# Patient Record
Sex: FEMALE | Race: OTHER | NOT HISPANIC OR LATINO
[De-identification: names, ages, dates, MRNs, and addresses within clinical notes are randomized per-mention and may not be internally consistent; named-entity substitution may affect disease eponyms.]

---

## 2016-03-08 RX ORDER — FLUTICASONE PROPIONATE AND SALMETEROL 50; 250 UG/1; UG/1
1 POWDER ORAL; RESPIRATORY (INHALATION)
Qty: 0 | Refills: 0 | COMMUNITY
Start: 2016-03-08

## 2016-03-08 RX ORDER — METOPROLOL TARTRATE 50 MG
1 TABLET ORAL
Qty: 0 | Refills: 0 | COMMUNITY
Start: 2016-03-08

## 2017-01-05 ENCOUNTER — APPOINTMENT (OUTPATIENT)
Dept: HOME HEALTH SERVICES | Facility: HOME HEALTH | Age: 82
End: 2017-01-05

## 2017-01-05 VITALS
DIASTOLIC BLOOD PRESSURE: 48 MMHG | SYSTOLIC BLOOD PRESSURE: 120 MMHG | OXYGEN SATURATION: 97 % | RESPIRATION RATE: 14 BRPM | HEART RATE: 64 BPM | TEMPERATURE: 99 F

## 2017-01-05 DIAGNOSIS — E55.9 VITAMIN D DEFICIENCY, UNSPECIFIED: ICD-10-CM

## 2017-01-30 ENCOUNTER — MEDICATION RENEWAL (OUTPATIENT)
Age: 82
End: 2017-01-30

## 2017-02-01 ENCOUNTER — LABORATORY RESULT (OUTPATIENT)
Age: 82
End: 2017-02-01

## 2017-02-22 ENCOUNTER — LABORATORY RESULT (OUTPATIENT)
Age: 82
End: 2017-02-22

## 2017-02-24 LAB
BASOPHILS # BLD AUTO: 0.05 K/UL
BASOPHILS NFR BLD AUTO: 1 %
EOSINOPHIL # BLD AUTO: 0.2 K/UL
EOSINOPHIL NFR BLD AUTO: 3.9 %
HCT VFR BLD CALC: 32 %
HGB BLD-MCNC: 10 G/DL
IMM GRANULOCYTES NFR BLD AUTO: 1.4 %
LYMPHOCYTES # BLD AUTO: 1.66 K/UL
LYMPHOCYTES NFR BLD AUTO: 32.4 %
MAN DIFF?: NORMAL
MCHC RBC-ENTMCNC: 28.8 PG
MCHC RBC-ENTMCNC: 31.3 GM/DL
MCV RBC AUTO: 92.2 FL
MONOCYTES # BLD AUTO: 0.49 K/UL
MONOCYTES NFR BLD AUTO: 9.6 %
NEUTROPHILS # BLD AUTO: 2.65 K/UL
NEUTROPHILS NFR BLD AUTO: 51.7 %
PLATELET # BLD AUTO: NORMAL
RBC # BLD: 3.47 M/UL
RBC # FLD: 13.4 %
WBC # FLD AUTO: 5.12 K/UL

## 2017-03-06 ENCOUNTER — MEDICATION RENEWAL (OUTPATIENT)
Age: 82
End: 2017-03-06

## 2017-03-08 ENCOUNTER — APPOINTMENT (OUTPATIENT)
Dept: HOME HEALTH SERVICES | Facility: HOME HEALTH | Age: 82
End: 2017-03-08

## 2017-03-08 VITALS
DIASTOLIC BLOOD PRESSURE: 60 MMHG | HEART RATE: 69 BPM | RESPIRATION RATE: 14 BRPM | TEMPERATURE: 98.2 F | OXYGEN SATURATION: 96 % | SYSTOLIC BLOOD PRESSURE: 140 MMHG

## 2017-03-08 DIAGNOSIS — Z23 ENCOUNTER FOR IMMUNIZATION: ICD-10-CM

## 2017-03-08 DIAGNOSIS — K31.84 GASTROPARESIS: ICD-10-CM

## 2017-03-08 DIAGNOSIS — B49 UNSPECIFIED MYCOSIS: ICD-10-CM

## 2017-03-08 DIAGNOSIS — R19.5 OTHER FECAL ABNORMALITIES: ICD-10-CM

## 2017-03-15 ENCOUNTER — MEDICATION RENEWAL (OUTPATIENT)
Age: 82
End: 2017-03-15

## 2017-03-20 ENCOUNTER — MEDICATION RENEWAL (OUTPATIENT)
Age: 82
End: 2017-03-20

## 2017-03-20 ENCOUNTER — LABORATORY RESULT (OUTPATIENT)
Age: 82
End: 2017-03-20

## 2017-03-21 ENCOUNTER — LABORATORY RESULT (OUTPATIENT)
Age: 82
End: 2017-03-21

## 2017-03-26 ENCOUNTER — CLINICAL ADVICE (OUTPATIENT)
Age: 82
End: 2017-03-26

## 2017-03-30 LAB
BASOPHILS # BLD AUTO: 0.01 K/UL
BASOPHILS NFR BLD AUTO: 0.2 %
EOSINOPHIL # BLD AUTO: 0.15 K/UL
EOSINOPHIL NFR BLD AUTO: 2.7 %
HCT VFR BLD CALC: 33.5 %
HGB BLD-MCNC: 10.8 G/DL
IMM GRANULOCYTES NFR BLD AUTO: 0.4 %
LYMPHOCYTES # BLD AUTO: 1.36 K/UL
LYMPHOCYTES NFR BLD AUTO: 24.6 %
MAN DIFF?: NORMAL
MCHC RBC-ENTMCNC: 29 PG
MCHC RBC-ENTMCNC: 32.2 GM/DL
MCV RBC AUTO: 90.1 FL
MONOCYTES # BLD AUTO: 0.48 K/UL
MONOCYTES NFR BLD AUTO: 8.7 %
NEUTROPHILS # BLD AUTO: 3.5 K/UL
NEUTROPHILS NFR BLD AUTO: 63.4 %
PLATELET # BLD AUTO: NORMAL
PLATELET # PLAS AUTO: 52 K/UL
RBC # BLD: 3.72 M/UL
RBC # FLD: 13.2 %
WBC # FLD AUTO: 5.52 K/UL

## 2017-03-31 ENCOUNTER — CLINICAL ADVICE (OUTPATIENT)
Age: 82
End: 2017-03-31

## 2017-05-03 ENCOUNTER — APPOINTMENT (OUTPATIENT)
Dept: HOME HEALTH SERVICES | Facility: HOME HEALTH | Age: 82
End: 2017-05-03

## 2017-05-03 VITALS
OXYGEN SATURATION: 96 % | TEMPERATURE: 98 F | DIASTOLIC BLOOD PRESSURE: 62 MMHG | SYSTOLIC BLOOD PRESSURE: 142 MMHG | HEART RATE: 67 BPM | RESPIRATION RATE: 16 BRPM

## 2017-05-03 DIAGNOSIS — K21.9 GASTRO-ESOPHAGEAL REFLUX DISEASE W/OUT ESOPHAGITIS: ICD-10-CM

## 2017-05-03 DIAGNOSIS — D69.6 THROMBOCYTOPENIA, UNSPECIFIED: ICD-10-CM

## 2017-05-03 DIAGNOSIS — E78.5 HYPERLIPIDEMIA, UNSPECIFIED: ICD-10-CM

## 2017-05-03 RX ORDER — AZITHROMYCIN 500 MG/1
500 TABLET, FILM COATED ORAL DAILY
Qty: 5 | Refills: 0 | Status: DISCONTINUED | COMMUNITY
Start: 2017-03-26 | End: 2017-05-03

## 2017-05-03 RX ORDER — AZITHROMYCIN 500 MG/1
500 TABLET, FILM COATED ORAL DAILY
Qty: 5 | Refills: 0 | Status: DISCONTINUED | COMMUNITY
Start: 2017-03-31 | End: 2017-05-03

## 2017-05-05 ENCOUNTER — MEDICATION RENEWAL (OUTPATIENT)
Age: 82
End: 2017-05-05

## 2017-05-11 DIAGNOSIS — L03.119 CELLULITIS OF UNSPECIFIED PART OF LIMB: ICD-10-CM

## 2017-05-16 ENCOUNTER — APPOINTMENT (OUTPATIENT)
Dept: HOME HEALTH SERVICES | Facility: HOME HEALTH | Age: 82
End: 2017-05-16

## 2017-05-16 VITALS
OXYGEN SATURATION: 94 % | HEART RATE: 68 BPM | SYSTOLIC BLOOD PRESSURE: 120 MMHG | RESPIRATION RATE: 18 BRPM | DIASTOLIC BLOOD PRESSURE: 60 MMHG | TEMPERATURE: 98.3 F

## 2017-05-24 ENCOUNTER — MEDICATION RENEWAL (OUTPATIENT)
Age: 82
End: 2017-05-24

## 2017-06-05 ENCOUNTER — MEDICATION RENEWAL (OUTPATIENT)
Age: 82
End: 2017-06-05

## 2017-06-26 ENCOUNTER — RX RENEWAL (OUTPATIENT)
Age: 82
End: 2017-06-26

## 2017-06-26 ENCOUNTER — CHART COPY (OUTPATIENT)
Age: 82
End: 2017-06-26

## 2017-06-27 ENCOUNTER — LABORATORY RESULT (OUTPATIENT)
Age: 82
End: 2017-06-27

## 2017-07-11 ENCOUNTER — APPOINTMENT (OUTPATIENT)
Dept: HOME HEALTH SERVICES | Facility: HOME HEALTH | Age: 82
End: 2017-07-11

## 2017-07-11 VITALS
HEART RATE: 64 BPM | DIASTOLIC BLOOD PRESSURE: 78 MMHG | RESPIRATION RATE: 16 BRPM | OXYGEN SATURATION: 95 % | WEIGHT: 210 LBS | SYSTOLIC BLOOD PRESSURE: 124 MMHG | TEMPERATURE: 98 F | BODY MASS INDEX: 34.99 KG/M2 | HEIGHT: 65 IN

## 2017-07-11 DIAGNOSIS — R30.0 DYSURIA: ICD-10-CM

## 2017-07-11 DIAGNOSIS — Z74.01 BED CONFINEMENT STATUS: ICD-10-CM

## 2017-07-11 DIAGNOSIS — I10 ESSENTIAL (PRIMARY) HYPERTENSION: ICD-10-CM

## 2017-07-11 DIAGNOSIS — K59.00 CONSTIPATION, UNSPECIFIED: ICD-10-CM

## 2017-07-11 RX ORDER — AZITHROMYCIN 250 MG/1
250 TABLET, FILM COATED ORAL
Qty: 6 | Refills: 0 | Status: COMPLETED | COMMUNITY
Start: 2017-03-26

## 2017-07-12 PROBLEM — Z74.01 CONFINED TO BED: Status: ACTIVE | Noted: 2017-01-19

## 2017-07-12 PROBLEM — K59.00 CONSTIPATION: Status: ACTIVE | Noted: 2017-07-11

## 2017-07-12 PROBLEM — R30.0 DYSURIA: Status: ACTIVE | Noted: 2017-07-11

## 2017-07-12 RX ORDER — CEPHALEXIN 500 MG/1
500 CAPSULE ORAL
Qty: 14 | Refills: 0 | Status: DISCONTINUED | COMMUNITY
Start: 2017-05-11 | End: 2017-07-12

## 2017-07-14 ENCOUNTER — LABORATORY RESULT (OUTPATIENT)
Age: 82
End: 2017-07-14

## 2017-07-16 LAB
APPEARANCE: ABNORMAL
BILIRUBIN URINE: NEGATIVE
BLOOD URINE: NEGATIVE
COLOR: YELLOW
GLUCOSE QUALITATIVE U: NORMAL MG/DL
KETONES URINE: NEGATIVE
LEUKOCYTE ESTERASE URINE: NEGATIVE
NITRITE URINE: NEGATIVE
PH URINE: 8
PROTEIN URINE: 30 MG/DL
SPECIFIC GRAVITY URINE: 1.01
UROBILINOGEN URINE: NORMAL MG/DL

## 2017-07-31 ENCOUNTER — MEDICATION RENEWAL (OUTPATIENT)
Age: 82
End: 2017-07-31

## 2017-08-22 ENCOUNTER — MEDICATION RENEWAL (OUTPATIENT)
Age: 82
End: 2017-08-22

## 2017-08-23 ENCOUNTER — RX RENEWAL (OUTPATIENT)
Age: 82
End: 2017-08-23

## 2017-08-29 ENCOUNTER — MEDICATION RENEWAL (OUTPATIENT)
Age: 82
End: 2017-08-29

## 2017-09-01 ENCOUNTER — LABORATORY RESULT (OUTPATIENT)
Age: 82
End: 2017-09-01

## 2017-09-02 LAB
25(OH)D3 SERPL-MCNC: 28.2 NG/ML
ALBUMIN SERPL ELPH-MCNC: 3.1 G/DL
ALP BLD-CCNC: 156 U/L
ALT SERPL-CCNC: 18 U/L
ANION GAP SERPL CALC-SCNC: 12 MMOL/L
AST SERPL-CCNC: 35 U/L
BASOPHILS # BLD AUTO: 0.01 K/UL
BASOPHILS NFR BLD AUTO: 0.2 %
BILIRUB SERPL-MCNC: 0.4 MG/DL
BUN SERPL-MCNC: 39 MG/DL
CALCIUM SERPL-MCNC: 9.3 MG/DL
CHLORIDE SERPL-SCNC: 104 MMOL/L
CO2 SERPL-SCNC: 25 MMOL/L
CREAT SERPL-MCNC: 1.6 MG/DL
EOSINOPHIL # BLD AUTO: 0.21 K/UL
EOSINOPHIL NFR BLD AUTO: 5.1 %
GLUCOSE SERPL-MCNC: 90 MG/DL
HBA1C MFR BLD HPLC: 5.4 %
HCT VFR BLD CALC: 28.8 %
HGB BLD-MCNC: 8.8 G/DL
IMM GRANULOCYTES NFR BLD AUTO: 0 %
LYMPHOCYTES # BLD AUTO: 1.47 K/UL
LYMPHOCYTES NFR BLD AUTO: 36 %
MAN DIFF?: NORMAL
MCHC RBC-ENTMCNC: 27.1 PG
MCHC RBC-ENTMCNC: 30.6 GM/DL
MCV RBC AUTO: 88.6 FL
MONOCYTES # BLD AUTO: 0.35 K/UL
MONOCYTES NFR BLD AUTO: 8.6 %
NEUTROPHILS # BLD AUTO: 2.04 K/UL
NEUTROPHILS NFR BLD AUTO: 50.1 %
NT-PROBNP SERPL-MCNC: 244 PG/ML
PLATELET # BLD AUTO: NORMAL
POTASSIUM SERPL-SCNC: 4.5 MMOL/L
PROT SERPL-MCNC: 7.6 G/DL
RBC # BLD: 3.25 M/UL
RBC # FLD: 14.1 %
SODIUM SERPL-SCNC: 141 MMOL/L
TSH SERPL-ACNC: 1.45 UIU/ML
WBC # FLD AUTO: 4.08 K/UL

## 2017-09-05 ENCOUNTER — MEDICATION RENEWAL (OUTPATIENT)
Age: 82
End: 2017-09-05

## 2017-09-07 ENCOUNTER — CLINICAL ADVICE (OUTPATIENT)
Age: 82
End: 2017-09-07

## 2017-09-07 ENCOUNTER — RESULT CHARGE (OUTPATIENT)
Age: 82
End: 2017-09-07

## 2017-09-11 ENCOUNTER — MEDICATION RENEWAL (OUTPATIENT)
Age: 82
End: 2017-09-11

## 2017-09-20 ENCOUNTER — APPOINTMENT (OUTPATIENT)
Dept: HOME HEALTH SERVICES | Facility: HOME HEALTH | Age: 82
End: 2017-09-20
Payer: MEDICAID

## 2017-09-20 VITALS
HEART RATE: 67 BPM | DIASTOLIC BLOOD PRESSURE: 68 MMHG | BODY MASS INDEX: 34.99 KG/M2 | RESPIRATION RATE: 16 BRPM | OXYGEN SATURATION: 95 % | WEIGHT: 210 LBS | HEIGHT: 65 IN | SYSTOLIC BLOOD PRESSURE: 138 MMHG | TEMPERATURE: 98.8 F

## 2017-09-20 PROCEDURE — 99350 HOME/RES VST EST HIGH MDM 60: CPT

## 2017-09-22 ENCOUNTER — MEDICATION RENEWAL (OUTPATIENT)
Age: 82
End: 2017-09-22

## 2017-09-22 VITALS — OXYGEN SATURATION: 88 %

## 2017-09-27 ENCOUNTER — LABORATORY RESULT (OUTPATIENT)
Age: 82
End: 2017-09-27

## 2017-09-27 ENCOUNTER — MOBILE ON CALL (OUTPATIENT)
Age: 82
End: 2017-09-27

## 2017-09-27 ENCOUNTER — CLINICAL ADVICE (OUTPATIENT)
Age: 82
End: 2017-09-27

## 2017-09-27 DIAGNOSIS — R11.2 NAUSEA WITH VOMITING, UNSPECIFIED: ICD-10-CM

## 2017-09-27 LAB
ALBUMIN SERPL ELPH-MCNC: 3.1 G/DL
ALP BLD-CCNC: 150 U/L
ALT SERPL-CCNC: 19 U/L
ANION GAP SERPL CALC-SCNC: 11 MMOL/L
AST SERPL-CCNC: 36 U/L
BILIRUB SERPL-MCNC: 0.4 MG/DL
BUN SERPL-MCNC: 28 MG/DL
CALCIUM SERPL-MCNC: 9.1 MG/DL
CHLORIDE SERPL-SCNC: 104 MMOL/L
CO2 SERPL-SCNC: 29 MMOL/L
CREAT SERPL-MCNC: 1.19 MG/DL
GLUCOSE SERPL-MCNC: 157 MG/DL
POTASSIUM SERPL-SCNC: 4.5 MMOL/L
PROT SERPL-MCNC: 7.2 G/DL
SODIUM SERPL-SCNC: 144 MMOL/L

## 2017-09-27 RX ORDER — ONDANSETRON 4 MG/1
4 TABLET, ORALLY DISINTEGRATING ORAL EVERY 6 HOURS
Qty: 1 | Refills: 0 | Status: COMPLETED | COMMUNITY
Start: 2017-09-27 | End: 2017-10-04

## 2017-09-28 LAB
BASOPHILS # BLD AUTO: 0.01 K/UL
BASOPHILS NFR BLD AUTO: 0.2 %
EOSINOPHIL # BLD AUTO: 0.02 K/UL
EOSINOPHIL NFR BLD AUTO: 0.4 %
HCT VFR BLD CALC: 30.1 %
HGB BLD-MCNC: 9.2 G/DL
IMM GRANULOCYTES NFR BLD AUTO: 0.4 %
LYMPHOCYTES # BLD AUTO: 0.77 K/UL
LYMPHOCYTES NFR BLD AUTO: 13.6 %
MAN DIFF?: NORMAL
MCHC RBC-ENTMCNC: 27.2 PG
MCHC RBC-ENTMCNC: 30.6 GM/DL
MCV RBC AUTO: 89.1 FL
MONOCYTES # BLD AUTO: 0.37 K/UL
MONOCYTES NFR BLD AUTO: 6.5 %
NEUTROPHILS # BLD AUTO: 4.48 K/UL
NEUTROPHILS NFR BLD AUTO: 78.9 %
PLATELET # BLD AUTO: NORMAL
RBC # BLD: 3.38 M/UL
RBC # FLD: 14 %
WBC # FLD AUTO: 5.67 K/UL

## 2017-09-30 ENCOUNTER — INPATIENT (INPATIENT)
Facility: HOSPITAL | Age: 82
LOS: 10 days | Discharge: HOME HEALTH | DRG: 871 | End: 2017-10-11
Attending: INTERNAL MEDICINE | Admitting: STUDENT IN AN ORGANIZED HEALTH CARE EDUCATION/TRAINING PROGRAM
Payer: MEDICAID

## 2017-09-30 VITALS
RESPIRATION RATE: 24 BRPM | HEART RATE: 100 BPM | HEIGHT: 63 IN | OXYGEN SATURATION: 94 % | SYSTOLIC BLOOD PRESSURE: 138 MMHG | DIASTOLIC BLOOD PRESSURE: 68 MMHG | WEIGHT: 149.91 LBS

## 2017-09-30 DIAGNOSIS — N17.9 ACUTE KIDNEY FAILURE, UNSPECIFIED: ICD-10-CM

## 2017-09-30 DIAGNOSIS — J18.9 PNEUMONIA, UNSPECIFIED ORGANISM: ICD-10-CM

## 2017-09-30 DIAGNOSIS — R57.9 SHOCK, UNSPECIFIED: ICD-10-CM

## 2017-09-30 DIAGNOSIS — K72.01 ACUTE AND SUBACUTE HEPATIC FAILURE WITH COMA: ICD-10-CM

## 2017-09-30 DIAGNOSIS — E87.5 HYPERKALEMIA: ICD-10-CM

## 2017-09-30 DIAGNOSIS — A41.9 SEPSIS, UNSPECIFIED ORGANISM: ICD-10-CM

## 2017-09-30 DIAGNOSIS — Z29.9 ENCOUNTER FOR PROPHYLACTIC MEASURES, UNSPECIFIED: ICD-10-CM

## 2017-09-30 DIAGNOSIS — I21.4 NON-ST ELEVATION (NSTEMI) MYOCARDIAL INFARCTION: ICD-10-CM

## 2017-09-30 DIAGNOSIS — E11.8 TYPE 2 DIABETES MELLITUS WITH UNSPECIFIED COMPLICATIONS: ICD-10-CM

## 2017-09-30 LAB
ALBUMIN SERPL ELPH-MCNC: 2.3 G/DL — LOW (ref 3.3–5)
ALP SERPL-CCNC: 148 U/L — HIGH (ref 40–120)
ALT FLD-CCNC: 1547 U/L — HIGH (ref 12–78)
ANION GAP SERPL CALC-SCNC: 11 MMOL/L — SIGNIFICANT CHANGE UP (ref 5–17)
APPEARANCE UR: ABNORMAL
APTT BLD: 34.3 SEC — SIGNIFICANT CHANGE UP (ref 27.5–37.4)
AST SERPL-CCNC: 3440 U/L — HIGH (ref 15–37)
BASE EXCESS BLDA CALC-SCNC: -4.3 MMOL/L — LOW (ref -2–2)
BASOPHILS # BLD AUTO: 0.1 K/UL — SIGNIFICANT CHANGE UP (ref 0–0.2)
BASOPHILS NFR BLD AUTO: 0.5 % — SIGNIFICANT CHANGE UP (ref 0–2)
BILIRUB SERPL-MCNC: 0.9 MG/DL — SIGNIFICANT CHANGE UP (ref 0.2–1.2)
BILIRUB UR-MCNC: NEGATIVE — SIGNIFICANT CHANGE UP
BLOOD GAS COMMENTS ARTERIAL: SIGNIFICANT CHANGE UP
BUN SERPL-MCNC: 48 MG/DL — HIGH (ref 7–23)
CALCIUM SERPL-MCNC: 8.4 MG/DL — LOW (ref 8.5–10.1)
CHLORIDE SERPL-SCNC: 103 MMOL/L — SIGNIFICANT CHANGE UP (ref 96–108)
CK MB BLD-MCNC: 3.8 % — HIGH (ref 0–3.5)
CK MB CFR SERPL CALC: 15.3 NG/ML — HIGH (ref 0–3.6)
CK SERPL-CCNC: 402 U/L — HIGH (ref 26–192)
CO2 SERPL-SCNC: 26 MMOL/L — SIGNIFICANT CHANGE UP (ref 22–31)
COLOR SPEC: YELLOW — SIGNIFICANT CHANGE UP
CREAT SERPL-MCNC: 2.7 MG/DL — HIGH (ref 0.5–1.3)
DIFF PNL FLD: NEGATIVE — SIGNIFICANT CHANGE UP
EOSINOPHIL # BLD AUTO: 0 K/UL — SIGNIFICANT CHANGE UP (ref 0–0.5)
EOSINOPHIL NFR BLD AUTO: 0 % — SIGNIFICANT CHANGE UP (ref 0–6)
GLUCOSE SERPL-MCNC: 111 MG/DL — HIGH (ref 70–99)
GLUCOSE UR QL: NEGATIVE — SIGNIFICANT CHANGE UP
HCO3 BLDA-SCNC: 20 MMOL/L — LOW (ref 23–27)
HCT VFR BLD CALC: 28.1 % — LOW (ref 34.5–45)
HCT VFR BLD CALC: 30.6 % — LOW (ref 34.5–45)
HGB BLD-MCNC: 9.1 G/DL — LOW (ref 11.5–15.5)
HGB BLD-MCNC: 9.5 G/DL — LOW (ref 11.5–15.5)
HOROWITZ INDEX BLDA+IHG-RTO: 40 — SIGNIFICANT CHANGE UP
INR BLD: 1.53 RATIO — HIGH (ref 0.88–1.16)
KETONES UR-MCNC: NEGATIVE — SIGNIFICANT CHANGE UP
LACTATE SERPL-SCNC: 1.7 MMOL/L — SIGNIFICANT CHANGE UP (ref 0.7–2)
LACTATE SERPL-SCNC: 2.7 MMOL/L — HIGH (ref 0.7–2)
LACTATE SERPL-SCNC: 3.4 MMOL/L — HIGH (ref 0.7–2)
LEUKOCYTE ESTERASE UR-ACNC: ABNORMAL
LYMPHOCYTES # BLD AUTO: 1 K/UL — SIGNIFICANT CHANGE UP (ref 1–3.3)
LYMPHOCYTES # BLD AUTO: 6.1 % — LOW (ref 13–44)
MCHC RBC-ENTMCNC: 28.2 PG — SIGNIFICANT CHANGE UP (ref 27–34)
MCHC RBC-ENTMCNC: 29 PG — SIGNIFICANT CHANGE UP (ref 27–34)
MCHC RBC-ENTMCNC: 31.1 GM/DL — LOW (ref 32–36)
MCHC RBC-ENTMCNC: 32.3 GM/DL — SIGNIFICANT CHANGE UP (ref 32–36)
MCV RBC AUTO: 89.6 FL — SIGNIFICANT CHANGE UP (ref 80–100)
MCV RBC AUTO: 90.8 FL — SIGNIFICANT CHANGE UP (ref 80–100)
MONOCYTES # BLD AUTO: 1.3 K/UL — HIGH (ref 0–0.9)
MONOCYTES NFR BLD AUTO: 8 % — SIGNIFICANT CHANGE UP (ref 1–9)
NEUTROPHILS # BLD AUTO: 13.7 K/UL — HIGH (ref 1.8–7.4)
NEUTROPHILS NFR BLD AUTO: 85.4 % — HIGH (ref 43–77)
NITRITE UR-MCNC: NEGATIVE — SIGNIFICANT CHANGE UP
PCO2 BLDA: 65 MMHG — HIGH (ref 32–46)
PH BLDA: 7.17 — CRITICAL LOW (ref 7.35–7.45)
PH UR: 5 — SIGNIFICANT CHANGE UP (ref 5–8)
PLATELET # BLD AUTO: 112 K/UL — LOW (ref 150–400)
PLATELET # BLD AUTO: 156 K/UL — SIGNIFICANT CHANGE UP (ref 150–400)
PO2 BLDA: 73 MMHG — LOW (ref 74–108)
POTASSIUM SERPL-MCNC: 5.4 MMOL/L — HIGH (ref 3.5–5.3)
POTASSIUM SERPL-SCNC: 5.4 MMOL/L — HIGH (ref 3.5–5.3)
PROT SERPL-MCNC: 7.5 G/DL — SIGNIFICANT CHANGE UP (ref 6–8.3)
PROT UR-MCNC: 150 MG/DL
PROTHROM AB SERPL-ACNC: 16.8 SEC — HIGH (ref 9.8–12.7)
RBC # BLD: 3.13 M/UL — LOW (ref 3.8–5.2)
RBC # BLD: 3.37 M/UL — LOW (ref 3.8–5.2)
RBC # FLD: 13.4 % — SIGNIFICANT CHANGE UP (ref 10.3–14.5)
RBC # FLD: 14 % — SIGNIFICANT CHANGE UP (ref 10.3–14.5)
SAO2 % BLDA: 90 % — LOW (ref 92–96)
SODIUM SERPL-SCNC: 140 MMOL/L — SIGNIFICANT CHANGE UP (ref 135–145)
SP GR SPEC: 1.02 — SIGNIFICANT CHANGE UP (ref 1.01–1.02)
TROPONIN I SERPL-MCNC: 14.8 NG/ML — HIGH (ref 0.01–0.04)
TROPONIN I SERPL-MCNC: 35.1 NG/ML — HIGH (ref 0.01–0.04)
UROBILINOGEN FLD QL: NEGATIVE — SIGNIFICANT CHANGE UP
WBC # BLD: 15.8 K/UL — HIGH (ref 3.8–10.5)
WBC # BLD: 16 K/UL — HIGH (ref 3.8–10.5)
WBC # FLD AUTO: 15.8 K/UL — HIGH (ref 3.8–10.5)
WBC # FLD AUTO: 16 K/UL — HIGH (ref 3.8–10.5)

## 2017-09-30 PROCEDURE — 99223 1ST HOSP IP/OBS HIGH 75: CPT | Mod: AI

## 2017-09-30 PROCEDURE — 93010 ELECTROCARDIOGRAM REPORT: CPT

## 2017-09-30 PROCEDURE — 99291 CRITICAL CARE FIRST HOUR: CPT

## 2017-09-30 PROCEDURE — 71010: CPT | Mod: 26

## 2017-09-30 PROCEDURE — 99285 EMERGENCY DEPT VISIT HI MDM: CPT

## 2017-09-30 PROCEDURE — 99223 1ST HOSP IP/OBS HIGH 75: CPT

## 2017-09-30 RX ORDER — ACETAMINOPHEN 500 MG
650 TABLET ORAL ONCE
Qty: 0 | Refills: 0 | Status: COMPLETED | OUTPATIENT
Start: 2017-09-30 | End: 2017-09-30

## 2017-09-30 RX ORDER — SODIUM CHLORIDE 9 MG/ML
1000 INJECTION INTRAMUSCULAR; INTRAVENOUS; SUBCUTANEOUS ONCE
Qty: 0 | Refills: 0 | Status: COMPLETED | OUTPATIENT
Start: 2017-09-30 | End: 2017-09-30

## 2017-09-30 RX ORDER — DEXTROSE 50 % IN WATER 50 %
12.5 SYRINGE (ML) INTRAVENOUS ONCE
Qty: 0 | Refills: 0 | Status: DISCONTINUED | OUTPATIENT
Start: 2017-09-30 | End: 2017-10-03

## 2017-09-30 RX ORDER — PANTOPRAZOLE SODIUM 20 MG/1
40 TABLET, DELAYED RELEASE ORAL DAILY
Qty: 0 | Refills: 0 | Status: DISCONTINUED | OUTPATIENT
Start: 2017-09-30 | End: 2017-09-30

## 2017-09-30 RX ORDER — VANCOMYCIN HCL 1 G
1000 VIAL (EA) INTRAVENOUS ONCE
Qty: 0 | Refills: 0 | Status: COMPLETED | OUTPATIENT
Start: 2017-09-30 | End: 2017-09-30

## 2017-09-30 RX ORDER — INSULIN LISPRO 100/ML
VIAL (ML) SUBCUTANEOUS
Qty: 0 | Refills: 0 | Status: DISCONTINUED | OUTPATIENT
Start: 2017-09-30 | End: 2017-10-01

## 2017-09-30 RX ORDER — INFLUENZA VIRUS VACCINE 15; 15; 15; 15 UG/.5ML; UG/.5ML; UG/.5ML; UG/.5ML
0.5 SUSPENSION INTRAMUSCULAR ONCE
Qty: 0 | Refills: 0 | Status: DISCONTINUED | OUTPATIENT
Start: 2017-09-30 | End: 2017-10-11

## 2017-09-30 RX ORDER — SODIUM CHLORIDE 9 MG/ML
1000 INJECTION, SOLUTION INTRAVENOUS
Qty: 0 | Refills: 0 | Status: DISCONTINUED | OUTPATIENT
Start: 2017-09-30 | End: 2017-10-03

## 2017-09-30 RX ORDER — IPRATROPIUM/ALBUTEROL SULFATE 18-103MCG
3 AEROSOL WITH ADAPTER (GRAM) INHALATION EVERY 4 HOURS
Qty: 0 | Refills: 0 | Status: DISCONTINUED | OUTPATIENT
Start: 2017-09-30 | End: 2017-10-11

## 2017-09-30 RX ORDER — FUROSEMIDE 40 MG
0.5 TABLET ORAL
Qty: 0 | Refills: 0 | COMMUNITY

## 2017-09-30 RX ORDER — FUROSEMIDE 40 MG
40 TABLET ORAL ONCE
Qty: 0 | Refills: 0 | Status: COMPLETED | OUTPATIENT
Start: 2017-09-30 | End: 2017-09-30

## 2017-09-30 RX ORDER — GLUCAGON INJECTION, SOLUTION 0.5 MG/.1ML
1 INJECTION, SOLUTION SUBCUTANEOUS ONCE
Qty: 0 | Refills: 0 | Status: DISCONTINUED | OUTPATIENT
Start: 2017-09-30 | End: 2017-10-03

## 2017-09-30 RX ORDER — DEXTROSE 50 % IN WATER 50 %
1 SYRINGE (ML) INTRAVENOUS ONCE
Qty: 0 | Refills: 0 | Status: DISCONTINUED | OUTPATIENT
Start: 2017-09-30 | End: 2017-10-03

## 2017-09-30 RX ORDER — PANTOPRAZOLE SODIUM 20 MG/1
40 TABLET, DELAYED RELEASE ORAL DAILY
Qty: 0 | Refills: 0 | Status: DISCONTINUED | OUTPATIENT
Start: 2017-09-30 | End: 2017-10-03

## 2017-09-30 RX ORDER — VANCOMYCIN HCL 1 G
1000 VIAL (EA) INTRAVENOUS EVERY 24 HOURS
Qty: 0 | Refills: 0 | Status: DISCONTINUED | OUTPATIENT
Start: 2017-09-30 | End: 2017-09-30

## 2017-09-30 RX ORDER — ASPIRIN/CALCIUM CARB/MAGNESIUM 324 MG
300 TABLET ORAL DAILY
Qty: 0 | Refills: 0 | Status: DISCONTINUED | OUTPATIENT
Start: 2017-09-30 | End: 2017-10-03

## 2017-09-30 RX ORDER — HEPARIN SODIUM 5000 [USP'U]/ML
5000 INJECTION INTRAVENOUS; SUBCUTANEOUS EVERY 12 HOURS
Qty: 0 | Refills: 0 | Status: DISCONTINUED | OUTPATIENT
Start: 2017-09-30 | End: 2017-10-03

## 2017-09-30 RX ORDER — HYDROMORPHONE HYDROCHLORIDE 2 MG/ML
0.25 INJECTION INTRAMUSCULAR; INTRAVENOUS; SUBCUTANEOUS ONCE
Qty: 0 | Refills: 0 | Status: DISCONTINUED | OUTPATIENT
Start: 2017-09-30 | End: 2017-09-30

## 2017-09-30 RX ORDER — ACETAMINOPHEN 500 MG
650 TABLET ORAL EVERY 4 HOURS
Qty: 0 | Refills: 0 | Status: DISCONTINUED | OUTPATIENT
Start: 2017-09-30 | End: 2017-10-03

## 2017-09-30 RX ORDER — PIPERACILLIN AND TAZOBACTAM 4; .5 G/20ML; G/20ML
3.38 INJECTION, POWDER, LYOPHILIZED, FOR SOLUTION INTRAVENOUS EVERY 12 HOURS
Qty: 0 | Refills: 0 | Status: DISCONTINUED | OUTPATIENT
Start: 2017-09-30 | End: 2017-09-30

## 2017-09-30 RX ORDER — ACETAMINOPHEN 500 MG
650 TABLET ORAL EVERY 4 HOURS
Qty: 0 | Refills: 0 | Status: DISCONTINUED | OUTPATIENT
Start: 2017-09-30 | End: 2017-10-01

## 2017-09-30 RX ORDER — VANCOMYCIN HCL 1 G
1000 VIAL (EA) INTRAVENOUS EVERY 24 HOURS
Qty: 0 | Refills: 0 | Status: DISCONTINUED | OUTPATIENT
Start: 2017-10-01 | End: 2017-10-01

## 2017-09-30 RX ORDER — PIPERACILLIN AND TAZOBACTAM 4; .5 G/20ML; G/20ML
3.38 INJECTION, POWDER, LYOPHILIZED, FOR SOLUTION INTRAVENOUS ONCE
Qty: 0 | Refills: 0 | Status: COMPLETED | OUTPATIENT
Start: 2017-09-30 | End: 2017-09-30

## 2017-09-30 RX ORDER — SODIUM CHLORIDE 9 MG/ML
1000 INJECTION INTRAMUSCULAR; INTRAVENOUS; SUBCUTANEOUS
Qty: 0 | Refills: 0 | Status: DISCONTINUED | OUTPATIENT
Start: 2017-09-30 | End: 2017-10-01

## 2017-09-30 RX ORDER — IPRATROPIUM BROMIDE 0.2 MG/ML
500 SOLUTION, NON-ORAL INHALATION EVERY 4 HOURS
Qty: 0 | Refills: 0 | Status: DISCONTINUED | OUTPATIENT
Start: 2017-09-30 | End: 2017-10-01

## 2017-09-30 RX ORDER — ONDANSETRON 8 MG/1
4 TABLET, FILM COATED ORAL EVERY 6 HOURS
Qty: 0 | Refills: 0 | Status: DISCONTINUED | OUTPATIENT
Start: 2017-09-30 | End: 2017-10-11

## 2017-09-30 RX ORDER — PIPERACILLIN AND TAZOBACTAM 4; .5 G/20ML; G/20ML
3.38 INJECTION, POWDER, LYOPHILIZED, FOR SOLUTION INTRAVENOUS EVERY 12 HOURS
Qty: 0 | Refills: 0 | Status: COMPLETED | OUTPATIENT
Start: 2017-09-30 | End: 2017-10-07

## 2017-09-30 RX ADMIN — PIPERACILLIN AND TAZOBACTAM 200 GRAM(S): 4; .5 INJECTION, POWDER, LYOPHILIZED, FOR SOLUTION INTRAVENOUS at 11:11

## 2017-09-30 RX ADMIN — Medication 250 MILLIGRAM(S): at 11:20

## 2017-09-30 RX ADMIN — SODIUM CHLORIDE 150 MILLILITER(S): 9 INJECTION INTRAMUSCULAR; INTRAVENOUS; SUBCUTANEOUS at 22:55

## 2017-09-30 RX ADMIN — SODIUM CHLORIDE 1000 MILLILITER(S): 9 INJECTION INTRAMUSCULAR; INTRAVENOUS; SUBCUTANEOUS at 11:36

## 2017-09-30 RX ADMIN — Medication 3 MILLILITER(S): at 23:19

## 2017-09-30 RX ADMIN — SODIUM CHLORIDE 1000 MILLILITER(S): 9 INJECTION INTRAMUSCULAR; INTRAVENOUS; SUBCUTANEOUS at 10:59

## 2017-09-30 RX ADMIN — PANTOPRAZOLE SODIUM 40 MILLIGRAM(S): 20 TABLET, DELAYED RELEASE ORAL at 23:03

## 2017-09-30 RX ADMIN — Medication 300 MILLIGRAM(S): at 23:04

## 2017-09-30 RX ADMIN — Medication 500 MICROGRAM(S): at 23:19

## 2017-09-30 RX ADMIN — SODIUM CHLORIDE 1000 MILLILITER(S): 9 INJECTION INTRAMUSCULAR; INTRAVENOUS; SUBCUTANEOUS at 12:27

## 2017-09-30 RX ADMIN — SODIUM CHLORIDE 150 MILLILITER(S): 9 INJECTION INTRAMUSCULAR; INTRAVENOUS; SUBCUTANEOUS at 14:31

## 2017-09-30 RX ADMIN — Medication 650 MILLIGRAM(S): at 11:00

## 2017-09-30 RX ADMIN — PIPERACILLIN AND TAZOBACTAM 25 GRAM(S): 4; .5 INJECTION, POWDER, LYOPHILIZED, FOR SOLUTION INTRAVENOUS at 23:04

## 2017-09-30 NOTE — H&P ADULT - PROBLEM SELECTOR PROBLEM 5
Type 2 diabetes mellitus with complication, without long-term current use of insulin Acute liver failure with hepatic coma

## 2017-09-30 NOTE — H&P ADULT - ATTENDING COMMENTS
The admission plan was discussed in detail with the family members at the bedside. Their questions and concerns were addressed to the best of my ability. They are in agreement with the plan as detailed above. They demonstrated adequate understanding of the counseling which I have provided. The admission plan was discussed in detail with the family members at the bedside. Their questions and concerns were addressed to the best of my ability. They are in agreement with the plan as detailed above. They demonstrated adequate understanding of the counseling which I have provided.    Emotional support was provided at the bedside. Extended discussion totalling 30 minutes spent discussing plan of care, advanced care planning.

## 2017-09-30 NOTE — CONSULT NOTE ADULT - ASSESSMENT
89yo F w/ PMHx DM2, asthma, HTN, iron deficiency anemia, COPD w/ intermittent use of home O2 presents w/ weakness and fatigue. Found to have severe sepsis secondary to PNA. I was called to evaluate because of troponin of 14.  While her abdominal pain for the last few days could have been cardiac in etiology, the entire clinical picture seems to be in the setting of infection and sepsis/hypotension.  Her EKG is AF with no R wave progression; This is unchanged from prior EKG in 2016.  Last TTE in 2016 with normal LV function.  Antibiotics and IVF resuscitation  Repeat TTE to see if their are changes in LV function  We can check one more set of CE, but if there is no improvement in mental status, I would hold off on starting heparin  ASA 81 CA while unable to tolerate to  Discussed with entire family in detail  Will follow closely with you

## 2017-09-30 NOTE — H&P ADULT - HISTORY OF PRESENT ILLNESS
87yo F w/ PMHx DM2, asthma, HTN presents w/ weakness and fatigue.     in the ED, pt found hypotensive w/ SBP in 50's initially, now w/  after 3L NS bolus. Pt also bradycardic w/ P in 50's persistently despite hypotension. Pt was given vanco/zosyn as well as she was found to have large PNA.     Family Hx: denies h/o premature CAD, DM2 in immediate family members 87yo F w/ PMHx DM2, asthma, HTN presents w/ weakness and fatigue. She developed a fever last night and the family admits mild cough which was nonproductive. 3-4 days prior to presentation the pt was complaining of mild abd discomfort but was tolerating po well. Family denies dysphagia. They stated that she had an episode of bilious vomiting 4 days ago but none since. She was tolerating diet yesterday without difficulty. Last night developed rectal temp of 102F but denied diaphoresis, chills, rigors. Upon waking this morning, the pt was obtunded. Responding only to noxious stimuli so EMS was alerted. No other recent travel. No recent hospitalizations. No other recent changes in her overall health.     in the ED, pt found hypotensive w/ SBP in 50's initially, now w/  after 3L NS bolus. Pt also bradycardic w/ P in 50's persistently despite hypotension. Pt was given vanco/zosyn as well as she was found to have L sided PNA.     Family Hx: denies h/o premature CAD, DM2 in immediate family members 89yo F w/ PMHx DM2, asthma, HTN, iron deficiency anemia, COPD w/ intermittent use of home O2 presents w/ weakness and fatigue. pt is nonverbal at this time and unable to provide any history. She developed a fever last night and the family admits mild cough which was nonproductive. 3-4 days prior to presentation the pt was complaining of mild abd discomfort but was tolerating po well. Family denies dysphagia. They stated that she had an episode of bilious vomiting 4 days ago but none since. She was tolerating diet yesterday without difficulty. Last night developed rectal temp of 102F but denied diaphoresis, chills, rigors. Upon waking this morning, the pt was obtunded. Responding only to noxious stimuli so EMS was alerted. No other recent travel. No recent hospitalizations. No other recent changes in her overall health.  Recnetly her DM2 regimen was decreased due to normal glucose levels. Also, her recent creatinine was 1.1 as per family at the bedside. Family members have medical background.     In the ED, pt found hypotensive w/ SBP in 50's initially, now w/  after 3L NS bolus. Pt also bradycardic w/ P in 50's persistently despite hypotension. Pt was given vanco/zosyn as well as she was found to have L sided PNA.     Family Hx: denies h/o premature CAD, DM2 in immediate family members

## 2017-09-30 NOTE — H&P ADULT - ASSESSMENT
89yo F w/ PMHx DM2, asthma, HTN presents w/ weakness and fatigue presents w/ L sided CAP and MODS 87yo F w/ PMHx DM2, asthma, HTN, iron deficiency anemia, COPD w/ intermittent use of home O2 presents w/ MODS, shock liver, SAMIA, NSTEMI

## 2017-09-30 NOTE — H&P ADULT - PROBLEM SELECTOR PLAN 1
admit to telemetry  pt is comfort care measures  I discussed indications for telemetry w/ the pt and family at the bedside. They request telemetry monitoring as the pt has responded historically to IVF resuscitation and antibiotics. I discussed poor prognosis at length.   They request that we are aggressive w/ IVF resuscitation and IV antibiotics but they request no vasopressor support, no ICU admission, no invasive respiratory therapy. They completed DNR/DNI paperwork prior to my bedside eval.  Will continue w/ IV vanco and IV zosyn at renal dose, w/ daily CMP.  NPO  IVF according to sepsis protocol's and will adjust as necessary  trend lactic acid  ICU was consulted  will obtain palliative consult admit to telemetry  pt initially elected for comfort care measures. her BP improved in the ED s/p 3L NS bolus and the family now wishes for aggressive MEDICAL therapy only. I discussed anticoagulation, vasopressors, more aggressive pulmonary measures and the wishes are as follows: they want her tx to include IV antibiotics, IVF (aggressive if necessary), consultant recommendations including GI (Efrain), nephro (Justine), ICU (Chito), cardio (Bolivar), palliative nursing team was consulted over the phone but are unavailable until Monday  the consults have been placed as listed  I discussed indications for telemetry w/ the pt and family at the bedside. They request telemetry monitoring as the pt has responded historically to IVF resuscitation and antibiotics and now seems to have improved from a hemodynamic perspective. I discussed poor prognosis at length and the family was afforded opportunity to express concerns. they demonstrated adequate understanding of the counseling which I have provided and detailed here.  They completed DNR/DNI paperwork prior to my bedside eval  Will continue w/ IV vanco and IV zosyn at renal dose, w/ daily CMP.  NPO  IVF according to sepsis protocol and will adjust as necessary  trend lactic acid  f/u urine/blood cultures

## 2017-09-30 NOTE — ED ADULT TRIAGE NOTE - CHIEF COMPLAINT QUOTE
from home family states found her unresponsive this morning now awake family states she is very weak w hx of dm

## 2017-09-30 NOTE — CONSULT NOTE ADULT - PROBLEM SELECTOR RECOMMENDATION 9
serial lfts  hep serology  in and out  correction of electrolytes and underlying cardiac dysfunction  avoidance of all hepatotoxic drugs   to follow

## 2017-09-30 NOTE — H&P ADULT - PROBLEM SELECTOR PLAN 3
no GI consult for now as the pt is requesting comfort care measures as detailed above  trend LFT's management as above

## 2017-09-30 NOTE — ED PROVIDER NOTE - OBJECTIVE STATEMENT
87 yo female hx of DM 89 yo female hx of DM BIBEMS here with sons c/o generalized weakness, lethargy, unresponsiveness since this morning, states patient was fine yesterday night, here fever 102.8 rectally, tachypneic. HCP son wants DNR/DNI. PMD Dr. Lilliana Ricci

## 2017-09-30 NOTE — H&P ADULT - NSHPSOCIALHISTORY_GEN_ALL_CORE
unable to obtain social hx as pt is nonverbal unable to obtain social hx as pt is nonverbal  family denies etoh/smoking in her personal hx  she has home health aide who assists w/ ADL's/personal hygeine/etc.   nonambulatory @ baseline

## 2017-09-30 NOTE — H&P ADULT - PROBLEM SELECTOR PLAN 5
NPO, fingersticks q6hrs w/ ISS, and hypoglycemia protocol I discussed poor prognosis along w/ MODS but family requests consult from GI  will trend CMP in AM s/p IVF and f/u GI recs

## 2017-09-30 NOTE — H&P ADULT - PROBLEM SELECTOR PLAN 6
no DVT ppx - comfort care  no GI ppx - comfort care pt also requesting nephrology consult  management as above  avoid nephrotoxic agents

## 2017-09-30 NOTE — H&P ADULT - PROBLEM SELECTOR PLAN 4
no nephro consult for now as the pt is requesting comfort care measures  daily BMP no EKG changes, will reassess w/ BMP s/p aggressive IVF resuscitation

## 2017-09-30 NOTE — ED PROVIDER NOTE - CRITICAL CARE PROVIDED
interpretation of diagnostic studies/direct patient care (not related to procedure)/consult w/ pt's family directly relating to pts condition/documentation/conducted a detailed discussion of DNR status/additional history taking/consultation with other physicians

## 2017-09-30 NOTE — PROGRESS NOTE ADULT - SUBJECTIVE AND OBJECTIVE BOX
HPI:  89yo F w/ PMHx DM2, asthma, HTN, iron deficiency anemia, COPD w/ intermittent use of home O2 presents w/ weakness and fatigue. Has history of prior admissions for PNA and known chronic CO2 retention.  At time of admission she was initially made DNR/DNI & admitted to floor with sepsis, septic shock, PNA, shock liver, NSTEMI, SAMIA.  She was conservatively treated with IV hydration and IV ABX.  Several disucssions had with family during the day regarding patient care and possible comfort care which the family was unwilling to entertain at the time.  Called by Hospitalist this evening  to reassess patient as she was no longer obtunded and the family asking for higher level of care to maximize recovery.    Found patient to be alert, on 100% NRB, communicating with family.  She following commands, BALDERAS with productive weak cough.  Patient has many family member who are Physicians in various medical subspecialties.  They participate actively in the the care/care plan construct.  Though she will remain DNR/DNI she is upgraded for maximal pulmonary toilet/support as well as acute renal failure which may require lasix drip therapy and possibly hemodialysis.      PAST MEDICAL & SURGICAL HISTORY:  Asthma  Diabetes mellitus  HTN   No significant past surgical history    ROS- patient obtunded on admission. Reportedly was at baseline only one day PTA    Medications:  piperacillin/tazobactam IVPB. 3.375 Gram(s) IV Intermittent every 12 hours  vancomycin  IVPB 1000 milliGRAM(s) IV Intermittent every 24 hours  ALBUTerol/ipratropium for Nebulization 3 milliLiter(s) Nebulizer every 4 hours  ipratropium    for Nebulization 500 MICROGram(s) Nebulizer every 4 hours  ondansetron Injectable 4 milliGRAM(s) IV Push every 6 hours PRN  aspirin Suppository 300 milliGRAM(s) Rectal daily  heparin  Injectable 5000 Unit(s) SubCutaneous every 12 hours  pantoprazole  Injectable 40 milliGRAM(s) IV Push daily  insulin lispro (HumaLOG) corrective regimen sliding scale   SubCutaneous three times a day before meals  sodium chloride 0.9%. 1000 milliLiter(s) IV Continuous <Continuous>  dextrose 5%. 1000 milliLiter(s) IV Continuous <Continuous>    ICU Vital Signs Last 24 Hrs  T(C): 36.4 (01 Oct 2017 00:18), Max: 39.3 (30 Sep 2017 10:30)  T(F): 97.6 (01 Oct 2017 00:18), Max: 102.8 (30 Sep 2017 10:30)  HR: 66 (01 Oct 2017 00:18) (48 - 100)  BP: 138/58 (01 Oct 2017 00:18) (84/42 - 146/61)  BP(mean): 84 (01 Oct 2017 00:18) (78 - 88)  RR: 24 (01 Oct 2017 00:18) (16 - 32)  SpO2: 100% (01 Oct 2017 00:18) (94% - 100%)    ABG - ( 30 Sep 2017 23:41 )  pH: 7.17  /  pCO2: 65    /  pO2: 73    / HCO3: 20    / Base Excess: -4.3  /  SaO2: 90        I&O's Detail  30 Sep 2017 07:01  -  01 Oct 2017 01:11  --------------------------------------------------------  IN:    IV PiggyBack: 100 mL    sodium chloride 0.9%.: 450 mL  Total IN: 550 mL    OUT:    Indwelling Catheter - Urethral: 30 mL  Total OUT: 30 mL    Total NET: 520 mL    Recieved ~3L in ER and IV running ~150cc/h on floor for ~8-10 hours    LABS:                        9.1    15.8  )-----------( 112      ( 30 Sep 2017 23:45 )             28.1         141  |  108  |  49<H>  ----------------------------<  111<H>  5.0   |  23  |  2.70<H>    Ca    7.0<L>      30 Sep 2017 23:45    TPro  6.9  /  Alb  2.1<L>  /  TBili  0.6  /  DBili  .40<H>  /  AST  5263<H>  /  ALT  2524<H>  /  AlkPhos  129<H>      CARDIAC MARKERS ( 30 Sep 2017 23:45 )  36.100 ng/mL / x     / x     / x     / x      CARDIAC MARKERS ( 30 Sep 2017 17:12 )  35.100 ng/mL / x     / x     / x     / x      CARDIAC MARKERS ( 30 Sep 2017 10:59 )  14.800 ng/mL / x     / 402 U/L / x     / 15.3 ng/mL    CAPILLARY BLOOD GLUCOSE  118 (01 Oct 2017 00:00)  PT/INR - ( 30 Sep 2017 10:57 )   PT: 16.8 sec;   INR: 1.53 ratio    PTT - ( 30 Sep 2017 10:57 )  PTT:34.3 sec  Urinalysis Basic - ( 01 Oct 2017 00:32 )    Color: Yellow / Appearance: Turbid / S.020 / pH: x  Gluc: x / Ketone: Negative  / Bili: Small / Urobili: 4   Blood: x / Protein: 150 mg/dL / Nitrite: Negative   Leuk Esterase: Small / RBC: 6-10 /HPF / WBC 6-10   Sq Epi: x / Non Sq Epi: Moderate / Bacteria: Moderate    Physical Examination:    General: alert, complains of pain, follows commands, confused to date, location, some English words spoken,most in primary [employer] language but family reports she makes sense.    HEENT: Pupils equal, reactive to light, symmetrically reactive face symmetric, speech reportedly clear    PULM: audible rhonchi, rhonchi and coarse BS bilaterally, L>>R, some rales    CVS: irreg, no murmur, IVC on bedside echo appears full with <50% compression/respiration, technically difficult to obtain apical/4 chamber view, + effusion pleural    ABD: Soft, nondistended, nontender, normoactive bowel sounds, no masses    EXT: No edema, nontender    SKIN: Warm and well perfused, no rashes noted.    RADIOLOGY:       EXAM:  CHEST 1 VIEW                            PROCEDURE DATE:  2017          INTERPRETATION:  History: Fever.    AP chest. Prior 3/7/2016.  No change heart mediastinum. There is airspace infiltrate in the left   midlung with loss of definition of the left cardiac border, consistent   with pneumonia. No pleural effusion.    Impression: Left midlung pneumonia. Please follow to resolution.      Impression:  HD #2 [id number] YOF admitted with sepsis, septic shock, LPNA, shock liver, ARF, AMS/Encephalopathy, NSTEMI    Neuro- encephalopathy due to toxic/metabolic state, improved with resuscitation, continue neuro checks  Pulm - Acute hypercapneic respiratory failure - continue BiPap for now, pulmonary toilet, bronchodilators            repeat ABG in am, repeat CXR, goal O2 sats >92%  CV- hemodynamically stable, goal MAP >65, unable to assess EF on bedside echo        NSTEMI troponins seem to have peaked at 35, continue to trend, ASA for now, possibly IV Heparin in am, cardiology following        continue statin, beta blocker once blood pressures stable for 24h  GI - transaminitis secondary to sepsis, septic shock, hypotension, avoid hepatotoxic agents, continue to monitor/follow trend         expected coagulopathy secondary to shock liver state, keep NPO for now, PPI  renal- acute renal failure/SAMIA/sepsis- anuric       mendez placed for I/O monitoring, Lasix administered, if no improvement will give larger dose.  Likely will require nephrology consult.       urine electrolytes, creatinine, osmo ordered for possible hemodialysis  ID- IV Vanco x1 - check trough , zosyn ,follow up all cultures      cooling blanket for fevers, no tylenol or motrin until MSOF state resolves  heme- SCD's SQH for DVT prophlyaxis, thrombocytopenia likely secondary to sepsis state, continue to monitor    multiple physician family members at bedside and all questions answered.  They are actively participating in her care plan and are provided with updates frequently.  case and careplan reviewed with Dr. Valdez/EICU Attending.    CRITICAL CARE TIME SPENT:65minutes

## 2017-09-30 NOTE — PROVIDER CONTACT NOTE (CHANGE IN STATUS NOTIFICATION) - SITUATION
Pt response and talking. Pt needs to be reassessed. Pt VVS. Family at bedside and supportive Pt response and talking. Pt needs to be reassessed. Pt VVS. Family at bedside and supportive. Bladder scan performed with no residual noted

## 2017-09-30 NOTE — ED PROVIDER NOTE - PROGRESS NOTE DETAILS
discussed case with HCP who is Son Oracio, states DNR/DNI left IJ placed by me discussed case with Dr. Dale, may go to the floor, Dr. Cornejo accepting

## 2017-09-30 NOTE — CHART NOTE - NSCHARTNOTEFT_GEN_A_CORE
Was called to see Pt. because family was reporting pt. is becoming unresponsive. Pt examined at bedside. She was not responding to commands and had labored respirations. Discussed with family that patient is DNR/DNI and there was nothing else to be done in this situation. Family still insisted a rapid response be called.   Dr. Dale (ICU) and Dr. Cornejo were present. They had another discussion with the family telling them of the poor prognosis and the fact that everything was already being done. Baldo Dale and Clemente discussed with family at length. No further intervention warranted at this time.

## 2017-09-30 NOTE — CONSULT NOTE ADULT - SUBJECTIVE AND OBJECTIVE BOX
Patient is a 88y old  Female who presents with a chief complaint of shortness of breath and unresponsiveness.     HPI: 89 y/o female brought in by family for shortness of breath and unresponsiveness. She was found to be febrile and in resp distress in the ED, placed on NRM. She opens eyes to voice but does not communicate, history obtained from the family. She was in her usual state of health yesterday and did not complain of anything. She is hypotensive, getting ivf bolus. She received vancomycin, zosyn in the ED.     Allergies: No Known Allergies    PAST MEDICAL & SURGICAL HISTORY:  Asthma  Diabetes mellitus  HTN (hypertension)  No significant past surgical history    HOME MEDICATIONS  aspirin  percocet  atorvastatin  gabapentin  lasix  losartan  metoprolol  nifedipine  oomeprazole    REVIEW OF SYSTEMS: UTO    T(F): 102.8 (17 @ 10:30), Max: 102.8 (17 @ 10:30)  HR: 55 (17 @ 12:36) (48 - 100)  BP: 99/30 (17 @ 12:36) (87/56 - 138/68)  RR: 16 (17 @ 12:36) (16 - 24)  SpO2: 100% (17 @ 12:36) (94% - 100%)    PHYSICAL EXAM  General: elderly female in moderate resp distress  CNS: somnolent, opens eyes to voice, does not follow commands, withdraws to pain on all extremities  HEENT: pupils reactive to light  Resp: diminished air entry with coarse rhonchi on left, minimal crackles on right  CVS: S1S2, bradycardic, irregular  Abd: soft, NT, +BS  Ext: trace edema  Skin: not mottled                        9.5    16.0  )-----------( 156      ( 30 Sep 2017 10:57 )             30.6     -    140  |  103  |  48<H>  ----------------------------<  111<H>  5.4<H>   |  26  |  2.70<H>    Ca    8.4<L>      30 Sep 2017 10:59    TPro  7.5  /  Alb  2.3<L>  /  TBili  0.9  /  DBili  x   /  AST  3440<H>  /  ALT  1547<H>  /  AlkPhos  148<H>      Lactate 3.4            @ 10:55    CARDIAC MARKERS ( 30 Sep 2017 10:59 )  14.800 ng/mL / x     / 402 U/L / x     / 15.3 ng/mL    PT/INR - ( 30 Sep 2017 10:57 )   PT: 16.8 sec;   INR: 1.53 ratio       PTT - ( 30 Sep 2017 10:57 )  PTT:34.3 sec  Urinalysis Basic - ( 30 Sep 2017 11:38 )    Color: Yellow / Appearance: x / S.020 / pH: x  Gluc: x / Ketone: Negative  / Bili: Negative / Urobili: Negative   Blood: x / Protein: 150 mg/dL / Nitrite: Negative   Leuk Esterase: Moderate / RBC: 6-10 /HPF / WBC 11-25   Sq Epi: x / Non Sq Epi: Moderate / Bacteria: Moderate    Xray Chest 1 View AP/PA (17 @ 11:03): Left midlung pneumonia.     CODE STATUS: DNR, DNI  GOC discussion: SRAVANTHI Patient is a 88y old  Female who presents with a chief complaint of shortness of breath and unresponsiveness.     HPI: 87 y/o female brought in by family for shortness of breath and unresponsiveness. She was found to be febrile and in resp distress in the ED, placed on NRM. She opens eyes to voice but does not communicate, history obtained from the family. She was in her usual state of health yesterday and did not complain of anything. She is hypotensive, getting ivf bolus. She received vancomycin, zosyn in the ED.     Allergies: No Known Allergies    PAST MEDICAL & SURGICAL HISTORY:  Asthma  Diabetes mellitus  HTN (hypertension)  AVINASH  No significant past surgical history    HOME MEDICATIONS  aspirin  percocet  atorvastatin  gabapentin  lasix  losartan  metoprolol  nifedipine  oomeprazole    REVIEW OF SYSTEMS: UTO    T(F): 102.8 (17 @ 10:30), Max: 102.8 (17 @ 10:30)  HR: 55 (17 @ 12:36) (48 - 100)  BP: 99/30 (17 @ 12:36) (87/56 - 138/68)  RR: 16 (17 @ 12:36) (16 - 24)  SpO2: 100% (17 @ 12:36) (94% - 100%)    PHYSICAL EXAM  General: elderly female in moderate resp distress  CNS: somnolent, opens eyes to voice, does not follow commands, withdraws to pain on all extremities  HEENT: pupils reactive to light  Resp: diminished air entry with coarse rhonchi on left, minimal crackles on right  CVS: S1S2, bradycardic, irregular  Abd: soft, NT, +BS  Ext: trace edema  Skin: not mottled                        9.5    16.0  )-----------( 156      ( 30 Sep 2017 10:57 )             30.6     -    140  |  103  |  48<H>  ----------------------------<  111<H>  5.4<H>   |  26  |  2.70<H>    Ca    8.4<L>      30 Sep 2017 10:59    TPro  7.5  /  Alb  2.3<L>  /  TBili  0.9  /  DBili  x   /  AST  3440<H>  /  ALT  1547<H>  /  AlkPhos  148<H>      Lactate 3.4            @ 10:55    CARDIAC MARKERS ( 30 Sep 2017 10:59 )  14.800 ng/mL / x     / 402 U/L / x     / 15.3 ng/mL    PT/INR - ( 30 Sep 2017 10:57 )   PT: 16.8 sec;   INR: 1.53 ratio       PTT - ( 30 Sep 2017 10:57 )  PTT:34.3 sec  Urinalysis Basic - ( 30 Sep 2017 11:38 )    Color: Yellow / Appearance: x / S.020 / pH: x  Gluc: x / Ketone: Negative  / Bili: Negative / Urobili: Negative   Blood: x / Protein: 150 mg/dL / Nitrite: Negative   Leuk Esterase: Moderate / RBC: 6-10 /HPF / WBC 11-25   Sq Epi: x / Non Sq Epi: Moderate / Bacteria: Moderate    Xray Chest 1 View AP/PA (17 @ 11:03): Left midlung pneumonia.     CODE STATUS: DNR, DNI  GOC discussion: SRAVANTHI

## 2017-09-30 NOTE — H&P ADULT - NSHPATTENDINGPLANDISCUSS_GEN_ALL_CORE
patient, family, ED attending, ED nursing staff patient, family, ED attending, ED nursing staff, palliative care nurse over the phone, GI, nephro, cardio

## 2017-09-30 NOTE — CONSULT NOTE ADULT - SUBJECTIVE AND OBJECTIVE BOX
[facility] - John Tariq, No, Nirav, Chito Tapia  Office Number: [phone]    Initial Consult Note    CHIEF COMPLAINT: Patient is a 88y old  Female who presents with a chief complaint of found unresponsive (30 Sep 2017 15:29)      HPI:  87yo F w/ PMHx DM2, asthma, HTN, iron deficiency anemia, COPD w/ intermittent use of home O2 presents w/ weakness and fatigue. pt is nonverbal at this time and unable to provide any history. She developed a fever last night and the family admits mild cough which was nonproductive. 3-4 days prior to presentation the pt was complaining of mild abd discomfort but was tolerating po well. Family denies dysphagia. They stated that she had an episode of bilious vomiting 4 days ago but none since. She was tolerating diet yesterday without difficulty. Last night developed rectal temp of 102F but denied diaphoresis, chills, rigors. Upon waking this morning, the pt was obtunded. Responding only to noxious stimuli so EMS was alerted. No other recent travel. No recent hospitalizations. No other recent changes in her overall health.  Recnetly her DM2 regimen was decreased due to normal glucose levels. Also, her recent creatinine was 1.1 as per family at the bedside. Family members have medical background.     In the ED, pt found hypotensive w/ SBP in 50's initially, now w/  after 3L NS bolus. Pt also bradycardic w/ P in 50's persistently despite hypotension. Pt was given vanco/zosyn as well as she was found to have L sided PNA.     She has seen a cardiologist in distant past. Unclear what her cardiac problems are. Family denies a history of AF, but she was in AF on an EKG in 2016 in [place].  She has had lower abdominal pain for the 3 days. No chest pain or tightness. No difficulty breathing reported.  Her mental status is now waxing and waning.    Family Hx: denies h/o premature CAD, DM2 in immediate family members (30 Sep 2017 12:46)      PAST MEDICAL & SURGICAL HISTORY:  Asthma  Diabetes mellitus  HTN (hypertension)  No significant past surgical history      SOCIAL HISTORY:  No tobacco, ethanol, or drug abuse.    FAMILY HISTORY:    No family history of acute MI or sudden cardiac death.    MEDICATIONS  (STANDING):  sodium chloride 0.9%. 1000 milliLiter(s) (150 mL/Hr) IV Continuous <Continuous>  insulin lispro (HumaLOG) corrective regimen sliding scale   SubCutaneous three times a day before meals  dextrose 5%. 1000 milliLiter(s) (50 mL/Hr) IV Continuous <Continuous>  dextrose 50% Injectable 12.5 Gram(s) IV Push once  pantoprazole  Injectable 40 milliGRAM(s) IV Push daily  influenza   Vaccine 0.5 milliLiter(s) IntraMuscular once  piperacillin/tazobactam IVPB. 3.375 Gram(s) IV Intermittent every 12 hours    MEDICATIONS  (PRN):  ondansetron Injectable 4 milliGRAM(s) IV Push every 6 hours PRN Nausea  dextrose Gel 1 Dose(s) Oral once PRN Blood Glucose LESS THAN 70 milliGRAM(s)/deciliter  glucagon  Injectable 1 milliGRAM(s) IntraMuscular once PRN Glucose LESS THAN 70 milligrams/deciliter      Allergies    No Known Allergies    Intolerances        REVIEW OF SYSTEMS:    Unable to assess given overall mental status    VITAL SIGNS:   Vital Signs Last 24 Hrs  T(C): 39.3 (30 Sep 2017 10:30), Max: 39.3 (30 Sep 2017 10:30)  T(F): 102.8 (30 Sep 2017 10:30), Max: 102.8 (30 Sep 2017 10:30)  HR: 64 (30 Sep 2017 13:50) (48 - 100)  BP: 120/71 (30 Sep 2017 13:50) (87/56 - 138/68)  BP(mean): --  RR: 16 (30 Sep 2017 13:50) (16 - 24)  SpO2: 100% (30 Sep 2017 13:50) (94% - 100%)    I&O's Summary      On Exam:    Constitutional: Lethargic, on 100% NRB  Lungs:  Mildly increased RR. Coarse BS bilaterally  Cardiovascular: irregular.  S1 and S2 positive.  No murmurs, rubs, gallops or clicks  Gastrointestinal: Bowel Sounds present, soft, nontender.   Lymph: No peripheral edema. No cervical lymphadenopathy.  Neurological: Alert, no focal deficits  Skin: No rashes or ulcers   Psych:  Did not assess    LABS: All Labs Reviewed:                        9.5    16.0  )-----------( 156      ( 30 Sep 2017 10:57 )             30.6     30 Sep 2017 10:59    140    |  103    |  48     ----------------------------<  111    5.4     |  26     |  2.70     Ca    8.4        30 Sep 2017 10:59    TPro  7.5    /  Alb  2.3    /  TBili  0.9    /  DBili  x      /  AST  3440   /  ALT  1547   /  AlkPhos  148    30 Sep 2017 10:59    PT/INR - ( 30 Sep 2017 10:57 )   PT: 16.8 sec;   INR: 1.53 ratio         PTT - ( 30 Sep 2017 10:57 )  PTT:34.3 sec  CARDIAC MARKERS ( 30 Sep 2017 10:59 )  14.800 ng/mL / x     / 402 U/L / x     / 15.3 ng/mL      Blood Culture:         RADIOLOGY:    EKG: AF 78 bpm, no R wave progression (seen on prior EKG)

## 2017-09-30 NOTE — H&P ADULT - NSHPPHYSICALEXAM_GEN_ALL_CORE
T(C): 39.3 (09-30-17 @ 10:30), Max: 39.3 (09-30-17 @ 10:30)  HR: 55 (09-30-17 @ 12:36) (48 - 100)  BP: 99/30 (09-30-17 @ 12:36) (87/56 - 138/68)  RR: 16 (09-30-17 @ 12:36) (16 - 24)  SpO2: 100% (09-30-17 @ 12:36) (94% - 100%)    GENERAL: patient appears obtunded, nonverbal  EYES: sclera clear, no exudates  ENMT: oropharynx clear without erythema, no exudates, moist mucous membranes  NECK: supple, soft, no thyromegaly noted  LUNGS: reduced air entry b/l  HEART: marked bradycardia  GASTROINTESTINAL: abdomen is soft, nontender, nondistended, normoactive bowel sounds, no palpable masses  INTEGUMENT: good skin turgor, no lesions noted  MUSCULOSKELETAL: no clubbing or cyanosis, no obvious deformity  NEUROLOGIC: awake, alert, oriented x3, good muscle tone in 4 extremities, no obvious sensory deficits  PSYCHIATRIC: unable to assess  HEME/LYMPH: no palpable supraclavicular nodules, no obvious ecchymosis or petechiae T(C): 39.3 (09-30-17 @ 10:30), Max: 39.3 (09-30-17 @ 10:30)  HR: 55 (09-30-17 @ 12:36) (48 - 100)  BP: 99/30 (09-30-17 @ 12:36) (87/56 - 138/68)  RR: 16 (09-30-17 @ 12:36) (16 - 24)  SpO2: 100% (09-30-17 @ 12:36) (94% - 100%)    GENERAL: patient appears obtunded, nonverbal  EYES: sclera clear, no exudates  ENMT: oropharynx clear without erythema, no exudates, moist mucous membranes  NECK: supple, soft, no thyromegaly noted  LUNGS: reduced air entry b/l  HEART: marked bradycardia  GASTROINTESTINAL: abdomen is soft, nontender, nondistended, normoactive bowel sounds, no palpable masses  INTEGUMENT: good skin turgor, no lesions noted  MUSCULOSKELETAL: no clubbing or cyanosis, no obvious deformity  NEUROLOGIC: responsive to painful stimuli in the ED, pupils reactive to light, moving 4 extremities  PSYCHIATRIC: unable to assess  HEME/LYMPH: no palpable supraclavicular nodules, no obvious ecchymosis or petechiae

## 2017-09-30 NOTE — PROVIDER CONTACT NOTE (CHANGE IN STATUS NOTIFICATION) - RECOMMENDATIONS
Pt to be seen by PA from ICU for transfer. Pt transferred to ICU bed 2 stable and in no distress. Report given to Batsheva VAUGHN

## 2017-09-30 NOTE — H&P ADULT - PROBLEM SELECTOR PLAN 2
management as above family elects for no heparin or anticoagulation at this time  cardiology was consulted by the ED, will f/u recs but no invasive measures are planned at this time  will trend enzymes q 6hrs and ordered EKG for the AM family elects for no heparin or anticoagulation at this time. I agree w/ this plan.  cardiology was consulted by the ED, will f/u recs but no invasive measures are planned at this time  will trend enzymes q 6hrs and ordered EKG for the AM

## 2017-09-30 NOTE — CONSULT NOTE ADULT - ATTENDING COMMENTS
88 F PMHx HTN, DM2, HLD admitted with acute respiratory failure and septic shock due to LLL pneumonia. She also has SAMIA, hyperkalemia, encephalopathy, shock liver and acute NSTEMI.     I had multiple discussions with the children at bedside and her son (physician) over the phone, they do not want her to undergo CPR, intubation or dialysis. They were not sure of pressors, however, in view of multiorgan failure, isolated organ support is not indicated. She is not a bipap candidate because of obtundation. She has extremely poor prognosis and has a high risk of death, I suggested to pursue comfort care. Family understands everything and wants to see how she does over the next few hours, will make her comfort care only if does not improve. I recommended morphine for resp distress but the son (physician) is hesitant, he is ok with her getting morphine is she goes into severe resp distress but wants to hold off on it now.     Care per primary team.     No need for ICU 88 F PMHx HTN, DM2, HLD, AVINASH, chronic hypercapnia admitted with acute respiratory failure and septic shock due to LLL pneumonia. She also has SAMIA, hyperkalemia, encephalopathy, shock liver and acute NSTEMI.     I had multiple discussions with the children at bedside and her son (physician) over the phone, they do not want her to undergo CPR, intubation or dialysis. They were not sure of pressors, however, in view of multiorgan failure, isolated organ support is not indicated. She is not a bipap candidate because of obtundation. She has extremely poor prognosis and has a high risk of death, I suggested to pursue comfort care. Family understands everything and wants to see how she does over the next few hours, will make her comfort care only if does not improve. I recommended morphine for resp distress but the son (physician) is hesitant, he is ok with her getting morphine is she goes into severe resp distress but wants to hold off on it now.     Care per primary team.     No need for ICU

## 2017-10-01 DIAGNOSIS — J96.92 RESPIRATORY FAILURE, UNSPECIFIED WITH HYPERCAPNIA: ICD-10-CM

## 2017-10-01 DIAGNOSIS — A41.9 SEPSIS, UNSPECIFIED ORGANISM: ICD-10-CM

## 2017-10-01 LAB
ALBUMIN SERPL ELPH-MCNC: 2 G/DL — LOW (ref 3.3–5)
ALBUMIN SERPL ELPH-MCNC: 2.1 G/DL — LOW (ref 3.3–5)
ALBUMIN SERPL ELPH-MCNC: 2.1 G/DL — LOW (ref 3.3–5)
ALP SERPL-CCNC: 117 U/L — SIGNIFICANT CHANGE UP (ref 40–120)
ALP SERPL-CCNC: 129 U/L — HIGH (ref 40–120)
ALP SERPL-CCNC: 136 U/L — HIGH (ref 40–120)
ALT FLD-CCNC: 2340 U/L — HIGH (ref 12–78)
ALT FLD-CCNC: 2524 U/L — HIGH (ref 12–78)
ALT FLD-CCNC: 2624 U/L — HIGH (ref 12–78)
ANION GAP SERPL CALC-SCNC: 10 MMOL/L — SIGNIFICANT CHANGE UP (ref 5–17)
ANION GAP SERPL CALC-SCNC: 10 MMOL/L — SIGNIFICANT CHANGE UP (ref 5–17)
ANION GAP SERPL CALC-SCNC: 11 MMOL/L — SIGNIFICANT CHANGE UP (ref 5–17)
APPEARANCE UR: ABNORMAL
APTT BLD: 28.9 SEC — SIGNIFICANT CHANGE UP (ref 27.5–37.4)
APTT BLD: 31.5 SEC — SIGNIFICANT CHANGE UP (ref 27.5–37.4)
AST SERPL-CCNC: 4398 U/L — HIGH (ref 15–37)
AST SERPL-CCNC: 4402 U/L — HIGH (ref 15–37)
AST SERPL-CCNC: 5263 U/L — HIGH (ref 15–37)
BASE EXCESS BLDA CALC-SCNC: -4.6 MMOL/L — LOW (ref -2–2)
BASE EXCESS BLDA CALC-SCNC: -5.6 MMOL/L — LOW (ref -2–2)
BASE EXCESS BLDA CALC-SCNC: -5.7 MMOL/L — LOW (ref -2–2)
BASOPHILS # BLD AUTO: 0.1 K/UL — SIGNIFICANT CHANGE UP (ref 0–0.2)
BASOPHILS NFR BLD AUTO: 0.6 % — SIGNIFICANT CHANGE UP (ref 0–2)
BILIRUB DIRECT SERPL-MCNC: 0.3 MG/DL — HIGH (ref 0.05–0.2)
BILIRUB DIRECT SERPL-MCNC: 0.4 MG/DL — HIGH (ref 0.05–0.2)
BILIRUB INDIRECT FLD-MCNC: 0.2 MG/DL — SIGNIFICANT CHANGE UP (ref 0.2–1)
BILIRUB INDIRECT FLD-MCNC: 0.3 MG/DL — SIGNIFICANT CHANGE UP (ref 0.2–1)
BILIRUB SERPL-MCNC: 0.6 MG/DL — SIGNIFICANT CHANGE UP (ref 0.2–1.2)
BILIRUB SERPL-MCNC: 0.6 MG/DL — SIGNIFICANT CHANGE UP (ref 0.2–1.2)
BILIRUB SERPL-MCNC: 0.7 MG/DL — SIGNIFICANT CHANGE UP (ref 0.2–1.2)
BILIRUB UR-MCNC: ABNORMAL
BLOOD GAS COMMENTS ARTERIAL: SIGNIFICANT CHANGE UP
BUN SERPL-MCNC: 49 MG/DL — HIGH (ref 7–23)
BUN SERPL-MCNC: 51 MG/DL — HIGH (ref 7–23)
BUN SERPL-MCNC: 52 MG/DL — HIGH (ref 7–23)
CALCIUM SERPL-MCNC: 7 MG/DL — LOW (ref 8.5–10.1)
CALCIUM SERPL-MCNC: 7.1 MG/DL — LOW (ref 8.5–10.1)
CALCIUM SERPL-MCNC: 7.3 MG/DL — LOW (ref 8.5–10.1)
CHLORIDE SERPL-SCNC: 108 MMOL/L — SIGNIFICANT CHANGE UP (ref 96–108)
CHLORIDE UR-SCNC: 25 MMOL/L — SIGNIFICANT CHANGE UP
CK MB BLD-MCNC: 6.6 % — HIGH (ref 0–3.5)
CK MB CFR SERPL CALC: 54.6 NG/ML — HIGH (ref 0–3.6)
CK SERPL-CCNC: 832 U/L — HIGH (ref 26–192)
CO2 SERPL-SCNC: 22 MMOL/L — SIGNIFICANT CHANGE UP (ref 22–31)
CO2 SERPL-SCNC: 23 MMOL/L — SIGNIFICANT CHANGE UP (ref 22–31)
CO2 SERPL-SCNC: 23 MMOL/L — SIGNIFICANT CHANGE UP (ref 22–31)
COLOR SPEC: YELLOW — SIGNIFICANT CHANGE UP
CREAT ?TM UR-MCNC: 124 MG/DL — SIGNIFICANT CHANGE UP
CREAT ?TM UR-MCNC: 133 MG/DL — SIGNIFICANT CHANGE UP
CREAT SERPL-MCNC: 2.7 MG/DL — HIGH (ref 0.5–1.3)
CREAT SERPL-MCNC: 2.8 MG/DL — HIGH (ref 0.5–1.3)
CREAT SERPL-MCNC: 2.9 MG/DL — HIGH (ref 0.5–1.3)
CULTURE RESULTS: SIGNIFICANT CHANGE UP
DIFF PNL FLD: ABNORMAL
EOSINOPHIL # BLD AUTO: 0 K/UL — SIGNIFICANT CHANGE UP (ref 0–0.5)
EOSINOPHIL NFR BLD AUTO: 0.2 % — SIGNIFICANT CHANGE UP (ref 0–6)
GLUCOSE SERPL-MCNC: 106 MG/DL — HIGH (ref 70–99)
GLUCOSE SERPL-MCNC: 111 MG/DL — HIGH (ref 70–99)
GLUCOSE SERPL-MCNC: 93 MG/DL — SIGNIFICANT CHANGE UP (ref 70–99)
GLUCOSE UR QL: NEGATIVE — SIGNIFICANT CHANGE UP
HCO3 BLDA-SCNC: 19 MMOL/L — LOW (ref 23–27)
HCO3 BLDA-SCNC: 19 MMOL/L — LOW (ref 23–27)
HCO3 BLDA-SCNC: 20 MMOL/L — LOW (ref 23–27)
HCT VFR BLD CALC: 26.8 % — LOW (ref 34.5–45)
HCT VFR BLD CALC: 28.7 % — LOW (ref 34.5–45)
HGB BLD-MCNC: 8.3 G/DL — LOW (ref 11.5–15.5)
HGB BLD-MCNC: 8.7 G/DL — LOW (ref 11.5–15.5)
HOROWITZ INDEX BLDA+IHG-RTO: 40 — SIGNIFICANT CHANGE UP
HOROWITZ INDEX BLDA+IHG-RTO: 40 — SIGNIFICANT CHANGE UP
HOROWITZ INDEX BLDA+IHG-RTO: 50 — SIGNIFICANT CHANGE UP
INR BLD: 1.53 RATIO — HIGH (ref 0.88–1.16)
INR BLD: 1.55 RATIO — HIGH (ref 0.88–1.16)
KETONES UR-MCNC: NEGATIVE — SIGNIFICANT CHANGE UP
LACTATE SERPL-SCNC: 0.8 MMOL/L — SIGNIFICANT CHANGE UP (ref 0.7–2)
LACTATE SERPL-SCNC: 1.6 MMOL/L — SIGNIFICANT CHANGE UP (ref 0.7–2)
LEUKOCYTE ESTERASE UR-ACNC: ABNORMAL
LYMPHOCYTES # BLD AUTO: 1.4 K/UL — SIGNIFICANT CHANGE UP (ref 1–3.3)
LYMPHOCYTES # BLD AUTO: 11.2 % — LOW (ref 13–44)
MAGNESIUM SERPL-MCNC: 2.2 MG/DL — SIGNIFICANT CHANGE UP (ref 1.6–2.6)
MAGNESIUM SERPL-MCNC: 2.2 MG/DL — SIGNIFICANT CHANGE UP (ref 1.6–2.6)
MCHC RBC-ENTMCNC: 27.8 PG — SIGNIFICANT CHANGE UP (ref 27–34)
MCHC RBC-ENTMCNC: 28.6 PG — SIGNIFICANT CHANGE UP (ref 27–34)
MCHC RBC-ENTMCNC: 30.2 GM/DL — LOW (ref 32–36)
MCHC RBC-ENTMCNC: 31.1 GM/DL — LOW (ref 32–36)
MCV RBC AUTO: 91.8 FL — SIGNIFICANT CHANGE UP (ref 80–100)
MCV RBC AUTO: 92 FL — SIGNIFICANT CHANGE UP (ref 80–100)
MONOCYTES # BLD AUTO: 0.8 K/UL — SIGNIFICANT CHANGE UP (ref 0–0.9)
MONOCYTES NFR BLD AUTO: 6.4 % — SIGNIFICANT CHANGE UP (ref 1–9)
NEUTROPHILS # BLD AUTO: 9.8 K/UL — HIGH (ref 1.8–7.4)
NEUTROPHILS NFR BLD AUTO: 81.7 % — HIGH (ref 43–77)
NITRITE UR-MCNC: NEGATIVE — SIGNIFICANT CHANGE UP
OSMOLALITY SERPL: 312 MOS/KG — HIGH (ref 275–300)
OSMOLALITY UR: 316 MOS/KG — SIGNIFICANT CHANGE UP (ref 50–1200)
OSMOLALITY UR: 337 MOS/KG — SIGNIFICANT CHANGE UP (ref 50–1200)
PCO2 BLDA: 58 MMHG — HIGH (ref 32–46)
PCO2 BLDA: 66 MMHG — HIGH (ref 32–46)
PCO2 BLDA: 75 MMHG — CRITICAL HIGH (ref 32–46)
PH BLDA: 7.11 — CRITICAL LOW (ref 7.35–7.45)
PH BLDA: 7.16 — CRITICAL LOW (ref 7.35–7.45)
PH BLDA: 7.19 — CRITICAL LOW (ref 7.35–7.45)
PH UR: 5 — SIGNIFICANT CHANGE UP (ref 5–8)
PHOSPHATE SERPL-MCNC: 5.8 MG/DL — HIGH (ref 2.5–4.5)
PLATELET # BLD AUTO: 92 K/UL — LOW (ref 150–400)
PLATELET # BLD AUTO: 94 K/UL — LOW (ref 150–400)
PO2 BLDA: 128 MMHG — HIGH (ref 74–108)
PO2 BLDA: 75 MMHG — SIGNIFICANT CHANGE UP (ref 74–108)
PO2 BLDA: 98 MMHG — SIGNIFICANT CHANGE UP (ref 74–108)
POTASSIUM SERPL-MCNC: 4.9 MMOL/L — SIGNIFICANT CHANGE UP (ref 3.5–5.3)
POTASSIUM SERPL-MCNC: 4.9 MMOL/L — SIGNIFICANT CHANGE UP (ref 3.5–5.3)
POTASSIUM SERPL-MCNC: 5 MMOL/L — SIGNIFICANT CHANGE UP (ref 3.5–5.3)
POTASSIUM SERPL-SCNC: 4.9 MMOL/L — SIGNIFICANT CHANGE UP (ref 3.5–5.3)
POTASSIUM SERPL-SCNC: 4.9 MMOL/L — SIGNIFICANT CHANGE UP (ref 3.5–5.3)
POTASSIUM SERPL-SCNC: 5 MMOL/L — SIGNIFICANT CHANGE UP (ref 3.5–5.3)
POTASSIUM UR-SCNC: 56 MMOL/L — SIGNIFICANT CHANGE UP
PROT SERPL-MCNC: 6.3 G/DL — SIGNIFICANT CHANGE UP (ref 6–8.3)
PROT SERPL-MCNC: 6.9 G/DL — SIGNIFICANT CHANGE UP (ref 6–8.3)
PROT SERPL-MCNC: 6.9 G/DL — SIGNIFICANT CHANGE UP (ref 6–8.3)
PROT UR-MCNC: 150 MG/DL
PROTHROM AB SERPL-ACNC: 16.8 SEC — HIGH (ref 9.8–12.7)
PROTHROM AB SERPL-ACNC: 17 SEC — HIGH (ref 9.8–12.7)
RBC # BLD: 2.91 M/UL — LOW (ref 3.8–5.2)
RBC # BLD: 3.12 M/UL — LOW (ref 3.8–5.2)
RBC # FLD: 13.7 % — SIGNIFICANT CHANGE UP (ref 10.3–14.5)
RBC # FLD: 13.8 % — SIGNIFICANT CHANGE UP (ref 10.3–14.5)
SAO2 % BLDA: 91 % — LOW (ref 92–96)
SAO2 % BLDA: 94 % — SIGNIFICANT CHANGE UP (ref 92–96)
SAO2 % BLDA: 98 % — HIGH (ref 92–96)
SODIUM SERPL-SCNC: 141 MMOL/L — SIGNIFICANT CHANGE UP (ref 135–145)
SODIUM UR-SCNC: 20 MMOL/L — SIGNIFICANT CHANGE UP
SODIUM UR-SCNC: 37 MMOL/L — SIGNIFICANT CHANGE UP
SP GR SPEC: 1.02 — SIGNIFICANT CHANGE UP (ref 1.01–1.02)
SPECIMEN SOURCE: SIGNIFICANT CHANGE UP
TROPONIN I SERPL-MCNC: 32.2 NG/ML — HIGH (ref 0.01–0.04)
TROPONIN I SERPL-MCNC: 33.6 NG/ML — HIGH (ref 0.01–0.04)
TROPONIN I SERPL-MCNC: 36.1 NG/ML — HIGH (ref 0.01–0.04)
UROBILINOGEN FLD QL: 4
UUN UR-MCNC: 216 MG/DL — SIGNIFICANT CHANGE UP
VANCOMYCIN TROUGH SERPL-MCNC: 8.1 UG/ML — LOW (ref 10–20)
WBC # BLD: 11.7 K/UL — HIGH (ref 3.8–10.5)
WBC # BLD: 12 K/UL — HIGH (ref 3.8–10.5)
WBC # FLD AUTO: 11.7 K/UL — HIGH (ref 3.8–10.5)
WBC # FLD AUTO: 12 K/UL — HIGH (ref 3.8–10.5)

## 2017-10-01 PROCEDURE — 76775 US EXAM ABDO BACK WALL LIM: CPT | Mod: 26

## 2017-10-01 PROCEDURE — 99291 CRITICAL CARE FIRST HOUR: CPT

## 2017-10-01 PROCEDURE — 71010: CPT | Mod: 26

## 2017-10-01 PROCEDURE — 76705 ECHO EXAM OF ABDOMEN: CPT | Mod: 26

## 2017-10-01 PROCEDURE — 93010 ELECTROCARDIOGRAM REPORT: CPT

## 2017-10-01 PROCEDURE — 99233 SBSQ HOSP IP/OBS HIGH 50: CPT

## 2017-10-01 RX ORDER — AZITHROMYCIN 500 MG/1
500 TABLET, FILM COATED ORAL ONCE
Qty: 0 | Refills: 0 | Status: COMPLETED | OUTPATIENT
Start: 2017-10-01 | End: 2017-10-01

## 2017-10-01 RX ORDER — DEXTROSE 50 % IN WATER 50 %
12.5 SYRINGE (ML) INTRAVENOUS ONCE
Qty: 0 | Refills: 0 | Status: DISCONTINUED | OUTPATIENT
Start: 2017-10-01 | End: 2017-10-03

## 2017-10-01 RX ORDER — DEXTROSE 50 % IN WATER 50 %
1 SYRINGE (ML) INTRAVENOUS ONCE
Qty: 0 | Refills: 0 | Status: DISCONTINUED | OUTPATIENT
Start: 2017-10-01 | End: 2017-10-03

## 2017-10-01 RX ORDER — FUROSEMIDE 40 MG
80 TABLET ORAL
Qty: 0 | Refills: 0 | Status: COMPLETED | OUTPATIENT
Start: 2017-10-01 | End: 2017-10-01

## 2017-10-01 RX ORDER — INSULIN LISPRO 100/ML
VIAL (ML) SUBCUTANEOUS EVERY 6 HOURS
Qty: 0 | Refills: 0 | Status: DISCONTINUED | OUTPATIENT
Start: 2017-10-01 | End: 2017-10-03

## 2017-10-01 RX ORDER — VANCOMYCIN HCL 1 G
1000 VIAL (EA) INTRAVENOUS ONCE
Qty: 0 | Refills: 0 | Status: COMPLETED | OUTPATIENT
Start: 2017-10-01 | End: 2017-10-01

## 2017-10-01 RX ORDER — SODIUM CHLORIDE 9 MG/ML
1000 INJECTION, SOLUTION INTRAVENOUS
Qty: 0 | Refills: 0 | Status: DISCONTINUED | OUTPATIENT
Start: 2017-10-01 | End: 2017-10-02

## 2017-10-01 RX ORDER — DEXTROSE 50 % IN WATER 50 %
25 SYRINGE (ML) INTRAVENOUS ONCE
Qty: 0 | Refills: 0 | Status: DISCONTINUED | OUTPATIENT
Start: 2017-10-01 | End: 2017-10-03

## 2017-10-01 RX ORDER — FUROSEMIDE 40 MG
80 TABLET ORAL ONCE
Qty: 0 | Refills: 0 | Status: COMPLETED | OUTPATIENT
Start: 2017-10-01 | End: 2017-10-01

## 2017-10-01 RX ORDER — GLUCAGON INJECTION, SOLUTION 0.5 MG/.1ML
1 INJECTION, SOLUTION SUBCUTANEOUS ONCE
Qty: 0 | Refills: 0 | Status: DISCONTINUED | OUTPATIENT
Start: 2017-10-01 | End: 2017-10-03

## 2017-10-01 RX ORDER — SODIUM CHLORIDE 9 MG/ML
500 INJECTION, SOLUTION INTRAVENOUS ONCE
Qty: 0 | Refills: 0 | Status: COMPLETED | OUTPATIENT
Start: 2017-10-01 | End: 2017-10-01

## 2017-10-01 RX ORDER — AZITHROMYCIN 500 MG/1
TABLET, FILM COATED ORAL
Qty: 0 | Refills: 0 | Status: COMPLETED | OUTPATIENT
Start: 2017-10-01 | End: 2017-10-03

## 2017-10-01 RX ORDER — SODIUM CHLORIDE 9 MG/ML
1000 INJECTION, SOLUTION INTRAVENOUS
Qty: 0 | Refills: 0 | Status: DISCONTINUED | OUTPATIENT
Start: 2017-10-01 | End: 2017-10-03

## 2017-10-01 RX ORDER — SODIUM CHLORIDE 9 MG/ML
1000 INJECTION, SOLUTION INTRAVENOUS
Qty: 0 | Refills: 0 | Status: DISCONTINUED | OUTPATIENT
Start: 2017-10-01 | End: 2017-10-01

## 2017-10-01 RX ORDER — ALBUMIN HUMAN 25 %
50 VIAL (ML) INTRAVENOUS EVERY 8 HOURS
Qty: 0 | Refills: 0 | Status: COMPLETED | OUTPATIENT
Start: 2017-10-01 | End: 2017-10-02

## 2017-10-01 RX ORDER — SODIUM CHLORIDE 9 MG/ML
1000 INJECTION INTRAMUSCULAR; INTRAVENOUS; SUBCUTANEOUS
Qty: 0 | Refills: 0 | Status: DISCONTINUED | OUTPATIENT
Start: 2017-10-01 | End: 2017-10-01

## 2017-10-01 RX ORDER — AZITHROMYCIN 500 MG/1
500 TABLET, FILM COATED ORAL EVERY 24 HOURS
Qty: 0 | Refills: 0 | Status: COMPLETED | OUTPATIENT
Start: 2017-10-02 | End: 2017-10-03

## 2017-10-01 RX ORDER — VANCOMYCIN HCL 1 G
1000 VIAL (EA) INTRAVENOUS ONCE
Qty: 0 | Refills: 0 | Status: DISCONTINUED | OUTPATIENT
Start: 2017-10-01 | End: 2017-10-01

## 2017-10-01 RX ORDER — NYSTATIN CREAM 100000 [USP'U]/G
1 CREAM TOPICAL EVERY 6 HOURS
Qty: 0 | Refills: 0 | Status: DISCONTINUED | OUTPATIENT
Start: 2017-10-01 | End: 2017-10-11

## 2017-10-01 RX ADMIN — Medication 40 MILLIGRAM(S): at 00:10

## 2017-10-01 RX ADMIN — Medication 3 MILLILITER(S): at 23:21

## 2017-10-01 RX ADMIN — Medication 250 MILLIGRAM(S): at 12:45

## 2017-10-01 RX ADMIN — NYSTATIN CREAM 1 APPLICATION(S): 100000 CREAM TOPICAL at 17:22

## 2017-10-01 RX ADMIN — Medication 80 MILLIGRAM(S): at 18:06

## 2017-10-01 RX ADMIN — Medication 650 MILLIGRAM(S): at 21:25

## 2017-10-01 RX ADMIN — PANTOPRAZOLE SODIUM 40 MILLIGRAM(S): 20 TABLET, DELAYED RELEASE ORAL at 11:31

## 2017-10-01 RX ADMIN — SODIUM CHLORIDE 1500 MILLILITER(S): 9 INJECTION, SOLUTION INTRAVENOUS at 16:50

## 2017-10-01 RX ADMIN — Medication 50 MILLILITER(S): at 21:35

## 2017-10-01 RX ADMIN — PIPERACILLIN AND TAZOBACTAM 25 GRAM(S): 4; .5 INJECTION, POWDER, LYOPHILIZED, FOR SOLUTION INTRAVENOUS at 11:31

## 2017-10-01 RX ADMIN — HEPARIN SODIUM 5000 UNIT(S): 5000 INJECTION INTRAVENOUS; SUBCUTANEOUS at 05:26

## 2017-10-01 RX ADMIN — Medication 300 MILLIGRAM(S): at 11:32

## 2017-10-01 RX ADMIN — AZITHROMYCIN 255 MILLIGRAM(S): 500 TABLET, FILM COATED ORAL at 12:45

## 2017-10-01 RX ADMIN — Medication 3 MILLILITER(S): at 15:29

## 2017-10-01 RX ADMIN — HYDROMORPHONE HYDROCHLORIDE 0.25 MILLIGRAM(S): 2 INJECTION INTRAMUSCULAR; INTRAVENOUS; SUBCUTANEOUS at 00:11

## 2017-10-01 RX ADMIN — Medication 80 MILLIGRAM(S): at 02:11

## 2017-10-01 RX ADMIN — HEPARIN SODIUM 5000 UNIT(S): 5000 INJECTION INTRAVENOUS; SUBCUTANEOUS at 18:05

## 2017-10-01 RX ADMIN — NYSTATIN CREAM 1 APPLICATION(S): 100000 CREAM TOPICAL at 12:31

## 2017-10-01 RX ADMIN — Medication 3 MILLILITER(S): at 21:09

## 2017-10-01 RX ADMIN — SODIUM CHLORIDE 50 MILLILITER(S): 9 INJECTION, SOLUTION INTRAVENOUS at 12:45

## 2017-10-01 RX ADMIN — Medication 3 MILLILITER(S): at 03:48

## 2017-10-01 RX ADMIN — Medication 3 MILLILITER(S): at 07:39

## 2017-10-01 RX ADMIN — Medication 3 MILLILITER(S): at 10:51

## 2017-10-01 RX ADMIN — Medication 650 MILLIGRAM(S): at 23:24

## 2017-10-01 RX ADMIN — HYDROMORPHONE HYDROCHLORIDE 0.25 MILLIGRAM(S): 2 INJECTION INTRAMUSCULAR; INTRAVENOUS; SUBCUTANEOUS at 00:10

## 2017-10-01 RX ADMIN — Medication 50 MILLILITER(S): at 13:46

## 2017-10-01 RX ADMIN — SODIUM CHLORIDE 40 MILLILITER(S): 9 INJECTION INTRAMUSCULAR; INTRAVENOUS; SUBCUTANEOUS at 11:32

## 2017-10-01 RX ADMIN — PIPERACILLIN AND TAZOBACTAM 25 GRAM(S): 4; .5 INJECTION, POWDER, LYOPHILIZED, FOR SOLUTION INTRAVENOUS at 22:32

## 2017-10-01 NOTE — PROGRESS NOTE ADULT - PROBLEM SELECTOR PLAN 6
SAMIA is persisting, c/w IVF and f/u with Renal  avoid nephrotoxic agents SAMIA is persisting, with oliguria ?ATN  c/w IVF and f/u with Renal  avoid nephrotoxic agents

## 2017-10-01 NOTE — CONSULT NOTE ADULT - ASSESSMENT
·	SAMIA: Oliguric ATN, ARB rx  ·	Shock, Sepsis  ·	Shock liver  ·	Respiratory falure on BIPAP  ·	Respiratory and metabolic acidosis    Will continue IVF. Pressor support as needed. Add bicarb to IVF. Check cultures. IV abx. D/w family at bedside. Overall prognosis poor. Supportive care. Cardiology / pulmonary follow up. Will follow electrolytes and renal function trend. Further recommendations pending clinical course. Strict I & O. If UO doesn't improve in the next 24 hours may need RRT. Monitor fluid status closely. IV lasix PRN.  Thank you for the courtesy of this referral. ·	SAMIA: Oliguric ATN, ARB rx  ·	Shock, Sepsis  ·	Shock liver  ·	Respiratory falure on BIPAP  ·	Respiratory and metabolic acidosis  ·	Anemia    Will continue IVF. Pressor support as needed. Add bicarb to IVF. Check cultures. IV abx. D/w family at bedside. Overall prognosis poor. Supportive care. Cardiology / pulmonary follow up. Will follow electrolytes and renal function trend. Further recommendations pending clinical course. Strict I & O. Monitor h/h. Check iron studies. Transfuse PRN. If UO doesn't improve in the next 24 hours may need RRT. Monitor fluid status closely. IV lasix PRN.  Thank you for the courtesy of this referral.

## 2017-10-01 NOTE — PROGRESS NOTE ADULT - PROBLEM SELECTOR PLAN 1
with multi organ failure, 2/2 unknown etiology.  c/w IV zosyn and zithro for PNA.  f/u urine/blood cultures.

## 2017-10-01 NOTE — PROGRESS NOTE ADULT - ASSESSMENT
88F PMH HTN, HLD, DM2, COPD on home oxygen, & chronic hypercapnic respiratory failure presents with septic shock with acute on chronic hypoxic and hypercapnic respiratory failure, NSTEMI, shock liver, SAMIA with oliguria, and coagulopathy. Found to have RLL CAP/aspiration pna + GBS UTI.    - Arousable, follows simple commands, but has encephalopathy due to sepsis  - HD stable currently, not on pressors, hold antihypertensives  - NSTEMI due to demand ischemia in the setting of multiorgan failure  - Continue with BiPAP, ABG with minimal improvement, DNR/DNI  - Continue with Duonebs, no evidence of COPD exacerbation, hold off on steroids at this time  - NPO while on BiPAP, c/w PPI for PUD ppx while NPO  - Shock liver, f/up abdominal ultrasound, +gallbladder sludge on bedside u/s  - SAMIA with oliguria, s/p IVF resuscitation, no evidence of volume overload or pulmonary edema, does not require dialysis at this time (although unsure if family would pursue dialysis - many doctors in the family that are in agreement with DNR/DNI, but unclear about dialysis)  - Start albumin with IVF (1/2NS+NaHCO3 75mEq@50/hr)  - On Vancomycin (by level) + Zosyn + Azithromycin for severe CAP, f/up ULegionella; UCx with GBS  - DVT ppx  - On D5 infusion while NPO, -140  - Poor prognosis, patient appears to be dying, DNR/DNI, no aggressive measures, no pressures  CC time spent: 45 min

## 2017-10-01 NOTE — PROGRESS NOTE ADULT - SUBJECTIVE AND OBJECTIVE BOX
CHIEF COMPLAINT:  Patient is a 88y old  Female who presents with a chief complaint of found unresponsive (30 Sep 2017 15:29)    HPI:  89yo F w/ PMHx DM2, asthma, HTN, iron deficiency anemia, COPD w/ intermittent use of home O2 presents w/ weakness and fatigue. pt is nonverbal at this time and unable to provide any history. She developed a fever last night and the family admits mild cough which was nonproductive. 3-4 days prior to presentation the pt was complaining of mild abd discomfort but was tolerating po well. Family denies dysphagia. They stated that she had an episode of bilious vomiting 4 days ago but none since. She was tolerating diet yesterday without difficulty. Last night developed rectal temp of 102F but denied diaphoresis, chills, rigors. Upon waking this morning, the pt was obtunded. Responding only to noxious stimuli so EMS was alerted. No other recent travel. No recent hospitalizations. No other recent changes in her overall health.  Recnetly her DM2 regimen was decreased due to normal glucose levels. Also, her recent creatinine was 1.1 as per family at the bedside. Family members have medical background.     In the ED, pt found hypotensive w/ SBP in 50's initially, now w/  after 3L NS bolus. Pt also bradycardic w/ P in 50's persistently despite hypotension. Pt was given vanco/zosyn as well as she was found to have L sided PNA.     Family Hx: denies h/o premature CAD, DM2 in immediate family members (30 Sep 2017 12:46)    Interval Events:    REVIEW OF SYSTEMS:  [xxx ] All other systems negative  [ ] Unable to assess ROS because ________    OBJECTIVE:  ICU Vital Signs Last 24 Hrs  T(C): 36.4 (01 Oct 2017 04:01), Max: 39.3 (30 Sep 2017 10:30)  T(F): 97.6 (01 Oct 2017 04:01), Max: 102.8 (30 Sep 2017 10:30)  HR: 66 (01 Oct 2017 07:43) (48 - 100)  BP: 103/60 (01 Oct 2017 07:00) (84/42 - 146/61)  BP(mean): 77 (01 Oct 2017 07:00) (69 - 88)  ABP: --  ABP(mean): --  RR: 27 (01 Oct 2017 07:00) (10 - 32)  SpO2: 100% (01 Oct 2017 07:43) (94% - 100%)         @ 07:01  -  10-01 @ 07:00  --------------------------------------------------------  IN: 925 mL / OUT: 30 mL / NET: 895 mL      CAPILLARY BLOOD GLUCOSE  115 (01 Oct 2017 06:00)          PHYSICAL EXAM:  Neuro:  awake, follows commands BALDERAS 4/4, denies sob, denies c/p cn intact  Pulm:  crackles in lower extremities base to 1/4 bilat, diminished in lower lung fields bases to 1/3 up left base to 1/4 up right, POCUS  pleural effusions left> right, focal b lines bilat, dynamic bronchograms in RML/RLL, and in LLL, utilizing bipap 20/5 50% spVT 250 - 400   C/V:  RRR S1/S2 without rubs murmurs gallops clicks, POCUS RV<LV dyskinesis of left apex, VTI 26, unable to eval IVC. periph pulses palpable at 2+ radial bilat, DP/PT 1+/1+, +1 lower extremity edema, digits warm to touch with good cap refill < 3 sec, skin excoriation under breast noted  GI/:  obese soft non tender non distended hypo active bowel sounds, mendez patent scant urine outpur  Lymph:  non palp  Neck:  supple   LINES:  non  HOSPITAL MEDICATIONS:  MEDICATIONS  (STANDING):  sodium chloride 0.9%. 1000 milliLiter(s) (75 mL/Hr) IV Continuous <Continuous>  insulin lispro (HumaLOG) corrective regimen sliding scale   SubCutaneous three times a day before meals  dextrose 5%. 1000 milliLiter(s) (50 mL/Hr) IV Continuous <Continuous>  dextrose 50% Injectable 12.5 Gram(s) IV Push once  influenza   Vaccine 0.5 milliLiter(s) IntraMuscular once  piperacillin/tazobactam IVPB. 3.375 Gram(s) IV Intermittent every 12 hours  ALBUTerol/ipratropium for Nebulization 3 milliLiter(s) Nebulizer every 4 hours  pantoprazole  Injectable 40 milliGRAM(s) IV Push daily  aspirin Suppository 300 milliGRAM(s) Rectal daily  heparin  Injectable 5000 Unit(s) SubCutaneous every 12 hours    MEDICATIONS  (PRN):  ondansetron Injectable 4 milliGRAM(s) IV Push every 6 hours PRN Nausea  dextrose Gel 1 Dose(s) Oral once PRN Blood Glucose LESS THAN 70 milliGRAM(s)/deciliter  glucagon  Injectable 1 milliGRAM(s) IntraMuscular once PRN Glucose LESS THAN 70 milligrams/deciliter  acetaminophen  Suppository. 650 milliGRAM(s) Rectal every 4 hours PRN Mild Pain (1 - 3)      LABS:                        8.3    12.0  )-----------( 94       ( 01 Oct 2017 06:04 )             26.8     Hgb Trend: 8.3<--, 9.1<--, 9.5<--  10-01    141  |  108  |  51<H>  ----------------------------<  106<H>  4.9   |  23  |  2.80<H>    Ca    7.1<L>      01 Oct 2017 06:04  Mg     2.2     10-    TPro  6.3  /  Alb  2.0<L>  /  TBili  0.6  /  DBili  .30<H>  /  AST  4398<H>  /  ALT  2340<H>  /  AlkPhos  117  10-01    LIVER FUNCTIONS - ( 01 Oct 2017 06:04 )  Alb: 2.0 g/dL / Pro: 6.3 g/dL / ALK PHOS: 117 U/L / ALT: 2340 U/L / AST: 4398 U/L / GGT: x           Creatinine Trend: 2.80<--, 2.70<--, 2.70<--  PT/INR - ( 01 Oct 2017 06:04 )   PT: 17.0 sec;   INR: 1.55 ratio         PTT - ( 01 Oct 2017 06:04 )  PTT:31.5 sec  Urinalysis Basic - ( 01 Oct 2017 00:32 )    Color: Yellow / Appearance: Turbid / S.020 / pH: x  Gluc: x / Ketone: Negative  / Bili: Small / Urobili: 4   Blood: x / Protein: 150 mg/dL / Nitrite: Negative   Leuk Esterase: Small / RBC: 6-10 /HPF / WBC 6-10   Sq Epi: x / Non Sq Epi: Moderate / Bacteria: Moderate      Arterial Blood Gas:  10-01 @ 05:40  7.16/66/75/20/91/-4.6  ABG lactate: --  Arterial Blood Gas:  10-01 @ 03:24  7.11/75/128/19/98/-5.6  ABG lactate: --  Arterial Blood Gas:   @ 23:41  7.17/65/73/20/90/-4.3  ABG lactate: --        MICROBIOLOGY:     RADIOLOGY:  [ ] Reviewed and interpreted by me    EKG:      [facility] (ext [id number]) CHIEF COMPLAINT:  Patient is a 88y old  Female who presents with a chief complaint of found unresponsive (30 Sep 2017 15:29)    HPI:  89yo F w/ PMHx DM2, asthma, HTN, iron deficiency anemia, COPD w/ intermittent use of home O2 presents w/ weakness and fatigue. pt is nonverbal at this time and unable to provide any history. She developed a fever last night and the family admits mild cough which was nonproductive. 3-4 days prior to presentation the pt was complaining of mild abd discomfort but was tolerating po well. Family denies dysphagia. They stated that she had an episode of bilious vomiting 4 days ago but none since. She was tolerating diet yesterday without difficulty. Last night developed rectal temp of 102F but denied diaphoresis, chills, rigors. Upon waking this morning, the pt was obtunded. Responding only to noxious stimuli so EMS was alerted. No other recent travel. No recent hospitalizations. No other recent changes in her overall health.  Recnetly her DM2 regimen was decreased due to normal glucose levels. Also, her recent creatinine was 1.1 as per family at the bedside. Family members have medical background.     In the ED, pt found hypotensive w/ SBP in 50's initially, now w/  after 3L NS bolus. Pt also bradycardic w/ P in 50's persistently despite hypotension. Pt was given vanco/zosyn as well as she was found to have L sided PNA.     Family Hx: denies h/o premature CAD, DM2 in immediate family members (30 Sep 2017 12:46)    Interval Events:    REVIEW OF SYSTEMS:  [xxx ] All other systems negative  [ ] Unable to assess ROS because ________    OBJECTIVE:  ICU Vital Signs Last 24 Hrs  T(C): 36.4 (01 Oct 2017 04:01), Max: 39.3 (30 Sep 2017 10:30)  T(F): 97.6 (01 Oct 2017 04:01), Max: 102.8 (30 Sep 2017 10:30)  HR: 66 (01 Oct 2017 07:43) (48 - 100)  BP: 103/60 (01 Oct 2017 07:00) (84/42 - 146/61)  BP(mean): 77 (01 Oct 2017 07:00) (69 - 88)  ABP: --  ABP(mean): --  RR: 27 (01 Oct 2017 07:00) (10 - 32)  SpO2: 100% (01 Oct 2017 07:43) (94% - 100%)         @ 07:01  -  10-01 @ 07:00  --------------------------------------------------------  IN: 925 mL / OUT: 30 mL / NET: 895 mL      CAPILLARY BLOOD GLUCOSE  115 (01 Oct 2017 06:00)          PHYSICAL EXAM:  Neuro:  awake, follows commands BALDERAS 4/4, denies sob, denies c/p cn intact  Pulm:  crackles in lower extremities base to 1/4 bilat, diminished in lower lung fields bases to 1/3 up left base to 1/4 up right, POCUS  small pleural effusions left> right, focal b lines bilat A line predom, dynamic bronchograms in RML/RLL, and in LLL, utilizing bipap 20/5 50% spVT 250 - 400   C/V:  RRR S1/S2 without rubs murmurs gallops clicks, POCUS RV<LV dyskinesis of left apex, VTI 26, unable to eval IVC. periph pulses palpable at 2+ radial bilat, DP/PT 1+/1+, +1 lower extremity edema, digits warm to touch with good cap refill < 3 sec, skin excoriation under breast noted  GI/:  obese soft non tender non distended hypo active bowel sounds, mendez patent scant urine outpur  Lymph:  non palp  Neck:  supple   LINES:  non  HOSPITAL MEDICATIONS:  MEDICATIONS  (STANDING):  sodium chloride 0.9%. 1000 milliLiter(s) (75 mL/Hr) IV Continuous <Continuous>  insulin lispro (HumaLOG) corrective regimen sliding scale   SubCutaneous three times a day before meals  dextrose 5%. 1000 milliLiter(s) (50 mL/Hr) IV Continuous <Continuous>  dextrose 50% Injectable 12.5 Gram(s) IV Push once  influenza   Vaccine 0.5 milliLiter(s) IntraMuscular once  piperacillin/tazobactam IVPB. 3.375 Gram(s) IV Intermittent every 12 hours  ALBUTerol/ipratropium for Nebulization 3 milliLiter(s) Nebulizer every 4 hours  pantoprazole  Injectable 40 milliGRAM(s) IV Push daily  aspirin Suppository 300 milliGRAM(s) Rectal daily  heparin  Injectable 5000 Unit(s) SubCutaneous every 12 hours    MEDICATIONS  (PRN):  ondansetron Injectable 4 milliGRAM(s) IV Push every 6 hours PRN Nausea  dextrose Gel 1 Dose(s) Oral once PRN Blood Glucose LESS THAN 70 milliGRAM(s)/deciliter  glucagon  Injectable 1 milliGRAM(s) IntraMuscular once PRN Glucose LESS THAN 70 milligrams/deciliter  acetaminophen  Suppository. 650 milliGRAM(s) Rectal every 4 hours PRN Mild Pain (1 - 3)      LABS:                        8.3    12.0  )-----------( 94       ( 01 Oct 2017 06:04 )             26.8     Hgb Trend: 8.3<--, 9.1<--, 9.5<--  10-01    141  |  108  |  51<H>  ----------------------------<  106<H>  4.9   |  23  |  2.80<H>    Ca    7.1<L>      01 Oct 2017 06:04  Mg     2.2     10-    TPro  6.3  /  Alb  2.0<L>  /  TBili  0.6  /  DBili  .30<H>  /  AST  4398<H>  /  ALT  2340<H>  /  AlkPhos  117  10-01    LIVER FUNCTIONS - ( 01 Oct 2017 06:04 )  Alb: 2.0 g/dL / Pro: 6.3 g/dL / ALK PHOS: 117 U/L / ALT: 2340 U/L / AST: 4398 U/L / GGT: x           Creatinine Trend: 2.80<--, 2.70<--, 2.70<--  PT/INR - ( 01 Oct 2017 06:04 )   PT: 17.0 sec;   INR: 1.55 ratio         PTT - ( 01 Oct 2017 06:04 )  PTT:31.5 sec  Urinalysis Basic - ( 01 Oct 2017 00:32 )    Color: Yellow / Appearance: Turbid / S.020 / pH: x  Gluc: x / Ketone: Negative  / Bili: Small / Urobili: 4   Blood: x / Protein: 150 mg/dL / Nitrite: Negative   Leuk Esterase: Small / RBC: 6-10 /HPF / WBC 6-10   Sq Epi: x / Non Sq Epi: Moderate / Bacteria: Moderate      Arterial Blood Gas:  10-01 @ 05:40  7.16/66/75/20/91/-4.6  ABG lactate: --  Arterial Blood Gas:  10-01 @ 03:24  7.11/75/128/19/98/-5.6  ABG lactate: --  Arterial Blood Gas:   @ 23:41  7.17/65/73/20/90/-4.3  ABG lactate: --        MICROBIOLOGY:     RADIOLOGY:  [ ] Reviewed and interpreted by me    EKG:      [facility] (ext [id number]) CHIEF COMPLAINT:  Patient is a 88y old  Female who presents with a chief complaint of found unresponsive (30 Sep 2017 15:29)    HPI:  87yo F w/ PMHx DM2, asthma, HTN, iron deficiency anemia, COPD w/ intermittent use of home O2 presents w/ weakness and fatigue. pt is nonverbal at this time and unable to provide any history. She developed a fever last night and the family admits mild cough which was nonproductive. 3-4 days prior to presentation the pt was complaining of mild abd discomfort but was tolerating po well. Family denies dysphagia. They stated that she had an episode of bilious vomiting 4 days ago but none since. She was tolerating diet yesterday without difficulty. Last night developed rectal temp of 102F but denied diaphoresis, chills, rigors. Upon waking this morning, the pt was obtunded. Responding only to noxious stimuli so EMS was alerted. No other recent travel. No recent hospitalizations. No other recent changes in her overall health.  Recnetly her DM2 regimen was decreased due to normal glucose levels. Also, her recent creatinine was 1.1 as per family at the bedside. Family members have medical background.     In the ED, pt found hypotensive w/ SBP in 50's initially, now w/  after 3L NS bolus. Pt also bradycardic w/ P in 50's persistently despite hypotension. Pt was given vanco/zosyn as well as she was found to have L sided PNA.     Family Hx: denies h/o premature CAD, DM2 in immediate family members (30 Sep 2017 12:4    Interval Events:  placed on bipap, received totoal of 120 mg lasix with minimal output  REVIEW OF SYSTEMS:  [xxx ] All other systems negative  [ ] Unable to assess ROS because ________    OBJECTIVE:  ICU Vital Signs Last 24 Hrs  T(C): 36.4 (01 Oct 2017 04:01), Max: 39.3 (30 Sep 2017 10:30)  T(F): 97.6 (01 Oct 2017 04:01), Max: 102.8 (30 Sep 2017 10:30)  HR: 66 (01 Oct 2017 07:43) (48 - 100)  BP: 103/60 (01 Oct 2017 07:00) (84/42 - 146/61)  BP(mean): 77 (01 Oct 2017 07:00) (69 - 88)  ABP: --  ABP(mean): --  RR: 27 (01 Oct 2017 07:00) (10 - 32)  SpO2: 100% (01 Oct 2017 07:43) (94% - 100%)         @ 07:01  -  10-01 @ 07:00  --------------------------------------------------------  IN: 925 mL / OUT: 30 mL / NET: 895 mL      CAPILLARY BLOOD GLUCOSE  115 (01 Oct 2017 06:00)          PHYSICAL EXAM:  Neuro:  awake, follows commands BALDERAS 4/4, denies sob, denies c/p cn intact  Pulm:  crackles in lower extremities base to 1/4 bilat, diminished in lower lung fields bases to 1/3 up left base to 1/4 up right, POCUS  small pleural effusions left> right, focal b lines bilat A line predom, dynamic bronchograms in RML/RLL, and in LLL, utilizing bipap 20/5 50% spVT 250 - 400   C/V:  RRR S1/S2 without rubs murmurs gallops clicks, POCUS RV<LV dyskinesis of left apex, VTI 26, unable to eval IVC. periph pulses palpable at 2+ radial bilat, DP/PT 1+/1+, +1 lower extremity edema, digits warm to touch with good cap refill < 3 sec, skin excoriation under breast noted  GI/:  obese soft non tender non distended hypo active bowel sounds, mendez patent scant urine outpur  Lymph:  non palp  Neck:  supple   LINES:  non  HOSPITAL MEDICATIONS:  MEDICATIONS  (STANDING):  sodium chloride 0.9%. 1000 milliLiter(s) (75 mL/Hr) IV Continuous <Continuous>  insulin lispro (HumaLOG) corrective regimen sliding scale   SubCutaneous three times a day before meals  dextrose 5%. 1000 milliLiter(s) (50 mL/Hr) IV Continuous <Continuous>  dextrose 50% Injectable 12.5 Gram(s) IV Push once  influenza   Vaccine 0.5 milliLiter(s) IntraMuscular once  piperacillin/tazobactam IVPB. 3.375 Gram(s) IV Intermittent every 12 hours  ALBUTerol/ipratropium for Nebulization 3 milliLiter(s) Nebulizer every 4 hours  pantoprazole  Injectable 40 milliGRAM(s) IV Push daily  aspirin Suppository 300 milliGRAM(s) Rectal daily  heparin  Injectable 5000 Unit(s) SubCutaneous every 12 hours    MEDICATIONS  (PRN):  ondansetron Injectable 4 milliGRAM(s) IV Push every 6 hours PRN Nausea  dextrose Gel 1 Dose(s) Oral once PRN Blood Glucose LESS THAN 70 milliGRAM(s)/deciliter  glucagon  Injectable 1 milliGRAM(s) IntraMuscular once PRN Glucose LESS THAN 70 milligrams/deciliter  acetaminophen  Suppository. 650 milliGRAM(s) Rectal every 4 hours PRN Mild Pain (1 - 3)      LABS:                        8.3    12.0  )-----------( 94       ( 01 Oct 2017 06:04 )             26.8     Hgb Trend: 8.3<--, 9.1<--, 9.5<--  10-01    141  |  108  |  51<H>  ----------------------------<  106<H>  4.9   |  23  |  2.80<H>    Ca    7.1<L>      01 Oct 2017 06:04  Mg     2.2     10-    TPro  6.3  /  Alb  2.0<L>  /  TBili  0.6  /  DBili  .30<H>  /  AST  4398<H>  /  ALT  2340<H>  /  AlkPhos  117  10-    LIVER FUNCTIONS - ( 01 Oct 2017 06:04 )  Alb: 2.0 g/dL / Pro: 6.3 g/dL / ALK PHOS: 117 U/L / ALT: 2340 U/L / AST: 4398 U/L / GGT: x           Creatinine Trend: 2.80<--, 2.70<--, 2.70<--  PT/INR - ( 01 Oct 2017 06:04 )   PT: 17.0 sec;   INR: 1.55 ratio         PTT - ( 01 Oct 2017 06:04 )  PTT:31.5 sec  Urinalysis Basic - ( 01 Oct 2017 00:32 )    Color: Yellow / Appearance: Turbid / S.020 / pH: x  Gluc: x / Ketone: Negative  / Bili: Small / Urobili: 4   Blood: x / Protein: 150 mg/dL / Nitrite: Negative   Leuk Esterase: Small / RBC: 6-10 /HPF / WBC 6-10   Sq Epi: x / Non Sq Epi: Moderate / Bacteria: Moderate      Arterial Blood Gas:  10-01 @ 05:40  7.16/66/75/20/91/-4.6  ABG lactate: --  Arterial Blood Gas:  10-01 @ 03:24  7.11/75/128/19/98/-5.6  ABG lactate: --  Arterial Blood Gas:   @ 23:41  7.17/65/73/20/90/-4.3  ABG lactate: --        MICROBIOLOGY:     RADIOLOGY:  [ ] Reviewed and interpreted by me    EKG:      [facility]-BC (ext [id number])

## 2017-10-01 NOTE — CONSULT NOTE ADULT - SUBJECTIVE AND OBJECTIVE BOX
Patient is a 88y old  Female who presents with a chief complaint of found unresponsive (30 Sep 2017 15:29)    HPI:  89yo F w/ PMHx DM2, asthma, HTN, iron deficiency anemia, COPD w/ intermittent use of home O2 presents w/ weakness and fatigue. pt is nonverbal at this time and unable to provide any history. She developed a fever last night and the family admits mild cough which was nonproductive. 3-4 days prior to presentation the pt was complaining of mild abd discomfort but was tolerating po well. Family denies dysphagia. They stated that she had an episode of bilious vomiting 4 days ago but none since. She was tolerating diet yesterday without difficulty. Last night developed rectal temp of 102F but denied diaphoresis, chills, rigors. Upon waking this morning, the pt was obtunded. Responding only to noxious stimuli so EMS was alerted. No other recent travel. No recent hospitalizations. No other recent changes in her overall health.  Recnetly her DM2 regimen was decreased due to normal glucose levels. Also, her recent creatinine was 1.1 as per family at the bedside. Family members have medical background.     In the ED, pt found hypotensive w/ SBP in 50's initially, now w/  after 3L NS bolus. Pt also bradycardic w/ P in 50's persistently despite hypotension. Pt was given vanco/zosyn as well as she was found to have L sided PNA.     Family Hx: denies h/o premature CAD, DM2 in immediate family members (30 Sep 2017 12:46)    Renal consult called for low urine output and SAMIA. Family at bedside. Pt on BIPAP.       PAST MEDICAL HISTORY:  Asthma  Diabetes mellitus  HTN (hypertension)      PAST SURGICAL HISTORY:  No significant past surgical history      FAMILY HISTORY:      SOCIAL HISTORY: No smoking or alcohol use     Allergies    No Known Allergies    Home Medications:   * Patient Currently Takes Medications as of 08-Mar-2016 15:18 documented in Structured Notes  · 	fluticasone-salmeterol:   2 times a day  · 	Medrol Dosepak: use as directed  · 	acetaminophen 650 mg rectal suppository: 1 suppository(ies) rectal every 6 hours, As needed, For Temp over 38.0 C (100.4 F)  · 	acetaminophen 325 mg oral tablet: 2 tab(s) orally every 6 hours, As needed, Moderate Pain  · 	losartan 25 mg oral tablet: 1 tab(s) orally once a day  · 	atorvastatin 10 mg oral tablet: 1 tab(s) orally once a day (at bedtime)  · 	aspirin 81 mg oral tablet, chewable: 1 tab(s) orally once a day  · 	metoprolol tartrate 25 mg oral tablet: 1 tab(s) orally every 12 hours  · 	albuterol-ipratropium 2.5 mg-0.5 mg/3 mL inhalation solution: 3 milliliter(s) inhaled every 6 hours  · 	docusate sodium 10 mg/mL oral liquid: 10 milliliter(s) orally 2 times a day, As needed, Constipation  · 	potassium chloride 10 mEq oral tablet, extended release: 1 tab(s) orally once a day  · 	gabapentin 300 mg oral tablet:  orally 3 times a day  · 	ferrous sulfate 325 mg (65 mg elemental iron) oral delayed release tablet:  orally   · 	Lasix 40 mg oral tablet: 1 tab(s) orally once a day  · 	omeprazole 40 mg oral delayed release capsule: 1 cap(s) orally once a day  · 	Nifedical XL 30 mg oral tablet, extended release: tab(s) orally once a day  · 	Percocet 5/325 oral tablet: 1 tab(s) orally every 6 hours, As Needed      MEDICATIONS  (STANDING):  dextrose 5%. 1000 milliLiter(s) (50 mL/Hr) IV Continuous <Continuous>  dextrose 50% Injectable 12.5 Gram(s) IV Push once  influenza   Vaccine 0.5 milliLiter(s) IntraMuscular once  piperacillin/tazobactam IVPB. 3.375 Gram(s) IV Intermittent every 12 hours  ALBUTerol/ipratropium for Nebulization 3 milliLiter(s) Nebulizer every 4 hours  pantoprazole  Injectable 40 milliGRAM(s) IV Push daily  aspirin Suppository 300 milliGRAM(s) Rectal daily  heparin  Injectable 5000 Unit(s) SubCutaneous every 12 hours  sodium chloride 0.9%. 1000 milliLiter(s) (40 mL/Hr) IV Continuous <Continuous>  nystatin Powder 1 Application(s) Topical every 6 hours  insulin lispro (HumaLOG) corrective regimen sliding scale   SubCutaneous every 6 hours  dextrose 5%. 1000 milliLiter(s) (50 mL/Hr) IV Continuous <Continuous>  dextrose 50% Injectable 12.5 Gram(s) IV Push once  dextrose 50% Injectable 25 Gram(s) IV Push once  dextrose 50% Injectable 25 Gram(s) IV Push once    MEDICATIONS  (PRN):  ondansetron Injectable 4 milliGRAM(s) IV Push every 6 hours PRN Nausea  dextrose Gel 1 Dose(s) Oral once PRN Blood Glucose LESS THAN 70 milliGRAM(s)/deciliter  glucagon  Injectable 1 milliGRAM(s) IntraMuscular once PRN Glucose LESS THAN 70 milligrams/deciliter  acetaminophen  Suppository. 650 milliGRAM(s) Rectal every 4 hours PRN Mild Pain (1 - 3)  dextrose Gel 1 Dose(s) Oral once PRN Blood Glucose LESS THAN 70 milliGRAM(s)/deciliter  glucagon  Injectable 1 milliGRAM(s) IntraMuscular once PRN Glucose LESS THAN 70 milligrams/deciliter      REVIEW OF SYSTEMS:  General: on BIPAP    T(F): 97.5 (10-01-17 @ 08:00), Max: 97.8 (17 @ 22:45)  HR: 79 (10-01-17 @ 11:16) (48 - 79)  BP: 114/69 (10-01-17 @ 10:00) (84/42 - 146/61)  RR: 19 (10-01-17 @ 10:00) (10 - 32)  SpO2: 99% (10-01-17 @ 11:16) (95% - 100%)  Wt(kg): --    PHYSICAL EXAM:  General: on BIPAP  Respiratory: b/l air entry  Cardiovascular: S1 S2  Gastrointestinal: soft  Extremities: Edema        10-01    141  |  108  |  51<H>  ----------------------------<  106<H>  4.9   |  23  |  2.80<H>    Ca    7.1<L>      01 Oct 2017 06:04  Mg     2.2     10-01    TPro  6.3  /  Alb  2.0<L>  /  TBili  0.6  /  DBili  .30<H>  /  AST  4398<H>  /  ALT  2340<H>  /  AlkPhos  117  10-01                          8.7    11.7  )-----------( 92       ( 01 Oct 2017 11:01 )             28.7       Hemoglobin: 8.7 g/dL (10-01 @ 11:01)  Hematocrit: 28.7 % (10-01 @ 11:01)  Potassium, Serum: 4.9 mmol/L (10-01 @ 06:04)  Blood Urea Nitrogen, Serum: 51 mg/dL (10-01 @ 06:04)      Creatinine, Serum: 2.80 (10-01 @ 06:04)  Creatinine, Serum: 2.70 ( @ 23:45)  Creatinine, Serum: 2.70 ( @ 10:59)      Urinalysis Basic - ( 01 Oct 2017 00:32 )    Color: Yellow / Appearance: Turbid / S.020 / pH: x  Gluc: x / Ketone: Negative  / Bili: Small / Urobili: 4   Blood: x / Protein: 150 mg/dL / Nitrite: Negative   Leuk Esterase: Small / RBC: 6-10 /HPF / WBC 6-10   Sq Epi: x / Non Sq Epi: Moderate / Bacteria: Moderate      LIVER FUNCTIONS - ( 01 Oct 2017 06:04 )  Alb: 2.0 g/dL / Pro: 6.3 g/dL / ALK PHOS: 117 U/L / ALT: 2340 U/L / AST: 4398 U/L / GGT: x           CARDIAC MARKERS ( 01 Oct 2017 06:04 )  32.200 ng/mL / x     / x     / x     / x      CARDIAC MARKERS ( 30 Sep 2017 23:45 )  36.100 ng/mL / x     / x     / x     / x      CARDIAC MARKERS ( 30 Sep 2017 17:12 )  35.100 ng/mL / x     / x     / x     / x      CARDIAC MARKERS ( 30 Sep 2017 10:59 )  14.800 ng/mL / x     / 402 U/L / x     / 15.3 ng/mL      Creatine Kinase, Serum: 402 U/L ( @ 10:59)          ABG - ( 01 Oct 2017 05:40 )  pH: 7.16  /  pCO2: 66    /  pO2: 75    / HCO3: 20    / Base Excess: -4.6  /  SaO2: 91                I&O's Detail    30 Sep 2017 07:01  -  01 Oct 2017 07:00  --------------------------------------------------------  IN:    IV PiggyBack: 100 mL    sodium chloride 0.9%: 825 mL  Total IN: 925 mL    OUT:    Indwelling Catheter - Urethral: 55 mL  Total OUT: 55 mL    Total NET: 870 mL      01 Oct 2017 07:  -  01 Oct 2017 11:23  --------------------------------------------------------  IN:    sodium chloride 0.9%: 75 mL    sodium chloride 0.9%.: 40 mL  Total IN: 115 mL    OUT:    Indwelling Catheter - Urethral: 10 mL  Total OUT: 10 mL    Total NET: 105 mL

## 2017-10-01 NOTE — DIETITIAN INITIAL EVALUATION ADULT. - PROBLEM SELECTOR PLAN 2
family elects for no heparin or anticoagulation at this time. I agree w/ this plan.  cardiology was consulted by the ED, will f/u recs but no invasive measures are planned at this time  will trend enzymes q 6hrs and ordered EKG for the AM

## 2017-10-01 NOTE — PROGRESS NOTE ADULT - SUBJECTIVE AND OBJECTIVE BOX
[facility] - John Tariq Grossman, [place], Chito Tapia [place]  Office Number:  [phone]    On bipap, breathing comfortably.  Transferred to ICU overnight for higher level of care. Troponin seems to have peaked at 32, but with multiorgan failure    ROS: negative unless otherwise mentioned.    Telemetry:  AF 60's, although she appears more regular during my exam    MEDICATIONS  (STANDING):  sodium chloride 0.9%. 1000 milliLiter(s) (75 mL/Hr) IV Continuous <Continuous>  insulin lispro (HumaLOG) corrective regimen sliding scale   SubCutaneous three times a day before meals  dextrose 5%. 1000 milliLiter(s) (50 mL/Hr) IV Continuous <Continuous>  dextrose 50% Injectable 12.5 Gram(s) IV Push once  influenza   Vaccine 0.5 milliLiter(s) IntraMuscular once  piperacillin/tazobactam IVPB. 3.375 Gram(s) IV Intermittent every 12 hours  ALBUTerol/ipratropium for Nebulization 3 milliLiter(s) Nebulizer every 4 hours  pantoprazole  Injectable 40 milliGRAM(s) IV Push daily  aspirin Suppository 300 milliGRAM(s) Rectal daily  heparin  Injectable 5000 Unit(s) SubCutaneous every 12 hours    MEDICATIONS  (PRN):  ondansetron Injectable 4 milliGRAM(s) IV Push every 6 hours PRN Nausea  dextrose Gel 1 Dose(s) Oral once PRN Blood Glucose LESS THAN 70 milliGRAM(s)/deciliter  glucagon  Injectable 1 milliGRAM(s) IntraMuscular once PRN Glucose LESS THAN 70 milligrams/deciliter  acetaminophen  Suppository. 650 milliGRAM(s) Rectal every 4 hours PRN Mild Pain (1 - 3)      Allergies    No Known Allergies    Intolerances        Vital Signs Last 24 Hrs  T(C): 36.4 (01 Oct 2017 04:01), Max: 39.3 (30 Sep 2017 10:30)  T(F): 97.6 (01 Oct 2017 04:01), Max: 102.8 (30 Sep 2017 10:30)  HR: 69 (01 Oct 2017 07:00) (48 - 100)  BP: 103/60 (01 Oct 2017 07:00) (84/42 - 146/61)  BP(mean): 77 (01 Oct 2017 07:00) (69 - 88)  RR: 27 (01 Oct 2017 07:00) (10 - 32)  SpO2: 100% (01 Oct 2017 07:00) (94% - 100%)    I&O's Summary    30 Sep 2017 07:01  -  01 Oct 2017 07:00  --------------------------------------------------------  IN: 925 mL / OUT: 30 mL / NET: 895 mL        ON EXAM:    Constitutional: Lethargic but awake and moaning. On bipap  Lungs:  Mildly increased RR. Coarse BS bilaterally with decreased Bs at both bases  Cardiovascular: irregular.  S1 and S2 positive.  No murmurs, rubs, gallops or clicks  Gastrointestinal: Bowel Sounds present, soft, nontender.   Lymph: No peripheral edema. No cervical lymphadenopathy.  Neurological: Awake but could not fully assess  Skin: No rashes or ulcers   Psych:  Did not assess      LABS: All Labs Reviewed:                        8.3    12.0  )-----------( x        ( 01 Oct 2017 06:04 )             26.8                         9.1    15.8  )-----------( 112      ( 30 Sep 2017 23:45 )             28.1                         9.5    16.0  )-----------( 156      ( 30 Sep 2017 10:57 )             30.6     01 Oct 2017 06:04    141    |  108    |  51     ----------------------------<  106    4.9     |  23     |  2.80   30 Sep 2017 23:45    141    |  108    |  49     ----------------------------<  111    5.0     |  23     |  2.70   30 Sep 2017 10:59    140    |  103    |  48     ----------------------------<  111    5.4     |  26     |  2.70     Ca    7.1        01 Oct 2017 06:04  Ca    7.0        30 Sep 2017 23:45  Ca    8.4        30 Sep 2017 10:59  Mg     2.2       01 Oct 2017 06:04    TPro  6.3    /  Alb  2.0    /  TBili  0.6    /  DBili  .30    /  AST  4398   /  ALT  2340   /  AlkPhos  117    01 Oct 2017 06:04  TPro  6.9    /  Alb  2.1    /  TBili  0.6    /  DBili  .40    /  AST  5263   /  ALT  2524   /  AlkPhos  129    30 Sep 2017 23:45  TPro  7.5    /  Alb  2.3    /  TBili  0.9    /  DBili  x      /  AST  3440   /  ALT  1547   /  AlkPhos  148    30 Sep 2017 10:59    PT/INR - ( 01 Oct 2017 06:04 )   PT: 17.0 sec;   INR: 1.55 ratio         PTT - ( 01 Oct 2017 06:04 )  PTT:31.5 sec  CARDIAC MARKERS ( 01 Oct 2017 06:04 )  32.200 ng/mL / x     / x     / x     / x      CARDIAC MARKERS ( 30 Sep 2017 23:45 )  36.100 ng/mL / x     / x     / x     / x      CARDIAC MARKERS ( 30 Sep 2017 17:12 )  35.100 ng/mL / x     / x     / x     / x      CARDIAC MARKERS ( 30 Sep 2017 10:59 )  14.800 ng/mL / x     / 402 U/L / x     / 15.3 ng/mL      Blood Culture:

## 2017-10-01 NOTE — DIETITIAN INITIAL EVALUATION ADULT. - PROBLEM SELECTOR PLAN 5
I discussed poor prognosis along w/ MODS but family requests consult from GI  will trend CMP in AM s/p IVF and f/u GI recs

## 2017-10-01 NOTE — PROGRESS NOTE ADULT - ASSESSMENT
87yo F w/ PMHx DM2, asthma, HTN, iron deficiency anemia, COPD w/ intermittent use of home O2 presents w/ weakness and fatigue. Found to have severe sepsis secondary to PNA. I was called to evaluate because of troponin of 14, which had increased to 32  While her abdominal pain for the last few days could have been cardiac in etiology, the entire clinical picture seems to be in the setting of infection and sepsis/hypotension.  Her EKG is AF with no R wave progression; This is unchanged from prior EKG in 2016.  Last TTE in 2016 with normal LV function. Repeat TTE to see if their are changes in LV function  Antibiotics.   There is multiorgan failure with minimal urine output despite IV Lasix. Probably ATN in the setting of hypotension and multiorgan failure  She is not overtly fluid overloaded on exam but CXR is consistent with pulmonary edema.  ASA 81 MO if unable to tolerate po  If family wants, we can start heparin gtt for A/C, which would treat ACS as well as A/C for her AF  Discussed with entire family in detail  Will follow closely with you    High risk for decompensation. >35 min spent taking care of patient.

## 2017-10-01 NOTE — PROGRESS NOTE ADULT - ASSESSMENT
87yo F w/ PMHx DM2, asthma, HTN, iron deficiency anemia, COPD w/ intermittent use of home O2 presents w/ MODS, shock liver, SAMIA, NSTEMI

## 2017-10-01 NOTE — PROGRESS NOTE ADULT - SUBJECTIVE AND OBJECTIVE BOX
Interval events: admitted to ICU overnight for acute hypoxic and hypercapnic respiratory failure requiring BiPAP, also with shock liver, SAMIA, oliguria, coagulopathy, thrombocytopenia. No fevers. Currently arousable, but minimally responsive, does not follow commands well    Review of Systems: unable to obtain due to encephalopathy    T(F): 97.7 (10-01-17 @ 16:11), Max: 97.9 (10-01-17 @ 12:00)  HR: 71 (10-01-17 @ 15:45) (59 - 79)  BP: 116/56 (10-01-17 @ 14:00) (84/42 - 146/61)  RR: 16 (10-01-17 @ 14:00) (10 - 32)  SpO2: 99% (10-01-17 @ 15:45) (95% - 100%)    CAPILLARY BLOOD GLUCOSE  100 (01 Oct 2017 12:00)    I&O's Summary    30 Sep 2017 07:01  -  01 Oct 2017 07:00  --------------------------------------------------------  IN: 925 mL / OUT: 55 mL / NET: 870 mL    Physical Exam:     Gen: NAD; arousable, interacting, follows simple commands  Neuro: CN II-XII grossly intact; non-focal  HEENT: NC/AT; EOMI; MMM  CV: normal S1 & S2; regular rate and rhythm   Pulm: +rhonchi at bases bilaterally  GI: soft; NT/ND  Ext: no edema; pulses intact  Skin: cool extremities, pulses intact    Meds:  heparin  Injectable 5000 Unit(s) SubCutaneous every 12 hours  piperacillin/tazobactam IVPB. 3.375 Gram(s) IV Intermittent every 12 hours  azithromycin  IVPB      vancomycin  IVPB 1000 milliGRAM(s) IV Intermittent once  insulin lispro (HumaLOG) corrective regimen sliding scale   SubCutaneous every 6 hours  ALBUTerol/ipratropium for Nebulization 3 milliLiter(s) Nebulizer every 4 hours  ondansetron Injectable 4 milliGRAM(s) IV Push every 6 hours PRN  acetaminophen  Suppository. 650 milliGRAM(s) Rectal every 4 hours PRN  aspirin Suppository 300 milliGRAM(s) Rectal daily  pantoprazole  Injectable 40 milliGRAM(s) IV Push daily  dextrose 5% + sodium chloride 0.45% 1000 milliLiter(s) + Sodium Bicarbonate 75mEq @ 50mL/hour  albumin human 25% IVPB 50 milliLiter(s) IV Intermittent every 8 hours  nystatin Powder 1 Application(s) Topical every 6 hours                        8.7    11.7  )-----------( 92       ( 01 Oct 2017 11:01 )             28.7     141  |  108  |  52  ----------------------<  93  4.9   |  22  |  2.90    Ca    7.3<L>      01 Oct 2017 11:01  Phos  5.8     10-01  Mg     2.2     10-01    TPro  6.9  /  Alb  2.1<L>  /  TBili  0.7  /  DBili  x   /  AST  4402<H>  /  ALT  2624<H>  /  AlkPhos  136<H>  10-01    Lactate 0.8           10-01 @ 06:04    Lactate 1.6            @ 23:45    Lactate 1.7            @ 17:12      CARDIAC MARKERS ( 01 Oct 2017 11:01 )  33.600 ng/mL / x     / 832 U/L / x     / 54.6 ng/mL  CARDIAC MARKERS ( 01 Oct 2017 06:04 )  32.200 ng/mL / x     / x     / x     / x      CARDIAC MARKERS ( 30 Sep 2017 23:45 )  36.100 ng/mL / x     / x     / x     / x      CARDIAC MARKERS ( 30 Sep 2017 17:12 )  35.100 ng/mL / x     / x     / x     / x      CARDIAC MARKERS ( 30 Sep 2017 10:59 )  14.800 ng/mL / x     / 402 U/L / x     / 15.3 ng/mL    PT/INR - ( 01 Oct 2017 11:01 )   PT: 16.8 sec;   INR: 1.53 ratio    PTT - ( 01 Oct 2017 11:01 )  PTT:28.9 sec    Urinalysis Basic - ( 01 Oct 2017 00:32 )    Color: Yellow / Appearance: Turbid / S.020 / pH: x  Gluc: x / Ketone: Negative  / Bili: Small / Urobili: 4   Blood: x / Protein: 150 mg/dL / Nitrite: Negative   Leuk Esterase: Small / RBC: 6-10 /HPF / WBC 6-10   Sq Epi: x / Non Sq Epi: Moderate / Bacteria: Moderate      ABG - ( 01 Oct 2017 13:24 )  pH: 7.19  /  pCO2: 58    /  pO2: 98    / HCO3: 19    / Base Excess: -5.7  /  SaO2: 94        Blood cultures: NGTD    Urine culture: >100,000 Group B Strep (agalactiae)    Renal ultrasound: no hydronephrosis    CXR: airspace disease at right lung base worrisome for pneumonia vs. effusion; no PTX    Bedside Lung U/S: A-line predominant anteriorly with scattered B lines at the bases bilaterally; there is a small alveolar consolidation at right lung base with air bronchograms, no significant pleural effusion    Bedside Cardiac U/S: LV systolic function appears normal; LV>RV, no significant pericardial effusion, IVC plump without variation    CENTRAL LINE: N    GRIGGS: Y     A-LINE: N    GLOBAL ISSUE/BEST PRACTICE:  Analgesia: n/a  Sedation: n/a  HOB elevation: yes  Stress ulcer prophylaxis: yes  VTE prophylaxis: yes  Glycemic control: yes  Nutrition: NPO while on BiPAP    CODE STATUS: DNR/DNI

## 2017-10-01 NOTE — PROVIDER CONTACT NOTE (CRITICAL VALUE NOTIFICATION) - ACTION/TREATMENT ORDERED:
Pending order.
Additional fluids, ZosynPhylliso.
No Changes @ this time
continue Bipap; options being discussed with family
BIPAP
Increase settings to 20/5, rate 18 - repeat ABG
NS @ 150ml/hr

## 2017-10-01 NOTE — PROGRESS NOTE ADULT - SUBJECTIVE AND OBJECTIVE BOX
INTERVAL HPI/OVERNIGHT EVENTS:  HPI:  87yo F w/ PMHx DM2, asthma, HTN, iron deficiency anemia, COPD w/ intermittent use of home O2 presents w/ weakness and fatigue. pt is nonverbal at this time and unable to provide any history. She developed a fever last night and the family admits mild cough which was nonproductive. 3-4 days prior to presentation the pt was complaining of mild abd discomfort but was tolerating po well. Family denies dysphagia. They stated that she had an episode of bilious vomiting 4 days ago but none since. She was tolerating diet yesterday without difficulty. Last night developed rectal temp of 102F but denied diaphoresis, chills, rigors. Upon waking this morning, the pt was obtunded. Responding only to noxious stimuli so EMS was alerted. No other recent travel. No recent hospitalizations. No other recent changes in her overall health.  Recnetly her DM2 regimen was decreased due to normal glucose levels. Also, her recent creatinine was 1.1 as per family at the bedside. Family members have medical background.     bipap mask on board  npo    MEDICATIONS  (STANDING):  dextrose 5%. 1000 milliLiter(s) (50 mL/Hr) IV Continuous <Continuous>  dextrose 50% Injectable 12.5 Gram(s) IV Push once  influenza   Vaccine 0.5 milliLiter(s) IntraMuscular once  piperacillin/tazobactam IVPB. 3.375 Gram(s) IV Intermittent every 12 hours  ALBUTerol/ipratropium for Nebulization 3 milliLiter(s) Nebulizer every 4 hours  pantoprazole  Injectable 40 milliGRAM(s) IV Push daily  aspirin Suppository 300 milliGRAM(s) Rectal daily  heparin  Injectable 5000 Unit(s) SubCutaneous every 12 hours  nystatin Powder 1 Application(s) Topical every 6 hours  insulin lispro (HumaLOG) corrective regimen sliding scale   SubCutaneous every 6 hours  dextrose 5%. 1000 milliLiter(s) (50 mL/Hr) IV Continuous <Continuous>  dextrose 50% Injectable 12.5 Gram(s) IV Push once  dextrose 50% Injectable 25 Gram(s) IV Push once  dextrose 50% Injectable 25 Gram(s) IV Push once  dextrose 5% + sodium chloride 0.45% 1000 milliLiter(s) (50 mL/Hr) IV Continuous <Continuous>  azithromycin  IVPB      albumin human 25% IVPB 50 milliLiter(s) IV Intermittent every 8 hours    MEDICATIONS  (PRN):  ondansetron Injectable 4 milliGRAM(s) IV Push every 6 hours PRN Nausea  dextrose Gel 1 Dose(s) Oral once PRN Blood Glucose LESS THAN 70 milliGRAM(s)/deciliter  glucagon  Injectable 1 milliGRAM(s) IntraMuscular once PRN Glucose LESS THAN 70 milligrams/deciliter  acetaminophen  Suppository. 650 milliGRAM(s) Rectal every 4 hours PRN Mild Pain (1 - 3)  dextrose Gel 1 Dose(s) Oral once PRN Blood Glucose LESS THAN 70 milliGRAM(s)/deciliter  glucagon  Injectable 1 milliGRAM(s) IntraMuscular once PRN Glucose LESS THAN 70 milligrams/deciliter      Allergies    No Known Allergies    Intolerances          General:  No wt loss, fevers, chills, night sweats, fatigue,   Eyes:  Good vision, no reported pain  ENT:  No sore throat, pain, runny nose, dysphagia  CV:  No pain, palpitations, hypo/hypertension  Resp:  No dyspnea, cough, tachypnea, wheezing  GI:  No pain, No nausea, No vomiting, No diarrhea, No constipation, No weight loss, No fever, No pruritis, No rectal bleeding, No tarry stools, No dysphagia,  :  No pain, bleeding, incontinence, nocturia  Muscle:  No pain, weakness  Neuro:  No weakness, tingling, memory problems  Psych:  No fatigue, insomnia, mood problems, depression  Endocrine:  No polyuria, polydipsia, cold/heat intolerance  Heme:  No petechiae, ecchymosis, easy bruisability  Skin:  No rash, tattoos, scars, edema      PHYSICAL EXAM:   Vital Signs:  Vital Signs Last 24 Hrs  T(C): 36.6 (01 Oct 2017 12:00), Max: 36.6 (30 Sep 2017 22:45)  T(F): 97.9 (01 Oct 2017 12:00), Max: 97.9 (01 Oct 2017 12:00)  HR: 66 (01 Oct 2017 14:00) (59 - 79)  BP: 116/56 (01 Oct 2017 14:00) (84/42 - 146/61)  BP(mean): 80 (01 Oct 2017 14:00) (69 - 88)  RR: 16 (01 Oct 2017 14:00) (10 - 32)  SpO2: 100% (01 Oct 2017 14:00) (95% - 100%)  Daily     Daily Weight in k (01 Oct 2017 13:11)I&O's Summary    30 Sep 2017 07:  -  01 Oct 2017 07:00  --------------------------------------------------------  IN: 925 mL / OUT: 55 mL / NET: 870 mL    01 Oct 2017 07:01  -  01 Oct 2017 14:18  --------------------------------------------------------  IN: 395 mL / OUT: 45 mL / NET: 350 mL        GENERAL:  Appears stated age, well-groomed, well-nourished, no distress  HEENT:  NC/AT,  conjunctivae clear and pink, no thyromegaly, nodules, adenopathy, no JVD, sclera -anicteric  CHEST:  Full & symmetric excursion, no increased effort, breath sounds clear bipap mask  HEART:  Regular rhythm, S1, S2, no murmur/rub/S3/S4, no abdominal bruit, no edema  ABDOMEN:  Soft, non-tender, non-distended, normoactive bowel sounds,  no masses ,no hepato-splenomegaly, no signs of chronic liver disease  EXTEREMITIES:  no cyanosis,clubbing or edema  SKIN:  No rash/erythema/ecchymoses/petechiae/wounds/abscess/warm/dry  NEURO:  Alert, oriented, no asterixis, no tremor, no encephalopathy      LABS:                        8.7    11.7  )-----------( 92       ( 01 Oct 2017 11:01 )             28.7     10-01    141  |  108  |  52<H>  ----------------------------<  93  4.9   |  22  |  2.90<H>    Ca    7.3<L>      01 Oct 2017 11:01  Phos  5.8     10-01  Mg     2.2     10-    TPro  6.9  /  Alb  2.1<L>  /  TBili  0.7  /  DBili  x   /  AST  4402<H>  /  ALT  2624<H>  /  AlkPhos  136<H>  10-01    PT/INR - ( 01 Oct 2017 11:01 )   PT: 16.8 sec;   INR: 1.53 ratio         PTT - ( 01 Oct 2017 11:01 )  PTT:28.9 sec  Urinalysis Basic - ( 01 Oct 2017 00:32 )    Color: Yellow / Appearance: Turbid / S.020 / pH: x  Gluc: x / Ketone: Negative  / Bili: Small / Urobili: 4   Blood: x / Protein: 150 mg/dL / Nitrite: Negative   Leuk Esterase: Small / RBC: 6-10 /HPF / WBC 6-10   Sq Epi: x / Non Sq Epi: Moderate / Bacteria: Moderate      amylase   lipaseLipase, Serum: 289 U/L ( @ 10:59)    RADIOLOGY & ADDITIONAL TESTS:

## 2017-10-01 NOTE — PROGRESS NOTE ADULT - ASSESSMENT
88 yr old female with stated hx copd intermittent home O2, asthma, Fe deficiency anemia who presents with fever fatigue found to have severe sepsis 2/2 to pmn and NSTEMI requiring transfer to ICU 2/2 to hypercapnic resp failure and multiorgan failure requiring bipap therapy        Plan:   Neuro:   neuro checks q 4 hrs and prn, bed rest at present    Pulm:  pt is dnr/dni however will optimize bipap therapy to maintain ph 7.35-7.45; PCO2 35 - 45: SPO2 > 92%, bronchodilators prn, chest pt q 2 hrs    Card:  will continue to trend c.e., obtain TTE, start ASA per rectum ( pt on bipap)    GI/:  currently npo due to bipap, when pulm improves re institute diet as tolerated, bowel regimen, mendez for strict I&O's, pt not on pressor therapy    ID:  f/u cultures, d/c vanco and administer to trough maintain trough 15 to 20, continue zosyn, vanco tough q.d    FEN/HEME/ENDO:  POC glucose q 4 hrs maintain glucose 140 - 160, NS at 30 cc/hr, consider lasix gtt, trend lytes/with LFT's q 12 hrs/cbc/ coags q 12, obtain renal consult, sent urine lytes and osmol with serum lytes and osmol, trend abg prn 88 yr old female with stated hx copd intermittent home O2, asthma, Fe deficiency anemia who presents with fever fatigue found to have severe sepsis 2/2 to pmn and NSTEMI requiring transfer to ICU 2/2 to hypercapnic resp failure and multiorgan failure requiring bipap therapy        Plan:   Neuro:   neuro checks q 4 hrs and prn, bed rest at present    Pulm:  pt is dnr/dni however will optimize bipap therapy to maintain ph 7.35-7.45; PCO2 35 - 45: SPO2 > 92%, bronchodilators prn, chest pt q 2 hrs    Card:  will continue to trend c.e., obtain TTE, start ASA per rectum ( pt on bipap)    GI/:  currently npo due to bipap, when pulm improves re institute diet as tolerated, bowel regimen, mendez for strict I&O's, pt not on pressor therapy,will obtain abd u/s 2/2 to sludge noted in gall bladder via POCUS    ID:  f/u cultures, d/c vanco and administer to trough maintain trough 15 to 20, continue zosyn, vanco tough q.d, will send urine legionella, add zithro    FEN/HEME/ENDO:  POC glucose q 4 hrs maintain glucose 140 - 160, NS at 30 cc/hr, consider lasix gtt, trend lytes/with LFT's q 12 hrs/cbc/ coags q 12, obtain renal consult, sent urine lytes and osmol with serum lytes and osmol, trend abg prn 88 yr old female with stated hx copd intermittent home O2, asthma, Fe deficiency anemia who presents with fever fatigue found to have severe sepsis 2/2 to pmn and NSTEMI requiring transfer to ICU 2/2 to hypercapnic resp failure and multiorgan failure requiring bipap therapy        Plan:   Neuro:   neuro checks q 4 hrs and prn, bed rest at present    Pulm:  pt is dnr/dni however will optimize bipap therapy to maintain ph 7.35-7.45; PCO2 35 - 45: SPO2 > 92%, bronchodilators prn, chest pt q 2 hrs    Card:  will continue to trend c.e., obtain TTE, start ASA per rectum ( pt on bipap)    GI/:  currently npo due to bipap, when pulm improves re institute diet as tolerated, bowel regimen, mendez for strict I&O's, pt not on pressor therapy,will obtain abd u/s 2/2 to sludge noted in gall bladder via POCUS    ID:  f/u cultures, d/c vanco and administer to trough maintain trough 15 to 20, continue zosyn, vanco tough q.d, will send urine legionella, add zithro    FEN/HEME/ENDO:  POC glucose q 4 hrs maintain glucose 140 - 160, NS at 30 cc/hr, consider lasix gtt, trend lytes/with LFT's q 12 hrs/cbc/ coags q 12, obtain renal consult, sent urine lytes and osmol with serum lytes and osmol, trend abg prn          > 40 minutes spent critical care

## 2017-10-01 NOTE — PROGRESS NOTE ADULT - SUBJECTIVE AND OBJECTIVE BOX
Patient is a 88y old  Female who presents with a chief complaint of found unresponsive (30 Sep 2017 15:29)      INTERVAL HPI: Pt seen and examined in ICU. family bed side. on BIPAP, alert and answers sometimes.  OVERNIGHT EVENTS:  T(F): 97.9 (10-01-17 @ 12:00), Max: 97.9 (10-01-17 @ 12:00)  HR: 69 (10-01-17 @ 12:00) (59 - 79)  BP: 108/49 (10-01-17 @ 12:00) (84/42 - 146/61)  RR: 23 (10-01-17 @ 12:00) (10 - 32)  SpO2: 100% (10-01-17 @ 12:00) (95% - 100%)  Wt(kg): --  I&O's Summary    30 Sep 2017 07:  -  01 Oct 2017 07:00  --------------------------------------------------------  IN: 925 mL / OUT: 55 mL / NET: 870 mL    01 Oct 2017 07:  -  01 Oct 2017 13:23  --------------------------------------------------------  IN: 395 mL / OUT: 45 mL / NET: 350 mL        REVIEW OF SYSTEM:    UTO as pt is on BIPAP      PHYSICAL EXAM:  GENERAL: NAD, well-developed  HEAD:  Atraumatic, Normocephalic  EYES: unable to examine  NECK: Supple, No JVD, Normal thyroid  HEART: Regular rate and rhythm; No murmurs, rubs, or gallops  RESPIRATORY: diminished BS B/L, No wheezing / rhonchi  ABDOMEN: Soft, Nontender, Nondistended; Bowel sounds present  NEUROLOGY: moving all extremities  EXTREMITIES:  2+ Peripheral Pulses, + edema  SKIN: warm, dry, normal color, no rash or abnormal lesions        LABS:                        8.7    11.7  )-----------( 92       ( 01 Oct 2017 11:01 )             28.7     10-01    141  |  108  |  52<H>  ----------------------------<  93  4.9   |  22  |  2.90<H>    Ca    7.3<L>      01 Oct 2017 11:01  Phos  5.8     10-01  Mg     2.2     10-01    TPro  6.9  /  Alb  2.1<L>  /  TBili  0.7  /  DBili  x   /  AST  4402<H>  /  ALT  2624<H>  /  AlkPhos  136<H>  10-01    PT/INR - ( 01 Oct 2017 11:01 )   PT: 16.8 sec;   INR: 1.53 ratio         PTT - ( 01 Oct 2017 11:01 )  PTT:28.9 sec  Urinalysis Basic - ( 01 Oct 2017 00:32 )    Color: Yellow / Appearance: Turbid / S.020 / pH: x  Gluc: x / Ketone: Negative  / Bili: Small / Urobili: 4   Blood: x / Protein: 150 mg/dL / Nitrite: Negative   Leuk Esterase: Small / RBC: 6-10 /HPF / WBC 6-10   Sq Epi: x / Non Sq Epi: Moderate / Bacteria: Moderate      CAPILLARY BLOOD GLUCOSE  100 (01 Oct 2017 12:00)  115 (01 Oct 2017 06:00)  118 (01 Oct 2017 00:00)  142 (30 Sep 2017 16:30)        ABG - ( 01 Oct 2017 05:40 )  pH: 7.16  /  pCO2: 66    /  pO2: 75    / HCO3: 20    / Base Excess: -4.6  /  SaO2: 91                        MEDICATIONS  (STANDING):  dextrose 5%. 1000 milliLiter(s) (50 mL/Hr) IV Continuous <Continuous>  dextrose 50% Injectable 12.5 Gram(s) IV Push once  influenza   Vaccine 0.5 milliLiter(s) IntraMuscular once  piperacillin/tazobactam IVPB. 3.375 Gram(s) IV Intermittent every 12 hours  ALBUTerol/ipratropium for Nebulization 3 milliLiter(s) Nebulizer every 4 hours  pantoprazole  Injectable 40 milliGRAM(s) IV Push daily  aspirin Suppository 300 milliGRAM(s) Rectal daily  heparin  Injectable 5000 Unit(s) SubCutaneous every 12 hours  nystatin Powder 1 Application(s) Topical every 6 hours  insulin lispro (HumaLOG) corrective regimen sliding scale   SubCutaneous every 6 hours  dextrose 5%. 1000 milliLiter(s) (50 mL/Hr) IV Continuous <Continuous>  dextrose 50% Injectable 12.5 Gram(s) IV Push once  dextrose 50% Injectable 25 Gram(s) IV Push once  dextrose 50% Injectable 25 Gram(s) IV Push once  dextrose 5% + sodium chloride 0.45% 1000 milliLiter(s) (50 mL/Hr) IV Continuous <Continuous>  azithromycin  IVPB      albumin human 25% IVPB 50 milliLiter(s) IV Intermittent every 8 hours    MEDICATIONS  (PRN):  ondansetron Injectable 4 milliGRAM(s) IV Push every 6 hours PRN Nausea  dextrose Gel 1 Dose(s) Oral once PRN Blood Glucose LESS THAN 70 milliGRAM(s)/deciliter  glucagon  Injectable 1 milliGRAM(s) IntraMuscular once PRN Glucose LESS THAN 70 milligrams/deciliter  acetaminophen  Suppository. 650 milliGRAM(s) Rectal every 4 hours PRN Mild Pain (1 - 3)  dextrose Gel 1 Dose(s) Oral once PRN Blood Glucose LESS THAN 70 milliGRAM(s)/deciliter  glucagon  Injectable 1 milliGRAM(s) IntraMuscular once PRN Glucose LESS THAN 70 milligrams/deciliter

## 2017-10-02 LAB
ALBUMIN SERPL ELPH-MCNC: 2.4 G/DL — LOW (ref 3.3–5)
ALP SERPL-CCNC: 119 U/L — SIGNIFICANT CHANGE UP (ref 40–120)
ALT FLD-CCNC: 2221 U/L — HIGH (ref 12–78)
ANION GAP SERPL CALC-SCNC: 10 MMOL/L — SIGNIFICANT CHANGE UP (ref 5–17)
APTT BLD: 29.6 SEC — SIGNIFICANT CHANGE UP (ref 27.5–37.4)
AST SERPL-CCNC: 2792 U/L — HIGH (ref 15–37)
BASE EXCESS BLDA CALC-SCNC: -3 MMOL/L — LOW (ref -2–2)
BASOPHILS # BLD AUTO: 0 K/UL — SIGNIFICANT CHANGE UP (ref 0–0.2)
BASOPHILS NFR BLD AUTO: 0.6 % — SIGNIFICANT CHANGE UP (ref 0–2)
BILIRUB SERPL-MCNC: 0.7 MG/DL — SIGNIFICANT CHANGE UP (ref 0.2–1.2)
BLOOD GAS COMMENTS ARTERIAL: SIGNIFICANT CHANGE UP
BLOOD GAS COMMENTS ARTERIAL: SIGNIFICANT CHANGE UP
BUN SERPL-MCNC: 59 MG/DL — HIGH (ref 7–23)
CALCIUM SERPL-MCNC: 7.4 MG/DL — LOW (ref 8.5–10.1)
CHLORIDE SERPL-SCNC: 107 MMOL/L — SIGNIFICANT CHANGE UP (ref 96–108)
CK MB BLD-MCNC: 5.7 % — HIGH (ref 0–3.5)
CK MB CFR SERPL CALC: 24.7 NG/ML — HIGH (ref 0–3.6)
CK SERPL-CCNC: 432 U/L — HIGH (ref 26–192)
CO2 SERPL-SCNC: 24 MMOL/L — SIGNIFICANT CHANGE UP (ref 22–31)
CREAT SERPL-MCNC: 3.5 MG/DL — HIGH (ref 0.5–1.3)
CULTURE RESULTS: NO GROWTH — SIGNIFICANT CHANGE UP
D DIMER BLD IA.RAPID-MCNC: 7126 NG/ML DDU — HIGH
EOSINOPHIL # BLD AUTO: 0.1 K/UL — SIGNIFICANT CHANGE UP (ref 0–0.5)
EOSINOPHIL NFR BLD AUTO: 1.1 % — SIGNIFICANT CHANGE UP (ref 0–6)
FIBRINOGEN PPP-MCNC: 376 MG/DL — SIGNIFICANT CHANGE UP (ref 310–510)
FSP PPP-MCNC: >=20 UG/ML
GLUCOSE SERPL-MCNC: 132 MG/DL — HIGH (ref 70–99)
HBA1C BLD-MCNC: 5.4 % — SIGNIFICANT CHANGE UP (ref 4–5.6)
HCO3 BLDA-SCNC: 22 MMOL/L — LOW (ref 23–27)
HCT VFR BLD CALC: 25.8 % — LOW (ref 34.5–45)
HGB BLD-MCNC: 8.1 G/DL — LOW (ref 11.5–15.5)
HOROWITZ INDEX BLDA+IHG-RTO: 25 — SIGNIFICANT CHANGE UP
INR BLD: 1.68 RATIO — HIGH (ref 0.88–1.16)
LEGIONELLA AG UR QL: NEGATIVE — SIGNIFICANT CHANGE UP
LYMPHOCYTES # BLD AUTO: 0.9 K/UL — LOW (ref 1–3.3)
LYMPHOCYTES # BLD AUTO: 13.7 % — SIGNIFICANT CHANGE UP (ref 13–44)
MAGNESIUM SERPL-MCNC: 2.1 MG/DL — SIGNIFICANT CHANGE UP (ref 1.6–2.6)
MCHC RBC-ENTMCNC: 28.4 PG — SIGNIFICANT CHANGE UP (ref 27–34)
MCHC RBC-ENTMCNC: 31.2 GM/DL — LOW (ref 32–36)
MCV RBC AUTO: 90.7 FL — SIGNIFICANT CHANGE UP (ref 80–100)
MONOCYTES # BLD AUTO: 0.5 K/UL — SIGNIFICANT CHANGE UP (ref 0–0.9)
MONOCYTES NFR BLD AUTO: 7.8 % — SIGNIFICANT CHANGE UP (ref 1–9)
NEUTROPHILS # BLD AUTO: 5.2 K/UL — SIGNIFICANT CHANGE UP (ref 1.8–7.4)
NEUTROPHILS NFR BLD AUTO: 76.8 % — SIGNIFICANT CHANGE UP (ref 43–77)
PCO2 BLDA: 48 MMHG — HIGH (ref 32–46)
PH BLDA: 7.29 — LOW (ref 7.35–7.45)
PHOSPHATE SERPL-MCNC: 5 MG/DL — HIGH (ref 2.5–4.5)
PLATELET # BLD AUTO: 94 K/UL — LOW (ref 150–400)
PO2 BLDA: 60 MMHG — LOW (ref 74–108)
POTASSIUM SERPL-MCNC: 4.4 MMOL/L — SIGNIFICANT CHANGE UP (ref 3.5–5.3)
POTASSIUM SERPL-SCNC: 4.4 MMOL/L — SIGNIFICANT CHANGE UP (ref 3.5–5.3)
PROT SERPL-MCNC: 6.5 G/DL — SIGNIFICANT CHANGE UP (ref 6–8.3)
PROTHROM AB SERPL-ACNC: 18.5 SEC — HIGH (ref 9.8–12.7)
RBC # BLD: 2.84 M/UL — LOW (ref 3.8–5.2)
RBC # FLD: 13.7 % — SIGNIFICANT CHANGE UP (ref 10.3–14.5)
SAO2 % BLDA: 88 % — LOW (ref 92–96)
SODIUM SERPL-SCNC: 141 MMOL/L — SIGNIFICANT CHANGE UP (ref 135–145)
SPECIMEN SOURCE: SIGNIFICANT CHANGE UP
TROPONIN I SERPL-MCNC: 21.8 NG/ML — HIGH (ref 0.01–0.04)
VANCOMYCIN TROUGH SERPL-MCNC: 15.4 UG/ML — SIGNIFICANT CHANGE UP (ref 10–20)
WBC # BLD: 6.8 K/UL — SIGNIFICANT CHANGE UP (ref 3.8–10.5)
WBC # FLD AUTO: 6.8 K/UL — SIGNIFICANT CHANGE UP (ref 3.8–10.5)

## 2017-10-02 PROCEDURE — 99291 CRITICAL CARE FIRST HOUR: CPT

## 2017-10-02 PROCEDURE — 99233 SBSQ HOSP IP/OBS HIGH 50: CPT

## 2017-10-02 PROCEDURE — 71010: CPT | Mod: 26

## 2017-10-02 PROCEDURE — 93306 TTE W/DOPPLER COMPLETE: CPT | Mod: 26

## 2017-10-02 RX ORDER — SODIUM CHLORIDE 9 MG/ML
1000 INJECTION, SOLUTION INTRAVENOUS
Qty: 0 | Refills: 0 | Status: DISCONTINUED | OUTPATIENT
Start: 2017-10-02 | End: 2017-10-03

## 2017-10-02 RX ORDER — FUROSEMIDE 40 MG
60 TABLET ORAL ONCE
Qty: 0 | Refills: 0 | Status: COMPLETED | OUTPATIENT
Start: 2017-10-02 | End: 2017-10-02

## 2017-10-02 RX ORDER — SODIUM CHLORIDE 9 MG/ML
1000 INJECTION, SOLUTION INTRAVENOUS
Qty: 0 | Refills: 0 | Status: DISCONTINUED | OUTPATIENT
Start: 2017-10-02 | End: 2017-10-02

## 2017-10-02 RX ORDER — HYDROMORPHONE HYDROCHLORIDE 2 MG/ML
0.25 INJECTION INTRAMUSCULAR; INTRAVENOUS; SUBCUTANEOUS EVERY 4 HOURS
Qty: 0 | Refills: 0 | Status: DISCONTINUED | OUTPATIENT
Start: 2017-10-02 | End: 2017-10-04

## 2017-10-02 RX ORDER — METOPROLOL TARTRATE 50 MG
2.5 TABLET ORAL EVERY 6 HOURS
Qty: 0 | Refills: 0 | Status: DISCONTINUED | OUTPATIENT
Start: 2017-10-02 | End: 2017-10-03

## 2017-10-02 RX ORDER — HYDROMORPHONE HYDROCHLORIDE 2 MG/ML
0.5 INJECTION INTRAMUSCULAR; INTRAVENOUS; SUBCUTANEOUS EVERY 4 HOURS
Qty: 0 | Refills: 0 | Status: DISCONTINUED | OUTPATIENT
Start: 2017-10-02 | End: 2017-10-02

## 2017-10-02 RX ADMIN — Medication 3 MILLILITER(S): at 07:55

## 2017-10-02 RX ADMIN — Medication 3 MILLILITER(S): at 16:50

## 2017-10-02 RX ADMIN — AZITHROMYCIN 255 MILLIGRAM(S): 500 TABLET, FILM COATED ORAL at 10:41

## 2017-10-02 RX ADMIN — Medication 300 MILLIGRAM(S): at 11:44

## 2017-10-02 RX ADMIN — Medication 50 MILLILITER(S): at 14:21

## 2017-10-02 RX ADMIN — PIPERACILLIN AND TAZOBACTAM 25 GRAM(S): 4; .5 INJECTION, POWDER, LYOPHILIZED, FOR SOLUTION INTRAVENOUS at 21:01

## 2017-10-02 RX ADMIN — SODIUM CHLORIDE 50 MILLILITER(S): 9 INJECTION, SOLUTION INTRAVENOUS at 14:00

## 2017-10-02 RX ADMIN — HYDROMORPHONE HYDROCHLORIDE 0.5 MILLIGRAM(S): 2 INJECTION INTRAMUSCULAR; INTRAVENOUS; SUBCUTANEOUS at 12:30

## 2017-10-02 RX ADMIN — HYDROMORPHONE HYDROCHLORIDE 0.5 MILLIGRAM(S): 2 INJECTION INTRAMUSCULAR; INTRAVENOUS; SUBCUTANEOUS at 12:45

## 2017-10-02 RX ADMIN — Medication 3 MILLILITER(S): at 23:14

## 2017-10-02 RX ADMIN — HEPARIN SODIUM 5000 UNIT(S): 5000 INJECTION INTRAVENOUS; SUBCUTANEOUS at 18:46

## 2017-10-02 RX ADMIN — Medication 2.5 MILLIGRAM(S): at 18:45

## 2017-10-02 RX ADMIN — NYSTATIN CREAM 1 APPLICATION(S): 100000 CREAM TOPICAL at 12:09

## 2017-10-02 RX ADMIN — Medication 3 MILLILITER(S): at 02:37

## 2017-10-02 RX ADMIN — Medication 50 MILLILITER(S): at 05:33

## 2017-10-02 RX ADMIN — Medication 2.5 MILLIGRAM(S): at 11:41

## 2017-10-02 RX ADMIN — Medication 50 MILLILITER(S): at 21:02

## 2017-10-02 RX ADMIN — PIPERACILLIN AND TAZOBACTAM 25 GRAM(S): 4; .5 INJECTION, POWDER, LYOPHILIZED, FOR SOLUTION INTRAVENOUS at 11:41

## 2017-10-02 RX ADMIN — Medication 3 MILLILITER(S): at 12:05

## 2017-10-02 RX ADMIN — PANTOPRAZOLE SODIUM 40 MILLIGRAM(S): 20 TABLET, DELAYED RELEASE ORAL at 11:42

## 2017-10-02 RX ADMIN — NYSTATIN CREAM 1 APPLICATION(S): 100000 CREAM TOPICAL at 18:44

## 2017-10-02 RX ADMIN — Medication 3 MILLILITER(S): at 20:34

## 2017-10-02 RX ADMIN — HEPARIN SODIUM 5000 UNIT(S): 5000 INJECTION INTRAVENOUS; SUBCUTANEOUS at 05:34

## 2017-10-02 RX ADMIN — Medication 2: at 18:44

## 2017-10-02 RX ADMIN — Medication 60 MILLIGRAM(S): at 15:34

## 2017-10-02 RX ADMIN — NYSTATIN CREAM 1 APPLICATION(S): 100000 CREAM TOPICAL at 05:34

## 2017-10-02 RX ADMIN — Medication 2: at 12:08

## 2017-10-02 NOTE — PROGRESS NOTE ADULT - SUBJECTIVE AND OBJECTIVE BOX
Patient is a 88y old  Female who presents with a chief complaint of found unresponsive (30 Sep 2017 15:29).       INTERVAL HPI: 88F PMH HTN, HLD, DM2, COPD on home oxygen, chronically bedbound, & chronic hypercapnic respiratory failure presents with septic shock with acute on chronic hypoxic and hypercapnic respiratory failure, NSTEMI, shock liver, SAMIA with oliguria, and coagulopathy. Found to have RLL CAP/aspiration pna + GBS UTI.  Pt seen and examined, Pt is alert, awake. c/o pain. family bed side. Off BIPAP    OVERNIGHT EVENTS:   T(F): 98.8 (10-02-17 @ 11:33), Max: 98.8 (10-02-17 @ 11:33)  HR: 65 (10-02-17 @ 14:00) (60 - 107)  BP: 120/60 (10-02-17 @ 13:00) (109/53 - 160/70)  RR: 16 (10-02-17 @ 14:00) (14 - 33)  SpO2: 95% (10-02-17 @ 14:00) (92% - 100%)  Wt(kg): --  I&O's Summary    01 Oct 2017 07:  -  02 Oct 2017 07:00  --------------------------------------------------------  IN: 2495 mL / OUT: 220 mL / NET: 2275 mL    02 Oct 2017 07:  -  02 Oct 2017 14:54  --------------------------------------------------------  IN: 300 mL / OUT: 187 mL / NET: 113 mL        REVIEW OF SYSTEM:    Constitutional: No fever, chills, fatigue  Neuro: No headache, numbness, +weakness  Resp: No cough, wheezing, +shortness of breath  CVS: No chest pain, palpitations, leg swelling  GI: No abdominal pain, nausea, vomiting, diarrhea   : No dysuria, frequency, incontinence  Skin: No itching, burning, rashes, or lesions   Msk: Pain in   Psych: No depression, anxiety, mood swings          PHYSICAL EXAM:  GENERAL: NAD, well-developed  HEAD:  Atraumatic, Normocephalic  EYES: EOMI, PERRLA, conjunctiva and sclera clear  ENMT: No tonsillar erythema, exudates, or enlargement; Moist mucous membranes,   NECK: Supple, No JVD, Normal thyroid  HEART: Regular rate and rhythm; No murmurs, rubs, or gallops  RESPIRATORY: CTA B/L, No wheezing / rhonchi  ABDOMEN: Soft, Nontender, Nondistended; Bowel sounds present  NEUROLOGY: A&Ox3, nonfocal, CN II-XII grossly intact, sensation intact, no gait abnormalities bilaterally;  EXTREMITIES:  2+ Peripheral Pulses, No clubbing, cyanosis, or edema  SKIN: warm, dry, normal color, no rash or abnormal lesions        LABS:                        8.1    6.8   )-----------( 94       ( 02 Oct 2017 06:02 )             25.8     10-02    141  |  107  |  59<H>  ----------------------------<  132<H>  4.4   |  24  |  3.50<H>    Ca    7.4<L>      02 Oct 2017 06:02  Phos  5.0     10-02  Mg     2.1     10-02    TPro  6.5  /  Alb  2.4<L>  /  TBili  0.7  /  DBili  x   /  AST  2792<H>  /  ALT  2221<H>  /  AlkPhos  119  10-02    PT/INR - ( 02 Oct 2017 06:02 )   PT: 18.5 sec;   INR: 1.68 ratio         PTT - ( 02 Oct 2017 06:02 )  PTT:29.6 sec  Urinalysis Basic - ( 01 Oct 2017 00:32 )    Color: Yellow / Appearance: Turbid / S.020 / pH: x  Gluc: x / Ketone: Negative  / Bili: Small / Urobili: 4   Blood: x / Protein: 150 mg/dL / Nitrite: Negative   Leuk Esterase: Small / RBC: 6-10 /HPF / WBC 6-10   Sq Epi: x / Non Sq Epi: Moderate / Bacteria: Moderate      CAPILLARY BLOOD GLUCOSE  134 (02 Oct 2017 05:00)  132 (01 Oct 2017 23:00)  144 (01 Oct 2017 18:00)        ABG - ( 02 Oct 2017 05:47 )  pH: 7.29  /  pCO2: 48    /  pO2: 60    / HCO3: 22    / Base Excess: -3.0  /  SaO2: 88                        MEDICATIONS  (STANDING):  dextrose 5%. 1000 milliLiter(s) (50 mL/Hr) IV Continuous <Continuous>  dextrose 50% Injectable 12.5 Gram(s) IV Push once  influenza   Vaccine 0.5 milliLiter(s) IntraMuscular once  piperacillin/tazobactam IVPB. 3.375 Gram(s) IV Intermittent every 12 hours  ALBUTerol/ipratropium for Nebulization 3 milliLiter(s) Nebulizer every 4 hours  pantoprazole  Injectable 40 milliGRAM(s) IV Push daily  aspirin Suppository 300 milliGRAM(s) Rectal daily  heparin  Injectable 5000 Unit(s) SubCutaneous every 12 hours  nystatin Powder 1 Application(s) Topical every 6 hours  insulin lispro (HumaLOG) corrective regimen sliding scale   SubCutaneous every 6 hours  dextrose 5%. 1000 milliLiter(s) (50 mL/Hr) IV Continuous <Continuous>  dextrose 50% Injectable 12.5 Gram(s) IV Push once  dextrose 50% Injectable 25 Gram(s) IV Push once  dextrose 50% Injectable 25 Gram(s) IV Push once  azithromycin  IVPB      albumin human 25% IVPB 50 milliLiter(s) IV Intermittent every 8 hours  azithromycin  IVPB 500 milliGRAM(s) IV Intermittent every 24 hours  metoprolol Injectable 2.5 milliGRAM(s) IV Push every 6 hours  furosemide   Injectable 60 milliGRAM(s) IV Push once  dextrose 5% + sodium chloride 0.45%. 1000 milliLiter(s) (50 mL/Hr) IV Continuous <Continuous>    MEDICATIONS  (PRN):  ondansetron Injectable 4 milliGRAM(s) IV Push every 6 hours PRN Nausea  dextrose Gel 1 Dose(s) Oral once PRN Blood Glucose LESS THAN 70 milliGRAM(s)/deciliter  glucagon  Injectable 1 milliGRAM(s) IntraMuscular once PRN Glucose LESS THAN 70 milligrams/deciliter  acetaminophen  Suppository. 650 milliGRAM(s) Rectal every 4 hours PRN Mild Pain (1 - 3)  dextrose Gel 1 Dose(s) Oral once PRN Blood Glucose LESS THAN 70 milliGRAM(s)/deciliter  glucagon  Injectable 1 milliGRAM(s) IntraMuscular once PRN Glucose LESS THAN 70 milligrams/deciliter  HYDROmorphone  Injectable 0.25 milliGRAM(s) IV Push every 4 hours PRN Severe Pain (7 - 10)

## 2017-10-02 NOTE — PROGRESS NOTE ADULT - SUBJECTIVE AND OBJECTIVE BOX
24 hour events:     Review of Systems:  Unable to obtain 2/2 mental status    T(F): 98.5 (10-02-17 @ 04:01), Max: 98.7 (10-02-17 @ 00:33)  HR: 72 (10-02-17 @ 07:00) (62 - 107)  BP: 134/60 (10-02-17 @ 07:00) (107/51 - 160/70)  RR: 27 (10-02-17 @ 07:00) (16 - 30)  SpO2: 92% (10-02-17 @ 07:00) (92% - 100%)  Wt(kg): --        CAPILLARY BLOOD GLUCOSE  134 (02 Oct 2017 05:00)          I&O's Summary    01 Oct 2017 07:01  -  02 Oct 2017 07:00  --------------------------------------------------------  IN: 2495 mL / OUT: 220 mL / NET: 2275 mL        Physical Exam:   Gen:  Neuro:  HEENT:  Resp:  CVS:  Abd:  Ext:  Skin:    Meds:  piperacillin/tazobactam IVPB. IV Intermittent  azithromycin  IVPB   azithromycin  IVPB IV Intermittent      dextrose Gel Oral PRN  dextrose 50% Injectable IV Push  glucagon  Injectable IntraMuscular PRN  insulin lispro (HumaLOG) corrective regimen sliding scale SubCutaneous  dextrose Gel Oral PRN  dextrose 50% Injectable IV Push  dextrose 50% Injectable IV Push  dextrose 50% Injectable IV Push  glucagon  Injectable IntraMuscular PRN    ALBUTerol/ipratropium for Nebulization Nebulizer    ondansetron Injectable IV Push PRN  acetaminophen  Suppository. Rectal PRN  aspirin Suppository Rectal      heparin  Injectable SubCutaneous    pantoprazole  Injectable IV Push      dextrose 5%. IV Continuous  dextrose 5%. IV Continuous  dextrose 5% + sodium chloride 0.45% IV Continuous  albumin human 25% IVPB IV Intermittent    influenza   Vaccine IntraMuscular    nystatin Powder Topical                              8.1    6.8   )-----------( 94       ( 02 Oct 2017 06:02 )             25.8       10-02    141  |  107  |  59<H>  ----------------------------<  132<H>  4.4   |  24  |  3.50<H>    Ca    7.4<L>      02 Oct 2017 06:02  Phos  5.0     10-02  Mg     2.1     10-02    TPro  6.5  /  Alb  2.4<L>  /  TBili  0.7  /  DBili  x   /  AST  2792<H>  /  ALT  2221<H>  /  AlkPhos  119  10-02      CARDIAC MARKERS ( 02 Oct 2017 06:02 )  21.800 ng/mL / x     / 432 U/L / x     / 24.7 ng/mL  CARDIAC MARKERS ( 01 Oct 2017 11:01 )  33.600 ng/mL / x     / 832 U/L / x     / 54.6 ng/mL  CARDIAC MARKERS ( 01 Oct 2017 06:04 )  32.200 ng/mL / x     / x     / x     / x      CARDIAC MARKERS ( 30 Sep 2017 23:45 )  36.100 ng/mL / x     / x     / x     / x      CARDIAC MARKERS ( 30 Sep 2017 17:12 )  35.100 ng/mL / x     / x     / x     / x      CARDIAC MARKERS ( 30 Sep 2017 10:59 )  14.800 ng/mL / x     / 402 U/L / x     / 15.3 ng/mL      PT/INR - ( 02 Oct 2017 06:02 )   PT: 18.5 sec;   INR: 1.68 ratio         PTT - ( 02 Oct 2017 06:02 )  PTT:29.6 sec  Urinalysis Basic - ( 01 Oct 2017 00:32 )    Color: Yellow / Appearance: Turbid / S.020 / pH: x  Gluc: x / Ketone: Negative  / Bili: Small / Urobili: 4   Blood: x / Protein: 150 mg/dL / Nitrite: Negative   Leuk Esterase: Small / RBC: 6-10 /HPF / WBC 6-10   Sq Epi: x / Non Sq Epi: Moderate / Bacteria: Moderate                  Radiology: ***    Bedside ultrasound: ***    CENTRAL LINE: N/Y          DATE INSERTED:              REMOVE: Y/N  GRIGGS: N/Y                       DATE INSERTED:              REMOVE: Y/N  A-LINE: N/Y                       DATE INSERTED:              REMOVE: Y/N    GLOBAL ISSUE/BEST PRACTICE:  Analgesia:  Sedation:  CAM-ICU:   HOB elevation: yes  Stress ulcer prophylaxis:  VTE prophylaxis:  Glycemic control:  Nutrition:    CODE STATUS: *** 24 hour events: No overnight events. Per Pt's son, Pt is more arousable this morning. Per son, Pt states she feels okay this morning.    Review of Systems:  Unable to obtain 2/2 mental status    T(F): 98.5 (10-02-17 @ 04:01), Max: 98.7 (10-02-17 @ 00:33)  HR: 72 (10-02-17 @ 07:00) (62 - 107)  BP: 134/60 (10-02-17 @ 07:00) (107/51 - 160/70)  RR: 27 (10-02-17 @ 07:00) (16 - 30)  SpO2: 92% (10-02-17 @ 07:00) (92% - 100%)  Wt(kg): --        CAPILLARY BLOOD GLUCOSE  134 (02 Oct 2017 05:00)          I&O's Summary    01 Oct 2017 07:01  -  02 Oct 2017 07:00  --------------------------------------------------------  IN: 2495 mL / OUT: 220 mL / NET: 2275 mL        Physical Exam:   Gen: Mild distress  Neuro: Alert, oriented to person  HEENT: NC/AT  Resp: Coarse lung sounds B/L  CVS: RRR, +S1/S2  Abd: Soft, NT/ND, +bs  Ext: No edema, +pulses  Skin: Warm, well perfused    Meds:  piperacillin/tazobactam IVPB. IV Intermittent  azithromycin  IVPB   azithromycin  IVPB IV Intermittent      dextrose Gel Oral PRN  dextrose 50% Injectable IV Push  glucagon  Injectable IntraMuscular PRN  insulin lispro (HumaLOG) corrective regimen sliding scale SubCutaneous  dextrose Gel Oral PRN  dextrose 50% Injectable IV Push  dextrose 50% Injectable IV Push  dextrose 50% Injectable IV Push  glucagon  Injectable IntraMuscular PRN    ALBUTerol/ipratropium for Nebulization Nebulizer    ondansetron Injectable IV Push PRN  acetaminophen  Suppository. Rectal PRN  aspirin Suppository Rectal      heparin  Injectable SubCutaneous    pantoprazole  Injectable IV Push      dextrose 5%. IV Continuous  dextrose 5%. IV Continuous  dextrose 5% + sodium chloride 0.45% IV Continuous  albumin human 25% IVPB IV Intermittent    influenza   Vaccine IntraMuscular    nystatin Powder Topical                              8.1    6.8   )-----------( 94       ( 02 Oct 2017 06:02 )             25.8       10    141  |  107  |  59<H>  ----------------------------<  132<H>  4.4   |  24  |  3.50<H>    Ca    7.4<L>      02 Oct 2017 06:02  Phos  5.0     10  Mg     2.1     10    TPro  6.5  /  Alb  2.4<L>  /  TBili  0.7  /  DBili  x   /  AST  2792<H>  /  ALT  2221<H>  /  AlkPhos  119  10      CARDIAC MARKERS ( 02 Oct 2017 06:02 )  21.800 ng/mL / x     / 432 U/L / x     / 24.7 ng/mL  CARDIAC MARKERS ( 01 Oct 2017 11:01 )  33.600 ng/mL / x     / 832 U/L / x     / 54.6 ng/mL  CARDIAC MARKERS ( 01 Oct 2017 06:04 )  32.200 ng/mL / x     / x     / x     / x      CARDIAC MARKERS ( 30 Sep 2017 23:45 )  36.100 ng/mL / x     / x     / x     / x      CARDIAC MARKERS ( 30 Sep 2017 17:12 )  35.100 ng/mL / x     / x     / x     / x      CARDIAC MARKERS ( 30 Sep 2017 10:59 )  14.800 ng/mL / x     / 402 U/L / x     / 15.3 ng/mL      PT/INR - ( 02 Oct 2017 06:02 )   PT: 18.5 sec;   INR: 1.68 ratio         PTT - ( 02 Oct 2017 06:02 )  PTT:29.6 sec  Urinalysis Basic - ( 01 Oct 2017 00:32 )    Color: Yellow / Appearance: Turbid / S.020 / pH: x  Gluc: x / Ketone: Negative  / Bili: Small / Urobili: 4   Blood: x / Protein: 150 mg/dL / Nitrite: Negative   Leuk Esterase: Small / RBC: 6-10 /HPF / WBC 6-10   Sq Epi: x / Non Sq Epi: Moderate / Bacteria: Moderate    Bedside ultrasound: A line predominant b/l lungs, no pleural effusions    CENTRAL LINE: N  GRIGGS: Y  A-LINE: N    GLOBAL ISSUE/BEST PRACTICE:  Analgesia:Y  Sedation:N  CAM-ICU:   HOB elevation: yes  Stress ulcer prophylaxis:Y  VTE prophylaxis:Y  Glycemic control:Y  Nutrition:Y    CODE STATUS: DNR/DNI 24 hour events: No overnight events. Per Pt's son, Pt is more arousable this morning. Per son, Pt states she feels okay this morning.    Review of Systems:  Unable to obtain 2/2 mental status    T(F): 98.5 (10-02-17 @ 04:01), Max: 98.7 (10-02-17 @ 00:33)  HR: 72 (10-02-17 @ 07:00) (62 - 107)  BP: 134/60 (10-02-17 @ 07:00) (107/51 - 160/70)  RR: 27 (10-02-17 @ 07:00) (16 - 30)  SpO2: 92% (10-02-17 @ 07:00) (92% - 100%)  Wt(kg): --        CAPILLARY BLOOD GLUCOSE  134 (02 Oct 2017 05:00)          I&O's Summary    01 Oct 2017 07:01  -  02 Oct 2017 07:00  --------------------------------------------------------  IN: 2495 mL / OUT: 220 mL / NET: 2275 mL        Physical Exam:   Gen: Mild distress  Neuro: Alert, oriented to person  HEENT: NC/AT  Resp: Coarse lung sounds B/L  CVS: RRR, +S1/S2  Abd: Soft, NT/ND, +bs  Ext: No edema, +pulses  Skin: Warm, well perfused    Meds:  piperacillin/tazobactam IVPB. IV Intermittent  azithromycin  IVPB IV Intermittent  insulin lispro (HumaLOG) corrective regimen sliding scale SubCutaneous  ALBUTerol/ipratropium for Nebulization Nebulizer  ondansetron Injectable IV Push PRN  acetaminophen  Suppository. Rectal PRN  aspirin Suppository Rectal  heparin  Injectable SubCutaneous  pantoprazole  Injectable IV Push  dextrose 5% + sodium chloride 0.45% IV Continuous  albumin human 25% IVPB IV Intermittent  influenza   Vaccine IntraMuscular  nystatin Powder Topical                       8.1    6.8   )-----------( 94       ( 02 Oct 2017 06:02 )             25.8     141  |  107  |  59  -----------------------<  132<H>  4.4   |  24  |  3.50    Ca    7.4<L>      02 Oct 2017 06:02  Phos  5.0     10-02  Mg     2.1     10-02    TPro  6.5  /  Alb  2.4<L>  /  TBili  0.7  /  DBili  x   /  AST  2792<H>  /  ALT  2221<H>  /  AlkPhos  119  10-02      CARDIAC MARKERS ( 02 Oct 2017 06:02 )  21.800 ng/mL / x     / 432 U/L / x     / 24.7 ng/mL  CARDIAC MARKERS ( 01 Oct 2017 11:01 )  33.600 ng/mL / x     / 832 U/L / x     / 54.6 ng/mL  CARDIAC MARKERS ( 01 Oct 2017 06:04 )  32.200 ng/mL / x     / x     / x     / x      CARDIAC MARKERS ( 30 Sep 2017 23:45 )  36.100 ng/mL / x     / x     / x     / x      CARDIAC MARKERS ( 30 Sep 2017 17:12 )  35.100 ng/mL / x     / x     / x     / x      CARDIAC MARKERS ( 30 Sep 2017 10:59 )  14.800 ng/mL / x     / 402 U/L / x     / 15.3 ng/mL    PT/INR - ( 02 Oct 2017 06:02 )   PT: 18.5 sec;   INR: 1.68 ratio    PTT - ( 02 Oct 2017 06:02 )  PTT:29.6 sec    Urinalysis Basic - ( 01 Oct 2017 00:32 )    Color: Yellow / Appearance: Turbid / S.020 / pH: x  Gluc: x / Ketone: Negative  / Bili: Small / Urobili: 4   Blood: x / Protein: 150 mg/dL / Nitrite: Negative   Leuk Esterase: Small / RBC: 6-10 /HPF / WBC 6-10   Sq Epi: x / Non Sq Epi: Moderate / Bacteria: Moderate    Bedside ultrasound: A line predominant b/l lungs, no pleural effusions    CENTRAL LINE: N  GRIGGS: Y     (REMOVE: N)  A-LINE: N    GLOBAL ISSUE/BEST PRACTICE:  Analgesia:Y  Sedation:N  CAM-ICU:   HOB elevation: yes  Stress ulcer prophylaxis:Y  VTE prophylaxis:Y  Glycemic control:Y  Nutrition: start pleasure feeds    CODE STATUS: DNR/DNI

## 2017-10-02 NOTE — PROGRESS NOTE ADULT - PROBLEM SELECTOR PLAN 1
monitor LRTs, enzymes improving  no cholestasis  INR less than <2-- vitamin K if increases  optimize perfusion, IVF hydration monitor LRTs, enzymes improving  no cholestasis  INR less than <2-- vitamin K if increases  optimize perfusion, IVF hydration  ICU monitoring

## 2017-10-02 NOTE — PROGRESS NOTE ADULT - SUBJECTIVE AND OBJECTIVE BOX
[facility] - John Tariq, No, Nirav, [place], [place]  Office Number:  [phone]    Patient resting comfortably in bed in NAD.  Laying flat with no respiratory distress.  On BIPAP for respiratory support.  No overnight events.    Telemetry:  Probably sinus rhythm.    MEDICATIONS  (STANDING):  dextrose 5%. 1000 milliLiter(s) (50 mL/Hr) IV Continuous <Continuous>  dextrose 50% Injectable 12.5 Gram(s) IV Push once  influenza   Vaccine 0.5 milliLiter(s) IntraMuscular once  piperacillin/tazobactam IVPB. 3.375 Gram(s) IV Intermittent every 12 hours  ALBUTerol/ipratropium for Nebulization 3 milliLiter(s) Nebulizer every 4 hours  pantoprazole  Injectable 40 milliGRAM(s) IV Push daily  aspirin Suppository 300 milliGRAM(s) Rectal daily  heparin  Injectable 5000 Unit(s) SubCutaneous every 12 hours  nystatin Powder 1 Application(s) Topical every 6 hours  insulin lispro (HumaLOG) corrective regimen sliding scale   SubCutaneous every 6 hours  dextrose 5%. 1000 milliLiter(s) (50 mL/Hr) IV Continuous <Continuous>  dextrose 50% Injectable 12.5 Gram(s) IV Push once  dextrose 50% Injectable 25 Gram(s) IV Push once  dextrose 50% Injectable 25 Gram(s) IV Push once  dextrose 5% + sodium chloride 0.45% 1000 milliLiter(s) (50 mL/Hr) IV Continuous <Continuous>  azithromycin  IVPB      albumin human 25% IVPB 50 milliLiter(s) IV Intermittent every 8 hours  azithromycin  IVPB 500 milliGRAM(s) IV Intermittent every 24 hours    MEDICATIONS  (PRN):  ondansetron Injectable 4 milliGRAM(s) IV Push every 6 hours PRN Nausea  dextrose Gel 1 Dose(s) Oral once PRN Blood Glucose LESS THAN 70 milliGRAM(s)/deciliter  glucagon  Injectable 1 milliGRAM(s) IntraMuscular once PRN Glucose LESS THAN 70 milligrams/deciliter  acetaminophen  Suppository. 650 milliGRAM(s) Rectal every 4 hours PRN Mild Pain (1 - 3)  dextrose Gel 1 Dose(s) Oral once PRN Blood Glucose LESS THAN 70 milliGRAM(s)/deciliter  glucagon  Injectable 1 milliGRAM(s) IntraMuscular once PRN Glucose LESS THAN 70 milligrams/deciliter      Allergies    No Known Allergies        Vital Signs Last 24 Hrs  T(C): 36.9 (02 Oct 2017 04:01), Max: 37.1 (02 Oct 2017 00:33)  T(F): 98.5 (02 Oct 2017 04:01), Max: 98.7 (02 Oct 2017 00:33)  HR: 70 (02 Oct 2017 05:11) (62 - 107)  BP: 131/88 (02 Oct 2017 05:00) (103/60 - 160/70)  BP(mean): 104 (02 Oct 2017 05:00) (70 - 104)  RR: 27 (02 Oct 2017 05:00) (16 - 30)  SpO2: 100% (02 Oct 2017 05:11) (98% - 100%)    I&O's Summary    30 Sep 2017 07:01  -  01 Oct 2017 07:00  --------------------------------------------------------  IN: 925 mL / OUT: 55 mL / NET: 870 mL    01 Oct 2017 07:01  -  02 Oct 2017 06:01  --------------------------------------------------------  IN: 2445 mL / OUT: 190 mL / NET: 2255 mL        ON EXAM:    General: NAD, awake and alert  HEENT: Mucous membranes are dry, anicteric  Lungs: Non-labored, breath sounds are clear bilaterally, No wheezing, rales or rhonchi.  Coarse b/l  Cardiovascular: Regular, S1 and S2, no murmurs, rubs, or gallops  Gastrointestinal: Bowel Sounds present, soft, nontender.   Lymph: No peripheral edema. No lymphadenopathy.  Skin: No rashes or ulcers    LABS: All Labs Reviewed:                        8.7    11.7  )-----------( 92       ( 01 Oct 2017 11:01 )             28.7                         8.3    12.0  )-----------( 94       ( 01 Oct 2017 06:04 )             26.8                         9.1    15.8  )-----------( 112      ( 30 Sep 2017 23:45 )             28.1     01 Oct 2017 11:01    141    |  108    |  52     ----------------------------<  93     4.9     |  22     |  2.90   01 Oct 2017 06:04    141    |  108    |  51     ----------------------------<  106    4.9     |  23     |  2.80   30 Sep 2017 23:45    141    |  108    |  49     ----------------------------<  111    5.0     |  23     |  2.70     Ca    7.3        01 Oct 2017 11:01  Ca    7.1        01 Oct 2017 06:04  Ca    7.0        30 Sep 2017 23:45  Phos  5.8       01 Oct 2017 11:01  Mg     2.2       01 Oct 2017 11:01  Mg     2.2       01 Oct 2017 06:04    TPro  6.9    /  Alb  2.1    /  TBili  0.7    /  DBili  x      /  AST  4402   /  ALT  2624   /  AlkPhos  136    01 Oct 2017 11:01  TPro  6.3    /  Alb  2.0    /  TBili  0.6    /  DBili  .30    /  AST  4398   /  ALT  2340   /  AlkPhos  117    01 Oct 2017 06:04  TPro  6.9    /  Alb  2.1    /  TBili  0.6    /  DBili  .40    /  AST  5263   /  ALT  2524   /  AlkPhos  129    30 Sep 2017 23:45    PT/INR - ( 01 Oct 2017 11:01 )   PT: 16.8 sec;   INR: 1.53 ratio         PTT - ( 01 Oct 2017 11:01 )  PTT:28.9 sec  CARDIAC MARKERS ( 01 Oct 2017 11:01 )  33.600 ng/mL / x     / 832 U/L / x     / 54.6 ng/mL  CARDIAC MARKERS ( 01 Oct 2017 06:04 )  32.200 ng/mL / x     / x     / x     / x      CARDIAC MARKERS ( 30 Sep 2017 23:45 )  36.100 ng/mL / x     / x     / x     / x      CARDIAC MARKERS ( 30 Sep 2017 17:12 )  35.100 ng/mL / x     / x     / x     / x      CARDIAC MARKERS ( 30 Sep 2017 10:59 )  14.800 ng/mL / x     / 402 U/L / x     / 15.3 ng/mL

## 2017-10-02 NOTE — PROGRESS NOTE ADULT - ASSESSMENT
87yo F w/ PMHx DM2, asthma, HTN, iron deficiency anemia, COPD w/ intermittent use of home O2, severe sepsis secondary to pneumonia, MSOF, NSTEMI, overall normal LV function,     - Continue to trend CE until downtrending  - Follow up echocardiogram  - Continue aspirin  - Would try to add metoprolol 12.5 BID today  - Not a great candidate for full dose oral AC  - ? Plavix 75 QD given NSTEMI  - No statin drug secondary to shock liver.  To re started when this resolves.    - Appears relatively euvolemic on exam, although she is positive 2L.  May consider slowing down with IVF as patient no longer hypotensive  - Monitor and replete electrolytes as needed  - BIPAP as needed for respiratory support  - No arrhythmia on the monitor  - Not a great candidate for coronary angiography  - Abx per primary    The patient is at high risk for decompensation. >35 min of critical care time spent caring for this patient.

## 2017-10-02 NOTE — PROGRESS NOTE ADULT - ASSESSMENT
88 year old female admitted with severe sepsis secondary to UTI/pneumonia found to have hypoxic hepatitis

## 2017-10-02 NOTE — PROGRESS NOTE ADULT - ATTENDING COMMENTS
88F PMH HTN, HLD, DM2, COPD on home oxygen, chronically bedbound, & chronic hypercapnic respiratory failure presents with septic shock with acute on chronic hypoxic and hypercapnic respiratory failure, NSTEMI, shock liver, SAMIA with oliguria, and coagulopathy. Found to have RLL CAP/aspiration pna + GBS UTI.    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 Does not require dialysis at this time. Trial of Albumin + furosemide, on IVF  - On Vancomycin (by level) + Zosyn + Azithromycin for severe CAP, f/up ULegionella; UCx with GBS  - DVT ppx  - C/w HISS  - Poor prognosis, appears to be in the final stages of life, no aggressive measures  CC time spent: 40 min

## 2017-10-02 NOTE — PROGRESS NOTE ADULT - SUBJECTIVE AND OBJECTIVE BOX
INTERVAL HPI/OVERNIGHT EVENTS: improving liver enzymes, minimal change in mental status  HPI:  87yo F w/ PMHx DM2, asthma, HTN, iron deficiency anemia, COPD w/ intermittent use of home O2 presents w/ weakness and fatigue. pt is nonverbal at this time and unable to provide any history. She developed a fever last night and the family admits mild cough which was nonproductive. 3-4 days prior to presentation the pt was complaining of mild abd discomfort but was tolerating po well. Family denies dysphagia. They stated that she had an episode of bilious vomiting 4 days ago but none since. She was tolerating diet yesterday without difficulty. Last night developed rectal temp of 102F but denied diaphoresis, chills, rigors. Upon waking this morning, the pt was obtunded. Responding only to noxious stimuli so EMS was alerted. No other recent travel. No recent hospitalizations. No other recent changes in her overall health.  Recnetly her DM2 regimen was decreased due to normal glucose levels. Also, her recent creatinine was 1.1 as per family at the bedside. Family members have medical background.       MEDICATIONS  (STANDING):  dextrose 5%. 1000 milliLiter(s) (50 mL/Hr) IV Continuous <Continuous>  dextrose 50% Injectable 12.5 Gram(s) IV Push once  influenza   Vaccine 0.5 milliLiter(s) IntraMuscular once  piperacillin/tazobactam IVPB. 3.375 Gram(s) IV Intermittent every 12 hours  ALBUTerol/ipratropium for Nebulization 3 milliLiter(s) Nebulizer every 4 hours  pantoprazole  Injectable 40 milliGRAM(s) IV Push daily  aspirin Suppository 300 milliGRAM(s) Rectal daily  heparin  Injectable 5000 Unit(s) SubCutaneous every 12 hours  nystatin Powder 1 Application(s) Topical every 6 hours  insulin lispro (HumaLOG) corrective regimen sliding scale   SubCutaneous every 6 hours  dextrose 5%. 1000 milliLiter(s) (50 mL/Hr) IV Continuous <Continuous>  dextrose 50% Injectable 12.5 Gram(s) IV Push once  dextrose 50% Injectable 25 Gram(s) IV Push once  dextrose 50% Injectable 25 Gram(s) IV Push once  dextrose 5% + sodium chloride 0.45% 1000 milliLiter(s) (50 mL/Hr) IV Continuous <Continuous>  azithromycin  IVPB      albumin human 25% IVPB 50 milliLiter(s) IV Intermittent every 8 hours    MEDICATIONS  (PRN):  ondansetron Injectable 4 milliGRAM(s) IV Push every 6 hours PRN Nausea  dextrose Gel 1 Dose(s) Oral once PRN Blood Glucose LESS THAN 70 milliGRAM(s)/deciliter  glucagon  Injectable 1 milliGRAM(s) IntraMuscular once PRN Glucose LESS THAN 70 milligrams/deciliter  acetaminophen  Suppository. 650 milliGRAM(s) Rectal every 4 hours PRN Mild Pain (1 - 3)  dextrose Gel 1 Dose(s) Oral once PRN Blood Glucose LESS THAN 70 milliGRAM(s)/deciliter  glucagon  Injectable 1 milliGRAM(s) IntraMuscular once PRN Glucose LESS THAN 70 milligrams/deciliter      Allergies    No Known Allergies    Intolerances          General:  minimally responsive, further ROS unable to be obtained         PHYSICAL EXAM:   Vital Signs:  Vital Signs Last 24 Hrs  T(C): 36.6 (01 Oct 2017 12:00), Max: 36.6 (30 Sep 2017 22:45)  T(F): 97.9 (01 Oct 2017 12:00), Max: 97.9 (01 Oct 2017 12:00)  HR: 66 (01 Oct 2017 14:00) (59 - 79)  BP: 116/56 (01 Oct 2017 14:00) (84/42 - 146/61)  BP(mean): 80 (01 Oct 2017 14:00) (69 - 88)  RR: 16 (01 Oct 2017 14:00) (10 - 32)  SpO2: 100% (01 Oct 2017 14:00) (95% - 100%)  Daily     Daily Weight in k (01 Oct 2017 13:11)I&O's Summary    30 Sep 2017 07:01  -  01 Oct 2017 07:00  --------------------------------------------------------  IN: 925 mL / OUT: 55 mL / NET: 870 mL    01 Oct 2017 07:  -  01 Oct 2017 14:18  --------------------------------------------------------  IN: 395 mL / OUT: 45 mL / NET: 350 mL        GENERAL:  minimally responsive, does not follow commands   HEENT:  NC/AT,  conjunctivae clear and pink, no thyromegaly, nodules, adenopathy, no JVD, sclera -anicteric, (+) oxygen facemask  CHEST:  Full & symmetric excursion, no increased effort, breath sounds clear bipap mask  HEART:  Regular rhythm, S1, S2, no murmur/rub/S3/S4, no abdominal bruit, no edema  ABDOMEN:  Soft, non-tender, non-distended, normoactive bowel sounds,  no masses ,no hepato-splenomegaly, no signs of chronic liver disease  EXTEREMITIES:  no cyanosis,clubbing or edema  SKIN:  No rash/erythema/ecchymoses/petechiae/wounds/abscess/warm/dry  NEURO:  responsive to painful stimuli, does not follow commands       LABS: reviewed

## 2017-10-02 NOTE — PROGRESS NOTE ADULT - ASSESSMENT
88F PMH HTN, HLD, DM2, COPD on home oxygen, & chronic hypercapnic respiratory failure presents with septic shock with acute on chronic hypoxic and hypercapnic respiratory failure, NSTEMI, shock liver, SAMIA with oliguria, and coagulopathy. Found to have RLL CAP/aspiration pna + GBS UTI.    Neuro - Pt is arousable, can follow commands, will add dilaudid 0.5mg PRN for pain  CV - HD stable, will start metoprolol 2.5mg IV q6h for HTN, trend troponins, NSTEMI likely 2/2 demand ischemia  Resp - D/c bipap, start aerosol mask for O2 supplementation, ABG improved from yesterday, cont duonebs  GI - Will start pleasure feeds, cont PPI, liver enzymes improving, cont IV hydration  Renal - BUN/Cr increased this AM, urine output improving, no evidence of volume overload or pulmonary edema  Endo - Cont ISS, fingersticks, on D5W for IVF  ID - Cont zosyn, azithromycin and vanco by level (pending vanc trough this afternoon), will d/c azithromycin if legionella negative  Heme - ASA rectally and heparin subq for DVT ppx  Dispo - Pt with poor prognosis 2/2 multi organ failure, is DNR/DNI

## 2017-10-02 NOTE — PROVIDER CONTACT NOTE (CRITICAL VALUE NOTIFICATION) - PERSON GIVING RESULT:
Alexandra
Chelsea/Tayla
MARTITA Galdamez RRT
Marie
Tayla Haines
Tayla Haines

## 2017-10-02 NOTE — PROGRESS NOTE ADULT - PROBLEM SELECTOR PLAN 1
with multi organ failure, 2/2 PNA and UTI  c/w IV zosyn and zithro for PNA.  urine is + for GBS  f/u urine/blood c/s.

## 2017-10-02 NOTE — PROGRESS NOTE ADULT - ASSESSMENT
·	SAMIA: Oliguric ATN, ARB rx  ·	Shock, Sepsis  ·	Shock liver  ·	Respiratory falure on BIPAP  ·	Respiratory and metabolic acidosis  ·	Anemia    Will continue IVF. Will d/c bicarb. Check cultures. IV abx. D/w family at bedside. Overall prognosis poor. Supportive care. Cardiology / pulmonary follow up. Will follow electrolytes and renal function trend. Further recommendations pending clinical course. Strict I & O. Monitor h/h. Check iron studies. Transfuse PRN. If UO doesn't improve in the next 24 hours may need RRT. Monitor fluid status closely. IV lasix x 1 dose.

## 2017-10-02 NOTE — PROGRESS NOTE ADULT - SUBJECTIVE AND OBJECTIVE BOX
Patient is a 88y old  Female who presents with a chief complaint of found unresponsive (30 Sep 2017 15:29)      Patient seen in follow up for SAMIA, ATN. Family at bedside. UO remains poor. Off BIPAP.     PAST MEDICAL HISTORY:  Asthma  Diabetes mellitus  HTN (hypertension)    MEDICATIONS  (STANDING):  dextrose 5%. 1000 milliLiter(s) (50 mL/Hr) IV Continuous <Continuous>  dextrose 50% Injectable 12.5 Gram(s) IV Push once  influenza   Vaccine 0.5 milliLiter(s) IntraMuscular once  piperacillin/tazobactam IVPB. 3.375 Gram(s) IV Intermittent every 12 hours  ALBUTerol/ipratropium for Nebulization 3 milliLiter(s) Nebulizer every 4 hours  pantoprazole  Injectable 40 milliGRAM(s) IV Push daily  aspirin Suppository 300 milliGRAM(s) Rectal daily  heparin  Injectable 5000 Unit(s) SubCutaneous every 12 hours  nystatin Powder 1 Application(s) Topical every 6 hours  insulin lispro (HumaLOG) corrective regimen sliding scale   SubCutaneous every 6 hours  dextrose 5%. 1000 milliLiter(s) (50 mL/Hr) IV Continuous <Continuous>  dextrose 50% Injectable 12.5 Gram(s) IV Push once  dextrose 50% Injectable 25 Gram(s) IV Push once  dextrose 50% Injectable 25 Gram(s) IV Push once  dextrose 5% + sodium chloride 0.45% 1000 milliLiter(s) (50 mL/Hr) IV Continuous <Continuous>  azithromycin  IVPB      albumin human 25% IVPB 50 milliLiter(s) IV Intermittent every 8 hours  azithromycin  IVPB 500 milliGRAM(s) IV Intermittent every 24 hours  metoprolol Injectable 2.5 milliGRAM(s) IV Push every 6 hours    MEDICATIONS  (PRN):  ondansetron Injectable 4 milliGRAM(s) IV Push every 6 hours PRN Nausea  dextrose Gel 1 Dose(s) Oral once PRN Blood Glucose LESS THAN 70 milliGRAM(s)/deciliter  glucagon  Injectable 1 milliGRAM(s) IntraMuscular once PRN Glucose LESS THAN 70 milligrams/deciliter  acetaminophen  Suppository. 650 milliGRAM(s) Rectal every 4 hours PRN Mild Pain (1 - 3)  dextrose Gel 1 Dose(s) Oral once PRN Blood Glucose LESS THAN 70 milliGRAM(s)/deciliter  glucagon  Injectable 1 milliGRAM(s) IntraMuscular once PRN Glucose LESS THAN 70 milligrams/deciliter  HYDROmorphone  Injectable 0.5 milliGRAM(s) IV Push every 4 hours PRN Severe Pain (7 - 10)    T(C): 36.3 (10-02-17 @ 08:00), Max: 37.1 (10-02-17 @ 00:33)  HR: 72 (10-02-17 @ 10:00) (62 - 107)  BP: 130/59 (10-02-17 @ 10:00) (98/68 - 160/70)  RR: 20 (10-02-17 @ 10:00) (10 - 32)  SpO2: 99% (10-02-17 @ 10:00) (92% - 100%)  Wt(kg): --  I&O's Detail    01 Oct 2017 07:01  -  02 Oct 2017 07:00  --------------------------------------------------------  IN:    dextrose 5% + sodium chloride 0.45%: 950 mL    Lactated Ringers IV Bolus: 500 mL    sodium chloride 0.9%: 75 mL    sodium chloride 0.9%: 120 mL    Solution: 250 mL    Solution: 250 mL    Solution: 200 mL    Solution: 150 mL  Total IN: 2495 mL    OUT:    Indwelling Catheter - Urethral: 220 mL  Total OUT: 220 mL    Total NET: 2275 mL      02 Oct 2017 07:01  -  02 Oct 2017 11:31  --------------------------------------------------------  IN:    dextrose 5% + sodium chloride 0.45%: 200 mL  Total IN: 200 mL    OUT:    Indwelling Catheter - Urethral: 107 mL  Total OUT: 107 mL    Total NET: 93 mL          PHYSICAL EXAM:  General: NAD  Respiratory: b/l air entry, decreased breath sounds  Cardiovascular: S1 S2  Gastrointestinal: soft  Extremities:  edema                          8.1    6.8   )-----------( 94       ( 02 Oct 2017 06:02 )             25.8     1002    141  |  107  |  59<H>  ----------------------------<  132<H>  4.4   |  24  |  3.50<H>    Ca    7.4<L>      02 Oct 2017 06:02  Phos  5.0     10  Mg     2.1     10-02    TPro  6.5  /  Alb  2.4<L>  /  TBili  0.7  /  DBili  x   /  AST  2792<H>  /  ALT  2221<H>  /  AlkPhos  119  1002    CARDIAC MARKERS ( 02 Oct 2017 06:02 )  21.800 ng/mL / x     / 432 U/L / x     / 24.7 ng/mL  CARDIAC MARKERS ( 01 Oct 2017 11:01 )  33.600 ng/mL / x     / 832 U/L / x     / 54.6 ng/mL  CARDIAC MARKERS ( 01 Oct 2017 06:04 )  32.200 ng/mL / x     / x     / x     / x      CARDIAC MARKERS ( 30 Sep 2017 23:45 )  36.100 ng/mL / x     / x     / x     / x      CARDIAC MARKERS ( 30 Sep 2017 17:12 )  35.100 ng/mL / x     / x     / x     / x          LIVER FUNCTIONS - ( 02 Oct 2017 06:02 )  Alb: 2.4 g/dL / Pro: 6.5 g/dL / ALK PHOS: 119 U/L / ALT: 2221 U/L / AST: 2792 U/L / GGT: x           Urinalysis Basic - ( 01 Oct 2017 00:32 )    Color: Yellow / Appearance: Turbid / S.020 / pH: x  Gluc: x / Ketone: Negative  / Bili: Small / Urobili: 4   Blood: x / Protein: 150 mg/dL / Nitrite: Negative   Leuk Esterase: Small / RBC: 6-10 /HPF / WBC 6-10   Sq Epi: x / Non Sq Epi: Moderate / Bacteria: Moderate      ABG - ( 02 Oct 2017 05:47 )  pH: 7.29  /  pCO2: 48    /  pO2: 60    / HCO3: 22    / Base Excess: -3.0  /  SaO2: 88                Sodium, Serum: 141 (10-02 @ 06:02)  Sodium, Serum: 141 (10-01 @ 11:01)  Sodium, Serum: 141 (10-01 @ 06:04)  Sodium, Serum: 141 ( @ 23:45)    Creatinine, Serum: 3.50 (10-02 @ 06:02)  Creatinine, Serum: 2.90 (10-01 @ 11:)  Creatinine, Serum: 2.80 (10-01 @ 06:04)  Creatinine, Serum: 2.70 ( @ 23:45)  Creatinine, Serum: 2.70 ( @ 10:59)    Potassium, Serum: 4.4 (10-02 @ 06:02)  Potassium, Serum: 4.9 (10-01 @ 11:)  Potassium, Serum: 4.9 (10-01 @ 06:04)  Potassium, Serum: 5.0 ( @ 23:45)    Hemoglobin: 8.1 (10-02 @ 06:02)  Hemoglobin: 8.7 (10-01 @ 11:)  Hemoglobin: 8.3 (10-01 @ 06:04)  Hemoglobin: 9.1 ( @ 23:45)

## 2017-10-03 ENCOUNTER — APPOINTMENT (OUTPATIENT)
Dept: HOME HEALTH SERVICES | Facility: HOME HEALTH | Age: 82
End: 2017-10-03

## 2017-10-03 LAB
ALBUMIN SERPL ELPH-MCNC: 2.7 G/DL — LOW (ref 3.3–5)
ALP SERPL-CCNC: 110 U/L — SIGNIFICANT CHANGE UP (ref 40–120)
ALT FLD-CCNC: 1698 U/L — HIGH (ref 12–78)
ANION GAP SERPL CALC-SCNC: 9 MMOL/L — SIGNIFICANT CHANGE UP (ref 5–17)
AST SERPL-CCNC: 1617 U/L — HIGH (ref 15–37)
BASOPHILS # BLD AUTO: 0.1 K/UL — SIGNIFICANT CHANGE UP (ref 0–0.2)
BASOPHILS NFR BLD AUTO: 1.1 % — SIGNIFICANT CHANGE UP (ref 0–2)
BILIRUB SERPL-MCNC: 0.8 MG/DL — SIGNIFICANT CHANGE UP (ref 0.2–1.2)
BUN SERPL-MCNC: 61 MG/DL — HIGH (ref 7–23)
CALCIUM SERPL-MCNC: 8.1 MG/DL — LOW (ref 8.5–10.1)
CHLORIDE SERPL-SCNC: 106 MMOL/L — SIGNIFICANT CHANGE UP (ref 96–108)
CO2 SERPL-SCNC: 25 MMOL/L — SIGNIFICANT CHANGE UP (ref 22–31)
CREAT SERPL-MCNC: 3.5 MG/DL — HIGH (ref 0.5–1.3)
CULTURE RESULTS: SIGNIFICANT CHANGE UP
EOSINOPHIL # BLD AUTO: 0.3 K/UL — SIGNIFICANT CHANGE UP (ref 0–0.5)
EOSINOPHIL NFR BLD AUTO: 4.4 % — SIGNIFICANT CHANGE UP (ref 0–6)
GLUCOSE SERPL-MCNC: 113 MG/DL — HIGH (ref 70–99)
HAV IGG+IGM SER QL: REACTIVE
HBV CORE AB SER-ACNC: SIGNIFICANT CHANGE UP
HBV CORE IGM SER-ACNC: SIGNIFICANT CHANGE UP
HBV SURFACE AB SER-ACNC: SIGNIFICANT CHANGE UP
HBV SURFACE AG SER-ACNC: SIGNIFICANT CHANGE UP
HCT VFR BLD CALC: 24.1 % — LOW (ref 34.5–45)
HCV AB S/CO SERPL IA: 0.14 S/CO — SIGNIFICANT CHANGE UP
HCV AB SERPL-IMP: SIGNIFICANT CHANGE UP
HGB BLD-MCNC: 7.2 G/DL — LOW (ref 11.5–15.5)
LYMPHOCYTES # BLD AUTO: 1.2 K/UL — SIGNIFICANT CHANGE UP (ref 1–3.3)
LYMPHOCYTES # BLD AUTO: 20.3 % — SIGNIFICANT CHANGE UP (ref 13–44)
MAGNESIUM SERPL-MCNC: 2.2 MG/DL — SIGNIFICANT CHANGE UP (ref 1.6–2.6)
MCHC RBC-ENTMCNC: 27.8 PG — SIGNIFICANT CHANGE UP (ref 27–34)
MCHC RBC-ENTMCNC: 29.9 GM/DL — LOW (ref 32–36)
MCV RBC AUTO: 92.9 FL — SIGNIFICANT CHANGE UP (ref 80–100)
MONOCYTES # BLD AUTO: 0.6 K/UL — SIGNIFICANT CHANGE UP (ref 0–0.9)
MONOCYTES NFR BLD AUTO: 10.1 % — HIGH (ref 1–9)
NEUTROPHILS # BLD AUTO: 3.7 K/UL — SIGNIFICANT CHANGE UP (ref 1.8–7.4)
NEUTROPHILS NFR BLD AUTO: 64.1 % — SIGNIFICANT CHANGE UP (ref 43–77)
PHOSPHATE SERPL-MCNC: 4.1 MG/DL — SIGNIFICANT CHANGE UP (ref 2.5–4.5)
PLATELET # BLD AUTO: 87 K/UL — LOW (ref 150–400)
POTASSIUM SERPL-MCNC: 3.8 MMOL/L — SIGNIFICANT CHANGE UP (ref 3.5–5.3)
POTASSIUM SERPL-SCNC: 3.8 MMOL/L — SIGNIFICANT CHANGE UP (ref 3.5–5.3)
PROT SERPL-MCNC: 6.6 G/DL — SIGNIFICANT CHANGE UP (ref 6–8.3)
RBC # BLD: 2.6 M/UL — LOW (ref 3.8–5.2)
RBC # FLD: 13.9 % — SIGNIFICANT CHANGE UP (ref 10.3–14.5)
SODIUM SERPL-SCNC: 140 MMOL/L — SIGNIFICANT CHANGE UP (ref 135–145)
WBC # BLD: 5.7 K/UL — SIGNIFICANT CHANGE UP (ref 3.8–10.5)
WBC # FLD AUTO: 5.7 K/UL — SIGNIFICANT CHANGE UP (ref 3.8–10.5)

## 2017-10-03 PROCEDURE — 99291 CRITICAL CARE FIRST HOUR: CPT

## 2017-10-03 PROCEDURE — 99233 SBSQ HOSP IP/OBS HIGH 50: CPT

## 2017-10-03 RX ORDER — PANTOPRAZOLE SODIUM 20 MG/1
40 TABLET, DELAYED RELEASE ORAL
Qty: 0 | Refills: 0 | Status: DISCONTINUED | OUTPATIENT
Start: 2017-10-03 | End: 2017-10-11

## 2017-10-03 RX ORDER — GLUCAGON INJECTION, SOLUTION 0.5 MG/.1ML
1 INJECTION, SOLUTION SUBCUTANEOUS ONCE
Qty: 0 | Refills: 0 | Status: DISCONTINUED | OUTPATIENT
Start: 2017-10-03 | End: 2017-10-11

## 2017-10-03 RX ORDER — ASPIRIN/CALCIUM CARB/MAGNESIUM 324 MG
81 TABLET ORAL DAILY
Qty: 0 | Refills: 0 | Status: DISCONTINUED | OUTPATIENT
Start: 2017-10-03 | End: 2017-10-11

## 2017-10-03 RX ORDER — DEXTROSE 50 % IN WATER 50 %
25 SYRINGE (ML) INTRAVENOUS ONCE
Qty: 0 | Refills: 0 | Status: DISCONTINUED | OUTPATIENT
Start: 2017-10-03 | End: 2017-10-11

## 2017-10-03 RX ORDER — DEXTROSE 50 % IN WATER 50 %
1 SYRINGE (ML) INTRAVENOUS ONCE
Qty: 0 | Refills: 0 | Status: DISCONTINUED | OUTPATIENT
Start: 2017-10-03 | End: 2017-10-11

## 2017-10-03 RX ORDER — ASPIRIN/CALCIUM CARB/MAGNESIUM 324 MG
81 TABLET ORAL DAILY
Qty: 0 | Refills: 0 | Status: DISCONTINUED | OUTPATIENT
Start: 2017-10-03 | End: 2017-10-03

## 2017-10-03 RX ORDER — METOPROLOL TARTRATE 50 MG
12.5 TABLET ORAL EVERY 12 HOURS
Qty: 0 | Refills: 0 | Status: DISCONTINUED | OUTPATIENT
Start: 2017-10-03 | End: 2017-10-03

## 2017-10-03 RX ORDER — METOPROLOL TARTRATE 50 MG
12.5 TABLET ORAL EVERY 8 HOURS
Qty: 0 | Refills: 0 | Status: DISCONTINUED | OUTPATIENT
Start: 2017-10-03 | End: 2017-10-03

## 2017-10-03 RX ORDER — CLOPIDOGREL BISULFATE 75 MG/1
75 TABLET, FILM COATED ORAL DAILY
Qty: 0 | Refills: 0 | Status: DISCONTINUED | OUTPATIENT
Start: 2017-10-03 | End: 2017-10-11

## 2017-10-03 RX ORDER — GABAPENTIN 400 MG/1
300 CAPSULE ORAL EVERY 8 HOURS
Qty: 0 | Refills: 0 | Status: DISCONTINUED | OUTPATIENT
Start: 2017-10-03 | End: 2017-10-03

## 2017-10-03 RX ORDER — SODIUM CHLORIDE 9 MG/ML
1000 INJECTION, SOLUTION INTRAVENOUS
Qty: 0 | Refills: 0 | Status: DISCONTINUED | OUTPATIENT
Start: 2017-10-03 | End: 2017-10-11

## 2017-10-03 RX ORDER — METOPROLOL TARTRATE 50 MG
12.5 TABLET ORAL EVERY 8 HOURS
Qty: 0 | Refills: 0 | Status: DISCONTINUED | OUTPATIENT
Start: 2017-10-03 | End: 2017-10-11

## 2017-10-03 RX ORDER — INSULIN LISPRO 100/ML
VIAL (ML) SUBCUTANEOUS
Qty: 0 | Refills: 0 | Status: DISCONTINUED | OUTPATIENT
Start: 2017-10-03 | End: 2017-10-11

## 2017-10-03 RX ORDER — GABAPENTIN 400 MG/1
300 CAPSULE ORAL DAILY
Qty: 0 | Refills: 0 | Status: DISCONTINUED | OUTPATIENT
Start: 2017-10-03 | End: 2017-10-03

## 2017-10-03 RX ORDER — GABAPENTIN 400 MG/1
100 CAPSULE ORAL DAILY
Qty: 0 | Refills: 0 | Status: DISCONTINUED | OUTPATIENT
Start: 2017-10-03 | End: 2017-10-11

## 2017-10-03 RX ORDER — DEXTROSE 50 % IN WATER 50 %
12.5 SYRINGE (ML) INTRAVENOUS ONCE
Qty: 0 | Refills: 0 | Status: DISCONTINUED | OUTPATIENT
Start: 2017-10-03 | End: 2017-10-11

## 2017-10-03 RX ORDER — HEPARIN SODIUM 5000 [USP'U]/ML
5000 INJECTION INTRAVENOUS; SUBCUTANEOUS EVERY 8 HOURS
Qty: 0 | Refills: 0 | Status: DISCONTINUED | OUTPATIENT
Start: 2017-10-03 | End: 2017-10-11

## 2017-10-03 RX ADMIN — Medication 2: at 12:44

## 2017-10-03 RX ADMIN — NYSTATIN CREAM 1 APPLICATION(S): 100000 CREAM TOPICAL at 06:16

## 2017-10-03 RX ADMIN — Medication 3 MILLILITER(S): at 16:23

## 2017-10-03 RX ADMIN — NYSTATIN CREAM 1 APPLICATION(S): 100000 CREAM TOPICAL at 22:53

## 2017-10-03 RX ADMIN — HEPARIN SODIUM 5000 UNIT(S): 5000 INJECTION INTRAVENOUS; SUBCUTANEOUS at 14:20

## 2017-10-03 RX ADMIN — Medication 12.5 MILLIGRAM(S): at 14:20

## 2017-10-03 RX ADMIN — Medication 2.5 MILLIGRAM(S): at 00:06

## 2017-10-03 RX ADMIN — PIPERACILLIN AND TAZOBACTAM 25 GRAM(S): 4; .5 INJECTION, POWDER, LYOPHILIZED, FOR SOLUTION INTRAVENOUS at 22:30

## 2017-10-03 RX ADMIN — Medication 3 MILLILITER(S): at 07:37

## 2017-10-03 RX ADMIN — Medication 2.5 MILLIGRAM(S): at 06:15

## 2017-10-03 RX ADMIN — NYSTATIN CREAM 1 APPLICATION(S): 100000 CREAM TOPICAL at 00:06

## 2017-10-03 RX ADMIN — Medication 3 MILLILITER(S): at 23:17

## 2017-10-03 RX ADMIN — AZITHROMYCIN 255 MILLIGRAM(S): 500 TABLET, FILM COATED ORAL at 11:55

## 2017-10-03 RX ADMIN — Medication 3 MILLILITER(S): at 19:32

## 2017-10-03 RX ADMIN — PIPERACILLIN AND TAZOBACTAM 25 GRAM(S): 4; .5 INJECTION, POWDER, LYOPHILIZED, FOR SOLUTION INTRAVENOUS at 11:56

## 2017-10-03 RX ADMIN — Medication 12.5 MILLIGRAM(S): at 22:30

## 2017-10-03 RX ADMIN — Medication 3 MILLILITER(S): at 11:34

## 2017-10-03 RX ADMIN — PANTOPRAZOLE SODIUM 40 MILLIGRAM(S): 20 TABLET, DELAYED RELEASE ORAL at 18:31

## 2017-10-03 RX ADMIN — NYSTATIN CREAM 1 APPLICATION(S): 100000 CREAM TOPICAL at 18:29

## 2017-10-03 RX ADMIN — Medication 81 MILLIGRAM(S): at 11:57

## 2017-10-03 RX ADMIN — Medication 3 MILLILITER(S): at 02:50

## 2017-10-03 RX ADMIN — CLOPIDOGREL BISULFATE 75 MILLIGRAM(S): 75 TABLET, FILM COATED ORAL at 11:57

## 2017-10-03 RX ADMIN — GABAPENTIN 100 MILLIGRAM(S): 400 CAPSULE ORAL at 09:48

## 2017-10-03 RX ADMIN — HEPARIN SODIUM 5000 UNIT(S): 5000 INJECTION INTRAVENOUS; SUBCUTANEOUS at 06:15

## 2017-10-03 RX ADMIN — HEPARIN SODIUM 5000 UNIT(S): 5000 INJECTION INTRAVENOUS; SUBCUTANEOUS at 22:29

## 2017-10-03 RX ADMIN — NYSTATIN CREAM 1 APPLICATION(S): 100000 CREAM TOPICAL at 12:44

## 2017-10-03 NOTE — PHYSICAL THERAPY INITIAL EVALUATION ADULT - PERTINENT HX OF CURRENT PROBLEM, REHAB EVAL
89yo F w/ PMHx DM2, asthma, HTN, iron deficiency anemia, COPD w/ intermittent use of home O2 presents w/ MODS, shock liver, SAMIA, NSTEMI

## 2017-10-03 NOTE — SWALLOW BEDSIDE ASSESSMENT ADULT - ORAL PHASE
Within functional limits/Decreased anterior-posterior movement of the bolus Decreased anterior-posterior movement of the bolus Delayed oral transit time/Decreased anterior-posterior movement of the bolus

## 2017-10-03 NOTE — PROGRESS NOTE ADULT - ASSESSMENT
87yo F w/ PMHx DM2, asthma, HTN, iron deficiency anemia, COPD w/ intermittent use of home O2 presents w/ MODS, shock liver, SAMIA, NSTEMI. sepsis 2/2 to PNA and UTI

## 2017-10-03 NOTE — SWALLOW BEDSIDE ASSESSMENT ADULT - PHARYNGEAL PHASE
Within functional limits Delayed pharyngeal swallow/Decreased laryngeal elevation/Delayed throat clear post oral intake Delayed pharyngeal swallow/Decreased laryngeal elevation/Multiple swallows

## 2017-10-03 NOTE — SWALLOW BEDSIDE ASSESSMENT ADULT - SWALLOW EVAL: RECOMMENDED FEEDING/EATING TECHNIQUES
check mouth frequently for oral residue/pocketing/crush medication (when feasible)/maintain upright posture during/after eating for 30 mins/alternate food with liquid/hard swallow w/ each bite or sip/position upright (90 degrees)

## 2017-10-03 NOTE — PROGRESS NOTE ADULT - SUBJECTIVE AND OBJECTIVE BOX
24 hour events: No overnight events. Per family, Pt states she felt cold overnight and has been having some b/l leg pain.    Review of Systems:  Unable to obtain 2/2 mental status    T(F): 98.7 (10-03-17 @ 06:00), Max: 98.8 (10-02-17 @ 11:33)  HR: 65 (10-03-17 @ 07:39) (60 - 103)  BP: 133/59 (10-03-17 @ 07:00) (104/51 - 142/60)  RR: 19 (10-03-17 @ 07:00) (14 - 33)  SpO2: 95% (10-03-17 @ 07:39) (91% - 100%)  Wt(kg): --        CAPILLARY BLOOD GLUCOSE  143 (02 Oct 2017 21:14)          I&O's Summary    02 Oct 2017 07:01  -  03 Oct 2017 07:00  --------------------------------------------------------  IN: 1800 mL / OUT: 607 mL / NET: 1193 mL        Physical Exam:   Gen: Sitting in bed, mild distress  Neuro: Alert/oriented to person  HEENT: NC/AT  Resp: Coarse, diminished breath sounds b/l  CVS: RRR, +S1/S2  Abd: Soft, NT/ND, +BS  Ext: No edema, +pulses  Skin: Warm, well perfused    Meds:  azithromycin  IVPB   azithromycin  IVPB IV Intermittent  piperacillin/tazobactam IVPB. IV Intermittent    metoprolol Injectable IV Push    dextrose 50% Injectable IV Push  dextrose 50% Injectable IV Push  dextrose 50% Injectable IV Push  dextrose Gel Oral PRN  glucagon  Injectable IntraMuscular PRN  insulin lispro (HumaLOG) corrective regimen sliding scale SubCutaneous    ALBUTerol/ipratropium for Nebulization Nebulizer    acetaminophen  Suppository. Rectal PRN  aspirin Suppository Rectal  HYDROmorphone  Injectable IV Push PRN  ondansetron Injectable IV Push PRN      heparin  Injectable SubCutaneous    pantoprazole  Injectable IV Push      dextrose 5% + sodium chloride 0.45%. IV Continuous  dextrose 5%. IV Continuous    influenza   Vaccine IntraMuscular    nystatin Powder Topical                              7.2    5.7   )-----------( x        ( 03 Oct 2017 06:46 )             24.1       10-03    140  |  106  |  61<H>  ----------------------------<  113<H>  3.8   |  25  |  3.50<H>    Ca    8.1<L>      03 Oct 2017 06:46  Phos  4.1     10-03  Mg     2.2     10-03    TPro  6.6  /  Alb  2.7<L>  /  TBili  0.8  /  DBili  x   /  AST  1617<H>  /  ALT  1698<H>  /  AlkPhos  110  10-03      CARDIAC MARKERS ( 02 Oct 2017 06:02 )  21.800 ng/mL / x     / 432 U/L / x     / 24.7 ng/mL  CARDIAC MARKERS ( 01 Oct 2017 11:01 )  33.600 ng/mL / x     / 832 U/L / x     / 54.6 ng/mL      PT/INR - ( 02 Oct 2017 06:02 )   PT: 18.5 sec;   INR: 1.68 ratio         PTT - ( 02 Oct 2017 06:02 )  PTT:29.6 sec    .Urine Catheterized   No growth -- 10-01 @ 11:11  .Urine Clean Catch (Midstream)   >100,000 CFU/ml Streptococcus agalactiae (Group B)  Normal Urogenital cat present -- 09-30 @ 15:16  .Blood Blood   No growth to date. -- 09-30 @ 14:49              Radiology: TTE official read pending    CENTRAL LINE: N  GRIGGS: Y 	  (Remove: No)  A-LINE: N    GLOBAL ISSUE/BEST PRACTICE:  Analgesia:Y  Sedation:N  CAM-ICU:   HOB elevation: yes  Stress ulcer prophylaxis:Y  VTE prophylaxis:Y  Glycemic control:Y  Nutrition:Y    CODE STATUS: DNR/DNI 24 hour events: No overnight events. Per family, Pt states she felt cold overnight and has been having some b/l leg pain.    Review of Systems:  Unable to obtain 2/2 mental status    T(F): 98.7 (10-03-17 @ 06:00), Max: 98.8 (10-02-17 @ 11:33)  HR: 65 (10-03-17 @ 07:39) (60 - 103)  BP: 133/59 (10-03-17 @ 07:00) (104/51 - 142/60)  RR: 19 (10-03-17 @ 07:00) (14 - 33)  SpO2: 95% (10-03-17 @ 07:39) (91% - 100%)  Wt(kg): --        CAPILLARY BLOOD GLUCOSE  143 (02 Oct 2017 21:14)          I&O's Summary    02 Oct 2017 07:01  -  03 Oct 2017 07:00  --------------------------------------------------------  IN: 1800 mL / OUT: 607 mL / NET: 1193 mL        Physical Exam:   Gen: Sitting in bed, mild distress  Neuro: Alert/oriented to person  HEENT: NC/AT  Resp: Coarse, diminished breath sounds b/l  CVS: RRR, +S1/S2  Abd: Soft, NT/ND, +BS  Ext: No edema, +pulses  Skin: Warm, well perfused    Meds:  azithromycin  IVPB   azithromycin  IVPB IV Intermittent  piperacillin/tazobactam IVPB. IV Intermittent    metoprolol Injectable IV Push    dextrose 50% Injectable IV Push  dextrose 50% Injectable IV Push  dextrose 50% Injectable IV Push  dextrose Gel Oral PRN  glucagon  Injectable IntraMuscular PRN  insulin lispro (HumaLOG) corrective regimen sliding scale SubCutaneous    ALBUTerol/ipratropium for Nebulization Nebulizer    acetaminophen  Suppository. Rectal PRN  aspirin Suppository Rectal  HYDROmorphone  Injectable IV Push PRN  ondansetron Injectable IV Push PRN      heparin  Injectable SubCutaneous    pantoprazole  Injectable IV Push      dextrose 5% + sodium chloride 0.45%. IV Continuous  dextrose 5%. IV Continuous    influenza   Vaccine IntraMuscular    nystatin Powder Topical                              7.2    5.7   )-----------( x        ( 03 Oct 2017 06:46 )             24.1       10-03    140  |  106  |  61<H>  ----------------------------<  113<H>  3.8   |  25  |  3.50<H>    Ca    8.1<L>      03 Oct 2017 06:46  Phos  4.1     10-03  Mg     2.2     10-03    TPro  6.6  /  Alb  2.7<L>  /  TBili  0.8  /  DBili  x   /  AST  1617<H>  /  ALT  1698<H>  /  AlkPhos  110  10-03      CARDIAC MARKERS ( 02 Oct 2017 06:02 )  21.800 ng/mL / x     / 432 U/L / x     / 24.7 ng/mL  CARDIAC MARKERS ( 01 Oct 2017 11:01 )  33.600 ng/mL / x     / 832 U/L / x     / 54.6 ng/mL      PT/INR - ( 02 Oct 2017 06:02 )   PT: 18.5 sec;   INR: 1.68 ratio         PTT - ( 02 Oct 2017 06:02 )  PTT:29.6 sec    .Urine Catheterized   No growth -- 10-01 @ 11:11  .Urine Clean Catch (Midstream)   >100,000 CFU/ml Streptococcus agalactiae (Group B)  Normal Urogenital cat present -- 09-30 @ 15:16  .Blood Blood   No growth to date. -- 09-30 @ 14:49              Radiology: TTE official read pending    CENTRAL LINE: N  GRIGGS: Y 	  (Remove: No)  A-LINE: N    GLOBAL ISSUE/BEST PRACTICE:  Analgesia:Y  Sedation:N  CAM-ICU: Y  HOB elevation: yes  Stress ulcer prophylaxis:Y  VTE prophylaxis:Y  Glycemic control:Y  Nutrition:Y    CODE STATUS: DNR/DNI 24 hour events: No overnight events. Per family, Pt states she felt cold overnight and has been having some b/l leg pain.    Review of Systems:  Unable to obtain 2/2 mental status    T(F): 98.7 (10-03-17 @ 06:00), Max: 98.8 (10-02-17 @ 11:33)  HR: 65 (10-03-17 @ 07:39) (60 - 103)  BP: 133/59 (10-03-17 @ 07:00) (104/51 - 142/60)  RR: 19 (10-03-17 @ 07:00) (14 - 33)  SpO2: 95% (10-03-17 @ 07:39) (91% - 100%)  Wt(kg): --        CAPILLARY BLOOD GLUCOSE  143 (02 Oct 2017 21:14)          I&O's Summary    02 Oct 2017 07:01  -  03 Oct 2017 07:00  --------------------------------------------------------  IN: 1800 mL / OUT: 607 mL / NET: 1193 mL        Physical Exam:   Gen: Sitting in bed, mild distress  Neuro: Alert/oriented to person  HEENT: NC/AT  Resp: Coarse, diminished breath sounds b/l  CVS: RRR, +S1/S2  Abd: Soft, NT/ND, +BS  Ext: No edema, +pulses  Skin: Warm, well perfused    Meds:  azithromycin  IVPB   azithromycin  IVPB IV Intermittent  piperacillin/tazobactam IVPB. IV Intermittent    metoprolol Injectable IV Push    dextrose 50% Injectable IV Push  dextrose 50% Injectable IV Push  dextrose 50% Injectable IV Push  dextrose Gel Oral PRN  glucagon  Injectable IntraMuscular PRN  insulin lispro (HumaLOG) corrective regimen sliding scale SubCutaneous    ALBUTerol/ipratropium for Nebulization Nebulizer    acetaminophen  Suppository. Rectal PRN  aspirin Suppository Rectal  HYDROmorphone  Injectable IV Push PRN  ondansetron Injectable IV Push PRN      heparin  Injectable SubCutaneous    pantoprazole  Injectable IV Push      dextrose 5% + sodium chloride 0.45%. IV Continuous  dextrose 5%. IV Continuous    influenza   Vaccine IntraMuscular    nystatin Powder Topical                              7.2    5.7   )-----------( x        ( 03 Oct 2017 06:46 )             24.1       10-03    140  |  106  |  61<H>  ----------------------------<  113<H>  3.8   |  25  |  3.50<H>    Ca    8.1<L>      03 Oct 2017 06:46  Phos  4.1     10-03  Mg     2.2     10-03    TPro  6.6  /  Alb  2.7<L>  /  TBili  0.8  /  DBili  x   /  AST  1617<H>  /  ALT  1698<H>  /  AlkPhos  110  10-03      CARDIAC MARKERS ( 02 Oct 2017 06:02 )  21.800 ng/mL / x     / 432 U/L / x     / 24.7 ng/mL  CARDIAC MARKERS ( 01 Oct 2017 11:01 )  33.600 ng/mL / x     / 832 U/L / x     / 54.6 ng/mL      PT/INR - ( 02 Oct 2017 06:02 )   PT: 18.5 sec;   INR: 1.68 ratio         PTT - ( 02 Oct 2017 06:02 )  PTT:29.6 sec    .Urine Catheterized   No growth -- 10-01 @ 11:11  .Urine Clean Catch (Midstream)   >100,000 CFU/ml Streptococcus agalactiae (Group B)  Normal Urogenital cat present -- 09-30 @ 15:16  .Blood Blood   No growth to date. -- 09-30 @ 14:49              Radiology: TTE official read pending    CENTRAL LINE: N  GRIGGS: Y 	  (Remove: No)  A-LINE: N    GLOBAL ISSUE/BEST PRACTICE:  Analgesia:Y  Sedation:N  CAM-ICU: N  HOB elevation: yes  Stress ulcer prophylaxis:Y  VTE prophylaxis:Y  Glycemic control:Y  Nutrition:Y    CODE STATUS: DNR/DNI 24 hour events: No overnight events. Per family, Pt states she felt cold overnight and has been having some b/l leg pain.    Review of Systems:  Unable to obtain 2/2 mental status    T(F): 98.7 (10-03-17 @ 06:00), Max: 98.8 (10-02-17 @ 11:33)  HR: 65 (10-03-17 @ 07:39) (60 - 103)  BP: 133/59 (10-03-17 @ 07:00) (104/51 - 142/60)  RR: 19 (10-03-17 @ 07:00) (14 - 33)  SpO2: 95% (10-03-17 @ 07:39) (91% - 100%)    CAPILLARY BLOOD GLUCOSE  143 (02 Oct 2017 21:14)    I&O's Summary    02 Oct 2017 07:01  -  03 Oct 2017 07:00  --------------------------------------------------------  IN: 1800 mL / OUT: 607 mL / NET: 1193 mL    Physical Exam:   Gen: Sitting in bed, mild distress  Neuro: Alert/oriented to person  HEENT: NC/AT  Resp: Coarse, diminished breath sounds b/l  CVS: RRR, +S1/S2  Abd: Soft, NT/ND, +BS  Ext: No edema, +pulses  Skin: Warm, well perfused    Meds:  azithromycin  IVPB IV Intermittent  piperacillin/tazobactam IVPB. IV Intermittent  metoprolol Injectable IV Push  insulin lispro (HumaLOG) corrective regimen sliding scale SubCutaneous  ALBUTerol/ipratropium for Nebulization Nebulizer  acetaminophen  Suppository. Rectal PRN  aspirin Suppository Rectal  HYDROmorphone  Injectable IV Push PRN  ondansetron Injectable IV Push PRN  heparin  Injectable SubCutaneous  pantoprazole  Injectable IV Push  dextrose 5% + sodium chloride 0.45%. IV Continuous  influenza   Vaccine IntraMuscular  nystatin Powder Topical                        7.2    5.7   )-----------( x        ( 03 Oct 2017 06:46 )             24.1     140  |  106  |  61<H>  ----------------------------<  113<H>  3.8   |  25  |  3.50<H>    Ca    8.1<L>      03 Oct 2017 06:46  Phos  4.1     10-03  Mg     2.2     10-03    TPro  6.6  /  Alb  2.7<L>  /  TBili  0.8  /  DBili  x   /  AST  1617<H>  /  ALT  1698<H>  /  AlkPhos  110  10-03      CARDIAC MARKERS ( 02 Oct 2017 06:02 )  21.800 ng/mL / x     / 432 U/L / x     / 24.7 ng/mL  CARDIAC MARKERS ( 01 Oct 2017 11:01 )  33.600 ng/mL / x     / 832 U/L / x     / 54.6 ng/mL    PT/INR - ( 02 Oct 2017 06:02 )   PT: 18.5 sec;   INR: 1.68 ratio    PTT - ( 02 Oct 2017 06:02 )  PTT:29.6 sec    .Urine Catheterized   No growth -- 10-01 @ 11:11  .Urine Clean Catch (Midstream)   >100,000 CFU/ml Streptococcus agalactiae (Group B)  Normal Urogenital cat present -- 09-30 @ 15:16  .Blood Blood   No growth to date. -- 09-30 @ 14:49    Radiology: TTE official read pending    CENTRAL LINE: N  GRIGGS: Y 	  (Remove: No)  A-LINE: N    GLOBAL ISSUE/BEST PRACTICE:  Analgesia:Y  Sedation:N  CAM-ICU: N  HOB elevation: yes  Stress ulcer prophylaxis:Y  VTE prophylaxis:Y  Glycemic control:Y  Nutrition:Y    CODE STATUS: DNR/DNI

## 2017-10-03 NOTE — PROGRESS NOTE ADULT - PROBLEM SELECTOR PLAN 1
monitor LRTs, enzymes improving  no cholestasis  INR less than <2-- vitamin K if increases  optimize perfusion, IVF hydration  rule out chronic viral hepatitis- f/u serologies   ICU monitoring

## 2017-10-03 NOTE — PROGRESS NOTE ADULT - ASSESSMENT
88 female with a history of HTN, DM, PVD, CKD, COPD now septic shock, MI and hepatic congestion with SAMIA  had been oliguric until last night, now the urine out put has improved  Creatinine is at rayna, and hopefully will improve in the next few days.  continue lasix on PRN basis  continue ABX as per ID  spoke to the entire family at bed side. will follow closely.

## 2017-10-03 NOTE — PROGRESS NOTE ADULT - SUBJECTIVE AND OBJECTIVE BOX
Patient is a 88y old  Female who presents with a chief complaint of found unresponsive (30 Sep 2017 15:29).       INTERVAL HPI: 88F PMH HTN, HLD, DM2, COPD on home oxygen, chronically bedbound, & chronic hypercapnic respiratory failure presents with septic shock with acute on chronic hypoxic and hypercapnic respiratory failure, NSTEMI, shock liver, SAMIA with oliguria, and coagulopathy. Found to have RLL CAP/aspiration pna + strep UTI.  Pt seen and examined in ICU, Pt is arousable. c/o pain in toes and feeling cold. family bed side. Off BIPAP, on NC    OVERNIGHT EVENTS:   ICU Vital Signs Last 24 Hrs  T(C): 37.7 (03 Oct 2017 15:21), Max: 37.7 (03 Oct 2017 15:21)  T(F): 99.8 (03 Oct 2017 15:21), Max: 99.8 (03 Oct 2017 15:21)  HR: 68 (03 Oct 2017 16:25) (61 - 103)  BP: 134/60 (03 Oct 2017 16:00) (104/51 - 153/66)  BP(mean): 87 (03 Oct 2017 16:00) (73 - 95)  ABP: --  ABP(mean): --  RR: 20 (03 Oct 2017 16:00) (14 - 27)  SpO2: 96% (03 Oct 2017 16:25) (92% - 100%)        REVIEW OF SYSTEM:    Constitutional: No fever, chills, fatigue, feeling cold  Neuro: No headache, numbness, +weakness  Resp: No cough, wheezing, +shortness of breath  CVS: No chest pain, palpitations, leg swelling  GI: No abdominal pain, nausea, vomiting, diarrhea   : No dysuria, frequency, incontinence  Skin: No itching, burning, rashes, or lesions   Msk: Pain in LE  Psych: No depression, anxiety, mood swings          PHYSICAL EXAM:  GENERAL: NAD, well-developed  HEAD:  Atraumatic, Normocephalic  EYES: EOMI, PERRLA, conjunctiva and sclera clear  ENMT: No tonsillar erythema, exudates, or enlargement; Moist mucous membranes,   NECK: Supple, No JVD, Normal thyroid  HEART: Regular rate and rhythm; No murmurs, rubs, or gallops  RESPIRATORY: CTA B/L, No wheezing / rhonchi  ABDOMEN: Soft, Nontender, Nondistended; Bowel sounds present  NEUROLOGY: A&Ox1, moving all extremities  EXTREMITIES:  2+ Peripheral Pulses, No clubbing, cyanosis, or edema  SKIN: warm, dry, normal color, no rash or abnormal lesions      LABS:                        7.2    5.7   )-----------( 87       ( 03 Oct 2017 06:46 )             24.1     03 Oct 2017 06:46    140    |  106    |  61     ----------------------------<  113    3.8     |  25     |  3.50     Ca    8.1        03 Oct 2017 06:46  Phos  4.1       03 Oct 2017 06:46  Mg     2.2       03 Oct 2017 06:46    TPro  6.6    /  Alb  2.7    /  TBili  0.8    /  DBili  x      /  AST  1617   /  ALT  1698   /  AlkPhos  110    03 Oct 2017 06:46    PT/INR - ( 02 Oct 2017 06:02 )   PT: 18.5 sec;   INR: 1.68 ratio         PTT - ( 02 Oct 2017 06:02 )  PTT:29.6 sec    CAPILLARY BLOOD GLUCOSE  181 (03 Oct 2017 12:00)  122 (03 Oct 2017 08:00)  143 (02 Oct 2017 21:14)  175 (02 Oct 2017 18:00)        UCx       RADIOLOGY & ADDITIONAL TESTS:            MEDICATIONS  (STANDING):  ALBUTerol/ipratropium for Nebulization 3 milliLiter(s) Nebulizer every 4 hours  aspirin  chewable 81 milliGRAM(s) Oral daily  clopidogrel Tablet 75 milliGRAM(s) Oral daily  dextrose 5%. 1000 milliLiter(s) (50 mL/Hr) IV Continuous <Continuous>  dextrose 50% Injectable 12.5 Gram(s) IV Push once  dextrose 50% Injectable 25 Gram(s) IV Push once  dextrose 50% Injectable 25 Gram(s) IV Push once  gabapentin 100 milliGRAM(s) Oral daily  heparin  Injectable 5000 Unit(s) SubCutaneous every 8 hours  influenza   Vaccine 0.5 milliLiter(s) IntraMuscular once  insulin lispro (HumaLOG) corrective regimen sliding scale   SubCutaneous Before meals and at bedtime  metoprolol 12.5 milliGRAM(s) Oral every 8 hours  nystatin Powder 1 Application(s) Topical every 6 hours  pantoprazole    Tablet 40 milliGRAM(s) Oral before breakfast  piperacillin/tazobactam IVPB. 3.375 Gram(s) IV Intermittent every 12 hours    MEDICATIONS  (PRN):  dextrose Gel 1 Dose(s) Oral once PRN Blood Glucose LESS THAN 70 milliGRAM(s)/deciliter  glucagon  Injectable 1 milliGRAM(s) IntraMuscular once PRN Glucose LESS THAN 70 milligrams/deciliter  HYDROmorphone  Injectable 0.25 milliGRAM(s) IV Push every 4 hours PRN Severe Pain (7 - 10)  ondansetron Injectable 4 milliGRAM(s) IV Push every 6 hours PRN Nausea

## 2017-10-03 NOTE — PHYSICAL THERAPY INITIAL EVALUATION ADULT - ACTIVE RANGE OF MOTION EXAMINATION, REHAB EVAL
deficits as listed below/bilateral upper extremity Active ROM was WFL (within functional limits)/Right LE Active ROM was WFL (within functional limits)

## 2017-10-03 NOTE — PROGRESS NOTE ADULT - ATTENDING COMMENTS
88F PMH HTN, HLD, DM2, COPD on home oxygen, chronically bedbound, & chronic hypercapnic respiratory failure presents with septic shock with acute on chronic hypoxic and hypercapnic respiratory failure, NSTEMI, shock liver, SAMIA with oliguria, and coagulopathy. Found to have RLL CAP/aspiration pna + GBS UTI.    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 Does not require dialysis at this time  - D/c Vancomycin and Azithromycin, continue zosyn to complete 7 days  - DVT ppx  - C/w HISS  - Poor prognosis, appears to be in the final stages of life, no aggressive measures  CC time spent: 40 min

## 2017-10-03 NOTE — SWALLOW BEDSIDE ASSESSMENT ADULT - COMMENTS
89yo F w/ PMHx DM2, asthma, HTN, iron deficiency anemia, COPD w/ intermittent use of home O2 presents w/ weakness and fatigue, fever.   Pt awake, alert communicates wants/ needs, follows basic commands  Pt c reduced labial seal and slow bolus prep/propulsion c delayed trigger of swallow c throat clear and cough reflex response indicative of aspiration

## 2017-10-03 NOTE — PROGRESS NOTE ADULT - ASSESSMENT
87yo F w/ PMHx DM2, asthma, HTN, iron deficiency anemia, COPD w/ intermittent use of home O2, severe sepsis secondary to pneumonia, MSOF, NSTEMI, overall normal LV function,     - Ric now downtrending.   - Follow up official echocardiogram  - Continue aspirin  - Cont lopressor 5mg q6  - Not a great candidate for full dose oral AC  -   Plavix 75 QD when able to take PO.   - No statin drug secondary to shock liver.  To re started when this resolves.    - Still net positive. Please monitor fluid status.   - Monitor and replete electrolytes. Keep K>4.0 and Mg>2.0.   - Cont Bipap  - No arrhythmia on the monitor  - Not a great candidate for coronary angiography  - Abx per primary  - Further cardiac workup will depend on clinical course.     The patient is at high risk for decompensation. >35 min of critical care time spent caring for this patient.

## 2017-10-03 NOTE — PROGRESS NOTE ADULT - SUBJECTIVE AND OBJECTIVE BOX
INTERVAL HPI/OVERNIGHT EVENTS: improving liver enzymes, improved mental status  HPI:  89yo F w/ PMHx DM2, asthma, HTN, iron deficiency anemia, COPD w/ intermittent use of home O2 presents w/ weakness and fatigue. pt is nonverbal at this time and unable to provide any history. She developed a fever last night and the family admits mild cough which was nonproductive. 3-4 days prior to presentation the pt was complaining of mild abd discomfort but was tolerating po well. Family denies dysphagia. They stated that she had an episode of bilious vomiting 4 days ago but none since. She was tolerating diet yesterday without difficulty. Last night developed rectal temp of 102F but denied diaphoresis, chills, rigors. Upon waking this morning, the pt was obtunded. Responding only to noxious stimuli so EMS was alerted. No other recent travel. No recent hospitalizations. No other recent changes in her overall health.  Recnetly her DM2 regimen was decreased due to normal glucose levels. Also, her recent creatinine was 1.1 as per family at the bedside. Family members have medical background.       MEDICATIONS  (STANDING):  dextrose 5%. 1000 milliLiter(s) (50 mL/Hr) IV Continuous <Continuous>  dextrose 50% Injectable 12.5 Gram(s) IV Push once  influenza   Vaccine 0.5 milliLiter(s) IntraMuscular once  piperacillin/tazobactam IVPB. 3.375 Gram(s) IV Intermittent every 12 hours  ALBUTerol/ipratropium for Nebulization 3 milliLiter(s) Nebulizer every 4 hours  pantoprazole  Injectable 40 milliGRAM(s) IV Push daily  aspirin Suppository 300 milliGRAM(s) Rectal daily  heparin  Injectable 5000 Unit(s) SubCutaneous every 12 hours  nystatin Powder 1 Application(s) Topical every 6 hours  insulin lispro (HumaLOG) corrective regimen sliding scale   SubCutaneous every 6 hours  dextrose 5%. 1000 milliLiter(s) (50 mL/Hr) IV Continuous <Continuous>  dextrose 50% Injectable 12.5 Gram(s) IV Push once  dextrose 50% Injectable 25 Gram(s) IV Push once  dextrose 50% Injectable 25 Gram(s) IV Push once  dextrose 5% + sodium chloride 0.45% 1000 milliLiter(s) (50 mL/Hr) IV Continuous <Continuous>  azithromycin  IVPB      albumin human 25% IVPB 50 milliLiter(s) IV Intermittent every 8 hours    MEDICATIONS  (PRN):  ondansetron Injectable 4 milliGRAM(s) IV Push every 6 hours PRN Nausea  dextrose Gel 1 Dose(s) Oral once PRN Blood Glucose LESS THAN 70 milliGRAM(s)/deciliter  glucagon  Injectable 1 milliGRAM(s) IntraMuscular once PRN Glucose LESS THAN 70 milligrams/deciliter  acetaminophen  Suppository. 650 milliGRAM(s) Rectal every 4 hours PRN Mild Pain (1 - 3)  dextrose Gel 1 Dose(s) Oral once PRN Blood Glucose LESS THAN 70 milliGRAM(s)/deciliter  glucagon  Injectable 1 milliGRAM(s) IntraMuscular once PRN Glucose LESS THAN 70 milligrams/deciliter      Allergies    No Known Allergies    Intolerances          General:  No wt loss, fevers, chills, night sweats, fatigue   Eyes:  Good vision, no reported pain  ENT:  No sore throat, pain, runny nose, dysphagia  CV:  No pain, palpitations, hypo/hypertension  Resp:  No dyspnea, cough, tachypnea, wheezing  GI:  No pain, No nausea, No vomiting, No diarrhea, No constipation, No weight loss, No fever, No pruritis, No rectal bleeding, No tarry stools, No dysphagia,  :  No pain, bleeding, incontinence, nocturia  Muscle:  No pain, weakness  Neuro:  RLE pain  Psych:  No fatigue, insomnia, mood problems, depression  Endocrine:  No polyuria, polydipsia, cold/heat intolerance  Heme:  No petechiae, ecchymosis, easy bruisability  Skin:  No rash, tattoos, scars, edema        PHYSICAL EXAM:   Vital Signs:  Vital Signs Last 24 Hrs  T(C): 36.6 (01 Oct 2017 12:00), Max: 36.6 (30 Sep 2017 22:45)  T(F): 97.9 (01 Oct 2017 12:00), Max: 97.9 (01 Oct 2017 12:00)  HR: 66 (01 Oct 2017 14:00) (59 - 79)  BP: 116/56 (01 Oct 2017 14:00) (84/42 - 146/61)  BP(mean): 80 (01 Oct 2017 14:00) (69 - 88)  RR: 16 (01 Oct 2017 14:00) (10 - 32)  SpO2: 100% (01 Oct 2017 14:00) (95% - 100%)  Daily     Daily Weight in k (01 Oct 2017 13:11)I&O's Summary    30 Sep 2017 07:  -  01 Oct 2017 07:00  --------------------------------------------------------  IN: 925 mL / OUT: 55 mL / NET: 870 mL    01 Oct 2017 07:  -  01 Oct 2017 14:18  --------------------------------------------------------  IN: 395 mL / OUT: 45 mL / NET: 350 mL        GENERAL:  AAOx2, follows commands, able to communicate verbally   HEENT:  NC/AT,  conjunctivae clear and pink, no thyromegaly, nodules, adenopathy, no JVD, sclera -anicteric  CHEST:  decreased BS at bases  HEART:  Regular rhythm, S1, S2, no murmur/rub/S3/S4, no abdominal bruit, no edema  ABDOMEN:  Soft, non-tender, non-distended, normoactive bowel sounds,  no masses ,no hepato-splenomegaly, no signs of chronic liver disease  EXTEREMITIES:  no cyanosis,clubbing or edema  SKIN:  No rash/erythema/ecchymoses/petechiae/wounds/abscess/warm/dry  NEURO:  responsive to painful stimuli, does not follow commands       LABS: reviewed

## 2017-10-03 NOTE — PROGRESS NOTE ADULT - PROBLEM SELECTOR PLAN 1
with multi organ failure, 2/2 PNA and UTI  Improving with stable vitals.  c/w IV zosyn   urine is + for strep  f/u urine/blood c/s.

## 2017-10-03 NOTE — PROGRESS NOTE ADULT - ASSESSMENT
88F PMH HTN, HLD, DM2, COPD on home oxygen, & chronic hypercapnic respiratory failure presents with septic shock with acute on chronic hypoxic and hypercapnic respiratory failure, NSTEMI, shock liver, SAMIA with oliguria, and coagulopathy. Found to have RLL CAP/aspiration pna + GBS UTI.    Neuro - Pt is more awake, alert, responsive to commands, cont dilaudid prn for pain  CV - HD stable, troponins trending down, cont metoprolol, FU TTE official read, NSTEMI likely 2/2 demand ischemia in the setting of multiorgan failure  Resp - Pt on bipap overnight, aerosol mask during day, cont duonebs  GI - Cont pleasure feeds, PPI, IV hydration, liver enzymes trending down  Renal - Urine output improved will cont to monitor, no evidence of volume overload/pulmonary edema  Endo - Cont ISS, fingersticks, D5W for IVF  ID - Cont zosyn, azithromycin (day 3), vanco trough 15.4 yesterday, will hold vanco dose, d/c azithromycin after today  Heme - ASA rectal and heparin subq for DVT ppx  Dispo - Pt poor prognosis 2/2 multi organ failure, DNR/DNI 88F PMH HTN, HLD, DM2, COPD on home oxygen, & chronic hypercapnic respiratory failure presents with septic shock with acute on chronic hypoxic and hypercapnic respiratory failure, NSTEMI, shock liver, SAMIA with oliguria, and coagulopathy. Found to have RLL CAP/aspiration pna + GBS UTI.    Neuro - Pt is more awake, alert, responsive to commands, cont dilaudid prn for pain  CV - HD stable, troponins trending down, cont metoprolol (will switch to PO 12.5mg q8h), FU TTE official read, NSTEMI likely 2/2 demand ischemia in the setting of multiorgan failure, will switch ASA to PO and add Plavix  Resp - Pt on bipap overnight, nasal cannula during day, cont duonebs  GI - Cont pleasure feeds, PPI (will switch to PO), d/c IV hydration, liver enzymes trending down  Renal - Urine output improved will cont to monitor, no evidence of volume overload/pulmonary edema  Endo - Cont ISS, fingersticks, D5W for IVF  ID - Cont zosyn, azithromycin (day 3), vanco trough 15.4 yesterday, will hold vanco dose, d/c azithromycin after today  Heme - ASA PO and heparin subq for DVT ppx  Dispo - Pt poor prognosis 2/2 multi organ failure, DNR/DNI, will get PT consult

## 2017-10-03 NOTE — PHYSICAL THERAPY INITIAL EVALUATION ADULT - ADDITIONAL COMMENTS
(social work note;  Pta pt lives at home w her son, Pt  is basically bedbound with a 24/7-aide through [facility]. Family has chap[facility] lift  at home to move patient. . Pt also known to house calls under care of dr. Rosales.  Family very supportive at bedside.

## 2017-10-03 NOTE — PROGRESS NOTE ADULT - SUBJECTIVE AND OBJECTIVE BOX
MALINDA SULTANA  88y  Female    Patient is a 88y old  Female who presents with a chief complaint of found unresponsive (30 Sep 2017 15:29)    awake and alert. entire family at bed side. urine out put has improved. Denies any chest pain or shortness of breath. off of BIPAP      HPI:  87yo F w/ PMHx DM2, asthma, HTN, iron deficiency anemia, COPD w/ intermittent use of home O2 presents w/ weakness and fatigue. pt is nonverbal at this time and unable to provide any history. She developed a fever last night and the family admits mild cough which was nonproductive. 3-4 days prior to presentation the pt was complaining of mild abd discomfort but was tolerating po well. Family denies dysphagia. They stated that she had an episode of bilious vomiting 4 days ago but none since. She was tolerating diet yesterday without difficulty. Last night developed rectal temp of 102F but denied diaphoresis, chills, rigors. Upon waking this morning, the pt was obtunded. Responding only to noxious stimuli so EMS was alerted. No other recent travel. No recent hospitalizations. No other recent changes in her overall health.  Recnetly her DM2 regimen was decreased due to normal glucose levels. Also, her recent creatinine was 1.1 as per family at the bedside. Family members have medical background.       PAST MEDICAL & SURGICAL HISTORY:  Asthma  Diabetes mellitus  HTN (hypertension)  No significant past surgical history          PHYSICAL EXAM:    T(C): 36.7 (10-03-17 @ 08:29), Max: 37.1 (10-02-17 @ 21:03)  HR: 73 (10-03-17 @ 13:00) (61 - 103)  BP: 133/61 (10-03-17 @ 13:00) (104/51 - 153/66)  RR: 27 (10-03-17 @ 13:00) (14 - 27)  SpO2: 95% (10-03-17 @ 13:00) (91% - 100%)  Wt(kg): --    I&O's Detail    02 Oct 2017 07:01  -  03 Oct 2017 07:00  --------------------------------------------------------  IN:    dextrose 5% + sodium chloride 0.45%: 200 mL    dextrose 5% + sodium chloride 0.45%.: 900 mL    Oral Fluid: 150 mL    Solution: 250 mL    Solution: 100 mL    Solution: 200 mL  Total IN: 1800 mL    OUT:    Indwelling Catheter - Urethral: 607 mL  Total OUT: 607 mL    Total NET: 1193 mL          Respiratory: clear anteriorly, decreased BS at bases  Cardiovascular: S1 S2  Gastrointestinal: soft NT ND +BS  Extremities: one plus edema   Neuro: Awake and alert    MEDICATIONS  (STANDING):  ALBUTerol/ipratropium for Nebulization 3 milliLiter(s) Nebulizer every 4 hours  aspirin  chewable 81 milliGRAM(s) Oral daily  clopidogrel Tablet 75 milliGRAM(s) Oral daily  dextrose 5%. 1000 milliLiter(s) (50 mL/Hr) IV Continuous <Continuous>  dextrose 50% Injectable 12.5 Gram(s) IV Push once  dextrose 50% Injectable 25 Gram(s) IV Push once  dextrose 50% Injectable 25 Gram(s) IV Push once  gabapentin 100 milliGRAM(s) Oral daily  heparin  Injectable 5000 Unit(s) SubCutaneous every 8 hours  influenza   Vaccine 0.5 milliLiter(s) IntraMuscular once  insulin lispro (HumaLOG) corrective regimen sliding scale   SubCutaneous Before meals and at bedtime  metoprolol 12.5 milliGRAM(s) Oral every 8 hours  nystatin Powder 1 Application(s) Topical every 6 hours  pantoprazole    Tablet 40 milliGRAM(s) Oral before breakfast  piperacillin/tazobactam IVPB. 3.375 Gram(s) IV Intermittent every 12 hours    MEDICATIONS  (PRN):  dextrose Gel 1 Dose(s) Oral once PRN Blood Glucose LESS THAN 70 milliGRAM(s)/deciliter  glucagon  Injectable 1 milliGRAM(s) IntraMuscular once PRN Glucose LESS THAN 70 milligrams/deciliter  HYDROmorphone  Injectable 0.25 milliGRAM(s) IV Push every 4 hours PRN Severe Pain (7 - 10)  ondansetron Injectable 4 milliGRAM(s) IV Push every 6 hours PRN Nausea                            7.2    5.7   )-----------( 87       ( 03 Oct 2017 06:46 )             24.1       10-03    140  |  106  |  61<H>  ----------------------------<  113<H>  3.8   |  25  |  3.50<H>    Ca    8.1<L>      03 Oct 2017 06:46  Phos  4.1     10-03  Mg     2.2     10-03    TPro  6.6  /  Alb  2.7<L>  /  TBili  0.8  /  DBili  x   /  AST  1617<H>  /  ALT  1698<H>  /  AlkPhos  110  10-03    < from: Xray Chest 1 View AP -PORTABLE-Routine (10.02.17 @ 07:01) >  INTERPRETATION:  Follow-up.    AP chest. Prior 10/1/2017.  Low lung volumes. Patient's chin obscures left apex. No change heart   mediastinum. There is interval resolution of airspace disease at the   right base. The lungs grossly clear at this time. Cardiac apex obscures   left base.

## 2017-10-04 DIAGNOSIS — D64.9 ANEMIA, UNSPECIFIED: ICD-10-CM

## 2017-10-04 LAB
ALBUMIN SERPL ELPH-MCNC: 2.4 G/DL — LOW (ref 3.3–5)
ALP SERPL-CCNC: 98 U/L — SIGNIFICANT CHANGE UP (ref 40–120)
ALT FLD-CCNC: 1304 U/L — HIGH (ref 12–78)
ANION GAP SERPL CALC-SCNC: 8 MMOL/L — SIGNIFICANT CHANGE UP (ref 5–17)
APTT BLD: 36.2 SEC — SIGNIFICANT CHANGE UP (ref 27.5–37.4)
AST SERPL-CCNC: 852 U/L — HIGH (ref 15–37)
BASE EXCESS BLDA CALC-SCNC: -0.6 MMOL/L — SIGNIFICANT CHANGE UP (ref -2–2)
BASOPHILS # BLD AUTO: 0.1 K/UL — SIGNIFICANT CHANGE UP (ref 0–0.2)
BASOPHILS NFR BLD AUTO: 1.3 % — SIGNIFICANT CHANGE UP (ref 0–2)
BILIRUB SERPL-MCNC: 1.1 MG/DL — SIGNIFICANT CHANGE UP (ref 0.2–1.2)
BLOOD GAS COMMENTS ARTERIAL: SIGNIFICANT CHANGE UP
BUN SERPL-MCNC: 55 MG/DL — HIGH (ref 7–23)
CALCIUM SERPL-MCNC: 7.7 MG/DL — LOW (ref 8.5–10.1)
CHLORIDE SERPL-SCNC: 104 MMOL/L — SIGNIFICANT CHANGE UP (ref 96–108)
CO2 SERPL-SCNC: 27 MMOL/L — SIGNIFICANT CHANGE UP (ref 22–31)
CREAT SERPL-MCNC: 3.2 MG/DL — HIGH (ref 0.5–1.3)
EOSINOPHIL # BLD AUTO: 0.3 K/UL — SIGNIFICANT CHANGE UP (ref 0–0.5)
EOSINOPHIL NFR BLD AUTO: 4.4 % — SIGNIFICANT CHANGE UP (ref 0–6)
GLUCOSE SERPL-MCNC: 101 MG/DL — HIGH (ref 70–99)
HCO3 BLDA-SCNC: 24 MMOL/L — SIGNIFICANT CHANGE UP (ref 23–27)
HCT VFR BLD CALC: 23.8 % — LOW (ref 34.5–45)
HGB BLD-MCNC: 7.3 G/DL — LOW (ref 11.5–15.5)
HOROWITZ INDEX BLDA+IHG-RTO: SIGNIFICANT CHANGE UP
INR BLD: 1.48 RATIO — HIGH (ref 0.88–1.16)
LYMPHOCYTES # BLD AUTO: 1.8 K/UL — SIGNIFICANT CHANGE UP (ref 1–3.3)
LYMPHOCYTES # BLD AUTO: 30.6 % — SIGNIFICANT CHANGE UP (ref 13–44)
MAGNESIUM SERPL-MCNC: 2.2 MG/DL — SIGNIFICANT CHANGE UP (ref 1.6–2.6)
MCHC RBC-ENTMCNC: 28.3 PG — SIGNIFICANT CHANGE UP (ref 27–34)
MCHC RBC-ENTMCNC: 30.8 GM/DL — LOW (ref 32–36)
MCV RBC AUTO: 91.7 FL — SIGNIFICANT CHANGE UP (ref 80–100)
MONOCYTES # BLD AUTO: 0.7 K/UL — SIGNIFICANT CHANGE UP (ref 0–0.9)
MONOCYTES NFR BLD AUTO: 11.6 % — HIGH (ref 1–9)
NEUTROPHILS # BLD AUTO: 3 K/UL — SIGNIFICANT CHANGE UP (ref 1.8–7.4)
NEUTROPHILS NFR BLD AUTO: 52.1 % — SIGNIFICANT CHANGE UP (ref 43–77)
PCO2 BLDA: 50 MMHG — HIGH (ref 32–46)
PH BLDA: 7.32 — LOW (ref 7.35–7.45)
PHOSPHATE SERPL-MCNC: 4.1 MG/DL — SIGNIFICANT CHANGE UP (ref 2.5–4.5)
PLATELET # BLD AUTO: 80 K/UL — LOW (ref 150–400)
PO2 BLDA: 55 MMHG — LOW (ref 74–108)
POTASSIUM SERPL-MCNC: 4 MMOL/L — SIGNIFICANT CHANGE UP (ref 3.5–5.3)
POTASSIUM SERPL-SCNC: 4 MMOL/L — SIGNIFICANT CHANGE UP (ref 3.5–5.3)
PROT SERPL-MCNC: 6.2 G/DL — SIGNIFICANT CHANGE UP (ref 6–8.3)
PROTHROM AB SERPL-ACNC: 16.3 SEC — HIGH (ref 9.8–12.7)
RBC # BLD: 2.6 M/UL — LOW (ref 3.8–5.2)
RBC # FLD: 13.9 % — SIGNIFICANT CHANGE UP (ref 10.3–14.5)
SAO2 % BLDA: 85 % — LOW (ref 92–96)
SODIUM SERPL-SCNC: 139 MMOL/L — SIGNIFICANT CHANGE UP (ref 135–145)
WBC # BLD: 5.8 K/UL — SIGNIFICANT CHANGE UP (ref 3.8–10.5)
WBC # FLD AUTO: 5.8 K/UL — SIGNIFICANT CHANGE UP (ref 3.8–10.5)

## 2017-10-04 PROCEDURE — 71010: CPT | Mod: 26

## 2017-10-04 PROCEDURE — 99291 CRITICAL CARE FIRST HOUR: CPT

## 2017-10-04 PROCEDURE — 99233 SBSQ HOSP IP/OBS HIGH 50: CPT

## 2017-10-04 RX ORDER — SODIUM CHLORIDE 9 MG/ML
1000 INJECTION, SOLUTION INTRAVENOUS
Qty: 0 | Refills: 0 | Status: DISCONTINUED | OUTPATIENT
Start: 2017-10-04 | End: 2017-10-08

## 2017-10-04 RX ORDER — HYDROMORPHONE HYDROCHLORIDE 2 MG/ML
0.12 INJECTION INTRAMUSCULAR; INTRAVENOUS; SUBCUTANEOUS EVERY 4 HOURS
Qty: 0 | Refills: 0 | Status: DISCONTINUED | OUTPATIENT
Start: 2017-10-04 | End: 2017-10-11

## 2017-10-04 RX ADMIN — HEPARIN SODIUM 5000 UNIT(S): 5000 INJECTION INTRAVENOUS; SUBCUTANEOUS at 21:43

## 2017-10-04 RX ADMIN — SODIUM CHLORIDE 50 MILLILITER(S): 9 INJECTION, SOLUTION INTRAVENOUS at 11:18

## 2017-10-04 RX ADMIN — HYDROMORPHONE HYDROCHLORIDE 0.25 MILLIGRAM(S): 2 INJECTION INTRAMUSCULAR; INTRAVENOUS; SUBCUTANEOUS at 08:02

## 2017-10-04 RX ADMIN — PIPERACILLIN AND TAZOBACTAM 25 GRAM(S): 4; .5 INJECTION, POWDER, LYOPHILIZED, FOR SOLUTION INTRAVENOUS at 12:03

## 2017-10-04 RX ADMIN — Medication 3 MILLILITER(S): at 07:21

## 2017-10-04 RX ADMIN — PANTOPRAZOLE SODIUM 40 MILLIGRAM(S): 20 TABLET, DELAYED RELEASE ORAL at 06:16

## 2017-10-04 RX ADMIN — Medication 3 MILLILITER(S): at 11:28

## 2017-10-04 RX ADMIN — NYSTATIN CREAM 1 APPLICATION(S): 100000 CREAM TOPICAL at 17:53

## 2017-10-04 RX ADMIN — HYDROMORPHONE HYDROCHLORIDE 0.25 MILLIGRAM(S): 2 INJECTION INTRAMUSCULAR; INTRAVENOUS; SUBCUTANEOUS at 01:17

## 2017-10-04 RX ADMIN — Medication 12.5 MILLIGRAM(S): at 21:43

## 2017-10-04 RX ADMIN — NYSTATIN CREAM 1 APPLICATION(S): 100000 CREAM TOPICAL at 06:16

## 2017-10-04 RX ADMIN — HEPARIN SODIUM 5000 UNIT(S): 5000 INJECTION INTRAVENOUS; SUBCUTANEOUS at 14:46

## 2017-10-04 RX ADMIN — HEPARIN SODIUM 5000 UNIT(S): 5000 INJECTION INTRAVENOUS; SUBCUTANEOUS at 06:16

## 2017-10-04 RX ADMIN — Medication 4: at 21:43

## 2017-10-04 RX ADMIN — CLOPIDOGREL BISULFATE 75 MILLIGRAM(S): 75 TABLET, FILM COATED ORAL at 14:47

## 2017-10-04 RX ADMIN — Medication 12.5 MILLIGRAM(S): at 06:16

## 2017-10-04 RX ADMIN — Medication 81 MILLIGRAM(S): at 14:46

## 2017-10-04 RX ADMIN — Medication 3 MILLILITER(S): at 04:18

## 2017-10-04 RX ADMIN — HYDROMORPHONE HYDROCHLORIDE 0.25 MILLIGRAM(S): 2 INJECTION INTRAMUSCULAR; INTRAVENOUS; SUBCUTANEOUS at 00:47

## 2017-10-04 RX ADMIN — NYSTATIN CREAM 1 APPLICATION(S): 100000 CREAM TOPICAL at 12:04

## 2017-10-04 RX ADMIN — Medication 3 MILLILITER(S): at 15:21

## 2017-10-04 RX ADMIN — Medication 3 MILLILITER(S): at 20:16

## 2017-10-04 RX ADMIN — HYDROMORPHONE HYDROCHLORIDE 0.25 MILLIGRAM(S): 2 INJECTION INTRAMUSCULAR; INTRAVENOUS; SUBCUTANEOUS at 08:45

## 2017-10-04 NOTE — PROGRESS NOTE ADULT - SUBJECTIVE AND OBJECTIVE BOX
24 hour events: Pt had temperature of 100 at 2339 last evening.    Review of Systems:  Unable to obtain 2/2 mental status    T(F): 99.3 (10-04-17 @ 03:48), Max: 100 (10-03-17 @ 23:39)  HR: 62 (10-04-17 @ 07:33) (52 - 77)  BP: 131/60 (10-04-17 @ 07:00) (104/51 - 153/66)  RR: 24 (10-04-17 @ 07:00) (15 - 28)  SpO2: 95% (10-04-17 @ 07:33) (90% - 929%)  Wt(kg): --        CAPILLARY BLOOD GLUCOSE  181 (03 Oct 2017 12:00)          I&O's Summary    03 Oct 2017 07:01  -  04 Oct 2017 07:00  --------------------------------------------------------  IN: 1150 mL / OUT: 1000 mL / NET: 150 mL        Physical Exam:   Gen:  Neuro:  HEENT:  Resp:  CVS:  Abd:  Ext:  Skin:    Meds:  piperacillin/tazobactam IVPB. IV Intermittent    metoprolol Oral    dextrose 50% Injectable IV Push  dextrose 50% Injectable IV Push  dextrose 50% Injectable IV Push  dextrose Gel Oral PRN  glucagon  Injectable IntraMuscular PRN  insulin lispro (HumaLOG) corrective regimen sliding scale SubCutaneous    ALBUTerol/ipratropium for Nebulization Nebulizer    gabapentin Oral  HYDROmorphone  Injectable IV Push PRN  ondansetron Injectable IV Push PRN      aspirin  chewable Oral  clopidogrel Tablet Oral  heparin  Injectable SubCutaneous    pantoprazole    Tablet Oral      dextrose 5%. IV Continuous    influenza   Vaccine IntraMuscular    nystatin Powder Topical                              7.2    5.7   )-----------( 87       ( 03 Oct 2017 06:46 )             24.1       10-04    139  |  104  |  55<H>  ----------------------------<  101<H>  4.0   |  27  |  3.20<H>    Ca    7.7<L>      04 Oct 2017 06:41  Phos  4.1     10-04  Mg     2.2     10-04    TPro  6.2  /  Alb  2.4<L>  /  TBili  1.1  /  DBili  x   /  AST  852<H>  /  ALT  1304<H>  /  AlkPhos  98  10-04          PT/INR - ( 04 Oct 2017 06:41 )   PT: 16.3 sec;   INR: 1.48 ratio         PTT - ( 04 Oct 2017 06:41 )  PTT:36.2 sec    .Urine Catheterized   No growth -- 10-01 @ 11:11  .Urine Clean Catch (Midstream)   >100,000 CFU/ml Streptococcus agalactiae (Group B)  Streptococcus agalactiae (Group B)  Group B streptococci are susceptible to ampicillin,  penicillin and cefazolin, but may be resistant to  erythromycin and clindamycin.  Recommendations for intrapartum prophylaxis for Group B  streptococci are penicillin or ampicillin.  Normal Urogenital cat present -- 09-30 @ 15:16  .Blood Blood   No growth to date. -- 09-30 @ 14:49      CENTRAL LINE: N  GRIGGS: Y                             Remove: N  A-LINE: N    GLOBAL ISSUE/BEST PRACTICE:  Analgesia:Y  Sedation:N  CAM-ICU: N  HOB elevation: yes  Stress ulcer prophylaxis:Y  VTE prophylaxis:Y  Glycemic control:Y  Nutrition:Y    CODE STATUS: DNR/DNI 24 hour events: Pt had temperature of 100 at 2339 last evening.    Review of Systems:  Unable to obtain 2/2 mental status    T(F): 99.3 (10-04-17 @ 03:48), Max: 100 (10-03-17 @ 23:39)  HR: 62 (10-04-17 @ 07:33) (52 - 77)  BP: 131/60 (10-04-17 @ 07:00) (104/51 - 153/66)  RR: 24 (10-04-17 @ 07:00) (15 - 28)  SpO2: 95% (10-04-17 @ 07:33) (90% - 929%)  Wt(kg): --        CAPILLARY BLOOD GLUCOSE  181 (03 Oct 2017 12:00)          I&O's Summary    03 Oct 2017 07:01  -  04 Oct 2017 07:00  --------------------------------------------------------  IN: 1150 mL / OUT: 1000 mL / NET: 150 mL        Physical Exam:   Gen: NAD, resting comfortably  Neuro: AAOx2  HEENT: NC/AT  Resp: Diminished, coarse breath sounds b/l  CVS: RRR, +S1/S2  Abd: Soft, NT/ND, +Bs  Ext: No edema, +pulses  Skin: Warm, well perfused    Meds:  piperacillin/tazobactam IVPB. IV Intermittent    metoprolol Oral    dextrose 50% Injectable IV Push  dextrose 50% Injectable IV Push  dextrose 50% Injectable IV Push  dextrose Gel Oral PRN  glucagon  Injectable IntraMuscular PRN  insulin lispro (HumaLOG) corrective regimen sliding scale SubCutaneous    ALBUTerol/ipratropium for Nebulization Nebulizer    gabapentin Oral  HYDROmorphone  Injectable IV Push PRN  ondansetron Injectable IV Push PRN      aspirin  chewable Oral  clopidogrel Tablet Oral  heparin  Injectable SubCutaneous    pantoprazole    Tablet Oral      dextrose 5%. IV Continuous    influenza   Vaccine IntraMuscular    nystatin Powder Topical                              7.2    5.7   )-----------( 87       ( 03 Oct 2017 06:46 )             24.1       10-04    139  |  104  |  55<H>  ----------------------------<  101<H>  4.0   |  27  |  3.20<H>    Ca    7.7<L>      04 Oct 2017 06:41  Phos  4.1     10-04  Mg     2.2     10-04    TPro  6.2  /  Alb  2.4<L>  /  TBili  1.1  /  DBili  x   /  AST  852<H>  /  ALT  1304<H>  /  AlkPhos  98  10-04          PT/INR - ( 04 Oct 2017 06:41 )   PT: 16.3 sec;   INR: 1.48 ratio         PTT - ( 04 Oct 2017 06:41 )  PTT:36.2 sec    .Urine Catheterized   No growth -- 10-01 @ 11:11  .Urine Clean Catch (Midstream)   >100,000 CFU/ml Streptococcus agalactiae (Group B)  Streptococcus agalactiae (Group B)  Group B streptococci are susceptible to ampicillin,  penicillin and cefazolin, but may be resistant to  erythromycin and clindamycin.  Recommendations for intrapartum prophylaxis for Group B  streptococci are penicillin or ampicillin.  Normal Urogenital cat present -- 09-30 @ 15:16  .Blood Blood   No growth to date. -- 09-30 @ 14:49      CENTRAL LINE: N  GRIGGS: Y                             Remove: N  A-LINE: N    GLOBAL ISSUE/BEST PRACTICE:  Analgesia:Y  Sedation:N  CAM-ICU: N  HOB elevation: yes  Stress ulcer prophylaxis:Y  VTE prophylaxis:Y  Glycemic control:Y  Nutrition:Y    CODE STATUS: DNR/DNI 24 hour events: Pt had temperature of 100 at 2339 last evening.    Review of Systems:  Unable to obtain 2/2 mental status    T(F): 99.3 (10-04-17 @ 03:48), Max: 100 (10-03-17 @ 23:39)  HR: 62 (10-04-17 @ 07:33) (52 - 77)  BP: 131/60 (10-04-17 @ 07:00) (104/51 - 153/66)  RR: 24 (10-04-17 @ 07:00) (15 - 28)  SpO2: 95% (10-04-17 @ 07:33) (90% - 929%)    CAPILLARY BLOOD GLUCOSE  181 (03 Oct 2017 12:00)    I&O's Summary    03 Oct 2017 07:01  -  04 Oct 2017 07:00  --------------------------------------------------------  IN: 1150 mL / OUT: 1000 mL / NET: 150 mL    Physical Exam:   Gen: NAD, resting comfortably  Neuro: AAOx2  HEENT: NC/AT  Resp: Diminished, coarse breath sounds b/l  CVS: RRR, +S1/S2  Abd: Soft, NT/ND, +Bs  Ext: No edema, +pulses  Skin: Warm, well perfused    Meds:  piperacillin/tazobactam IVPB. IV Intermittent  metoprolol Oral  insulin lispro (HumaLOG) corrective regimen sliding scale SubCutaneous  ALBUTerol/ipratropium for Nebulization Nebulizer  gabapentin Oral  HYDROmorphone  Injectable IV Push PRN  ondansetron Injectable IV Push PRN  aspirin  chewable Oral  clopidogrel Tablet Oral  heparin  Injectable SubCutaneous  pantoprazole    Tablet Oral  influenza   Vaccine IntraMuscular  nystatin Powder Topical  D5 1/2NS@50/hr                          7.2    5.7   )-----------( 87       ( 03 Oct 2017 06:46 )             24.1     139  |  104  |  55  ----------------------<  101  4.0   |  27  |  3.20    Ca    7.7<L>      04 Oct 2017 06:41  Phos  4.1     10-04  Mg     2.2     10-04    TPro  6.2  /  Alb  2.4<L>  /  TBili  1.1  /  DBili  x   /  AST  852<H>  /  ALT  1304<H>  /  AlkPhos  98  10-04    PT/INR - ( 04 Oct 2017 06:41 )   PT: 16.3 sec;   INR: 1.48 ratio    PTT - ( 04 Oct 2017 06:41 )  PTT:36.2 sec    Urine Catheterized   No growth -- 10-01 @ 11:11    Urine Clean Catch (Midstream)   >100,000 CFU/ml Streptococcus agalactiae (Group B)  Streptococcus agalactiae (Group B)    Blood Blood   No growth to date. -- 09-30 @ 14:49    CENTRAL LINE: N  GRIGGS: Y                             Remove: N (for comfort/SAMIA)  A-LINE: N    GLOBAL ISSUE/BEST PRACTICE:  Analgesia:Y  Sedation:N  CAM-ICU: N  HOB elevation: yes  Stress ulcer prophylaxis:Y  VTE prophylaxis:Y  Glycemic control:Y  Nutrition:Y    CODE STATUS: DNR/DNI

## 2017-10-04 NOTE — PROGRESS NOTE ADULT - ASSESSMENT
89yo F w/ PMHx DM2, asthma, HTN, iron deficiency anemia, COPD w/ intermittent use of home O2 presents w/ MODS, shock liver, SAMIA, NSTEMI. sepsis 2/2 to PNA and UTI

## 2017-10-04 NOTE — PROGRESS NOTE ADULT - SUBJECTIVE AND OBJECTIVE BOX
Follow up:  sepsis, bradycardia, hypotension, elev troponin    HPI:  87yo F w/ PMHx DM2, asthma, HTN, iron deficiency anemia, COPD w/ intermittent use of home O2 presents w/ weakness and fatigue. pt is nonverbal at this time and unable to provide any history. She developed a fever and the family admits mild cough which was nonproductive. 3-4 days prior to presentation the pt was complaining of mild abd discomfort but was tolerating po well. Family denies dysphagia.  In the ED, pt found hypotensive w/ SBP in 50's initially, now w/  after 3L NS bolus. Pt also bradycardic w/ P in 50's persistently despite hypotension. Pt was given vanco/zosyn as well as she was found to have L sided PNA.     She continued to have a fever overnight but has remained off pressors. She is lethargic and agitated with no new cardiac events overnight.      PAST MEDICAL & SURGICAL HISTORY:  Asthma  Diabetes mellitus  HTN (hypertension)  No significant past surgical history      MEDICATIONS  (STANDING):  ALBUTerol/ipratropium for Nebulization 3 milliLiter(s) Nebulizer every 4 hours  aspirin  chewable 81 milliGRAM(s) Oral daily  clopidogrel Tablet 75 milliGRAM(s) Oral daily  dextrose 5%. 1000 milliLiter(s) (50 mL/Hr) IV Continuous <Continuous>  dextrose 50% Injectable 12.5 Gram(s) IV Push once  dextrose 50% Injectable 25 Gram(s) IV Push once  dextrose 50% Injectable 25 Gram(s) IV Push once  gabapentin 100 milliGRAM(s) Oral daily  heparin  Injectable 5000 Unit(s) SubCutaneous every 8 hours  influenza   Vaccine 0.5 milliLiter(s) IntraMuscular once  insulin lispro (HumaLOG) corrective regimen sliding scale   SubCutaneous Before meals and at bedtime  metoprolol 12.5 milliGRAM(s) Oral every 8 hours  nystatin Powder 1 Application(s) Topical every 6 hours  pantoprazole    Tablet 40 milliGRAM(s) Oral before breakfast  piperacillin/tazobactam IVPB. 3.375 Gram(s) IV Intermittent every 12 hours    MEDICATIONS  (PRN):  dextrose Gel 1 Dose(s) Oral once PRN Blood Glucose LESS THAN 70 milliGRAM(s)/deciliter  glucagon  Injectable 1 milliGRAM(s) IntraMuscular once PRN Glucose LESS THAN 70 milligrams/deciliter  HYDROmorphone  Injectable 0.25 milliGRAM(s) IV Push every 4 hours PRN Severe Pain (7 - 10)  ondansetron Injectable 4 milliGRAM(s) IV Push every 6 hours PRN Nausea      REVIEW OF SYSTEMS: unobtainable    Vital Signs Last 24 Hrs  T(C): 37.4 (04 Oct 2017 03:48), Max: 37.8 (03 Oct 2017 23:39)  T(F): 99.3 (04 Oct 2017 03:48), Max: 100 (03 Oct 2017 23:39)  HR: 62 (04 Oct 2017 07:33) (52 - 77)  BP: 131/60 (04 Oct 2017 07:00) (104/51 - 153/66)  BP(mean): 86 (04 Oct 2017 07:00) (73 - 95)  RR: 24 (04 Oct 2017 07:00) (15 - 28)  SpO2: 95% (04 Oct 2017 07:33) (90% - 929%)    I&O's Summary    03 Oct 2017 07:01  -  04 Oct 2017 07:00  --------------------------------------------------------  IN: 1150 mL / OUT: 1000 mL / NET: 150 mL        PHYSICAL EXAM:    Constitutional: agitated  Eyes:   Pupils round, no lesions  ENMT: no exudate or erythema  Pulmonary: Non-labored, breath sounds are clear bilaterally but decreased,  No wheezing, rales or rhonchi  Cardiovascular: PMI not palpable RRR normal S1 and S2, no murmurs, rubs, gallops or clicks  Gastrointestinal: Bowel Sounds present, soft, nontender.   Lymph: No cervical lymphadenopathy.  Skin: No rashes.  No cyanosis.  Psych: cannot assess   Ext: No lower ext edema                                7.2    5.7   )-----------( 87       ( 03 Oct 2017 06:46 )             24.1     10-04    139  |  104  |  55<H>  ----------------------------<  101<H>  4.0   |  27  |  3.20<H>    Ca    7.7<L>      04 Oct 2017 06:41  Phos  4.1     10-04  Mg     2.2     10-04    TPro  6.2  /  Alb  2.4<L>  /  TBili  1.1  /  DBili  x   /  AST  852<H>  /  ALT  1304<H>  /  AlkPhos  98  10-04  < from: 12 Lead ECG (10.01.17 @ 10:54) >    Ventricular Rate 72 BPM    Atrial Rate 72 BPM    P-R Interval 234 ms    QRS Duration 126 ms    Q-T Interval 430 ms    QTC Calculation(Bezet) 470 ms    P Axis 58 degrees    R Axis -28 degrees    T Axis 14 degrees    Diagnosis Line Sinus rhythm with 1st degree AV block  Right bundle branch block  Abnormal ECG    Confirmed by janes Lutz ([id number]) on 10/1/2017 2:57:15 PM    < end of copied text >  < from: Xray Chest 1 View AP -PORTABLE-Routine (10.02.17 @ 07:01) >    EXAM:  PORTABLE CHEST                            PROCEDURE DATE:  10/02/2017          INTERPRETATION:  Follow-up.    AP chest. Prior 10/1/2017.  Low lung volumes. Patient's chin obscures left apex. No change heart   mediastinum. There is interval resolution of airspace disease at the   right base. The lungs grossly clear at this time. Cardiac apex obscures   left base.    Impression: As above                EDELMIRA STEVENS M.D., ATTENDING RADIOLOGIST  This document has been electronically signed. Oct  2 2017  8:28AM                < end of copied text >  < from: TTE Echo Doppler w/o Cont (10.02.17 @ 17:04) >     EXAM:  ECHO TTE W/O CON COMP W/DOPPLR         PROCEDURE DATE:  10/02/2017        INTERPRETATION:  INDICATION: N STEMI  Referring MDeoDDeo:Reshma  Blood Pressure 120/58        Weight (kg) :68     Height (cm):160       BSA (sq m): 1.71  Technician: EDITH    Dimensions:    LA 4.1       Normal Values: 2.0 - 4.0 cm    Ao 3.3        Normal Values: 2.0 - 3.8 cm  SEPTUM 1.1       Normal Values: 0.6 - 1.2 cm  PWT 1.0       Normal Values: 0.6 - 1.1 cm  LVIDd 4.0         Normal Values: 3.0 - 5.6 cm  LVIDs2.4         Normal Values: 1.8 - 4.0 cm      OBSERVATIONS:  Technically difficult study  Mitral Valve: Calcified mitral annulus with normally opening mitral valve   leaflets. Mild mitral regurgitation.  Aortic Valve/Aorta: Calcified trileaflet aorticvalve with normal   opening.. Mild AI  Tricuspid Valve: Normal tricuspid valve. Mild tricuspid regurgitation.  Pulmonic Valve: The pulmonic valve is not well visualized. Probably   normal.  Left Atrium: Normal  Right Atrium: Normal  Left Ventricle: The endocardium is not well-visualized. Overall there is   preserved left ventricular systolic function.. The EF is approximately   65%.  Right Ventricle: Right ventricle is not well-visualized but appears to be   normal and systolic function  Pericardium/Pleura: No pericardial effusion noted.  Pulmonary/RV Pressure: The right ventricular systolic pressure is   estimated to be 44mmHg, assuming that the right atrial pressure is   estimated to be 8 mmHg. This is consistent with mild pulmonary   hypertension.  LV Diastolic Function: stage 1 diastolic dysfunction     Conclusion: Technically difficult study. Preserved left ventricular   systolic function.                  PERNELL KNUTSON M.D., ATTENDING CARDIOLOGIST  This document has been electronically signed. Oct  3 2017  8:25PM                < end of copied text >

## 2017-10-04 NOTE — PROGRESS NOTE ADULT - ATTENDING COMMENTS
88F PMH HTN, HLD, DM2, COPD on home oxygen, chronically bedbound, & chronic hypercapnic respiratory failure presents with septic shock with acute on chronic hypoxic and hypercapnic respiratory failure, NSTEMI, shock liver, SAMIA with oliguria, and coagulopathy. Found to have RLL CAP/aspiration pna + GBS UTI.    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 Does not require dialysis at this time. Restart D5 1/2NS given not eating well  - Continue zosyn day 5/7  - DVT ppx  - C/w HISS  - Poor prognosis, appears to be in the final stages of life, no aggressive measures. I discussed this thoroughly with patient's children. They understand that she is dying and do not want CPR or intubation, but want other therapies such as BiPAP, oxygen, pressors, etc. At this point she is not on pressors, but is requiring intermittent BiPAP. I am very worried about her prognosis and I told the family that she appears to be approaching end of life  CC time spent: 40 min

## 2017-10-04 NOTE — PROGRESS NOTE ADULT - ASSESSMENT
88F PMH HTN, HLD, DM2, COPD on home oxygen, & chronic hypercapnic respiratory failure presents with septic shock with acute on chronic hypoxic and hypercapnic respiratory failure, NSTEMI, shock liver, SAMIA with oliguria, and coagulopathy. Found to have RLL CAP/aspiration pna + GBS UTI. Pt had mild temp of 100 overnight.    Neuro - Decrease PRN dilaudid to 0.125mg, cont gabapentin 100mg daily  CV - HD stable, cont metoprolol 12.5 q8h, cont plavix/asa, will start statin once shock liver resolves, demand ischemia likely 2/2 multiorgan failure, will need ischemic workup once more stable  Resp - Continue NC daily, bipap at night, cont duonebs/chest PT  GI - Cont dysphagia diet, tolerating well, liver enzymes improving, no further management for gallbladder adenomyomatosis  Renal - BUN/Cr improving, urine output improving, will continue to monitor   Endo - Cont ISS, fingersticks  ID - Cont zosyn (day 5/7), azithromycin d/c'd yesterday  Heme - Heparin subq for dvt ppx  Dispo - Poor prognosis, DNR/DNI, no aggressive measures

## 2017-10-04 NOTE — PROGRESS NOTE ADULT - SUBJECTIVE AND OBJECTIVE BOX
Patient is a 88y old  Female who presents with a chief complaint of found unresponsive (30 Sep 2017 15:29).       INTERVAL HPI: 88F PMH HTN, HLD, DM2, COPD on home oxygen, chronically bedbound, & chronic hypercapnic respiratory failure presents with septic shock with acute on chronic hypoxic and hypercapnic respiratory failure, NSTEMI, shock liver, SAMIA with oliguria, and coagulopathy. Found to have RLL CAP/aspiration pna + strep UTI.  Pt seen and examined in ICU, Pt is arousable. c/o pain in toes, and legs. as per family bed side. Off BIPAP, on NC    OVERNIGHT EVENTS:   ICU Vital Signs Last 24 Hrs  T(C): 36.9 (04 Oct 2017 16:00), Max: 37.8 (03 Oct 2017 23:39)  T(F): 98.4 (04 Oct 2017 16:00), Max: 100 (03 Oct 2017 23:39)  HR: 65 (04 Oct 2017 20:19) (52 - 71)  BP: 122/59 (04 Oct 2017 18:00) (104/50 - 134/62)  BP(mean): 85 (04 Oct 2017 18:00) (72 - 89)  ABP: --  ABP(mean): --  RR: 18 (04 Oct 2017 19:00) (12 - 29)  SpO2: 100% (04 Oct 2017 20:19) (90% - 929%)        REVIEW OF SYSTEM:    unable to obtain 2/2       PHYSICAL EXAM:  GENERAL: NAD, well-developed  HEAD:  Atraumatic, Normocephalic  EYES: unable to examine  HEART: Regular rate and rhythm; No murmurs, rubs, or gallops  RESPIRATORY: harsh BS B/L, No wheezing / rhonchi  ABDOMEN: Soft, Nontender, Nondistended; Bowel sounds present  NEUROLOGY: A&Ox1, moving all extremities  EXTREMITIES:  2+ Peripheral Pulses, No clubbing, cyanosis, or edema  SKIN: warm, dry, normal color, no rash or abnormal lesions    LABS:                        7.3    5.8   )-----------( 80       ( 04 Oct 2017 06:41 )             23.8     04 Oct 2017 06:41    139    |  104    |  55     ----------------------------<  101    4.0     |  27     |  3.20     Ca    7.7        04 Oct 2017 06:41  Phos  4.1       04 Oct 2017 06:41  Mg     2.2       04 Oct 2017 06:41    TPro  6.2    /  Alb  2.4    /  TBili  1.1    /  DBili  x      /  AST  852    /  ALT  1304   /  AlkPhos  98     04 Oct 2017 06:41    PT/INR - ( 04 Oct 2017 06:41 )   PT: 16.3 sec;   INR: 1.48 ratio         PTT - ( 04 Oct 2017 06:41 )  PTT:36.2 sec    CAPILLARY BLOOD GLUCOSE  133 (04 Oct 2017 18:00)  116 (04 Oct 2017 12:00)  111 (04 Oct 2017 08:00)        UCx       RADIOLOGY & ADDITIONAL TESTS:                                  7.2    5.7   )-----------( 87       ( 03 Oct 2017 06:46 )             24.1     03 Oct 2017 06:46    140    |  106    |  61     ----------------------------<  113    3.8     |  25     |  3.50     Ca    8.1        03 Oct 2017 06:46  Phos  4.1       03 Oct 2017 06:46  Mg     2.2       03 Oct 2017 06:46    TPro  6.6    /  Alb  2.7    /  TBili  0.8    /  DBili  x      /  AST  1617   /  ALT  1698   /  AlkPhos  110    03 Oct 2017 06:46    PT/INR - ( 02 Oct 2017 06:02 )   PT: 18.5 sec;   INR: 1.68 ratio         PTT - ( 02 Oct 2017 06:02 )  PTT:29.6 sec    CAPILLARY BLOOD GLUCOSE  181 (03 Oct 2017 12:00)  122 (03 Oct 2017 08:00)  143 (02 Oct 2017 21:14)  175 (02 Oct 2017 18:00)        UCx       RADIOLOGY & ADDITIONAL TESTS:            MEDICATIONS  (STANDING):  ALBUTerol/ipratropium for Nebulization 3 milliLiter(s) Nebulizer every 4 hours  aspirin  chewable 81 milliGRAM(s) Oral daily  clopidogrel Tablet 75 milliGRAM(s) Oral daily  dextrose 5%. 1000 milliLiter(s) (50 mL/Hr) IV Continuous <Continuous>  dextrose 50% Injectable 12.5 Gram(s) IV Push once  dextrose 50% Injectable 25 Gram(s) IV Push once  dextrose 50% Injectable 25 Gram(s) IV Push once  gabapentin 100 milliGRAM(s) Oral daily  heparin  Injectable 5000 Unit(s) SubCutaneous every 8 hours  influenza   Vaccine 0.5 milliLiter(s) IntraMuscular once  insulin lispro (HumaLOG) corrective regimen sliding scale   SubCutaneous Before meals and at bedtime  metoprolol 12.5 milliGRAM(s) Oral every 8 hours  nystatin Powder 1 Application(s) Topical every 6 hours  pantoprazole    Tablet 40 milliGRAM(s) Oral before breakfast  piperacillin/tazobactam IVPB. 3.375 Gram(s) IV Intermittent every 12 hours    MEDICATIONS  (PRN):  dextrose Gel 1 Dose(s) Oral once PRN Blood Glucose LESS THAN 70 milliGRAM(s)/deciliter  glucagon  Injectable 1 milliGRAM(s) IntraMuscular once PRN Glucose LESS THAN 70 milligrams/deciliter  HYDROmorphone  Injectable 0.25 milliGRAM(s) IV Push every 4 hours PRN Severe Pain (7 - 10)  ondansetron Injectable 4 milliGRAM(s) IV Push every 6 hours PRN Nausea

## 2017-10-04 NOTE — PROGRESS NOTE ADULT - PROBLEM SELECTOR PLAN 1
monitor LRTs, enzymes improving  no cholestasis  INR less than <2-- vitamin K if increases  optimize perfusion, IVF hydration  no evidence of chronic viral hepatitis, biliary obstruction  no signs/symptoms of acute liver failure  ICU monitoring

## 2017-10-04 NOTE — PROGRESS NOTE ADULT - SUBJECTIVE AND OBJECTIVE BOX
INTERVAL HPI/OVERNIGHT EVENTS: mental status improving, follow commands  HPI:  89yo F w/ PMHx DM2, asthma, HTN, iron deficiency anemia, COPD w/ intermittent use of home O2 presents w/ weakness and fatigue. pt is nonverbal at this time and unable to provide any history. She developed a fever last night and the family admits mild cough which was nonproductive. 3-4 days prior to presentation the pt was complaining of mild abd discomfort but was tolerating po well. Family denies dysphagia. They stated that she had an episode of bilious vomiting 4 days ago but none since. She was tolerating diet yesterday without difficulty. Last night developed rectal temp of 102F but denied diaphoresis, chills, rigors. Upon waking this morning, the pt was obtunded. Responding only to noxious stimuli so EMS was alerted. No other recent travel. No recent hospitalizations. No other recent changes in her overall health.  Recnetly her DM2 regimen was decreased due to normal glucose levels. Also, her recent creatinine was 1.1 as per family at the bedside. Family members have medical background.       MEDICATIONS  (STANDING):  dextrose 5%. 1000 milliLiter(s) (50 mL/Hr) IV Continuous <Continuous>  dextrose 50% Injectable 12.5 Gram(s) IV Push once  influenza   Vaccine 0.5 milliLiter(s) IntraMuscular once  piperacillin/tazobactam IVPB. 3.375 Gram(s) IV Intermittent every 12 hours  ALBUTerol/ipratropium for Nebulization 3 milliLiter(s) Nebulizer every 4 hours  pantoprazole  Injectable 40 milliGRAM(s) IV Push daily  aspirin Suppository 300 milliGRAM(s) Rectal daily  heparin  Injectable 5000 Unit(s) SubCutaneous every 12 hours  nystatin Powder 1 Application(s) Topical every 6 hours  insulin lispro (HumaLOG) corrective regimen sliding scale   SubCutaneous every 6 hours  dextrose 5%. 1000 milliLiter(s) (50 mL/Hr) IV Continuous <Continuous>  dextrose 50% Injectable 12.5 Gram(s) IV Push once  dextrose 50% Injectable 25 Gram(s) IV Push once  dextrose 50% Injectable 25 Gram(s) IV Push once  dextrose 5% + sodium chloride 0.45% 1000 milliLiter(s) (50 mL/Hr) IV Continuous <Continuous>  azithromycin  IVPB      albumin human 25% IVPB 50 milliLiter(s) IV Intermittent every 8 hours    MEDICATIONS  (PRN):  ondansetron Injectable 4 milliGRAM(s) IV Push every 6 hours PRN Nausea  dextrose Gel 1 Dose(s) Oral once PRN Blood Glucose LESS THAN 70 milliGRAM(s)/deciliter  glucagon  Injectable 1 milliGRAM(s) IntraMuscular once PRN Glucose LESS THAN 70 milligrams/deciliter  acetaminophen  Suppository. 650 milliGRAM(s) Rectal every 4 hours PRN Mild Pain (1 - 3)  dextrose Gel 1 Dose(s) Oral once PRN Blood Glucose LESS THAN 70 milliGRAM(s)/deciliter  glucagon  Injectable 1 milliGRAM(s) IntraMuscular once PRN Glucose LESS THAN 70 milligrams/deciliter      Allergies    No Known Allergies    Intolerances          General:  No wt loss, fevers, chills, night sweats, fatigue   Eyes:  Good vision, no reported pain  ENT:  No sore throat, pain, runny nose, dysphagia  CV:  No pain, palpitations, hypo/hypertension  Resp:  No dyspnea, cough, tachypnea, wheezing  GI:  No pain, No nausea, No vomiting, No diarrhea, No constipation, No weight loss, No fever, No pruritis, No rectal bleeding, No tarry stools, No dysphagia,  :  No pain, bleeding, incontinence, nocturia  Muscle:  No pain, weakness  Neuro:  RLE pain  Psych:  No fatigue, insomnia, mood problems, depression  Endocrine:  No polyuria, polydipsia, cold/heat intolerance  Heme:  No petechiae, ecchymosis, easy bruisability  Skin:  No rash, tattoos, scars, edema        PHYSICAL EXAM:   Vital Signs:  Vital Signs Last 24 Hrs  T(C): 36.6 (01 Oct 2017 12:00), Max: 36.6 (30 Sep 2017 22:45)  T(F): 97.9 (01 Oct 2017 12:00), Max: 97.9 (01 Oct 2017 12:00)  HR: 66 (01 Oct 2017 14:00) (59 - 79)  BP: 116/56 (01 Oct 2017 14:00) (84/42 - 146/61)  BP(mean): 80 (01 Oct 2017 14:00) (69 - 88)  RR: 16 (01 Oct 2017 14:00) (10 - 32)  SpO2: 100% (01 Oct 2017 14:00) (95% - 100%)  Daily     Daily Weight in k (01 Oct 2017 13:11)I&O's Summary    30 Sep 2017 07:  -  01 Oct 2017 07:00  --------------------------------------------------------  IN: 925 mL / OUT: 55 mL / NET: 870 mL    01 Oct 2017 07:  -  01 Oct 2017 14:18  --------------------------------------------------------  IN: 395 mL / OUT: 45 mL / NET: 350 mL        GENERAL:  AAOx2, follows commands, able to communicate verbally   HEENT:  NC/AT,  conjunctivae clear and pink, no thyromegaly, nodules, adenopathy, no JVD, sclera -anicteric  CHEST:  decreased BS at bases  HEART:  Regular rhythm, S1, S2, no murmur/rub/S3/S4, no abdominal bruit, no edema  ABDOMEN:  Soft, non-tender, non-distended, normoactive bowel sounds,  no masses ,no hepato-splenomegaly, no signs of chronic liver disease  EXTEREMITIES:  no cyanosis,clubbing or edema  SKIN:  No rash/erythema/ecchymoses/petechiae/wounds/abscess/warm/dry  NEURO:  responsive to painful stimuli, does not follow commands       LABS: reviewed

## 2017-10-04 NOTE — PROGRESS NOTE ADULT - SUBJECTIVE AND OBJECTIVE BOX
Patient is a 88y old  Female who presents with a chief complaint of found unresponsive (30 Sep 2017 15:29)      Patient seen in follow up for SAMIA, ATN. Family at bedside. UO improving. Renal function improving.     PAST MEDICAL HISTORY:  Asthma  Diabetes mellitus  HTN (hypertension)     MEDICATIONS  (STANDING):  ALBUTerol/ipratropium for Nebulization 3 milliLiter(s) Nebulizer every 4 hours  aspirin  chewable 81 milliGRAM(s) Oral daily  clopidogrel Tablet 75 milliGRAM(s) Oral daily  dextrose 5%. 1000 milliLiter(s) (50 mL/Hr) IV Continuous <Continuous>  dextrose 50% Injectable 12.5 Gram(s) IV Push once  dextrose 50% Injectable 25 Gram(s) IV Push once  dextrose 50% Injectable 25 Gram(s) IV Push once  gabapentin 100 milliGRAM(s) Oral daily  heparin  Injectable 5000 Unit(s) SubCutaneous every 8 hours  influenza   Vaccine 0.5 milliLiter(s) IntraMuscular once  insulin lispro (HumaLOG) corrective regimen sliding scale   SubCutaneous Before meals and at bedtime  metoprolol 12.5 milliGRAM(s) Oral every 8 hours  nystatin Powder 1 Application(s) Topical every 6 hours  pantoprazole    Tablet 40 milliGRAM(s) Oral before breakfast  piperacillin/tazobactam IVPB. 3.375 Gram(s) IV Intermittent every 12 hours    MEDICATIONS  (PRN):  dextrose Gel 1 Dose(s) Oral once PRN Blood Glucose LESS THAN 70 milliGRAM(s)/deciliter  glucagon  Injectable 1 milliGRAM(s) IntraMuscular once PRN Glucose LESS THAN 70 milligrams/deciliter  HYDROmorphone  Injectable 0.25 milliGRAM(s) IV Push every 4 hours PRN Severe Pain (7 - 10)  ondansetron Injectable 4 milliGRAM(s) IV Push every 6 hours PRN Nausea    T(C): 37.2 (10-04-17 @ 07:54), Max: 37.8 (10-03-17 @ 23:39)  HR: 65 (10-04-17 @ 08:00) (52 - 103)  BP: 125/59 (10-04-17 @ 08:00) (104/51 - 153/66)  RR: 19 (10-04-17 @ 08:00)  SpO2: 96% (10-04-17 @ 08:00)  Wt(kg): --  I&O's Detail    03 Oct 2017 07:01  -  04 Oct 2017 07:00  --------------------------------------------------------  IN:    dextrose 5% + sodium chloride 0.45%.: 100 mL    Oral Fluid: 600 mL    Solution: 250 mL    Solution: 200 mL  Total IN: 1150 mL    OUT:    Indwelling Catheter - Urethral: 1000 mL  Total OUT: 1000 mL    Total NET: 150 mL      04 Oct 2017 07:01  -  04 Oct 2017 09:07  --------------------------------------------------------  IN:  Total IN: 0 mL    OUT:    Indwelling Catheter - Urethral: 45 mL  Total OUT: 45 mL    Total NET: -45 mL              PHYSICAL EXAM:  General: NAD  Respiratory: b/l air entry, decreased breath sounds  Cardiovascular: S1 S2  Gastrointestinal: soft  Extremities:  edema                    LABORATORY:                        7.3    5.8   )-----------( 80       ( 04 Oct 2017 06:41 )             23.8     10-04    139  |  104  |  55<H>  ----------------------------<  101<H>  4.0   |  27  |  3.20<H>    Ca    7.7<L>      04 Oct 2017 06:41  Phos  4.1     10-04  Mg     2.2     10-04    TPro  6.2  /  Alb  2.4<L>  /  TBili  1.1  /  DBili  x   /  AST  852<H>  /  ALT  1304<H>  /  AlkPhos  98  10-04    Sodium, Serum: 139 mmol/L (10-04 @ 06:41)  Sodium, Serum: 140 mmol/L (10-03 @ 06:46)    Potassium, Serum: 4.0 mmol/L (10-04 @ 06:41)  Potassium, Serum: 3.8 mmol/L (10-03 @ 06:46)    Hemoglobin: 7.3 g/dL (10-04 @ 06:41)  Hemoglobin: 7.2 g/dL (10-03 @ 06:46)  Hemoglobin: 8.1 g/dL (10-02 @ 06:02)  Hemoglobin: 8.7 g/dL (10-01 @ 11:01)    Creatinine, Serum 3.20 (10-04 @ 06:41)  Creatinine, Serum 3.50 (10-03 @ 06:46)  Creatinine, Serum 3.50 (10-02 @ 06:02)  Creatinine, Serum 2.90 (10-01 @ 11:01)        LIVER FUNCTIONS - ( 04 Oct 2017 06:41 )  Alb: 2.4 g/dL / Pro: 6.2 g/dL / ALK PHOS: 98 U/L / ALT: 1304 U/L / AST: 852 U/L / GGT: x

## 2017-10-04 NOTE — PROGRESS NOTE ADULT - ASSESSMENT
·	SAMIA: Oliguric ATN, ARB rx  ·	Shock, Sepsis  ·	Shock liver  ·	Respiratory falure on BIPAP  ·	Respiratory and metabolic acidosis  ·	Anemia    Improving renal function. Non oliguric. Will continue current meds. Encourage PO intake as tolerated. Overall prognosis poor. Supportive care. Cardiology / pulmonary follow up. Will follow electrolytes and renal function trend. Further recommendations pending clinical course. Strict I & O. Monitor h/h. Transfuse PRN. Monitor fluid status closely.

## 2017-10-04 NOTE — PROGRESS NOTE ADULT - ASSESSMENT
89yo F w/ PMHx DM2, asthma, HTN, iron deficiency anemia, COPD w/ intermittent use of home O2, severe sepsis secondary to pneumonia, MSOF, NSTEMI, overall normal LV function,   Ric now downtrending.     Recommend:  - Continue aspirin, clopidogrel  - Cont DVT prophylaxis  - cont metop  - No statin drug secondary to shock liver.  To re started when this resolves.    - Still net positive. Please monitor fluid status.   - Monitor and replete electrolytes. Keep K>4.0 and Mg>2.0.   - Cont Bipap  - No arrhythmia on the monitor  - Not a candidate for coronary angiography  - Abx per primary  - Further cardiac workup will depend on clinical course.     The patient is at high risk for decompensation. >35 min of critical care time spent caring for this patient.

## 2017-10-05 LAB
ALBUMIN SERPL ELPH-MCNC: 2.5 G/DL — LOW (ref 3.3–5)
ALP SERPL-CCNC: 107 U/L — SIGNIFICANT CHANGE UP (ref 40–120)
ALT FLD-CCNC: 1069 U/L — HIGH (ref 12–78)
ANION GAP SERPL CALC-SCNC: 10 MMOL/L — SIGNIFICANT CHANGE UP (ref 5–17)
AST SERPL-CCNC: 466 U/L — HIGH (ref 15–37)
BASOPHILS # BLD AUTO: 0.1 K/UL — SIGNIFICANT CHANGE UP (ref 0–0.2)
BASOPHILS NFR BLD AUTO: 1.3 % — SIGNIFICANT CHANGE UP (ref 0–2)
BILIRUB SERPL-MCNC: 0.9 MG/DL — SIGNIFICANT CHANGE UP (ref 0.2–1.2)
BUN SERPL-MCNC: 61 MG/DL — HIGH (ref 7–23)
CALCIUM SERPL-MCNC: 8.4 MG/DL — LOW (ref 8.5–10.1)
CHLORIDE SERPL-SCNC: 103 MMOL/L — SIGNIFICANT CHANGE UP (ref 96–108)
CO2 SERPL-SCNC: 25 MMOL/L — SIGNIFICANT CHANGE UP (ref 22–31)
CREAT SERPL-MCNC: 3.3 MG/DL — HIGH (ref 0.5–1.3)
CULTURE RESULTS: SIGNIFICANT CHANGE UP
CULTURE RESULTS: SIGNIFICANT CHANGE UP
EOSINOPHIL # BLD AUTO: 0.3 K/UL — SIGNIFICANT CHANGE UP (ref 0–0.5)
EOSINOPHIL NFR BLD AUTO: 4.8 % — SIGNIFICANT CHANGE UP (ref 0–6)
GLUCOSE SERPL-MCNC: 137 MG/DL — HIGH (ref 70–99)
HCT VFR BLD CALC: 25.8 % — LOW (ref 34.5–45)
HGB BLD-MCNC: 7.9 G/DL — LOW (ref 11.5–15.5)
LYMPHOCYTES # BLD AUTO: 1.5 K/UL — SIGNIFICANT CHANGE UP (ref 1–3.3)
LYMPHOCYTES # BLD AUTO: 20.8 % — SIGNIFICANT CHANGE UP (ref 13–44)
MAGNESIUM SERPL-MCNC: 2.2 MG/DL — SIGNIFICANT CHANGE UP (ref 1.6–2.6)
MCHC RBC-ENTMCNC: 28.4 PG — SIGNIFICANT CHANGE UP (ref 27–34)
MCHC RBC-ENTMCNC: 30.6 GM/DL — LOW (ref 32–36)
MCV RBC AUTO: 92.8 FL — SIGNIFICANT CHANGE UP (ref 80–100)
MONOCYTES # BLD AUTO: 0.8 K/UL — SIGNIFICANT CHANGE UP (ref 0–0.9)
MONOCYTES NFR BLD AUTO: 11.9 % — HIGH (ref 1–9)
NEUTROPHILS # BLD AUTO: 4.3 K/UL — SIGNIFICANT CHANGE UP (ref 1.8–7.4)
NEUTROPHILS NFR BLD AUTO: 61.3 % — SIGNIFICANT CHANGE UP (ref 43–77)
PHOSPHATE SERPL-MCNC: 4.4 MG/DL — SIGNIFICANT CHANGE UP (ref 2.5–4.5)
PLATELET # BLD AUTO: 97 K/UL — LOW (ref 150–400)
POTASSIUM SERPL-MCNC: 3.9 MMOL/L — SIGNIFICANT CHANGE UP (ref 3.5–5.3)
POTASSIUM SERPL-SCNC: 3.9 MMOL/L — SIGNIFICANT CHANGE UP (ref 3.5–5.3)
PROT SERPL-MCNC: 6.8 G/DL — SIGNIFICANT CHANGE UP (ref 6–8.3)
RBC # BLD: 2.78 M/UL — LOW (ref 3.8–5.2)
RBC # FLD: 14.2 % — SIGNIFICANT CHANGE UP (ref 10.3–14.5)
SODIUM SERPL-SCNC: 138 MMOL/L — SIGNIFICANT CHANGE UP (ref 135–145)
SPECIMEN SOURCE: SIGNIFICANT CHANGE UP
SPECIMEN SOURCE: SIGNIFICANT CHANGE UP
WBC # BLD: 7 K/UL — SIGNIFICANT CHANGE UP (ref 3.8–10.5)
WBC # FLD AUTO: 7 K/UL — SIGNIFICANT CHANGE UP (ref 3.8–10.5)

## 2017-10-05 PROCEDURE — 99233 SBSQ HOSP IP/OBS HIGH 50: CPT

## 2017-10-05 PROCEDURE — 99233 SBSQ HOSP IP/OBS HIGH 50: CPT | Mod: GC

## 2017-10-05 PROCEDURE — 99291 CRITICAL CARE FIRST HOUR: CPT

## 2017-10-05 RX ORDER — POLYETHYLENE GLYCOL 3350 17 G/17G
17 POWDER, FOR SOLUTION ORAL DAILY
Qty: 0 | Refills: 0 | Status: DISCONTINUED | OUTPATIENT
Start: 2017-10-05 | End: 2017-10-11

## 2017-10-05 RX ORDER — SODIUM CHLORIDE 9 MG/ML
500 INJECTION, SOLUTION INTRAVENOUS ONCE
Qty: 0 | Refills: 0 | Status: DISCONTINUED | OUTPATIENT
Start: 2017-10-05 | End: 2017-10-05

## 2017-10-05 RX ORDER — SENNA PLUS 8.6 MG/1
2 TABLET ORAL AT BEDTIME
Qty: 0 | Refills: 0 | Status: DISCONTINUED | OUTPATIENT
Start: 2017-10-05 | End: 2017-10-10

## 2017-10-05 RX ORDER — SODIUM CHLORIDE 9 MG/ML
250 INJECTION, SOLUTION INTRAVENOUS ONCE
Qty: 0 | Refills: 0 | Status: COMPLETED | OUTPATIENT
Start: 2017-10-05 | End: 2017-10-05

## 2017-10-05 RX ORDER — DOCUSATE SODIUM 100 MG
100 CAPSULE ORAL THREE TIMES A DAY
Qty: 0 | Refills: 0 | Status: DISCONTINUED | OUTPATIENT
Start: 2017-10-05 | End: 2017-10-10

## 2017-10-05 RX ADMIN — PANTOPRAZOLE SODIUM 40 MILLIGRAM(S): 20 TABLET, DELAYED RELEASE ORAL at 09:12

## 2017-10-05 RX ADMIN — NYSTATIN CREAM 1 APPLICATION(S): 100000 CREAM TOPICAL at 06:52

## 2017-10-05 RX ADMIN — Medication 4: at 09:19

## 2017-10-05 RX ADMIN — Medication 3 MILLILITER(S): at 04:30

## 2017-10-05 RX ADMIN — HEPARIN SODIUM 5000 UNIT(S): 5000 INJECTION INTRAVENOUS; SUBCUTANEOUS at 21:53

## 2017-10-05 RX ADMIN — NYSTATIN CREAM 1 APPLICATION(S): 100000 CREAM TOPICAL at 18:09

## 2017-10-05 RX ADMIN — SENNA PLUS 2 TABLET(S): 8.6 TABLET ORAL at 21:53

## 2017-10-05 RX ADMIN — NYSTATIN CREAM 1 APPLICATION(S): 100000 CREAM TOPICAL at 12:03

## 2017-10-05 RX ADMIN — CLOPIDOGREL BISULFATE 75 MILLIGRAM(S): 75 TABLET, FILM COATED ORAL at 12:02

## 2017-10-05 RX ADMIN — Medication 3 MILLILITER(S): at 12:03

## 2017-10-05 RX ADMIN — Medication 100 MILLIGRAM(S): at 21:53

## 2017-10-05 RX ADMIN — Medication 3 MILLILITER(S): at 00:29

## 2017-10-05 RX ADMIN — HEPARIN SODIUM 5000 UNIT(S): 5000 INJECTION INTRAVENOUS; SUBCUTANEOUS at 06:51

## 2017-10-05 RX ADMIN — Medication 3 MILLILITER(S): at 07:35

## 2017-10-05 RX ADMIN — Medication 3 MILLILITER(S): at 20:31

## 2017-10-05 RX ADMIN — Medication 3 MILLILITER(S): at 17:04

## 2017-10-05 RX ADMIN — PIPERACILLIN AND TAZOBACTAM 25 GRAM(S): 4; .5 INJECTION, POWDER, LYOPHILIZED, FOR SOLUTION INTRAVENOUS at 00:10

## 2017-10-05 RX ADMIN — NYSTATIN CREAM 1 APPLICATION(S): 100000 CREAM TOPICAL at 00:10

## 2017-10-05 RX ADMIN — PIPERACILLIN AND TAZOBACTAM 25 GRAM(S): 4; .5 INJECTION, POWDER, LYOPHILIZED, FOR SOLUTION INTRAVENOUS at 22:03

## 2017-10-05 RX ADMIN — Medication 12.5 MILLIGRAM(S): at 21:53

## 2017-10-05 RX ADMIN — Medication 12.5 MILLIGRAM(S): at 06:51

## 2017-10-05 RX ADMIN — Medication 12.5 MILLIGRAM(S): at 13:49

## 2017-10-05 RX ADMIN — PIPERACILLIN AND TAZOBACTAM 25 GRAM(S): 4; .5 INJECTION, POWDER, LYOPHILIZED, FOR SOLUTION INTRAVENOUS at 12:01

## 2017-10-05 RX ADMIN — Medication 2: at 22:16

## 2017-10-05 RX ADMIN — SODIUM CHLORIDE 500 MILLILITER(S): 9 INJECTION, SOLUTION INTRAVENOUS at 09:45

## 2017-10-05 RX ADMIN — Medication 81 MILLIGRAM(S): at 12:02

## 2017-10-05 RX ADMIN — HEPARIN SODIUM 5000 UNIT(S): 5000 INJECTION INTRAVENOUS; SUBCUTANEOUS at 13:49

## 2017-10-05 RX ADMIN — SODIUM CHLORIDE 50 MILLILITER(S): 9 INJECTION, SOLUTION INTRAVENOUS at 00:11

## 2017-10-05 RX ADMIN — Medication 4: at 12:02

## 2017-10-05 NOTE — PROGRESS NOTE ADULT - SUBJECTIVE AND OBJECTIVE BOX
Patient is a 88y old  Female who presents with a chief complaint of found unresponsive (30 Sep 2017 15:29). Admitted for septic shock      INTERVAL HPI: Pt seen and examined with many members of family bed side. Pt is lethargic. on O2 NC  OVERNIGHT EVENTS:  T(F): 98.8 (10-05-17 @ 11:17), Max: 98.9 (10-04-17 @ 20:00)  HR: 65 (10-05-17 @ 12:04) (59 - 74)  BP: 174/70 (10-05-17 @ 12:00) (120/58 - 174/70)  RR: 25 (10-05-17 @ 12:00) (16 - 31)  SpO2: 99% (10-05-17 @ 12:04) (92% - 100%)  Wt(kg): --  I&O's Summary    04 Oct 2017 07:01  -  05 Oct 2017 07:00  --------------------------------------------------------  IN: 1490 mL / OUT: 495 mL / NET: 995 mL    05 Oct 2017 07:01  -  05 Oct 2017 15:55  --------------------------------------------------------  IN: 980 mL / OUT: 350 mL / NET: 630 mL        REVIEW OF SYSTEM:    UTO as pt is lethargic and non-communicative.    PHYSICAL EXAM:  GENERAL: NAD, well-developed  HEAD:  Atraumatic, Normocephalic  NECK: Supple, No JVD, Normal thyroid  HEART: Regular rate and rhythm; No murmurs, rubs, or gallops  RESPIRATORY: CTA B/L, No wheezing / rhonchi  ABDOMEN: Soft, Nontender, Nondistended; Bowel sounds present  NEUROLOGY: A&Ox1, Moving all extremities  EXTREMITIES:  2+ Peripheral Pulses, No clubbing, cyanosis, or edema  SKIN: warm, dry, normal color, no rash or abnormal lesions        LABS:                        7.9    7.0   )-----------( 97       ( 05 Oct 2017 06:06 )             25.8     10-05    138  |  103  |  61<H>  ----------------------------<  137<H>  3.9   |  25  |  3.30<H>    Ca    8.4<L>      05 Oct 2017 06:06  Phos  4.4     10-05  Mg     2.2     10-05    TPro  6.8  /  Alb  2.5<L>  /  TBili  0.9  /  DBili  x   /  AST  466<H>  /  ALT  1069<H>  /  AlkPhos  107  10-05    PT/INR - ( 04 Oct 2017 06:41 )   PT: 16.3 sec;   INR: 1.48 ratio         PTT - ( 04 Oct 2017 06:41 )  PTT:36.2 sec    CAPILLARY BLOOD GLUCOSE  212 (05 Oct 2017 02:00)  133 (04 Oct 2017 18:00)        ABG - ( 04 Oct 2017 15:05 )  pH: 7.32  /  pCO2: 50    /  pO2: 55    / HCO3: 24    / Base Excess: -0.6  /  SaO2: 85                        MEDICATIONS  (STANDING):  ALBUTerol/ipratropium for Nebulization 3 milliLiter(s) Nebulizer every 4 hours  aspirin  chewable 81 milliGRAM(s) Oral daily  clopidogrel Tablet 75 milliGRAM(s) Oral daily  dextrose 5% + sodium chloride 0.45%. 1000 milliLiter(s) (50 mL/Hr) IV Continuous <Continuous>  dextrose 5%. 1000 milliLiter(s) (50 mL/Hr) IV Continuous <Continuous>  dextrose 50% Injectable 12.5 Gram(s) IV Push once  dextrose 50% Injectable 25 Gram(s) IV Push once  dextrose 50% Injectable 25 Gram(s) IV Push once  gabapentin 100 milliGRAM(s) Oral daily  heparin  Injectable 5000 Unit(s) SubCutaneous every 8 hours  influenza   Vaccine 0.5 milliLiter(s) IntraMuscular once  insulin lispro (HumaLOG) corrective regimen sliding scale   SubCutaneous Before meals and at bedtime  metoprolol 12.5 milliGRAM(s) Oral every 8 hours  nystatin Powder 1 Application(s) Topical every 6 hours  pantoprazole    Tablet 40 milliGRAM(s) Oral before breakfast  piperacillin/tazobactam IVPB. 3.375 Gram(s) IV Intermittent every 12 hours    MEDICATIONS  (PRN):  dextrose Gel 1 Dose(s) Oral once PRN Blood Glucose LESS THAN 70 milliGRAM(s)/deciliter  glucagon  Injectable 1 milliGRAM(s) IntraMuscular once PRN Glucose LESS THAN 70 milligrams/deciliter  HYDROmorphone  Injectable 0.125 milliGRAM(s) IV Push every 4 hours PRN Severe Pain (7 - 10)  ondansetron Injectable 4 milliGRAM(s) IV Push every 6 hours PRN Nausea

## 2017-10-05 NOTE — PROGRESS NOTE ADULT - ATTENDING COMMENTS
88F PMH HTN, HLD, DM2, COPD on home oxygen, chronically bedbound, & chronic hypercapnic respiratory failure presents with septic shock with acute on chronic hypoxic and hypercapnic respiratory failure, NSTEMI, shock liver, SAMIA with oliguria, and coagulopathy. Found to have RLL CAP/aspiration pna + GBS UTI.    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 Does not require dialysis at this time. Continue with IVF hydration (D5 1/2NS@50/hr), give  mL bolus x1  - Continue zosyn day 6/7  - DVT ppx  - C/w HISS  - Poor prognosis, appears to be approaching end-of-life. Family understands that patient is dying. She is DNR/DNI, but they want other therapies, such as BiPAP, oxygen, pressors, antibiotics, etc. She remains off pressors currently, but is requiring intermittent BiPAP.  CC time spent: 35 min

## 2017-10-05 NOTE — PROGRESS NOTE ADULT - SUBJECTIVE AND OBJECTIVE BOX
24 hour events: Afebrile overnight    Review of Systems:  Unable to obtain 2/2 mental status    T(F): 98.6 (10-05-17 @ 05:13), Max: 99 (10-04-17 @ 07:54)  HR: 74 (10-05-17 @ 06:00) (59 - 74)  BP: 151/65 (10-05-17 @ 06:00) (104/50 - 151/65)  RR: 26 (10-05-17 @ 06:00) (12 - 29)  SpO2: 93% (10-05-17 @ 06:00) (91% - 100%)  Wt(kg): --        CAPILLARY BLOOD GLUCOSE  212 (05 Oct 2017 02:00)          I&O's Summary    04 Oct 2017 07:01  -  05 Oct 2017 07:00  --------------------------------------------------------  IN: 1490 mL / OUT: 495 mL / NET: 995 mL        Physical Exam:   Gen:  Neuro:  HEENT:  Resp:  CVS:  Abd:  Ext:  Skin:    Meds:  piperacillin/tazobactam IVPB. IV Intermittent    metoprolol Oral    dextrose 50% Injectable IV Push  dextrose 50% Injectable IV Push  dextrose 50% Injectable IV Push  dextrose Gel Oral PRN  glucagon  Injectable IntraMuscular PRN  insulin lispro (HumaLOG) corrective regimen sliding scale SubCutaneous    ALBUTerol/ipratropium for Nebulization Nebulizer    gabapentin Oral  HYDROmorphone  Injectable IV Push PRN  ondansetron Injectable IV Push PRN      aspirin  chewable Oral  clopidogrel Tablet Oral  heparin  Injectable SubCutaneous    pantoprazole    Tablet Oral      dextrose 5% + sodium chloride 0.45%. IV Continuous  dextrose 5%. IV Continuous    influenza   Vaccine IntraMuscular    nystatin Powder Topical                              7.9    7.0   )-----------( 97       ( 05 Oct 2017 06:06 )             25.8       10-05    138  |  103  |  61<H>  ----------------------------<  137<H>  3.9   |  25  |  3.30<H>    Ca    8.4<L>      05 Oct 2017 06:06  Phos  4.4     10-05  Mg     2.2     10-05    TPro  6.8  /  Alb  2.5<L>  /  TBili  0.9  /  DBili  x   /  AST  466<H>  /  ALT  1069<H>  /  AlkPhos  107  10-05          PT/INR - ( 04 Oct 2017 06:41 )   PT: 16.3 sec;   INR: 1.48 ratio         PTT - ( 04 Oct 2017 06:41 )  PTT:36.2 sec    .Urine Catheterized   No growth -- 10-01 @ 11:11  .Urine Clean Catch (Midstream)   >100,000 CFU/ml Streptococcus agalactiae (Group B)  Streptococcus agalactiae (Group B)  Group B streptococci are susceptible to ampicillin,  penicillin and cefazolin, but may be resistant to  erythromycin and clindamycin.  Recommendations for intrapartum prophylaxis for Group B  streptococci are penicillin or ampicillin.  Normal Urogenital cat present -- 09-30 @ 15:16  .Blood Blood   No growth to date. -- 09-30 @ 14:49              Radiology: ***    Bedside ultrasound: ***    CENTRAL LINE: N  GRIGGS: Y                           DATE INSERTED: 9/30             REMOVE: N  A-LINE: N/Y                       DATE INSERTED:              REMOVE: Y/N    GLOBAL ISSUE/BEST PRACTICE:  Analgesia: Y  Sedation:N  CAM-ICU: N  HOB elevation: yes  Stress ulcer prophylaxis:Y  VTE prophylaxis:Y  Glycemic control:Y  Nutrition:Y    CODE STATUS: DNR/DNI 24 hour events: Afebrile overnight, no acute events    Review of Systems:  Unable to obtain 2/2 mental status    T(F): 98.6 (10-05-17 @ 05:13), Max: 99 (10-04-17 @ 07:54)  HR: 74 (10-05-17 @ 06:00) (59 - 74)  BP: 151/65 (10-05-17 @ 06:00) (104/50 - 151/65)  RR: 26 (10-05-17 @ 06:00) (12 - 29)  SpO2: 93% (10-05-17 @ 06:00) (91% - 100%)  Wt(kg): --        CAPILLARY BLOOD GLUCOSE  212 (05 Oct 2017 02:00)          I&O's Summary    04 Oct 2017 07:01  -  05 Oct 2017 07:00  --------------------------------------------------------  IN: 1490 mL / OUT: 495 mL / NET: 995 mL        Physical Exam:   Gen: NAD, lethargic  Neuro: AAOx2  HEENT:NC/AT  Resp:  CVS:  Abd:  Ext:  Skin:    Meds:  piperacillin/tazobactam IVPB. IV Intermittent    metoprolol Oral    dextrose 50% Injectable IV Push  dextrose 50% Injectable IV Push  dextrose 50% Injectable IV Push  dextrose Gel Oral PRN  glucagon  Injectable IntraMuscular PRN  insulin lispro (HumaLOG) corrective regimen sliding scale SubCutaneous    ALBUTerol/ipratropium for Nebulization Nebulizer    gabapentin Oral  HYDROmorphone  Injectable IV Push PRN  ondansetron Injectable IV Push PRN      aspirin  chewable Oral  clopidogrel Tablet Oral  heparin  Injectable SubCutaneous    pantoprazole    Tablet Oral      dextrose 5% + sodium chloride 0.45%. IV Continuous  dextrose 5%. IV Continuous    influenza   Vaccine IntraMuscular    nystatin Powder Topical                              7.9    7.0   )-----------( 97       ( 05 Oct 2017 06:06 )             25.8       10-05    138  |  103  |  61<H>  ----------------------------<  137<H>  3.9   |  25  |  3.30<H>    Ca    8.4<L>      05 Oct 2017 06:06  Phos  4.4     10-05  Mg     2.2     10-05    TPro  6.8  /  Alb  2.5<L>  /  TBili  0.9  /  DBili  x   /  AST  466<H>  /  ALT  1069<H>  /  AlkPhos  107  10-05          PT/INR - ( 04 Oct 2017 06:41 )   PT: 16.3 sec;   INR: 1.48 ratio         PTT - ( 04 Oct 2017 06:41 )  PTT:36.2 sec    .Urine Catheterized   No growth -- 10-01 @ 11:11  .Urine Clean Catch (Midstream)   >100,000 CFU/ml Streptococcus agalactiae (Group B)  Streptococcus agalactiae (Group B)  Group B streptococci are susceptible to ampicillin,  penicillin and cefazolin, but may be resistant to  erythromycin and clindamycin.  Recommendations for intrapartum prophylaxis for Group B  streptococci are penicillin or ampicillin.  Normal Urogenital cat present -- 09-30 @ 15:16  .Blood Blood   No growth to date. -- 09-30 @ 14:49              Radiology: ***    Bedside ultrasound: ***    CENTRAL LINE: N  GRIGGS: Y                           DATE INSERTED: 9/30             REMOVE: N  A-LINE: N/Y                       DATE INSERTED:              REMOVE: Y/N    GLOBAL ISSUE/BEST PRACTICE:  Analgesia: Y  Sedation:N  CAM-ICU: N  HOB elevation: yes  Stress ulcer prophylaxis:Y  VTE prophylaxis:Y  Glycemic control:Y  Nutrition:Y    CODE STATUS: DNR/DNI 24 hour events: Afebrile overnight, no acute events    Review of Systems:  Unable to obtain 2/2 mental status    T(F): 98.6 (10-05-17 @ 05:13), Max: 99 (10-04-17 @ 07:54)  HR: 74 (10-05-17 @ 06:00) (59 - 74)  BP: 151/65 (10-05-17 @ 06:00) (104/50 - 151/65)  RR: 26 (10-05-17 @ 06:00) (12 - 29)  SpO2: 93% (10-05-17 @ 06:00) (91% - 100%)  Wt(kg): --        CAPILLARY BLOOD GLUCOSE  212 (05 Oct 2017 02:00)          I&O's Summary    04 Oct 2017 07:01  -  05 Oct 2017 07:00  --------------------------------------------------------  IN: 1490 mL / OUT: 495 mL / NET: 995 mL        Physical Exam:   Gen: NAD, lethargic  Neuro: AAOx2  HEENT:NC/AT  Resp: Diminished, coarse breath sounds b/l  CVS: RRR, +S1/S2  Abd: Soft, NT/ND, +bs  Ext: no edema, + pulses  Skin: warm, well perfused    Meds:  piperacillin/tazobactam IVPB. IV Intermittent    metoprolol Oral    dextrose 50% Injectable IV Push  dextrose 50% Injectable IV Push  dextrose 50% Injectable IV Push  dextrose Gel Oral PRN  glucagon  Injectable IntraMuscular PRN  insulin lispro (HumaLOG) corrective regimen sliding scale SubCutaneous    ALBUTerol/ipratropium for Nebulization Nebulizer    gabapentin Oral  HYDROmorphone  Injectable IV Push PRN  ondansetron Injectable IV Push PRN      aspirin  chewable Oral  clopidogrel Tablet Oral  heparin  Injectable SubCutaneous    pantoprazole    Tablet Oral      dextrose 5% + sodium chloride 0.45%. IV Continuous  dextrose 5%. IV Continuous    influenza   Vaccine IntraMuscular    nystatin Powder Topical                              7.9    7.0   )-----------( 97       ( 05 Oct 2017 06:06 )             25.8       10-05    138  |  103  |  61<H>  ----------------------------<  137<H>  3.9   |  25  |  3.30<H>    Ca    8.4<L>      05 Oct 2017 06:06  Phos  4.4     10-05  Mg     2.2     10-05    TPro  6.8  /  Alb  2.5<L>  /  TBili  0.9  /  DBili  x   /  AST  466<H>  /  ALT  1069<H>  /  AlkPhos  107  10-05          PT/INR - ( 04 Oct 2017 06:41 )   PT: 16.3 sec;   INR: 1.48 ratio         PTT - ( 04 Oct 2017 06:41 )  PTT:36.2 sec    .Urine Catheterized   No growth -- 10-01 @ 11:11  .Urine Clean Catch (Midstream)   >100,000 CFU/ml Streptococcus agalactiae (Group B)  Streptococcus agalactiae (Group B)  Group B streptococci are susceptible to ampicillin,  penicillin and cefazolin, but may be resistant to  erythromycin and clindamycin.  Recommendations for intrapartum prophylaxis for Group B  streptococci are penicillin or ampicillin.  Normal Urogenital cat present -- 09-30 @ 15:16  .Blood Blood   No growth to date. -- 09-30 @ 14:49          CENTRAL LINE: N  GRIGGS: Y                           DATE INSERTED: 9/30             REMOVE: N  A-LINE: N    GLOBAL ISSUE/BEST PRACTICE:  Analgesia: Y  Sedation:N  CAM-ICU: N  HOB elevation: yes  Stress ulcer prophylaxis:Y  VTE prophylaxis:Y  Glycemic control:Y  Nutrition:Y    CODE STATUS: DNR/DNI 24 hour events: Afebrile overnight, no acute events. Decreased urine output    Review of Systems:  Unable to obtain 2/2 mental status    T(F): 98.6 (10-05-17 @ 05:13), Max: 99 (10-04-17 @ 07:54)  HR: 74 (10-05-17 @ 06:00) (59 - 74)  BP: 151/65 (10-05-17 @ 06:00) (104/50 - 151/65)  RR: 26 (10-05-17 @ 06:00) (12 - 29)  SpO2: 93% (10-05-17 @ 06:00) (91% - 100%)    CAPILLARY BLOOD GLUCOSE  212 (05 Oct 2017 02:00)    I&O's Summary    04 Oct 2017 07:01  -  05 Oct 2017 07:00  --------------------------------------------------------  IN: 1490 mL / OUT: 495 mL / NET: 995 mL    Physical Exam:   Gen: NAD, lethargic  Neuro: AAOx2  HEENT:NC/AT  Resp: Diminished, coarse breath sounds b/l  CVS: RRR, +S1/S2  Abd: Soft, NT/ND, +bs  Ext: no edema, + pulses  Skin: warm, well perfused    Meds:  piperacillin/tazobactam IVPB. IV Intermittent  metoprolol Oral  insulin lispro (HumaLOG) corrective regimen sliding scale SubCutaneous  ALBUTerol/ipratropium for Nebulization Nebulizer  gabapentin Oral  HYDROmorphone  Injectable IV Push PRN  ondansetron Injectable IV Push PRN  aspirin  chewable Oral  clopidogrel Tablet Oral  heparin  Injectable SubCutaneous  pantoprazole    Tablet Oral  dextrose 5% + sodium chloride 0.45%. IV Continuous  influenza   Vaccine IntraMuscular  nystatin Powder Topical                          7.9    7.0   )-----------( 97       ( 05 Oct 2017 06:06 )             25.8     138  |  103  |  61  ---------------------<  137<H>  3.9   |  25  |  3.30    Ca    8.4<L>      05 Oct 2017 06:06  Phos  4.4     10-05  Mg     2.2     10-05    TPro  6.8  /  Alb  2.5<L>  /  TBili  0.9  /  DBili  x   /  AST  466<H>  /  ALT  1069<H>  /  AlkPhos  107  10-05    PT/INR - ( 04 Oct 2017 06:41 )   PT: 16.3 sec;   INR: 1.48 ratio    PTT - ( 04 Oct 2017 06:41 )  PTT:36.2 sec    .Urine Catheterized   No growth -- 10-01 @ 11:11  .Urine Clean Catch (Midstream)   >100,000 CFU/ml Streptococcus agalactiae (Group B)  Streptococcus agalactiae (Group B)  Group B streptococci are susceptible to ampicillin,  penicillin and cefazolin, but may be resistant to  erythromycin and clindamycin.  Recommendations for intrapartum prophylaxis for Group B  streptococci are penicillin or ampicillin.  Normal Urogenital cat present -- 09-30 @ 15:16  .Blood Blood   No growth to date. -- 09-30 @ 14:49      CENTRAL LINE: N  GRIGGS: Y                           DATE INSERTED: 9/30             REMOVE: N  A-LINE: N    GLOBAL ISSUE/BEST PRACTICE:  Analgesia: Y  Sedation:N  CAM-ICU: N  HOB elevation: yes  Stress ulcer prophylaxis:Y  VTE prophylaxis:Y  Glycemic control:Y  Nutrition:Y    CODE STATUS: DNR/DNI

## 2017-10-05 NOTE — PROGRESS NOTE ADULT - ASSESSMENT
88F PMH HTN, HLD, DM2, COPD on home oxygen, & chronic hypercapnic respiratory failure presents with septic shock with acute on chronic hypoxic and hypercapnic respiratory failure, NSTEMI, shock liver, SAMIA with oliguria, and coagulopathy. Found to have RLL CAP/aspiration pna + GBS UTI.    Neuro - Cont PRN dilaudid to 0.125mg, cont gabapentin 100mg daily  CV - HD stable, cont metoprolol 12.5 q8h, cont plavix/asa, will start statin once shock liver resolves, demand ischemia likely 2/2 multiorgan failure, will need ischemic workup once more stable  Resp - Continue NC daily, bipap at night, cont duonebs/chest PT  GI - Cont dysphagia diet, tolerating well, liver enzymes improving, no further management for gallbladder adenomyomatosis  Renal - BUN/Cr increased slightly, will continue to monitor lytes/urine output, gave IVF bolus   Endo - Cont ISS, fingersticks  ID - Cont zosyn (day 6/7)  Heme - Heparin subq for dvt ppx  Dispo - Poor prognosis, DNR/DNI, no aggressive measures

## 2017-10-05 NOTE — PROGRESS NOTE ADULT - SUBJECTIVE AND OBJECTIVE BOX
[facility]    John Tariq, No, Nirav, [place], [place], [place]      [phone]    CHIEF COMPLAINT: Patient is a 88y old  Female who presents with a chief complaint of found unresponsive (30 Sep 2017 15:29)      Follow Up: pneumonia, nstemi, msof    Interim history: poor ms, moaning, unable to obtain hx, events reviewed    MEDICATIONS  (STANDING):  ALBUTerol/ipratropium for Nebulization 3 milliLiter(s) Nebulizer every 4 hours  aspirin  chewable 81 milliGRAM(s) Oral daily  clopidogrel Tablet 75 milliGRAM(s) Oral daily  dextrose 5% + sodium chloride 0.45%. 1000 milliLiter(s) (50 mL/Hr) IV Continuous <Continuous>  dextrose 5%. 1000 milliLiter(s) (50 mL/Hr) IV Continuous <Continuous>  dextrose 50% Injectable 12.5 Gram(s) IV Push once  dextrose 50% Injectable 25 Gram(s) IV Push once  dextrose 50% Injectable 25 Gram(s) IV Push once  gabapentin 100 milliGRAM(s) Oral daily  heparin  Injectable 5000 Unit(s) SubCutaneous every 8 hours  influenza   Vaccine 0.5 milliLiter(s) IntraMuscular once  insulin lispro (HumaLOG) corrective regimen sliding scale   SubCutaneous Before meals and at bedtime  metoprolol 12.5 milliGRAM(s) Oral every 8 hours  nystatin Powder 1 Application(s) Topical every 6 hours  pantoprazole    Tablet 40 milliGRAM(s) Oral before breakfast  piperacillin/tazobactam IVPB. 3.375 Gram(s) IV Intermittent every 12 hours    MEDICATIONS  (PRN):  dextrose Gel 1 Dose(s) Oral once PRN Blood Glucose LESS THAN 70 milliGRAM(s)/deciliter  glucagon  Injectable 1 milliGRAM(s) IntraMuscular once PRN Glucose LESS THAN 70 milligrams/deciliter  HYDROmorphone  Injectable 0.125 milliGRAM(s) IV Push every 4 hours PRN Severe Pain (7 - 10)  ondansetron Injectable 4 milliGRAM(s) IV Push every 6 hours PRN Nausea      REVIEW OF SYSTEMS: unable    Vital Signs Last 24 Hrs  T(C): 37 (05 Oct 2017 05:13), Max: 37.2 (04 Oct 2017 07:54)  T(F): 98.6 (05 Oct 2017 05:13), Max: 99 (04 Oct 2017 07:54)  HR: 62 (05 Oct 2017 04:33) (59 - 72)  BP: 148/63 (05 Oct 2017 02:00) (104/50 - 148/63)  BP(mean): 91 (05 Oct 2017 02:00) (72 - 94)  RR: 23 (05 Oct 2017 02:00) (12 - 29)  SpO2: 97% (05 Oct 2017 04:33) (91% - 100%)    I&O's Summary    03 Oct 2017 07:01  -  04 Oct 2017 07:00  --------------------------------------------------------  IN: 1150 mL / OUT: 1000 mL / NET: 150 mL    04 Oct 2017 07:01  -  05 Oct 2017 05:54  --------------------------------------------------------  IN: 1290 mL / OUT: 495 mL / NET: 795 mL        Telemetry past 24h: sr    PHYSICAL EXAM:    Constitutional: well-nourished, well-developed, moaning  HEENT:  MMM, sclerae anicteric, conjunctivae clear, no oral cyanosis.  Pulmonary: Non-labored, breath sounds are difficult to characterize given her moaning, no obvious wheezing, rales or rhonchi  Cardiovascular: Regular, S1 and S2.  difficult No murmur.  No rubs, gallops or clicks  Gastrointestinal:  soft, nontender.   Lymph: 1+ peripheral edema.   Neurological: poorly responsive  Skin: No rashes.  Psych:  poorly responsive    LABS: All Labs Reviewed:                        7.3    5.8   )-----------( 80       ( 04 Oct 2017 06:41 )             23.8                         7.2    5.7   )-----------( 87       ( 03 Oct 2017 06:46 )             24.1                         8.1    6.8   )-----------( 94       ( 02 Oct 2017 06:02 )             25.8     04 Oct 2017 06:41    139    |  104    |  55     ----------------------------<  101    4.0     |  27     |  3.20   03 Oct 2017 06:46    140    |  106    |  61     ----------------------------<  113    3.8     |  25     |  3.50   02 Oct 2017 06:02    141    |  107    |  59     ----------------------------<  132    4.4     |  24     |  3.50     Ca    7.7        04 Oct 2017 06:41  Ca    8.1        03 Oct 2017 06:46  Ca    7.4        02 Oct 2017 06:02  Phos  4.1       04 Oct 2017 06:41  Phos  4.1       03 Oct 2017 06:46  Phos  5.0       02 Oct 2017 06:02  Mg     2.2       04 Oct 2017 06:41  Mg     2.2       03 Oct 2017 06:46  Mg     2.1       02 Oct 2017 06:02    TPro  6.2    /  Alb  2.4    /  TBili  1.1    /  DBili  x      /  AST  852    /  ALT  1304   /  AlkPhos  98     04 Oct 2017 06:41  TPro  6.6    /  Alb  2.7    /  TBili  0.8    /  DBili  x      /  AST  1617   /  ALT  1698   /  AlkPhos  110    03 Oct 2017 06:46  TPro  6.5    /  Alb  2.4    /  TBili  0.7    /  DBili  x      /  AST  2792   /  ALT  2221   /  AlkPhos  119    02 Oct 2017 06:02    PT/INR - ( 04 Oct 2017 06:41 )   PT: 16.3 sec;   INR: 1.48 ratio         PTT - ( 04 Oct 2017 06:41 )  PTT:36.2 sec      Blood Culture: Organism --  Gram Stain Blood -- Gram Stain --  Specimen Source .Urine Catheterized  Culture-Blood --    Organism --  Gram Stain Blood -- Gram Stain --  Specimen Source .Urine Clean Catch (Midstream)  Culture-Blood --    Organism --  Gram Stain Blood -- Gram Stain --  Specimen Source .Blood Blood  Culture-Blood --            RADIOLOGY:    EKG:    Echo:  < from: TTE Echo Doppler w/o Cont (10.02.17 @ 17:04) >     EXAM:  ECHO TTE W/O CON COMP W/DOPPLR         PROCEDURE DATE:  10/02/2017        INTERPRETATION:  INDICATION: N STEMI  Referring M.D.:Reshma  Blood Pressure 120/58        Weight (kg) :68     Height (cm):160       BSA (sq m): 1.71  Technician: EDITH    Dimensions:    LA 4.1       Normal Values: 2.0 - 4.0 cm    Ao 3.3        Normal Values: 2.0 - 3.8 cm  SEPTUM 1.1       Normal Values: 0.6 - 1.2 cm  PWT 1.0       Normal Values: 0.6 - 1.1 cm  LVIDd 4.0         Normal Values: 3.0 - 5.6 cm  LVIDs2.4         Normal Values: 1.8 - 4.0 cm      OBSERVATIONS:  Technically difficult study  Mitral Valve: Calcified mitral annulus with normally opening mitral valve   leaflets. Mild mitral regurgitation.  Aortic Valve/Aorta: Calcified trileaflet aorticvalve with normal   opening.. Mild AI  Tricuspid Valve: Normal tricuspid valve. Mild tricuspid regurgitation.  Pulmonic Valve: The pulmonic valve is not well visualized. Probably   normal.  Left Atrium: Normal  Right Atrium: Normal  Left Ventricle: The endocardium is not well-visualized. Overall there is   preserved left ventricular systolic function.. The EF is approximately   65%.  Right Ventricle: Right ventricle is not well-visualized but appears to be   normal and systolic function  Pericardium/Pleura: No pericardial effusion noted.  Pulmonary/RV Pressure: The right ventricular systolic pressure is   estimated to be 44mmHg, assuming that the right atrial pressure is   estimated to be 8 mmHg. This is consistent with mild pulmonary   hypertension.  LV Diastolic Function: stage 1 diastolic dysfunction     Conclusion: Technically difficult study. Preserved left ventricular   systolic function.                  PERNELL KNUTSON M.D., ATTENDING CARDIOLOGIST  This document has been electronically signed. Oct  3 2017  8:25PM                < end of copied text >

## 2017-10-05 NOTE — PROGRESS NOTE ADULT - ASSESSMENT
·	SAMIA: Oliguric ATN, ARB rx  ·	Shock, Sepsis  ·	Shock liver  ·	Respiratory falure on BIPAP  ·	Respiratory and metabolic acidosis  ·	Anemia    Stable renal function. UO low. Will monitor on current meds. Encourage PO intake as tolerated. Overall prognosis poor. Supportive care. Cardiology / pulmonary follow up. Will follow electrolytes and renal function trend. Further recommendations pending clinical course. Strict I & O. Monitor h/h. Transfuse PRN. Monitor fluid status closely. D/w family at bedside.

## 2017-10-05 NOTE — PROGRESS NOTE ADULT - SUBJECTIVE AND OBJECTIVE BOX
INTERVAL HPI/OVERNIGHT EVENTS: mental status improving, follow commands  HPI:  87yo F w/ PMHx DM2, asthma, HTN, iron deficiency anemia, COPD w/ intermittent use of home O2 presents w/ weakness and fatigue. pt is nonverbal at this time and unable to provide any history. She developed a fever last night and the family admits mild cough which was nonproductive. 3-4 days prior to presentation the pt was complaining of mild abd discomfort but was tolerating po well. Family denies dysphagia. They stated that she had an episode of bilious vomiting 4 days ago but none since. She was tolerating diet yesterday without difficulty. Last night developed rectal temp of 102F but denied diaphoresis, chills, rigors. Upon waking this morning, the pt was obtunded. Responding only to noxious stimuli so EMS was alerted. No other recent travel. No recent hospitalizations. No other recent changes in her overall health.  Recnetly her DM2 regimen was decreased due to normal glucose levels. Also, her recent creatinine was 1.1 as per family at the bedside. Family members have medical background.       MEDICATIONS  (STANDING):  dextrose 5%. 1000 milliLiter(s) (50 mL/Hr) IV Continuous <Continuous>  dextrose 50% Injectable 12.5 Gram(s) IV Push once  influenza   Vaccine 0.5 milliLiter(s) IntraMuscular once  piperacillin/tazobactam IVPB. 3.375 Gram(s) IV Intermittent every 12 hours  ALBUTerol/ipratropium for Nebulization 3 milliLiter(s) Nebulizer every 4 hours  pantoprazole  Injectable 40 milliGRAM(s) IV Push daily  aspirin Suppository 300 milliGRAM(s) Rectal daily  heparin  Injectable 5000 Unit(s) SubCutaneous every 12 hours  nystatin Powder 1 Application(s) Topical every 6 hours  insulin lispro (HumaLOG) corrective regimen sliding scale   SubCutaneous every 6 hours  dextrose 5%. 1000 milliLiter(s) (50 mL/Hr) IV Continuous <Continuous>  dextrose 50% Injectable 12.5 Gram(s) IV Push once  dextrose 50% Injectable 25 Gram(s) IV Push once  dextrose 50% Injectable 25 Gram(s) IV Push once  dextrose 5% + sodium chloride 0.45% 1000 milliLiter(s) (50 mL/Hr) IV Continuous <Continuous>  azithromycin  IVPB      albumin human 25% IVPB 50 milliLiter(s) IV Intermittent every 8 hours    MEDICATIONS  (PRN):  ondansetron Injectable 4 milliGRAM(s) IV Push every 6 hours PRN Nausea  dextrose Gel 1 Dose(s) Oral once PRN Blood Glucose LESS THAN 70 milliGRAM(s)/deciliter  glucagon  Injectable 1 milliGRAM(s) IntraMuscular once PRN Glucose LESS THAN 70 milligrams/deciliter  acetaminophen  Suppository. 650 milliGRAM(s) Rectal every 4 hours PRN Mild Pain (1 - 3)  dextrose Gel 1 Dose(s) Oral once PRN Blood Glucose LESS THAN 70 milliGRAM(s)/deciliter  glucagon  Injectable 1 milliGRAM(s) IntraMuscular once PRN Glucose LESS THAN 70 milligrams/deciliter      Allergies    No Known Allergies    Intolerances          General:  No wt loss, fevers, chills, night sweats, fatigue   Eyes:  Good vision, no reported pain  ENT:  No sore throat, pain, runny nose, dysphagia  CV:  No pain, palpitations, hypo/hypertension  Resp:  No dyspnea, cough, tachypnea, wheezing  GI:  No pain, No nausea, No vomiting, No diarrhea, No constipation, No weight loss, No fever, No pruritis, No rectal bleeding, No tarry stools, No dysphagia,  :  No pain, bleeding, incontinence, nocturia  Muscle:  No pain, weakness  Neuro:  RLE pain  Psych:  No fatigue, insomnia, mood problems, depression  Endocrine:  No polyuria, polydipsia, cold/heat intolerance  Heme:  No petechiae, ecchymosis, easy bruisability  Skin:  No rash, tattoos, scars, edema        PHYSICAL EXAM:   Vital Signs:  Vital Signs Last 24 Hrs  T(C): 36.6 (01 Oct 2017 12:00), Max: 36.6 (30 Sep 2017 22:45)  T(F): 97.9 (01 Oct 2017 12:00), Max: 97.9 (01 Oct 2017 12:00)  HR: 66 (01 Oct 2017 14:00) (59 - 79)  BP: 116/56 (01 Oct 2017 14:00) (84/42 - 146/61)  BP(mean): 80 (01 Oct 2017 14:00) (69 - 88)  RR: 16 (01 Oct 2017 14:00) (10 - 32)  SpO2: 100% (01 Oct 2017 14:00) (95% - 100%)  Daily     Daily Weight in k (01 Oct 2017 13:11)I&O's Summary    30 Sep 2017 07:  -  01 Oct 2017 07:00  --------------------------------------------------------  IN: 925 mL / OUT: 55 mL / NET: 870 mL    01 Oct 2017 07:  -  01 Oct 2017 14:18  --------------------------------------------------------  IN: 395 mL / OUT: 45 mL / NET: 350 mL        GENERAL:  AAOx2, follows commands, able to communicate verbally   HEENT:  NC/AT,  conjunctivae clear and pink, no thyromegaly, nodules, adenopathy, no JVD, sclera -anicteric  CHEST:  decreased BS at bases  HEART:  Regular rhythm, S1, S2, no murmur/rub/S3/S4, no abdominal bruit, no edema  ABDOMEN:  Soft, non-tender, non-distended, normoactive bowel sounds,  no masses ,no hepato-splenomegaly, no signs of chronic liver disease  EXTEREMITIES:  no cyanosis,clubbing or edema  SKIN:  No rash/erythema/ecchymoses/petechiae/wounds/abscess/warm/dry  NEURO:  responsive to painful stimuli, does not follow commands       LABS: reviewed

## 2017-10-05 NOTE — PROGRESS NOTE ADULT - PROBLEM SELECTOR PLAN 1
with multi organ failure, 2/2 PNA and UTI.  CXR Improving with stable vitals.  c/w IV zosyn   urine is + for strep  f/u urine/blood c/s.

## 2017-10-05 NOTE — PROGRESS NOTE ADULT - SUBJECTIVE AND OBJECTIVE BOX
Patient is a 88y old  Female who presents with a chief complaint of found unresponsive (30 Sep 2017 15:29)      Patient seen in follow up for SAMIA, ATN. Family at bedside. UO low yesterday     PAST MEDICAL HISTORY:  Asthma  Diabetes mellitus  HTN (hypertension)     MEDICATIONS  (STANDING):  ALBUTerol/ipratropium for Nebulization 3 milliLiter(s) Nebulizer every 4 hours  aspirin  chewable 81 milliGRAM(s) Oral daily  clopidogrel Tablet 75 milliGRAM(s) Oral daily  dextrose 5% + sodium chloride 0.45%. 1000 milliLiter(s) (50 mL/Hr) IV Continuous <Continuous>  dextrose 5%. 1000 milliLiter(s) (50 mL/Hr) IV Continuous <Continuous>  dextrose 50% Injectable 12.5 Gram(s) IV Push once  dextrose 50% Injectable 25 Gram(s) IV Push once  dextrose 50% Injectable 25 Gram(s) IV Push once  gabapentin 100 milliGRAM(s) Oral daily  heparin  Injectable 5000 Unit(s) SubCutaneous every 8 hours  influenza   Vaccine 0.5 milliLiter(s) IntraMuscular once  insulin lispro (HumaLOG) corrective regimen sliding scale   SubCutaneous Before meals and at bedtime  lactated ringers Bolus 250 milliLiter(s) IV Bolus once  metoprolol 12.5 milliGRAM(s) Oral every 8 hours  nystatin Powder 1 Application(s) Topical every 6 hours  pantoprazole    Tablet 40 milliGRAM(s) Oral before breakfast  piperacillin/tazobactam IVPB. 3.375 Gram(s) IV Intermittent every 12 hours    MEDICATIONS  (PRN):  dextrose Gel 1 Dose(s) Oral once PRN Blood Glucose LESS THAN 70 milliGRAM(s)/deciliter  glucagon  Injectable 1 milliGRAM(s) IntraMuscular once PRN Glucose LESS THAN 70 milligrams/deciliter  HYDROmorphone  Injectable 0.125 milliGRAM(s) IV Push every 4 hours PRN Severe Pain (7 - 10)  ondansetron Injectable 4 milliGRAM(s) IV Push every 6 hours PRN Nausea    T(C): 36.6 (10-05-17 @ 08:01), Max: 37.8 (10-03-17 @ 23:39)  HR: 65 (10-05-17 @ 07:38) (52 - 77)  BP: 151/65 (10-05-17 @ 06:00) (104/50 - 153/66)  RR: 26 (10-05-17 @ 06:00)  SpO2: 99% (10-05-17 @ 07:38)  Wt(kg): --  I&O's Detail    04 Oct 2017 07:01  -  05 Oct 2017 07:00  --------------------------------------------------------  IN:    dextrose 5% + sodium chloride 0.45%.: 1050 mL    Oral Fluid: 140 mL    Solution: 100 mL    Solution: 200 mL  Total IN: 1490 mL    OUT:    Indwelling Catheter - Urethral: 495 mL  Total OUT: 495 mL    Total NET: 995 mL                  PHYSICAL EXAM:  General: NAD  Respiratory: b/l air entry, decreased breath sounds  Cardiovascular: S1 S2  Gastrointestinal: soft  Extremities:  edema        LABORATORY:                        7.9    7.0   )-----------( 97       ( 05 Oct 2017 06:06 )             25.8     10-05    138  |  103  |  61<H>  ----------------------------<  137<H>  3.9   |  25  |  3.30<H>    Ca    8.4<L>      05 Oct 2017 06:06  Phos  4.4     10-05  Mg     2.2     10-05    TPro  6.8  /  Alb  2.5<L>  /  TBili  0.9  /  DBili  x   /  AST  466<H>  /  ALT  1069<H>  /  AlkPhos  107  10-05    Sodium, Serum: 138 mmol/L (10-05 @ 06:06)  Sodium, Serum: 139 mmol/L (10-04 @ 06:41)    Potassium, Serum: 3.9 mmol/L (10-05 @ 06:06)  Potassium, Serum: 4.0 mmol/L (10-04 @ 06:41)    Hemoglobin: 7.9 g/dL (10-05 @ 06:06)  Hemoglobin: 7.3 g/dL (10-04 @ 06:41)  Hemoglobin: 7.2 g/dL (10-03 @ 06:46)    Creatinine, Serum 3.30 (10-05 @ 06:06)  Creatinine, Serum 3.20 (10-04 @ 06:41)  Creatinine, Serum 3.50 (10-03 @ 06:46)        LIVER FUNCTIONS - ( 05 Oct 2017 06:06 )  Alb: 2.5 g/dL / Pro: 6.8 g/dL / ALK PHOS: 107 U/L / ALT: 1069 U/L / AST: 466 U/L / GGT: x             ABG - ( 04 Oct 2017 15:05 )  pH: 7.32  /  pCO2: 50    /  pO2: 55    / HCO3: 24    / Base Excess: -0.6  /  SaO2: 85

## 2017-10-05 NOTE — PROGRESS NOTE ADULT - ASSESSMENT
87yo F w/ PMHx DM2, asthma, HTN, iron deficiency anemia, COPD w/ intermittent use of home O2, severe sepsis secondary to pneumonia, MSOF, NSTEMI, overall normal LV function, enzymes downtrending.     - no acute ischemia  - Continue aspirin, clopidogrel  - Cont DVT prophylaxis  - cont metoprolol 12.5 q8, as tolerated by bp  - No statin drug secondary to shock liver, which is resolving.  Would start when this resolves.    - Still net positive (+795) with mild edema. Would try to keep I=O  - Monitor and replete electrolytes. Keep K>4.0 and Mg>2.0.   - No arrhythmia on the monitor  - Not a candidate for coronary angiography  - Abx   - dvt prophylaxis  - trend creatinine and hgb in setting of prem and anemia  - Further cardiac workup will depend on clinical course.     The patient is at risk of abrupt decompensation.  35 minutes of critical care time was spent with this patient.

## 2017-10-06 LAB
ALBUMIN SERPL ELPH-MCNC: 2.3 G/DL — LOW (ref 3.3–5)
ALP SERPL-CCNC: 99 U/L — SIGNIFICANT CHANGE UP (ref 40–120)
ALT FLD-CCNC: 772 U/L — HIGH (ref 12–78)
ANION GAP SERPL CALC-SCNC: 9 MMOL/L — SIGNIFICANT CHANGE UP (ref 5–17)
AST SERPL-CCNC: 224 U/L — HIGH (ref 15–37)
BASOPHILS # BLD AUTO: 0.1 K/UL — SIGNIFICANT CHANGE UP (ref 0–0.2)
BASOPHILS NFR BLD AUTO: 1 % — SIGNIFICANT CHANGE UP (ref 0–2)
BILIRUB SERPL-MCNC: 0.8 MG/DL — SIGNIFICANT CHANGE UP (ref 0.2–1.2)
BUN SERPL-MCNC: 51 MG/DL — HIGH (ref 7–23)
CALCIUM SERPL-MCNC: 8.2 MG/DL — LOW (ref 8.5–10.1)
CHLORIDE SERPL-SCNC: 104 MMOL/L — SIGNIFICANT CHANGE UP (ref 96–108)
CO2 SERPL-SCNC: 26 MMOL/L — SIGNIFICANT CHANGE UP (ref 22–31)
CREAT SERPL-MCNC: 2.7 MG/DL — HIGH (ref 0.5–1.3)
EOSINOPHIL # BLD AUTO: 0.4 K/UL — SIGNIFICANT CHANGE UP (ref 0–0.5)
EOSINOPHIL NFR BLD AUTO: 5.5 % — SIGNIFICANT CHANGE UP (ref 0–6)
GLUCOSE SERPL-MCNC: 146 MG/DL — HIGH (ref 70–99)
HCT VFR BLD CALC: 24.8 % — LOW (ref 34.5–45)
HGB BLD-MCNC: 7.5 G/DL — LOW (ref 11.5–15.5)
LYMPHOCYTES # BLD AUTO: 1.4 K/UL — SIGNIFICANT CHANGE UP (ref 1–3.3)
LYMPHOCYTES # BLD AUTO: 22.1 % — SIGNIFICANT CHANGE UP (ref 13–44)
MAGNESIUM SERPL-MCNC: 2.2 MG/DL — SIGNIFICANT CHANGE UP (ref 1.6–2.6)
MCHC RBC-ENTMCNC: 28 PG — SIGNIFICANT CHANGE UP (ref 27–34)
MCHC RBC-ENTMCNC: 30.4 GM/DL — LOW (ref 32–36)
MCV RBC AUTO: 92.1 FL — SIGNIFICANT CHANGE UP (ref 80–100)
MONOCYTES # BLD AUTO: 0.7 K/UL — SIGNIFICANT CHANGE UP (ref 0–0.9)
MONOCYTES NFR BLD AUTO: 11 % — HIGH (ref 1–9)
NEUTROPHILS # BLD AUTO: 3.9 K/UL — SIGNIFICANT CHANGE UP (ref 1.8–7.4)
NEUTROPHILS NFR BLD AUTO: 60.4 % — SIGNIFICANT CHANGE UP (ref 43–77)
PHOSPHATE SERPL-MCNC: 4 MG/DL — SIGNIFICANT CHANGE UP (ref 2.5–4.5)
PLATELET # BLD AUTO: 106 K/UL — LOW (ref 150–400)
POTASSIUM SERPL-MCNC: 3.8 MMOL/L — SIGNIFICANT CHANGE UP (ref 3.5–5.3)
POTASSIUM SERPL-SCNC: 3.8 MMOL/L — SIGNIFICANT CHANGE UP (ref 3.5–5.3)
PROT SERPL-MCNC: 6.5 G/DL — SIGNIFICANT CHANGE UP (ref 6–8.3)
RBC # BLD: 2.7 M/UL — LOW (ref 3.8–5.2)
RBC # FLD: 13.8 % — SIGNIFICANT CHANGE UP (ref 10.3–14.5)
SODIUM SERPL-SCNC: 139 MMOL/L — SIGNIFICANT CHANGE UP (ref 135–145)
WBC # BLD: 6.5 K/UL — SIGNIFICANT CHANGE UP (ref 3.8–10.5)
WBC # FLD AUTO: 6.5 K/UL — SIGNIFICANT CHANGE UP (ref 3.8–10.5)

## 2017-10-06 PROCEDURE — 99233 SBSQ HOSP IP/OBS HIGH 50: CPT | Mod: GC

## 2017-10-06 PROCEDURE — 99291 CRITICAL CARE FIRST HOUR: CPT

## 2017-10-06 PROCEDURE — 99233 SBSQ HOSP IP/OBS HIGH 50: CPT

## 2017-10-06 RX ADMIN — Medication 3 MILLILITER(S): at 15:40

## 2017-10-06 RX ADMIN — Medication 3 MILLILITER(S): at 11:38

## 2017-10-06 RX ADMIN — HEPARIN SODIUM 5000 UNIT(S): 5000 INJECTION INTRAVENOUS; SUBCUTANEOUS at 05:38

## 2017-10-06 RX ADMIN — CLOPIDOGREL BISULFATE 75 MILLIGRAM(S): 75 TABLET, FILM COATED ORAL at 12:23

## 2017-10-06 RX ADMIN — Medication 12.5 MILLIGRAM(S): at 22:58

## 2017-10-06 RX ADMIN — Medication 3 MILLILITER(S): at 20:55

## 2017-10-06 RX ADMIN — HEPARIN SODIUM 5000 UNIT(S): 5000 INJECTION INTRAVENOUS; SUBCUTANEOUS at 23:08

## 2017-10-06 RX ADMIN — Medication 2: at 08:11

## 2017-10-06 RX ADMIN — NYSTATIN CREAM 1 APPLICATION(S): 100000 CREAM TOPICAL at 05:40

## 2017-10-06 RX ADMIN — PIPERACILLIN AND TAZOBACTAM 25 GRAM(S): 4; .5 INJECTION, POWDER, LYOPHILIZED, FOR SOLUTION INTRAVENOUS at 12:22

## 2017-10-06 RX ADMIN — Medication 100 MILLIGRAM(S): at 14:10

## 2017-10-06 RX ADMIN — SODIUM CHLORIDE 50 MILLILITER(S): 9 INJECTION, SOLUTION INTRAVENOUS at 06:00

## 2017-10-06 RX ADMIN — Medication 100 MILLIGRAM(S): at 22:58

## 2017-10-06 RX ADMIN — NYSTATIN CREAM 1 APPLICATION(S): 100000 CREAM TOPICAL at 12:24

## 2017-10-06 RX ADMIN — Medication 3 MILLILITER(S): at 23:09

## 2017-10-06 RX ADMIN — NYSTATIN CREAM 1 APPLICATION(S): 100000 CREAM TOPICAL at 18:05

## 2017-10-06 RX ADMIN — HEPARIN SODIUM 5000 UNIT(S): 5000 INJECTION INTRAVENOUS; SUBCUTANEOUS at 14:10

## 2017-10-06 RX ADMIN — Medication 100 MILLIGRAM(S): at 05:38

## 2017-10-06 RX ADMIN — SENNA PLUS 2 TABLET(S): 8.6 TABLET ORAL at 22:58

## 2017-10-06 RX ADMIN — Medication 2: at 18:04

## 2017-10-06 RX ADMIN — Medication 12.5 MILLIGRAM(S): at 05:38

## 2017-10-06 RX ADMIN — PIPERACILLIN AND TAZOBACTAM 25 GRAM(S): 4; .5 INJECTION, POWDER, LYOPHILIZED, FOR SOLUTION INTRAVENOUS at 22:57

## 2017-10-06 RX ADMIN — Medication 81 MILLIGRAM(S): at 12:23

## 2017-10-06 RX ADMIN — Medication 3 MILLILITER(S): at 04:41

## 2017-10-06 RX ADMIN — Medication 3 MILLILITER(S): at 00:47

## 2017-10-06 RX ADMIN — POLYETHYLENE GLYCOL 3350 17 GRAM(S): 17 POWDER, FOR SOLUTION ORAL at 18:04

## 2017-10-06 RX ADMIN — Medication 12.5 MILLIGRAM(S): at 14:10

## 2017-10-06 RX ADMIN — NYSTATIN CREAM 1 APPLICATION(S): 100000 CREAM TOPICAL at 23:24

## 2017-10-06 RX ADMIN — PANTOPRAZOLE SODIUM 40 MILLIGRAM(S): 20 TABLET, DELAYED RELEASE ORAL at 05:39

## 2017-10-06 RX ADMIN — Medication 2: at 12:22

## 2017-10-06 RX ADMIN — Medication 3 MILLILITER(S): at 07:45

## 2017-10-06 NOTE — PROGRESS NOTE ADULT - SUBJECTIVE AND OBJECTIVE BOX
[facility] - John Tariq, No, Nirav, [place], [place]  Office Number:  [phone]    Patient resting comfortably in bed in NAD.  Laying flat with no respiratory distress.  No complaints of chest pain, dyspnea, palpitations, PND, or orthopnea.  Comfortable in bed    ROS: negative unless otherwise mentioned.    Telemetry:  SR, no ectopy    MEDICATIONS  (STANDING):  ALBUTerol/ipratropium for Nebulization 3 milliLiter(s) Nebulizer every 4 hours  aspirin  chewable 81 milliGRAM(s) Oral daily  clopidogrel Tablet 75 milliGRAM(s) Oral daily  dextrose 5% + sodium chloride 0.45%. 1000 milliLiter(s) (50 mL/Hr) IV Continuous <Continuous>  dextrose 5%. 1000 milliLiter(s) (50 mL/Hr) IV Continuous <Continuous>  dextrose 50% Injectable 12.5 Gram(s) IV Push once  dextrose 50% Injectable 25 Gram(s) IV Push once  dextrose 50% Injectable 25 Gram(s) IV Push once  docusate sodium 100 milliGRAM(s) Oral three times a day  gabapentin 100 milliGRAM(s) Oral daily  heparin  Injectable 5000 Unit(s) SubCutaneous every 8 hours  influenza   Vaccine 0.5 milliLiter(s) IntraMuscular once  insulin lispro (HumaLOG) corrective regimen sliding scale   SubCutaneous Before meals and at bedtime  metoprolol 12.5 milliGRAM(s) Oral every 8 hours  nystatin Powder 1 Application(s) Topical every 6 hours  pantoprazole    Tablet 40 milliGRAM(s) Oral before breakfast  piperacillin/tazobactam IVPB. 3.375 Gram(s) IV Intermittent every 12 hours  senna 2 Tablet(s) Oral at bedtime    MEDICATIONS  (PRN):  dextrose Gel 1 Dose(s) Oral once PRN Blood Glucose LESS THAN 70 milliGRAM(s)/deciliter  glucagon  Injectable 1 milliGRAM(s) IntraMuscular once PRN Glucose LESS THAN 70 milligrams/deciliter  HYDROmorphone  Injectable 0.125 milliGRAM(s) IV Push every 4 hours PRN Severe Pain (7 - 10)  ondansetron Injectable 4 milliGRAM(s) IV Push every 6 hours PRN Nausea  polyethylene glycol 3350 17 Gram(s) Oral daily PRN Constipation      Allergies    No Known Allergies    Intolerances        Vital Signs Last 24 Hrs  T(C): 37.1 (06 Oct 2017 04:01), Max: 37.2 (05 Oct 2017 20:30)  T(F): 98.8 (06 Oct 2017 04:01), Max: 98.9 (05 Oct 2017 20:30)  HR: 58 (06 Oct 2017 07:00) (58 - 76)  BP: 150/67 (06 Oct 2017 07:00) (108/69 - 177/72)  BP(mean): 97 (06 Oct 2017 07:00) (81 - 112)  RR: 30 (06 Oct 2017 07:00) (15 - 40)  SpO2: 98% (06 Oct 2017 07:00) (91% - 100%)    I&O's Summary    05 Oct 2017 07:01  -  06 Oct 2017 07:00  --------------------------------------------------------  IN: 1905 mL / OUT: 1560 mL / NET: 345 mL        ON EXAM:    Constitutional: well-nourished, well-developed, moaning  HEENT:  MMM, sclerae anicteric, conjunctivae clear, no oral cyanosis.  Pulmonary: Non-labored, breath sounds are difficult to characterize given her moaning, no obvious wheezing, rales or rhonchi  Cardiovascular: Regular, S1 and S2.  No murmur.  No rubs, gallops or clicks  Gastrointestinal:  soft, nontender.   Lymph: trace peripheral edema.   Neurological: poorly responsive  Skin: No rashes.  Psych:  opens eyes and is responsive    LABS: All Labs Reviewed:                        7.5    6.5   )-----------( 106      ( 06 Oct 2017 05:33 )             24.8                         7.9    7.0   )-----------( 97       ( 05 Oct 2017 06:06 )             25.8                         7.3    5.8   )-----------( 80       ( 04 Oct 2017 06:41 )             23.8     06 Oct 2017 05:33    139    |  104    |  51     ----------------------------<  146    3.8     |  26     |  2.70   05 Oct 2017 06:06    138    |  103    |  61     ----------------------------<  137    3.9     |  25     |  3.30   04 Oct 2017 06:41    139    |  104    |  55     ----------------------------<  101    4.0     |  27     |  3.20     Ca    8.2        06 Oct 2017 05:33  Ca    8.4        05 Oct 2017 06:06  Ca    7.7        04 Oct 2017 06:41  Phos  4.0       06 Oct 2017 05:33  Phos  4.4       05 Oct 2017 06:06  Phos  4.1       04 Oct 2017 06:41  Mg     2.2       06 Oct 2017 05:33  Mg     2.2       05 Oct 2017 06:06  Mg     2.2       04 Oct 2017 06:41    TPro  6.5    /  Alb  2.3    /  TBili  0.8    /  DBili  x      /  AST  224    /  ALT  772    /  AlkPhos  99     06 Oct 2017 05:33  TPro  6.8    /  Alb  2.5    /  TBili  0.9    /  DBili  x      /  AST  466    /  ALT  1069   /  AlkPhos  107    05 Oct 2017 06:06  TPro  6.2    /  Alb  2.4    /  TBili  1.1    /  DBili  x      /  AST  852    /  ALT  1304   /  AlkPhos  98     04 Oct 2017 06:41          Blood Culture: Organism --  Gram Stain Blood -- Gram Stain --  Specimen Source .Urine Catheterized  Culture-Blood --

## 2017-10-06 NOTE — PROGRESS NOTE ADULT - ASSESSMENT
89yo F w/ PMHx DM2, asthma, HTN, iron deficiency anemia, COPD w/ intermittent use of home O2, severe sepsis secondary to pneumonia, MSOF, NSTEMI, overall normal LV function, enzymes downtrended.    - TTE reviewed, grossly preserved LV function  - Continue aspirin, clopidogrel for now. Watch Hb  - Cont DVT prophylaxis  - cont metoprolol 12.5 mg po q8hr, HR in 50's will limit its titration  - No statin drug secondary to shock liver, which is resolving.  Would start when this resolves.    - Still net positive (+345) with mild edema. Would try to keep I=O  - BP elevated this morning, will need to add po medications if remains this high. Avoid ace/arb in the setting of improving SAMIA  - Monitor and replete electrolytes. Keep K>4.0 and Mg>2.0.   - No arrhythmia on the monitor  - Not a candidate for coronary angiography  - Abx   - dvt prophylaxis  - trend creatinine and hgb in setting of samia and anemia  - Further cardiac workup will depend on clinical course.     The patient is at risk of abrupt decompensation.  35 minutes of critical care time was spent with this patient.

## 2017-10-06 NOTE — PROGRESS NOTE ADULT - SUBJECTIVE AND OBJECTIVE BOX
24 hour events:     Review of Systems:  Unable to obtain 2/2 mental status    T(F): 98.6 (10-06-17 @ 07:23), Max: 98.9 (10-05-17 @ 20:30)  HR: 58 (10-06-17 @ 07:00) (58 - 76)  BP: 150/67 (10-06-17 @ 07:00) (108/69 - 177/72)  RR: 30 (10-06-17 @ 07:00) (15 - 40)  SpO2: 98% (10-06-17 @ 07:00) (91% - 100%)  Wt(kg): --        CAPILLARY BLOOD GLUCOSE  175 (05 Oct 2017 22:00)          I&O's Summary    05 Oct 2017 07:01  -  06 Oct 2017 07:00  --------------------------------------------------------  IN: 1905 mL / OUT: 1560 mL / NET: 345 mL        Physical Exam:   Gen: NAD, lethargic  Neuro: AAOx2  HEENT: NC/AT  Resp: Coarse, diminished bs b/l  CVS: RRR, +s1/s2  Abd: Soft, NT/ND, +BS  Ext: No edema, + pulses  Skin: Warm, well perfused    Meds:  piperacillin/tazobactam IVPB. IV Intermittent    metoprolol Oral    dextrose 50% Injectable IV Push  dextrose 50% Injectable IV Push  dextrose 50% Injectable IV Push  dextrose Gel Oral PRN  glucagon  Injectable IntraMuscular PRN  insulin lispro (HumaLOG) corrective regimen sliding scale SubCutaneous    ALBUTerol/ipratropium for Nebulization Nebulizer    gabapentin Oral  HYDROmorphone  Injectable IV Push PRN  ondansetron Injectable IV Push PRN      aspirin  chewable Oral  clopidogrel Tablet Oral  heparin  Injectable SubCutaneous    docusate sodium Oral  pantoprazole    Tablet Oral  polyethylene glycol 3350 Oral PRN  senna Oral      dextrose 5% + sodium chloride 0.45%. IV Continuous  dextrose 5%. IV Continuous    influenza   Vaccine IntraMuscular    nystatin Powder Topical                              7.5    6.5   )-----------( 106      ( 06 Oct 2017 05:33 )             24.8       10-06    139  |  104  |  51<H>  ----------------------------<  146<H>  3.8   |  26  |  2.70<H>    Ca    8.2<L>      06 Oct 2017 05:33  Phos  4.0     10-06  Mg     2.2     10-06    TPro  6.5  /  Alb  2.3<L>  /  TBili  0.8  /  DBili  x   /  AST  224<H>  /  ALT  772<H>  /  AlkPhos  99  10-06              .Urine Catheterized   No growth -- 10-01 @ 11:11          CENTRAL LINE: N  GRIGGS: Y           REMOVE: N  A-LINE: N    GLOBAL ISSUE/BEST PRACTICE:  Analgesia: Y  Sedation: N  CAM-ICU: N  HOB elevation: yes  Stress ulcer prophylaxis: Y  VTE prophylaxis: Y  Glycemic control: Y  Nutrition: Y    CODE STATUS: DNR/DNI 24 hour events: No overnight events. Pt comfortable this AM, tolerating diet.     Review of Systems:  Unable to obtain 2/2 mental status    T(F): 98.6 (10-06-17 @ 07:23), Max: 98.9 (10-05-17 @ 20:30)  HR: 58 (10-06-17 @ 07:00) (58 - 76)  BP: 150/67 (10-06-17 @ 07:00) (108/69 - 177/72)  RR: 30 (10-06-17 @ 07:00) (15 - 40)  SpO2: 98% (10-06-17 @ 07:00) (91% - 100%)  Wt(kg): --        CAPILLARY BLOOD GLUCOSE  175 (05 Oct 2017 22:00)          I&O's Summary    05 Oct 2017 07:01  -  06 Oct 2017 07:00  --------------------------------------------------------  IN: 1905 mL / OUT: 1560 mL / NET: 345 mL        Physical Exam:   Gen: NAD, lethargic  Neuro: AAOx2  HEENT: NC/AT  Resp: Wheezes, diminished bs b/l  CVS: RRR, +s1/s2  Abd: Soft, NT/ND, +BS  Ext: No edema, + pulses  Skin: Warm, well perfused    Meds:  piperacillin/tazobactam IVPB. IV Intermittent    metoprolol Oral    dextrose 50% Injectable IV Push  dextrose 50% Injectable IV Push  dextrose 50% Injectable IV Push  dextrose Gel Oral PRN  glucagon  Injectable IntraMuscular PRN  insulin lispro (HumaLOG) corrective regimen sliding scale SubCutaneous    ALBUTerol/ipratropium for Nebulization Nebulizer    gabapentin Oral  HYDROmorphone  Injectable IV Push PRN  ondansetron Injectable IV Push PRN      aspirin  chewable Oral  clopidogrel Tablet Oral  heparin  Injectable SubCutaneous    docusate sodium Oral  pantoprazole    Tablet Oral  polyethylene glycol 3350 Oral PRN  senna Oral      dextrose 5% + sodium chloride 0.45%. IV Continuous  dextrose 5%. IV Continuous    influenza   Vaccine IntraMuscular    nystatin Powder Topical                              7.5    6.5   )-----------( 106      ( 06 Oct 2017 05:33 )             24.8       10-06    139  |  104  |  51<H>  ----------------------------<  146<H>  3.8   |  26  |  2.70<H>    Ca    8.2<L>      06 Oct 2017 05:33  Phos  4.0     10-06  Mg     2.2     10-06    TPro  6.5  /  Alb  2.3<L>  /  TBili  0.8  /  DBili  x   /  AST  224<H>  /  ALT  772<H>  /  AlkPhos  99  10-06              .Urine Catheterized   No growth -- 10-01 @ 11:11          CENTRAL LINE: N  GRIGGS: Y           REMOVE: N  A-LINE: N    GLOBAL ISSUE/BEST PRACTICE:  Analgesia: Y  Sedation: N  CAM-ICU: N  HOB elevation: yes  Stress ulcer prophylaxis: Y  VTE prophylaxis: Y  Glycemic control: Y  Nutrition: Y    CODE STATUS: DNR/DNI 24 hour events: No overnight events. Pt comfortable this AM, tolerating diet.     Review of Systems:  Unable to obtain 2/2 mental status    T(F): 98.6 (10-06-17 @ 07:23), Max: 98.9 (10-05-17 @ 20:30)  HR: 58 (10-06-17 @ 07:00) (58 - 76)  BP: 150/67 (10-06-17 @ 07:00) (108/69 - 177/72)  RR: 30 (10-06-17 @ 07:00) (15 - 40)  SpO2: 98% (10-06-17 @ 07:00) (91% - 100%)  Wt(kg): --        CAPILLARY BLOOD GLUCOSE  175 (05 Oct 2017 22:00)          I&O's Summary    05 Oct 2017 07:01  -  06 Oct 2017 07:00  --------------------------------------------------------  IN: 1905 mL / OUT: 1560 mL / NET: 345 mL        Physical Exam:   Gen: NAD, lethargic  Neuro: AAOx2  HEENT: NC/AT  Resp: Wheezes, diminished bs b/l  CVS: RRR, +s1/s2  Abd: Soft, NT/ND, +BS  Ext: No edema, + pulses  Skin: Warm, well perfused    Meds:  piperacillin/tazobactam IVPB. IV Intermittent    metoprolol Oral    dextrose 50% Injectable IV Push  dextrose 50% Injectable IV Push  dextrose 50% Injectable IV Push  dextrose Gel Oral PRN  glucagon  Injectable IntraMuscular PRN  insulin lispro (HumaLOG) corrective regimen sliding scale SubCutaneous    ALBUTerol/ipratropium for Nebulization Nebulizer    gabapentin Oral  HYDROmorphone  Injectable IV Push PRN  ondansetron Injectable IV Push PRN      aspirin  chewable Oral  clopidogrel Tablet Oral  heparin  Injectable SubCutaneous    docusate sodium Oral  pantoprazole    Tablet Oral  polyethylene glycol 3350 Oral PRN  senna Oral      dextrose 5% + sodium chloride 0.45%. IV Continuous  dextrose 5%. IV Continuous    influenza   Vaccine IntraMuscular    nystatin Powder Topical                              7.5    6.5   )-----------( 106      ( 06 Oct 2017 05:33 )             24.8       10-06    139  |  104  |  51<H>  ----------------------------<  146<H>  3.8   |  26  |  2.70<H>    Ca    8.2<L>      06 Oct 2017 05:33  Phos  4.0     10-06  Mg     2.2     10-06    TPro  6.5  /  Alb  2.3<L>  /  TBili  0.8  /  DBili  x   /  AST  224<H>  /  ALT  772<H>  /  AlkPhos  99  10-06    .Urine Catheterized   No growth -- 10-01 @ 11:11      Legionella pneumophila Antigen, Urine (10.01.17 @ 14:03)    Legionella Antigen, Urine: Negative    bedside US--Bline predominant on R, A line predominant on L    CENTRAL LINE: N  GRIGGS: Y           REMOVE: N  A-LINE: N    GLOBAL ISSUE/BEST PRACTICE:  Analgesia: Y  Sedation: N  CAM-ICU: N  HOB elevation: yes  Stress ulcer prophylaxis: Y  VTE prophylaxis: Y  Glycemic control: Y  Nutrition: Y    CODE STATUS: DNR/DNI

## 2017-10-06 NOTE — PROGRESS NOTE ADULT - ASSESSMENT
A/P: 88F PMH HTN, HLD, DM2, COPD on home oxygen, & chronic hypercapnic respiratory failure presents with septic shock with acute on chronic hypoxic and hypercapnic respiratory failure, NSTEMI, shock liver, SAMIA with oliguria, and coagulopathy. Found to have RLL CAP/aspiration pna + GBS UTI.    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 Found to have RLL CAP/aspiration pna + GBS UTI.    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

## 2017-10-06 NOTE — PROGRESS NOTE ADULT - ATTENDING COMMENTS
88F PMH HTN, HLD, DM2, COPD on home oxygen, chronically bedbound, & chronic hypercapnic respiratory failure presents with resolving sepsis and multiorgan failure from aspiration pneumonia.    --encephalopathy improving per son at bedside this am, ?component of delirium  --htn, demand ischemia, continue ASA, plavix, BB  --COPD, continue bronchodilators  BiPAP qHS and prn for chronic hypercapnic respiratory failure  --SAMIA from ATN improving, cont IVF  --shock liver improving  --complete course of zosyn for aspiration pneumonia  --dysphagia, tolerating dysphagia 1 diet  --stable for tele  --DNR, DNI  --plan discussed with son at bedside

## 2017-10-06 NOTE — PROGRESS NOTE ADULT - SUBJECTIVE AND OBJECTIVE BOX
Patient is a 88y old  Female who presents with a chief complaint of found unresponsive (30 Sep 2017 15:29). Admitted for septic shock  Improving over all.     INTERVAL HPI: Pt seen and examined with many members of family bed side. Pt is lethargic. on O2 NC  OVERNIGHT EVENTS:  ICU Vital Signs Last 24 Hrs  T(C): 37.1 (06 Oct 2017 15:15), Max: 37.2 (05 Oct 2017 20:30)  T(F): 98.7 (06 Oct 2017 15:15), Max: 98.9 (05 Oct 2017 20:30)  HR: 69 (06 Oct 2017 19:00) (58 - 76)  BP: 177/74 (06 Oct 2017 16:00) (127/86 - 177/74)  BP(mean): 106 (06 Oct 2017 16:00) (82 - 106)  ABP: --  ABP(mean): --  RR: 33 (06 Oct 2017 19:00) (15 - 40)  SpO2: 98% (06 Oct 2017 19:00) (91% - 100%)        REVIEW OF SYSTEM:    UTO as pt is lethargic and non-communicative.    PHYSICAL EXAM:  GENERAL: NAD, well-developed  HEAD:  Atraumatic, Normocephalic  NECK: Supple, No JVD, Normal thyroid  HEART: Regular rate and rhythm; No murmurs, rubs, or gallops  RESPIRATORY: CTA B/L, No wheezing / rhonchi  ABDOMEN: Soft, Nontender, Nondistended; Bowel sounds present  NEUROLOGY: A&Ox1, Moving all extremities  EXTREMITIES:  2+ Peripheral Pulses, No clubbing, cyanosis, +edema LE  SKIN: warm, dry, normal color, no rash or abnormal lesions        LABS:                        7.5    6.5   )-----------( 106      ( 06 Oct 2017 05:33 )             24.8     06 Oct 2017 05:33    139    |  104    |  51     ----------------------------<  146    3.8     |  26     |  2.70     Ca    8.2        06 Oct 2017 05:33  Phos  4.0       06 Oct 2017 05:33  Mg     2.2       06 Oct 2017 05:33    TPro  6.5    /  Alb  2.3    /  TBili  0.8    /  DBili  x      /  AST  224    /  ALT  772    /  AlkPhos  99     06 Oct 2017 05:33        CAPILLARY BLOOD GLUCOSE  187 (06 Oct 2017 16:00)  165 (06 Oct 2017 12:00)  157 (06 Oct 2017 07:00)  175 (05 Oct 2017 22:00)        UCx       RADIOLOGY & ADDITIONAL TESTS:                MEDICATIONS  (STANDING):  ALBUTerol/ipratropium for Nebulization 3 milliLiter(s) Nebulizer every 4 hours  aspirin  chewable 81 milliGRAM(s) Oral daily  clopidogrel Tablet 75 milliGRAM(s) Oral daily  dextrose 5% + sodium chloride 0.45%. 1000 milliLiter(s) (50 mL/Hr) IV Continuous <Continuous>  dextrose 5%. 1000 milliLiter(s) (50 mL/Hr) IV Continuous <Continuous>  dextrose 50% Injectable 12.5 Gram(s) IV Push once  dextrose 50% Injectable 25 Gram(s) IV Push once  dextrose 50% Injectable 25 Gram(s) IV Push once  gabapentin 100 milliGRAM(s) Oral daily  heparin  Injectable 5000 Unit(s) SubCutaneous every 8 hours  influenza   Vaccine 0.5 milliLiter(s) IntraMuscular once  insulin lispro (HumaLOG) corrective regimen sliding scale   SubCutaneous Before meals and at bedtime  metoprolol 12.5 milliGRAM(s) Oral every 8 hours  nystatin Powder 1 Application(s) Topical every 6 hours  pantoprazole    Tablet 40 milliGRAM(s) Oral before breakfast  piperacillin/tazobactam IVPB. 3.375 Gram(s) IV Intermittent every 12 hours    MEDICATIONS  (PRN):  dextrose Gel 1 Dose(s) Oral once PRN Blood Glucose LESS THAN 70 milliGRAM(s)/deciliter  glucagon  Injectable 1 milliGRAM(s) IntraMuscular once PRN Glucose LESS THAN 70 milligrams/deciliter  HYDROmorphone  Injectable 0.125 milliGRAM(s) IV Push every 4 hours PRN Severe Pain (7 - 10)  ondansetron Injectable 4 milliGRAM(s) IV Push every 6 hours PRN Nausea

## 2017-10-06 NOTE — PROGRESS NOTE ADULT - ASSESSMENT
·	SAMIA: Oliguric ATN, ARB rx  ·	Shock, Sepsis  ·	Shock liver  ·	Respiratory falure on BIPAP  ·	Respiratory and metabolic acidosis  ·	Anemia    Improving renal function. UO better. Will monitor on current meds. Encourage PO intake as tolerated. Overall prognosis poor. Supportive care. Cardiology / pulmonary follow up. Will follow electrolytes and renal function trend. Strict I & O. Monitor h/h. Transfuse PRN. Monitor fluid status closely. D/w family at bedside.

## 2017-10-06 NOTE — PROGRESS NOTE ADULT - SUBJECTIVE AND OBJECTIVE BOX
Patient is a 88y old  Female who presents with a chief complaint of found unresponsive (30 Sep 2017 15:29)      Patient seen in follow up for SAMIA, ATN. Family at bedside. Improving renal function.     PAST MEDICAL HISTORY:  Asthma  Diabetes mellitus  HTN (hypertension)    MEDICATIONS  (STANDING):  ALBUTerol/ipratropium for Nebulization 3 milliLiter(s) Nebulizer every 4 hours  aspirin  chewable 81 milliGRAM(s) Oral daily  clopidogrel Tablet 75 milliGRAM(s) Oral daily  dextrose 5% + sodium chloride 0.45%. 1000 milliLiter(s) (50 mL/Hr) IV Continuous <Continuous>  dextrose 5%. 1000 milliLiter(s) (50 mL/Hr) IV Continuous <Continuous>  dextrose 50% Injectable 12.5 Gram(s) IV Push once  dextrose 50% Injectable 25 Gram(s) IV Push once  dextrose 50% Injectable 25 Gram(s) IV Push once  docusate sodium 100 milliGRAM(s) Oral three times a day  gabapentin 100 milliGRAM(s) Oral daily  heparin  Injectable 5000 Unit(s) SubCutaneous every 8 hours  influenza   Vaccine 0.5 milliLiter(s) IntraMuscular once  insulin lispro (HumaLOG) corrective regimen sliding scale   SubCutaneous Before meals and at bedtime  metoprolol 12.5 milliGRAM(s) Oral every 8 hours  nystatin Powder 1 Application(s) Topical every 6 hours  pantoprazole    Tablet 40 milliGRAM(s) Oral before breakfast  piperacillin/tazobactam IVPB. 3.375 Gram(s) IV Intermittent every 12 hours  senna 2 Tablet(s) Oral at bedtime    MEDICATIONS  (PRN):  dextrose Gel 1 Dose(s) Oral once PRN Blood Glucose LESS THAN 70 milliGRAM(s)/deciliter  glucagon  Injectable 1 milliGRAM(s) IntraMuscular once PRN Glucose LESS THAN 70 milligrams/deciliter  HYDROmorphone  Injectable 0.125 milliGRAM(s) IV Push every 4 hours PRN Severe Pain (7 - 10)  ondansetron Injectable 4 milliGRAM(s) IV Push every 6 hours PRN Nausea  polyethylene glycol 3350 17 Gram(s) Oral daily PRN Constipation    T(C): 37.1 (10-06-17 @ 11:41), Max: 37.2 (10-04-17 @ 20:00)  HR: 60 (10-06-17 @ 11:43) (58 - 76)  BP: 143/63 (10-06-17 @ 08:00) (104/50 - 177/72)  RR: 31 (10-06-17 @ 10:01)  SpO2: 100% (10-06-17 @ 11:43)  Wt(kg): --  I&O's Detail    05 Oct 2017 07:01  -  06 Oct 2017 07:00  --------------------------------------------------------  IN:    dextrose 5% + sodium chloride 0.45%.: 1200 mL    Lactated Ringers IV Bolus: 250 mL    Oral Fluid: 255 mL    Solution: 200 mL  Total IN: 1905 mL    OUT:    Indwelling Catheter - Urethral: 1560 mL  Total OUT: 1560 mL    Total NET: 345 mL      06 Oct 2017 07:01  -  06 Oct 2017 12:07  --------------------------------------------------------  IN:    dextrose 5% + sodium chloride 0.45%.: 200 mL    Oral Fluid: 200 mL  Total IN: 400 mL    OUT:    Indwelling Catheter - Urethral: 210 mL  Total OUT: 210 mL    Total NET: 190 mL        PHYSICAL EXAM:  General: NAD  Respiratory: b/l air entry, decreased breath sounds  Cardiovascular: S1 S2  Gastrointestinal: soft  Extremities:  edema          LABORATORY:                        7.5    6.5   )-----------( 106      ( 06 Oct 2017 05:33 )             24.8     10-06    139  |  104  |  51<H>  ----------------------------<  146<H>  3.8   |  26  |  2.70<H>    Ca    8.2<L>      06 Oct 2017 05:33  Phos  4.0     10-06  Mg     2.2     10-06    TPro  6.5  /  Alb  2.3<L>  /  TBili  0.8  /  DBili  x   /  AST  224<H>  /  ALT  772<H>  /  AlkPhos  99  10-06    Sodium, Serum: 139 mmol/L (10-06 @ 05:33)  Sodium, Serum: 138 mmol/L (10-05 @ 06:06)    Potassium, Serum: 3.8 mmol/L (10-06 @ 05:33)  Potassium, Serum: 3.9 mmol/L (10-05 @ 06:06)    Hemoglobin: 7.5 g/dL (10-06 @ 05:33)  Hemoglobin: 7.9 g/dL (10-05 @ 06:06)  Hemoglobin: 7.3 g/dL (10-04 @ 06:41)    Creatinine, Serum 2.70 (10-06 @ 05:33)  Creatinine, Serum 3.30 (10-05 @ 06:06)  Creatinine, Serum 3.20 (10-04 @ 06:41)        LIVER FUNCTIONS - ( 06 Oct 2017 05:33 )  Alb: 2.3 g/dL / Pro: 6.5 g/dL / ALK PHOS: 99 U/L / ALT: 772 U/L / AST: 224 U/L / GGT: x             ABG - ( 04 Oct 2017 15:05 )  pH: 7.32  /  pCO2: 50    /  pO2: 55    / HCO3: 24    / Base Excess: -0.6  /  SaO2: 85

## 2017-10-07 LAB
ABO RH CONFIRMATION: SIGNIFICANT CHANGE UP
ALBUMIN SERPL ELPH-MCNC: 2.3 G/DL — LOW (ref 3.3–5)
ALP SERPL-CCNC: 99 U/L — SIGNIFICANT CHANGE UP (ref 40–120)
ALT FLD-CCNC: 606 U/L — HIGH (ref 12–78)
ANION GAP SERPL CALC-SCNC: 10 MMOL/L — SIGNIFICANT CHANGE UP (ref 5–17)
AST SERPL-CCNC: 131 U/L — HIGH (ref 15–37)
BILIRUB SERPL-MCNC: 0.8 MG/DL — SIGNIFICANT CHANGE UP (ref 0.2–1.2)
BUN SERPL-MCNC: 49 MG/DL — HIGH (ref 7–23)
CALCIUM SERPL-MCNC: 8.4 MG/DL — LOW (ref 8.5–10.1)
CHLORIDE SERPL-SCNC: 103 MMOL/L — SIGNIFICANT CHANGE UP (ref 96–108)
CO2 SERPL-SCNC: 26 MMOL/L — SIGNIFICANT CHANGE UP (ref 22–31)
CREAT SERPL-MCNC: 2.5 MG/DL — HIGH (ref 0.5–1.3)
GLUCOSE SERPL-MCNC: 141 MG/DL — HIGH (ref 70–99)
HCT VFR BLD CALC: 23.6 % — LOW (ref 34.5–45)
HGB BLD-MCNC: 7.3 G/DL — LOW (ref 11.5–15.5)
MAGNESIUM SERPL-MCNC: 2.2 MG/DL — SIGNIFICANT CHANGE UP (ref 1.6–2.6)
MCHC RBC-ENTMCNC: 28.5 PG — SIGNIFICANT CHANGE UP (ref 27–34)
MCHC RBC-ENTMCNC: 30.8 GM/DL — LOW (ref 32–36)
MCV RBC AUTO: 92.6 FL — SIGNIFICANT CHANGE UP (ref 80–100)
PHOSPHATE SERPL-MCNC: 3.7 MG/DL — SIGNIFICANT CHANGE UP (ref 2.5–4.5)
PLATELET # BLD AUTO: 87 K/UL — LOW (ref 150–400)
POTASSIUM SERPL-MCNC: 3.6 MMOL/L — SIGNIFICANT CHANGE UP (ref 3.5–5.3)
POTASSIUM SERPL-SCNC: 3.6 MMOL/L — SIGNIFICANT CHANGE UP (ref 3.5–5.3)
PROT SERPL-MCNC: 6.5 G/DL — SIGNIFICANT CHANGE UP (ref 6–8.3)
RBC # BLD: 2.55 M/UL — LOW (ref 3.8–5.2)
RBC # FLD: 13.6 % — SIGNIFICANT CHANGE UP (ref 10.3–14.5)
SODIUM SERPL-SCNC: 139 MMOL/L — SIGNIFICANT CHANGE UP (ref 135–145)
WBC # BLD: 5.8 K/UL — SIGNIFICANT CHANGE UP (ref 3.8–10.5)
WBC # FLD AUTO: 5.8 K/UL — SIGNIFICANT CHANGE UP (ref 3.8–10.5)

## 2017-10-07 PROCEDURE — 99233 SBSQ HOSP IP/OBS HIGH 50: CPT

## 2017-10-07 RX ORDER — LIDOCAINE 4 G/100G
1 CREAM TOPICAL DAILY
Qty: 0 | Refills: 0 | Status: DISCONTINUED | OUTPATIENT
Start: 2017-10-07 | End: 2017-10-11

## 2017-10-07 RX ADMIN — Medication 81 MILLIGRAM(S): at 11:43

## 2017-10-07 RX ADMIN — Medication 12.5 MILLIGRAM(S): at 06:34

## 2017-10-07 RX ADMIN — Medication 100 MILLIGRAM(S): at 15:47

## 2017-10-07 RX ADMIN — GABAPENTIN 100 MILLIGRAM(S): 400 CAPSULE ORAL at 11:43

## 2017-10-07 RX ADMIN — NYSTATIN CREAM 1 APPLICATION(S): 100000 CREAM TOPICAL at 11:44

## 2017-10-07 RX ADMIN — CLOPIDOGREL BISULFATE 75 MILLIGRAM(S): 75 TABLET, FILM COATED ORAL at 11:43

## 2017-10-07 RX ADMIN — Medication 12.5 MILLIGRAM(S): at 22:13

## 2017-10-07 RX ADMIN — Medication 3 MILLILITER(S): at 15:00

## 2017-10-07 RX ADMIN — Medication 2: at 11:43

## 2017-10-07 RX ADMIN — PANTOPRAZOLE SODIUM 40 MILLIGRAM(S): 20 TABLET, DELAYED RELEASE ORAL at 06:34

## 2017-10-07 RX ADMIN — HEPARIN SODIUM 5000 UNIT(S): 5000 INJECTION INTRAVENOUS; SUBCUTANEOUS at 06:34

## 2017-10-07 RX ADMIN — Medication 100 MILLIGRAM(S): at 22:10

## 2017-10-07 RX ADMIN — Medication 2: at 22:14

## 2017-10-07 RX ADMIN — Medication 2: at 08:14

## 2017-10-07 RX ADMIN — NYSTATIN CREAM 1 APPLICATION(S): 100000 CREAM TOPICAL at 18:05

## 2017-10-07 RX ADMIN — NYSTATIN CREAM 1 APPLICATION(S): 100000 CREAM TOPICAL at 23:54

## 2017-10-07 RX ADMIN — NYSTATIN CREAM 1 APPLICATION(S): 100000 CREAM TOPICAL at 06:35

## 2017-10-07 RX ADMIN — Medication 3 MILLILITER(S): at 23:55

## 2017-10-07 RX ADMIN — Medication 3 MILLILITER(S): at 19:50

## 2017-10-07 RX ADMIN — Medication 12.5 MILLIGRAM(S): at 15:47

## 2017-10-07 RX ADMIN — HEPARIN SODIUM 5000 UNIT(S): 5000 INJECTION INTRAVENOUS; SUBCUTANEOUS at 22:10

## 2017-10-07 RX ADMIN — Medication 3 MILLILITER(S): at 03:08

## 2017-10-07 RX ADMIN — Medication 100 MILLIGRAM(S): at 06:34

## 2017-10-07 RX ADMIN — HEPARIN SODIUM 5000 UNIT(S): 5000 INJECTION INTRAVENOUS; SUBCUTANEOUS at 15:47

## 2017-10-07 RX ADMIN — Medication 3 MILLILITER(S): at 11:01

## 2017-10-07 RX ADMIN — LIDOCAINE 1 PATCH: 4 CREAM TOPICAL at 22:10

## 2017-10-07 RX ADMIN — PIPERACILLIN AND TAZOBACTAM 25 GRAM(S): 4; .5 INJECTION, POWDER, LYOPHILIZED, FOR SOLUTION INTRAVENOUS at 11:43

## 2017-10-07 RX ADMIN — SENNA PLUS 2 TABLET(S): 8.6 TABLET ORAL at 22:10

## 2017-10-07 NOTE — PROGRESS NOTE ADULT - ASSESSMENT
89yo F w/ PMHx DM2, asthma, HTN, iron deficiency anemia, COPD w/ intermittent use of home O2, severe sepsis secondary to pneumonia, MSOF, NSTEMI, overall normal LV function, enzymes downtrended.    - TTE reviewed, grossly preserved LV function  - Continue aspirin, clopidogrel for now. Watch Hb  - Cont DVT prophylaxis  - cont metoprolol 12.5 mg po q8hr, HR in 50's will limit its titration  - No statin drug secondary to shock liver, which is resolving.  Would start when this resolves.    - Still net positive (+760) with mild edema. Would try to keep I=O.   - BP elevated this morning, will need to add po medications if remains this high. Avoid ace/arb in the setting of improving SAMIA. Can add norvasc 5mg po daily or hydralazine 10mg po tid.  - Monitor and replete electrolytes. Keep K>4.0 and Mg>2.0.   - No arrhythmia on the monitor  - Not a candidate for coronary angiography  - Abx   - dvt prophylaxis  - trend creatinine and hgb in setting of samia and anemia  - Further cardiac workup will depend on clinical course.

## 2017-10-07 NOTE — PROGRESS NOTE ADULT - SUBJECTIVE AND OBJECTIVE BOX
INTERVAL HPI/OVERNIGHT EVENTS: unchanged clinically  HPI:  89yo F w/ PMHx DM2, asthma, HTN, iron deficiency anemia, COPD w/ intermittent use of home O2 presents w/ weakness and fatigue. pt is nonverbal at this time and unable to provide any history. She developed a fever last night and the family admits mild cough which was nonproductive. 3-4 days prior to presentation the pt was complaining of mild abd discomfort but was tolerating po well. Family denies dysphagia. They stated that she had an episode of bilious vomiting 4 days ago but none since. She was tolerating diet yesterday without difficulty. Last night developed rectal temp of 102F but denied diaphoresis, chills, rigors. Upon waking this morning, the pt was obtunded. Responding only to noxious stimuli so EMS was alerted. No other recent travel. No recent hospitalizations. No other recent changes in her overall health.  Recnetly her DM2 regimen was decreased due to normal glucose levels. Also, her recent creatinine was 1.1 as per family at the bedside. Family members have medical background.       MEDICATIONS  (STANDING):  dextrose 5%. 1000 milliLiter(s) (50 mL/Hr) IV Continuous <Continuous>  dextrose 50% Injectable 12.5 Gram(s) IV Push once  influenza   Vaccine 0.5 milliLiter(s) IntraMuscular once  piperacillin/tazobactam IVPB. 3.375 Gram(s) IV Intermittent every 12 hours  ALBUTerol/ipratropium for Nebulization 3 milliLiter(s) Nebulizer every 4 hours  pantoprazole  Injectable 40 milliGRAM(s) IV Push daily  aspirin Suppository 300 milliGRAM(s) Rectal daily  heparin  Injectable 5000 Unit(s) SubCutaneous every 12 hours  nystatin Powder 1 Application(s) Topical every 6 hours  insulin lispro (HumaLOG) corrective regimen sliding scale   SubCutaneous every 6 hours  dextrose 5%. 1000 milliLiter(s) (50 mL/Hr) IV Continuous <Continuous>  dextrose 50% Injectable 12.5 Gram(s) IV Push once  dextrose 50% Injectable 25 Gram(s) IV Push once  dextrose 50% Injectable 25 Gram(s) IV Push once  dextrose 5% + sodium chloride 0.45% 1000 milliLiter(s) (50 mL/Hr) IV Continuous <Continuous>  azithromycin  IVPB      albumin human 25% IVPB 50 milliLiter(s) IV Intermittent every 8 hours    MEDICATIONS  (PRN):  ondansetron Injectable 4 milliGRAM(s) IV Push every 6 hours PRN Nausea  dextrose Gel 1 Dose(s) Oral once PRN Blood Glucose LESS THAN 70 milliGRAM(s)/deciliter  glucagon  Injectable 1 milliGRAM(s) IntraMuscular once PRN Glucose LESS THAN 70 milligrams/deciliter  acetaminophen  Suppository. 650 milliGRAM(s) Rectal every 4 hours PRN Mild Pain (1 - 3)  dextrose Gel 1 Dose(s) Oral once PRN Blood Glucose LESS THAN 70 milliGRAM(s)/deciliter  glucagon  Injectable 1 milliGRAM(s) IntraMuscular once PRN Glucose LESS THAN 70 milligrams/deciliter      Allergies    No Known Allergies    Intolerances          General:  No wt loss, fevers, chills, night sweats, fatigue   Eyes:  Good vision, no reported pain  ENT:  No sore throat, pain, runny nose, dysphagia  CV:  No pain, palpitations, hypo/hypertension  Resp:  No dyspnea, cough, tachypnea, wheezing  GI:  No pain, No nausea, No vomiting, No diarrhea, No constipation, No weight loss, No fever, No pruritis, No rectal bleeding, No tarry stools, No dysphagia,  :  No pain, bleeding, incontinence, nocturia  Muscle:  No pain, weakness  Neuro:  RLE pain  Psych:  No fatigue, insomnia, mood problems, depression  Endocrine:  No polyuria, polydipsia, cold/heat intolerance  Heme:  No petechiae, ecchymosis, easy bruisability  Skin:  No rash, tattoos, scars, edema        PHYSICAL EXAM:   Vital Signs:  Vital Signs Last 24 Hrs  T(C): 36.6 (01 Oct 2017 12:00), Max: 36.6 (30 Sep 2017 22:45)  T(F): 97.9 (01 Oct 2017 12:00), Max: 97.9 (01 Oct 2017 12:00)  HR: 66 (01 Oct 2017 14:00) (59 - 79)  BP: 116/56 (01 Oct 2017 14:00) (84/42 - 146/61)  BP(mean): 80 (01 Oct 2017 14:00) (69 - 88)  RR: 16 (01 Oct 2017 14:00) (10 - 32)  SpO2: 100% (01 Oct 2017 14:00) (95% - 100%)  Daily     Daily Weight in k (01 Oct 2017 13:11)I&O's Summary    30 Sep 2017 07:01  -  01 Oct 2017 07:00  --------------------------------------------------------  IN: 925 mL / OUT: 55 mL / NET: 870 mL    01 Oct 2017 07:  -  01 Oct 2017 14:18  --------------------------------------------------------  IN: 395 mL / OUT: 45 mL / NET: 350 mL        GENERAL:  AAOx2, follows commands, able to communicate verbally   HEENT:  NC/AT,  conjunctivae clear and pink, no thyromegaly, nodules, adenopathy, no JVD, sclera -anicteric  CHEST:  decreased BS at bases  HEART:  Regular rhythm, S1, S2, no murmur/rub/S3/S4, no abdominal bruit, no edema  ABDOMEN:  Soft, non-tender, non-distended, normoactive bowel sounds,  no masses ,no hepato-splenomegaly, no signs of chronic liver disease  EXTEREMITIES:  no cyanosis,clubbing or edema  SKIN:  No rash/erythema/ecchymoses/petechiae/wounds/abscess/warm/dry  NEURO:  responsive to painful stimuli, does not follow commands       LABS: reviewed INTERVAL HPI/OVERNIGHT EVENTS: unchanged clinically  HPI:  89yo F w/ PMHx DM2, asthma, HTN, iron deficiency anemia, COPD w/ intermittent use of home O2 presents w/ weakness and fatigue. pt is nonverbal at this time and unable to provide any history. She developed a fever last night and the family admits mild cough which was nonproductive. 3-4 days prior to presentation the pt was complaining of mild abd discomfort but was tolerating po well. Family denies dysphagia. They stated that she had an episode of bilious vomiting 4 days ago but none since. She was tolerating diet yesterday without difficulty. Last night developed rectal temp of 102F but denied diaphoresis, chills, rigors. Upon waking this morning, the pt was obtunded. Responding only to noxious stimuli so EMS was alerted. No other recent travel. No recent hospitalizations. No other recent changes in her overall health.  Recnetly her DM2 regimen was decreased due to normal glucose levels. Also, her recent creatinine was 1.1 as per family at the bedside. Family members have medical background.       MEDICATIONS  (STANDING):  dextrose 5%. 1000 milliLiter(s) (50 mL/Hr) IV Continuous <Continuous>  dextrose 50% Injectable 12.5 Gram(s) IV Push once  influenza   Vaccine 0.5 milliLiter(s) IntraMuscular once  piperacillin/tazobactam IVPB. 3.375 Gram(s) IV Intermittent every 12 hours  ALBUTerol/ipratropium for Nebulization 3 milliLiter(s) Nebulizer every 4 hours  pantoprazole  Injectable 40 milliGRAM(s) IV Push daily  aspirin Suppository 300 milliGRAM(s) Rectal daily  heparin  Injectable 5000 Unit(s) SubCutaneous every 12 hours  nystatin Powder 1 Application(s) Topical every 6 hours  insulin lispro (HumaLOG) corrective regimen sliding scale   SubCutaneous every 6 hours  dextrose 5%. 1000 milliLiter(s) (50 mL/Hr) IV Continuous <Continuous>  dextrose 50% Injectable 12.5 Gram(s) IV Push once  dextrose 50% Injectable 25 Gram(s) IV Push once  dextrose 50% Injectable 25 Gram(s) IV Push once  dextrose 5% + sodium chloride 0.45% 1000 milliLiter(s) (50 mL/Hr) IV Continuous <Continuous>  azithromycin  IVPB      albumin human 25% IVPB 50 milliLiter(s) IV Intermittent every 8 hours    MEDICATIONS  (PRN):  ondansetron Injectable 4 milliGRAM(s) IV Push every 6 hours PRN Nausea  dextrose Gel 1 Dose(s) Oral once PRN Blood Glucose LESS THAN 70 milliGRAM(s)/deciliter  glucagon  Injectable 1 milliGRAM(s) IntraMuscular once PRN Glucose LESS THAN 70 milligrams/deciliter  acetaminophen  Suppository. 650 milliGRAM(s) Rectal every 4 hours PRN Mild Pain (1 - 3)  dextrose Gel 1 Dose(s) Oral once PRN Blood Glucose LESS THAN 70 milliGRAM(s)/deciliter  glucagon  Injectable 1 milliGRAM(s) IntraMuscular once PRN Glucose LESS THAN 70 milligrams/deciliter      Allergies    No Known Allergies    Intolerances          General:  No wt loss, fevers, chills, night sweats, fatigue   Eyes:  Good vision, no reported pain  ENT:  No sore throat, pain, runny nose, dysphagia  CV:  No pain, palpitations, hypo/hypertension  Resp:  No dyspnea, cough, tachypnea, wheezing  GI:  No pain, No nausea, No vomiting, No diarrhea, No constipation, No weight loss, No fever, No pruritis, No rectal bleeding, No tarry stools, No dysphagia,  :  No pain, bleeding, incontinence, nocturia  Muscle:  No pain, weakness  Neuro:  RLE pain  Psych:  No fatigue, insomnia, mood problems, depression  Endocrine:  No polyuria, polydipsia, cold/heat intolerance  Heme:  No petechiae, ecchymosis, easy bruisability  Skin:  No rash, tattoos, scars, edema        PHYSICAL EXAM:   Vital Signs:  Vital Signs Last 24 Hrs  T(C): 36.6 (01 Oct 2017 12:00), Max: 36.6 (30 Sep 2017 22:45)  T(F): 97.9 (01 Oct 2017 12:00), Max: 97.9 (01 Oct 2017 12:00)  HR: 66 (01 Oct 2017 14:00) (59 - 79)  BP: 116/56 (01 Oct 2017 14:00) (84/42 - 146/61)  BP(mean): 80 (01 Oct 2017 14:00) (69 - 88)  RR: 16 (01 Oct 2017 14:00) (10 - 32)  SpO2: 100% (01 Oct 2017 14:00) (95% - 100%)  Daily     Daily Weight in k (01 Oct 2017 13:11)I&O's Summary    30 Sep 2017 07:01  -  01 Oct 2017 07:00  --------------------------------------------------------  IN: 925 mL / OUT: 55 mL / NET: 870 mL    01 Oct 2017 07:  -  01 Oct 2017 14:18  --------------------------------------------------------  IN: 395 mL / OUT: 45 mL / NET: 350 mL        GENERAL:  AAOx2, follows commands, able to communicate verbally   HEENT:  NC/AT,  conjunctivae clear and pink, no thyromegaly, nodules, adenopathy, no JVD, sclera -anicteric  CHEST:  decreased BS at bases  HEART:  Regular rhythm, S1, S2, no murmur/rub/S3/S4, no abdominal bruit, no edema  ABDOMEN:  Soft, non-tender, non-distended, normoactive bowel sounds,  no masses ,no hepato-splenomegaly, no signs of chronic liver disease  EXTEREMITIES:  no cyanosis,clubbing or edema  SKIN:  No rash/erythema/ecchymoses/petechiae/wounds/abscess/warm/dry  NEURO:  follows commands      LABS: reviewed

## 2017-10-07 NOTE — PROGRESS NOTE ADULT - SUBJECTIVE AND OBJECTIVE BOX
Patient is a 88y old  Female who presents with a chief complaint of found unresponsive (30 Sep 2017 15:29)        HPI:  87yo F w/ PMHx DM2, asthma, HTN, iron deficiency anemia, COPD w/ intermittent use of home O2 presents w/ weakness and fatigue. pt is nonverbal at this time and unable to provide any history. She developed a fever last night and the family admits mild cough which was nonproductive. 3-4 days prior to presentation the pt was complaining of mild abd discomfort but was tolerating po well. Family denies dysphagia. They stated that she had an episode of bilious vomiting 4 days ago but none since. She was tolerating diet yesterday without difficulty. Last night developed rectal temp of 102F but denied diaphoresis, chills, rigors. Upon waking this morning, the pt was obtunded. Responding only to noxious stimuli so EMS was alerted. No other recent travel. No recent hospitalizations. No other recent changes in her overall health.  Recnetly her DM2 regimen was decreased due to normal glucose levels. Also, her recent creatinine was 1.1 as per family at the bedside. Family members have medical background.     In the ED, pt found hypotensive w/ SBP in 50's initially, now w/  after 3L NS bolus. Pt also bradycardic w/ P in 50's persistently despite hypotension. Pt was given vanco/zosyn as well as she was found to have L sided PNA.     Family Hx: denies h/o premature CAD, DM2 in immediate family members (30 Sep 2017 12:46)      SUBJECTIVE & OBJECTIVE: Pt seen and examined at bedside.     PHYSICAL EXAM:  T(C): 36.8 (10-07-17 @ 08:06), Max: 37.3 (10-07-17 @ 06:40)  HR: 74 (10-07-17 @ 11:02) (60 - 80)  BP: 169/87 (10-07-17 @ 08:06) (140/60 - 177/74)  RR: 26 (10-07-17 @ 08:06) (18 - 33)  SpO2: 98% (10-07-17 @ 11:02) (95% - 100%)  Wt(kg): --   GENERAL: NAD, well-groomed, well-developed  HEAD:  Atraumatic, Normocephalic  EYES: EOMI, PERRLA, conjunctiva and sclera clear  ENMT: Moist mucous membranes  NECK: Supple, No JVD  NERVOUS SYSTEM:  Alert & Oriented X3, Motor Strength 5/5 B/L upper and lower extremities; DTRs 2+ intact and symmetric  CHEST/LUNG: Clear to auscultation bilaterally; No rales, rhonchi, wheezing, or rubs  HEART: Regular rate and rhythm; No murmurs, rubs, or gallops  ABDOMEN: Soft, Nontender, Nondistended; Bowel sounds present  EXTREMITIES:  2+ Peripheral Pulses, No clubbing, cyanosis, or edema        MEDICATIONS  (STANDING):  ALBUTerol/ipratropium for Nebulization 3 milliLiter(s) Nebulizer every 4 hours  aspirin  chewable 81 milliGRAM(s) Oral daily  clopidogrel Tablet 75 milliGRAM(s) Oral daily  dextrose 5% + sodium chloride 0.45%. 1000 milliLiter(s) (50 mL/Hr) IV Continuous <Continuous>  dextrose 5%. 1000 milliLiter(s) (50 mL/Hr) IV Continuous <Continuous>  dextrose 50% Injectable 12.5 Gram(s) IV Push once  dextrose 50% Injectable 25 Gram(s) IV Push once  dextrose 50% Injectable 25 Gram(s) IV Push once  docusate sodium 100 milliGRAM(s) Oral three times a day  gabapentin 100 milliGRAM(s) Oral daily  heparin  Injectable 5000 Unit(s) SubCutaneous every 8 hours  influenza   Vaccine 0.5 milliLiter(s) IntraMuscular once  insulin lispro (HumaLOG) corrective regimen sliding scale   SubCutaneous Before meals and at bedtime  lidocaine   Patch 1 Patch Transdermal daily  metoprolol 12.5 milliGRAM(s) Oral every 8 hours  nystatin Powder 1 Application(s) Topical every 6 hours  pantoprazole    Tablet 40 milliGRAM(s) Oral before breakfast  senna 2 Tablet(s) Oral at bedtime    MEDICATIONS  (PRN):  dextrose Gel 1 Dose(s) Oral once PRN Blood Glucose LESS THAN 70 milliGRAM(s)/deciliter  glucagon  Injectable 1 milliGRAM(s) IntraMuscular once PRN Glucose LESS THAN 70 milligrams/deciliter  HYDROmorphone  Injectable 0.125 milliGRAM(s) IV Push every 4 hours PRN Severe Pain (7 - 10)  ondansetron Injectable 4 milliGRAM(s) IV Push every 6 hours PRN Nausea  polyethylene glycol 3350 17 Gram(s) Oral daily PRN Constipation      LABS:                        7.3    5.8   )-----------( 87       ( 07 Oct 2017 06:43 )             23.6     10-07    139  |  103  |  49<H>  ----------------------------<  141<H>  3.6   |  26  |  2.50<H>    Ca    8.4<L>      07 Oct 2017 06:43  Phos  3.7     10-07  Mg     2.2     10-07    TPro  6.5  /  Alb  2.3<L>  /  TBili  0.8  /  DBili  x   /  AST  131<H>  /  ALT  606<H>  /  AlkPhos  99  10-07        Magnesium, Serum: 2.2 mg/dL (10-07 @ 06:43)    CAPILLARY BLOOD GLUCOSE  126 (06 Oct 2017 21:22)  187 (06 Oct 2017 16:00)          CAPILLARY BLOOD GLUCOSE  126 (06 Oct 2017 21:22)  187 (06 Oct 2017 16:00)        CAPILLARY BLOOD GLUCOSE  126 (06 Oct 2017 21:22)                RECENT CULTURES:      RADIOLOGY & ADDITIONAL TESTS:                        DVT/GI ppx  Discussed with pt @ bedside

## 2017-10-07 NOTE — PROGRESS NOTE ADULT - SUBJECTIVE AND OBJECTIVE BOX
[facility] - John Tariq, No, Nirav, [place], [place]  Office Number:  [phone]    Moaning in bed. Resting comfortably    ROS: negative unless otherwise mentioned.    Telemetry:  SR 50's, no ectopy    MEDICATIONS  (STANDING):  ALBUTerol/ipratropium for Nebulization 3 milliLiter(s) Nebulizer every 4 hours  aspirin  chewable 81 milliGRAM(s) Oral daily  clopidogrel Tablet 75 milliGRAM(s) Oral daily  dextrose 5% + sodium chloride 0.45%. 1000 milliLiter(s) (50 mL/Hr) IV Continuous <Continuous>  dextrose 5%. 1000 milliLiter(s) (50 mL/Hr) IV Continuous <Continuous>  dextrose 50% Injectable 12.5 Gram(s) IV Push once  dextrose 50% Injectable 25 Gram(s) IV Push once  dextrose 50% Injectable 25 Gram(s) IV Push once  docusate sodium 100 milliGRAM(s) Oral three times a day  gabapentin 100 milliGRAM(s) Oral daily  heparin  Injectable 5000 Unit(s) SubCutaneous every 8 hours  influenza   Vaccine 0.5 milliLiter(s) IntraMuscular once  insulin lispro (HumaLOG) corrective regimen sliding scale   SubCutaneous Before meals and at bedtime  metoprolol 12.5 milliGRAM(s) Oral every 8 hours  nystatin Powder 1 Application(s) Topical every 6 hours  pantoprazole    Tablet 40 milliGRAM(s) Oral before breakfast  piperacillin/tazobactam IVPB. 3.375 Gram(s) IV Intermittent every 12 hours  senna 2 Tablet(s) Oral at bedtime    MEDICATIONS  (PRN):  dextrose Gel 1 Dose(s) Oral once PRN Blood Glucose LESS THAN 70 milliGRAM(s)/deciliter  glucagon  Injectable 1 milliGRAM(s) IntraMuscular once PRN Glucose LESS THAN 70 milligrams/deciliter  HYDROmorphone  Injectable 0.125 milliGRAM(s) IV Push every 4 hours PRN Severe Pain (7 - 10)  ondansetron Injectable 4 milliGRAM(s) IV Push every 6 hours PRN Nausea  polyethylene glycol 3350 17 Gram(s) Oral daily PRN Constipation      Allergies    No Known Allergies    Intolerances        Vital Signs Last 24 Hrs  T(C): 36.8 (07 Oct 2017 08:06), Max: 37.3 (07 Oct 2017 06:40)  T(F): 98.2 (07 Oct 2017 08:06), Max: 99.2 (07 Oct 2017 06:40)  HR: 60 (07 Oct 2017 08:06) (60 - 72)  BP: 169/87 (07 Oct 2017 08:06) (140/60 - 177/74)  BP(mean): 92 (06 Oct 2017 20:00) (92 - 106)  RR: 26 (07 Oct 2017 08:06) (18 - 33)  SpO2: 98% (07 Oct 2017 08:06) (95% - 100%)    I&O's Summary    06 Oct 2017 07:01  -  07 Oct 2017 07:00  --------------------------------------------------------  IN: 1270 mL / OUT: 510 mL / NET: 760 mL    07 Oct 2017 07:01  -  07 Oct 2017 10:41  --------------------------------------------------------  IN: 100 mL / OUT: 0 mL / NET: 100 mL        ON EXAM:    Constitutional: well-nourished, well-developed, moaning  HEENT:  MMM, sclerae anicteric, conjunctivae clear, no oral cyanosis.  Pulmonary: Non-labored, breath sounds are difficult to characterize given her moaning, no obvious wheezing, rales or rhonchi  Cardiovascular: Regular, S1 and S2.  No murmur.  No rubs, gallops or clicks  Gastrointestinal:  soft, nontender.   Lymph: trace peripheral edema.   Neurological: poorly responsive  Skin: No rashes.  Psych:  opens eyes and is responsive    LABS: All Labs Reviewed:                        7.3    5.8   )-----------( x        ( 07 Oct 2017 06:43 )             23.6                         7.5    6.5   )-----------( 106      ( 06 Oct 2017 05:33 )             24.8                         7.9    7.0   )-----------( 97       ( 05 Oct 2017 06:06 )             25.8     07 Oct 2017 06:43    139    |  103    |  49     ----------------------------<  141    3.6     |  26     |  2.50   06 Oct 2017 05:33    139    |  104    |  51     ----------------------------<  146    3.8     |  26     |  2.70   05 Oct 2017 06:06    138    |  103    |  61     ----------------------------<  137    3.9     |  25     |  3.30     Ca    8.4        07 Oct 2017 06:43  Ca    8.2        06 Oct 2017 05:33  Ca    8.4        05 Oct 2017 06:06  Phos  3.7       07 Oct 2017 06:43  Phos  4.0       06 Oct 2017 05:33  Phos  4.4       05 Oct 2017 06:06  Mg     2.2       07 Oct 2017 06:43  Mg     2.2       06 Oct 2017 05:33  Mg     2.2       05 Oct 2017 06:06    TPro  6.5    /  Alb  2.3    /  TBili  0.8    /  DBili  x      /  AST  131    /  ALT  606    /  AlkPhos  99     07 Oct 2017 06:43  TPro  6.5    /  Alb  2.3    /  TBili  0.8    /  DBili  x      /  AST  224    /  ALT  772    /  AlkPhos  99     06 Oct 2017 05:33  TPro  6.8    /  Alb  2.5    /  TBili  0.9    /  DBili  x      /  AST  466    /  ALT  1069   /  AlkPhos  107    05 Oct 2017 06:06          Blood Culture:

## 2017-10-07 NOTE — PROGRESS NOTE ADULT - PROBLEM SELECTOR PLAN 1
with multi organ failure, 2/2 PNA and UTI.  CXR Improving with stable vitals.  completed zosyn total for 7 days  urine is + for strep  f/u urine/blood c/s.

## 2017-10-08 DIAGNOSIS — J44.9 CHRONIC OBSTRUCTIVE PULMONARY DISEASE, UNSPECIFIED: ICD-10-CM

## 2017-10-08 DIAGNOSIS — R54 AGE-RELATED PHYSICAL DEBILITY: ICD-10-CM

## 2017-10-08 LAB
ANION GAP SERPL CALC-SCNC: 8 MMOL/L — SIGNIFICANT CHANGE UP (ref 5–17)
BUN SERPL-MCNC: 43 MG/DL — HIGH (ref 7–23)
CALCIUM SERPL-MCNC: 8.1 MG/DL — LOW (ref 8.5–10.1)
CHLORIDE SERPL-SCNC: 104 MMOL/L — SIGNIFICANT CHANGE UP (ref 96–108)
CO2 SERPL-SCNC: 26 MMOL/L — SIGNIFICANT CHANGE UP (ref 22–31)
CREAT SERPL-MCNC: 2.1 MG/DL — HIGH (ref 0.5–1.3)
GLUCOSE SERPL-MCNC: 132 MG/DL — HIGH (ref 70–99)
HCT VFR BLD CALC: 28.7 % — LOW (ref 34.5–45)
HGB BLD-MCNC: 8.8 G/DL — LOW (ref 11.5–15.5)
MCHC RBC-ENTMCNC: 28.1 PG — SIGNIFICANT CHANGE UP (ref 27–34)
MCHC RBC-ENTMCNC: 30.7 GM/DL — LOW (ref 32–36)
MCV RBC AUTO: 91.5 FL — SIGNIFICANT CHANGE UP (ref 80–100)
PLATELET # BLD AUTO: 92 K/UL — LOW (ref 150–400)
POTASSIUM SERPL-MCNC: 3.7 MMOL/L — SIGNIFICANT CHANGE UP (ref 3.5–5.3)
POTASSIUM SERPL-SCNC: 3.7 MMOL/L — SIGNIFICANT CHANGE UP (ref 3.5–5.3)
RBC # BLD: 3.14 M/UL — LOW (ref 3.8–5.2)
RBC # FLD: 13.9 % — SIGNIFICANT CHANGE UP (ref 10.3–14.5)
SODIUM SERPL-SCNC: 138 MMOL/L — SIGNIFICANT CHANGE UP (ref 135–145)
WBC # BLD: 6.3 K/UL — SIGNIFICANT CHANGE UP (ref 3.8–10.5)
WBC # FLD AUTO: 6.3 K/UL — SIGNIFICANT CHANGE UP (ref 3.8–10.5)

## 2017-10-08 PROCEDURE — 71250 CT THORAX DX C-: CPT | Mod: 26

## 2017-10-08 PROCEDURE — 99233 SBSQ HOSP IP/OBS HIGH 50: CPT

## 2017-10-08 RX ORDER — FUROSEMIDE 40 MG
40 TABLET ORAL ONCE
Qty: 0 | Refills: 0 | Status: DISCONTINUED | OUTPATIENT
Start: 2017-10-08 | End: 2017-10-11

## 2017-10-08 RX ORDER — HYDRALAZINE HCL 50 MG
10 TABLET ORAL THREE TIMES A DAY
Qty: 0 | Refills: 0 | Status: DISCONTINUED | OUTPATIENT
Start: 2017-10-08 | End: 2017-10-10

## 2017-10-08 RX ADMIN — Medication 100 MILLIGRAM(S): at 21:46

## 2017-10-08 RX ADMIN — Medication 3 MILLILITER(S): at 15:03

## 2017-10-08 RX ADMIN — PANTOPRAZOLE SODIUM 40 MILLIGRAM(S): 20 TABLET, DELAYED RELEASE ORAL at 06:14

## 2017-10-08 RX ADMIN — NYSTATIN CREAM 1 APPLICATION(S): 100000 CREAM TOPICAL at 06:15

## 2017-10-08 RX ADMIN — Medication 3 MILLILITER(S): at 07:39

## 2017-10-08 RX ADMIN — NYSTATIN CREAM 1 APPLICATION(S): 100000 CREAM TOPICAL at 11:47

## 2017-10-08 RX ADMIN — Medication 12.5 MILLIGRAM(S): at 14:45

## 2017-10-08 RX ADMIN — Medication 3 MILLILITER(S): at 20:59

## 2017-10-08 RX ADMIN — CLOPIDOGREL BISULFATE 75 MILLIGRAM(S): 75 TABLET, FILM COATED ORAL at 11:47

## 2017-10-08 RX ADMIN — Medication 20 MILLIGRAM(S): at 21:39

## 2017-10-08 RX ADMIN — Medication 3 MILLILITER(S): at 02:42

## 2017-10-08 RX ADMIN — Medication 12.5 MILLIGRAM(S): at 21:39

## 2017-10-08 RX ADMIN — Medication 20 MILLIGRAM(S): at 14:45

## 2017-10-08 RX ADMIN — HEPARIN SODIUM 5000 UNIT(S): 5000 INJECTION INTRAVENOUS; SUBCUTANEOUS at 21:39

## 2017-10-08 RX ADMIN — HEPARIN SODIUM 5000 UNIT(S): 5000 INJECTION INTRAVENOUS; SUBCUTANEOUS at 06:14

## 2017-10-08 RX ADMIN — LIDOCAINE 1 PATCH: 4 CREAM TOPICAL at 21:40

## 2017-10-08 RX ADMIN — Medication 4: at 21:48

## 2017-10-08 RX ADMIN — SENNA PLUS 2 TABLET(S): 8.6 TABLET ORAL at 21:39

## 2017-10-08 RX ADMIN — Medication 3 MILLILITER(S): at 11:09

## 2017-10-08 RX ADMIN — Medication 10 MILLIGRAM(S): at 14:45

## 2017-10-08 RX ADMIN — LIDOCAINE 1 PATCH: 4 CREAM TOPICAL at 11:31

## 2017-10-08 RX ADMIN — Medication 81 MILLIGRAM(S): at 11:47

## 2017-10-08 RX ADMIN — Medication 100 MILLIGRAM(S): at 06:14

## 2017-10-08 RX ADMIN — Medication: at 14:33

## 2017-10-08 RX ADMIN — SODIUM CHLORIDE 50 MILLILITER(S): 9 INJECTION, SOLUTION INTRAVENOUS at 03:52

## 2017-10-08 RX ADMIN — HEPARIN SODIUM 5000 UNIT(S): 5000 INJECTION INTRAVENOUS; SUBCUTANEOUS at 14:45

## 2017-10-08 RX ADMIN — Medication 100 MILLIGRAM(S): at 14:45

## 2017-10-08 RX ADMIN — NYSTATIN CREAM 1 APPLICATION(S): 100000 CREAM TOPICAL at 19:55

## 2017-10-08 RX ADMIN — Medication 12.5 MILLIGRAM(S): at 06:14

## 2017-10-08 RX ADMIN — Medication 10 MILLIGRAM(S): at 21:39

## 2017-10-08 RX ADMIN — Medication 3 MILLILITER(S): at 23:55

## 2017-10-08 NOTE — PROGRESS NOTE ADULT - PROBLEM SELECTOR PLAN 1
with multi organ failure, 2/2 PNA and UTI.  CXR Improving with stable vitals.  completed zosyn total for 7 days

## 2017-10-08 NOTE — CONSULT NOTE ADULT - PROBLEM SELECTOR RECOMMENDATION 9
multifactorial - heart lung and kidney in the state of frail elderly  BIPAP and O2 support intermittently,   HOB elevation  asp prec  oral hygiene  skin care  keep sat > 88 pct  I jesika  prognosis guarded  will check CT chest to eval for pulm edema, effusions, atelectasis, and other causes of resp distress

## 2017-10-08 NOTE — CONSULT NOTE ADULT - SUBJECTIVE AND OBJECTIVE BOX
Date/Time Patient Seen:  		  Referring MD:   Data Reviewed	       Patient is a 88y old  Female who presents with a chief complaint of found unresponsive (30 Sep 2017 15:29)      Subjective/HPI    seen and examined  vs and meds reviewed  intermittently on BIPAP and Venti mask for resp distress  son at the bedside provides hx, pt is from [place] originally, now with recurrent admissions to [facility], and extensive medical history  pt is weak, frail and unable to ambulate    89yo F w/ PMHx DM2, asthma, HTN, iron deficiency anemia, COPD w/ intermittent use of home O2, severe sepsis secondary to pneumonia, MSOF, NSTEMI, overall normal LV function, enzymes downtrended.        PAST MEDICAL & SURGICAL HISTORY:  Asthma  Diabetes mellitus  HTN (hypertension)  No significant past surgical history        Medication list         MEDICATIONS  (STANDING):  ALBUTerol/ipratropium for Nebulization 3 milliLiter(s) Nebulizer every 4 hours  aspirin  chewable 81 milliGRAM(s) Oral daily  clopidogrel Tablet 75 milliGRAM(s) Oral daily  dextrose 5% + sodium chloride 0.45%. 1000 milliLiter(s) (50 mL/Hr) IV Continuous <Continuous>  dextrose 5%. 1000 milliLiter(s) (50 mL/Hr) IV Continuous <Continuous>  dextrose 50% Injectable 12.5 Gram(s) IV Push once  dextrose 50% Injectable 25 Gram(s) IV Push once  dextrose 50% Injectable 25 Gram(s) IV Push once  docusate sodium 100 milliGRAM(s) Oral three times a day  gabapentin 100 milliGRAM(s) Oral daily  heparin  Injectable 5000 Unit(s) SubCutaneous every 8 hours  hydrALAZINE 10 milliGRAM(s) Oral three times a day  influenza   Vaccine 0.5 milliLiter(s) IntraMuscular once  insulin lispro (HumaLOG) corrective regimen sliding scale   SubCutaneous Before meals and at bedtime  lidocaine   Patch 1 Patch Transdermal daily  methylPREDNISolone sodium succinate Injectable 20 milliGRAM(s) IV Push three times a day  metoprolol 12.5 milliGRAM(s) Oral every 8 hours  nystatin Powder 1 Application(s) Topical every 6 hours  pantoprazole    Tablet 40 milliGRAM(s) Oral before breakfast  senna 2 Tablet(s) Oral at bedtime    MEDICATIONS  (PRN):  dextrose Gel 1 Dose(s) Oral once PRN Blood Glucose LESS THAN 70 milliGRAM(s)/deciliter  glucagon  Injectable 1 milliGRAM(s) IntraMuscular once PRN Glucose LESS THAN 70 milligrams/deciliter  HYDROmorphone  Injectable 0.125 milliGRAM(s) IV Push every 4 hours PRN Severe Pain (7 - 10)  ondansetron Injectable 4 milliGRAM(s) IV Push every 6 hours PRN Nausea  polyethylene glycol 3350 17 Gram(s) Oral daily PRN Constipation         Vitals log        ICU Vital Signs Last 24 Hrs  T(C): 36.9 (08 Oct 2017 07:48), Max: 37.1 (07 Oct 2017 19:45)  T(F): 98.5 (08 Oct 2017 07:48), Max: 98.7 (07 Oct 2017 19:45)  HR: 64 (08 Oct 2017 07:48) (64 - 84)  BP: 165/74 (08 Oct 2017 07:48) (116/65 - 174/82)  BP(mean): --  ABP: --  ABP(mean): --  RR: 19 (08 Oct 2017 07:48) (19 - 25)  SpO2: 97% (08 Oct 2017 07:48) (95% - 100%)           Input and Output:  I&O's Detail    07 Oct 2017 07:01  -  08 Oct 2017 07:00  --------------------------------------------------------  IN:    dextrose 5% + sodium chloride 0.45%.: 950 mL    Oral Fluid: 220 mL    Packed Red Blood Cells: 300 mL    Solution: 100 mL  Total IN: 1570 mL    OUT:  Total OUT: 0 mL    Total NET: 1570 mL          Lab Data                        8.8    6.3   )-----------( 92       ( 08 Oct 2017 06:30 )             28.7     10-08    138  |  104  |  43<H>  ----------------------------<  132<H>  3.7   |  26  |  2.10<H>    Ca    8.1<L>      08 Oct 2017 06:30  Phos  3.7     10-07  Mg     2.2     10-07    TPro  6.5  /  Alb  2.3<L>  /  TBili  0.8  /  DBili  x   /  AST  131<H>  /  ALT  606<H>  /  AlkPhos  99  10-07        non smoker  open stove exposure in earlier life in [place] (risk factor for COPD)  has 5 children  lives with family        Review of Systems	  weak  frail  dyspnea  poor appetite      Objective     Physical Examination  head at  verbal  heart - s1s2  lungs - dec BS  abd - soft  cn grossly int  kyphosis        Pertinent Lab findings & Imaging      Singh:  NO   Adequate UO     I&O's Detail    07 Oct 2017 07:01  -  08 Oct 2017 07:00  --------------------------------------------------------  IN:    dextrose 5% + sodium chloride 0.45%.: 950 mL    Oral Fluid: 220 mL    Packed Red Blood Cells: 300 mL    Solution: 100 mL  Total IN: 1570 mL    OUT:  Total OUT: 0 mL    Total NET: 1570 mL               Discussed with:     Cultures:	        Radiology      clear lungs, low volumes, no focal infiltrate

## 2017-10-08 NOTE — PROGRESS NOTE ADULT - PROBLEM SELECTOR PLAN 1
monitor LRTs, enzymes improving  no cholestasis  INR less than <2-- vitamin K if increases  optimize perfusion, IVF hydration  no evidence of chronic viral hepatitis, biliary obstruction  no signs/symptoms of acute liver failure

## 2017-10-08 NOTE — PROGRESS NOTE ADULT - ASSESSMENT
89yo F w/ PMHx DM2, asthma, HTN, iron deficiency anemia, COPD w/ intermittent use of home O2, severe sepsis secondary to pneumonia, MSOF, NSTEMI, overall normal LV function, enzymes downtrended. Multiorgan failure is resolving.    - TTE reviewed, grossly preserved LV function  - Continue aspirin, clopidogrel for now. There is likely some underlying CAD given her significant CE leak.  - cont metoprolol 12.5 mg po q8hr, HR in 50's will limit its titration  - No statin drug secondary to shock liver, which is resolving.  Would start Lipitor 40 when this resolves.  Please check LFTs in AM  - Still net positive with mild edema. Would try to keep I=O.   - BP elevated this morning, will need to add po medications if remains this high. Avoid ace/arb in the setting of improving SAMIA. I have added low dose hydralazine.  - Monitor and replete electrolytes. Keep K>4.0 and Mg>2.0.   - No arrhythmia on the monitor  - Not a candidate for coronary angiography  - Abx   - DVT prophylaxis  - trend creatinine and hgb in setting of samia and anemia  - Further cardiac workup will depend on clinical course.

## 2017-10-08 NOTE — PROGRESS NOTE ADULT - SUBJECTIVE AND OBJECTIVE BOX
Patient is a 88y old  Female who presents with a chief complaint of found unresponsive (30 Sep 2017 15:29)        HPI:  89yo F w/ PMHx DM2, asthma, HTN, iron deficiency anemia, COPD w/ intermittent use of home O2 presents w/ weakness and fatigue. pt is nonverbal at this time and unable to provide any history. She developed a fever last night and the family admits mild cough which was nonproductive. 3-4 days prior to presentation the pt was complaining of mild abd discomfort but was tolerating po well. Family denies dysphagia. They stated that she had an episode of bilious vomiting 4 days ago but none since. She was tolerating diet yesterday without difficulty. Last night developed rectal temp of 102F but denied diaphoresis, chills, rigors. Upon waking this morning, the pt was obtunded. Responding only to noxious stimuli so EMS was alerted. No other recent travel. No recent hospitalizations. No other recent changes in her overall health.  Recnetly her DM2 regimen was decreased due to normal glucose levels. Also, her recent creatinine was 1.1 as per family at the bedside. Family members have medical background.     In the ED, pt found hypotensive w/ SBP in 50's initially, now w/  after 3L NS bolus. Pt also bradycardic w/ P in 50's persistently despite hypotension. Pt was given vanco/zosyn as well as she was found to have L sided PNA.     Family Hx: denies h/o premature CAD, DM2 in immediate family members (30 Sep 2017 12:46)      SUBJECTIVE & OBJECTIVE: Pt seen and examined at bedside.     PHYSICAL EXAM:  T(C): 36.9 (10-08-17 @ 07:48), Max: 37.1 (10-07-17 @ 19:45)  HR: 64 (10-08-17 @ 07:48) (64 - 84)  BP: 165/74 (10-08-17 @ 07:48) (116/65 - 174/82)  RR: 19 (10-08-17 @ 07:48) (19 - 25)  SpO2: 97% (10-08-17 @ 07:48) (95% - 100%)  Wt(kg): --   GENERAL: NAD, well-groomed, well-developed  HEAD:  Atraumatic, Normocephalic  EYES: EOMI, PERRLA, conjunctiva and sclera clear  ENMT: Moist mucous membranes  NECK: Supple, No JVD  NERVOUS SYSTEM:  Alert & Oriented X3, Motor Strength 5/5 B/L upper and lower extremities; DTRs 2+ intact and symmetric  CHEST/LUNG: Clear to auscultation bilaterally; No rales, rhonchi, wheezing, or rubs  HEART: Regular rate and rhythm; No murmurs, rubs, or gallops  ABDOMEN: Soft, Nontender, Nondistended; Bowel sounds present  EXTREMITIES:  2+ Peripheral Pulses, No clubbing, cyanosis, or edema        MEDICATIONS  (STANDING):  ALBUTerol/ipratropium for Nebulization 3 milliLiter(s) Nebulizer every 4 hours  aspirin  chewable 81 milliGRAM(s) Oral daily  clopidogrel Tablet 75 milliGRAM(s) Oral daily  dextrose 5% + sodium chloride 0.45%. 1000 milliLiter(s) (50 mL/Hr) IV Continuous <Continuous>  dextrose 5%. 1000 milliLiter(s) (50 mL/Hr) IV Continuous <Continuous>  dextrose 50% Injectable 12.5 Gram(s) IV Push once  dextrose 50% Injectable 25 Gram(s) IV Push once  dextrose 50% Injectable 25 Gram(s) IV Push once  docusate sodium 100 milliGRAM(s) Oral three times a day  gabapentin 100 milliGRAM(s) Oral daily  heparin  Injectable 5000 Unit(s) SubCutaneous every 8 hours  hydrALAZINE 10 milliGRAM(s) Oral three times a day  influenza   Vaccine 0.5 milliLiter(s) IntraMuscular once  insulin lispro (HumaLOG) corrective regimen sliding scale   SubCutaneous Before meals and at bedtime  lidocaine   Patch 1 Patch Transdermal daily  metoprolol 12.5 milliGRAM(s) Oral every 8 hours  nystatin Powder 1 Application(s) Topical every 6 hours  pantoprazole    Tablet 40 milliGRAM(s) Oral before breakfast  senna 2 Tablet(s) Oral at bedtime    MEDICATIONS  (PRN):  dextrose Gel 1 Dose(s) Oral once PRN Blood Glucose LESS THAN 70 milliGRAM(s)/deciliter  glucagon  Injectable 1 milliGRAM(s) IntraMuscular once PRN Glucose LESS THAN 70 milligrams/deciliter  HYDROmorphone  Injectable 0.125 milliGRAM(s) IV Push every 4 hours PRN Severe Pain (7 - 10)  ondansetron Injectable 4 milliGRAM(s) IV Push every 6 hours PRN Nausea  polyethylene glycol 3350 17 Gram(s) Oral daily PRN Constipation      LABS:                        8.8    6.3   )-----------( 92       ( 08 Oct 2017 06:30 )             28.7     10-08    138  |  104  |  43<H>  ----------------------------<  132<H>  3.7   |  26  |  2.10<H>    Ca    8.1<L>      08 Oct 2017 06:30  Phos  3.7     10-07  Mg     2.2     10-07    TPro  6.5  /  Alb  2.3<L>  /  TBili  0.8  /  DBili  x   /  AST  131<H>  /  ALT  606<H>  /  AlkPhos  99  10-07          CAPILLARY BLOOD GLUCOSE  132 (08 Oct 2017 07:48)  187 (07 Oct 2017 21:00)  150 (07 Oct 2017 16:55)          CAPILLARY BLOOD GLUCOSE  132 (08 Oct 2017 07:48)  187 (07 Oct 2017 21:00)  150 (07 Oct 2017 16:55)        CAPILLARY BLOOD GLUCOSE  132 (08 Oct 2017 07:48)                RECENT CULTURES:      RADIOLOGY & ADDITIONAL TESTS:                        DVT/GI ppx  Discussed with pt @ bedside

## 2017-10-08 NOTE — PROGRESS NOTE ADULT - SUBJECTIVE AND OBJECTIVE BOX
INTERVAL HPI/OVERNIGHT EVENTS: unchanged clinically  HPI:  89yo F w/ PMHx DM2, asthma, HTN, iron deficiency anemia, COPD w/ intermittent use of home O2 presents w/ weakness and fatigue. pt is nonverbal at this time and unable to provide any history. She developed a fever last night and the family admits mild cough which was nonproductive. 3-4 days prior to presentation the pt was complaining of mild abd discomfort but was tolerating po well. Family denies dysphagia. They stated that she had an episode of bilious vomiting 4 days ago but none since. She was tolerating diet yesterday without difficulty. Last night developed rectal temp of 102F but denied diaphoresis, chills, rigors. Upon waking this morning, the pt was obtunded. Responding only to noxious stimuli so EMS was alerted. No other recent travel. No recent hospitalizations. No other recent changes in her overall health.  Recnetly her DM2 regimen was decreased due to normal glucose levels. Also, her recent creatinine was 1.1 as per family at the bedside. Family members have medical background.       MEDICATIONS  (STANDING):  dextrose 5%. 1000 milliLiter(s) (50 mL/Hr) IV Continuous <Continuous>  dextrose 50% Injectable 12.5 Gram(s) IV Push once  influenza   Vaccine 0.5 milliLiter(s) IntraMuscular once  piperacillin/tazobactam IVPB. 3.375 Gram(s) IV Intermittent every 12 hours  ALBUTerol/ipratropium for Nebulization 3 milliLiter(s) Nebulizer every 4 hours  pantoprazole  Injectable 40 milliGRAM(s) IV Push daily  aspirin Suppository 300 milliGRAM(s) Rectal daily  heparin  Injectable 5000 Unit(s) SubCutaneous every 12 hours  nystatin Powder 1 Application(s) Topical every 6 hours  insulin lispro (HumaLOG) corrective regimen sliding scale   SubCutaneous every 6 hours  dextrose 5%. 1000 milliLiter(s) (50 mL/Hr) IV Continuous <Continuous>  dextrose 50% Injectable 12.5 Gram(s) IV Push once  dextrose 50% Injectable 25 Gram(s) IV Push once  dextrose 50% Injectable 25 Gram(s) IV Push once  dextrose 5% + sodium chloride 0.45% 1000 milliLiter(s) (50 mL/Hr) IV Continuous <Continuous>  azithromycin  IVPB      albumin human 25% IVPB 50 milliLiter(s) IV Intermittent every 8 hours    MEDICATIONS  (PRN):  ondansetron Injectable 4 milliGRAM(s) IV Push every 6 hours PRN Nausea  dextrose Gel 1 Dose(s) Oral once PRN Blood Glucose LESS THAN 70 milliGRAM(s)/deciliter  glucagon  Injectable 1 milliGRAM(s) IntraMuscular once PRN Glucose LESS THAN 70 milligrams/deciliter  acetaminophen  Suppository. 650 milliGRAM(s) Rectal every 4 hours PRN Mild Pain (1 - 3)  dextrose Gel 1 Dose(s) Oral once PRN Blood Glucose LESS THAN 70 milliGRAM(s)/deciliter  glucagon  Injectable 1 milliGRAM(s) IntraMuscular once PRN Glucose LESS THAN 70 milligrams/deciliter      Allergies    No Known Allergies    Intolerances          General:  No wt loss, fevers, chills, night sweats, fatigue   Eyes:  Good vision, no reported pain  ENT:  No sore throat, pain, runny nose, dysphagia  CV:  No pain, palpitations, hypo/hypertension  Resp:  No dyspnea, cough, tachypnea, wheezing  GI:  No pain, No nausea, No vomiting, No diarrhea, No constipation, No weight loss, No fever, No pruritis, No rectal bleeding, No tarry stools, No dysphagia,  :  No pain, bleeding, incontinence, nocturia  Muscle:  No pain, weakness  Neuro:  RLE pain  Psych:  No fatigue, insomnia, mood problems, depression  Endocrine:  No polyuria, polydipsia, cold/heat intolerance  Heme:  No petechiae, ecchymosis, easy bruisability  Skin:  No rash, tattoos, scars, edema        PHYSICAL EXAM:   Vital Signs:  Vital Signs Last 24 Hrs  T(C): 36.6 (01 Oct 2017 12:00), Max: 36.6 (30 Sep 2017 22:45)  T(F): 97.9 (01 Oct 2017 12:00), Max: 97.9 (01 Oct 2017 12:00)  HR: 66 (01 Oct 2017 14:00) (59 - 79)  BP: 116/56 (01 Oct 2017 14:00) (84/42 - 146/61)  BP(mean): 80 (01 Oct 2017 14:00) (69 - 88)  RR: 16 (01 Oct 2017 14:00) (10 - 32)  SpO2: 100% (01 Oct 2017 14:00) (95% - 100%)  Daily     Daily Weight in k (01 Oct 2017 13:11)I&O's Summary    30 Sep 2017 07:01  -  01 Oct 2017 07:00  --------------------------------------------------------  IN: 925 mL / OUT: 55 mL / NET: 870 mL    01 Oct 2017 07:  -  01 Oct 2017 14:18  --------------------------------------------------------  IN: 395 mL / OUT: 45 mL / NET: 350 mL        GENERAL:  AAOx2, follows commands, able to communicate verbally   HEENT:  NC/AT,  conjunctivae clear and pink, no thyromegaly, nodules, adenopathy, no JVD, sclera -anicteric  CHEST:  decreased BS at bases  HEART:  Regular rhythm, S1, S2, no murmur/rub/S3/S4, no abdominal bruit, no edema  ABDOMEN:  Soft, non-tender, non-distended, normoactive bowel sounds,  no masses ,no hepato-splenomegaly, no signs of chronic liver disease  EXTEREMITIES:  no cyanosis,clubbing or edema  SKIN:  No rash/erythema/ecchymoses/petechiae/wounds/abscess/warm/dry  NEURO:  follows commands      LABS: reviewed

## 2017-10-08 NOTE — CONSULT NOTE ADULT - PROBLEM SELECTOR RECOMMENDATION 4
pt is lifetime non smoker, however, comes from [place] where open stove cooking was common and has confirmed exposure to open stove cooking as per son,   She is very likely yo have obstructive airway disease  will add empiric dose of Solumedrol   cont NEBS  check ct chest  ABG noted in the EMR  BIPAP for support as needed   keep sat > 88 pct

## 2017-10-08 NOTE — CONSULT NOTE ADULT - PROBLEM SELECTOR RECOMMENDATION 2
cvs regimen  BP control  I and O  diuresis as tolerated by renal function  cardio following  TTE reviewed, limited utility at present  prognosis poor  pt is DNR

## 2017-10-08 NOTE — PROGRESS NOTE ADULT - SUBJECTIVE AND OBJECTIVE BOX
[facility] - John Tariq, No, Nirav, [place], [place]  Office Number:  [phone]    Patient resting comfortably in bed. Sleeping during exam but opens eyes to name. Family at bedside    ROS: negative unless otherwise mentioned.    Telemetry:  SR/SB 60's    MEDICATIONS  (STANDING):  ALBUTerol/ipratropium for Nebulization 3 milliLiter(s) Nebulizer every 4 hours  aspirin  chewable 81 milliGRAM(s) Oral daily  clopidogrel Tablet 75 milliGRAM(s) Oral daily  dextrose 5% + sodium chloride 0.45%. 1000 milliLiter(s) (50 mL/Hr) IV Continuous <Continuous>  dextrose 5%. 1000 milliLiter(s) (50 mL/Hr) IV Continuous <Continuous>  dextrose 50% Injectable 12.5 Gram(s) IV Push once  dextrose 50% Injectable 25 Gram(s) IV Push once  dextrose 50% Injectable 25 Gram(s) IV Push once  docusate sodium 100 milliGRAM(s) Oral three times a day  gabapentin 100 milliGRAM(s) Oral daily  heparin  Injectable 5000 Unit(s) SubCutaneous every 8 hours  influenza   Vaccine 0.5 milliLiter(s) IntraMuscular once  insulin lispro (HumaLOG) corrective regimen sliding scale   SubCutaneous Before meals and at bedtime  lidocaine   Patch 1 Patch Transdermal daily  metoprolol 12.5 milliGRAM(s) Oral every 8 hours  nystatin Powder 1 Application(s) Topical every 6 hours  pantoprazole    Tablet 40 milliGRAM(s) Oral before breakfast  senna 2 Tablet(s) Oral at bedtime    MEDICATIONS  (PRN):  dextrose Gel 1 Dose(s) Oral once PRN Blood Glucose LESS THAN 70 milliGRAM(s)/deciliter  glucagon  Injectable 1 milliGRAM(s) IntraMuscular once PRN Glucose LESS THAN 70 milligrams/deciliter  HYDROmorphone  Injectable 0.125 milliGRAM(s) IV Push every 4 hours PRN Severe Pain (7 - 10)  ondansetron Injectable 4 milliGRAM(s) IV Push every 6 hours PRN Nausea  polyethylene glycol 3350 17 Gram(s) Oral daily PRN Constipation      Allergies    No Known Allergies    Intolerances        Vital Signs Last 24 Hrs  T(C): 36.9 (08 Oct 2017 07:48), Max: 37.1 (07 Oct 2017 19:45)  T(F): 98.5 (08 Oct 2017 07:48), Max: 98.7 (07 Oct 2017 19:45)  HR: 64 (08 Oct 2017 07:48) (64 - 84)  BP: 165/74 (08 Oct 2017 07:48) (116/65 - 174/82)  BP(mean): --  RR: 19 (08 Oct 2017 07:48) (19 - 25)  SpO2: 97% (08 Oct 2017 07:48) (95% - 100%)    I&O's Summary    07 Oct 2017 07:01  -  08 Oct 2017 07:00  --------------------------------------------------------  IN: 1570 mL / OUT: 0 mL / NET: 1570 mL        ON EXAM:    Constitutional: well-nourished, well-developed, moaning  HEENT:  MMM, sclerae anicteric, conjunctivae clear, no oral cyanosis.  Pulmonary: Non-labored, breath sounds are difficult to characterize, no obvious wheezing, rales or rhonchi  Cardiovascular: Regular, S1 and S2.  No murmur.  No rubs, gallops or clicks  Gastrointestinal:  soft, nontender.   Lymph: trace peripheral edema.   Neurological: poorly responsive  Skin: No rashes.  Psych:  opens eyes and is responsive    LABS: All Labs Reviewed:                        8.8    6.3   )-----------( 92       ( 08 Oct 2017 06:30 )             28.7                         7.3    5.8   )-----------( 87       ( 07 Oct 2017 06:43 )             23.6                         7.5    6.5   )-----------( 106      ( 06 Oct 2017 05:33 )             24.8     08 Oct 2017 06:30    138    |  104    |  43     ----------------------------<  132    3.7     |  26     |  2.10   07 Oct 2017 06:43    139    |  103    |  49     ----------------------------<  141    3.6     |  26     |  2.50   06 Oct 2017 05:33    139    |  104    |  51     ----------------------------<  146    3.8     |  26     |  2.70     Ca    8.1        08 Oct 2017 06:30  Ca    8.4        07 Oct 2017 06:43  Ca    8.2        06 Oct 2017 05:33  Phos  3.7       07 Oct 2017 06:43  Phos  4.0       06 Oct 2017 05:33  Mg     2.2       07 Oct 2017 06:43  Mg     2.2       06 Oct 2017 05:33    TPro  6.5    /  Alb  2.3    /  TBili  0.8    /  DBili  x      /  AST  131    /  ALT  606    /  AlkPhos  99     07 Oct 2017 06:43  TPro  6.5    /  Alb  2.3    /  TBili  0.8    /  DBili  x      /  AST  224    /  ALT  772    /  AlkPhos  99     06 Oct 2017 05:33          Blood Culture:

## 2017-10-09 ENCOUNTER — TRANSCRIPTION ENCOUNTER (OUTPATIENT)
Age: 82
End: 2017-10-09

## 2017-10-09 LAB
ANION GAP SERPL CALC-SCNC: 7 MMOL/L — SIGNIFICANT CHANGE UP (ref 5–17)
BUN SERPL-MCNC: 39 MG/DL — HIGH (ref 7–23)
CALCIUM SERPL-MCNC: 9 MG/DL — SIGNIFICANT CHANGE UP (ref 8.5–10.1)
CHLORIDE SERPL-SCNC: 105 MMOL/L — SIGNIFICANT CHANGE UP (ref 96–108)
CO2 SERPL-SCNC: 29 MMOL/L — SIGNIFICANT CHANGE UP (ref 22–31)
CREAT SERPL-MCNC: 1.9 MG/DL — HIGH (ref 0.5–1.3)
GLUCOSE SERPL-MCNC: 140 MG/DL — HIGH (ref 70–99)
HCT VFR BLD CALC: 33.4 % — LOW (ref 34.5–45)
HGB BLD-MCNC: 10.3 G/DL — LOW (ref 11.5–15.5)
MCHC RBC-ENTMCNC: 28.2 PG — SIGNIFICANT CHANGE UP (ref 27–34)
MCHC RBC-ENTMCNC: 30.7 GM/DL — LOW (ref 32–36)
MCV RBC AUTO: 91.8 FL — SIGNIFICANT CHANGE UP (ref 80–100)
PLATELET # BLD AUTO: 71 K/UL — LOW (ref 150–400)
POTASSIUM SERPL-MCNC: 3.8 MMOL/L — SIGNIFICANT CHANGE UP (ref 3.5–5.3)
POTASSIUM SERPL-SCNC: 3.8 MMOL/L — SIGNIFICANT CHANGE UP (ref 3.5–5.3)
RBC # BLD: 3.64 M/UL — LOW (ref 3.8–5.2)
RBC # FLD: 14 % — SIGNIFICANT CHANGE UP (ref 10.3–14.5)
SODIUM SERPL-SCNC: 141 MMOL/L — SIGNIFICANT CHANGE UP (ref 135–145)
WBC # BLD: 3.6 K/UL — LOW (ref 3.8–10.5)
WBC # FLD AUTO: 3.6 K/UL — LOW (ref 3.8–10.5)

## 2017-10-09 PROCEDURE — 99233 SBSQ HOSP IP/OBS HIGH 50: CPT

## 2017-10-09 RX ADMIN — Medication 3 MILLILITER(S): at 19:37

## 2017-10-09 RX ADMIN — Medication 3 MILLILITER(S): at 08:06

## 2017-10-09 RX ADMIN — Medication 12.5 MILLIGRAM(S): at 22:03

## 2017-10-09 RX ADMIN — CLOPIDOGREL BISULFATE 75 MILLIGRAM(S): 75 TABLET, FILM COATED ORAL at 14:43

## 2017-10-09 RX ADMIN — NYSTATIN CREAM 1 APPLICATION(S): 100000 CREAM TOPICAL at 18:20

## 2017-10-09 RX ADMIN — Medication 3 MILLILITER(S): at 11:48

## 2017-10-09 RX ADMIN — NYSTATIN CREAM 1 APPLICATION(S): 100000 CREAM TOPICAL at 14:51

## 2017-10-09 RX ADMIN — Medication 2: at 17:55

## 2017-10-09 RX ADMIN — Medication 20 MILLIGRAM(S): at 05:54

## 2017-10-09 RX ADMIN — Medication 12.5 MILLIGRAM(S): at 05:53

## 2017-10-09 RX ADMIN — Medication 10 MILLIGRAM(S): at 05:53

## 2017-10-09 RX ADMIN — Medication 3 MILLILITER(S): at 03:09

## 2017-10-09 RX ADMIN — LIDOCAINE 1 PATCH: 4 CREAM TOPICAL at 22:04

## 2017-10-09 RX ADMIN — HEPARIN SODIUM 5000 UNIT(S): 5000 INJECTION INTRAVENOUS; SUBCUTANEOUS at 22:03

## 2017-10-09 RX ADMIN — Medication 3 MILLILITER(S): at 23:17

## 2017-10-09 RX ADMIN — Medication 100 MILLIGRAM(S): at 22:03

## 2017-10-09 RX ADMIN — Medication 100 MILLIGRAM(S): at 05:54

## 2017-10-09 RX ADMIN — LIDOCAINE 1 PATCH: 4 CREAM TOPICAL at 09:30

## 2017-10-09 RX ADMIN — HEPARIN SODIUM 5000 UNIT(S): 5000 INJECTION INTRAVENOUS; SUBCUTANEOUS at 14:43

## 2017-10-09 RX ADMIN — Medication 10 MILLIGRAM(S): at 14:42

## 2017-10-09 RX ADMIN — Medication 3 MILLILITER(S): at 16:43

## 2017-10-09 RX ADMIN — Medication 10 MILLIGRAM(S): at 22:03

## 2017-10-09 RX ADMIN — NYSTATIN CREAM 1 APPLICATION(S): 100000 CREAM TOPICAL at 06:04

## 2017-10-09 RX ADMIN — Medication 81 MILLIGRAM(S): at 14:43

## 2017-10-09 RX ADMIN — SENNA PLUS 2 TABLET(S): 8.6 TABLET ORAL at 22:03

## 2017-10-09 RX ADMIN — PANTOPRAZOLE SODIUM 40 MILLIGRAM(S): 20 TABLET, DELAYED RELEASE ORAL at 05:53

## 2017-10-09 RX ADMIN — NYSTATIN CREAM 1 APPLICATION(S): 100000 CREAM TOPICAL at 23:23

## 2017-10-09 RX ADMIN — Medication 2: at 13:05

## 2017-10-09 RX ADMIN — Medication 12.5 MILLIGRAM(S): at 14:42

## 2017-10-09 RX ADMIN — HEPARIN SODIUM 5000 UNIT(S): 5000 INJECTION INTRAVENOUS; SUBCUTANEOUS at 05:53

## 2017-10-09 RX ADMIN — Medication 100 MILLIGRAM(S): at 14:43

## 2017-10-09 NOTE — PROGRESS NOTE ADULT - ASSESSMENT
·	SAMIA: Oliguric ATN, ARB rx  ·	s/p Shock, Sepsis, Shock liver  ·	Anemia    Improving renal function. Will monitor on current meds. Encourage PO intake as tolerated. Supportive care. Cardiology / pulmonary follow up. Will follow electrolytes and renal function trend. Strict I & O.

## 2017-10-09 NOTE — DISCHARGE NOTE ADULT - CARE PLAN
Principal Discharge DX:	Septic shock  Goal:	Improvement.  Secondary Diagnosis:	Respiratory failure with hypercapnia  Secondary Diagnosis:	NSTEMI (non-ST elevated myocardial infarction)  Secondary Diagnosis:	Acute liver failure with hepatic coma  Secondary Diagnosis:	Acute kidney injury  Secondary Diagnosis:	Diabetes mellitus Principal Discharge DX:	Septic shock  Goal:	Improvement.  Instructions for follow-up, activity and diet:	Follow up with outpatient PCP.  Secondary Diagnosis:	Respiratory failure with hypercapnia  Instructions for follow-up, activity and diet:	Follow up with outpatient PCP.  Secondary Diagnosis:	NSTEMI (non-ST elevated myocardial infarction)  Instructions for follow-up, activity and diet:	Follow up with outpatient PCP. Continue home meds.  Secondary Diagnosis:	Acute liver failure with hepatic coma  Instructions for follow-up, activity and diet:	Hold Statin. Please follow up liver enzymes with outpatient PCP. Resume as per PCP.  Secondary Diagnosis:	Acute kidney injury  Instructions for follow-up, activity and diet:	Follow up BMP with outpatient PCP. Encourage oral intake.  Secondary Diagnosis:	Diabetes mellitus  Instructions for follow-up, activity and diet:	Continue home meds. Principal Discharge DX:	Septic shock  Goal:	Improvement.  Instructions for follow-up, activity and diet:	Follow up with outpatient PCP.  Secondary Diagnosis:	Respiratory failure with hypercapnia  Instructions for follow-up, activity and diet:	Follow up with outpatient PCP.  Secondary Diagnosis:	NSTEMI (non-ST elevated myocardial infarction)  Instructions for follow-up, activity and diet:	Follow up with outpatient PCP. Continue plavix and asa.  Secondary Diagnosis:	Acute liver failure with hepatic coma  Instructions for follow-up, activity and diet:	Hold Statin. Please follow up liver enzymes with outpatient PCP. Resume as per PCP.  Secondary Diagnosis:	Acute kidney injury  Instructions for follow-up, activity and diet:	Follow up BMP with outpatient PCP. Encourage oral intake.  Secondary Diagnosis:	Diabetes mellitus  Instructions for follow-up, activity and diet:	Continue home meds.  Secondary Diagnosis:	HTN (hypertension)  Instructions for follow-up, activity and diet:	Continue home meds. Principal Discharge DX:	Septic shock  Goal:	Improvement.  Instructions for follow-up, activity and diet:	Follow up with outpatient PCP.  Secondary Diagnosis:	Respiratory failure with hypercapnia  Instructions for follow-up, activity and diet:	Follow up with outpatient PCP.  Secondary Diagnosis:	NSTEMI (non-ST elevated myocardial infarction)  Instructions for follow-up, activity and diet:	Follow up with outpatient PCP. Continue plavix and asa.  Secondary Diagnosis:	Acute liver failure with hepatic coma  Instructions for follow-up, activity and diet:	Hold Statin. Please follow up liver enzymes with outpatient PCP in 3 days. Resume as per PCP.  Secondary Diagnosis:	Acute kidney injury  Instructions for follow-up, activity and diet:	Follow up CBC and BMP in 3 days with outpatient PCP. Encourage oral intake.  Secondary Diagnosis:	Diabetes mellitus  Instructions for follow-up, activity and diet:	Continue home meds. Monitor glucose. Follow up with outpatient PCP for any adjustments.  Secondary Diagnosis:	HTN (hypertension)  Instructions for follow-up, activity and diet:	Continue home meds. Start amlodipine as instructed. Principal Discharge DX:	Septic shock  Goal:	Improvement.  Instructions for follow-up, activity and diet:	Follow up with outpatient PCP.  Secondary Diagnosis:	Respiratory failure with hypercapnia  Instructions for follow-up, activity and diet:	Follow up with outpatient PCP.  Secondary Diagnosis:	NSTEMI (non-ST elevated myocardial infarction)  Instructions for follow-up, activity and diet:	Follow up with outpatient PCP. Continue plavix and asa.  Secondary Diagnosis:	Acute liver failure with hepatic coma  Instructions for follow-up, activity and diet:	Hold Statin. Please follow up liver enzymes with outpatient PCP in 3 days. Resume as per PCP.  Secondary Diagnosis:	Acute kidney injury  Instructions for follow-up, activity and diet:	Follow up CBC and BMP in 3 days with outpatient PCP. Encourage oral intake.  Secondary Diagnosis:	Diabetes mellitus  Instructions for follow-up, activity and diet:	Start insulin as instructed. Glipizide stopped. Monitor glucose. Follow up with outpatient PCP for any adjustments.  Secondary Diagnosis:	HTN (hypertension)  Instructions for follow-up, activity and diet:	Continue home meds. Start amlodipine as instructed.

## 2017-10-09 NOTE — DISCHARGE NOTE ADULT - MEDICATION SUMMARY - MEDICATIONS TO STOP TAKING
I will STOP taking the medications listed below when I get home from the hospital:    atorvastatin 10 mg oral tablet  -- 1 tab(s) by mouth once a day (at bedtime) I will STOP taking the medications listed below when I get home from the hospital:    atorvastatin 10 mg oral tablet  -- 1 tab(s) by mouth once a day (at bedtime)    glipiZIDE 5 mg oral tablet  -- 1 tab(s) by mouth 2 times a day

## 2017-10-09 NOTE — GOALS OF CARE CONVERSATION - PERSONAL ADVANCE DIRECTIVE - CONVERSATION DETAILS
spoke to pt son, Oracio on phone, reviewed molst form, plan of care. pt home w help, not hospice not comfort care, want pt treated, w antibiotics and return to hospital if needed. Son to visit tomorrow, will follow up w molst form. pt needs time to get things in order at home per son.

## 2017-10-09 NOTE — DISCHARGE NOTE ADULT - MEDICATION SUMMARY - MEDICATIONS TO TAKE
I will START or STAY ON the medications listed below when I get home from the hospital:    predniSONE 10 mg oral tablet  -- 2  tabs by mouth once a day x 3 days starting 10/12  1 tab by mouth once a day x 3 days starting 10/15  -- Indication: For Respiratory failure with hypercapnia    acetaminophen 650 mg rectal suppository  -- 1 suppository(ies) rectally every 6 hours, As needed, For Temp over 38.0 C (100.4 F)  -- Indication: For Prophylactic measure    acetaminophen 325 mg oral tablet  -- 2 tab(s) by mouth every 6 hours, As needed, Moderate Pain  -- Indication: For Prophylactic measure    aspirin 81 mg oral tablet, chewable  -- 1 tab(s) by mouth once a day  -- Indication: For NSTEMI (non-ST elevated myocardial infarction)    gabapentin 300 mg oral tablet  --  by mouth 3 times a day  -- Indication: For Diabetes mellitus    glipiZIDE 5 mg oral tablet  -- 1 tab(s) by mouth 2 times a day  -- Indication: For Diabetes mellitus    clopidogrel 75 mg oral tablet  -- 1 tab(s) by mouth once a day  -- Indication: For NSTEMI (non-ST elevated myocardial infarction)    metoprolol tartrate 25 mg oral tablet  -- 1 tab(s) by mouth once a day  -- Indication: For HTN (hypertension)    albuterol-ipratropium 2.5 mg-0.5 mg/3 mL inhalation solution  -- 3 milliliter(s) inhaled every 6 hours  -- Indication: For Respiratory failure with hypercapnia    fluticasone-salmeterol  --   2 times a day  -- Indication: For Respiratory failure with hypercapnia    amLODIPine 10 mg oral tablet  -- 1 tab(s) by mouth once a day  -- Indication: For HTN (hypertension)    lidocaine 5% topical film  -- Apply 1 patch to each side of sacrum once a day.   -- Indication: For Back pain    Lasix 40 mg oral tablet  -- 1 tab(s) by mouth once a day  -- Indication: For HTN (hypertension)    docusate sodium 10 mg/mL oral liquid  -- 10 milliliter(s) by mouth 2 times a day, As needed, Constipation  -- Indication: For Constipation    polyethylene glycol 3350 oral powder for reconstitution  -- 17 gram(s) by mouth once a day, As needed, Constipation  -- Indication: For Constipation    senna oral tablet  -- 2 tab(s) by mouth once a day (at bedtime)  -- Indication: For Constipation    montelukast 10 mg oral tablet  -- 1 tab(s) by mouth once a day  -- Indication: For Obstructive airway disease    omeprazole 40 mg oral delayed release capsule  -- 1 cap(s) by mouth once a day  -- Indication: For Prophylactic measure I will START or STAY ON the medications listed below when I get home from the hospital:    predniSONE 10 mg oral tablet  -- 2  tabs by mouth once a day x 3 days starting 10/12  1 tab by mouth once a day x 3 days starting 10/15  -- Indication: For Respiratory failure with hypercapnia    acetaminophen 650 mg rectal suppository  -- 1 suppository(ies) rectally every 6 hours, As needed, For Temp over 38.0 C (100.4 F)  -- Indication: For Prophylactic measure    acetaminophen 325 mg oral tablet  -- 2 tab(s) by mouth every 6 hours, As needed, Moderate Pain  -- Indication: For Prophylactic measure    aspirin 81 mg oral tablet, chewable  -- 1 tab(s) by mouth once a day  -- Indication: For NSTEMI (non-ST elevated myocardial infarction)    gabapentin 300 mg oral tablet  --  by mouth 3 times a day  -- Indication: For Diabetes mellitus    insulin lispro 100 units/mL subcutaneous solution  -- 2 unit(s) subcutaneous 4 times a day (before meals and at bedtime)  ; 2 Unit(s) if Glucose 151 - 200 4 Unit(s) if Glucose 201 - 250 6 Unit(s) if Glucose 251 - 300 8 Unit(s) if Glucose 301 - 350 10 Unit(s) if Glucose 351 - 400 12 Unit(s) if Glucose Greater Than 400   -- Indication: For Diabetes mellitus    clopidogrel 75 mg oral tablet  -- 1 tab(s) by mouth once a day  -- Indication: For NSTEMI (non-ST elevated myocardial infarction)    metoprolol tartrate 25 mg oral tablet  -- 1 tab(s) by mouth once a day  -- Indication: For HTN (hypertension)    albuterol-ipratropium 2.5 mg-0.5 mg/3 mL inhalation solution  -- 3 milliliter(s) inhaled every 6 hours  -- Indication: For Respiratory failure with hypercapnia    fluticasone-salmeterol  --   2 times a day  -- Indication: For Respiratory failure with hypercapnia    amLODIPine 10 mg oral tablet  -- 1 tab(s) by mouth once a day  -- Indication: For HTN (hypertension)    lidocaine 5% topical film  -- Apply 1 patch to each side of sacrum once a day.   -- Indication: For Back pain    Lasix 40 mg oral tablet  -- 1 tab(s) by mouth once a day  -- Indication: For HTN (hypertension)    polyethylene glycol 3350 oral powder for reconstitution  -- 17 gram(s) by mouth once a day, As needed, Constipation  -- Indication: For Constipation    senna oral tablet  -- 2 tab(s) by mouth once a day (at bedtime)  -- Indication: For Constipation    docusate sodium 10 mg/mL oral liquid  -- 10 milliliter(s) by mouth 2 times a day, As needed, Constipation  -- Indication: For Constipation    montelukast 10 mg oral tablet  -- 1 tab(s) by mouth once a day  -- Indication: For Obstructive airway disease    omeprazole 40 mg oral delayed release capsule  -- 1 cap(s) by mouth once a day  -- Indication: For Prophylactic measure I will START or STAY ON the medications listed below when I get home from the hospital:    predniSONE 10 mg oral tablet  -- 2  tabs by mouth once a day x 3 days starting 10/12  1 tab by mouth once a day x 3 days starting 10/15  -- Indication: For Respiratory failure with hypercapnia    acetaminophen 650 mg rectal suppository  -- 1 suppository(ies) rectally every 6 hours, As needed, For Temp over 38.0 C (100.4 F)  -- Indication: For Prophylactic measure    acetaminophen 325 mg oral tablet  -- 2 tab(s) by mouth every 6 hours, As needed, Moderate Pain  -- Indication: For Prophylactic measure    aspirin 81 mg oral tablet, chewable  -- 1 tab(s) by mouth once a day  -- Indication: For NSTEMI (non-ST elevated myocardial infarction)    gabapentin 300 mg oral tablet  --  by mouth 3 times a day  -- Indication: For Diabetes mellitus    insulin lispro 100 units/mL subcutaneous solution  -- 2 unit(s) subcutaneous 4 times a day (before meals and at bedtime)  ; 2 Unit(s) if Glucose 151 - 200 4 Unit(s) if Glucose 201 - 250 6 Unit(s) if Glucose 251 - 300 8 Unit(s) if Glucose 301 - 350 10 Unit(s) if Glucose 351 - 400 12 Unit(s) if Glucose Greater Than 400   -- Indication: For Diabetes mellitus    HumaLOG KwikPen 100 units/mL subcutaneous solution  -- 2 unit(s) subcutaneous 4 times a day (before meals and at bedtime)   -- Do not drink alcoholic beverages when taking this medication.  It is very important that you take or use this exactly as directed.  Do not skip doses or discontinue unless directed by your doctor.  Keep in refrigerator.  Do not freeze.    -- Indication: For Diabetes mellitus    clopidogrel 75 mg oral tablet  -- 1 tab(s) by mouth once a day  -- Indication: For NSTEMI (non-ST elevated myocardial infarction)    metoprolol tartrate 25 mg oral tablet  -- 1 tab(s) by mouth once a day  -- Indication: For HTN (hypertension)    albuterol-ipratropium 2.5 mg-0.5 mg/3 mL inhalation solution  -- 3 milliliter(s) inhaled every 6 hours  -- Indication: For Respiratory failure with hypercapnia    fluticasone-salmeterol  --   2 times a day  -- Indication: For Respiratory failure with hypercapnia    amLODIPine 10 mg oral tablet  -- 1 tab(s) by mouth once a day  -- Indication: For HTN (hypertension)    lidocaine 5% topical film  -- Apply 1 patch to each side of sacrum once a day.   -- Indication: For Back pain    Lasix 40 mg oral tablet  -- 1 tab(s) by mouth once a day  -- Indication: For HTN (hypertension)    polyethylene glycol 3350 oral powder for reconstitution  -- 17 gram(s) by mouth once a day, As needed, Constipation  -- Indication: For Constipation    senna oral tablet  -- 2 tab(s) by mouth once a day (at bedtime)  -- Indication: For Constipation    docusate sodium 10 mg/mL oral liquid  -- 10 milliliter(s) by mouth 2 times a day, As needed, Constipation  -- Indication: For Constipation    montelukast 10 mg oral tablet  -- 1 tab(s) by mouth once a day  -- Indication: For Obstructive airway disease    omeprazole 40 mg oral delayed release capsule  -- 1 cap(s) by mouth once a day  -- Indication: For Prophylactic measure

## 2017-10-09 NOTE — DISCHARGE NOTE ADULT - PATIENT PORTAL LINK FT
“You can access the [employer] Patient Portal, offered by [facility], by registering with the following website: [email or web address]”

## 2017-10-09 NOTE — PROGRESS NOTE ADULT - PROBLEM SELECTOR PLAN 1
risk factor - exposure to open stove cooking   cont current pulm regimen  keep sat > 86 pct  BIPAP as tolerated  prognosis poor  prob has a component of rest lung disease as well - severe Kyphosis noted

## 2017-10-09 NOTE — PROGRESS NOTE ADULT - ATTENDING COMMENTS
87yo F w/ PMHx DM2, asthma, HTN, iron deficiency anemia, COPD w/ intermittent use of home O2 presents w/ MODS, shock liver, SAMIA, NSTEMI. sepsis 2/2 to PNA and UTI  Septic shock.  Plan: With multi organ failure, secondary to UTI currently Afebrile, normal WBCs  ct scan chest reviewed  completed zosyn therapy       Problem: Respiratory failure with hypercapnia.  Plan: BIPAP at night and NC during the day now,  c/w Nebs.  - continue pulse ox monitoring    - As per Dr. Barraza, keep saturation >86%, CT chest showed large hiatal hernia.     Problem: NSTEMI (non-ST elevated myocardial infarction).  Plan: NSTEMI, c/w ASA, Plavix.  Bradycardia titrate BB  -anemia HH 10.3, would want to keep it above 9-10  continue on asa/plavix/bb/hold ace due to renal failure   will give 1 unit prbc.      Acute liver failure with hepatic coma.  Plan: transaminitis ? 2/2 sepsis.   improving LFTs and is on PPI  follow with GI.      Acute kidney injury.  Plan: SAMIA improving, with improved urine out put  -IVF d/c'd  - f/u with Renal  -avoid nephrotoxic agents.     Type 2 diabetes mellitus with complication, without long-term current use of insulin. Plan: on dysphagia diet, fingersticks q6hrs w/ ISS, and hypoglycemia protocol.

## 2017-10-09 NOTE — PROGRESS NOTE ADULT - ASSESSMENT
89yo F w/ PMHx DM2, asthma, HTN, iron deficiency anemia, COPD w/ intermittent use of home O2, severe sepsis secondary to pneumonia, MSOF, NSTEMI, overall normal LV function, enzymes downtrended. Multiorgan failure is resolving.    - Continue aspirin, clopidogrel for now.   - There is likely some underlying CAD given her significant CE leak.  - cont metoprolol 12.5 mg po q8hr, low hr may limit its titration  - No statin drug secondary to shock liver, which is resolving.  Would start Lipitor 40 when this resolves.   - BP remains elevated at times, hydralazine can be uptitrated as needed  - Monitor and replete electrolytes. Keep K>4.0 and Mg>2.0.   - Not a candidate for coronary angiography  - Abx   - DVT prophylaxis  - trend creatinine and hgb in setting of prem and anemia-has been slowly improving  - Further cardiac workup will depend on clinical course.   - will follow

## 2017-10-09 NOTE — DISCHARGE NOTE ADULT - ADDITIONAL INSTRUCTIONS
Follow up CBC and BMP in 3 days with outpatient PCP.  Hold Statin. Please follow up liver enzymes with outpatient PCP in 3 days. Resume as per PCP. Follow up CBC and BMP in 3 days with outpatient PCP. Follow up weekly labs per outpatient PCP.   Hold Statin. Please follow up liver enzymes with outpatient PCP in 3 days. Resume as per PCP.

## 2017-10-09 NOTE — DISCHARGE NOTE ADULT - CARE PROVIDER_API CALL
Lilliana Chatterjee), Family Medicine  [address] [place], [place] [address]  Phone: [phone]  Fax: [phone]

## 2017-10-09 NOTE — DISCHARGE NOTE ADULT - NSTOBACCOHOTLINE_GEN_A_CS
[facility] Smokers Quitline ([phone]) [facility] Smokers Quitline ([phone])

## 2017-10-09 NOTE — PROGRESS NOTE ADULT - SUBJECTIVE AND OBJECTIVE BOX
Patient is a 88y old  Female who presents with a chief complaint of found unresponsive (30 Sep 2017 15:29)    HPI: 87yo F w/ PMHx DM2, asthma, HTN, iron deficiency anemia, COPD w/ intermittent use of home O2 presents w/ weakness and fatigue. pt is nonverbal at this time and unable to provide any history. She developed a fever last night and the family admits mild cough which was nonproductive. 3-4 days prior to presentation the pt was complaining of mild abd discomfort but was tolerating po well. Family denies dysphagia. They stated that she had an episode of bilious vomiting 4 days ago but none since. She was tolerating diet yesterday without difficulty. Last night developed rectal temp of 102F but denied diaphoresis, chills, rigors. Upon waking this morning, the pt was obtunded. Responding only to noxious stimuli so EMS was alerted. No other recent travel. No recent hospitalizations. No other recent changes in her overall health.  Recently, her DM2 regimen was decreased due to normal glucose levels. Also, her recent creatinine was 1.1 as per family at the bedside. Family members have medical background.     Family Hx: denies h/o premature CAD, DM2 in immediate family members (30 Sep 2017 12:46)    SUBJECTIVE & OBJECTIVE: Pt seen and examined at bedside. Patient is lethargic, difficult to arouse. Son is at bedside. CT Chest showed large hiatal hernia, likely causing regurgitation and difficulty breathing.     ROS: unable to obtain as patient is lethargic and difficult to arouse    Physical Exam:  Vital Signs Last 24 Hrs  T(C): 36.7 (10-09-17 @ 11:24), Max: 36.8 (10-09-17 @ 07:25)  T(F): 98 (10-09-17 @ 11:24), Max: 98.3 (10-09-17 @ 07:25)  HR: 82 (10-09-17 @ 11:52) (65 - 82)  BP: 176/90 (10-09-17 @ 11:24) (153/73 - 196/76)  RR: 19 (10-09-17 @ 11:24) (18 - 19)  SpO2: 100% (10-09-17 @ 11:52) (97% - 100%)    GENERAL: NAD, chronically ill appearing, elderly female, bedbound  HEAD:  Atraumatic, Normocephalic  EYES: unable to assess as patient is sleeping  NERVOUS SYSTEM:  lethargic, difficult to arouse,   CHEST/LUNG: diminished breath sounds on LLL, diffuse wheezing b/l   HEART: Regular rate and rhythm; No murmurs, rubs, or gallops  ABDOMEN: Soft, Nontender, Nondistended; Bowel sounds present, no guarding, no rebound  EXTREMITIES:  2+ Peripheral Pulses, No clubbing, cyanosis, or edema    MEDICATIONS  (STANDING):  ALBUTerol/ipratropium for Nebulization 3 milliLiter(s) Nebulizer every 4 hours  aspirin  chewable 81 milliGRAM(s) Oral daily  clopidogrel Tablet 75 milliGRAM(s) Oral daily  dextrose 5%. 1000 milliLiter(s) (50 mL/Hr) IV Continuous <Continuous>  dextrose 50% Injectable 12.5 Gram(s) IV Push once  dextrose 50% Injectable 25 Gram(s) IV Push once  dextrose 50% Injectable 25 Gram(s) IV Push once  docusate sodium 100 milliGRAM(s) Oral three times a day  furosemide   Injectable 40 milliGRAM(s) IV Push once  gabapentin 100 milliGRAM(s) Oral daily  heparin  Injectable 5000 Unit(s) SubCutaneous every 8 hours  hydrALAZINE 10 milliGRAM(s) Oral three times a day  influenza   Vaccine 0.5 milliLiter(s) IntraMuscular once  insulin lispro (HumaLOG) corrective regimen sliding scale   SubCutaneous Before meals and at bedtime  lidocaine   Patch 1 Patch Transdermal daily  methylPREDNISolone sodium succinate Injectable 20 milliGRAM(s) IV Push three times a day  metoprolol 12.5 milliGRAM(s) Oral every 8 hours  nystatin Powder 1 Application(s) Topical every 6 hours  pantoprazole    Tablet 40 milliGRAM(s) Oral before breakfast  senna 2 Tablet(s) Oral at bedtime    MEDICATIONS  (PRN):  dextrose Gel 1 Dose(s) Oral once PRN Blood Glucose LESS THAN 70 milliGRAM(s)/deciliter  glucagon  Injectable 1 milliGRAM(s) IntraMuscular once PRN Glucose LESS THAN 70 milligrams/deciliter  HYDROmorphone  Injectable 0.125 milliGRAM(s) IV Push every 4 hours PRN Severe Pain (7 - 10)  ondansetron Injectable 4 milliGRAM(s) IV Push every 6 hours PRN Nausea  polyethylene glycol 3350 17 Gram(s) Oral daily PRN Constipation      LABS:                         10.3   3.6   )-----------( 71       ( 09 Oct 2017 06:56 )             33.4     09 Oct 2017 06:56    141    |  105    |  39     ----------------------------<  140    3.8     |  29     |  1.90     Ca    9.0        09 Oct 2017 06:56    TPro  7.6    /  Alb  2.5    /  TBili  0.8    /  DBili  .30    /  AST  70     /  ALT  445    /  AlkPhos  113    09 Oct 2017 06:56    LIVER FUNCTIONS - ( 09 Oct 2017 06:56 )  Alb: 2.5 g/dL / Pro: 7.6 g/dL / ALK PHOS: 113 U/L / ALT: 445 U/L / AST: 70 U/L / GGT: x             CAPILLARY BLOOD GLUCOSE  153 (09 Oct 2017 11:24)  143 (09 Oct 2017 07:25)  210 (08 Oct 2017 21:36)  167 (08 Oct 2017 16:44)                                  DVT/GI ppx  Discussed with pt @ bedside

## 2017-10-09 NOTE — DISCHARGE NOTE ADULT - HOSPITAL COURSE
87yo F w/ PMHx DM2, asthma, HTN, iron deficiency anemia, COPD w/ intermittent use of home O2 presents w/ weakness and fatigue. pt is nonverbal at this time and unable to provide any history. She developed a fever last night and the family admits mild cough which was nonproductive. 3-4 days prior to presentation the pt was complaining of mild abd discomfort but was tolerating po well. Family denies dysphagia. They stated that she had an episode of bilious vomiting 4 days ago but none since. She was tolerating diet yesterday without difficulty. Last night developed rectal temp of 102F but denied diaphoresis, chills, rigors. Upon waking this morning, the pt was obtunded. Responding only to noxious stimuli so EMS was alerted. No other recent travel. No recent hospitalizations. No other recent changes in her overall health.  Recently, her DM2 regimen was decreased due to normal glucose levels. Also, her recent creatinine was 1.1 as per family at the bedside. In the ED, pt found hypotensive w/ SBP in 50's initially, now w/  after 3L NS bolus. Pt also bradycardic w/ P in 50's persistently despite hypotension. Pt was given vanco/zosyn as well as she was found to have L sided PNA. Patient completed 7 day course of Zosyn. Patient found to have NSTEMI but was not a candidate for catheterization. Patient found to be anemic and transfused 1 unit pRBCs. Patient's SAMIA improved. CT chest showed No thoracic aortic aneurysm. No pericardial effusion. Global cardiomegaly present. Calcified coronary arteries identified. A large hiatus hernia is noted. Central airway intact. 87yo F w/ PMHx DM2, asthma, HTN, iron deficiency anemia, COPD w/ intermittent use of home O2 presents w/ weakness and fatigue. pt is nonverbal at this time and unable to provide any history. She developed a fever last night and the family admits mild cough which was nonproductive. 3-4 days prior to presentation the pt was complaining of mild abd discomfort but was tolerating po well. Family denies dysphagia. They stated that she had an episode of bilious vomiting 4 days ago but none since. She was tolerating diet yesterday without difficulty. Last night developed rectal temp of 102F but denied diaphoresis, chills, rigors. Upon waking this morning, the pt was obtunded. Responding only to noxious stimuli so EMS was alerted. No other recent travel. No recent hospitalizations. No other recent changes in her overall health.  Recently, her DM2 regimen was decreased due to normal glucose levels. Also, her recent creatinine was 1.1 as per family at the bedside. In the ED, pt found hypotensive w/ SBP in 50's initially, now w/  after 3L NS bolus. Pt also bradycardic w/ P in 50's persistently despite hypotension. Pt was given vanco/zosyn as well as she was found to have L sided PNA. Patient completed 7 day course of Zosyn. Patient found to have NSTEMI but was not a candidate for catheterization. Patient found to be anemic and transfused 1 unit pRBCs. Patient's SAMIA improved. CT chest showed No thoracic aortic aneurysm. No pericardial effusion. Global cardiomegaly present. Calcified coronary arteries identified. A large hiatus hernia is noted. Central airway intact.  Pt's general prognosis was poor, ( PULM/PALIATIVE) and palliative team was consulted and pt's family refuse comfort measure.        On discharge day, pt ________________________________ 87yo F w/ PMHx DM2, asthma, HTN, iron deficiency anemia, COPD w/ intermittent use of home O2 presents w/ weakness and fatigue. pt is nonverbal at this time and unable to provide any history. She developed a fever last night and the family admits mild cough which was nonproductive. 3-4 days prior to presentation the pt was complaining of mild abd discomfort but was tolerating po well. Family denies dysphagia. They stated that she had an episode of bilious vomiting 4 days ago but none since. She was tolerating diet yesterday without difficulty. Last night developed rectal temp of 102F but denied diaphoresis, chills, rigors. Upon waking this morning, the pt was obtunded. Responding only to noxious stimuli so EMS was alerted. No other recent travel. No recent hospitalizations. No other recent changes in her overall health.  Recently, her DM2 regimen was decreased due to normal glucose levels. Also, her recent creatinine was 1.1 as per family at the bedside. In the ED, pt found hypotensive w/ SBP in 50's initially, now w/  after 3L NS bolus. Pt also bradycardic w/ P in 50's persistently despite hypotension. Pt was given vanco/zosyn as well as she was found to have L sided PNA. Patient completed 7 day course of Zosyn. Patient found to have NSTEMI but was not a candidate for catheterization. Patient found to be anemic and transfused 1 unit pRBCs. Patient's SAMIA improved. CT chest showed No thoracic aortic aneurysm. No pericardial effusion. Global cardiomegaly present. Calcified coronary arteries identified. A large hiatus hernia is noted. Central airway intact.  Pt's general prognosis was poor, ( PULM/PALIATIVE) and palliative team was consulted and pt's family refuse comfort measure.  Patient had elevated liver enzymes AST 5263, ALT 2524 which has trended down AST 47, . Patient's statin was held. Patient should follow up with outpatient liver enzymes with PCP to resume statin. Patient's sbp >160 and was placed on hydralazine. Upon discharge, hydralazine switched to amlodipine for better compliance.     On discharge day, pt ________________________________ 87yo F w/ PMHx DM2, asthma, HTN, iron deficiency anemia, COPD w/ intermittent use of home O2 presents w/ weakness and fatigue. pt is nonverbal at this time and unable to provide any history. She developed a fever last night and the family admits mild cough which was nonproductive. 3-4 days prior to presentation the pt was complaining of mild abd discomfort but was tolerating po well. Family denies dysphagia. They stated that she had an episode of bilious vomiting 4 days ago but none since. She was tolerating diet yesterday without difficulty. Last night developed rectal temp of 102F but denied diaphoresis, chills, rigors. Upon waking this morning, the pt was obtunded. Responding only to noxious stimuli so EMS was alerted. No other recent travel. No recent hospitalizations. No other recent changes in her overall health.  Recently, her DM2 regimen was decreased due to normal glucose levels. Also, her recent creatinine was 1.1 as per family at the bedside. In the ED, pt found hypotensive w/ SBP in 50's initially, now w/  after 3L NS bolus. Pt also bradycardic w/ P in 50's persistently despite hypotension. Pt was given vanco/zosyn as well as she was found to have L sided PNA. Patient completed 7 day course of Zosyn. Patient found to have NSTEMI but was not a candidate for catheterization. Patient started on Plavix. Patient found to be anemic and transfused 1 unit pRBCs. Patient's SAMIA improved. CT chest showed No thoracic aortic aneurysm. No pericardial effusion. Global cardiomegaly present. Calcified coronary arteries identified. A large hiatus hernia is noted. Central airway intact.  Pt's general prognosis was poor, ( PULM/PALIATIVE) and palliative team was consulted and pt's family refused comfort measure.  Patient had elevated liver enzymes AST 5263, ALT 2524 which has trended down AST 47, . Patient's statin was held. Patient should follow up with outpatient liver enzymes with PCP to resume statin. Patient's sbp >160 and was placed on hydralazine. Upon discharge, hydralazine switched to amlodipine for better compliance.     On discharge day, pt ________________________________ 89yo F w/ PMHx DM2, asthma, HTN, iron deficiency anemia, COPD w/ intermittent use of home O2 presents w/ weakness and fatigue. pt is nonverbal at this time and unable to provide any history. She developed a fever last night and the family admits mild cough which was nonproductive. 3-4 days prior to presentation the pt was complaining of mild abd discomfort but was tolerating po well. Family denies dysphagia. They stated that she had an episode of bilious vomiting 4 days ago but none since. She was tolerating diet yesterday without difficulty. Last night developed rectal temp of 102F but denied diaphoresis, chills, rigors. Upon waking this morning, the pt was obtunded. Responding only to noxious stimuli so EMS was alerted. No other recent travel. No recent hospitalizations. No other recent changes in her overall health.  Recently, her DM2 regimen was decreased due to normal glucose levels. Also, her recent creatinine was 1.1 as per family at the bedside. In the ED, pt found hypotensive w/ SBP in 50's initially, now w/  after 3L NS bolus. Pt also bradycardic w/ P in 50's persistently despite hypotension. Pt was given vanco/zosyn as well as she was found to have L sided PNA. Patient completed 7 day course of Zosyn. Patient found to have NSTEMI but was not a candidate for catheterization. Patient started on Plavix. Patient found to be anemic and transfused 1 unit pRBCs. Patient's SAMIA improved. CT chest showed No thoracic aortic aneurysm. No pericardial effusion. Global cardiomegaly present. Calcified coronary arteries identified. A large hiatus hernia is noted. Central airway intact.  Pt's general prognosis was poor, ( PULM/PALIATIVE) and palliative team was consulted and pt's family refused comfort measure.  Patient had elevated liver enzymes AST 5263, ALT 2524 which has trended down AST 47, . Patient's statin was held. Patient should follow up with outpatient liver enzymes with PCP to resume statin. Patient's sbp >160 and was placed on hydralazine. Upon discharge, hydralazine switched to amlodipine for better compliance.     On discharge day, pt's vitals and physical exam were:   Vital Signs Last 24 Hrs  T(C): 37.4 (10-11-17 @ 07:39), Max: 37.4 (10-11-17 @ 07:39)  T(F): 99.4 (10-11-17 @ 07:39), Max: 99.4 (10-11-17 @ 07:39)  HR: 75 (10-11-17 @ 07:39) (73 - 89)  BP: 170/80 (10-11-17 @ 07:39) (143/73 - 170/80)  RR: 180 (10-11-17 @ 07:39) (18 - 180)  SpO2: 96% (10-11-17 @ 07:39) (92% - 98%)    Physical Exam:  General: elderly, chronically ill appearing female, no acute distress  HEENT: NCAT, moist mucous membranes  Neurology: Alert and arousable, nonfocal, sensation intact  Respiratory: +wheezing RUL, GUY, + inspiratory stridor, no rales, no rhonchi  CV: RRR, +S1/S2, no murmurs, rubs or gallops  Abdominal: Soft, NT, ND +BSx4, no guarding, no rebound  : wearing diaper  Extremities: No C/C/E, + peripheral pulses  MSK: Normal ROM, no joint erythema or warmth, no joint swelling   Skin: warm, dry, normal color, no rash or abnormal lesions    Patient is medically stable for discharge. Plan discussed with family. 89yo F w/ PMHx DM2, asthma, HTN, iron deficiency anemia, COPD w/ intermittent use of home O2 presents w/ weakness and fatigue. pt is nonverbal at this time and unable to provide any history. She developed a fever last night and the family admits mild cough which was nonproductive. 3-4 days prior to presentation the pt was complaining of mild abd discomfort but was tolerating po well. Family denies dysphagia. They stated that she had an episode of bilious vomiting 4 days ago but none since. She was tolerating diet yesterday without difficulty. Last night developed rectal temp of 102F but denied diaphoresis, chills, rigors. Upon waking this morning, the pt was obtunded. Responding only to noxious stimuli so EMS was alerted. No other recent travel. No recent hospitalizations. No other recent changes in her overall health.  Recently, her DM2 regimen was decreased due to normal glucose levels. Also, her recent creatinine was 1.1 as per family at the bedside. In the ED, pt found hypotensive w/ SBP in 50's initially, now w/  after 3L NS bolus. Pt also bradycardic w/ P in 50's persistently despite hypotension. Pt was given vanco/zosyn as well as she was found to have L sided PNA. Patient completed 7 day course of Zosyn. Patient found to have NSTEMI but was not a candidate for catheterization. Patient started on Plavix. Patient found to be anemic and transfused 1 unit pRBCs. Patient's SAMIA improved. CT chest showed No thoracic aortic aneurysm. No pericardial effusion. Global cardiomegaly present. Calcified coronary arteries identified. A large hiatus hernia is noted. Central airway intact.  Pt's general prognosis was poor, ( PULM/PALIATIVE) and palliative team was consulted and pt's family refused comfort measure.  Patient had elevated liver enzymes AST 5263, ALT 2524 which has trended down AST 47, . Patient's statin was held. Patient should follow up with outpatient liver enzymes with PCP to resume statin. Patient's sbp >160 and was placed on hydralazine. Upon discharge, hydralazine switched to amlodipine for better compliance.     On discharge day, pt's vitals and physical exam were:   Vital Signs Last 24 Hrs  T(C): 37.4 (10-11-17 @ 07:39), Max: 37.4 (10-11-17 @ 07:39)  T(F): 99.4 (10-11-17 @ 07:39), Max: 99.4 (10-11-17 @ 07:39)  HR: 75 (10-11-17 @ 07:39) (73 - 89)  BP: 170/80 (10-11-17 @ 07:39) (143/73 - 170/80)  RR: 180 (10-11-17 @ 07:39) (18 - 180)  SpO2: 96% (10-11-17 @ 07:39) (92% - 98%)    Physical Exam:  General: elderly, chronically ill appearing female, no acute distress  HEENT: NCAT, moist mucous membranes  Neurology: Alert and arousable, nonfocal, sensation intact  Respiratory: +wheezing RUL, GUY, + inspiratory stridor, no rales, no rhonchi  CV: RRR, +S1/S2, no murmurs, rubs or gallops  Abdominal: Soft, NT, ND +BSx4, no guarding, no rebound  : wearing diaper  Extremities: No C/C/E, + peripheral pulses  MSK: Normal ROM, no joint erythema or warmth, no joint swelling   Skin: warm, dry, normal color, no rash or abnormal lesions    Patient is medically stable for discharge. Plan discussed with family. pt to have repeat cbc/bmp in 3 days 89yo F w/ PMHx DM2, asthma, HTN, iron deficiency anemia, COPD w/ intermittent use of home O2 presents w/ weakness and fatigue. pt is nonverbal at this time and unable to provide any history. She developed a fever last night and the family admits mild cough which was nonproductive. 3-4 days prior to presentation the pt was complaining of mild abd discomfort but was tolerating po well. Family denies dysphagia. They stated that she had an episode of bilious vomiting 4 days ago but none since. She was tolerating diet yesterday without difficulty. Last night developed rectal temp of 102F but denied diaphoresis, chills, rigors. Upon waking this morning, the pt was obtunded. Responding only to noxious stimuli so EMS was alerted. No other recent travel. No recent hospitalizations. No other recent changes in her overall health.  Recently, her DM2 regimen was decreased due to normal glucose levels. Also, her recent creatinine was 1.1 as per family at the bedside. In the ED, pt found hypotensive w/ SBP in 50's initially, now w/  after 3L NS bolus. Pt also bradycardic w/ P in 50's persistently despite hypotension. Pt was given vanco/zosyn as well as she was found to have L sided PNA. Patient completed 7 day course of Zosyn. Patient found to have NSTEMI but was not a candidate for catheterization. Patient started on Plavix. Patient found to be anemic and transfused 1 unit pRBCs. Patient's SAMIA improved. CT chest showed No thoracic aortic aneurysm. No pericardial effusion. Global cardiomegaly present. Calcified coronary arteries identified. A large hiatus hernia is noted. Central airway intact.  Pt's general prognosis was poor, ( PULM/PALIATIVE) and palliative team was consulted and pt's family refused comfort measure.  Patient had elevated liver enzymes AST 5263, ALT 2524 which has trended down AST 47, . Patient's statin was held. Patient should follow up with outpatient liver enzymes with PCP to resume statin. Patient's sbp >160 and was placed on hydralazine. Upon discharge, hydralazine switched to amlodipine for better compliance.     On discharge day, pt's vitals and physical exam were:   Vital Signs Last 24 Hrs  T(C): 37.4 (10-11-17 @ 07:39), Max: 37.4 (10-11-17 @ 07:39)  T(F): 99.4 (10-11-17 @ 07:39), Max: 99.4 (10-11-17 @ 07:39)  HR: 75 (10-11-17 @ 07:39) (73 - 89)  BP: 170/80 (10-11-17 @ 07:39) (143/73 - 170/80)  RR: 180 (10-11-17 @ 07:39) (18 - 180)  SpO2: 96% (10-11-17 @ 07:39) (92% - 98%)    Physical Exam:  General: elderly, chronically ill appearing female, no acute distress  HEENT: NCAT, moist mucous membranes  Neurology: Alert and arousable, nonfocal, sensation intact  Respiratory: +wheezing RUL, GUY, + inspiratory stridor, no rales, no rhonchi  CV: RRR, +S1/S2, no murmurs, rubs or gallops  Abdominal: Soft, NT, ND +BSx4, no guarding, no rebound  : wearing diaper  Extremities: No C/C/E, + peripheral pulses  MSK: Normal ROM, no joint erythema or warmth, no joint swelling   Skin: warm, dry, normal color, no rash or abnormal lesions    Patient is medically stable for discharge. Plan discussed with family. pt to have repeat cbc/bmp in 3 days , given pt Hb1ac is 5.3, too tightly controlled, her age, and GFR 30, will hold sulfyurea and place on sliding scale insulin, pt is to follow up with pmd in 3 days 89yo F w/ PMHx DM2, asthma, HTN, iron deficiency anemia, COPD w/ intermittent use of home O2 presents w/ weakness and fatigue. pt is nonverbal at this time and unable to provide any history. She developed a fever last night and the family admits mild cough which was nonproductive. 3-4 days prior to presentation the pt was complaining of mild abd discomfort but was tolerating po well. Family denies dysphagia. They stated that she had an episode of bilious vomiting 4 days ago but none since. She was tolerating diet yesterday without difficulty. Last night developed rectal temp of 102F but denied diaphoresis, chills, rigors. Upon waking this morning, the pt was obtunded. Responding only to noxious stimuli so EMS was alerted. No other recent travel. No recent hospitalizations. No other recent changes in her overall health.  Recently, her DM2 regimen was decreased due to normal glucose levels. Also, her recent creatinine was 1.1 as per family at the bedside. In the ED, pt found hypotensive w/ SBP in 50's initially, now w/  after 3L NS bolus. Pt also bradycardic w/ P in 50's persistently despite hypotension. Pt was given vanco/zosyn as well as she was found to have L sided PNA. Patient completed 7 day course of Zosyn. Patient found to have NSTEMI but was not a candidate for catheterization. Patient started on Plavix. Patient found to be anemic and transfused 1 unit pRBCs. Patient's SAMIA improved. CT chest showed No thoracic aortic aneurysm. No pericardial effusion. Global cardiomegaly present. Calcified coronary arteries identified. A large hiatus hernia is noted. Central airway intact.  Pt's general prognosis was poor, ( PULM/PALIATIVE) and palliative team was consulted and pt's family refused comfort measure.  Patient had elevated liver enzymes AST 5263, ALT 2524 which has trended down AST 47, . Patient's statin was held. Patient should follow up with outpatient liver enzymes with PCP to resume statin. Patient's sbp >160 and was placed on hydralazine. Upon discharge, hydralazine switched to amlodipine for better compliance. Patient's HgA1c 5.4. Patient started on insulin sliding scale and held glipizide.     On discharge day, pt's vitals and physical exam were:   Vital Signs Last 24 Hrs  T(C): 37.4 (10-11-17 @ 07:39), Max: 37.4 (10-11-17 @ 07:39)  T(F): 99.4 (10-11-17 @ 07:39), Max: 99.4 (10-11-17 @ 07:39)  HR: 75 (10-11-17 @ 07:39) (73 - 89)  BP: 170/80 (10-11-17 @ 07:39) (143/73 - 170/80)  RR: 180 (10-11-17 @ 07:39) (18 - 180)  SpO2: 96% (10-11-17 @ 07:39) (92% - 98%)    Physical Exam:  General: elderly, chronically ill appearing female, no acute distress  HEENT: NCAT, moist mucous membranes  Neurology: Alert and arousable, nonfocal, sensation intact  Respiratory: +wheezing RUL, GUY, + inspiratory stridor, no rales, no rhonchi  CV: RRR, +S1/S2, no murmurs, rubs or gallops  Abdominal: Soft, NT, ND +BSx4, no guarding, no rebound  : wearing diaper  Extremities: No C/C/E, + peripheral pulses  MSK: Normal ROM, no joint erythema or warmth, no joint swelling   Skin: warm, dry, normal color, no rash or abnormal lesions    Patient is medically stable for discharge. Plan discussed with family. pt to have repeat cbc/bmp in 3 days , given pt Hb1ac is 5.4, too tightly controlled, her age, and GFR 30, will hold sulfonlyurea and place on sliding scale insulin, pt is to follow up with pmd in 3 days

## 2017-10-09 NOTE — PROGRESS NOTE ADULT - SUBJECTIVE AND OBJECTIVE BOX
Interval Events:    HPI: 89yo F w/ PMHx DM2, asthma, HTN, iron deficiency anemia, COPD w/ intermittent use of home O2 presents w/ weakness and fatigue. pt is nonverbal at this time and unable to provide any history. She developed a fever last night and the family admits mild cough which was nonproductive. 3-4 days prior to presentation the pt was complaining of mild abd discomfort but was tolerating po well. Family denies dysphagia. They stated that she had an episode of bilious vomiting 4 days ago but none since. She was tolerating diet yesterday without difficulty. Last night developed rectal temp of 102F but denied diaphoresis, chills, rigors. Upon waking this morning, the pt was obtunded. Responding only to noxious stimuli so EMS was alerted. No other recent travel. No recent hospitalizations. No other recent changes in her overall health.  Recnetly her DM2 regimen was decreased due to normal glucose levels. Also, her recent creatinine was 1.1 as per family at the bedside. Family members have medical background.     MEDICATIONS  (STANDING):  ALBUTerol/ipratropium for Nebulization 3 milliLiter(s) Nebulizer every 4 hours  aspirin  chewable 81 milliGRAM(s) Oral daily  clopidogrel Tablet 75 milliGRAM(s) Oral daily  dextrose 5%. 1000 milliLiter(s) (50 mL/Hr) IV Continuous <Continuous>  dextrose 50% Injectable 12.5 Gram(s) IV Push once  dextrose 50% Injectable 25 Gram(s) IV Push once  dextrose 50% Injectable 25 Gram(s) IV Push once  docusate sodium 100 milliGRAM(s) Oral three times a day  furosemide   Injectable 40 milliGRAM(s) IV Push once  gabapentin 100 milliGRAM(s) Oral daily  heparin  Injectable 5000 Unit(s) SubCutaneous every 8 hours  hydrALAZINE 10 milliGRAM(s) Oral three times a day  influenza   Vaccine 0.5 milliLiter(s) IntraMuscular once  insulin lispro (HumaLOG) corrective regimen sliding scale   SubCutaneous Before meals and at bedtime  lidocaine   Patch 1 Patch Transdermal daily  methylPREDNISolone sodium succinate Injectable 20 milliGRAM(s) IV Push three times a day  metoprolol 12.5 milliGRAM(s) Oral every 8 hours  nystatin Powder 1 Application(s) Topical every 6 hours  pantoprazole    Tablet 40 milliGRAM(s) Oral before breakfast  senna 2 Tablet(s) Oral at bedtime    MEDICATIONS  (PRN):  dextrose Gel 1 Dose(s) Oral once PRN Blood Glucose LESS THAN 70 milliGRAM(s)/deciliter  glucagon  Injectable 1 milliGRAM(s) IntraMuscular once PRN Glucose LESS THAN 70 milligrams/deciliter  HYDROmorphone  Injectable 0.125 milliGRAM(s) IV Push every 4 hours PRN Severe Pain (7 - 10)  ondansetron Injectable 4 milliGRAM(s) IV Push every 6 hours PRN Nausea  polyethylene glycol 3350 17 Gram(s) Oral daily PRN Constipation      Allergies    No Known Allergies    Intolerances        Review of Systems:    General:  No wt loss, fevers, chills, night sweats, fatigue   Eyes:  Good vision, no reported pain  ENT:  No sore throat, pain, runny nose, dysphagia  CV:  No pain, palpitations, hypo/hypertension  Resp:  No dyspnea, cough, tachypnea, wheezing  GI:  No pain, No nausea, No vomiting, No diarrhea, No constipation, No weight loss, No fever, No pruritis, No rectal bleeding, No tarry stools, No dysphagia,  :  No pain, bleeding, incontinence, nocturia  Muscle:  No pain, weakness  Neuro:  RLE pain  Psych:  No fatigue, insomnia, mood problems, depression  Endocrine:  No polyuria, polydipsia, cold/heat intolerance  Heme:  No petechiae, ecchymosis, easy bruisability  Skin:  No rash, tattoos, scars, edema      Vital Signs Last 24 Hrs  T(C): 36.7 (09 Oct 2017 11:24), Max: 37.5 (08 Oct 2017 16:22)  T(F): 98 (09 Oct 2017 11:24), Max: 99.5 (08 Oct 2017 16:22)  HR: 72 (09 Oct 2017 11:24) (65 - 84)  BP: 176/90 (09 Oct 2017 11:24) (134/85 - 196/76)  BP(mean): --  RR: 19 (09 Oct 2017 11:24) (18 - 19)  SpO2: 99% (09 Oct 2017 11:24) (94% - 99%)    PHYSICAL EXAM:    GENERAL:  AAOx2, follows commands, able to communicate verbally   HEENT:  NC/AT,  conjunctivae clear and pink, no thyromegaly, nodules, adenopathy, no JVD, sclera -anicteric  CHEST:  decreased BS at bases  HEART:  Regular rhythm, S1, S2, no murmur/rub/S3/S4, no abdominal bruit, no edema  ABDOMEN:  Soft, non-tender, non-distended, normoactive bowel sounds,  no masses ,no hepato-splenomegaly, no signs of chronic liver disease  EXTREMITIES:  no cyanosis,clubbing or edema  SKIN:  No rash/erythema/ecchymoses/petechiae/wounds/abscess/warm/dry  NEURO:  follows commands          LABS:                        10.3   3.6   )-----------( 71       ( 09 Oct 2017 06:56 )             33.4     10-09    141  |  105  |  39<H>  ----------------------------<  140<H>  3.8   |  29  |  1.90<H>    Ca    9.0      09 Oct 2017 06:56    TPro  7.6  /  Alb  2.5<L>  /  TBili  0.8  /  DBili  .30<H>  /  AST  70<H>  /  ALT  445<H>  /  AlkPhos  113  10-09          RADIOLOGY & ADDITIONAL TESTS: Interval Events: Pt is without complaints; clinically unchanged     HPI: 87yo F w/ PMHx DM2, asthma, HTN, iron deficiency anemia, COPD w/ intermittent use of home O2 presents w/ weakness and fatigue. pt is nonverbal at this time and unable to provide any history. She developed a fever last night and the family admits mild cough which was nonproductive. 3-4 days prior to presentation the pt was complaining of mild abd discomfort but was tolerating po well. Family denies dysphagia. They stated that she had an episode of bilious vomiting 4 days ago but none since. She was tolerating diet yesterday without difficulty. Last night developed rectal temp of 102F but denied diaphoresis, chills, rigors. Upon waking this morning, the pt was obtunded. Responding only to noxious stimuli so EMS was alerted. No other recent travel. No recent hospitalizations. No other recent changes in her overall health.  Recnetly her DM2 regimen was decreased due to normal glucose levels. Also, her recent creatinine was 1.1 as per family at the bedside. Family members have medical background.     MEDICATIONS  (STANDING):  ALBUTerol/ipratropium for Nebulization 3 milliLiter(s) Nebulizer every 4 hours  aspirin  chewable 81 milliGRAM(s) Oral daily  clopidogrel Tablet 75 milliGRAM(s) Oral daily  dextrose 5%. 1000 milliLiter(s) (50 mL/Hr) IV Continuous <Continuous>  dextrose 50% Injectable 12.5 Gram(s) IV Push once  dextrose 50% Injectable 25 Gram(s) IV Push once  dextrose 50% Injectable 25 Gram(s) IV Push once  docusate sodium 100 milliGRAM(s) Oral three times a day  furosemide   Injectable 40 milliGRAM(s) IV Push once  gabapentin 100 milliGRAM(s) Oral daily  heparin  Injectable 5000 Unit(s) SubCutaneous every 8 hours  hydrALAZINE 10 milliGRAM(s) Oral three times a day  influenza   Vaccine 0.5 milliLiter(s) IntraMuscular once  insulin lispro (HumaLOG) corrective regimen sliding scale   SubCutaneous Before meals and at bedtime  lidocaine   Patch 1 Patch Transdermal daily  methylPREDNISolone sodium succinate Injectable 20 milliGRAM(s) IV Push three times a day  metoprolol 12.5 milliGRAM(s) Oral every 8 hours  nystatin Powder 1 Application(s) Topical every 6 hours  pantoprazole    Tablet 40 milliGRAM(s) Oral before breakfast  senna 2 Tablet(s) Oral at bedtime    MEDICATIONS  (PRN):  dextrose Gel 1 Dose(s) Oral once PRN Blood Glucose LESS THAN 70 milliGRAM(s)/deciliter  glucagon  Injectable 1 milliGRAM(s) IntraMuscular once PRN Glucose LESS THAN 70 milligrams/deciliter  HYDROmorphone  Injectable 0.125 milliGRAM(s) IV Push every 4 hours PRN Severe Pain (7 - 10)  ondansetron Injectable 4 milliGRAM(s) IV Push every 6 hours PRN Nausea  polyethylene glycol 3350 17 Gram(s) Oral daily PRN Constipation      Allergies    No Known Allergies    Intolerances        Review of Systems:    General:  No wt loss, fevers, chills, night sweats, fatigue   Eyes:  Good vision, no reported pain  ENT:  No sore throat, pain, runny nose, dysphagia  CV:  No pain, palpitations, hypo/hypertension  Resp:  No dyspnea, cough, tachypnea, wheezing  GI:  No pain, No nausea, No vomiting, No diarrhea, No constipation, No weight loss, No fever, No pruritis, No rectal bleeding, No tarry stools, No dysphagia,  :  No pain, bleeding, incontinence, nocturia  Muscle:  No pain, weakness  Neuro:  RLE pain  Psych:  No fatigue, insomnia, mood problems, depression  Endocrine:  No polyuria, polydipsia, cold/heat intolerance  Heme:  No petechiae, ecchymosis, easy bruisability  Skin:  No rash, tattoos, scars, edema      Vital Signs Last 24 Hrs  T(C): 36.7 (09 Oct 2017 11:24), Max: 37.5 (08 Oct 2017 16:22)  T(F): 98 (09 Oct 2017 11:24), Max: 99.5 (08 Oct 2017 16:22)  HR: 72 (09 Oct 2017 11:24) (65 - 84)  BP: 176/90 (09 Oct 2017 11:24) (134/85 - 196/76)  BP(mean): --  RR: 19 (09 Oct 2017 11:24) (18 - 19)  SpO2: 99% (09 Oct 2017 11:24) (94% - 99%)    PHYSICAL EXAM:    GENERAL:  AAOx2, follows commands, able to communicate verbally   HEENT:  NC/AT,  conjunctivae clear and pink, no thyromegaly, nodules, adenopathy, no JVD, sclera -anicteric  CHEST:  decreased BS at bases  HEART:  Regular rhythm, S1, S2, no murmur/rub/S3/S4, no abdominal bruit, no edema  ABDOMEN:  Soft, non-tender, non-distended, normoactive bowel sounds,  no masses ,no hepato-splenomegaly, no signs of chronic liver disease  EXTREMITIES:  no cyanosis,clubbing or edema  SKIN:  No rash/erythema/ecchymoses/petechiae/wounds/abscess/warm/dry  NEURO:  follows commands          LABS:                        10.3   3.6   )-----------( 71       ( 09 Oct 2017 06:56 )             33.4     10-09    141  |  105  |  39<H>  ----------------------------<  140<H>  3.8   |  29  |  1.90<H>    Ca    9.0      09 Oct 2017 06:56    TPro  7.6  /  Alb  2.5<L>  /  TBili  0.8  /  DBili  .30<H>  /  AST  70<H>  /  ALT  445<H>  /  AlkPhos  113  10-09          RADIOLOGY & ADDITIONAL TESTS:

## 2017-10-09 NOTE — DISCHARGE NOTE ADULT - SECONDARY DIAGNOSIS.
Respiratory failure with hypercapnia NSTEMI (non-ST elevated myocardial infarction) Acute liver failure with hepatic coma Acute kidney injury Diabetes mellitus HTN (hypertension)

## 2017-10-09 NOTE — PROGRESS NOTE ADULT - PROBLEM SELECTOR PLAN 1
With multi organ failure, 2/2 PNA and UTI. Afebrile, normal WBCs  CXR Improving with stable vitals.  completed zosyn total for 7 days

## 2017-10-09 NOTE — PROGRESS NOTE ADULT - SUBJECTIVE AND OBJECTIVE BOX
Date/Time Patient Seen:  		  Referring MD:   Data Reviewed	       Patient is a 88y old  Female who presents with a chief complaint of found unresponsive (30 Sep 2017 15:29)  in bed  seen and examined  refusing BIPAP overnight        Subjective/HPI     PAST MEDICAL & SURGICAL HISTORY:  Asthma  Diabetes mellitus  HTN (hypertension)  No significant past surgical history        Medication list         MEDICATIONS  (STANDING):  ALBUTerol/ipratropium for Nebulization 3 milliLiter(s) Nebulizer every 4 hours  aspirin  chewable 81 milliGRAM(s) Oral daily  clopidogrel Tablet 75 milliGRAM(s) Oral daily  dextrose 5%. 1000 milliLiter(s) (50 mL/Hr) IV Continuous <Continuous>  dextrose 50% Injectable 12.5 Gram(s) IV Push once  dextrose 50% Injectable 25 Gram(s) IV Push once  dextrose 50% Injectable 25 Gram(s) IV Push once  docusate sodium 100 milliGRAM(s) Oral three times a day  furosemide   Injectable 40 milliGRAM(s) IV Push once  gabapentin 100 milliGRAM(s) Oral daily  heparin  Injectable 5000 Unit(s) SubCutaneous every 8 hours  hydrALAZINE 10 milliGRAM(s) Oral three times a day  influenza   Vaccine 0.5 milliLiter(s) IntraMuscular once  insulin lispro (HumaLOG) corrective regimen sliding scale   SubCutaneous Before meals and at bedtime  lidocaine   Patch 1 Patch Transdermal daily  methylPREDNISolone sodium succinate Injectable 20 milliGRAM(s) IV Push three times a day  metoprolol 12.5 milliGRAM(s) Oral every 8 hours  nystatin Powder 1 Application(s) Topical every 6 hours  pantoprazole    Tablet 40 milliGRAM(s) Oral before breakfast  senna 2 Tablet(s) Oral at bedtime    MEDICATIONS  (PRN):  dextrose Gel 1 Dose(s) Oral once PRN Blood Glucose LESS THAN 70 milliGRAM(s)/deciliter  glucagon  Injectable 1 milliGRAM(s) IntraMuscular once PRN Glucose LESS THAN 70 milligrams/deciliter  HYDROmorphone  Injectable 0.125 milliGRAM(s) IV Push every 4 hours PRN Severe Pain (7 - 10)  ondansetron Injectable 4 milliGRAM(s) IV Push every 6 hours PRN Nausea  polyethylene glycol 3350 17 Gram(s) Oral daily PRN Constipation         Vitals log        ICU Vital Signs Last 24 Hrs  T(C): 36.8 (09 Oct 2017 07:25), Max: 37.5 (08 Oct 2017 16:22)  T(F): 98.3 (09 Oct 2017 07:25), Max: 99.5 (08 Oct 2017 16:22)  HR: 70 (09 Oct 2017 07:25) (64 - 84)  BP: 196/76 (09 Oct 2017 07:25) (134/85 - 196/76)  BP(mean): --  ABP: --  ABP(mean): --  RR: 19 (09 Oct 2017 07:25) (18 - 20)  SpO2: 98% (09 Oct 2017 07:25) (94% - 98%)           Input and Output:  I&O's Detail    08 Oct 2017 07:01  -  09 Oct 2017 07:00  --------------------------------------------------------  IN:    dextrose 5% + sodium chloride 0.45%.: 200 mL    Oral Fluid: 240 mL    Solution: 342 mL  Total IN: 782 mL    OUT:  Total OUT: 0 mL    Total NET: 782 mL          Lab Data                        8.8    6.3   )-----------( 92       ( 08 Oct 2017 06:30 )             28.7     10-08    138  |  104  |  43<H>  ----------------------------<  132<H>  3.7   |  26  |  2.10<H>    Ca    8.1<L>      08 Oct 2017 06:30              Review of Systems	      Objective     Physical Examination    head at  heart - s1s2  lungs - dec BS  abd - soft  frail and weak      Pertinent Lab findings & Imaging      Samantha:  NO   Adequate UO     I&O's Detail    08 Oct 2017 07:01  -  09 Oct 2017 07:00  --------------------------------------------------------  IN:    dextrose 5% + sodium chloride 0.45%.: 200 mL    Oral Fluid: 240 mL    Solution: 342 mL  Total IN: 782 mL    OUT:  Total OUT: 0 mL    Total NET: 782 mL               Discussed with:     Cultures:	        Radiology

## 2017-10-09 NOTE — PROGRESS NOTE ADULT - SUBJECTIVE AND OBJECTIVE BOX
Patient is a 88y old  Female who presents with a chief complaint of found unresponsive (30 Sep 2017 15:29)      Patient seen in follow up for SAMIA, ATN. Improving renal function.     PAST MEDICAL HISTORY:  Asthma  Diabetes mellitus  HTN (hypertension)      MEDICATIONS  (STANDING):  ALBUTerol/ipratropium for Nebulization 3 milliLiter(s) Nebulizer every 4 hours  aspirin  chewable 81 milliGRAM(s) Oral daily  clopidogrel Tablet 75 milliGRAM(s) Oral daily  dextrose 5%. 1000 milliLiter(s) (50 mL/Hr) IV Continuous <Continuous>  dextrose 50% Injectable 12.5 Gram(s) IV Push once  dextrose 50% Injectable 25 Gram(s) IV Push once  dextrose 50% Injectable 25 Gram(s) IV Push once  docusate sodium 100 milliGRAM(s) Oral three times a day  furosemide   Injectable 40 milliGRAM(s) IV Push once  gabapentin 100 milliGRAM(s) Oral daily  heparin  Injectable 5000 Unit(s) SubCutaneous every 8 hours  hydrALAZINE 10 milliGRAM(s) Oral three times a day  influenza   Vaccine 0.5 milliLiter(s) IntraMuscular once  insulin lispro (HumaLOG) corrective regimen sliding scale   SubCutaneous Before meals and at bedtime  lidocaine   Patch 1 Patch Transdermal daily  methylPREDNISolone sodium succinate Injectable 20 milliGRAM(s) IV Push three times a day  metoprolol 12.5 milliGRAM(s) Oral every 8 hours  nystatin Powder 1 Application(s) Topical every 6 hours  pantoprazole    Tablet 40 milliGRAM(s) Oral before breakfast  senna 2 Tablet(s) Oral at bedtime    MEDICATIONS  (PRN):  dextrose Gel 1 Dose(s) Oral once PRN Blood Glucose LESS THAN 70 milliGRAM(s)/deciliter  glucagon  Injectable 1 milliGRAM(s) IntraMuscular once PRN Glucose LESS THAN 70 milligrams/deciliter  HYDROmorphone  Injectable 0.125 milliGRAM(s) IV Push every 4 hours PRN Severe Pain (7 - 10)  ondansetron Injectable 4 milliGRAM(s) IV Push every 6 hours PRN Nausea  polyethylene glycol 3350 17 Gram(s) Oral daily PRN Constipation    T(C): 36.7 (10-09-17 @ 11:24), Max: 37.5 (10-08-17 @ 16:22)  HR: 82 (10-09-17 @ 11:52) (64 - 84)  BP: 176/90 (10-09-17 @ 11:24) (116/65 - 196/76)  RR: 19 (10-09-17 @ 11:24)  SpO2: 100% (10-09-17 @ 11:52)  Wt(kg): --  I&O's Detail    08 Oct 2017 07:01  -  09 Oct 2017 07:00  --------------------------------------------------------  IN:    dextrose 5% + sodium chloride 0.45%.: 200 mL    Oral Fluid: 240 mL    Solution: 342 mL  Total IN: 782 mL    OUT:  Total OUT: 0 mL    Total NET: 782 mL          PHYSICAL EXAM:  General: NAD  Respiratory: b/l air entry, decreased breath sounds  Cardiovascular: S1 S2  Gastrointestinal: soft  Extremities:  edema          LABORATORY:                        10.3   3.6   )-----------( 71       ( 09 Oct 2017 06:56 )             33.4     10-09    141  |  105  |  39<H>  ----------------------------<  140<H>  3.8   |  29  |  1.90<H>    Ca    9.0      09 Oct 2017 06:56    TPro  7.6  /  Alb  2.5<L>  /  TBili  0.8  /  DBili  .30<H>  /  AST  70<H>  /  ALT  445<H>  /  AlkPhos  113  10-09    Sodium, Serum: 141 mmol/L (10-09 @ 06:56)  Sodium, Serum: 138 mmol/L (10-08 @ 06:30)    Potassium, Serum: 3.8 mmol/L (10-09 @ 06:56)  Potassium, Serum: 3.7 mmol/L (10-08 @ 06:30)    Hemoglobin: 10.3 g/dL (10-09 @ 06:56)  Hemoglobin: 8.8 g/dL (10-08 @ 06:30)  Hemoglobin: 7.3 g/dL (10-07 @ 06:43)    Creatinine, Serum 1.90 (10-09 @ 06:56)  Creatinine, Serum 2.10 (10-08 @ 06:30)  Creatinine, Serum 2.50 (10-07 @ 06:43)        LIVER FUNCTIONS - ( 09 Oct 2017 06:56 )  Alb: 2.5 g/dL / Pro: 7.6 g/dL / ALK PHOS: 113 U/L / ALT: 445 U/L / AST: 70 U/L / GGT: x

## 2017-10-09 NOTE — PROGRESS NOTE ADULT - SUBJECTIVE AND OBJECTIVE BOX
[facility]    John Tariq, No, Nirav, Regina, [place], [place]      [phone]    CHIEF COMPLAINT: Patient is a 88y old  Female who presents with a chief complaint of found unresponsive (30 Sep 2017 15:29)      Follow Up: sepsis, pna, nstemi    Interim history: per family she is greatly improved, more responsive, though she is now sleeping bc she was up at 530    MEDICATIONS  (STANDING):  ALBUTerol/ipratropium for Nebulization 3 milliLiter(s) Nebulizer every 4 hours  aspirin  chewable 81 milliGRAM(s) Oral daily  clopidogrel Tablet 75 milliGRAM(s) Oral daily  dextrose 5%. 1000 milliLiter(s) (50 mL/Hr) IV Continuous <Continuous>  dextrose 50% Injectable 12.5 Gram(s) IV Push once  dextrose 50% Injectable 25 Gram(s) IV Push once  dextrose 50% Injectable 25 Gram(s) IV Push once  docusate sodium 100 milliGRAM(s) Oral three times a day  furosemide   Injectable 40 milliGRAM(s) IV Push once  gabapentin 100 milliGRAM(s) Oral daily  heparin  Injectable 5000 Unit(s) SubCutaneous every 8 hours  hydrALAZINE 10 milliGRAM(s) Oral three times a day  influenza   Vaccine 0.5 milliLiter(s) IntraMuscular once  insulin lispro (HumaLOG) corrective regimen sliding scale   SubCutaneous Before meals and at bedtime  lidocaine   Patch 1 Patch Transdermal daily  methylPREDNISolone sodium succinate Injectable 20 milliGRAM(s) IV Push three times a day  metoprolol 12.5 milliGRAM(s) Oral every 8 hours  nystatin Powder 1 Application(s) Topical every 6 hours  pantoprazole    Tablet 40 milliGRAM(s) Oral before breakfast  senna 2 Tablet(s) Oral at bedtime    MEDICATIONS  (PRN):  dextrose Gel 1 Dose(s) Oral once PRN Blood Glucose LESS THAN 70 milliGRAM(s)/deciliter  glucagon  Injectable 1 milliGRAM(s) IntraMuscular once PRN Glucose LESS THAN 70 milligrams/deciliter  HYDROmorphone  Injectable 0.125 milliGRAM(s) IV Push every 4 hours PRN Severe Pain (7 - 10)  ondansetron Injectable 4 milliGRAM(s) IV Push every 6 hours PRN Nausea  polyethylene glycol 3350 17 Gram(s) Oral daily PRN Constipation      REVIEW OF SYSTEMS: unable    Vital Signs Last 24 Hrs  T(C): 36.8 (09 Oct 2017 07:25), Max: 37.5 (08 Oct 2017 16:22)  T(F): 98.3 (09 Oct 2017 07:25), Max: 99.5 (08 Oct 2017 16:22)  HR: 70 (09 Oct 2017 07:25) (65 - 84)  BP: 160/74 (09 Oct 2017 08:00) (134/85 - 196/76)  BP(mean): --  RR: 19 (09 Oct 2017 07:25) (18 - 20)  SpO2: 98% (09 Oct 2017 07:25) (94% - 98%)    I&O's Summary    08 Oct 2017 07:01  -  09 Oct 2017 07:00  --------------------------------------------------------  IN: 782 mL / OUT: 0 mL / NET: 782 mL        Telemetry past 24h:    PHYSICAL EXAM:    Constitutional: well-nourished, well-developed, NAD   HEENT:  MMM, sclerae anicteric, conjunctivae clear, no oral cyanosis.  Pulmonary: Non-labored, breath sounds are clear bilaterally, No wheezing, rales or rhonchi  Cardiovascular: Regular, S1 and S2.  No murmur.  No rubs, gallops or clicks  Gastrointestinal: Bowel Sounds present, soft, nontender.   Lymph: No peripheral edema.   Neurological: sleeping now, improved as per family  Skin: No rashes.      LABS: All Labs Reviewed:                        10.3   3.6   )-----------( x        ( 09 Oct 2017 06:56 )             33.4                         8.8    6.3   )-----------( 92       ( 08 Oct 2017 06:30 )             28.7                         7.3    5.8   )-----------( 87       ( 07 Oct 2017 06:43 )             23.6     09 Oct 2017 06:56    141    |  105    |  39     ----------------------------<  140    3.8     |  29     |  1.90   08 Oct 2017 06:30    138    |  104    |  43     ----------------------------<  132    3.7     |  26     |  2.10   07 Oct 2017 06:43    139    |  103    |  49     ----------------------------<  141    3.6     |  26     |  2.50     Ca    9.0        09 Oct 2017 06:56  Ca    8.1        08 Oct 2017 06:30  Ca    8.4        07 Oct 2017 06:43  Phos  3.7       07 Oct 2017 06:43  Mg     2.2       07 Oct 2017 06:43    TPro  6.5    /  Alb  2.3    /  TBili  0.8    /  DBili  x      /  AST  131    /  ALT  606    /  AlkPhos  99     07 Oct 2017 06:43          Blood Culture:         RADIOLOGY:    EKG:    Echo:    < from: TTE Echo Doppler w/o Cont (10.02.17 @ 17:04) >     EXAM:  ECHO TTE W/O CON COMP W/DOPPLR         PROCEDURE DATE:  10/02/2017        INTERPRETATION:  INDICATION: N STEMI  Referring M.D.:Reshma  Blood Pressure 120/58        Weight (kg) :68     Height (cm):160       BSA (sq m): 1.71  Technician: EDITH    Dimensions:    LA 4.1       Normal Values: 2.0 - 4.0 cm    Ao 3.3        Normal Values: 2.0 - 3.8 cm  SEPTUM 1.1       Normal Values: 0.6 - 1.2 cm  PWT 1.0       Normal Values: 0.6 - 1.1 cm  LVIDd 4.0         Normal Values: 3.0 - 5.6 cm  LVIDs2.4         Normal Values: 1.8 - 4.0 cm      OBSERVATIONS:  Technically difficult study  Mitral Valve: Calcified mitral annulus with normally opening mitral valve   leaflets. Mild mitral regurgitation.  Aortic Valve/Aorta: Calcified trileaflet aorticvalve with normal   opening.. Mild AI  Tricuspid Valve: Normal tricuspid valve. Mild tricuspid regurgitation.  Pulmonic Valve: The pulmonic valve is not well visualized. Probably   normal.  Left Atrium: Normal  Right Atrium: Normal  Left Ventricle: The endocardium is not well-visualized. Overall there is   preserved left ventricular systolic function.. The EF is approximately   65%.  Right Ventricle: Right ventricle is not well-visualized but appears to be   normal and systolic function  Pericardium/Pleura: No pericardial effusion noted.  Pulmonary/RV Pressure: The right ventricular systolic pressure is   estimated to be 44mmHg, assuming that the right atrial pressure is   estimated to be 8 mmHg. This is consistent with mild pulmonary   hypertension.  LV Diastolic Function: stage 1 diastolic dysfunction     Conclusion: Technically difficult study. Preserved left ventricular   systolic function.                  PERNELL KNUTSON M.D., ATTENDING CARDIOLOGIST  This document has been electronically signed. Oct  3 2017  8:25PM                < end of copied text >

## 2017-10-09 NOTE — DISCHARGE NOTE ADULT - PLAN OF CARE
Improvement. Follow up with outpatient PCP. Follow up with outpatient PCP. Continue home meds. Hold Statin. Please follow up liver enzymes with outpatient PCP. Resume as per PCP. Follow up BMP with outpatient PCP. Encourage oral intake. Continue home meds. Follow up with outpatient PCP. Continue plavix and asa. Hold Statin. Please follow up liver enzymes with outpatient PCP in 3 days. Resume as per PCP. Follow up CBC and BMP in 3 days with outpatient PCP. Encourage oral intake. Continue home meds. Monitor glucose. Follow up with outpatient PCP for any adjustments. Continue home meds. Start amlodipine as instructed. Start insulin as instructed. Glipizide stopped. Monitor glucose. Follow up with outpatient PCP for any adjustments.

## 2017-10-10 DIAGNOSIS — K59.00 CONSTIPATION, UNSPECIFIED: ICD-10-CM

## 2017-10-10 DIAGNOSIS — I10 ESSENTIAL (PRIMARY) HYPERTENSION: ICD-10-CM

## 2017-10-10 LAB
ALBUMIN SERPL ELPH-MCNC: 2.3 G/DL — LOW (ref 3.3–5)
ALP SERPL-CCNC: 97 U/L — SIGNIFICANT CHANGE UP (ref 40–120)
ALT FLD-CCNC: 302 U/L — HIGH (ref 12–78)
ANION GAP SERPL CALC-SCNC: 6 MMOL/L — SIGNIFICANT CHANGE UP (ref 5–17)
AST SERPL-CCNC: 47 U/L — HIGH (ref 15–37)
BILIRUB SERPL-MCNC: 0.6 MG/DL — SIGNIFICANT CHANGE UP (ref 0.2–1.2)
BUN SERPL-MCNC: 42 MG/DL — HIGH (ref 7–23)
CALCIUM SERPL-MCNC: 8.4 MG/DL — LOW (ref 8.5–10.1)
CHLORIDE SERPL-SCNC: 106 MMOL/L — SIGNIFICANT CHANGE UP (ref 96–108)
CO2 SERPL-SCNC: 30 MMOL/L — SIGNIFICANT CHANGE UP (ref 22–31)
CREAT SERPL-MCNC: 1.7 MG/DL — HIGH (ref 0.5–1.3)
GLUCOSE SERPL-MCNC: 114 MG/DL — HIGH (ref 70–99)
HCT VFR BLD CALC: 30.4 % — LOW (ref 34.5–45)
HGB BLD-MCNC: 9.5 G/DL — LOW (ref 11.5–15.5)
MCHC RBC-ENTMCNC: 28.9 PG — SIGNIFICANT CHANGE UP (ref 27–34)
MCHC RBC-ENTMCNC: 31.3 GM/DL — LOW (ref 32–36)
MCV RBC AUTO: 92.4 FL — SIGNIFICANT CHANGE UP (ref 80–100)
PLATELET # BLD AUTO: 91 K/UL — LOW (ref 150–400)
POTASSIUM SERPL-MCNC: 3.7 MMOL/L — SIGNIFICANT CHANGE UP (ref 3.5–5.3)
POTASSIUM SERPL-SCNC: 3.7 MMOL/L — SIGNIFICANT CHANGE UP (ref 3.5–5.3)
PROT SERPL-MCNC: 6.6 G/DL — SIGNIFICANT CHANGE UP (ref 6–8.3)
RBC # BLD: 3.29 M/UL — LOW (ref 3.8–5.2)
RBC # FLD: 14.1 % — SIGNIFICANT CHANGE UP (ref 10.3–14.5)
SODIUM SERPL-SCNC: 142 MMOL/L — SIGNIFICANT CHANGE UP (ref 135–145)
WBC # BLD: 6.7 K/UL — SIGNIFICANT CHANGE UP (ref 3.8–10.5)
WBC # FLD AUTO: 6.7 K/UL — SIGNIFICANT CHANGE UP (ref 3.8–10.5)

## 2017-10-10 PROCEDURE — 99232 SBSQ HOSP IP/OBS MODERATE 35: CPT

## 2017-10-10 PROCEDURE — 99233 SBSQ HOSP IP/OBS HIGH 50: CPT | Mod: GC

## 2017-10-10 PROCEDURE — 99497 ADVNCD CARE PLAN 30 MIN: CPT | Mod: 25

## 2017-10-10 RX ORDER — LIDOCAINE 4 G/100G
1 CREAM TOPICAL ONCE
Qty: 0 | Refills: 0 | Status: COMPLETED | OUTPATIENT
Start: 2017-10-10 | End: 2017-10-11

## 2017-10-10 RX ORDER — POLYETHYLENE GLYCOL 3350 17 G/17G
17 POWDER, FOR SOLUTION ORAL
Qty: 0 | Refills: 0 | COMMUNITY
Start: 2017-10-10

## 2017-10-10 RX ORDER — SENNA PLUS 8.6 MG/1
2 TABLET ORAL
Qty: 0 | Refills: 0 | COMMUNITY
Start: 2017-10-10

## 2017-10-10 RX ORDER — DOCUSATE SODIUM 100 MG
100 CAPSULE ORAL THREE TIMES A DAY
Qty: 0 | Refills: 0 | Status: DISCONTINUED | OUTPATIENT
Start: 2017-10-10 | End: 2017-10-11

## 2017-10-10 RX ORDER — ACETYLCYSTEINE 200 MG/ML
3 VIAL (ML) MISCELLANEOUS ONCE
Qty: 0 | Refills: 0 | Status: COMPLETED | OUTPATIENT
Start: 2017-10-10 | End: 2017-10-10

## 2017-10-10 RX ORDER — LIDOCAINE 4 G/100G
1 CREAM TOPICAL DAILY
Qty: 0 | Refills: 0 | Status: DISCONTINUED | OUTPATIENT
Start: 2017-10-10 | End: 2017-10-11

## 2017-10-10 RX ORDER — AMLODIPINE BESYLATE 2.5 MG/1
5 TABLET ORAL DAILY
Qty: 0 | Refills: 0 | Status: DISCONTINUED | OUTPATIENT
Start: 2017-10-10 | End: 2017-10-11

## 2017-10-10 RX ORDER — SENNA PLUS 8.6 MG/1
2 TABLET ORAL AT BEDTIME
Qty: 0 | Refills: 0 | Status: DISCONTINUED | OUTPATIENT
Start: 2017-10-10 | End: 2017-10-11

## 2017-10-10 RX ADMIN — Medication 3 MILLILITER(S): at 15:46

## 2017-10-10 RX ADMIN — NYSTATIN CREAM 1 APPLICATION(S): 100000 CREAM TOPICAL at 23:12

## 2017-10-10 RX ADMIN — Medication 2: at 12:47

## 2017-10-10 RX ADMIN — Medication 81 MILLIGRAM(S): at 12:47

## 2017-10-10 RX ADMIN — HEPARIN SODIUM 5000 UNIT(S): 5000 INJECTION INTRAVENOUS; SUBCUTANEOUS at 23:01

## 2017-10-10 RX ADMIN — AMLODIPINE BESYLATE 5 MILLIGRAM(S): 2.5 TABLET ORAL at 18:32

## 2017-10-10 RX ADMIN — Medication 100 MILLIGRAM(S): at 13:19

## 2017-10-10 RX ADMIN — Medication 3 MILLILITER(S): at 11:38

## 2017-10-10 RX ADMIN — CLOPIDOGREL BISULFATE 75 MILLIGRAM(S): 75 TABLET, FILM COATED ORAL at 12:47

## 2017-10-10 RX ADMIN — LIDOCAINE 1 PATCH: 4 CREAM TOPICAL at 23:01

## 2017-10-10 RX ADMIN — HEPARIN SODIUM 5000 UNIT(S): 5000 INJECTION INTRAVENOUS; SUBCUTANEOUS at 13:19

## 2017-10-10 RX ADMIN — Medication 20 MILLIGRAM(S): at 13:20

## 2017-10-10 RX ADMIN — Medication 3 MILLILITER(S): at 08:16

## 2017-10-10 RX ADMIN — PANTOPRAZOLE SODIUM 40 MILLIGRAM(S): 20 TABLET, DELAYED RELEASE ORAL at 05:50

## 2017-10-10 RX ADMIN — Medication 2: at 18:33

## 2017-10-10 RX ADMIN — Medication 20 MILLIGRAM(S): at 23:00

## 2017-10-10 RX ADMIN — Medication 100 MILLIGRAM(S): at 23:01

## 2017-10-10 RX ADMIN — Medication 10 MILLIGRAM(S): at 05:49

## 2017-10-10 RX ADMIN — Medication 12.5 MILLIGRAM(S): at 05:49

## 2017-10-10 RX ADMIN — SENNA PLUS 2 TABLET(S): 8.6 TABLET ORAL at 23:02

## 2017-10-10 RX ADMIN — GABAPENTIN 100 MILLIGRAM(S): 400 CAPSULE ORAL at 12:47

## 2017-10-10 RX ADMIN — HEPARIN SODIUM 5000 UNIT(S): 5000 INJECTION INTRAVENOUS; SUBCUTANEOUS at 05:49

## 2017-10-10 RX ADMIN — Medication 3 MILLILITER(S): at 11:39

## 2017-10-10 RX ADMIN — LIDOCAINE 1 PATCH: 4 CREAM TOPICAL at 12:48

## 2017-10-10 RX ADMIN — Medication 10 MILLIGRAM(S): at 13:19

## 2017-10-10 RX ADMIN — NYSTATIN CREAM 1 APPLICATION(S): 100000 CREAM TOPICAL at 18:33

## 2017-10-10 RX ADMIN — NYSTATIN CREAM 1 APPLICATION(S): 100000 CREAM TOPICAL at 12:55

## 2017-10-10 RX ADMIN — Medication 3 MILLILITER(S): at 05:01

## 2017-10-10 RX ADMIN — NYSTATIN CREAM 1 APPLICATION(S): 100000 CREAM TOPICAL at 05:49

## 2017-10-10 RX ADMIN — Medication 3 MILLILITER(S): at 21:22

## 2017-10-10 RX ADMIN — Medication 12.5 MILLIGRAM(S): at 13:19

## 2017-10-10 RX ADMIN — Medication 12.5 MILLIGRAM(S): at 23:01

## 2017-10-10 RX ADMIN — Medication 100 MILLIGRAM(S): at 05:49

## 2017-10-10 NOTE — PROGRESS NOTE ADULT - PROBLEM SELECTOR PLAN 1
With multi organ failure, 2/2 PNA and UTI. Afebrile, normal WBCs  CXR Improving with stable vitals.  - completed zosyn total for 7 days  - f/u AM labs

## 2017-10-10 NOTE — PROGRESS NOTE ADULT - SUBJECTIVE AND OBJECTIVE BOX
Date/Time Patient Seen:  		  Referring MD:   Data Reviewed	       Patient is a 88y old  Female who presents with a chief complaint of found unresponsive (09 Oct 2017 15:06)  in bed  seen and examined  vs and meds reviewed  son refused Solumedrol      Subjective/HPI     PAST MEDICAL & SURGICAL HISTORY:  Asthma  Diabetes mellitus  HTN (hypertension)  No significant past surgical history        Medication list         MEDICATIONS  (STANDING):  ALBUTerol/ipratropium for Nebulization 3 milliLiter(s) Nebulizer every 4 hours  aspirin  chewable 81 milliGRAM(s) Oral daily  clopidogrel Tablet 75 milliGRAM(s) Oral daily  dextrose 5%. 1000 milliLiter(s) (50 mL/Hr) IV Continuous <Continuous>  dextrose 50% Injectable 12.5 Gram(s) IV Push once  dextrose 50% Injectable 25 Gram(s) IV Push once  dextrose 50% Injectable 25 Gram(s) IV Push once  docusate sodium 100 milliGRAM(s) Oral three times a day  furosemide   Injectable 40 milliGRAM(s) IV Push once  gabapentin 100 milliGRAM(s) Oral daily  heparin  Injectable 5000 Unit(s) SubCutaneous every 8 hours  hydrALAZINE 10 milliGRAM(s) Oral three times a day  influenza   Vaccine 0.5 milliLiter(s) IntraMuscular once  insulin lispro (HumaLOG) corrective regimen sliding scale   SubCutaneous Before meals and at bedtime  lidocaine   Patch 1 Patch Transdermal daily  methylPREDNISolone sodium succinate Injectable 20 milliGRAM(s) IV Push three times a day  metoprolol 12.5 milliGRAM(s) Oral every 8 hours  nystatin Powder 1 Application(s) Topical every 6 hours  pantoprazole    Tablet 40 milliGRAM(s) Oral before breakfast  senna 2 Tablet(s) Oral at bedtime    MEDICATIONS  (PRN):  dextrose Gel 1 Dose(s) Oral once PRN Blood Glucose LESS THAN 70 milliGRAM(s)/deciliter  glucagon  Injectable 1 milliGRAM(s) IntraMuscular once PRN Glucose LESS THAN 70 milligrams/deciliter  HYDROmorphone  Injectable 0.125 milliGRAM(s) IV Push every 4 hours PRN Severe Pain (7 - 10)  ondansetron Injectable 4 milliGRAM(s) IV Push every 6 hours PRN Nausea  polyethylene glycol 3350 17 Gram(s) Oral daily PRN Constipation         Vitals log        ICU Vital Signs Last 24 Hrs  T(C): 36.8 (10 Oct 2017 05:24), Max: 37 (09 Oct 2017 21:08)  T(F): 98.3 (10 Oct 2017 05:24), Max: 98.6 (09 Oct 2017 21:08)  HR: 70 (10 Oct 2017 05:24) (70 - 95)  BP: 140/79 (10 Oct 2017 05:24) (129/62 - 196/76)  BP(mean): --  ABP: --  ABP(mean): --  RR: 20 (10 Oct 2017 05:24) (19 - 20)  SpO2: 98% (10 Oct 2017 05:24) (65% - 100%)           Input and Output:  I&O's Detail    09 Oct 2017 07:01  -  10 Oct 2017 07:00  --------------------------------------------------------  IN:    Oral Fluid: 210 mL  Total IN: 210 mL    OUT:  Total OUT: 0 mL    Total NET: 210 mL          Lab Data                        10.3   3.6   )-----------( 71       ( 09 Oct 2017 06:56 )             33.4     10-09    141  |  105  |  39<H>  ----------------------------<  140<H>  3.8   |  29  |  1.90<H>    Ca    9.0      09 Oct 2017 06:56    TPro  7.6  /  Alb  2.5<L>  /  TBili  0.8  /  DBili  .30<H>  /  AST  70<H>  /  ALT  445<H>  /  AlkPhos  113  10-09            Review of Systems	      Objective     Physical Examination  head at  heart - s1s2  lungs - dec BS  abd - soft  kyphosis        Pertinent Lab findings & Imaging      Samantha:  NO   Adequate UO     I&O's Detail    09 Oct 2017 07:01  -  10 Oct 2017 07:00  --------------------------------------------------------  IN:    Oral Fluid: 210 mL  Total IN: 210 mL    OUT:  Total OUT: 0 mL    Total NET: 210 mL               Discussed with:     Cultures:	        Radiology

## 2017-10-10 NOTE — PROGRESS NOTE ADULT - SUBJECTIVE AND OBJECTIVE BOX
Interval Events: (+) constipation x 2 days; clinically more alert today    HPI: 89yo F w/ PMHx DM2, asthma, HTN, iron deficiency anemia, COPD w/ intermittent use of home O2 presents w/ weakness and fatigue. pt is nonverbal at this time and unable to provide any history. She developed a fever last night and the family admits mild cough which was nonproductive. 3-4 days prior to presentation the pt was complaining of mild abd discomfort but was tolerating po well. Family denies dysphagia. They stated that she had an episode of bilious vomiting 4 days ago but none since. She was tolerating diet yesterday without difficulty. Last night developed rectal temp of 102F but denied diaphoresis, chills, rigors. Upon waking this morning, the pt was obtunded. Responding only to noxious stimuli so EMS was alerted. No other recent travel. No recent hospitalizations. No other recent changes in her overall health.  Recnetly her DM2 regimen was decreased due to normal glucose levels. Also, her recent creatinine was 1.1 as per family at the bedside. Family members have medical background.     MEDICATIONS  (STANDING):  ALBUTerol/ipratropium for Nebulization 3 milliLiter(s) Nebulizer every 4 hours  aspirin  chewable 81 milliGRAM(s) Oral daily  clopidogrel Tablet 75 milliGRAM(s) Oral daily  dextrose 5%. 1000 milliLiter(s) (50 mL/Hr) IV Continuous <Continuous>  dextrose 50% Injectable 12.5 Gram(s) IV Push once  dextrose 50% Injectable 25 Gram(s) IV Push once  dextrose 50% Injectable 25 Gram(s) IV Push once  docusate sodium 100 milliGRAM(s) Oral three times a day  furosemide   Injectable 40 milliGRAM(s) IV Push once  gabapentin 100 milliGRAM(s) Oral daily  heparin  Injectable 5000 Unit(s) SubCutaneous every 8 hours  hydrALAZINE 10 milliGRAM(s) Oral three times a day  influenza   Vaccine 0.5 milliLiter(s) IntraMuscular once  insulin lispro (HumaLOG) corrective regimen sliding scale   SubCutaneous Before meals and at bedtime  lidocaine   Patch 1 Patch Transdermal daily  methylPREDNISolone sodium succinate Injectable 20 milliGRAM(s) IV Push three times a day  metoprolol 12.5 milliGRAM(s) Oral every 8 hours  nystatin Powder 1 Application(s) Topical every 6 hours  pantoprazole    Tablet 40 milliGRAM(s) Oral before breakfast  senna 2 Tablet(s) Oral at bedtime    MEDICATIONS  (PRN):  dextrose Gel 1 Dose(s) Oral once PRN Blood Glucose LESS THAN 70 milliGRAM(s)/deciliter  glucagon  Injectable 1 milliGRAM(s) IntraMuscular once PRN Glucose LESS THAN 70 milligrams/deciliter  HYDROmorphone  Injectable 0.125 milliGRAM(s) IV Push every 4 hours PRN Severe Pain (7 - 10)  ondansetron Injectable 4 milliGRAM(s) IV Push every 6 hours PRN Nausea  polyethylene glycol 3350 17 Gram(s) Oral daily PRN Constipation      Allergies    No Known Allergies    Intolerances        Review of Systems:    General:  No wt loss, fevers, chills, night sweats, fatigue   Eyes:  Good vision, no reported pain  ENT:  No sore throat, pain, runny nose, dysphagia  CV:  No pain, palpitations, hypo/hypertension  Resp:  No dyspnea, cough, tachypnea, wheezing  GI:  (+) constipation  :  No pain, bleeding, incontinence, nocturia  Muscle:  No pain, weakness  Neuro:  RLE pain  Psych:  No fatigue, insomnia, mood problems, depression  Endocrine:  No polyuria, polydipsia, cold/heat intolerance  Heme:  No petechiae, ecchymosis, easy bruisability  Skin:  No rash, tattoos, scars, edema      Vital Signs Last 24 Hrs  T(C): 36.7 (09 Oct 2017 11:24), Max: 37.5 (08 Oct 2017 16:22)  T(F): 98 (09 Oct 2017 11:24), Max: 99.5 (08 Oct 2017 16:22)  HR: 72 (09 Oct 2017 11:24) (65 - 84)  BP: 176/90 (09 Oct 2017 11:24) (134/85 - 196/76)  BP(mean): --  RR: 19 (09 Oct 2017 11:24) (18 - 19)  SpO2: 99% (09 Oct 2017 11:24) (94% - 99%)    PHYSICAL EXAM:    GENERAL:  AAOx2, follows commands, able to communicate verbally   HEENT:  NC/AT,  conjunctivae clear and pink, no thyromegaly, nodules, adenopathy, no JVD, sclera -anicteric  CHEST:  decreased BS at bases  HEART:  Regular rhythm, S1, S2, no murmur/rub/S3/S4, no abdominal bruit, no edema  ABDOMEN:  Soft, non-tender, non-distended, normoactive bowel sounds,  no masses ,no hepato-splenomegaly, no signs of chronic liver disease  EXTREMITIES:  no cyanosis,clubbing or edema  SKIN:  No rash/erythema/ecchymoses/petechiae/wounds/abscess/warm/dry  NEURO:  follows commands          LABS: reviewed                      RADIOLOGY & ADDITIONAL TESTS:

## 2017-10-10 NOTE — PROGRESS NOTE ADULT - PROBLEM SELECTOR PLAN 4
transaminitis ? 2/2 sepsis.   - improving LFTs and is on PPI  - follow up GI recs
transaminitis ? 2/2 sepsis.   improving LFTs and is on PPI  follow with GI
cvs regimen  lasix as tolerated by renal function and BP   monitor labs  cardio follow up
transaminitis ? 2/2 sepsis.   improving LFTs and is on PPI  follow with GI
NSTEMI, c/w ASA, Plavix.  Bradycardia titrate BB  follow with cardio
cardiac enzymes are elevated, pt has no ac CP.   NSTEMI, c/w ASA and family refused for full a/c.  follow with cardio
cardiac enzymes are elevated, pt has no ac CP.   NSTEMI, c/w ASA and family refused for full a/c.  follow with cardio
cardiac enzymes trending down, pt has no CP.   NSTEMI, c/w ASA, Plavix  follow with cardio
transaminitis ? 2/2 sepsis.   improving LFTs and is on PPI  follow with GI

## 2017-10-10 NOTE — PROGRESS NOTE ADULT - ATTENDING COMMENTS
89yo F w/ PMHx DM2, asthma, HTN, iron deficiency anemia, COPD w/ intermittent use of home O2 presents w/ MODS, shock liver, SAMIA, NSTEMI. sepsis 2/2 to PNA and UTI  Septic shock.  Plan: With multi organ failure, secondary to UTI currently Afebrile, normal WBCs  ct scan chest reviewed  completed zosyn therapy       Problem: Respiratory failure with hypercapnia.  Plan: BIPAP at night and NC during the day now,  c/w Nebs.  - continue pulse ox monitoring    - As per Dr. Barraza, keep saturation >86%, CT chest showed large hiatal hernia.     Problem: NSTEMI (non-ST elevated myocardial infarction).  Plan: NSTEMI, c/w ASA, Plavix.  Bradycardia titrate BB  -anemia HH 9.6, would want to keep it above 9-10  continue on asa/plavix/bb/hold ace due to renal failure   will give 1 unit prbc.      Acute liver failure with hepatic coma.  Plan: transaminitis ? 2/2 sepsis.   improving LFTs and is on PPI  follow with GI.      Acute kidney injury.  Plan: SAMIA improving, with improved urine out put  -IVF d/c'd  - f/u with Renal  -avoid nephrotoxic agents.     Type 2 diabetes mellitus with complication, without long-term current use of insulin. Plan: on dysphagia diet, fingersticks q6hrs w/ ISS, and hypoglycemia protocol.  d/c planning in am 89yo F w/ PMHx DM2, asthma, HTN, iron deficiency anemia, COPD w/ intermittent use of home O2 presents w/ MODS, shock liver, SAMIA, NSTEMI. sepsis 2/2 to PNA and UTI  Septic shock.  Plan: With multi organ failure, secondary to UTI currently Afebrile, normal WBCs  ct scan chest reviewed  completed zosyn therapy       Problem: Respiratory failure with hypercapnia.  Plan: BIPAP at night and NC during the day now,  c/w Nebs.  - continue pulse ox monitoring    - As per Dr. Barraza, keep saturation >86%, CT chest showed large hiatal hernia.     Problem: NSTEMI (non-ST elevated myocardial infarction).  Plan: NSTEMI, c/w ASA, Plavix.  Bradycardia titrate BB  -anemia HH 9.6, would want to keep it above 9-10  continue on asa/plavix/bb/hold ace due to renal failure   will give 1 unit prbc.      Acute liver failure with hepatic coma.  Plan: transaminitis ? 2/2 sepsis.   improving LFTs and is on PPI  follow with GI.      Acute kidney injury.  Plan: SAMIA improving, with improved urine out put  -IVF d/c'd  - f/u with Renal  -avoid nephrotoxic agents.     Type 2 diabetes mellitus with complication, without long-term current use of insulin. Plan: on dysphagia diet, fingersticks q6hrs w/ ISS, and hypoglycemia protocol. discussed with son advanced care planning, they declined hospice, but agreed for Dnr/DNi  d/c planning in am

## 2017-10-10 NOTE — PROGRESS NOTE ADULT - PROBLEM SELECTOR PLAN 5
SAMIA improving, with improved urine out put  -IVF d/c'd  -As per Dr. Fletcher, encourage PO intake  -avoid nephrotoxic agents
SAMIA improving, with improved urine out put  c/w IVF and f/u with Renal  avoid nephrotoxic agents
SAMIA  resolving  monitor labs
SAMIA improving, with improved urine out put  -IVF d/c'd  - f/u with Renal  -avoid nephrotoxic agents
SAMIA improving, with improved urine out put  c/w IVF and f/u with Renal  avoid nephrotoxic agents
transaminitis ? 2/2 sepsis.   improving LFTs and is on PPI  follow with GI
transaminitis ? 2/2 sepsis.   improving LFTs and is on PPI  follow with GI ocnsult
transaminitis ? 2/2 sepsis.   trend LFTs and follow with GI ocnsult

## 2017-10-10 NOTE — PROGRESS NOTE ADULT - PROBLEM SELECTOR PLAN 1
multi organ failure, HF, COPD, Kyphosis, Atelectasis, Hiatal Hernia, Frailty,   ct chest reviewed  spoke with SON, refusing Solumedrol  prognosis overall is very poor  would benefit from family meeting and discussion of goals of care  pt is DNR  supportive regimen  spoke with son this am, discussed clinical course, poor prognosis and recommendations  will follow

## 2017-10-10 NOTE — PROGRESS NOTE ADULT - ASSESSMENT
88 female with a history of HTN, DM, PVD, CKD, COPD now septic shock, MI and hepatic congestion with SAMIA  Was oliguric on admission which has improved now.   Creatinine has been improving towards baseline.  continue lasix on PRN basis  spoke to the entire family at bed side.  stable from renal standpoint for discharge.

## 2017-10-10 NOTE — PROGRESS NOTE ADULT - SUBJECTIVE AND OBJECTIVE BOX
Patient is a 88y old  Female who presents with a chief complaint of found unresponsive (30 Sep 2017 15:29)    HPI: 89yo F w/ PMHx DM2, asthma, HTN, iron deficiency anemia, COPD w/ intermittent use of home O2 presents w/ weakness and fatigue. pt is nonverbal at this time and unable to provide any history. She developed a fever last night and the family admits mild cough which was nonproductive. 3-4 days prior to presentation the pt was complaining of mild abd discomfort but was tolerating po well. Family denies dysphagia. They stated that she had an episode of bilious vomiting 4 days ago but none since. She was tolerating diet yesterday without difficulty. Last night developed rectal temp of 102F but denied diaphoresis, chills, rigors. Upon waking this morning, the pt was obtunded. Responding only to noxious stimuli so EMS was alerted. No other recent travel. No recent hospitalizations. No other recent changes in her overall health.  Recently, her DM2 regimen was decreased due to normal glucose levels. Also, her recent creatinine was 1.1 as per family at the bedside. Family members have medical background.     Family Hx: denies h/o premature CAD, DM2 in immediate family members (30 Sep 2017 12:46)    SUBJECTIVE & OBJECTIVE: Pt seen and examined at bedside. Patient is lethargic but arousable. Family at bedside with concern about wheezing. Patient given duoneb treatment this AM.     ROS: unable to obtain as patient is lethargic    Physical Exam:  Vital Signs Last 24 Hrs  T(C): 36.8 (10-10-17 @ 07:49), Max: 36.8 (10-10-17 @ 05:24)  T(F): 98.2 (10-10-17 @ 07:49), Max: 98.3 (10-10-17 @ 05:24)  HR: 66 (10-10-17 @ 08:19) (64 - 95)  BP: 155/70 (10-10-17 @ 07:49) (129/62 - 155/70)  RR: 20 (10-10-17 @ 07:49) (20 - 20)  SpO2: 99% (10-10-17 @ 08:19) (65% - 99%)    GENERAL: NAD, chronically ill appearing, elderly female, bedbound  HEAD:  Atraumatic, Normocephalic  EYES: conjunctiva and sclera clear  NERVOUS SYSTEM:  lethargic, but arousable  CHEST/LUNG:+stridor, diminished breath sounds on LLL, +diffuse rhonchi  HEART: Regular rate and rhythm; No murmurs, rubs, or gallops  ABDOMEN: obese abdomen, Soft, Nontender, Nondistended; Bowel sounds present, no guarding, no rebound  EXTREMITIES:  2+ Peripheral Pulses, No clubbing, cyanosis, or edema    MEDICATIONS  (STANDING):  acetylcysteine 10% Inhalation 3 milliLiter(s) Inhalation once  ALBUTerol/ipratropium for Nebulization 3 milliLiter(s) Nebulizer every 4 hours  aspirin  chewable 81 milliGRAM(s) Oral daily  clopidogrel Tablet 75 milliGRAM(s) Oral daily  dextrose 5%. 1000 milliLiter(s) (50 mL/Hr) IV Continuous <Continuous>  dextrose 50% Injectable 12.5 Gram(s) IV Push once  dextrose 50% Injectable 25 Gram(s) IV Push once  dextrose 50% Injectable 25 Gram(s) IV Push once  docusate sodium 100 milliGRAM(s) Oral three times a day  furosemide   Injectable 40 milliGRAM(s) IV Push once  gabapentin 100 milliGRAM(s) Oral daily  heparin  Injectable 5000 Unit(s) SubCutaneous every 8 hours  hydrALAZINE 10 milliGRAM(s) Oral three times a day  influenza   Vaccine 0.5 milliLiter(s) IntraMuscular once  insulin lispro (HumaLOG) corrective regimen sliding scale   SubCutaneous Before meals and at bedtime  lidocaine   Patch 1 Patch Transdermal daily  lidocaine   Patch 1 Patch Transdermal daily  methylPREDNISolone sodium succinate Injectable 20 milliGRAM(s) IV Push three times a day  metoprolol 12.5 milliGRAM(s) Oral every 8 hours  nystatin Powder 1 Application(s) Topical every 6 hours  pantoprazole    Tablet 40 milliGRAM(s) Oral before breakfast  senna 2 Tablet(s) Oral at bedtime    MEDICATIONS  (PRN):  dextrose Gel 1 Dose(s) Oral once PRN Blood Glucose LESS THAN 70 milliGRAM(s)/deciliter  glucagon  Injectable 1 milliGRAM(s) IntraMuscular once PRN Glucose LESS THAN 70 milligrams/deciliter  HYDROmorphone  Injectable 0.125 milliGRAM(s) IV Push every 4 hours PRN Severe Pain (7 - 10)  ondansetron Injectable 4 milliGRAM(s) IV Push every 6 hours PRN Nausea  polyethylene glycol 3350 17 Gram(s) Oral daily PRN Constipation      LABS:                        9.5    6.7   )-----------( 91       ( 10 Oct 2017 07:31 )             30.4     10 Oct 2017 07:31    142    |  106    |  42     ----------------------------<  114    3.7     |  30     |  1.70     Ca    8.4        10 Oct 2017 07:31    TPro  6.6    /  Alb  2.3    /  TBili  0.6    /  DBili  x      /  AST  47     /  ALT  302    /  AlkPhos  97     10 Oct 2017 07:31    LIVER FUNCTIONS - ( 10 Oct 2017 07:31 )  Alb: 2.3 g/dL / Pro: 6.6 g/dL / ALK PHOS: 97 U/L / ALT: 302 U/L / AST: 47 U/L / GGT: x             CAPILLARY BLOOD GLUCOSE  118 (10 Oct 2017 08:00)  145 (09 Oct 2017 21:08)  162 (09 Oct 2017 17:18)  153 (09 Oct 2017 11:24)

## 2017-10-10 NOTE — PROGRESS NOTE ADULT - PROBLEM SELECTOR PLAN 8
DVT ppx w/ ICD's  GI ppx
DVT ppx w/ ICD's  GI ppxd

## 2017-10-10 NOTE — PROGRESS NOTE ADULT - PROBLEM SELECTOR PROBLEM 5
Acute kidney injury
Acute liver failure with hepatic coma

## 2017-10-10 NOTE — PROGRESS NOTE ADULT - SUBJECTIVE AND OBJECTIVE BOX
MALINDA SULTANA  88y  Female    Patient is a 88y old  Female who presents with a chief complaint of found unresponsive (09 Oct 2017 15:06)    awake and alert. family at bed side.  being fed. Denies any chest pain or shortness of breath.  Has CKD with a baseline creatinine of around 1.6    PAST MEDICAL & SURGICAL HISTORY:  Asthma  Diabetes mellitus  HTN (hypertension)  No significant past surgical history          PHYSICAL EXAM:    T(C): 37 (10-10-17 @ 11:00), Max: 37 (10-09-17 @ 21:08)  HR: 76 (10-10-17 @ 11:00) (64 - 95)  BP: 145/83 (10-10-17 @ 11:00) (129/62 - 166/73)  RR: 20 (10-10-17 @ 11:00) (20 - 20)  SpO2: 96% (10-10-17 @ 11:00) (65% - 100%)  Wt(kg): --    I&O's Detail    09 Oct 2017 07:01  -  10 Oct 2017 07:00  --------------------------------------------------------  IN:    Oral Fluid: 210 mL  Total IN: 210 mL    OUT:  Total OUT: 0 mL    Total NET: 210 mL          Respiratory: clear anteriorly, decreased BS at bases  Cardiovascular: S1 S2  Gastrointestinal: soft NT ND +BS  Extremities: edema   Neuro: Awake and alert    MEDICATIONS  (STANDING):  ALBUTerol/ipratropium for Nebulization 3 milliLiter(s) Nebulizer every 4 hours  aspirin  chewable 81 milliGRAM(s) Oral daily  clopidogrel Tablet 75 milliGRAM(s) Oral daily  dextrose 5%. 1000 milliLiter(s) (50 mL/Hr) IV Continuous <Continuous>  dextrose 50% Injectable 12.5 Gram(s) IV Push once  dextrose 50% Injectable 25 Gram(s) IV Push once  dextrose 50% Injectable 25 Gram(s) IV Push once  docusate sodium 100 milliGRAM(s) Oral three times a day  furosemide   Injectable 40 milliGRAM(s) IV Push once  gabapentin 100 milliGRAM(s) Oral daily  heparin  Injectable 5000 Unit(s) SubCutaneous every 8 hours  hydrALAZINE 10 milliGRAM(s) Oral three times a day  influenza   Vaccine 0.5 milliLiter(s) IntraMuscular once  insulin lispro (HumaLOG) corrective regimen sliding scale   SubCutaneous Before meals and at bedtime  lidocaine   Patch 1 Patch Transdermal daily  lidocaine   Patch 1 Patch Transdermal daily  methylPREDNISolone sodium succinate Injectable 20 milliGRAM(s) IV Push three times a day  metoprolol 12.5 milliGRAM(s) Oral every 8 hours  nystatin Powder 1 Application(s) Topical every 6 hours  pantoprazole    Tablet 40 milliGRAM(s) Oral before breakfast  senna 2 Tablet(s) Oral at bedtime    MEDICATIONS  (PRN):  dextrose Gel 1 Dose(s) Oral once PRN Blood Glucose LESS THAN 70 milliGRAM(s)/deciliter  glucagon  Injectable 1 milliGRAM(s) IntraMuscular once PRN Glucose LESS THAN 70 milligrams/deciliter  HYDROmorphone  Injectable 0.125 milliGRAM(s) IV Push every 4 hours PRN Severe Pain (7 - 10)  ondansetron Injectable 4 milliGRAM(s) IV Push every 6 hours PRN Nausea  polyethylene glycol 3350 17 Gram(s) Oral daily PRN Constipation                            9.5    6.7   )-----------( 91       ( 10 Oct 2017 07:31 )             30.4       10-10    142  |  106  |  42<H>  ----------------------------<  114<H>  3.7   |  30  |  1.70<H>    Ca    8.4<L>      10 Oct 2017 07:31    TPro  6.6  /  Alb  2.3<L>  /  TBili  0.6  /  DBili  x   /  AST  47<H>  /  ALT  302<H>  /  AlkPhos  97  10-10

## 2017-10-10 NOTE — PROGRESS NOTE ADULT - PROBLEM SELECTOR PLAN 7
DVT ppx w/ ICD's  GI ppx DVT ppx w/ ICD's  GI ppxd Start norvasc. d/c hydralazine   - continue metoprolol

## 2017-10-10 NOTE — PROGRESS NOTE ADULT - PROBLEM SELECTOR PROBLEM 6
Type 2 diabetes mellitus with complication, without long-term current use of insulin
Acute kidney injury
Type 2 diabetes mellitus with complication, without long-term current use of insulin

## 2017-10-10 NOTE — PROGRESS NOTE ADULT - PROBLEM SELECTOR PROBLEM 4
Acute liver failure with hepatic coma
NSTEMI (non-ST elevated myocardial infarction)
Acute liver failure with hepatic coma
NSTEMI (non-ST elevated myocardial infarction)

## 2017-10-10 NOTE — PROGRESS NOTE ADULT - ASSESSMENT
89yo F w/ PMHx DM2, asthma, HTN, iron deficiency anemia, COPD w/ intermittent use of home O2 presents w/ MODS, shock liver, SAMIA, NSTEMI. sepsis 2/2 to PNA and UTI. SAMIA improving. s/p 7 day course of abx.

## 2017-10-10 NOTE — PROGRESS NOTE ADULT - SUBJECTIVE AND OBJECTIVE BOX
Follow up: sepsis, pneumonia, nstemi    HPI:    Patient is sleeping but arousable. She appears responsive and comfortable.    PAST MEDICAL & SURGICAL HISTORY:  Asthma  Diabetes mellitus  HTN (hypertension)  No significant past surgical history      MEDICATIONS  (STANDING):  acetylcysteine 10% Inhalation 3 milliLiter(s) Inhalation once  ALBUTerol/ipratropium for Nebulization 3 milliLiter(s) Nebulizer every 4 hours  aspirin  chewable 81 milliGRAM(s) Oral daily  clopidogrel Tablet 75 milliGRAM(s) Oral daily  dextrose 5%. 1000 milliLiter(s) (50 mL/Hr) IV Continuous <Continuous>  dextrose 50% Injectable 12.5 Gram(s) IV Push once  dextrose 50% Injectable 25 Gram(s) IV Push once  dextrose 50% Injectable 25 Gram(s) IV Push once  docusate sodium 100 milliGRAM(s) Oral three times a day  furosemide   Injectable 40 milliGRAM(s) IV Push once  gabapentin 100 milliGRAM(s) Oral daily  heparin  Injectable 5000 Unit(s) SubCutaneous every 8 hours  hydrALAZINE 10 milliGRAM(s) Oral three times a day  influenza   Vaccine 0.5 milliLiter(s) IntraMuscular once  insulin lispro (HumaLOG) corrective regimen sliding scale   SubCutaneous Before meals and at bedtime  lidocaine   Patch 1 Patch Transdermal daily  lidocaine   Patch 1 Patch Transdermal daily  methylPREDNISolone sodium succinate Injectable 20 milliGRAM(s) IV Push three times a day  metoprolol 12.5 milliGRAM(s) Oral every 8 hours  nystatin Powder 1 Application(s) Topical every 6 hours  pantoprazole    Tablet 40 milliGRAM(s) Oral before breakfast  senna 2 Tablet(s) Oral at bedtime    MEDICATIONS  (PRN):  dextrose Gel 1 Dose(s) Oral once PRN Blood Glucose LESS THAN 70 milliGRAM(s)/deciliter  glucagon  Injectable 1 milliGRAM(s) IntraMuscular once PRN Glucose LESS THAN 70 milligrams/deciliter  HYDROmorphone  Injectable 0.125 milliGRAM(s) IV Push every 4 hours PRN Severe Pain (7 - 10)  ondansetron Injectable 4 milliGRAM(s) IV Push every 6 hours PRN Nausea  polyethylene glycol 3350 17 Gram(s) Oral daily PRN Constipation      Vital Signs Last 24 Hrs  T(C): 36.8 (10 Oct 2017 07:49), Max: 37 (09 Oct 2017 21:08)  T(F): 98.2 (10 Oct 2017 07:49), Max: 98.6 (09 Oct 2017 21:08)  HR: 66 (10 Oct 2017 08:19) (64 - 95)  BP: 155/70 (10 Oct 2017 07:49) (129/62 - 176/90)  BP(mean): --  RR: 20 (10 Oct 2017 07:49) (19 - 20)  SpO2: 99% (10 Oct 2017 08:19) (65% - 100%)    I&O's Summary    09 Oct 2017 07:01  -  10 Oct 2017 07:00  --------------------------------------------------------  IN: 210 mL / OUT: 0 mL / NET: 210 mL        PHYSICAL EXAM:    Constitutional: NAD,  Eyes:  Pupils round, no lesions  ENMT: no exudate or erythema  Pulmonary: Non-labored, breath sounds are clear bilaterally, No wheezing, rales or rhonchi  Cardiovascular: PMI not palpable RRR normal S1 and S2, no murmurs, rubs, gallops or clicks  Gastrointestinal: Bowel Sounds present, soft, nontender.   Lymph: No cervical lymphadenopathy.  Neurological: Alert, no focal deficits  Skin: No rashes.  No cyanosis.   Ext: No lower ext edema                                9.5    6.7   )-----------( 91       ( 10 Oct 2017 07:31 )             30.4     10-10    142  |  106  |  42<H>  ----------------------------<  114<H>  3.7   |  30  |  1.70<H>    Ca    8.4<L>      10 Oct 2017 07:31    TPro  6.6  /  Alb  2.3<L>  /  TBili  0.6  /  DBili  x   /  AST  47<H>  /  ALT  302<H>  /  AlkPhos  97  10-10      < from: 12 Lead ECG (10.01.17 @ 10:54) >    Ventricular Rate 72 BPM    Atrial Rate 72 BPM    P-R Interval 234 ms    QRS Duration 126 ms    Q-T Interval 430 ms    QTC Calculation(Bezet) 470 ms    P Axis 58 degrees    R Axis -28 degrees    T Axis 14 degrees    Diagnosis Line Sinus rhythm with 1st degree AV block  Right bundle branch block  Abnormal ECG    Confirmed by janes Lutz ([id number]) on 10/1/2017 2:57:15 PM    < end of copied text >  < from: CT Chest No Cont (10.08.17 @ 17:21) >    EXAM:  CT CHEST                            PROCEDURE DATE:  10/08/2017          INTERPRETATION:  Clinical information: Atelectasis, respiratory distress    Axial images obtained, coronal and sagittal images computer reformatted.   Limited study dueto patient positioning, patient is rotated in the   gantry towards the left. Additionally, streak artifacts present related   to patient positioning.    Noncontrast study limits evaluation of hilar and mediastinal regions.    Comparison exam dated 2/23/2016.        No thoracic aortic aneurysm. No pericardial effusion. Global cardiomegaly   present. Calcified coronary arteries identified.  A large hiatus hernia is noted.  Central airway intact.    Minimal right pleural effusion. Trace effusion or pleural thickening left   lung base. Airspace disease, left upper lobe. Prominent appearance of   pulmonary vasculature and perihilar regions. Calcified granuloma left   upper lobe. No pneumothorax. Severe dorsal kyphosis present.   Calcification in the gallbladder fossa?    IMPRESSION:  Limited exam   See above report.                MARCELO CORTES M.D.,ATTENDING RADIOLOGIST  This document has been electronically signed. Oct  8 2017  5:29PM                < end of copied text >  < from: TTE Echo Doppler w/o Cont (10.02.17 @ 17:04) >     EXAM:  ECHO TTE W/O CON COMP W/DOPPLR         PROCEDURE DATE:  10/02/2017        INTERPRETATION:  INDICATION: N STEMI  Referring M.DDeo:Reshma  Blood Pressure 120/58        Weight (kg) :68     Height (cm):160       BSA (sq m): 1.71  Technician: EDITH    Dimensions:    LA 4.1       Normal Values: 2.0 - 4.0 cm    Ao 3.3        Normal Values: 2.0 - 3.8 cm  SEPTUM 1.1       Normal Values: 0.6 - 1.2 cm  PWT 1.0       Normal Values: 0.6 - 1.1 cm  LVIDd 4.0         Normal Values: 3.0 - 5.6 cm  LVIDs2.4         Normal Values: 1.8 - 4.0 cm      OBSERVATIONS:  Technically difficult study  Mitral Valve: Calcified mitral annulus with normally opening mitral valve   leaflets. Mild mitral regurgitation.  Aortic Valve/Aorta: Calcified trileaflet aorticvalve with normal   opening.. Mild AI  Tricuspid Valve: Normal tricuspid valve. Mild tricuspid regurgitation.  Pulmonic Valve: The pulmonic valve is not well visualized. Probably   normal.  Left Atrium: Normal  Right Atrium: Normal  Left Ventricle: The endocardium is not well-visualized. Overall there is   preserved left ventricular systolic function.. The EF is approximately   65%.  Right Ventricle: Right ventricle is not well-visualized but appears to be   normal and systolic function  Pericardium/Pleura: No pericardial effusion noted.  Pulmonary/RV Pressure: The right ventricular systolic pressure is   estimated to be 44mmHg, assuming that the right atrial pressure is   estimated to be 8 mmHg. This is consistent with mild pulmonary   hypertension.  LV Diastolic Function: stage 1 diastolic dysfunction     Conclusion: Technically difficult study. Preserved left ventricular   systolic function.                  PERNELL KNUTSON M.D., ATTENDING CARDIOLOGIST  This document has been electronically signed. Oct  3 2017  8:25PM                < end of copied text >

## 2017-10-10 NOTE — PROGRESS NOTE ADULT - ASSESSMENT
87yo F w/ PMHx DM2, asthma, HTN, iron deficiency anemia, COPD w/ intermittent use of home O2, severe sepsis secondary to pneumonia, MSOF, NSTEMI, overall normal LV function, enzymes downtrended. Multiorgan failure is resolving.    - Continue aspirin, clopidogrel for now.   - There is likely some underlying CAD given her significant CE leak.  - cont metoprolol 12.5 mg po q8hr, low hr may limit its titration  - No statin drug secondary to shock liver, which is resolving.  Would start Lipitor 40 when this resolves.   - BP remains elevated at times, hydralazine can be uptitrated as needed  - Monitor and replete electrolytes. Keep K>4.0 and Mg>2.0.   - Not a candidate for coronary angiography  - Abx   - DVT prophylaxis  - trend creatinine and hgb in setting of prem and anemia-has been slowly improving  - Further cardiac workup will depend on clinical course.   - will follow

## 2017-10-11 ENCOUNTER — APPOINTMENT (OUTPATIENT)
Dept: HOME HEALTH SERVICES | Facility: HOME HEALTH | Age: 82
End: 2017-10-11

## 2017-10-11 VITALS
DIASTOLIC BLOOD PRESSURE: 76 MMHG | HEART RATE: 80 BPM | TEMPERATURE: 99 F | OXYGEN SATURATION: 95 % | RESPIRATION RATE: 18 BRPM | SYSTOLIC BLOOD PRESSURE: 169 MMHG

## 2017-10-11 VITALS
RESPIRATION RATE: 16 BRPM | TEMPERATURE: 97.6 F | SYSTOLIC BLOOD PRESSURE: 160 MMHG | DIASTOLIC BLOOD PRESSURE: 80 MMHG | OXYGEN SATURATION: 96 % | HEART RATE: 80 BPM

## 2017-10-11 LAB
ALBUMIN SERPL ELPH-MCNC: 2.4 G/DL — LOW (ref 3.3–5)
ALP SERPL-CCNC: 104 U/L — SIGNIFICANT CHANGE UP (ref 40–120)
ALT FLD-CCNC: 260 U/L — HIGH (ref 12–78)
ANION GAP SERPL CALC-SCNC: 7 MMOL/L — SIGNIFICANT CHANGE UP (ref 5–17)
AST SERPL-CCNC: 43 U/L — HIGH (ref 15–37)
BILIRUB SERPL-MCNC: 0.6 MG/DL — SIGNIFICANT CHANGE UP (ref 0.2–1.2)
BUN SERPL-MCNC: 43 MG/DL — HIGH (ref 7–23)
CALCIUM SERPL-MCNC: 8.7 MG/DL — SIGNIFICANT CHANGE UP (ref 8.5–10.1)
CHLORIDE SERPL-SCNC: 104 MMOL/L — SIGNIFICANT CHANGE UP (ref 96–108)
CO2 SERPL-SCNC: 30 MMOL/L — SIGNIFICANT CHANGE UP (ref 22–31)
CREAT SERPL-MCNC: 1.6 MG/DL — HIGH (ref 0.5–1.3)
GLUCOSE SERPL-MCNC: 134 MG/DL — HIGH (ref 70–99)
HCT VFR BLD CALC: 30.3 % — LOW (ref 34.5–45)
HGB BLD-MCNC: 9.3 G/DL — LOW (ref 11.5–15.5)
MCHC RBC-ENTMCNC: 28.3 PG — SIGNIFICANT CHANGE UP (ref 27–34)
MCHC RBC-ENTMCNC: 30.6 GM/DL — LOW (ref 32–36)
MCV RBC AUTO: 92.7 FL — SIGNIFICANT CHANGE UP (ref 80–100)
PLATELET # BLD AUTO: 83 K/UL — LOW (ref 150–400)
POTASSIUM SERPL-MCNC: 4 MMOL/L — SIGNIFICANT CHANGE UP (ref 3.5–5.3)
POTASSIUM SERPL-SCNC: 4 MMOL/L — SIGNIFICANT CHANGE UP (ref 3.5–5.3)
PROT SERPL-MCNC: 7 G/DL — SIGNIFICANT CHANGE UP (ref 6–8.3)
RBC # BLD: 3.27 M/UL — LOW (ref 3.8–5.2)
RBC # FLD: 13.9 % — SIGNIFICANT CHANGE UP (ref 10.3–14.5)
SODIUM SERPL-SCNC: 141 MMOL/L — SIGNIFICANT CHANGE UP (ref 135–145)
WBC # BLD: 6.3 K/UL — SIGNIFICANT CHANGE UP (ref 3.8–10.5)
WBC # FLD AUTO: 6.3 K/UL — SIGNIFICANT CHANGE UP (ref 3.8–10.5)

## 2017-10-11 PROCEDURE — 36415 COLL VENOUS BLD VENIPUNCTURE: CPT

## 2017-10-11 PROCEDURE — 84133 ASSAY OF URINE POTASSIUM: CPT

## 2017-10-11 PROCEDURE — 83735 ASSAY OF MAGNESIUM: CPT

## 2017-10-11 PROCEDURE — 85610 PROTHROMBIN TIME: CPT

## 2017-10-11 PROCEDURE — 85362 FIBRIN DEGRADATION PRODUCTS: CPT

## 2017-10-11 PROCEDURE — 97530 THERAPEUTIC ACTIVITIES: CPT

## 2017-10-11 PROCEDURE — 94660 CPAP INITIATION&MGMT: CPT

## 2017-10-11 PROCEDURE — 94668 MNPJ CHEST WALL SBSQ: CPT

## 2017-10-11 PROCEDURE — 82962 GLUCOSE BLOOD TEST: CPT

## 2017-10-11 PROCEDURE — 82570 ASSAY OF URINE CREATININE: CPT

## 2017-10-11 PROCEDURE — 87449 NOS EACH ORGANISM AG IA: CPT

## 2017-10-11 PROCEDURE — 99239 HOSP IP/OBS DSCHRG MGMT >30: CPT

## 2017-10-11 PROCEDURE — 87040 BLOOD CULTURE FOR BACTERIA: CPT

## 2017-10-11 PROCEDURE — P9047: CPT

## 2017-10-11 PROCEDURE — 83605 ASSAY OF LACTIC ACID: CPT

## 2017-10-11 PROCEDURE — 84540 ASSAY OF URINE/UREA-N: CPT

## 2017-10-11 PROCEDURE — P9016: CPT

## 2017-10-11 PROCEDURE — 36600 WITHDRAWAL OF ARTERIAL BLOOD: CPT

## 2017-10-11 PROCEDURE — 82553 CREATINE MB FRACTION: CPT

## 2017-10-11 PROCEDURE — 94760 N-INVAS EAR/PLS OXIMETRY 1: CPT

## 2017-10-11 PROCEDURE — 82550 ASSAY OF CK (CPK): CPT

## 2017-10-11 PROCEDURE — 97110 THERAPEUTIC EXERCISES: CPT

## 2017-10-11 PROCEDURE — 85730 THROMBOPLASTIN TIME PARTIAL: CPT

## 2017-10-11 PROCEDURE — 99231 SBSQ HOSP IP/OBS SF/LOW 25: CPT

## 2017-10-11 PROCEDURE — 93306 TTE W/DOPPLER COMPLETE: CPT

## 2017-10-11 PROCEDURE — 96375 TX/PRO/DX INJ NEW DRUG ADDON: CPT

## 2017-10-11 PROCEDURE — 84484 ASSAY OF TROPONIN QUANT: CPT

## 2017-10-11 PROCEDURE — 83935 ASSAY OF URINE OSMOLALITY: CPT

## 2017-10-11 PROCEDURE — 85379 FIBRIN DEGRADATION QUANT: CPT

## 2017-10-11 PROCEDURE — 76705 ECHO EXAM OF ABDOMEN: CPT

## 2017-10-11 PROCEDURE — 71250 CT THORAX DX C-: CPT

## 2017-10-11 PROCEDURE — 81001 URINALYSIS AUTO W/SCOPE: CPT

## 2017-10-11 PROCEDURE — 83690 ASSAY OF LIPASE: CPT

## 2017-10-11 PROCEDURE — 97112 NEUROMUSCULAR REEDUCATION: CPT

## 2017-10-11 PROCEDURE — 85384 FIBRINOGEN ACTIVITY: CPT

## 2017-10-11 PROCEDURE — 94667 MNPJ CHEST WALL 1ST: CPT

## 2017-10-11 PROCEDURE — 86850 RBC ANTIBODY SCREEN: CPT

## 2017-10-11 PROCEDURE — 94664 DEMO&/EVAL PT USE INHALER: CPT

## 2017-10-11 PROCEDURE — 86706 HEP B SURFACE ANTIBODY: CPT

## 2017-10-11 PROCEDURE — 76775 US EXAM ABDO BACK WALL LIM: CPT

## 2017-10-11 PROCEDURE — 83930 ASSAY OF BLOOD OSMOLALITY: CPT

## 2017-10-11 PROCEDURE — 83036 HEMOGLOBIN GLYCOSYLATED A1C: CPT

## 2017-10-11 PROCEDURE — 86901 BLOOD TYPING SEROLOGIC RH(D): CPT

## 2017-10-11 PROCEDURE — 87086 URINE CULTURE/COLONY COUNT: CPT

## 2017-10-11 PROCEDURE — 80053 COMPREHEN METABOLIC PANEL: CPT

## 2017-10-11 PROCEDURE — 80202 ASSAY OF VANCOMYCIN: CPT

## 2017-10-11 PROCEDURE — 86900 BLOOD TYPING SEROLOGIC ABO: CPT

## 2017-10-11 PROCEDURE — 86920 COMPATIBILITY TEST SPIN: CPT

## 2017-10-11 PROCEDURE — 86708 HEPATITIS A ANTIBODY: CPT

## 2017-10-11 PROCEDURE — 96365 THER/PROPH/DIAG IV INF INIT: CPT

## 2017-10-11 PROCEDURE — 96367 TX/PROPH/DG ADDL SEQ IV INF: CPT

## 2017-10-11 PROCEDURE — 97163 PT EVAL HIGH COMPLEX 45 MIN: CPT

## 2017-10-11 PROCEDURE — 99221 1ST HOSP IP/OBS SF/LOW 40: CPT

## 2017-10-11 PROCEDURE — 86705 HEP B CORE ANTIBODY IGM: CPT

## 2017-10-11 PROCEDURE — 82803 BLOOD GASES ANY COMBINATION: CPT

## 2017-10-11 PROCEDURE — 80076 HEPATIC FUNCTION PANEL: CPT

## 2017-10-11 PROCEDURE — 86709 HEPATITIS A IGM ANTIBODY: CPT

## 2017-10-11 PROCEDURE — 99233 SBSQ HOSP IP/OBS HIGH 50: CPT

## 2017-10-11 PROCEDURE — 86704 HEP B CORE ANTIBODY TOTAL: CPT

## 2017-10-11 PROCEDURE — 84100 ASSAY OF PHOSPHORUS: CPT

## 2017-10-11 PROCEDURE — 94640 AIRWAY INHALATION TREATMENT: CPT

## 2017-10-11 PROCEDURE — 80048 BASIC METABOLIC PNL TOTAL CA: CPT

## 2017-10-11 PROCEDURE — 82436 ASSAY OF URINE CHLORIDE: CPT

## 2017-10-11 PROCEDURE — 71045 X-RAY EXAM CHEST 1 VIEW: CPT

## 2017-10-11 PROCEDURE — 86803 HEPATITIS C AB TEST: CPT

## 2017-10-11 PROCEDURE — 84300 ASSAY OF URINE SODIUM: CPT

## 2017-10-11 PROCEDURE — 99285 EMERGENCY DEPT VISIT HI MDM: CPT | Mod: 25

## 2017-10-11 PROCEDURE — 85027 COMPLETE CBC AUTOMATED: CPT

## 2017-10-11 PROCEDURE — 93005 ELECTROCARDIOGRAM TRACING: CPT

## 2017-10-11 PROCEDURE — 87340 HEPATITIS B SURFACE AG IA: CPT

## 2017-10-11 RX ORDER — AMLODIPINE BESYLATE 2.5 MG/1
1 TABLET ORAL
Qty: 14 | Refills: 0 | OUTPATIENT
Start: 2017-10-11 | End: 2017-10-25

## 2017-10-11 RX ORDER — INSULIN LISPRO 100/ML
2 VIAL (ML) SUBCUTANEOUS
Qty: 60 | Refills: 0 | OUTPATIENT
Start: 2017-10-11

## 2017-10-11 RX ORDER — INSULIN LISPRO 100/ML
2 VIAL (ML) SUBCUTANEOUS
Qty: 1 | Refills: 0 | OUTPATIENT
Start: 2017-10-11

## 2017-10-11 RX ORDER — SENNOSIDES 8.6 MG/1
8.6 TABLET ORAL
Qty: 30 | Refills: 0 | Status: DISCONTINUED | COMMUNITY
Start: 2017-07-11 | End: 2017-10-01

## 2017-10-11 RX ORDER — LIDOCAINE 4 G/100G
2 CREAM TOPICAL
Qty: 28 | Refills: 0 | OUTPATIENT
Start: 2017-10-11 | End: 2017-10-25

## 2017-10-11 RX ORDER — CLOPIDOGREL BISULFATE 75 MG/1
1 TABLET, FILM COATED ORAL
Qty: 15 | Refills: 0 | OUTPATIENT
Start: 2017-10-11 | End: 2017-10-26

## 2017-10-11 RX ORDER — HYDRALAZINE HCL 50 MG
1 TABLET ORAL
Qty: 45 | Refills: 0 | OUTPATIENT
Start: 2017-10-11 | End: 2017-10-26

## 2017-10-11 RX ADMIN — LIDOCAINE 1 PATCH: 4 CREAM TOPICAL at 00:10

## 2017-10-11 RX ADMIN — Medication 4: at 08:35

## 2017-10-11 RX ADMIN — PANTOPRAZOLE SODIUM 40 MILLIGRAM(S): 20 TABLET, DELAYED RELEASE ORAL at 06:32

## 2017-10-11 RX ADMIN — NYSTATIN CREAM 1 APPLICATION(S): 100000 CREAM TOPICAL at 06:32

## 2017-10-11 RX ADMIN — Medication 3 MILLILITER(S): at 00:07

## 2017-10-11 RX ADMIN — Medication 100 MILLIGRAM(S): at 06:32

## 2017-10-11 RX ADMIN — Medication 4: at 23:00

## 2017-10-11 RX ADMIN — Medication 20 MILLIGRAM(S): at 06:32

## 2017-10-11 RX ADMIN — Medication 12.5 MILLIGRAM(S): at 06:32

## 2017-10-11 RX ADMIN — AMLODIPINE BESYLATE 5 MILLIGRAM(S): 2.5 TABLET ORAL at 06:32

## 2017-10-11 RX ADMIN — Medication 3 MILLILITER(S): at 07:34

## 2017-10-11 RX ADMIN — HEPARIN SODIUM 5000 UNIT(S): 5000 INJECTION INTRAVENOUS; SUBCUTANEOUS at 06:32

## 2017-10-11 RX ADMIN — LIDOCAINE 1 PATCH: 4 CREAM TOPICAL at 00:01

## 2017-10-11 RX ADMIN — Medication 3 MILLILITER(S): at 02:58

## 2017-10-11 NOTE — PROGRESS NOTE ADULT - PROBLEM SELECTOR PLAN 3
NSTEMI, c/w ASA, Plavix.  Bradycardia titrate BB  -anemia HH 9.5, would want to keep it above 9-10  - pt not a canidate for cath  - continue on asa/plavix/bb/hold ace due to renal failure
NSTEMI, c/w ASA, Plavix.  Bradycardia titrate BB  anemia HH 7.7, would want to keep it above 9-10  after prbc hh 8.8, would transuer 1 more unit to keep it around 9-10, and family who are three phsyciains requested one mor eprbc   pt not a canidate for cath  continue on asa/plavix/bb/hold ace due to renal failure   will give 1 unit prbc
NSTEMI, c/w ASA, Plavix.  Bradycardia titrate BB  -anemia HH 10.3, would want to keep it above 9-10  -after prbc hh 8.8, would transfuse 1 more unit to keep it around 9-10, and family who are three phsyciains requested one mor eprbc   pt not a canidate for cath  continue on asa/plavix/bb/hold ace due to renal failure   will give 1 unit prbc
multifactorial  BIPAP as tolerated  COPD and Restrictive Lung Disease Likely  CT chest reviewed  atelectasis, small effusion right side, severe Kyphosis and impressive Hiatal hernia  prognosis poor  pt is DNR  would benefit from Home Hospice eval
Improvement on CXR  Will continue IV zosyn   f/u blood cultures.
Improvement on CXR  Will continue zithro and IV zosyn   f/u blood cultures.
NSTEMI, c/w ASA, Plavix.  Bradycardia titrate BB  anemia HH 7.7, would want to keep it above 9-10  will give 1 unit prbc
Will continue zithro and IV zosyn   f/u urine/blood cultures.
continue colace 100 mg tid, senna 2 tabs at bedtime  monitor for BM, enema x 1 if needed
trial of colace 100 mg tid, senna 2 tabs at bedtime  monitor for BM  no abd distention, passing flatulence--unlikely bowel obstruction

## 2017-10-11 NOTE — PROGRESS NOTE ADULT - PROVIDER SPECIALTY LIST ADULT
Cardiology
Critical Care
Gastroenterology
Hospitalist
Nephrology
Pulmonology
Critical Care
Critical Care
Cardiology
Cardiology
MICU
Nephrology
Gastroenterology
Hospitalist
Hospitalist

## 2017-10-11 NOTE — PROGRESS NOTE ADULT - SUBJECTIVE AND OBJECTIVE BOX
[facility] - John Tariq, No, Nirav, [place], [place]  Office Number:  [phone]    Patient sitting up in bed with son in NAD.  Lethargic and unable to provide any history.      Telemetry:  Normal sinus rhythm    MEDICATIONS  (STANDING):  ALBUTerol/ipratropium for Nebulization 3 milliLiter(s) Nebulizer every 4 hours  amLODIPine   Tablet 5 milliGRAM(s) Oral daily  aspirin  chewable 81 milliGRAM(s) Oral daily  clopidogrel Tablet 75 milliGRAM(s) Oral daily  dextrose 5%. 1000 milliLiter(s) (50 mL/Hr) IV Continuous <Continuous>  dextrose 50% Injectable 12.5 Gram(s) IV Push once  dextrose 50% Injectable 25 Gram(s) IV Push once  dextrose 50% Injectable 25 Gram(s) IV Push once  docusate sodium 100 milliGRAM(s) Oral three times a day  furosemide   Injectable 40 milliGRAM(s) IV Push once  gabapentin 100 milliGRAM(s) Oral daily  heparin  Injectable 5000 Unit(s) SubCutaneous every 8 hours  influenza   Vaccine 0.5 milliLiter(s) IntraMuscular once  insulin lispro (HumaLOG) corrective regimen sliding scale   SubCutaneous Before meals and at bedtime  lidocaine   Patch 1 Patch Transdermal daily  lidocaine   Patch 1 Patch Transdermal daily  metoprolol 12.5 milliGRAM(s) Oral every 8 hours  nystatin Powder 1 Application(s) Topical every 6 hours  pantoprazole    Tablet 40 milliGRAM(s) Oral before breakfast  predniSONE   Tablet 20 milliGRAM(s) Oral daily  senna 2 Tablet(s) Oral at bedtime    MEDICATIONS  (PRN):  dextrose Gel 1 Dose(s) Oral once PRN Blood Glucose LESS THAN 70 milliGRAM(s)/deciliter  glucagon  Injectable 1 milliGRAM(s) IntraMuscular once PRN Glucose LESS THAN 70 milligrams/deciliter  HYDROmorphone  Injectable 0.125 milliGRAM(s) IV Push every 4 hours PRN Severe Pain (7 - 10)  ondansetron Injectable 4 milliGRAM(s) IV Push every 6 hours PRN Nausea  polyethylene glycol 3350 17 Gram(s) Oral daily PRN Constipation      Allergies    No Known Allergies        Vital Signs Last 24 Hrs  T(C): 37.4 (11 Oct 2017 07:39), Max: 37.4 (11 Oct 2017 07:39)  T(F): 99.4 (11 Oct 2017 07:39), Max: 99.4 (11 Oct 2017 07:39)  HR: 75 (11 Oct 2017 07:39) (66 - 89)  BP: 170/80 (11 Oct 2017 07:39) (143/73 - 188/83)  BP(mean): --  RR: 180 (11 Oct 2017 07:39) (16 - 180)  SpO2: 96% (11 Oct 2017 07:39) (92% - 99%)    I&O's Summary    10 Oct 2017 07:01  -  11 Oct 2017 07:00  --------------------------------------------------------  IN: 120 mL / OUT: 0 mL / NET: 120 mL        ON EXAM:    General: NAD  HEENT: Mucous membranes are dry, anicteric  Lungs: Non-labored, breath sounds are clear bilaterally, Decreased BS b/l  Cardiovascular: Regular, S1 and S2, no murmurs, rubs, or gallops.  Distant HS  Gastrointestinal: Bowel Sounds present, soft, nontender.   Lymph: No peripheral edema. No lymphadenopathy.  Skin: No rashes or ulcers    LABS: All Labs Reviewed:                        9.5    6.7   )-----------( 91       ( 10 Oct 2017 07:31 )             30.4                         10.3   3.6   )-----------( 71       ( 09 Oct 2017 06:56 )             33.4     10 Oct 2017 07:31    142    |  106    |  42     ----------------------------<  114    3.7     |  30     |  1.70   09 Oct 2017 06:56    141    |  105    |  39     ----------------------------<  140    3.8     |  29     |  1.90     Ca    8.4        10 Oct 2017 07:31  Ca    9.0        09 Oct 2017 06:56    TPro  6.6    /  Alb  2.3    /  TBili  0.6    /  DBili  x      /  AST  47     /  ALT  302    /  AlkPhos  97     10 Oct 2017 07:31  TPro  7.6    /  Alb  2.5    /  TBili  0.8    /  DBili  .30    /  AST  70     /  ALT  445    /  AlkPhos  113    09 Oct 2017 06:56

## 2017-10-11 NOTE — PROGRESS NOTE ADULT - SUBJECTIVE AND OBJECTIVE BOX
Date/Time Patient Seen:  		  Referring MD:   Data Reviewed	       Patient is a 88y old  Female who presents with a chief complaint of found unresponsive (09 Oct 2017 15:06)  in bed  seen and examined  vs and meds reviewed  son at the bedside        Subjective/HPI     PAST MEDICAL & SURGICAL HISTORY:  Asthma  Diabetes mellitus  HTN (hypertension)  No significant past surgical history        Medication list         MEDICATIONS  (STANDING):  ALBUTerol/ipratropium for Nebulization 3 milliLiter(s) Nebulizer every 4 hours  amLODIPine   Tablet 5 milliGRAM(s) Oral daily  aspirin  chewable 81 milliGRAM(s) Oral daily  clopidogrel Tablet 75 milliGRAM(s) Oral daily  dextrose 5%. 1000 milliLiter(s) (50 mL/Hr) IV Continuous <Continuous>  dextrose 50% Injectable 12.5 Gram(s) IV Push once  dextrose 50% Injectable 25 Gram(s) IV Push once  dextrose 50% Injectable 25 Gram(s) IV Push once  docusate sodium 100 milliGRAM(s) Oral three times a day  furosemide   Injectable 40 milliGRAM(s) IV Push once  gabapentin 100 milliGRAM(s) Oral daily  heparin  Injectable 5000 Unit(s) SubCutaneous every 8 hours  influenza   Vaccine 0.5 milliLiter(s) IntraMuscular once  insulin lispro (HumaLOG) corrective regimen sliding scale   SubCutaneous Before meals and at bedtime  lidocaine   Patch 1 Patch Transdermal daily  lidocaine   Patch 1 Patch Transdermal daily  metoprolol 12.5 milliGRAM(s) Oral every 8 hours  nystatin Powder 1 Application(s) Topical every 6 hours  pantoprazole    Tablet 40 milliGRAM(s) Oral before breakfast  predniSONE   Tablet 20 milliGRAM(s) Oral daily  senna 2 Tablet(s) Oral at bedtime    MEDICATIONS  (PRN):  dextrose Gel 1 Dose(s) Oral once PRN Blood Glucose LESS THAN 70 milliGRAM(s)/deciliter  glucagon  Injectable 1 milliGRAM(s) IntraMuscular once PRN Glucose LESS THAN 70 milligrams/deciliter  HYDROmorphone  Injectable 0.125 milliGRAM(s) IV Push every 4 hours PRN Severe Pain (7 - 10)  ondansetron Injectable 4 milliGRAM(s) IV Push every 6 hours PRN Nausea  polyethylene glycol 3350 17 Gram(s) Oral daily PRN Constipation         Vitals log        ICU Vital Signs Last 24 Hrs  T(C): 36.9 (11 Oct 2017 04:36), Max: 37.1 (10 Oct 2017 19:50)  T(F): 98.5 (11 Oct 2017 04:36), Max: 98.8 (10 Oct 2017 19:50)  HR: 82 (11 Oct 2017 04:36) (64 - 89)  BP: 158/81 (11 Oct 2017 04:36) (143/73 - 188/83)  BP(mean): --  ABP: --  ABP(mean): --  RR: 18 (11 Oct 2017 04:36) (16 - 20)  SpO2: 95% (11 Oct 2017 04:36) (95% - 99%)           Input and Output:  I&O's Detail    09 Oct 2017 07:01  -  10 Oct 2017 07:00  --------------------------------------------------------  IN:    Oral Fluid: 210 mL  Total IN: 210 mL    OUT:  Total OUT: 0 mL    Total NET: 210 mL      10 Oct 2017 07:01  -  11 Oct 2017 06:42  --------------------------------------------------------  IN:    Oral Fluid: 120 mL  Total IN: 120 mL    OUT:  Total OUT: 0 mL    Total NET: 120 mL          Lab Data                        9.5    6.7   )-----------( 91       ( 10 Oct 2017 07:31 )             30.4     10-10    142  |  106  |  42<H>  ----------------------------<  114<H>  3.7   |  30  |  1.70<H>    Ca    8.4<L>      10 Oct 2017 07:31    TPro  6.6  /  Alb  2.3<L>  /  TBili  0.6  /  DBili  x   /  AST  47<H>  /  ALT  302<H>  /  AlkPhos  97  10-10            Review of Systems	      Objective     Physical Examination    head at  heart - s1s2  lungs - rhonchi and crackles  abd - soft  kyphosis      Pertinent Lab findings & Imaging      Samantha:  NO   Adequate UO     I&O's Detail    09 Oct 2017 07:01  -  10 Oct 2017 07:00  --------------------------------------------------------  IN:    Oral Fluid: 210 mL  Total IN: 210 mL    OUT:  Total OUT: 0 mL    Total NET: 210 mL      10 Oct 2017 07:01  -  11 Oct 2017 06:42  --------------------------------------------------------  IN:    Oral Fluid: 120 mL  Total IN: 120 mL    OUT:  Total OUT: 0 mL    Total NET: 120 mL               Discussed with:     Cultures:	        Radiology

## 2017-10-11 NOTE — PROGRESS NOTE ADULT - PROBLEM SELECTOR PROBLEM 2
Anemia
Frail elderly
Respiratory failure with hypercapnia
Anemia
Respiratory failure with hypercapnia
Anemia
Respiratory failure with hypercapnia
Respiratory failure with hypercapnia

## 2017-10-11 NOTE — PROGRESS NOTE ADULT - PROBLEM SELECTOR PROBLEM 1
Septic shock
Frail elderly
Obstructive airway disease
Respiratory failure with hypercapnia
Septic shock

## 2017-10-11 NOTE — PROGRESS NOTE ADULT - PROBLEM SELECTOR PLAN 1
multifactorial  HF  COPD  Atelectasis  Congestion  Frailty  nebs, chest pt, suction PRN, lower steroids, for 3 more doses  lasix  I and O  BP control  replete lytes  prognosis poor  dc planning  pt is DNR  discussed with family

## 2017-10-11 NOTE — PROGRESS NOTE ADULT - ASSESSMENT
87yo F w/ PMHx DM2, asthma, HTN, iron deficiency anemia, COPD w/ intermittent use of home O2, severe sepsis secondary to pneumonia, MSOF, NSTEMI, overall normal LV function, enzymes downtrended. Multiorgan failure is resolving.    - Continue aspirin and clopidogrel for now given NSTEMI  - cont metoprolol 12.5 mg po q8hr, low hr may limit its titration  - No statin drug secondary to shock liver, which is resolving.  Would start Lipitor 40 when this resolves.   - BP remains elevated at times.  Can increase amlodipine to 10 QD  - Monitor and replete electrolytes. Keep K>4.0 and Mg>2.0.   - Not a candidate for coronary angiography  - Further cardiac workup will depend on clinical course.   - will follow

## 2017-10-11 NOTE — PROGRESS NOTE ADULT - PROBLEM SELECTOR PLAN 2
BIPAP at night and NC during the day now,  c/w Nebs, had episdoe of hypoxia overnight sat 70%, was palced on venti mask, will titrate to nasal canula with pulse ox monitoring    pulm savannaal
BIPAP at night and NC during the day now,  c/w Nebs.  - continue pulse ox monitoring    - As per Dr. Barraza, keep saturation >86%, CT chest showed large hiatal hernia
no overt signs/symptoms of gi bleeding  monitor h/h, transfuse as needed  endoscopic work up if patient develops GI bleed  check iron panel, ferritin, b12, folate to characterize anemia
supportive regimen  non ambulatory  pt is DNR  prognosis poor  assist with ADL  oral hygiene  skin care
BIPAP at night and NC during the day now,  c/w Nebs.  management as per ICU team.   Monitor ABG
ON BIPAP, management as per ICU team.   Monitor ABG
Off BIPAP now,  c/w Nebs.  management as per ICU team.   Monitor ABG
no overt signs/symptoms of gi bleeding  monitor h/h, transfuse as needed  endoscopic work up if patient develops GI bleed
no overt signs/symptoms of gi bleeding  monitor h/h, transfuse as needed  endoscopic work up if patient develops GI bleed  check iron panel, ferritin, b12, folate to characterize anemia
no overt signs/symptoms of gi bleeding  monitor h/h, transfuse as needed  endoscopic work up if patient develops GI bleed  check iron panel, ferritin, b12, folate to characterize anemia
BIPAP at night and NC during the day now,  c/w Nebs.  - continue pulse ox monitoring    - As per Dr. Barraza, continue supportive regimen, Solumedrol offered and son declined

## 2017-10-11 NOTE — PROGRESS NOTE ADULT - PROBLEM SELECTOR PROBLEM 3
NSTEMI (non-ST elevated myocardial infarction)
NSTEMI (non-ST elevated myocardial infarction)
Respiratory failure with hypercapnia
Community acquired pneumonia
Constipation
Constipation
NSTEMI (non-ST elevated myocardial infarction)
NSTEMI (non-ST elevated myocardial infarction)

## 2017-10-11 NOTE — PROGRESS NOTE ADULT - SUBJECTIVE AND OBJECTIVE BOX
Interval Events: clinically more alert today, (+) passing flatulence but no BM yet    HPI: 89yo F w/ PMHx DM2, asthma, HTN, iron deficiency anemia, COPD w/ intermittent use of home O2 presents w/ weakness and fatigue. pt is nonverbal at this time and unable to provide any history. She developed a fever last night and the family admits mild cough which was nonproductive. 3-4 days prior to presentation the pt was complaining of mild abd discomfort but was tolerating po well. Family denies dysphagia. They stated that she had an episode of bilious vomiting 4 days ago but none since. She was tolerating diet yesterday without difficulty. Last night developed rectal temp of 102F but denied diaphoresis, chills, rigors. Upon waking this morning, the pt was obtunded. Responding only to noxious stimuli so EMS was alerted. No other recent travel. No recent hospitalizations. No other recent changes in her overall health.  Recnetly her DM2 regimen was decreased due to normal glucose levels. Also, her recent creatinine was 1.1 as per family at the bedside. Family members have medical background.     MEDICATIONS  (STANDING):  ALBUTerol/ipratropium for Nebulization 3 milliLiter(s) Nebulizer every 4 hours  aspirin  chewable 81 milliGRAM(s) Oral daily  clopidogrel Tablet 75 milliGRAM(s) Oral daily  dextrose 5%. 1000 milliLiter(s) (50 mL/Hr) IV Continuous <Continuous>  dextrose 50% Injectable 12.5 Gram(s) IV Push once  dextrose 50% Injectable 25 Gram(s) IV Push once  dextrose 50% Injectable 25 Gram(s) IV Push once  docusate sodium 100 milliGRAM(s) Oral three times a day  furosemide   Injectable 40 milliGRAM(s) IV Push once  gabapentin 100 milliGRAM(s) Oral daily  heparin  Injectable 5000 Unit(s) SubCutaneous every 8 hours  hydrALAZINE 10 milliGRAM(s) Oral three times a day  influenza   Vaccine 0.5 milliLiter(s) IntraMuscular once  insulin lispro (HumaLOG) corrective regimen sliding scale   SubCutaneous Before meals and at bedtime  lidocaine   Patch 1 Patch Transdermal daily  methylPREDNISolone sodium succinate Injectable 20 milliGRAM(s) IV Push three times a day  metoprolol 12.5 milliGRAM(s) Oral every 8 hours  nystatin Powder 1 Application(s) Topical every 6 hours  pantoprazole    Tablet 40 milliGRAM(s) Oral before breakfast  senna 2 Tablet(s) Oral at bedtime    MEDICATIONS  (PRN):  dextrose Gel 1 Dose(s) Oral once PRN Blood Glucose LESS THAN 70 milliGRAM(s)/deciliter  glucagon  Injectable 1 milliGRAM(s) IntraMuscular once PRN Glucose LESS THAN 70 milligrams/deciliter  HYDROmorphone  Injectable 0.125 milliGRAM(s) IV Push every 4 hours PRN Severe Pain (7 - 10)  ondansetron Injectable 4 milliGRAM(s) IV Push every 6 hours PRN Nausea  polyethylene glycol 3350 17 Gram(s) Oral daily PRN Constipation      Allergies    No Known Allergies    Intolerances        Review of Systems:    General:  No wt loss, fevers, chills, night sweats, fatigue   Eyes:  Good vision, no reported pain  ENT:  No sore throat, pain, runny nose, dysphagia  CV:  No pain, palpitations, hypo/hypertension  Resp:  No dyspnea, cough, tachypnea, wheezing  GI:  (+) constipation  :  No pain, bleeding, incontinence, nocturia  Muscle:  No pain, weakness  Neuro:  RLE pain  Psych:  No fatigue, insomnia, mood problems, depression  Endocrine:  No polyuria, polydipsia, cold/heat intolerance  Heme:  No petechiae, ecchymosis, easy bruisability  Skin:  No rash, tattoos, scars, edema      Vital Signs Last 24 Hrs  T(C): 36.7 (09 Oct 2017 11:24), Max: 37.5 (08 Oct 2017 16:22)  T(F): 98 (09 Oct 2017 11:24), Max: 99.5 (08 Oct 2017 16:22)  HR: 72 (09 Oct 2017 11:24) (65 - 84)  BP: 176/90 (09 Oct 2017 11:24) (134/85 - 196/76)  BP(mean): --  RR: 19 (09 Oct 2017 11:24) (18 - 19)  SpO2: 99% (09 Oct 2017 11:24) (94% - 99%)    PHYSICAL EXAM:    GENERAL:  AAOx2, follows commands, able to communicate verbally   HEENT:  NC/AT,  conjunctivae clear and pink, no thyromegaly, nodules, adenopathy, no JVD, sclera -anicteric  CHEST:  decreased BS at bases  HEART:  Regular rhythm, S1, S2, no murmur/rub/S3/S4, no abdominal bruit, no edema  ABDOMEN:  Soft, non-tender, non-distended, normoactive bowel sounds,  no masses ,no hepato-splenomegaly, no signs of chronic liver disease  EXTREMITIES:  no cyanosis,clubbing or edema  SKIN:  No rash/erythema/ecchymoses/petechiae/wounds/abscess/warm/dry  NEURO:  follows commands          LABS: reviewed                      RADIOLOGY & ADDITIONAL TESTS:

## 2017-10-12 ENCOUNTER — MEDICATION RENEWAL (OUTPATIENT)
Age: 82
End: 2017-10-12

## 2017-10-13 ENCOUNTER — LABORATORY RESULT (OUTPATIENT)
Age: 82
End: 2017-10-13

## 2017-10-13 ENCOUNTER — CLINICAL ADVICE (OUTPATIENT)
Age: 82
End: 2017-10-13

## 2017-10-13 DIAGNOSIS — N17.0 ACUTE KIDNEY FAILURE WITH TUBULAR NECROSIS: ICD-10-CM

## 2017-10-13 DIAGNOSIS — R65.21 SEVERE SEPSIS WITH SEPTIC SHOCK: ICD-10-CM

## 2017-10-13 DIAGNOSIS — I10 ESSENTIAL (PRIMARY) HYPERTENSION: ICD-10-CM

## 2017-10-13 DIAGNOSIS — J96.22 ACUTE AND CHRONIC RESPIRATORY FAILURE WITH HYPERCAPNIA: ICD-10-CM

## 2017-10-13 DIAGNOSIS — E87.5 HYPERKALEMIA: ICD-10-CM

## 2017-10-13 DIAGNOSIS — K59.00 CONSTIPATION, UNSPECIFIED: ICD-10-CM

## 2017-10-13 DIAGNOSIS — J81.1 CHRONIC PULMONARY EDEMA: ICD-10-CM

## 2017-10-13 DIAGNOSIS — J18.9 PNEUMONIA, UNSPECIFIED ORGANISM: ICD-10-CM

## 2017-10-13 DIAGNOSIS — J44.0 CHRONIC OBSTRUCTIVE PULMONARY DISEASE WITH (ACUTE) LOWER RESPIRATORY INFECTION: ICD-10-CM

## 2017-10-13 DIAGNOSIS — B95.1 STREPTOCOCCUS, GROUP B, AS THE CAUSE OF DISEASES CLASSIFIED ELSEWHERE: ICD-10-CM

## 2017-10-13 DIAGNOSIS — G47.33 OBSTRUCTIVE SLEEP APNEA (ADULT) (PEDIATRIC): ICD-10-CM

## 2017-10-13 DIAGNOSIS — I24.8 OTHER FORMS OF ACUTE ISCHEMIC HEART DISEASE: ICD-10-CM

## 2017-10-13 DIAGNOSIS — R13.12 DYSPHAGIA, OROPHARYNGEAL PHASE: ICD-10-CM

## 2017-10-13 DIAGNOSIS — A41.9 SEPSIS, UNSPECIFIED ORGANISM: ICD-10-CM

## 2017-10-13 DIAGNOSIS — E87.4 MIXED DISORDER OF ACID-BASE BALANCE: ICD-10-CM

## 2017-10-13 DIAGNOSIS — K72.01 ACUTE AND SUBACUTE HEPATIC FAILURE WITH COMA: ICD-10-CM

## 2017-10-13 DIAGNOSIS — J96.21 ACUTE AND CHRONIC RESPIRATORY FAILURE WITH HYPOXIA: ICD-10-CM

## 2017-10-13 DIAGNOSIS — J98.11 ATELECTASIS: ICD-10-CM

## 2017-10-13 DIAGNOSIS — E83.39 OTHER DISORDERS OF PHOSPHORUS METABOLISM: ICD-10-CM

## 2017-10-13 DIAGNOSIS — E78.5 HYPERLIPIDEMIA, UNSPECIFIED: ICD-10-CM

## 2017-10-13 DIAGNOSIS — Z74.01 BED CONFINEMENT STATUS: ICD-10-CM

## 2017-10-13 DIAGNOSIS — N39.0 URINARY TRACT INFECTION, SITE NOT SPECIFIED: ICD-10-CM

## 2017-10-13 DIAGNOSIS — E11.65 TYPE 2 DIABETES MELLITUS WITH HYPERGLYCEMIA: ICD-10-CM

## 2017-10-13 DIAGNOSIS — Z79.82 LONG TERM (CURRENT) USE OF ASPIRIN: ICD-10-CM

## 2017-10-13 DIAGNOSIS — Z99.81 DEPENDENCE ON SUPPLEMENTAL OXYGEN: ICD-10-CM

## 2017-10-13 DIAGNOSIS — Z79.51 LONG TERM (CURRENT) USE OF INHALED STEROIDS: ICD-10-CM

## 2017-10-13 DIAGNOSIS — D69.59 OTHER SECONDARY THROMBOCYTOPENIA: ICD-10-CM

## 2017-10-13 DIAGNOSIS — Z66 DO NOT RESUSCITATE: ICD-10-CM

## 2017-10-13 DIAGNOSIS — Z79.84 LONG TERM (CURRENT) USE OF ORAL HYPOGLYCEMIC DRUGS: ICD-10-CM

## 2017-10-13 DIAGNOSIS — G92 TOXIC ENCEPHALOPATHY: ICD-10-CM

## 2017-10-13 DIAGNOSIS — D50.9 IRON DEFICIENCY ANEMIA, UNSPECIFIED: ICD-10-CM

## 2017-10-16 ENCOUNTER — LABORATORY RESULT (OUTPATIENT)
Age: 82
End: 2017-10-16

## 2017-10-16 LAB
ALBUMIN SERPL ELPH-MCNC: 3 G/DL
ALP BLD-CCNC: 104 U/L
ALT SERPL-CCNC: 80 U/L
ANION GAP SERPL CALC-SCNC: 14 MMOL/L
AST SERPL-CCNC: 44 U/L
BASOPHILS # BLD AUTO: 0.03 K/UL
BASOPHILS NFR BLD AUTO: 0.5 %
BILIRUB SERPL-MCNC: 0.6 MG/DL
BUN SERPL-MCNC: 46 MG/DL
CALCIUM SERPL-MCNC: 9.5 MG/DL
CHLORIDE SERPL-SCNC: 105 MMOL/L
CO2 SERPL-SCNC: 30 MMOL/L
CREAT SERPL-MCNC: 1.45 MG/DL
EOSINOPHIL # BLD AUTO: 0.22 K/UL
EOSINOPHIL NFR BLD AUTO: 3.9 %
GLUCOSE SERPL-MCNC: 109 MG/DL
HCT VFR BLD CALC: 22.1 %
HGB BLD-MCNC: 6.8 G/DL
IMM GRANULOCYTES NFR BLD AUTO: 0.2 %
LYMPHOCYTES # BLD AUTO: 1.15 K/UL
LYMPHOCYTES NFR BLD AUTO: 20.6 %
MAN DIFF?: NORMAL
MCHC RBC-ENTMCNC: 28.9 PG
MCHC RBC-ENTMCNC: 30.8 GM/DL
MCV RBC AUTO: 94 FL
MONOCYTES # BLD AUTO: 0.34 K/UL
MONOCYTES NFR BLD AUTO: 6.1 %
NEUTROPHILS # BLD AUTO: 3.83 K/UL
NEUTROPHILS NFR BLD AUTO: 68.7 %
PLATELET # BLD AUTO: 77 K/UL
POTASSIUM SERPL-SCNC: 3.8 MMOL/L
PROT SERPL-MCNC: 6.4 G/DL
RBC # BLD: 2.35 M/UL
RBC # FLD: 16.9 %
SODIUM SERPL-SCNC: 149 MMOL/L
WBC # FLD AUTO: 5.58 K/UL

## 2017-10-17 ENCOUNTER — EMERGENCY (EMERGENCY)
Facility: HOSPITAL | Age: 82
LOS: 1 days | Discharge: HOME | End: 2017-10-17
Attending: EMERGENCY MEDICINE | Admitting: EMERGENCY MEDICINE
Payer: MEDICAID

## 2017-10-17 VITALS
RESPIRATION RATE: 20 BRPM | HEART RATE: 83 BPM | SYSTOLIC BLOOD PRESSURE: 146 MMHG | TEMPERATURE: 99 F | DIASTOLIC BLOOD PRESSURE: 62 MMHG | OXYGEN SATURATION: 98 %

## 2017-10-17 VITALS
RESPIRATION RATE: 16 BRPM | WEIGHT: 160.06 LBS | HEIGHT: 64 IN | SYSTOLIC BLOOD PRESSURE: 143 MMHG | HEART RATE: 67 BPM | DIASTOLIC BLOOD PRESSURE: 124 MMHG | TEMPERATURE: 98 F | OXYGEN SATURATION: 99 %

## 2017-10-17 LAB
ALBUMIN SERPL ELPH-MCNC: 2.7 G/DL — LOW (ref 3.3–5)
ALBUMIN SERPL ELPH-MCNC: 2.8 G/DL — LOW (ref 3.3–5)
ALP SERPL-CCNC: 119 U/L — SIGNIFICANT CHANGE UP (ref 40–120)
ALP SERPL-CCNC: 131 U/L — HIGH (ref 40–120)
ALT FLD-CCNC: 103 U/L — HIGH (ref 12–78)
ALT FLD-CCNC: 114 U/L — HIGH (ref 12–78)
ANION GAP SERPL CALC-SCNC: 7 MMOL/L — SIGNIFICANT CHANGE UP (ref 5–17)
ANION GAP SERPL CALC-SCNC: 8 MMOL/L — SIGNIFICANT CHANGE UP (ref 5–17)
APTT BLD: 27.2 SEC — LOW (ref 27.5–37.4)
AST SERPL-CCNC: 49 U/L — HIGH (ref 15–37)
AST SERPL-CCNC: 92 U/L — HIGH (ref 15–37)
BASOPHILS # BLD AUTO: 0.1 K/UL — SIGNIFICANT CHANGE UP (ref 0–0.2)
BASOPHILS NFR BLD AUTO: 1.3 % — SIGNIFICANT CHANGE UP (ref 0–2)
BILIRUB SERPL-MCNC: 0.7 MG/DL — SIGNIFICANT CHANGE UP (ref 0.2–1.2)
BILIRUB SERPL-MCNC: 0.7 MG/DL — SIGNIFICANT CHANGE UP (ref 0.2–1.2)
BLD GP AB SCN SERPL QL: SIGNIFICANT CHANGE UP
BUN SERPL-MCNC: 42 MG/DL — HIGH (ref 7–23)
BUN SERPL-MCNC: 42 MG/DL — HIGH (ref 7–23)
CALCIUM SERPL-MCNC: 8.7 MG/DL — SIGNIFICANT CHANGE UP (ref 8.5–10.1)
CALCIUM SERPL-MCNC: 8.9 MG/DL — SIGNIFICANT CHANGE UP (ref 8.5–10.1)
CHLORIDE SERPL-SCNC: 105 MMOL/L — SIGNIFICANT CHANGE UP (ref 96–108)
CHLORIDE SERPL-SCNC: 105 MMOL/L — SIGNIFICANT CHANGE UP (ref 96–108)
CO2 SERPL-SCNC: 29 MMOL/L — SIGNIFICANT CHANGE UP (ref 22–31)
CO2 SERPL-SCNC: 31 MMOL/L — SIGNIFICANT CHANGE UP (ref 22–31)
CREAT SERPL-MCNC: 1.4 MG/DL — HIGH (ref 0.5–1.3)
CREAT SERPL-MCNC: 1.4 MG/DL — HIGH (ref 0.5–1.3)
EOSINOPHIL # BLD AUTO: 0.3 K/UL — SIGNIFICANT CHANGE UP (ref 0–0.5)
EOSINOPHIL NFR BLD AUTO: 4.3 % — SIGNIFICANT CHANGE UP (ref 0–6)
GLUCOSE SERPL-MCNC: 95 MG/DL — SIGNIFICANT CHANGE UP (ref 70–99)
GLUCOSE SERPL-MCNC: 97 MG/DL — SIGNIFICANT CHANGE UP (ref 70–99)
HCT VFR BLD CALC: 27.3 % — LOW (ref 34.5–45)
HGB BLD-MCNC: 8.2 G/DL — LOW (ref 11.5–15.5)
INR BLD: 1.17 RATIO — HIGH (ref 0.88–1.16)
LYMPHOCYTES # BLD AUTO: 1.5 K/UL — SIGNIFICANT CHANGE UP (ref 1–3.3)
LYMPHOCYTES # BLD AUTO: 20 % — SIGNIFICANT CHANGE UP (ref 13–44)
MCHC RBC-ENTMCNC: 28.8 PG — SIGNIFICANT CHANGE UP (ref 27–34)
MCHC RBC-ENTMCNC: 29.9 GM/DL — LOW (ref 32–36)
MCV RBC AUTO: 96.3 FL — SIGNIFICANT CHANGE UP (ref 80–100)
MONOCYTES # BLD AUTO: 0.6 K/UL — SIGNIFICANT CHANGE UP (ref 0–0.9)
MONOCYTES NFR BLD AUTO: 8.3 % — SIGNIFICANT CHANGE UP (ref 1–9)
NEUTROPHILS # BLD AUTO: 4.9 K/UL — SIGNIFICANT CHANGE UP (ref 1.8–7.4)
NEUTROPHILS NFR BLD AUTO: 66.1 % — SIGNIFICANT CHANGE UP (ref 43–77)
PLATELET # BLD AUTO: 89 K/UL — LOW (ref 150–400)
POTASSIUM SERPL-MCNC: 3.7 MMOL/L — SIGNIFICANT CHANGE UP (ref 3.5–5.3)
POTASSIUM SERPL-MCNC: 5.4 MMOL/L — HIGH (ref 3.5–5.3)
POTASSIUM SERPL-SCNC: 3.7 MMOL/L — SIGNIFICANT CHANGE UP (ref 3.5–5.3)
POTASSIUM SERPL-SCNC: 5.4 MMOL/L — HIGH (ref 3.5–5.3)
PROT SERPL-MCNC: 7.1 G/DL — SIGNIFICANT CHANGE UP (ref 6–8.3)
PROT SERPL-MCNC: 7.9 G/DL — SIGNIFICANT CHANGE UP (ref 6–8.3)
PROTHROM AB SERPL-ACNC: 12.8 SEC — HIGH (ref 9.8–12.7)
RBC # BLD: 2.83 M/UL — LOW (ref 3.8–5.2)
RBC # FLD: 15.6 % — HIGH (ref 10.3–14.5)
SODIUM SERPL-SCNC: 142 MMOL/L — SIGNIFICANT CHANGE UP (ref 135–145)
SODIUM SERPL-SCNC: 143 MMOL/L — SIGNIFICANT CHANGE UP (ref 135–145)
WBC # BLD: 7.5 K/UL — SIGNIFICANT CHANGE UP (ref 3.8–10.5)
WBC # FLD AUTO: 7.5 K/UL — SIGNIFICANT CHANGE UP (ref 3.8–10.5)

## 2017-10-17 PROCEDURE — 86900 BLOOD TYPING SEROLOGIC ABO: CPT

## 2017-10-17 PROCEDURE — 99285 EMERGENCY DEPT VISIT HI MDM: CPT | Mod: 25

## 2017-10-17 PROCEDURE — 86850 RBC ANTIBODY SCREEN: CPT

## 2017-10-17 PROCEDURE — 99285 EMERGENCY DEPT VISIT HI MDM: CPT

## 2017-10-17 PROCEDURE — 36430 TRANSFUSION BLD/BLD COMPNT: CPT

## 2017-10-17 PROCEDURE — 85730 THROMBOPLASTIN TIME PARTIAL: CPT

## 2017-10-17 PROCEDURE — 86901 BLOOD TYPING SEROLOGIC RH(D): CPT

## 2017-10-17 PROCEDURE — 86920 COMPATIBILITY TEST SPIN: CPT

## 2017-10-17 PROCEDURE — 96374 THER/PROPH/DIAG INJ IV PUSH: CPT | Mod: XU

## 2017-10-17 PROCEDURE — 80053 COMPREHEN METABOLIC PANEL: CPT

## 2017-10-17 PROCEDURE — P9016: CPT

## 2017-10-17 PROCEDURE — 36415 COLL VENOUS BLD VENIPUNCTURE: CPT

## 2017-10-17 PROCEDURE — 85610 PROTHROMBIN TIME: CPT

## 2017-10-17 PROCEDURE — 85027 COMPLETE CBC AUTOMATED: CPT

## 2017-10-17 RX ORDER — FUROSEMIDE 40 MG
20 TABLET ORAL EVERY 4 HOURS
Qty: 0 | Refills: 0 | Status: DISCONTINUED | OUTPATIENT
Start: 2017-10-17 | End: 2017-10-21

## 2017-10-17 RX ORDER — SODIUM CHLORIDE 9 MG/ML
3 INJECTION INTRAMUSCULAR; INTRAVENOUS; SUBCUTANEOUS ONCE
Qty: 0 | Refills: 0 | Status: COMPLETED | OUTPATIENT
Start: 2017-10-17 | End: 2017-10-17

## 2017-10-17 RX ADMIN — SODIUM CHLORIDE 3 MILLILITER(S): 9 INJECTION INTRAMUSCULAR; INTRAVENOUS; SUBCUTANEOUS at 13:00

## 2017-10-17 RX ADMIN — Medication 20 MILLIGRAM(S): at 15:30

## 2017-10-17 NOTE — ED ADULT NURSE NOTE - OBJECTIVE STATEMENT
Pt A&Ox4, to ED via ambulance sent for blood transfusion.  Per son at bedside, pt's Hgb is 6.5 and sent for PRBC transfusion.  Pt has end-stage COPD, history of multiple chronic diseases, recently hospitalized for blood transfusions.  Per son, pt has no new discomfort or complaints.  Pt is on oxygen at home, sits upright in tripod position with "pursed lip breathing" - per son this is her baseline.

## 2017-10-17 NOTE — ED ADULT NURSE REASSESSMENT NOTE - TEMPLATE LIST FOR HEAD TO TOE ASSESSMENT
Fluid/Electrolyte/Metabolic

## 2017-10-17 NOTE — ED PROVIDER NOTE - MEDICAL DECISION MAKING DETAILS
Anemia / weakness - check labs, xfusion Anemia / weakness - check labs, xfusion x1 unit, d/c home fu pmd

## 2017-10-17 NOTE — ED PROVIDER NOTE - OBJECTIVE STATEMENT
89 yo F p/w sent by [facility] for low Hgb. Pt with hx chronic anemia, received xfusions. Pt DNR / DNI, family states only here for xfusion. Pt with hx CHF, usually gets lasix with xfusion. No abd pain. No cp.. NO SOB. No change from usual state of health except ? mild fatigue. No fever/chills. No other co.

## 2017-10-17 NOTE — ED ADULT NURSE REASSESSMENT NOTE - NS ED NURSE REASSESS COMMENT FT1
1st unit PRBCs began infusing at 1535 through gauge #22 left forearm, site patent no s/s of redness or swelling, no s/s of transfusion reaction after 1st 15 minutes.  Pt's son at bedside, educated re: s/s of transfusion reaction, encouraged to call with any concerns, call bell within reach.
Pt had urine incontinence, pt cleaned & dried, new diaper and linens, T&P for comfort.
Spoke with pt's son re: pt's Hgb here is 8.2, at this time he just wants one unit of PRBCs transfused
copies of all pt's test results given to son at bedside
1st unit PRBCs completed infusing at 1840 with no complications through gauge # 22 left forearm, site patent with no s/s of redness or swelling.  Pt given dinner tray.  Will be D/C home; will arrange for ambulance back to private residence.

## 2017-10-19 ENCOUNTER — MEDICATION RENEWAL (OUTPATIENT)
Age: 82
End: 2017-10-19

## 2017-10-19 ENCOUNTER — APPOINTMENT (OUTPATIENT)
Dept: HOME HEALTH SERVICES | Facility: HOME HEALTH | Age: 82
End: 2017-10-19

## 2017-10-19 ENCOUNTER — CLINICAL ADVICE (OUTPATIENT)
Age: 82
End: 2017-10-19

## 2017-10-19 VITALS
TEMPERATURE: 98.8 F | DIASTOLIC BLOOD PRESSURE: 60 MMHG | HEART RATE: 86 BPM | RESPIRATION RATE: 16 BRPM | SYSTOLIC BLOOD PRESSURE: 140 MMHG | OXYGEN SATURATION: 95 %

## 2017-10-20 DIAGNOSIS — D64.9 ANEMIA, UNSPECIFIED: ICD-10-CM

## 2017-10-20 DIAGNOSIS — J45.909 UNSPECIFIED ASTHMA, UNCOMPLICATED: ICD-10-CM

## 2017-10-20 DIAGNOSIS — I10 ESSENTIAL (PRIMARY) HYPERTENSION: ICD-10-CM

## 2017-10-20 DIAGNOSIS — E11.9 TYPE 2 DIABETES MELLITUS WITHOUT COMPLICATIONS: ICD-10-CM

## 2017-10-20 DIAGNOSIS — I50.9 HEART FAILURE, UNSPECIFIED: ICD-10-CM

## 2017-10-20 DIAGNOSIS — Z79.4 LONG TERM (CURRENT) USE OF INSULIN: ICD-10-CM

## 2017-10-23 ENCOUNTER — MEDICATION RENEWAL (OUTPATIENT)
Age: 82
End: 2017-10-23

## 2017-10-24 ENCOUNTER — LABORATORY RESULT (OUTPATIENT)
Age: 82
End: 2017-10-24

## 2017-10-24 ENCOUNTER — MEDICATION RENEWAL (OUTPATIENT)
Age: 82
End: 2017-10-24

## 2017-10-25 ENCOUNTER — RX RENEWAL (OUTPATIENT)
Age: 82
End: 2017-10-25

## 2017-10-25 ENCOUNTER — APPOINTMENT (OUTPATIENT)
Dept: HOME HEALTH SERVICES | Facility: HOME HEALTH | Age: 82
End: 2017-10-25
Payer: MEDICAID

## 2017-10-25 VITALS
HEART RATE: 70 BPM | SYSTOLIC BLOOD PRESSURE: 128 MMHG | DIASTOLIC BLOOD PRESSURE: 60 MMHG | TEMPERATURE: 98.9 F | BODY MASS INDEX: 34.99 KG/M2 | OXYGEN SATURATION: 99 % | WEIGHT: 210 LBS | RESPIRATION RATE: 15 BRPM | HEIGHT: 65 IN

## 2017-10-25 DIAGNOSIS — E66.9 OBESITY, UNSPECIFIED: ICD-10-CM

## 2017-10-25 LAB
BASOPHILS # BLD AUTO: 0.12 K/UL
BASOPHILS NFR BLD AUTO: 1.9 %
EOSINOPHIL # BLD AUTO: 0.29 K/UL
EOSINOPHIL NFR BLD AUTO: 4.5 %
HCT VFR BLD CALC: 24.4 %
HGB BLD-MCNC: 7.6 G/DL
IMM GRANULOCYTES NFR BLD AUTO: 3.1 %
LYMPHOCYTES # BLD AUTO: 1.62 K/UL
LYMPHOCYTES NFR BLD AUTO: 25.2 %
MAN DIFF?: NORMAL
MCHC RBC-ENTMCNC: 28.1 PG
MCHC RBC-ENTMCNC: 31.1 GM/DL
MCV RBC AUTO: 90.4 FL
MONOCYTES # BLD AUTO: 0.69 K/UL
MONOCYTES NFR BLD AUTO: 10.7 %
NEUTROPHILS # BLD AUTO: 3.5 K/UL
NEUTROPHILS NFR BLD AUTO: 54.6 %
PLATELET # BLD AUTO: NORMAL
RBC # BLD: 2.7 M/UL
RBC # FLD: 17.3 %
WBC # FLD AUTO: 6.42 K/UL

## 2017-10-25 PROCEDURE — 99350 HOME/RES VST EST HIGH MDM 60: CPT | Mod: 25

## 2017-10-25 PROCEDURE — 99497 ADVNCD CARE PLAN 30 MIN: CPT

## 2017-10-25 RX ORDER — PREDNISONE 10 MG/1
10 TABLET ORAL
Qty: 9 | Refills: 0 | Status: COMPLETED | COMMUNITY
Start: 2017-10-11

## 2017-10-25 RX ORDER — AMLODIPINE BESYLATE 10 MG/1
10 TABLET ORAL DAILY
Qty: 90 | Refills: 1 | Status: DISCONTINUED | COMMUNITY
Start: 2017-10-11 | End: 2017-10-25

## 2017-10-25 RX ORDER — INSULIN LISPRO 100 [IU]/ML
100 INJECTION, SOLUTION INTRAVENOUS; SUBCUTANEOUS
Refills: 0 | Status: DISCONTINUED | COMMUNITY
Start: 2017-10-12 | End: 2017-10-25

## 2017-10-28 PROBLEM — E66.9 OBESITY: Status: ACTIVE | Noted: 2017-09-20

## 2017-10-31 ENCOUNTER — LABORATORY RESULT (OUTPATIENT)
Age: 82
End: 2017-10-31

## 2017-10-31 LAB
ALBUMIN SERPL ELPH-MCNC: 2.4 G/DL
ANION GAP SERPL CALC-SCNC: 13 MMOL/L
BASOPHILS # BLD AUTO: 0 K/UL
BASOPHILS NFR BLD AUTO: 0 %
BUN SERPL-MCNC: 25 MG/DL
CALCIUM SERPL-MCNC: 9.3 MG/DL
CHLORIDE SERPL-SCNC: 102 MMOL/L
CO2 SERPL-SCNC: 32 MMOL/L
CREAT SERPL-MCNC: 1.35 MG/DL
EOSINOPHIL # BLD AUTO: 0.05 K/UL
EOSINOPHIL NFR BLD AUTO: 1 %
GLUCOSE SERPL-MCNC: 82 MG/DL
HCT VFR BLD CALC: 27 %
HGB BLD-MCNC: 7.9 G/DL
LYMPHOCYTES # BLD AUTO: 1.43 K/UL
LYMPHOCYTES NFR BLD AUTO: 28 %
MAN DIFF?: NORMAL
MCHC RBC-ENTMCNC: 27.9 PG
MCHC RBC-ENTMCNC: 29.3 GM/DL
MCV RBC AUTO: 95.4 FL
MONOCYTES # BLD AUTO: 0.41 K/UL
MONOCYTES NFR BLD AUTO: 8 %
NEUTROPHILS # BLD AUTO: 3.23 K/UL
NEUTROPHILS NFR BLD AUTO: 63 %
NT-PROBNP SERPL-MCNC: 5873 PG/ML
PHOSPHATE SERPL-MCNC: 2.8 MG/DL
PLATELET # BLD AUTO: 96 K/UL
POTASSIUM SERPL-SCNC: 4.3 MMOL/L
RBC # BLD: 2.83 M/UL
RBC # FLD: 15.8 %
SODIUM SERPL-SCNC: 147 MMOL/L
WBC # FLD AUTO: 5.12 K/UL

## 2017-11-01 LAB
ALBUMIN SERPL ELPH-MCNC: 2.4 G/DL
ALP BLD-CCNC: 103 U/L
ALT SERPL-CCNC: 21 U/L
AST SERPL-CCNC: 34 U/L
BILIRUB DIRECT SERPL-MCNC: 0.3 MG/DL
BILIRUB INDIRECT SERPL-MCNC: 0.3 MG/DL
BILIRUB SERPL-MCNC: 0.6 MG/DL
PROT SERPL-MCNC: 6.5 G/DL

## 2017-11-06 ENCOUNTER — CLINICAL ADVICE (OUTPATIENT)
Age: 82
End: 2017-11-06

## 2017-11-07 ENCOUNTER — LABORATORY RESULT (OUTPATIENT)
Age: 82
End: 2017-11-07

## 2017-11-07 LAB
ALBUMIN SERPL ELPH-MCNC: 2.7 G/DL
ALP BLD-CCNC: 116 U/L
ALT SERPL-CCNC: 17 U/L
ANION GAP SERPL CALC-SCNC: 12 MMOL/L
AST SERPL-CCNC: 36 U/L
BASOPHILS # BLD AUTO: 0.02 K/UL
BASOPHILS NFR BLD AUTO: 0.3 %
BILIRUB SERPL-MCNC: 0.8 MG/DL
BUN SERPL-MCNC: 26 MG/DL
CALCIUM SERPL-MCNC: 9.6 MG/DL
CHLORIDE SERPL-SCNC: 97 MMOL/L
CO2 SERPL-SCNC: 36 MMOL/L
CREAT SERPL-MCNC: 1.47 MG/DL
EOSINOPHIL # BLD AUTO: 0.09 K/UL
EOSINOPHIL NFR BLD AUTO: 1.2 %
GLUCOSE SERPL-MCNC: 91 MG/DL
HCT VFR BLD CALC: 28.5 %
HGB BLD-MCNC: 8.7 G/DL
IMM GRANULOCYTES NFR BLD AUTO: 0.5 %
LYMPHOCYTES # BLD AUTO: 1.25 K/UL
LYMPHOCYTES NFR BLD AUTO: 16.8 %
MAN DIFF?: NORMAL
MCHC RBC-ENTMCNC: 29 PG
MCHC RBC-ENTMCNC: 30.5 GM/DL
MCV RBC AUTO: 95 FL
MONOCYTES # BLD AUTO: 0.49 K/UL
MONOCYTES NFR BLD AUTO: 6.6 %
NEUTROPHILS # BLD AUTO: 5.57 K/UL
NEUTROPHILS NFR BLD AUTO: 74.6 %
PLATELET # BLD AUTO: NORMAL
POTASSIUM SERPL-SCNC: 4.5 MMOL/L
PROT SERPL-MCNC: 7.1 G/DL
RBC # BLD: 3 M/UL
RBC # FLD: 15.6 %
SODIUM SERPL-SCNC: 145 MMOL/L
WBC # FLD AUTO: 7.46 K/UL

## 2017-11-08 NOTE — PROVIDER CONTACT NOTE (CRITICAL VALUE NOTIFICATION) - NS PROVIDER READ BACK
yes
Electrodesiccation And Curettage Text: The wound bed was treated with electrodesiccation and curettage after the biopsy was performed.
Type Of Destruction Used: Curettage
Biopsy Method: Personna blade
Post-Care Instructions: I reviewed with the patient in detail post-care instructions. Patient is to keep the biopsy site dry overnight, and then apply bacitracin twice daily until healed. Patient may apply hydrogen peroxide soaks to remove any crusting.
yes
Bill 91659 For Specimen Handling/Conveyance To Laboratory?: no
Notification Instructions: Patient will be notified of biopsy results. However, patient instructed to call the office if not contacted within 2 weeks.
Biopsy Type: H and E
Size Of Lesion In Cm: 0.5
X Size Of Lesion In Cm: 0
Dressing: bandage
Detail Level: Detailed
Electrodesiccation Text: The wound bed was treated with electrodesiccation after the biopsy was performed.
Billing Type: Third-Party Bill
Wound Care: Vaseline
Consent: Written consent was obtained and risks were reviewed including but not limited to scarring, infection, bleeding, scabbing, incomplete removal, nerve damage and allergy to anesthesia.
Silver Nitrate Text: The wound bed was treated with silver nitrate after the biopsy was performed.
X Size Of Lesion In Cm: 0.4
Hemostasis: Electrocautery
Cryotherapy Text: The wound bed was treated with cryotherapy after the biopsy was performed.

## 2017-11-09 ENCOUNTER — CLINICAL ADVICE (OUTPATIENT)
Age: 82
End: 2017-11-09

## 2017-11-11 ENCOUNTER — CLINICAL ADVICE (OUTPATIENT)
Age: 82
End: 2017-11-11

## 2017-11-14 ENCOUNTER — LABORATORY RESULT (OUTPATIENT)
Age: 82
End: 2017-11-14

## 2017-11-15 LAB
BASOPHILS # BLD AUTO: 0.03 K/UL
BASOPHILS NFR BLD AUTO: 0.6 %
EOSINOPHIL # BLD AUTO: 0.21 K/UL
EOSINOPHIL NFR BLD AUTO: 4.4 %
HCT VFR BLD CALC: 27.9 %
HGB BLD-MCNC: 8.3 G/DL
IMM GRANULOCYTES NFR BLD AUTO: 0.4 %
LYMPHOCYTES # BLD AUTO: 1.34 K/UL
LYMPHOCYTES NFR BLD AUTO: 28.1 %
MAN DIFF?: NORMAL
MCHC RBC-ENTMCNC: 27.6 PG
MCHC RBC-ENTMCNC: 29.7 GM/DL
MCV RBC AUTO: 92.7 FL
MONOCYTES # BLD AUTO: 0.7 K/UL
MONOCYTES NFR BLD AUTO: 14.7 %
NEUTROPHILS # BLD AUTO: 2.47 K/UL
NEUTROPHILS NFR BLD AUTO: 51.8 %
PLATELET # BLD AUTO: NORMAL
RBC # BLD: 3.01 M/UL
RBC # FLD: 16.2 %
WBC # FLD AUTO: 4.77 K/UL

## 2017-11-22 ENCOUNTER — CLINICAL ADVICE (OUTPATIENT)
Age: 82
End: 2017-11-22

## 2017-11-28 ENCOUNTER — APPOINTMENT (OUTPATIENT)
Dept: HOME HEALTH SERVICES | Facility: HOME HEALTH | Age: 82
End: 2017-11-28
Payer: MEDICAID

## 2017-11-28 VITALS
BODY MASS INDEX: 33.32 KG/M2 | OXYGEN SATURATION: 97 % | TEMPERATURE: 97.7 F | SYSTOLIC BLOOD PRESSURE: 142 MMHG | HEIGHT: 65 IN | HEART RATE: 77 BPM | WEIGHT: 200 LBS | DIASTOLIC BLOOD PRESSURE: 78 MMHG

## 2017-11-28 DIAGNOSIS — F03.90 UNSPECIFIED DEMENTIA W/OUT BEHAVIORAL DISTURBANCE: ICD-10-CM

## 2017-11-28 PROCEDURE — 99349 HOME/RES VST EST MOD MDM 40: CPT | Mod: 25

## 2017-11-28 PROCEDURE — 96372 THER/PROPH/DIAG INJ SC/IM: CPT | Mod: 59

## 2017-11-28 RX ORDER — CYANOCOBALAMIN 1000 UG/ML
1000 INJECTION INTRAMUSCULAR; SUBCUTANEOUS
Qty: 0 | Refills: 0 | Status: COMPLETED | OUTPATIENT
Start: 2017-11-28

## 2017-11-28 RX ADMIN — CYANOCOBALAMIN 1 MCG/ML: 1000 INJECTION INTRAMUSCULAR; SUBCUTANEOUS at 00:00

## 2017-12-05 ENCOUNTER — LABORATORY RESULT (OUTPATIENT)
Age: 82
End: 2017-12-05

## 2017-12-05 LAB
ALBUMIN SERPL ELPH-MCNC: 2.4 G/DL
ANION GAP SERPL CALC-SCNC: 11 MMOL/L
BASOPHILS # BLD AUTO: 0.02 K/UL
BASOPHILS NFR BLD AUTO: 0.4 %
BUN SERPL-MCNC: 25 MG/DL
CALCIUM SERPL-MCNC: 9 MG/DL
CHLORIDE SERPL-SCNC: 102 MMOL/L
CO2 SERPL-SCNC: 27 MMOL/L
CREAT SERPL-MCNC: 1.44 MG/DL
EOSINOPHIL # BLD AUTO: 0.4 K/UL
EOSINOPHIL NFR BLD AUTO: 8.8 %
GLUCOSE SERPL-MCNC: 90 MG/DL
HCT VFR BLD CALC: 29.8 %
HGB BLD-MCNC: 9.2 G/DL
IMM GRANULOCYTES NFR BLD AUTO: 0 %
LYMPHOCYTES # BLD AUTO: 1.82 K/UL
LYMPHOCYTES NFR BLD AUTO: 39.9 %
MAN DIFF?: NORMAL
MCHC RBC-ENTMCNC: 28.5 PG
MCHC RBC-ENTMCNC: 30.9 GM/DL
MCV RBC AUTO: 92.3 FL
MONOCYTES # BLD AUTO: 0.45 K/UL
MONOCYTES NFR BLD AUTO: 9.9 %
NEUTROPHILS # BLD AUTO: 1.87 K/UL
NEUTROPHILS NFR BLD AUTO: 41 %
PHOSPHATE SERPL-MCNC: 3.8 MG/DL
PLATELET # BLD AUTO: 90 K/UL
POTASSIUM SERPL-SCNC: 4.9 MMOL/L
RBC # BLD: 3.23 M/UL
RBC # BLD: 3.23 M/UL
RBC # FLD: 15.1 %
RETICS # AUTO: 1.9 %
RETICS AGGREG/RBC NFR: 62 K/UL
SODIUM SERPL-SCNC: 140 MMOL/L
WBC # FLD AUTO: 4.56 K/UL

## 2017-12-06 ENCOUNTER — CLINICAL ADVICE (OUTPATIENT)
Age: 82
End: 2017-12-06

## 2017-12-06 LAB
FOLATE SERPL-MCNC: >20 NG/ML
VIT B12 SERPL-MCNC: >2000 PG/ML

## 2017-12-11 ENCOUNTER — MEDICATION RENEWAL (OUTPATIENT)
Age: 82
End: 2017-12-11

## 2017-12-12 ENCOUNTER — RX RENEWAL (OUTPATIENT)
Age: 82
End: 2017-12-12

## 2018-01-03 ENCOUNTER — MEDICATION RENEWAL (OUTPATIENT)
Age: 83
End: 2018-01-03

## 2018-01-08 ENCOUNTER — TRANSCRIPTION ENCOUNTER (OUTPATIENT)
Age: 83
End: 2018-01-08

## 2018-01-17 LAB
ALBUMIN SERPL ELPH-MCNC: 2.6 G/DL
ANION GAP SERPL CALC-SCNC: 12 MMOL/L
BASOPHILS # BLD AUTO: 0.02 K/UL
BASOPHILS NFR BLD AUTO: 0.3 %
BUN SERPL-MCNC: 38 MG/DL
CALCIUM SERPL-MCNC: 9.4 MG/DL
CHLORIDE SERPL-SCNC: 107 MMOL/L
CO2 SERPL-SCNC: 24 MMOL/L
CREAT SERPL-MCNC: 1.51 MG/DL
EOSINOPHIL # BLD AUTO: 0.34 K/UL
EOSINOPHIL NFR BLD AUTO: 5.4 %
GLUCOSE SERPL-MCNC: 71 MG/DL
HCT VFR BLD CALC: 35.8 %
HGB BLD-MCNC: 11 G/DL
IMM GRANULOCYTES NFR BLD AUTO: 0 %
LYMPHOCYTES # BLD AUTO: 2.42 K/UL
LYMPHOCYTES NFR BLD AUTO: 38.8 %
MAN DIFF?: NORMAL
MCHC RBC-ENTMCNC: 28 PG
MCHC RBC-ENTMCNC: 30.7 GM/DL
MCV RBC AUTO: 91.1 FL
MONOCYTES # BLD AUTO: 0.53 K/UL
MONOCYTES NFR BLD AUTO: 8.5 %
NEUTROPHILS # BLD AUTO: 2.93 K/UL
NEUTROPHILS NFR BLD AUTO: 47 %
PHOSPHATE SERPL-MCNC: 4.4 MG/DL
PLATELET # BLD AUTO: 111 K/UL
POTASSIUM SERPL-SCNC: 5.5 MMOL/L
RBC # BLD: 3.93 M/UL
RBC # FLD: 14.9 %
SODIUM SERPL-SCNC: 143 MMOL/L
WBC # FLD AUTO: 6.24 K/UL

## 2018-01-29 ENCOUNTER — RX RENEWAL (OUTPATIENT)
Age: 83
End: 2018-01-29

## 2018-01-30 ENCOUNTER — MEDICATION RENEWAL (OUTPATIENT)
Age: 83
End: 2018-01-30

## 2018-02-05 ENCOUNTER — MEDICATION RENEWAL (OUTPATIENT)
Age: 83
End: 2018-02-05

## 2018-02-06 ENCOUNTER — APPOINTMENT (OUTPATIENT)
Dept: HOME HEALTH SERVICES | Facility: HOME HEALTH | Age: 83
End: 2018-02-06
Payer: MEDICAID

## 2018-02-06 VITALS
OXYGEN SATURATION: 97 % | TEMPERATURE: 98.1 F | WEIGHT: 180 LBS | DIASTOLIC BLOOD PRESSURE: 62 MMHG | HEIGHT: 65 IN | RESPIRATION RATE: 15 BRPM | SYSTOLIC BLOOD PRESSURE: 144 MMHG | HEART RATE: 77 BPM | BODY MASS INDEX: 29.99 KG/M2

## 2018-02-06 DIAGNOSIS — Z86.19 PERSONAL HISTORY OF OTHER INFECTIOUS AND PARASITIC DISEASES: ICD-10-CM

## 2018-02-06 PROCEDURE — 99350 HOME/RES VST EST HIGH MDM 60: CPT

## 2018-02-06 RX ORDER — CLOPIDOGREL 75 MG/1
75 TABLET, FILM COATED ORAL DAILY
Qty: 1 | Refills: 1 | Status: DISCONTINUED | COMMUNITY
Start: 2017-10-24 | End: 2018-02-06

## 2018-02-06 RX ORDER — CEFUROXIME AXETIL 500 MG/1
500 TABLET ORAL
Qty: 20 | Refills: 0 | Status: DISCONTINUED | COMMUNITY
Start: 2017-11-11 | End: 2018-02-06

## 2018-02-06 RX ORDER — PREDNISONE 1 MG/1
1 TABLET ORAL
Refills: 0 | Status: DISCONTINUED | COMMUNITY
Start: 2017-10-11 | End: 2018-02-06

## 2018-02-06 RX ORDER — FLUTICASONE PROPIONATE AND SALMETEROL 113; 14 UG/1; UG/1
113-14 POWDER, METERED RESPIRATORY (INHALATION)
Refills: 0 | Status: DISCONTINUED | COMMUNITY
Start: 2017-10-11 | End: 2018-02-06

## 2018-02-08 ENCOUNTER — MEDICATION RENEWAL (OUTPATIENT)
Age: 83
End: 2018-02-08

## 2018-02-08 DIAGNOSIS — Z00.00 ENCOUNTER FOR GENERAL ADULT MEDICAL EXAMINATION W/OUT ABNORMAL FINDINGS: ICD-10-CM

## 2018-02-09 ENCOUNTER — MEDICATION RENEWAL (OUTPATIENT)
Age: 83
End: 2018-02-09

## 2018-02-19 LAB — TSH SERPL-ACNC: 0.48 UIU/ML

## 2018-03-02 ENCOUNTER — CLINICAL ADVICE (OUTPATIENT)
Age: 83
End: 2018-03-02

## 2018-03-02 DIAGNOSIS — R00.1 BRADYCARDIA, UNSPECIFIED: ICD-10-CM

## 2018-03-02 DIAGNOSIS — R60.0 LOCALIZED EDEMA: ICD-10-CM

## 2018-03-05 ENCOUNTER — MEDICATION RENEWAL (OUTPATIENT)
Age: 83
End: 2018-03-05

## 2018-03-05 LAB
ALBUMIN SERPL ELPH-MCNC: 2.8 G/DL
ANION GAP SERPL CALC-SCNC: 13 MMOL/L
BASOPHILS # BLD AUTO: 0.02 K/UL
BASOPHILS NFR BLD AUTO: 0.4 %
BUN SERPL-MCNC: 28 MG/DL
CALCIUM SERPL-MCNC: 9.5 MG/DL
CHLORIDE SERPL-SCNC: 104 MMOL/L
CO2 SERPL-SCNC: 27 MMOL/L
CREAT SERPL-MCNC: 1.24 MG/DL
EOSINOPHIL # BLD AUTO: 0.19 K/UL
EOSINOPHIL NFR BLD AUTO: 3.6 %
GLUCOSE SERPL-MCNC: 91 MG/DL
HCT VFR BLD CALC: 34.6 %
HGB BLD-MCNC: 10.9 G/DL
IMM GRANULOCYTES NFR BLD AUTO: 0.2 %
LYMPHOCYTES # BLD AUTO: 1.37 K/UL
LYMPHOCYTES NFR BLD AUTO: 25.8 %
MAN DIFF?: NORMAL
MCHC RBC-ENTMCNC: 29.4 PG
MCHC RBC-ENTMCNC: 31.5 GM/DL
MCV RBC AUTO: 93.3 FL
MONOCYTES # BLD AUTO: 0.47 K/UL
MONOCYTES NFR BLD AUTO: 8.9 %
NEUTROPHILS # BLD AUTO: 3.25 K/UL
NEUTROPHILS NFR BLD AUTO: 61.1 %
PHOSPHATE SERPL-MCNC: 2.7 MG/DL
PLATELET # BLD AUTO: 107 K/UL
POTASSIUM SERPL-SCNC: 5.1 MMOL/L
RBC # BLD: 3.71 M/UL
RBC # FLD: 14.5 %
SODIUM SERPL-SCNC: 144 MMOL/L
WBC # FLD AUTO: 5.31 K/UL

## 2018-04-04 ENCOUNTER — MEDICATION RENEWAL (OUTPATIENT)
Age: 83
End: 2018-04-04

## 2018-04-19 ENCOUNTER — MEDICATION RENEWAL (OUTPATIENT)
Age: 83
End: 2018-04-19

## 2018-05-01 DIAGNOSIS — R39.81 FUNCTIONAL URINARY INCONTINENCE: ICD-10-CM

## 2018-05-02 ENCOUNTER — MEDICATION RENEWAL (OUTPATIENT)
Age: 83
End: 2018-05-02

## 2018-05-08 ENCOUNTER — LABORATORY RESULT (OUTPATIENT)
Age: 83
End: 2018-05-08

## 2018-05-10 LAB
ALBUMIN SERPL ELPH-MCNC: 2.8 G/DL
ANION GAP SERPL CALC-SCNC: 14 MMOL/L
BUN SERPL-MCNC: 36 MG/DL
CALCIUM SERPL-MCNC: 9.8 MG/DL
CHLORIDE SERPL-SCNC: 104 MMOL/L
CO2 SERPL-SCNC: 25 MMOL/L
CREAT SERPL-MCNC: 1.48 MG/DL
GLUCOSE SERPL-MCNC: 116 MG/DL
PHOSPHATE SERPL-MCNC: 4 MG/DL
POTASSIUM SERPL-SCNC: 4.4 MMOL/L
SODIUM SERPL-SCNC: 143 MMOL/L

## 2018-05-23 ENCOUNTER — APPOINTMENT (OUTPATIENT)
Dept: HOME HEALTH SERVICES | Facility: HOME HEALTH | Age: 83
End: 2018-05-23
Payer: MEDICAID

## 2018-05-24 ENCOUNTER — APPOINTMENT (OUTPATIENT)
Dept: HOME HEALTH SERVICES | Facility: HOME HEALTH | Age: 83
End: 2018-05-24
Payer: MEDICAID

## 2018-05-24 VITALS
OXYGEN SATURATION: 98 % | BODY MASS INDEX: 28.32 KG/M2 | SYSTOLIC BLOOD PRESSURE: 116 MMHG | HEART RATE: 75 BPM | RESPIRATION RATE: 15 BRPM | WEIGHT: 170 LBS | TEMPERATURE: 98.2 F | DIASTOLIC BLOOD PRESSURE: 64 MMHG | HEIGHT: 65 IN

## 2018-05-24 PROCEDURE — 99350 HOME/RES VST EST HIGH MDM 60: CPT

## 2018-06-06 ENCOUNTER — MEDICATION RENEWAL (OUTPATIENT)
Age: 83
End: 2018-06-06

## 2018-06-13 ENCOUNTER — APPOINTMENT (OUTPATIENT)
Dept: HOME HEALTH SERVICES | Facility: HOME HEALTH | Age: 83
End: 2018-06-13

## 2018-07-10 ENCOUNTER — MEDICATION RENEWAL (OUTPATIENT)
Age: 83
End: 2018-07-10

## 2018-07-13 ENCOUNTER — MEDICATION RENEWAL (OUTPATIENT)
Age: 83
End: 2018-07-13

## 2018-07-18 ENCOUNTER — LABORATORY RESULT (OUTPATIENT)
Age: 83
End: 2018-07-18

## 2018-07-19 LAB
25(OH)D3 SERPL-MCNC: 33.7 NG/ML
ALBUMIN SERPL ELPH-MCNC: 2.8 G/DL
ALP BLD-CCNC: 97 U/L
ALT SERPL-CCNC: 15 U/L
ANION GAP SERPL CALC-SCNC: 11 MMOL/L
AST SERPL-CCNC: 28 U/L
BILIRUB SERPL-MCNC: 0.3 MG/DL
BUN SERPL-MCNC: 44 MG/DL
CALCIUM SERPL-MCNC: 9 MG/DL
CHLORIDE SERPL-SCNC: 104 MMOL/L
CO2 SERPL-SCNC: 27 MMOL/L
CREAT SERPL-MCNC: 1.73 MG/DL
FERRITIN SERPL-MCNC: 55 NG/ML
GLUCOSE SERPL-MCNC: 74 MG/DL
MAGNESIUM SERPL-MCNC: 2.1 MG/DL
POTASSIUM SERPL-SCNC: 4.5 MMOL/L
PROT SERPL-MCNC: 6 G/DL
SODIUM SERPL-SCNC: 142 MMOL/L
TSH SERPL-ACNC: 0.37 UIU/ML
VIT B12 SERPL-MCNC: >2000 PG/ML

## 2018-07-26 ENCOUNTER — MEDICATION RENEWAL (OUTPATIENT)
Age: 83
End: 2018-07-26

## 2018-07-30 ENCOUNTER — MEDICATION RENEWAL (OUTPATIENT)
Age: 83
End: 2018-07-30

## 2018-08-06 PROBLEM — I50.9 HEART FAILURE, UNSPECIFIED: Chronic | Status: ACTIVE | Noted: 2017-10-17

## 2018-08-07 ENCOUNTER — APPOINTMENT (OUTPATIENT)
Dept: HOME HEALTH SERVICES | Facility: HOME HEALTH | Age: 83
End: 2018-08-07
Payer: MEDICAID

## 2018-08-07 VITALS
DIASTOLIC BLOOD PRESSURE: 70 MMHG | SYSTOLIC BLOOD PRESSURE: 132 MMHG | OXYGEN SATURATION: 100 % | RESPIRATION RATE: 15 BRPM | HEART RATE: 73 BPM | TEMPERATURE: 98.6 F | HEIGHT: 65 IN | BODY MASS INDEX: 28.32 KG/M2 | WEIGHT: 170 LBS

## 2018-08-07 DIAGNOSIS — Z71.89 OTHER SPECIFIED COUNSELING: ICD-10-CM

## 2018-08-07 PROCEDURE — 99350 HOME/RES VST EST HIGH MDM 60: CPT

## 2018-08-07 PROCEDURE — 99497 ADVNCD CARE PLAN 30 MIN: CPT

## 2018-08-07 RX ORDER — FAMOTIDINE 40 MG/1
40 TABLET, FILM COATED ORAL
Qty: 100 | Refills: 0 | Status: DISCONTINUED | COMMUNITY
Start: 2017-10-24 | End: 2018-08-07

## 2018-08-14 ENCOUNTER — CLINICAL ADVICE (OUTPATIENT)
Age: 83
End: 2018-08-14

## 2018-09-03 PROBLEM — R60.0 BILATERAL EDEMA OF LOWER EXTREMITY: Status: ACTIVE | Noted: 2017-09-20

## 2018-09-04 ENCOUNTER — MEDICATION RENEWAL (OUTPATIENT)
Age: 83
End: 2018-09-04

## 2018-09-10 ENCOUNTER — MEDICATION RENEWAL (OUTPATIENT)
Age: 83
End: 2018-09-10

## 2018-09-24 ENCOUNTER — APPOINTMENT (OUTPATIENT)
Dept: HOME HEALTH SERVICES | Facility: HOME HEALTH | Age: 83
End: 2018-09-24
Payer: MEDICAID

## 2018-09-24 VITALS
OXYGEN SATURATION: 96 % | RESPIRATION RATE: 15 BRPM | TEMPERATURE: 98 F | SYSTOLIC BLOOD PRESSURE: 138 MMHG | HEART RATE: 75 BPM | DIASTOLIC BLOOD PRESSURE: 70 MMHG

## 2018-09-24 DIAGNOSIS — E08.21 DIABETES MELLITUS DUE TO UNDERLYING CONDITION WITH DIABETIC NEPHROPATHY: ICD-10-CM

## 2018-09-24 DIAGNOSIS — Z23 ENCOUNTER FOR IMMUNIZATION: ICD-10-CM

## 2018-09-24 DIAGNOSIS — N18.4 CHRONIC KIDNEY DISEASE, STAGE 4 (SEVERE): ICD-10-CM

## 2018-09-24 DIAGNOSIS — H61.23 IMPACTED CERUMEN, BILATERAL: ICD-10-CM

## 2018-09-24 DIAGNOSIS — I50.9 HEART FAILURE, UNSPECIFIED: ICD-10-CM

## 2018-09-24 DIAGNOSIS — E11.42 TYPE 2 DIABETES MELLITUS WITH DIABETIC POLYNEUROPATHY: ICD-10-CM

## 2018-09-24 DIAGNOSIS — D63.1 CHRONIC KIDNEY DISEASE, UNSPECIFIED: ICD-10-CM

## 2018-09-24 DIAGNOSIS — N18.9 CHRONIC KIDNEY DISEASE, UNSPECIFIED: ICD-10-CM

## 2018-09-24 DIAGNOSIS — J44.9 CHRONIC OBSTRUCTIVE PULMONARY DISEASE, UNSPECIFIED: ICD-10-CM

## 2018-09-24 DIAGNOSIS — D69.49 OTHER PRIMARY THROMBOCYTOPENIA: ICD-10-CM

## 2018-09-24 PROCEDURE — 99350 HOME/RES VST EST HIGH MDM 60: CPT | Mod: 25

## 2018-09-24 PROCEDURE — G0008: CPT

## 2018-09-24 PROCEDURE — 90686 IIV4 VACC NO PRSV 0.5 ML IM: CPT

## 2018-09-24 RX ORDER — CLOPIDOGREL BISULFATE 75 MG/1
75 TABLET, FILM COATED ORAL DAILY
Qty: 90 | Refills: 3 | Status: DISCONTINUED | COMMUNITY
Start: 2017-10-11 | End: 2018-09-24

## 2018-09-26 ENCOUNTER — LABORATORY RESULT (OUTPATIENT)
Age: 83
End: 2018-09-26

## 2018-09-26 ENCOUNTER — MEDICATION RENEWAL (OUTPATIENT)
Age: 83
End: 2018-09-26

## 2018-09-27 LAB
25(OH)D3 SERPL-MCNC: 36.2 NG/ML
HBA1C MFR BLD HPLC: 4.8 %
PREALB SERPL NEPH-MCNC: 8 MG/DL
TSH SERPL-ACNC: 0.43 UIU/ML

## 2018-10-04 DIAGNOSIS — R11.14 BILIOUS VOMITING: ICD-10-CM

## 2018-10-05 ENCOUNTER — INPATIENT (INPATIENT)
Facility: HOSPITAL | Age: 83
LOS: 7 days | Discharge: HOME | DRG: 689 | End: 2018-10-13
Attending: INTERNAL MEDICINE | Admitting: FAMILY MEDICINE
Payer: MEDICAID

## 2018-10-05 ENCOUNTER — APPOINTMENT (OUTPATIENT)
Dept: HOME HEALTH SERVICES | Facility: HOME HEALTH | Age: 83
End: 2018-10-05

## 2018-10-05 VITALS — WEIGHT: 160.06 LBS | HEIGHT: 60 IN

## 2018-10-05 DIAGNOSIS — S31.000A UNSPECIFIED OPEN WOUND OF LOWER BACK AND PELVIS WITHOUT PENETRATION INTO RETROPERITONEUM, INITIAL ENCOUNTER: ICD-10-CM

## 2018-10-05 DIAGNOSIS — I10 ESSENTIAL (PRIMARY) HYPERTENSION: ICD-10-CM

## 2018-10-05 DIAGNOSIS — N39.0 URINARY TRACT INFECTION, SITE NOT SPECIFIED: ICD-10-CM

## 2018-10-05 DIAGNOSIS — Z29.9 ENCOUNTER FOR PROPHYLACTIC MEASURES, UNSPECIFIED: ICD-10-CM

## 2018-10-05 DIAGNOSIS — E11.22 TYPE 2 DIABETES MELLITUS WITH DIABETIC CHRONIC KIDNEY DISEASE: ICD-10-CM

## 2018-10-05 DIAGNOSIS — I50.32 CHRONIC DIASTOLIC (CONGESTIVE) HEART FAILURE: ICD-10-CM

## 2018-10-05 DIAGNOSIS — A41.9 SEPSIS, UNSPECIFIED ORGANISM: ICD-10-CM

## 2018-10-05 DIAGNOSIS — N17.9 ACUTE KIDNEY FAILURE, UNSPECIFIED: ICD-10-CM

## 2018-10-05 DIAGNOSIS — J44.9 CHRONIC OBSTRUCTIVE PULMONARY DISEASE, UNSPECIFIED: ICD-10-CM

## 2018-10-05 DIAGNOSIS — E11.9 TYPE 2 DIABETES MELLITUS WITHOUT COMPLICATIONS: ICD-10-CM

## 2018-10-05 DIAGNOSIS — I25.119 ATHEROSCLEROTIC HEART DISEASE OF NATIVE CORONARY ARTERY WITH UNSPECIFIED ANGINA PECTORIS: ICD-10-CM

## 2018-10-05 LAB
ALBUMIN SERPL ELPH-MCNC: 2.5 G/DL — LOW (ref 3.3–5)
ALP SERPL-CCNC: 144 U/L — HIGH (ref 40–120)
ALT FLD-CCNC: 22 U/L — SIGNIFICANT CHANGE UP (ref 12–78)
ANION GAP SERPL CALC-SCNC: 14 MMOL/L — SIGNIFICANT CHANGE UP (ref 5–17)
APPEARANCE UR: ABNORMAL
APTT BLD: 29.2 SEC — SIGNIFICANT CHANGE UP (ref 27.5–37.4)
AST SERPL-CCNC: 38 U/L — HIGH (ref 15–37)
BACTERIA # UR AUTO: ABNORMAL
BASE EXCESS BLDA CALC-SCNC: -3.7 MMOL/L — LOW (ref -2–2)
BASOPHILS # BLD AUTO: 0.02 K/UL — SIGNIFICANT CHANGE UP (ref 0–0.2)
BASOPHILS NFR BLD AUTO: 0.2 % — SIGNIFICANT CHANGE UP (ref 0–2)
BILIRUB SERPL-MCNC: 0.7 MG/DL — SIGNIFICANT CHANGE UP (ref 0.2–1.2)
BILIRUB UR-MCNC: ABNORMAL
BLOOD GAS COMMENTS ARTERIAL: SIGNIFICANT CHANGE UP
BUN SERPL-MCNC: 56 MG/DL — HIGH (ref 7–23)
CALCIUM SERPL-MCNC: 8.5 MG/DL — SIGNIFICANT CHANGE UP (ref 8.5–10.1)
CHLORIDE SERPL-SCNC: 113 MMOL/L — HIGH (ref 96–108)
CO2 SERPL-SCNC: 20 MMOL/L — LOW (ref 22–31)
COLOR SPEC: YELLOW — SIGNIFICANT CHANGE UP
COMMENT - URINE: SIGNIFICANT CHANGE UP
CREAT SERPL-MCNC: 1.7 MG/DL — HIGH (ref 0.5–1.3)
DIFF PNL FLD: ABNORMAL
EOSINOPHIL # BLD AUTO: 0.01 K/UL — SIGNIFICANT CHANGE UP (ref 0–0.5)
EOSINOPHIL NFR BLD AUTO: 0.1 % — SIGNIFICANT CHANGE UP (ref 0–6)
EPI CELLS # UR: SIGNIFICANT CHANGE UP
GLUCOSE SERPL-MCNC: 137 MG/DL — HIGH (ref 70–99)
GLUCOSE UR QL: NEGATIVE — SIGNIFICANT CHANGE UP
HCO3 BLDA-SCNC: 21 MMOL/L — LOW (ref 23–27)
HCT VFR BLD CALC: 37.8 % — SIGNIFICANT CHANGE UP (ref 34.5–45)
HGB BLD-MCNC: 12.3 G/DL — SIGNIFICANT CHANGE UP (ref 11.5–15.5)
HOROWITZ INDEX BLDA+IHG-RTO: SIGNIFICANT CHANGE UP
IMM GRANULOCYTES NFR BLD AUTO: 0.5 % — SIGNIFICANT CHANGE UP (ref 0–1.5)
INR BLD: 1.22 RATIO — HIGH (ref 0.88–1.16)
KETONES UR-MCNC: ABNORMAL
LACTATE SERPL-SCNC: 1.8 MMOL/L — SIGNIFICANT CHANGE UP (ref 0.7–2)
LACTATE SERPL-SCNC: 3.9 MMOL/L — HIGH (ref 0.7–2)
LEUKOCYTE ESTERASE UR-ACNC: ABNORMAL
LYMPHOCYTES # BLD AUTO: 1.47 K/UL — SIGNIFICANT CHANGE UP (ref 1–3.3)
LYMPHOCYTES # BLD AUTO: 11.4 % — LOW (ref 13–44)
MCHC RBC-ENTMCNC: 29 PG — SIGNIFICANT CHANGE UP (ref 27–34)
MCHC RBC-ENTMCNC: 32.5 GM/DL — SIGNIFICANT CHANGE UP (ref 32–36)
MCV RBC AUTO: 89.2 FL — SIGNIFICANT CHANGE UP (ref 80–100)
MONOCYTES # BLD AUTO: 0.78 K/UL — SIGNIFICANT CHANGE UP (ref 0–0.9)
MONOCYTES NFR BLD AUTO: 6 % — SIGNIFICANT CHANGE UP (ref 2–14)
NEUTROPHILS # BLD AUTO: 10.61 K/UL — HIGH (ref 1.8–7.4)
NEUTROPHILS NFR BLD AUTO: 81.8 % — HIGH (ref 43–77)
NITRITE UR-MCNC: NEGATIVE — SIGNIFICANT CHANGE UP
NRBC # BLD: 0 /100 WBCS — SIGNIFICANT CHANGE UP (ref 0–0)
PCO2 BLDA: 37 MMHG — SIGNIFICANT CHANGE UP (ref 32–46)
PH BLDA: 7.37 — SIGNIFICANT CHANGE UP (ref 7.35–7.45)
PH UR: 5 — SIGNIFICANT CHANGE UP (ref 5–8)
PLATELET # BLD AUTO: 143 K/UL — LOW (ref 150–400)
PO2 BLDA: 117 MMHG — HIGH (ref 74–108)
POTASSIUM SERPL-MCNC: 4.6 MMOL/L — SIGNIFICANT CHANGE UP (ref 3.5–5.3)
POTASSIUM SERPL-SCNC: 4.6 MMOL/L — SIGNIFICANT CHANGE UP (ref 3.5–5.3)
PROT SERPL-MCNC: 8 G/DL — SIGNIFICANT CHANGE UP (ref 6–8.3)
PROT UR-MCNC: 150 MG/DL
PROTHROM AB SERPL-ACNC: 13.4 SEC — HIGH (ref 9.8–12.7)
RBC # BLD: 4.24 M/UL — SIGNIFICANT CHANGE UP (ref 3.8–5.2)
RBC # FLD: 14.2 % — SIGNIFICANT CHANGE UP (ref 10.3–14.5)
RBC CASTS # UR COMP ASSIST: >50 /HPF (ref 0–4)
SAO2 % BLDA: 98 % — HIGH (ref 92–96)
SODIUM SERPL-SCNC: 147 MMOL/L — HIGH (ref 135–145)
SP GR SPEC: 1.01 — SIGNIFICANT CHANGE UP (ref 1.01–1.02)
UROBILINOGEN FLD QL: NEGATIVE — SIGNIFICANT CHANGE UP
WBC # BLD: 12.95 K/UL — HIGH (ref 3.8–10.5)
WBC # FLD AUTO: 12.95 K/UL — HIGH (ref 3.8–10.5)
WBC UR QL: >50

## 2018-10-05 PROCEDURE — 71045 X-RAY EXAM CHEST 1 VIEW: CPT | Mod: 26

## 2018-10-05 PROCEDURE — 93010 ELECTROCARDIOGRAM REPORT: CPT

## 2018-10-05 PROCEDURE — 70450 CT HEAD/BRAIN W/O DYE: CPT | Mod: 26

## 2018-10-05 PROCEDURE — 99223 1ST HOSP IP/OBS HIGH 75: CPT | Mod: GC,AI

## 2018-10-05 PROCEDURE — 99284 EMERGENCY DEPT VISIT MOD MDM: CPT

## 2018-10-05 RX ORDER — GLUCAGON INJECTION, SOLUTION 0.5 MG/.1ML
1 INJECTION, SOLUTION SUBCUTANEOUS ONCE
Qty: 0 | Refills: 0 | Status: DISCONTINUED | OUTPATIENT
Start: 2018-10-05 | End: 2018-10-13

## 2018-10-05 RX ORDER — HEPARIN SODIUM 5000 [USP'U]/ML
5000 INJECTION INTRAVENOUS; SUBCUTANEOUS EVERY 12 HOURS
Qty: 0 | Refills: 0 | Status: DISCONTINUED | OUTPATIENT
Start: 2018-10-05 | End: 2018-10-13

## 2018-10-05 RX ORDER — CEFTRIAXONE 500 MG/1
1 INJECTION, POWDER, FOR SOLUTION INTRAMUSCULAR; INTRAVENOUS EVERY 24 HOURS
Qty: 0 | Refills: 0 | Status: DISCONTINUED | OUTPATIENT
Start: 2018-10-05 | End: 2018-10-07

## 2018-10-05 RX ORDER — IPRATROPIUM/ALBUTEROL SULFATE 18-103MCG
1 AEROSOL WITH ADAPTER (GRAM) INHALATION
Qty: 0 | Refills: 0 | Status: DISCONTINUED | OUTPATIENT
Start: 2018-10-05 | End: 2018-10-05

## 2018-10-05 RX ORDER — LOSARTAN POTASSIUM 100 MG/1
1 TABLET, FILM COATED ORAL
Qty: 0 | Refills: 0 | COMMUNITY

## 2018-10-05 RX ORDER — FERROUS SULFATE 325(65) MG
1 TABLET ORAL
Qty: 0 | Refills: 0 | COMMUNITY

## 2018-10-05 RX ORDER — OMEPRAZOLE 10 MG/1
1 CAPSULE, DELAYED RELEASE ORAL
Qty: 0 | Refills: 0 | COMMUNITY

## 2018-10-05 RX ORDER — INSULIN LISPRO 100/ML
VIAL (ML) SUBCUTANEOUS AT BEDTIME
Qty: 0 | Refills: 0 | Status: DISCONTINUED | OUTPATIENT
Start: 2018-10-05 | End: 2018-10-13

## 2018-10-05 RX ORDER — SODIUM CHLORIDE 9 MG/ML
1000 INJECTION, SOLUTION INTRAVENOUS
Qty: 0 | Refills: 0 | Status: DISCONTINUED | OUTPATIENT
Start: 2018-10-05 | End: 2018-10-13

## 2018-10-05 RX ORDER — DEXTROSE 50 % IN WATER 50 %
12.5 SYRINGE (ML) INTRAVENOUS ONCE
Qty: 0 | Refills: 0 | Status: DISCONTINUED | OUTPATIENT
Start: 2018-10-05 | End: 2018-10-13

## 2018-10-05 RX ORDER — FUROSEMIDE 40 MG
1 TABLET ORAL
Qty: 0 | Refills: 0 | COMMUNITY

## 2018-10-05 RX ORDER — INSULIN LISPRO 100/ML
VIAL (ML) SUBCUTANEOUS
Qty: 0 | Refills: 0 | Status: DISCONTINUED | OUTPATIENT
Start: 2018-10-05 | End: 2018-10-13

## 2018-10-05 RX ORDER — SODIUM CHLORIDE 9 MG/ML
500 INJECTION INTRAMUSCULAR; INTRAVENOUS; SUBCUTANEOUS ONCE
Qty: 0 | Refills: 0 | Status: COMPLETED | OUTPATIENT
Start: 2018-10-05 | End: 2018-10-05

## 2018-10-05 RX ORDER — BUDESONIDE AND FORMOTEROL FUMARATE DIHYDRATE 160; 4.5 UG/1; UG/1
2 AEROSOL RESPIRATORY (INHALATION)
Qty: 0 | Refills: 0 | Status: DISCONTINUED | OUTPATIENT
Start: 2018-10-05 | End: 2018-10-13

## 2018-10-05 RX ORDER — SODIUM CHLORIDE 9 MG/ML
1000 INJECTION INTRAMUSCULAR; INTRAVENOUS; SUBCUTANEOUS ONCE
Qty: 0 | Refills: 0 | Status: COMPLETED | OUTPATIENT
Start: 2018-10-05 | End: 2018-10-05

## 2018-10-05 RX ORDER — DEXTROSE 50 % IN WATER 50 %
15 SYRINGE (ML) INTRAVENOUS ONCE
Qty: 0 | Refills: 0 | Status: DISCONTINUED | OUTPATIENT
Start: 2018-10-05 | End: 2018-10-13

## 2018-10-05 RX ORDER — DEXTROSE 50 % IN WATER 50 %
25 SYRINGE (ML) INTRAVENOUS ONCE
Qty: 0 | Refills: 0 | Status: DISCONTINUED | OUTPATIENT
Start: 2018-10-05 | End: 2018-10-13

## 2018-10-05 RX ORDER — FERROUS SULFATE 325(65) MG
325 TABLET ORAL DAILY
Qty: 0 | Refills: 0 | Status: DISCONTINUED | OUTPATIENT
Start: 2018-10-05 | End: 2018-10-13

## 2018-10-05 RX ORDER — PANTOPRAZOLE SODIUM 20 MG/1
40 TABLET, DELAYED RELEASE ORAL
Qty: 0 | Refills: 0 | Status: DISCONTINUED | OUTPATIENT
Start: 2018-10-05 | End: 2018-10-13

## 2018-10-05 RX ORDER — MONTELUKAST 4 MG/1
1 TABLET, CHEWABLE ORAL
Qty: 0 | Refills: 0 | COMMUNITY

## 2018-10-05 RX ORDER — IPRATROPIUM/ALBUTEROL SULFATE 18-103MCG
3 AEROSOL WITH ADAPTER (GRAM) INHALATION EVERY 6 HOURS
Qty: 0 | Refills: 0 | Status: DISCONTINUED | OUTPATIENT
Start: 2018-10-05 | End: 2018-10-13

## 2018-10-05 RX ORDER — CEFTRIAXONE 500 MG/1
1 INJECTION, POWDER, FOR SOLUTION INTRAMUSCULAR; INTRAVENOUS ONCE
Qty: 0 | Refills: 0 | Status: COMPLETED | OUTPATIENT
Start: 2018-10-05 | End: 2018-10-05

## 2018-10-05 RX ORDER — FUROSEMIDE 40 MG
20 TABLET ORAL DAILY
Qty: 0 | Refills: 0 | Status: DISCONTINUED | OUTPATIENT
Start: 2018-10-05 | End: 2018-10-06

## 2018-10-05 RX ADMIN — HEPARIN SODIUM 5000 UNIT(S): 5000 INJECTION INTRAVENOUS; SUBCUTANEOUS at 18:35

## 2018-10-05 RX ADMIN — CEFTRIAXONE 100 GRAM(S): 500 INJECTION, POWDER, FOR SOLUTION INTRAMUSCULAR; INTRAVENOUS at 11:07

## 2018-10-05 RX ADMIN — SODIUM CHLORIDE 1000 MILLILITER(S): 9 INJECTION INTRAMUSCULAR; INTRAVENOUS; SUBCUTANEOUS at 11:07

## 2018-10-05 RX ADMIN — SODIUM CHLORIDE 500 MILLILITER(S): 9 INJECTION INTRAMUSCULAR; INTRAVENOUS; SUBCUTANEOUS at 12:15

## 2018-10-05 RX ADMIN — Medication 3 MILLILITER(S): at 20:38

## 2018-10-05 NOTE — H&P ADULT - PROBLEM SELECTOR PLAN 5
- continue duonebs q4 standing  - continue supplemental O2: 88% in ED common baseline given obstructive disease  - some concern for chronic CO2 retention, will get VBG to assess; patient high aspiration risk and not a candidate for BiPAP - patient with CE leak on prior admission - continue duonebs q4 standing  - continue supplemental O2: 88% in ED common baseline given obstructive disease  - some concern for chronic CO2 retention, will get VBG to assess  - may require BIPAP - BP stable.   - hold losartan in setting of SAMIA. monitor Cr.

## 2018-10-05 NOTE — H&P ADULT - PMH
Asthma    Congestive heart failure    Coronary artery disease with angina pectoris, unspecified vessel or lesion type, unspecified whether native or transplanted heart    Diabetes mellitus    HTN (hypertension)

## 2018-10-05 NOTE — H&P ADULT - PROBLEM SELECTOR PLAN 7
IMPROVE VTE Individual Risk Assessment        RISK                                                          Points  [  ] Previous VTE                                                3  [  ] Thrombophilia                                             2  [ x ] Lower limb paralysis                                   2        (unable to hold up >15 seconds)    [  ] Current Cancer                                            2         (within 6 months)  [ x ] Immobilization > 24 hrs                              1  [ x ] ICU/CCU stay > 24 hours                            1  [ x ] Age > 60                                                    1  IMPROVE VTE Score ___5___    Heparin for DVT prophylaxis - turn q 2   - consult wound care

## 2018-10-05 NOTE — ED PROVIDER NOTE - SECONDARY DIAGNOSIS.
Urinary tract infection without hematuria, site unspecified Congestive heart failure Diabetes mellitus

## 2018-10-05 NOTE — H&P ADULT - PROBLEM SELECTOR PLAN 6
IMPROVE VTE Individual Risk Assessment        RISK                                                          Points  [  ] Previous VTE                                                3  [  ] Thrombophilia                                             2  [ x ] Lower limb paralysis                                   2        (unable to hold up >15 seconds)    [  ] Current Cancer                                            2         (within 6 months)  [ x ] Immobilization > 24 hrs                              1  [ x ] ICU/CCU stay > 24 hours                            1  [ x ] Age > 60                                                    1  IMPROVE VTE Score ___5___    Heparin for DVT prophylaxis - continue duonebs q4 standing  - continue supplemental O2: 88% in ED common baseline given obstructive disease  - some concern for chronic CO2 retention, will get VBG to assess; patient high aspiration risk and not a candidate for BiPAP - turn q 2   - consult wound care - on 3L O2 at home, desaturation to 88% on presentation, now sating in low 90's.  - continue duonebs q4 standing  - continue supplemental O2  - due to acute change in mental status need to r/o CO2 retention, will get VBG to assess  - may require BIPAP  - remote with continuous pulse ox

## 2018-10-05 NOTE — H&P ADULT - PROBLEM SELECTOR PROBLEM 6
Need for prophylactic measure Chronic obstructive pulmonary disease, unspecified COPD type Sacral wound

## 2018-10-05 NOTE — ED PROVIDER NOTE - PHYSICAL EXAMINATION
Spontaneous, unlabored and symmetrical jerzy stool cleaned by RN.  catheter placed to obtain urine, approx 300cc cloudy urine obtained.  1+ pitting b/l pedal edema

## 2018-10-05 NOTE — H&P ADULT - PROBLEM SELECTOR PLAN 8
IMPROVE VTE Individual Risk Assessment        RISK                                                          Points  [  ] Previous VTE                                                3  [  ] Thrombophilia                                             2  [ x ] Lower limb paralysis                                   2        (unable to hold up >15 seconds)    [  ] Current Cancer                                            2         (within 6 months)  [ x ] Immobilization > 24 hrs                              1  [ x ] ICU/CCU stay > 24 hours                            1  [ x ] Age > 60                                                    1  IMPROVE VTE Score ___5___    Heparin for DVT prophylaxis

## 2018-10-05 NOTE — SWALLOW BEDSIDE ASSESSMENT ADULT - ASR SWALLOW ASPIRATION MONITOR
pneumonia/change of breathing pattern/cough/upper respiratory infection/gurgly voice/fever/throat clearing

## 2018-10-05 NOTE — ED PROVIDER NOTE - OBJECTIVE STATEMENT
89yo F with DM, HTN, COPD on home O2 with son who states pt not talking or eating for 2-3 days. pt bringing up mucus and had some diarrhea, non bloody.  no resp distress, fever, chills, trauma.  son refused translation services, st he will translate.  st pt chronically bedridden. does not walk.   pt in [facility].  +MOLST, +DNR/DNI  pmd= Alba Weber

## 2018-10-05 NOTE — H&P ADULT - PROBLEM SELECTOR PROBLEM 4
Coronary artery disease with angina pectoris, unspecified vessel or lesion type, unspecified whether native or transplanted heart Essential hypertension Diabetes mellitus Type 2 diabetes mellitus with stage 3 chronic kidney disease, without long-term current use of insulin

## 2018-10-05 NOTE — H&P ADULT - ASSESSMENT
88 yo F w/ PMH of DM type 2, HTN, COPD on 3L home O2, diastolic HFpEF (last echo 10/2017) presented to ED w/ lethargy progressing to unresponsiveness accompanied by coughing/vomiting/dry heaves over the 3 days prior to admission; now admitted w/ sepsis and AMS likely 2/2 to UTI. 89 yo F w/ PMH of DM type 2, HTN, COPD on 3L home O2, diastolic HFpEF (last echo 10/2017) presented to ED w/ lethargy found to have + UA. admit for AMS & UTI

## 2018-10-05 NOTE — H&P ADULT - PROBLEM SELECTOR PLAN 4
- - BP stable.   - hold losartan in setting of SAMIA. monitor Cr. - stable  - hold PO meds  - start insulin coverage scale  - hypoglycemia protocol

## 2018-10-05 NOTE — ED PROVIDER NOTE - CARE PLAN
Principal Discharge DX:	Dehydration  Secondary Diagnosis:	Urinary tract infection without hematuria, site unspecified Principal Discharge DX:	Sepsis secondary to UTI  Secondary Diagnosis:	Congestive heart failure  Secondary Diagnosis:	Diabetes mellitus

## 2018-10-05 NOTE — H&P ADULT - PROBLEM SELECTOR PROBLEM 5
Chronic obstructive pulmonary disease, unspecified COPD type Coronary artery disease with angina pectoris, unspecified vessel or lesion type, unspecified whether native or transplanted heart Essential hypertension

## 2018-10-05 NOTE — H&P ADULT - HISTORY OF PRESENT ILLNESS
88 yo F w/ PMH of DM type 2, HTN, COPD on 3L home O2, diastolic HFpEF (last echo 10/2017) presented to ED w/ lethargy progressing to unresponsiveness accompanied by coughing/vomiting/dry heaves over the 3 days prior to admission.  Patient is not an English speaker, son is at bedside.  Patient is unresponsive to son, or multiple attempts to get her to follow commands.  Son reports patient is verbal, alert and oriented at baseline, and has only become non-verbal over that past 2 days.  He reports she had a cough, w/ yellowish sputum production, with dry heaves and that she has not been eating.    In ED patient is normotensive, afebrile, EKG shows___________ spO2 88% on supplemental O2.  Labs significant for WBC 12.95 w/ L shift, Lactate 3.9, Na 147, Cl 113, Co2 20, Crt 1.70, BUN 56, Albumin 2.5, GFR 26.  CXR and CT show no acute pathology.  Urine is positive for ketones, protein 150, leuk, blood, WBC, RBC >50, Epithelial cells, Bacteria TNTC. 88 yo F w/ PMH of DM type 2, HTN, COPD on 3L home O2, diastolic HFpEF (last echo 10/2017) presented to ED w/ lethargy progressing to unresponsiveness accompanied by coughing/vomiting/dry heaves over the 3 days prior to admission.  Patient is not an English speaker, son is at bedside.  Patient is unresponsive to son, or multiple attempts to get her to follow commands.  Son reports patient is verbal, alert and oriented at baseline, and has only become non-verbal over that past 2 days.  He reports she had a cough, w/ yellowish sputum production, with dry heaves and that she has not been eating.    In ED patient is normotensive, afebrile, EKG [id number] degree AV block pO2 88% on supplemental O2.  Labs significant for WBC 12.95 w/ L shift, Lactate 3.9, Na 147, Cl 113, Co2 20, Crt 1.70, BUN 56, Albumin 2.5, GFR 26.  CXR and CT show no acute pathology.  Urine is positive for ketones, protein 150, leuk, blood, WBC, RBC >50, Epithelial cells, Bacteria TNTC. 89 yo F w/ PMH of DM type 2, HTN, COPD on 3L home O2, diastolic HFpEF (last echo 10/2017) presented to ED w/ lethargy progressing to unresponsiveness accompanied by coughing/vomiting/dry heaves over the 3 days prior to admission.  Patient is not an English speaker, son is at bedside.  Patient is unresponsive to son, or multiple attempts to get her to follow commands.  Son reports patient is verbal, alert and oriented at baseline, and has only become non-verbal over that past 2 days.  He reports she had a cough, w/ yellowish sputum production, with dry heaves and diarrhea,  she has not been eating for several days. She also has been complaining of dysuria intermittently. She is incontinent at baseline.     In ED patient is normotensive, afebrile, EKG [id number] degree AV block pO2 88% on supplemental O2.  Labs significant for WBC 12.95 w/ L shift, Lactate 3.9, Na 147, Cl 113, Co2 20, Crt 1.70, BUN 56, Albumin 2.5, GFR 26.  CXR and CT show no acute pathology.  Urine is positive for ketones, protein 150, leuk, blood, WBC, RBC >50, few Epithelial cells, Bacteria TNTC.

## 2018-10-05 NOTE — H&P ADULT - PROBLEM SELECTOR PLAN 3
- continue? - crt 1.7: 0.3 elevation from 1.4 on prior discharge - cr 1.7: 0.3 elevation from 1.4 on prior discharge  - appears grossly volume overloaded. will give lasix 20IV, monitor Cr.  - avoid NSAIDs

## 2018-10-05 NOTE — H&P ADULT - PROBLEM SELECTOR PLAN 1
- admit to [facility]  - patient s/p 1.5 L in ED: 30mL/kg = 2160mL; however, given patient hx of CHF hydration being managed conservatively.  Patient VS stable, afebrile, not hypotensive: however, +2 pitting bilaterally  - s/p rocephin in ED  - continue Rocephin q24  - - admit to [facility]  - patient s/p 1.5 L in ED: 30mL/kg = 2160mL; however, given patient hx of CHF hydration being managed conservatively.  Patient VS stable, afebrile, not hypotensive: however, +2 pitting bilaterally  - s/p rocephin in ED  - continue Rocephin q24  - Dysphagia 1 w/ honey thick  - bedrest - admit to [facility]  - patient s/p 1.5 L in ED: 30mL/kg = 2160mL; however, given patient hx of CHF hydration being managed conservatively.  Patient VS stable, afebrile, not hypotensive: however, 1-2+ pitting bilaterally  - s/p rocephin in ED  - continue Rocephin q24  - am cbc, f/u blood cultures, urine culture & sensitivity  - Dysphagia 1 w/ honey thick  - bedrest, turn q 2 - admit to [facility]  - patient s/p 1.5 L in ED: 30mL/kg = 2160mL; however, given patient hx of CHF hydration being managed conservatively.  Patient VS stable, afebrile, not hypotensive: however, 1-2+ pitting bilaterally  - s/p rocephin in ED  - continue Rocephin q24  - am cbc, f/u blood cultures, urine culture & sensitivity  - s/p speech and swallow eval- c/w Dysphagia 1 w/ honey thick  - Aspiration precautions  - bedrest, turn q 2

## 2018-10-05 NOTE — ED ADULT NURSE NOTE - NSIMPLEMENTINTERV_GEN_ALL_ED
Implemented All Universal Safety Interventions:  [place] to call system. Call bell, personal items and telephone within reach. Instruct patient to call for assistance. Room bathroom lighting operational. Non-slip footwear when patient is off stretcher. Physically safe environment: no spills, clutter or unnecessary equipment. Stretcher in lowest position, wheels locked, appropriate side rails in place.

## 2018-10-05 NOTE — ED ADULT TRIAGE NOTE - CHIEF COMPLAINT QUOTE
pt from home, family and ems reports altered ms with n/v/d since yeterday, seen by [facility] nurse yesterday and given zofran, ems placed #20 right wrist, ns bolus infusing

## 2018-10-05 NOTE — ED PROVIDER NOTE - MEDICAL DECISION MAKING DETAILS
89F with COPD, DM, HTN, chronically ill with 2-3 days non verbal and not eating.  labs, cxr, CT head, UA, cultures. suspect UTI.  pt with [facility].

## 2018-10-05 NOTE — ED ADULT NURSE NOTE - CHIEF COMPLAINT QUOTE
pt from home, family and ems reports altered ms with n/v/d since yeterday, seen by [facility] nurse yesterday and given zofran, ems placed #20 right wrist, ns bolus infusing

## 2018-10-05 NOTE — ED ADULT NURSE NOTE - OBJECTIVE STATEMENT
JACINTO from Home accompanied by son. Present to ER with c/o of altered mental status and poor PO intake for 3-4 days. Pt has history of DM, HTN and HLD. EMS placed #20 on right wrist. Son refused

## 2018-10-05 NOTE — GOALS OF CARE CONVERSATION - PERSONAL ADVANCE DIRECTIVE - CONVERSATION DETAILS
met pt w son, Oracio, provided molst form: confirms pt is dnr dni.  message for Dr Braxton regarding updating molst form . contact # given to son. PC will follow

## 2018-10-05 NOTE — H&P ADULT - NSHPSOCIALHISTORY_GEN_ALL_CORE
Patient lives at home w/ son, has 24hr aide.  Patient is bedbound.  Son at bedside denies hx of smoking, alcohol or illicit substance use.  Unknown if patient had flu or pneumonia shot.

## 2018-10-05 NOTE — SWALLOW BEDSIDE ASSESSMENT ADULT - COMMENTS
Pt lethargic, cooperative. Pt admitted with sepsis and UTI. Pt known to ST department from previous admission (October 2017) when she was consuming a puree diet with nectar thickened liquids. Pt now presents with oropharyngeal dysphagia with reduced oral prep, labored manipulation and AP transport of the bolus, swallow delay, reduced laryngeal elevation. No overt s/s of aspiration noted. Recommend dysphagia 1 puree consistencies with honey thickened liquids. Discussed with MD.

## 2018-10-06 DIAGNOSIS — G93.41 METABOLIC ENCEPHALOPATHY: ICD-10-CM

## 2018-10-06 LAB
-  COAGULASE NEGATIVE STAPHYLOCOCCUS: SIGNIFICANT CHANGE UP
ANION GAP SERPL CALC-SCNC: 12 MMOL/L — SIGNIFICANT CHANGE UP (ref 5–17)
BASOPHILS # BLD AUTO: 0.04 K/UL — SIGNIFICANT CHANGE UP (ref 0–0.2)
BASOPHILS NFR BLD AUTO: 0.3 % — SIGNIFICANT CHANGE UP (ref 0–2)
BUN SERPL-MCNC: 62 MG/DL — HIGH (ref 7–23)
CALCIUM SERPL-MCNC: 8 MG/DL — LOW (ref 8.5–10.1)
CHLORIDE SERPL-SCNC: 117 MMOL/L — HIGH (ref 96–108)
CO2 SERPL-SCNC: 22 MMOL/L — SIGNIFICANT CHANGE UP (ref 22–31)
CREAT SERPL-MCNC: 1.6 MG/DL — HIGH (ref 0.5–1.3)
CULTURE RESULTS: SIGNIFICANT CHANGE UP
EOSINOPHIL # BLD AUTO: 0.01 K/UL — SIGNIFICANT CHANGE UP (ref 0–0.5)
EOSINOPHIL NFR BLD AUTO: 0.1 % — SIGNIFICANT CHANGE UP (ref 0–6)
GLUCOSE SERPL-MCNC: 144 MG/DL — HIGH (ref 70–99)
GRAM STN FLD: SIGNIFICANT CHANGE UP
HBA1C BLD-MCNC: 4.9 % — SIGNIFICANT CHANGE UP (ref 4–5.6)
HCT VFR BLD CALC: 35.6 % — SIGNIFICANT CHANGE UP (ref 34.5–45)
HGB BLD-MCNC: 11.3 G/DL — LOW (ref 11.5–15.5)
IMM GRANULOCYTES NFR BLD AUTO: 0.4 % — SIGNIFICANT CHANGE UP (ref 0–1.5)
LYMPHOCYTES # BLD AUTO: 1.46 K/UL — SIGNIFICANT CHANGE UP (ref 1–3.3)
LYMPHOCYTES # BLD AUTO: 11 % — LOW (ref 13–44)
MCHC RBC-ENTMCNC: 28.7 PG — SIGNIFICANT CHANGE UP (ref 27–34)
MCHC RBC-ENTMCNC: 31.7 GM/DL — LOW (ref 32–36)
MCV RBC AUTO: 90.4 FL — SIGNIFICANT CHANGE UP (ref 80–100)
METHOD TYPE: SIGNIFICANT CHANGE UP
MONOCYTES # BLD AUTO: 0.9 K/UL — SIGNIFICANT CHANGE UP (ref 0–0.9)
MONOCYTES NFR BLD AUTO: 6.8 % — SIGNIFICANT CHANGE UP (ref 2–14)
NEUTROPHILS # BLD AUTO: 10.76 K/UL — HIGH (ref 1.8–7.4)
NEUTROPHILS NFR BLD AUTO: 81.4 % — HIGH (ref 43–77)
PLATELET # BLD AUTO: 158 K/UL — SIGNIFICANT CHANGE UP (ref 150–400)
POTASSIUM SERPL-MCNC: 3.5 MMOL/L — SIGNIFICANT CHANGE UP (ref 3.5–5.3)
POTASSIUM SERPL-SCNC: 3.5 MMOL/L — SIGNIFICANT CHANGE UP (ref 3.5–5.3)
RBC # BLD: 3.94 M/UL — SIGNIFICANT CHANGE UP (ref 3.8–5.2)
RBC # FLD: 14.2 % — SIGNIFICANT CHANGE UP (ref 10.3–14.5)
SODIUM SERPL-SCNC: 151 MMOL/L — HIGH (ref 135–145)
SPECIMEN SOURCE: SIGNIFICANT CHANGE UP
SPECIMEN SOURCE: SIGNIFICANT CHANGE UP
WBC # BLD: 13.22 K/UL — HIGH (ref 3.8–10.5)
WBC # FLD AUTO: 13.22 K/UL — HIGH (ref 3.8–10.5)

## 2018-10-06 PROCEDURE — 99233 SBSQ HOSP IP/OBS HIGH 50: CPT

## 2018-10-06 RX ORDER — ACETAMINOPHEN 500 MG
650 TABLET ORAL EVERY 6 HOURS
Qty: 0 | Refills: 0 | Status: DISCONTINUED | OUTPATIENT
Start: 2018-10-06 | End: 2018-10-13

## 2018-10-06 RX ORDER — SODIUM CHLORIDE 9 MG/ML
1000 INJECTION, SOLUTION INTRAVENOUS
Qty: 0 | Refills: 0 | Status: DISCONTINUED | OUTPATIENT
Start: 2018-10-06 | End: 2018-10-07

## 2018-10-06 RX ORDER — VANCOMYCIN HCL 1 G
1000 VIAL (EA) INTRAVENOUS ONCE
Qty: 0 | Refills: 0 | Status: COMPLETED | OUTPATIENT
Start: 2018-10-06 | End: 2018-10-06

## 2018-10-06 RX ADMIN — Medication 3 MILLILITER(S): at 13:07

## 2018-10-06 RX ADMIN — Medication 325 MILLIGRAM(S): at 11:28

## 2018-10-06 RX ADMIN — Medication 250 MILLIGRAM(S): at 15:36

## 2018-10-06 RX ADMIN — Medication 3 MILLILITER(S): at 07:26

## 2018-10-06 RX ADMIN — BUDESONIDE AND FORMOTEROL FUMARATE DIHYDRATE 2 PUFF(S): 160; 4.5 AEROSOL RESPIRATORY (INHALATION) at 06:03

## 2018-10-06 RX ADMIN — SODIUM CHLORIDE 50 MILLILITER(S): 9 INJECTION, SOLUTION INTRAVENOUS at 09:51

## 2018-10-06 RX ADMIN — Medication 650 MILLIGRAM(S): at 12:54

## 2018-10-06 RX ADMIN — PANTOPRAZOLE SODIUM 40 MILLIGRAM(S): 20 TABLET, DELAYED RELEASE ORAL at 07:47

## 2018-10-06 RX ADMIN — Medication 1: at 17:20

## 2018-10-06 RX ADMIN — HEPARIN SODIUM 5000 UNIT(S): 5000 INJECTION INTRAVENOUS; SUBCUTANEOUS at 17:24

## 2018-10-06 RX ADMIN — BUDESONIDE AND FORMOTEROL FUMARATE DIHYDRATE 2 PUFF(S): 160; 4.5 AEROSOL RESPIRATORY (INHALATION) at 20:15

## 2018-10-06 RX ADMIN — Medication 650 MILLIGRAM(S): at 13:54

## 2018-10-06 RX ADMIN — CEFTRIAXONE 100 GRAM(S): 500 INJECTION, POWDER, FOR SOLUTION INTRAMUSCULAR; INTRAVENOUS at 11:27

## 2018-10-06 RX ADMIN — Medication 3 MILLILITER(S): at 20:46

## 2018-10-06 RX ADMIN — HEPARIN SODIUM 5000 UNIT(S): 5000 INJECTION INTRAVENOUS; SUBCUTANEOUS at 05:50

## 2018-10-06 RX ADMIN — Medication 20 MILLIGRAM(S): at 05:50

## 2018-10-06 NOTE — PROGRESS NOTE ADULT - PROBLEM SELECTOR PLAN 2
Suspect due to UTI and possible dehydration.  - s/p speech and swallow eval- c/w Dysphagia 1 w/ honey thick  - Aspiration precautions  - bedrest, turn q 2

## 2018-10-06 NOTE — PROGRESS NOTE ADULT - SUBJECTIVE AND OBJECTIVE BOX
HPI:  88 yo F w/ PMH of DM type 2, HTN, chronic hypoxic respiratory failure due to COPD, on 3-4L home O2, diastolic HFpEF (last echo 10/2017) presented to ED w/ lethargy progressing to unresponsiveness accompanied by coughing/vomiting/dry heaves over the 3 days prior to admission.        INTERVAL HPI:  Patient seen and examined. Son at bedside. Patient is groaning, but unable to voice any complaints. No respiratory distress. Per son, patient is more awake today and is eating more.     REVIEW OF SYSTEMS:  Unable to obtain.    VITAL SIGNS:  Vital Signs Last 24 Hrs  T(C): 36.6 (10-06-18 @ 05:08), Max: 37.1 (10-05-18 @ 20:06)  T(F): 97.8 (10-06-18 @ 05:08), Max: 98.7 (10-05-18 @ 20:06)  HR: 96 (10-06-18 @ 05:08) (76 - 100)  BP: 164/70 (10-06-18 @ 05:08) (140/64 - 170/64)  BP(mean): --  RR: 19 (10-06-18 @ 05:08) (18 - 20)  SpO2: 96% (10-06-18 @ 08:12) (96% - 100%)      PHYSICAL EXAM:     GENERAL: no acute distress  HEENT: NC/AT, EOMI, neck supple, MMM  RESPIRATORY: decreased breath sounds bilaterally, no wheezing  CARDIOVASCULAR: RRR, +systolic murmur  ABDOMINAL: soft, non-tender, non-distended, positive bowel sounds   EXTREMITIES: no clubbing, cyanosis, or edema  NEUROLOGICAL: awake, groaning, moves all 4 extremities.  SKIN: no rashes or lesions   MUSCULOSKELETAL: no gross joint deformity       Labs pending    CAPILLARY BLOOD GLUCOSE  POCT Blood Glucose.: 118 mg/dL (06 Oct 2018 07:26)  POCT Blood Glucose.: 136 mg/dL (05 Oct 2018 21:40)  POCT Blood Glucose.: 133 mg/dL (05 Oct 2018 17:15)      MEDICATIONS  (STANDING):  ALBUTerol/ipratropium for Nebulization 3 milliLiter(s) Nebulizer every 6 hours  buDESOnide 160 MICROgram(s)/formoterol 4.5 MICROgram(s) Inhaler 2 Puff(s) Inhalation two times a day  cefTRIAXone   IVPB 1 Gram(s) IV Intermittent every 24 hours  dextrose 5%. 1000 milliLiter(s) (50 mL/Hr) IV Continuous <Continuous>  dextrose 50% Injectable 12.5 Gram(s) IV Push once  dextrose 50% Injectable 25 Gram(s) IV Push once  dextrose 50% Injectable 25 Gram(s) IV Push once  ferrous    sulfate 325 milliGRAM(s) Oral daily  heparin  Injectable 5000 Unit(s) SubCutaneous every 12 hours  insulin lispro (HumaLOG) corrective regimen sliding scale   SubCutaneous three times a day before meals  insulin lispro (HumaLOG) corrective regimen sliding scale   SubCutaneous at bedtime  pantoprazole    Tablet 40 milliGRAM(s) Oral before breakfast  sodium chloride 0.45%. 1000 milliLiter(s) (50 mL/Hr) IV Continuous <Continuous>    MEDICATIONS  (PRN):  dextrose 40% Gel 15 Gram(s) Oral once PRN Blood Glucose LESS THAN 70 milliGRAM(s)/deciliter  glucagon  Injectable 1 milliGRAM(s) IntraMuscular once PRN Glucose LESS THAN 70 milligrams/deciliter

## 2018-10-06 NOTE — DIETITIAN INITIAL EVALUATION ADULT. - PROBLEM SELECTOR PLAN 1
- admit to [facility]  - patient s/p 1.5 L in ED: 30mL/kg = 2160mL; however, given patient hx of CHF hydration being managed conservatively.  Patient VS stable, afebrile, not hypotensive: however, 1-2+ pitting bilaterally  - s/p rocephin in ED  - continue Rocephin q24  - am cbc, f/u blood cultures, urine culture & sensitivity  - s/p speech and swallow eval- c/w Dysphagia 1 w/ honey thick  - Aspiration precautions  - bedrest, turn q 2

## 2018-10-06 NOTE — DIETITIAN INITIAL EVALUATION ADULT. - NS AS NUTRI INTERV MEALS SNACK
Carbohydrate - modified diet/continue diet as ordered/Texture-modified diet/Fat - modified diet/Mineral - modified diet

## 2018-10-06 NOTE — DIETITIAN INITIAL EVALUATION ADULT. - ORAL INTAKE PTA
good/patient followed puree nectar thick diet PTA PO was good until before admit now family states PO improving this AM

## 2018-10-06 NOTE — DIETITIAN INITIAL EVALUATION ADULT. - PROBLEM SELECTOR PLAN 6
- on 3L O2 at home, desaturation to 88% on presentation, now sating in low 90's.  - continue duonebs q4 standing  - continue supplemental O2  - due to acute change in mental status need to r/o CO2 retention, will get VBG to assess  - may require BIPAP  - remote with continuous pulse ox

## 2018-10-06 NOTE — DIETITIAN INITIAL EVALUATION ADULT. - PROBLEM SELECTOR PLAN 2
- EF 65% on echo 10/2017  - continue furosemide convert to IV due to lethargy- 20IV QD  - monitor cr closely while diuresing  - hold ARB due to SAMIA

## 2018-10-06 NOTE — DIETITIAN INITIAL EVALUATION ADULT. - DIET TYPE
dysphagia 1, pureed, honey consistency fluid/consistent carbohydrate (no snacks)/DASH/TLC (sodium and cholesterol restricted diet)

## 2018-10-06 NOTE — DIETITIAN INITIAL EVALUATION ADULT. - OTHER INFO
patient seen with family at bedside taking breakfast tray fairly well . per family patient on thick liquids puree PTA now honey thick explained to read label on thick it packages. noted A1c ordered with history DM .

## 2018-10-06 NOTE — PROGRESS NOTE ADULT - PROBLEM SELECTOR PLAN 7
With chronic hypoxic respiratory failure, on 3-4L O2 at home, desaturation to 88% on presentation, now saturating satisfactorily on 2.5-3L/min.  - continue Duonebs.  - continue supplemental O2

## 2018-10-06 NOTE — PROGRESS NOTE ADULT - PROBLEM SELECTOR PLAN 1
Unclear if her UA represents infection or colonization. Unable to illicit symptoms, but per documentation, patient had dysuria at home prior to admission.  -Continue Rocephin q24  -Follow up urine and blood cultures

## 2018-10-06 NOTE — CONSULT NOTE ADULT - SUBJECTIVE AND OBJECTIVE BOX
Infectious Diseases Consult by Jaclyn Barnes MD    Reason for Consult :Gram positive bacteremia    HPI: All hx per chart and her Grand son who is a PGY 1 medical resident   89 yo F w/ PMH of DM type 2, HTN, COPD on 3L home O2, diastolic HFpEF (last echo 10/2017) presented to ED w/ lethargy progressing to unresponsiveness accompanied by coughing/vomiting/dry heaves over the 3 days prior to admission.  Patient is not an English speaker, son is at bedside.  Patient is unresponsive to son, or multiple attempts to get her to follow commands.  Son reports patient is verbal, alert and oriented at baseline, and has only become non-verbal over that past 2 days.  He reports she had a cough, w/ yellowish sputum production, with dry heaves and diarrhea,  she has not been eating for several days. She also has been complaining of dysuria intermittently. She is incontinent at baseline. She is bed bound and incontinent of urine and stool and uses diapers at home     In ED patient is normotensive, afebrile, EKG [id number] degree AV block pO2 88% on supplemental O2.  Labs significant for WBC 12.95 w/ L shift, Lactate 3.9, Na 147, Cl 113, Co2 20, Crt 1.70, BUN 56, Albumin 2.5, GFR 26.  CXR and CT show no acute pathology.  Urine is positive for ketones, protein 150, leuk, blood, WBC, RBC >50, few Epithelial cells, Bacteria TNTC. (05 Oct 2018 12:55)    Blood cs done in ER 1 out of 2 positive for [facility] , id pending. She has been afebrile . As per family she is much improved since this morning     Past Medical & Surgical Hx:  PAST MEDICAL & SURGICAL HISTORY:  Coronary artery disease with angina pectoris, unspecified vessel or lesion type, unspecified whether native or transplanted heart  Congestive heart failure  Asthma  Diabetes mellitus  HTN (hypertension)  No significant past surgical history      Social History-- Retired, lives at home with Son   EtOH: denies   Tobacco: denies   Drug Use: denies     FAMILY HISTORY:  No pertinent family history in first degree relatives      Allergies    No Known Allergies    Intolerances        Home/ Out patient  Medications :  Home Medications:  albuterol-ipratropium 2.5 mg-0.5 mg/3 mL inhalation solution: 3 milliliter(s) inhaled every 6 hours (05 Oct 2018 15:17)  ferrous sulfate 324 mg (65 mg elemental iron) oral delayed release tablet: 1 tab(s) orally once a day (05 Oct 2018 15:17)  fluticasone-salmeterol 250 mcg-50 mcg inhalation powder: 1 puff(s) inhaled 2 times a day (05 Oct 2018 15:17)  glipiZIDE 5 mg oral tablet: 1 tab(s) orally once a day (05 Oct 2018 15:17)  Lasix 40 mg oral tablet: 1 tab(s) orally once a day (05 Oct 2018 15:17)  losartan 25 mg oral tablet: 1 tab(s) orally once a day (05 Oct 2018 15:17)  omeprazole 20 mg oral delayed release capsule: 1 cap(s) orally once a day (05 Oct 2018 15:17)  Percocet 5/325 oral tablet: 1 tab(s) orally every 6 hours (05 Oct 2018 15:17)      Current Inpatient Medications :    ANTIBIOTICS:   cefTRIAXone   IVPB 1 Gram(s) IV Intermittent every 24 hours  vancomycin  IVPB 1000 milliGRAM(s) IV Intermittent once      OTHER RELEVANT MEDICATIONS :  acetaminophen   Tablet .. 650 milliGRAM(s) Oral every 6 hours PRN  ALBUTerol/ipratropium for Nebulization 3 milliLiter(s) Nebulizer every 6 hours  buDESOnide 160 MICROgram(s)/formoterol 4.5 MICROgram(s) Inhaler 2 Puff(s) Inhalation two times a day  dextrose 40% Gel 15 Gram(s) Oral once PRN  dextrose 5%. 1000 milliLiter(s) IV Continuous <Continuous>  dextrose 50% Injectable 12.5 Gram(s) IV Push once  dextrose 50% Injectable 25 Gram(s) IV Push once  dextrose 50% Injectable 25 Gram(s) IV Push once  ferrous    sulfate 325 milliGRAM(s) Oral daily  glucagon  Injectable 1 milliGRAM(s) IntraMuscular once PRN  heparin  Injectable 5000 Unit(s) SubCutaneous every 12 hours  insulin lispro (HumaLOG) corrective regimen sliding scale   SubCutaneous three times a day before meals  insulin lispro (HumaLOG) corrective regimen sliding scale   SubCutaneous at bedtime  pantoprazole    Tablet 40 milliGRAM(s) Oral before breakfast  sodium chloride 0.45%. 1000 milliLiter(s) IV Continuous <Continuous>      ROS:  Unable to obtain due to : altered sensorium       I&O's Detail      Physical Exam:  Vital Signs Last 24 Hrs  T(C): 36.6 (06 Oct 2018 05:08), Max: 37.1 (05 Oct 2018 20:06)  T(F): 97.8 (06 Oct 2018 05:08), Max: 98.7 (05 Oct 2018 20:06)  HR: 96 (06 Oct 2018 05:08) (76 - 106)  BP: 164/70 (06 Oct 2018 05:08) (140/64 - 170/64)  BP(mean): --  RR: 19 (06 Oct 2018 05:08) (18 - 20)  SpO2: 96% (06 Oct 2018 08:12) (96% - 100%)      General: well developed well nourished, in no acute distress  Eyes: sclera anicteric, pupils equal and reactive to light  ENMT: buccal mucosa moist, pharynx not injected  Neck: supple, trachea midline  Lungs: clear, no wheeze/rhonchi  Cardiovascular: regular rate and rhythm, S1 S2  Abdomen: soft, nontender, no organomegaly present, bowel sounds normal  Neurological:  awake, answers simple questions , Cranial Nerves II-XII grossly intact  Skin: no increased ecchymosis/petechiae/purpura  Lymph Nodes: no palpable cervical/supraclavicular lymph nodes enlargements  Extremities: no cyanosis/clubbing/edema    Labs:                   11.3   13.22  )----------(  158       ( 06 Oct 2018 09:55 )               35.6      151    |  117    |  62     ----------------------------<  144        ( 06 Oct 2018 09:55 )  3.5     |  22     |  1.60     Ca    8.0        ( 06 Oct 2018 09:55 )    Lactate, Blood: 1.8 mmol/L (10-05-18 @ 15:15)  Lactate, Blood: 3.9 mmol/L (10-05-18 @ 11:18)    Procalcitonin, Serum (10.06.18 @ 09:55)    Procalcitonin, Serum: 0.26:     CAPILLARY BLOOD GLUCOSE      POCT Blood Glucose.: 140 mg/dL (06 Oct 2018 11:21)  POCT Blood Glucose.: 118 mg/dL (06 Oct 2018 07:26)  POCT Blood Glucose.: 136 mg/dL (05 Oct 2018 21:40)  POCT Blood Glucose.: 133 mg/dL (05 Oct 2018 17:15)            ABG - ( 05 Oct 2018 16:56 )  pH, Arterial: 7.37  pH, Blood: x     /  pCO2: 37    /  pO2: 117   / HCO3: 21    / Base Excess: -3.7  /  SaO2: 98            RECENT CULTURES:    Culture - Blood (collected 10-05-18 @ 16:29)  Source: .Blood Blood-Peripheral  Gram Stain (10-06-18 @ 11:43):    Growth in aerobic bottle: Gram Positive Cocci in Clusters  Preliminary Report (10-06-18 @ 11:44):    Growth in aerobic bottle: Gram Positive Cocci in Clusters    "Due to technical problems, Proteus sp. will Not be reported as part of    the BCID panel until further notice"    ***Blood Panel PCR results on this specimen are available    approximately 3 hours after the Gram stain result.***    Gram stain, PCR, and/or culture results may not always    correspond due to difference in methodologies.    ************************************************************    This PCR assay was performed using [employer].    The following targets are tested for: Enterococcus,    vancomycin resistant enterococci, Listeria monocytogenes,    coagulase negative staphylococci, S. aureus,    methicillin resistant S. aureus, Streptococcus agalactiae    (Group B), S. pneumoniae, S.pyogenes (Group A),    Acinetobacter baumannii, Enterobacter cloacae, E. coli,    Klebsiella oxytoca, K. pneumoniae, Proteus sp.,    Serratia marcescens, Haemophilus influenzae,    Neisseria meningitidis, Pseudomonas aeruginosa, Candida    albicans, C. glabrata, C krusei, C parapsilosis,    C. tropicalis and the KPC resistance gene.            RADIOLOGY & ADDITIONAL STUDIES:   Xray Chest 1 View- PORTABLE-Urgent (10.05.18 @ 12:30) >  Findings:     No acute infiltrates, congestion or pleural effusions  .      The pulmonary vasculature and aorta are normal for age. Cardiomegaly   unchanged.    The thorax is normal for age.    Impression: No acute pulmonary disease. No change from prior study.    CT Head No Cont (10.05.18 @ 12:22) >  The bone windows demonstrate no gross osseous abnormalities.        Impression:  1. Unremarkable noncontrast CT scan of the brain.              Assessment :   89 yo F w/ PMH of DM type 2, HTN, COPD on 3L home O2, diastolic HFpEF (last echo 10/2017) presented to ED w/ lethargy progressing to unresponsiveness , she most likely has metabolic encephalopathy secondary to UTI most likely   Gram positive bacteremia likely skin contamination. Clinically shew has improved     Plan :   - agree with repeating blood cs x 2 and giving one dose of Vancomycin   - continue with IV Rocephin pending urine cs   - trend CBC  - aspiration precautions     Continue with present regime .  Appropriate use of antibiotics and adverse effects reviewed.      I have discussed the above plan of care with patient's family in detail. They expressed understanding of the treatment plan . Risks, benefits and alternatives discussed in detail. I have asked if they have any questions or concerns and appropriately addressed them to the best of my ability .      > 55 minutes spent in direct patient care reviewing  the notes, lab data/ imaging , discussion with multidisciplinary team. All questions were addressed and answered to the best of my capacity .    Thank you for allowing me to participate in the care of your patient .      Jaclyn Barnes MD  [phone]

## 2018-10-06 NOTE — PROGRESS NOTE ADULT - PROBLEM SELECTOR PLAN 4
- cr 1.7 this admission, repeat labs this AM are pending.  - Will give gentle IV fluids and monitor volume status closely.   - avoid NSAIDs

## 2018-10-06 NOTE — PROGRESS NOTE ADULT - ASSESSMENT
89 yo F w/ PMH of DM type 2, HTN, COPD on 3L home O2, diastolic HFpEF (last echo 10/2017) presented to ED w/ lethargy found to have + UA. admit for AMS & UTI

## 2018-10-06 NOTE — PROGRESS NOTE ADULT - PROBLEM SELECTOR PLAN 3
- EF 65% on echo 10/2017  - Hold Lasix for now in setting of poor po intake.  - Will give gentle IV fluids and monitor closely for any signs of fluid overload.  - hold ARB due to SAMIA

## 2018-10-06 NOTE — DIETITIAN INITIAL EVALUATION ADULT. - PROBLEM SELECTOR PLAN 3
- cr 1.7: 0.3 elevation from 1.4 on prior discharge  - appears grossly volume overloaded. will give lasix 20IV, monitor Cr.  - avoid NSAIDs

## 2018-10-07 DIAGNOSIS — R78.81 BACTEREMIA: ICD-10-CM

## 2018-10-07 LAB
ANION GAP SERPL CALC-SCNC: 11 MMOL/L — SIGNIFICANT CHANGE UP (ref 5–17)
ANION GAP SERPL CALC-SCNC: 8 MMOL/L — SIGNIFICANT CHANGE UP (ref 5–17)
BUN SERPL-MCNC: 54 MG/DL — HIGH (ref 7–23)
BUN SERPL-MCNC: 60 MG/DL — HIGH (ref 7–23)
CALCIUM SERPL-MCNC: 7.3 MG/DL — LOW (ref 8.5–10.1)
CALCIUM SERPL-MCNC: 7.7 MG/DL — LOW (ref 8.5–10.1)
CHLORIDE SERPL-SCNC: 113 MMOL/L — HIGH (ref 96–108)
CHLORIDE SERPL-SCNC: 114 MMOL/L — HIGH (ref 96–108)
CO2 SERPL-SCNC: 20 MMOL/L — LOW (ref 22–31)
CO2 SERPL-SCNC: 23 MMOL/L — SIGNIFICANT CHANGE UP (ref 22–31)
CREAT SERPL-MCNC: 1.4 MG/DL — HIGH (ref 0.5–1.3)
CREAT SERPL-MCNC: 1.5 MG/DL — HIGH (ref 0.5–1.3)
CULTURE RESULTS: SIGNIFICANT CHANGE UP
GLUCOSE SERPL-MCNC: 117 MG/DL — HIGH (ref 70–99)
GLUCOSE SERPL-MCNC: 208 MG/DL — HIGH (ref 70–99)
GRAM STN FLD: SIGNIFICANT CHANGE UP
HCT VFR BLD CALC: 30.6 % — LOW (ref 34.5–45)
HGB BLD-MCNC: 9.8 G/DL — LOW (ref 11.5–15.5)
MCHC RBC-ENTMCNC: 28.8 PG — SIGNIFICANT CHANGE UP (ref 27–34)
MCHC RBC-ENTMCNC: 32 GM/DL — SIGNIFICANT CHANGE UP (ref 32–36)
MCV RBC AUTO: 90 FL — SIGNIFICANT CHANGE UP (ref 80–100)
NRBC # BLD: 0 /100 WBCS — SIGNIFICANT CHANGE UP (ref 0–0)
ORGANISM # SPEC MICROSCOPIC CNT: SIGNIFICANT CHANGE UP
ORGANISM # SPEC MICROSCOPIC CNT: SIGNIFICANT CHANGE UP
PLATELET # BLD AUTO: 115 K/UL — LOW (ref 150–400)
POTASSIUM SERPL-MCNC: 2.9 MMOL/L — CRITICAL LOW (ref 3.5–5.3)
POTASSIUM SERPL-MCNC: 5.4 MMOL/L — HIGH (ref 3.5–5.3)
POTASSIUM SERPL-SCNC: 2.9 MMOL/L — CRITICAL LOW (ref 3.5–5.3)
POTASSIUM SERPL-SCNC: 5.4 MMOL/L — HIGH (ref 3.5–5.3)
RBC # BLD: 3.4 M/UL — LOW (ref 3.8–5.2)
RBC # FLD: 14.2 % — SIGNIFICANT CHANGE UP (ref 10.3–14.5)
SODIUM SERPL-SCNC: 141 MMOL/L — SIGNIFICANT CHANGE UP (ref 135–145)
SODIUM SERPL-SCNC: 148 MMOL/L — HIGH (ref 135–145)
SPECIMEN SOURCE: SIGNIFICANT CHANGE UP
WBC # BLD: 9.46 K/UL — SIGNIFICANT CHANGE UP (ref 3.8–10.5)
WBC # FLD AUTO: 9.46 K/UL — SIGNIFICANT CHANGE UP (ref 3.8–10.5)

## 2018-10-07 PROCEDURE — 99233 SBSQ HOSP IP/OBS HIGH 50: CPT

## 2018-10-07 RX ORDER — POTASSIUM CHLORIDE 20 MEQ
10 PACKET (EA) ORAL
Qty: 0 | Refills: 0 | Status: COMPLETED | OUTPATIENT
Start: 2018-10-07 | End: 2018-10-07

## 2018-10-07 RX ORDER — POTASSIUM CHLORIDE 20 MEQ
40 PACKET (EA) ORAL EVERY 4 HOURS
Qty: 0 | Refills: 0 | Status: COMPLETED | OUTPATIENT
Start: 2018-10-07 | End: 2018-10-07

## 2018-10-07 RX ORDER — POTASSIUM PHOSPHATE, MONOBASIC POTASSIUM PHOSPHATE, DIBASIC 236; 224 MG/ML; MG/ML
15 INJECTION, SOLUTION INTRAVENOUS ONCE
Qty: 0 | Refills: 0 | Status: COMPLETED | OUTPATIENT
Start: 2018-10-07 | End: 2018-10-07

## 2018-10-07 RX ADMIN — POTASSIUM PHOSPHATE, MONOBASIC POTASSIUM PHOSPHATE, DIBASIC 62.5 MILLIMOLE(S): 236; 224 INJECTION, SOLUTION INTRAVENOUS at 16:32

## 2018-10-07 RX ADMIN — Medication 650 MILLIGRAM(S): at 12:43

## 2018-10-07 RX ADMIN — BUDESONIDE AND FORMOTEROL FUMARATE DIHYDRATE 2 PUFF(S): 160; 4.5 AEROSOL RESPIRATORY (INHALATION) at 19:58

## 2018-10-07 RX ADMIN — Medication 325 MILLIGRAM(S): at 11:38

## 2018-10-07 RX ADMIN — Medication 1: at 12:16

## 2018-10-07 RX ADMIN — BUDESONIDE AND FORMOTEROL FUMARATE DIHYDRATE 2 PUFF(S): 160; 4.5 AEROSOL RESPIRATORY (INHALATION) at 07:01

## 2018-10-07 RX ADMIN — HEPARIN SODIUM 5000 UNIT(S): 5000 INJECTION INTRAVENOUS; SUBCUTANEOUS at 05:59

## 2018-10-07 RX ADMIN — Medication 40 MILLIEQUIVALENT(S): at 10:09

## 2018-10-07 RX ADMIN — Medication 3 MILLILITER(S): at 07:46

## 2018-10-07 RX ADMIN — Medication 40 MILLIEQUIVALENT(S): at 15:07

## 2018-10-07 RX ADMIN — Medication 650 MILLIGRAM(S): at 11:43

## 2018-10-07 RX ADMIN — Medication 3 MILLILITER(S): at 13:43

## 2018-10-07 RX ADMIN — Medication 100 MILLIEQUIVALENT(S): at 10:38

## 2018-10-07 RX ADMIN — SODIUM CHLORIDE 50 MILLILITER(S): 9 INJECTION, SOLUTION INTRAVENOUS at 05:59

## 2018-10-07 RX ADMIN — CEFTRIAXONE 100 GRAM(S): 500 INJECTION, POWDER, FOR SOLUTION INTRAMUSCULAR; INTRAVENOUS at 11:38

## 2018-10-07 RX ADMIN — HEPARIN SODIUM 5000 UNIT(S): 5000 INJECTION INTRAVENOUS; SUBCUTANEOUS at 18:12

## 2018-10-07 RX ADMIN — Medication 3 MILLILITER(S): at 23:16

## 2018-10-07 RX ADMIN — Medication 100 MILLIEQUIVALENT(S): at 12:46

## 2018-10-07 RX ADMIN — PANTOPRAZOLE SODIUM 40 MILLIGRAM(S): 20 TABLET, DELAYED RELEASE ORAL at 08:35

## 2018-10-07 NOTE — PROGRESS NOTE ADULT - PROBLEM SELECTOR PLAN 3
UA positive, UCx growing Candida albicans. Likely represents colonization.  ?dysuria at home prior to presentation, but patient is a poor historian.  D/C Rocephin and observe off abx.

## 2018-10-07 NOTE — PROGRESS NOTE ADULT - SUBJECTIVE AND OBJECTIVE BOX
HPI:  88 yo F w/ PMH of DM type 2, HTN, chronic hypoxic respiratory failure due to COPD, on 3-4L home O2, diastolic HFpEF (last echo 10/2017) presented to ED w/ lethargy progressing to unresponsiveness accompanied by coughing/vomiting/dry heaves over the 3 days prior to admission.        INTERVAL HPI:  Patient seen and examined. Family at bedside. Patient much more alert today. She is speaking with family and per their report, she is back at her baseline. She has been eating. No acute events overnight. No respiratory distress. Per son, patient is more awake today and is eating more.     REVIEW OF SYSTEMS:  Unable to obtain.    VITAL SIGNS:  Vital Signs Last 24 Hrs  T(C): 36.7 (07 Oct 2018 13:08), Max: 36.9 (06 Oct 2018 21:19)  T(F): 98.1 (07 Oct 2018 13:08), Max: 98.5 (06 Oct 2018 21:19)  HR: 92 (07 Oct 2018 13:45) (89 - 99)  BP: 146/78 (07 Oct 2018 13:08) (146/78 - 169/79)  BP(mean): --  RR: 19 (07 Oct 2018 13:08) (18 - 19)  SpO2: 98% (07 Oct 2018 13:45) (94% - 100%)    PHYSICAL EXAM:     GENERAL: no acute distress  HEENT: NC/AT, EOMI, neck supple, MMM  RESPIRATORY: decreased breath sounds bilaterally, no wheezing  CARDIOVASCULAR: RRR, +systolic murmur  ABDOMINAL: soft, non-tender, non-distended, positive bowel sounds   EXTREMITIES: no clubbing, cyanosis, or edema  NEUROLOGICAL: awake, alert, at baseline per family's assessment  SKIN: no rashes or lesions   MUSCULOSKELETAL: no gross joint deformity                           9.8    9.46  )-----------( 115      ( 07 Oct 2018 07:42 )             30.6   10-07    148<H>  |  114<H>  |  60<H>  ----------------------------<  117<H>  2.9<LL>   |  23  |  1.40<H>    Ca    7.7<L>      07 Oct 2018 07:42  Phos  2.0     10-07  Mg     2.0     10-07    CAPILLARY BLOOD GLUCOSE      POCT Blood Glucose.: 194 mg/dL (07 Oct 2018 12:00)  POCT Blood Glucose.: 126 mg/dL (07 Oct 2018 07:44)  POCT Blood Glucose.: 140 mg/dL (06 Oct 2018 21:41)  POCT Blood Glucose.: 187 mg/dL (06 Oct 2018 16:31)      MEDICATIONS  (STANDING):  ALBUTerol/ipratropium for Nebulization 3 milliLiter(s) Nebulizer every 6 hours  buDESOnide 160 MICROgram(s)/formoterol 4.5 MICROgram(s) Inhaler 2 Puff(s) Inhalation two times a day  dextrose 5%. 1000 milliLiter(s) (50 mL/Hr) IV Continuous <Continuous>  dextrose 50% Injectable 12.5 Gram(s) IV Push once  dextrose 50% Injectable 25 Gram(s) IV Push once  dextrose 50% Injectable 25 Gram(s) IV Push once  ferrous    sulfate 325 milliGRAM(s) Oral daily  heparin  Injectable 5000 Unit(s) SubCutaneous every 12 hours  insulin lispro (HumaLOG) corrective regimen sliding scale   SubCutaneous three times a day before meals  insulin lispro (HumaLOG) corrective regimen sliding scale   SubCutaneous at bedtime  pantoprazole    Tablet 40 milliGRAM(s) Oral before breakfast  potassium chloride    Tablet ER 40 milliEquivalent(s) Oral every 4 hours  potassium phosphate IVPB 15 milliMole(s) IV Intermittent once    MEDICATIONS  (PRN):  acetaminophen   Tablet .. 650 milliGRAM(s) Oral every 6 hours PRN Mild Pain (1 - 3), Moderate Pain (4 - 6)  dextrose 40% Gel 15 Gram(s) Oral once PRN Blood Glucose LESS THAN 70 milliGRAM(s)/deciliter  glucagon  Injectable 1 milliGRAM(s) IntraMuscular once PRN Glucose LESS THAN 70 milligrams/deciliter

## 2018-10-07 NOTE — PROGRESS NOTE ADULT - PROBLEM SELECTOR PLAN 2
Gram positive bacteremia. Likely represents contamination. Monitor off abx and follow-up repeat blood cultures.

## 2018-10-07 NOTE — PROGRESS NOTE ADULT - SUBJECTIVE AND OBJECTIVE BOX
ID Progress note     Name: MALINDA SULTANA  Age: 89y  Gender: Female  MRN: [id number]    Interval History-- Events noted, doing well as per son at bedside, more awake. appetite is fair . Afebrile   Notes reviewed    Past Medical History--  Coronary artery disease with angina pectoris, unspecified vessel or lesion type, unspecified whether native or transplanted heart  Congestive heart failure  Asthma  Diabetes mellitus  HTN (hypertension)  No significant past surgical history      For details regarding the patient's social history, family history, and other miscellaneous elements, please refer the initial infectious diseases consultation and/or the admitting history and physical examination for this admission.    Allergies--  Allergies    No Known Allergies    Intolerances        Medications--  Antibiotics:    Immunologic:    Other:  acetaminophen   Tablet .. PRN  ALBUTerol/ipratropium for Nebulization  buDESOnide 160 MICROgram(s)/formoterol 4.5 MICROgram(s) Inhaler  dextrose 40% Gel PRN  dextrose 5%.  dextrose 50% Injectable  dextrose 50% Injectable  dextrose 50% Injectable  ferrous    sulfate  glucagon  Injectable PRN  heparin  Injectable  insulin lispro (HumaLOG) corrective regimen sliding scale  insulin lispro (HumaLOG) corrective regimen sliding scale  pantoprazole    Tablet  potassium chloride    Tablet ER  potassium phosphate IVPB  sodium chloride 0.45%.      Review of Systems--  Review of systems unable to be obtained secondary to clinical condition.    Physical Examination--    Vital Signs: T(F): 98.1 (10-07-18 @ 13:08), Max: 98.5 (10-06-18 @ 21:19)  HR: 89 (10-07-18 @ 13:08)  BP: 146/78 (10-07-18 @ 13:08)  RR: 19 (10-07-18 @ 13:08)  SpO2: 97% (10-07-18 @ 13:08)  Wt(kg): --      General: well developed well nourished, in no acute distress  Eyes: sclera anicteric, pupils equal and reactive to light  ENMT: buccal mucosa moist, pharynx not injected  Neck: supple, trachea midline  Lungs: clear, no wheeze/rhonchi  Cardiovascular: regular rate and rhythm, S1 S2  Abdomen: soft, nontender, no organomegaly present, bowel sounds normal  Neurological:  awake, answers simple questions , Cranial Nerves II-XII grossly intact  Skin: no increased ecchymosis/petechiae/purpura  Lymph Nodes: no palpable cervical/supraclavicular lymph nodes enlargements  Extremities: no cyanosis/clubbing/edema      Laboratory Studies--  CBC                        9.8    9.46  )-----------( 115      ( 07 Oct 2018 07:42 )             30.6       Chemistries  10-07    148<H>  |  114<H>  |  60<H>  ----------------------------<  117<H>  2.9<LL>   |  23  |  1.40<H>    Ca    7.7<L>      07 Oct 2018 07:42  Phos  2.0     10-07  Mg     2.0     10-07    CAPILLARY BLOOD GLUCOSE      POCT Blood Glucose.: 194 mg/dL (07 Oct 2018 12:00)  POCT Blood Glucose.: 126 mg/dL (07 Oct 2018 07:44)  POCT Blood Glucose.: 140 mg/dL (06 Oct 2018 21:41)  POCT Blood Glucose.: 187 mg/dL (06 Oct 2018 16:31)    Recent Cultures:    Culture - Urine (collected 05 Oct 2018 16:53)  Source: .Urine Clean Catch (Midstream)  Final Report (06 Oct 2018 22:41):    >100,000 CFU/ml Presumptive Candida albicans    Culture - Blood (collected 05 Oct 2018 16:29)  Source: .Blood Blood-Peripheral  Gram Stain (07 Oct 2018 02:24):    Growth in aerobic bottle: Gram Positive Rods  Preliminary Report (07 Oct 2018 02:24):    Growth in aerobic bottle: Gram Positive Rods    Culture - Blood (collected 05 Oct 2018 16:29)  Source: .Blood Blood-Peripheral  Gram Stain (06 Oct 2018 11:43):    Growth in aerobic bottle: Gram Positive Cocci in Clusters  Final Report (07 Oct 2018 12:09):    Growth in aerobic bottle: Coag Negative Staphylococcus    Single set isolate, possible contaminant. Contact    Microbiology if susceptibility testing clinically    indicated.    "Due to technical problems, Proteus sp. will Not be reported as part of    the BCID panel until further notice"    ***Blood Panel PCR results on this specimen are available    approximately 3 hours after the Gram stain result.***    Gram stain, PCR, and/or culture results may not always    correspond due to difference in methodologies.    ************************************************************    This PCR assay was performed using [employer].    The following targets are tested for: Enterococcus,    vancomycin resistant enterococci, Listeria monocytogenes,    coagulase negative staphylococci, S. aureus,    methicillin resistant S. aureus, Streptococcus agalactiae    (Group B), S. pneumoniae, S. pyogenes (Group A),    Acinetobacter baumannii, Enterobacter cloacae, E. coli,    Klebsiella oxytoca, K. pneumoniae, Proteus sp.,    Serratia marcescens, Haemophilus influenzae,    Neisseria meningitidis, Pseudomonas aeruginosa, Candida    albicans, C. glabrata, C krusei, C parapsilosis,    C. tropicalis and the KPC resistance gene.  Organism: Blood Culture PCR (07 Oct 2018 12:09)  Organism: Blood Culture PCR (07 Oct 2018 12:09)        Radiology:  Xray Chest 1 View- PORTABLE-Urgent (10.05.18 @ 12:30) >    Impression: No acute pulmonary disease. No change from prior study.      Assessment--    88 yo F w/ PMH of DM type 2, HTN, COPD on 3L home O2, diastolic HFpEF (last echo 10/2017) presented to ED w/ lethargy progressing to unresponsiveness , she most likely has metabolic encephalopathy secondary to UTI most likely   Gram positive bacteremia likely skin contamination. Clinically she has improved     Blood cs with Coag negative staph, likely skin contaminant . Urine cs reflects colonization doubt she has fungal UTI     She most likely had dehydration from use of diuretics     Plan :   - observe off antibiotics   - trend renal fucntion   - dc planning   - trend CBC  - aspiration precautions     Continue with present regime .  Appropriate use of antibiotics and adverse effects reviewed.    I have discussed the above plan of care with patient's family in detail. They expressed understanding of the treatment plan . Risks, benefits and alternatives discussed in detail. I have asked if they have any questions or concerns and appropriately addressed them to the best of my ability .      > 15 minutes spent in direct patient care reviewing  the notes, lab data/ imaging , discussion with multidisciplinary team. All questions were addressed and answered to the best of my capacity .    Thank you for allowing me to participate in the care of your patient .        Jaclyn Barnes MD  [phone]

## 2018-10-07 NOTE — PROGRESS NOTE ADULT - PROBLEM SELECTOR PLAN 7
- BP elevated.  - hold losartan in setting of SAMIA. monitor Cr.  - may need to add a new agent if her BP doesn't improve off fluids.

## 2018-10-07 NOTE — PROGRESS NOTE ADULT - ASSESSMENT
88 yo F w/ PMH of DM type 2, HTN, COPD on 3L home O2, diastolic HFpEF (last echo 10/2017) presented to ED w/ lethargy found to have + UA, admitted for AMS & suspected UTI

## 2018-10-07 NOTE — PROGRESS NOTE ADULT - PROBLEM SELECTOR PLAN 4
- EF 65% on echo 10/2017  - Hold Lasix for now in setting of poor po intake.  - Stop fluids for now and monitor.  - Will likely dc home off of Lasix.  - hold ARB due to SAMIA

## 2018-10-07 NOTE — PROGRESS NOTE ADULT - PROBLEM SELECTOR PLAN 1
Suspect due to UTI and possible dehydration.  - s/p speech and swallow eval- c/w Dysphagia 1 w/ honey thick  - Aspiration precautions  - bedrest, turn q 2 Suspect this was due to dehydration in the setting of poor po intake and diuresis.   Resolved at this time.   Had speech and swallow eval and was recommended c/w Dysphagia 1 w/ honey thick. This can likely be liberalized now that patient is more alert. Will have speech and swallow re-evaluate patient  - Aspiration precautions  - bedrest, turn q 2

## 2018-10-08 ENCOUNTER — APPOINTMENT (OUTPATIENT)
Dept: HOME HEALTH SERVICES | Facility: HOME HEALTH | Age: 83
End: 2018-10-08

## 2018-10-08 PROBLEM — I25.119 ATHEROSCLEROTIC HEART DISEASE OF NATIVE CORONARY ARTERY WITH UNSPECIFIED ANGINA PECTORIS: Chronic | Status: ACTIVE | Noted: 2018-10-05

## 2018-10-08 LAB
ANION GAP SERPL CALC-SCNC: 6 MMOL/L — SIGNIFICANT CHANGE UP (ref 5–17)
BUN SERPL-MCNC: 52 MG/DL — HIGH (ref 7–23)
CALCIUM SERPL-MCNC: 8 MG/DL — LOW (ref 8.5–10.1)
CHLORIDE SERPL-SCNC: 115 MMOL/L — HIGH (ref 96–108)
CO2 SERPL-SCNC: 23 MMOL/L — SIGNIFICANT CHANGE UP (ref 22–31)
CREAT SERPL-MCNC: 1.4 MG/DL — HIGH (ref 0.5–1.3)
CULTURE RESULTS: SIGNIFICANT CHANGE UP
GLUCOSE SERPL-MCNC: 138 MG/DL — HIGH (ref 70–99)
HCT VFR BLD CALC: 30.2 % — LOW (ref 34.5–45)
HGB BLD-MCNC: 10 G/DL — LOW (ref 11.5–15.5)
MAGNESIUM SERPL-MCNC: 1.7 MG/DL — SIGNIFICANT CHANGE UP (ref 1.6–2.6)
MCHC RBC-ENTMCNC: 29.2 PG — SIGNIFICANT CHANGE UP (ref 27–34)
MCHC RBC-ENTMCNC: 33.1 GM/DL — SIGNIFICANT CHANGE UP (ref 32–36)
MCV RBC AUTO: 88.3 FL — SIGNIFICANT CHANGE UP (ref 80–100)
NRBC # BLD: 0 /100 WBCS — SIGNIFICANT CHANGE UP (ref 0–0)
PHOSPHATE SERPL-MCNC: 2.1 MG/DL — LOW (ref 2.5–4.5)
PLATELET # BLD AUTO: 111 K/UL — LOW (ref 150–400)
POTASSIUM SERPL-MCNC: 4.8 MMOL/L — SIGNIFICANT CHANGE UP (ref 3.5–5.3)
POTASSIUM SERPL-SCNC: 4.8 MMOL/L — SIGNIFICANT CHANGE UP (ref 3.5–5.3)
RBC # BLD: 3.42 M/UL — LOW (ref 3.8–5.2)
RBC # FLD: 14.3 % — SIGNIFICANT CHANGE UP (ref 10.3–14.5)
SODIUM SERPL-SCNC: 144 MMOL/L — SIGNIFICANT CHANGE UP (ref 135–145)
SPECIMEN SOURCE: SIGNIFICANT CHANGE UP
WBC # BLD: 10.44 K/UL — SIGNIFICANT CHANGE UP (ref 3.8–10.5)
WBC # FLD AUTO: 10.44 K/UL — SIGNIFICANT CHANGE UP (ref 3.8–10.5)

## 2018-10-08 PROCEDURE — 99232 SBSQ HOSP IP/OBS MODERATE 35: CPT

## 2018-10-08 RX ORDER — POTASSIUM PHOSPHATE, MONOBASIC POTASSIUM PHOSPHATE, DIBASIC 236; 224 MG/ML; MG/ML
15 INJECTION, SOLUTION INTRAVENOUS ONCE
Qty: 0 | Refills: 0 | Status: COMPLETED | OUTPATIENT
Start: 2018-10-08 | End: 2018-10-08

## 2018-10-08 RX ADMIN — Medication 1: at 18:05

## 2018-10-08 RX ADMIN — HEPARIN SODIUM 5000 UNIT(S): 5000 INJECTION INTRAVENOUS; SUBCUTANEOUS at 18:08

## 2018-10-08 RX ADMIN — HEPARIN SODIUM 5000 UNIT(S): 5000 INJECTION INTRAVENOUS; SUBCUTANEOUS at 06:03

## 2018-10-08 RX ADMIN — POTASSIUM PHOSPHATE, MONOBASIC POTASSIUM PHOSPHATE, DIBASIC 62.5 MILLIMOLE(S): 236; 224 INJECTION, SOLUTION INTRAVENOUS at 20:45

## 2018-10-08 RX ADMIN — BUDESONIDE AND FORMOTEROL FUMARATE DIHYDRATE 2 PUFF(S): 160; 4.5 AEROSOL RESPIRATORY (INHALATION) at 06:45

## 2018-10-08 RX ADMIN — BUDESONIDE AND FORMOTEROL FUMARATE DIHYDRATE 2 PUFF(S): 160; 4.5 AEROSOL RESPIRATORY (INHALATION) at 20:23

## 2018-10-08 RX ADMIN — Medication 1: at 12:13

## 2018-10-08 RX ADMIN — Medication 3 MILLILITER(S): at 20:01

## 2018-10-08 RX ADMIN — Medication 3 MILLILITER(S): at 07:45

## 2018-10-08 RX ADMIN — Medication 325 MILLIGRAM(S): at 12:14

## 2018-10-08 RX ADMIN — PANTOPRAZOLE SODIUM 40 MILLIGRAM(S): 20 TABLET, DELAYED RELEASE ORAL at 08:53

## 2018-10-08 RX ADMIN — Medication 3 MILLILITER(S): at 14:05

## 2018-10-08 NOTE — PROGRESS NOTE ADULT - PROBLEM SELECTOR PLAN 4
- EF 65% on echo 10/2017  - Hold Lasix for now in setting of poor po intake  - Off fluids for now and monitor.  - Will likely dc home off of Lasix.  - hold ARB due to SAMIA

## 2018-10-08 NOTE — PROGRESS NOTE ADULT - ASSESSMENT
89 yo F w/ PMH of DM type 2, HTN, COPD on 3L home O2, diastolic HFpEF (last echo 10/2017) presented to ED w/ lethargy found to have + UA, admitted for AMS & suspected UTI

## 2018-10-08 NOTE — PROGRESS NOTE ADULT - NSHPATTENDINGPLANDISCUSS_GEN_ALL_CORE
patient's son re: treatment plan going forward, possibility of UTI and PNA. Need for IV hydration and antibiotics, possible further imaging.
patient's son re: treatment plan going forward, Need to hold diuretic and to encourage po intake. Discharge planning.
patient's son re: treatment plan going forward, observation off antibiotics. Need to hold diuretic and to encourage po intake.

## 2018-10-08 NOTE — PROGRESS NOTE ADULT - PROBLEM SELECTOR PLAN 7
- BP fluctuant.  - hold losartan in setting of SAMIA. monitor Cr.  - may need to add a new agent if her BP doesn't improve off fluids. Monitor until tomorrow and will decide re: addition of agent.

## 2018-10-08 NOTE — PROGRESS NOTE ADULT - SUBJECTIVE AND OBJECTIVE BOX
HPI:  88 yo F w/ PMH of DM type 2, HTN, chronic hypoxic respiratory failure due to COPD, on 3-4L home O2, diastolic HFpEF (last echo 10/2017) presented to ED w/ lethargy progressing to unresponsiveness accompanied by coughing/vomiting/dry heaves over the 3 days prior to admission.        INTERVAL HPI:  Patient seen and examined. Family at bedside. Patient alert again today. She has been eating. No acute events overnight. No respiratory distress.     REVIEW OF SYSTEMS:  Unable to obtain.    VITAL SIGNS:  Vital Signs Last 24 Hrs  T(C): 36.8 (08 Oct 2018 14:28), Max: 36.8 (07 Oct 2018 20:43)  T(F): 98.2 (08 Oct 2018 14:28), Max: 98.3 (07 Oct 2018 20:43)  HR: 71 (08 Oct 2018 14:28) (71 - 88)  BP: 118/84 (08 Oct 2018 14:28) (118/84 - 176/79)  BP(mean): --  RR: 18 (08 Oct 2018 14:28) (18 - 18)  SpO2: 97% (08 Oct 2018 14:28) (95% - 99%)    PHYSICAL EXAM:     GENERAL: no acute distress  HEENT: NC/AT, EOMI, neck supple, MMM  RESPIRATORY: decreased breath sounds bilaterally, no wheezing  CARDIOVASCULAR: RRR, +systolic murmur  ABDOMINAL: soft, non-tender, non-distended, positive bowel sounds   EXTREMITIES: no clubbing, cyanosis, or edema  NEUROLOGICAL: awake, alert, at baseline per family's assessment  SKIN: no rashes or lesions   MUSCULOSKELETAL: no gross joint deformity                           10.0   10.44 )-----------( 111      ( 08 Oct 2018 08:29 )             30.2   10-08    144  |  115<H>  |  52<H>  ----------------------------<  138<H>  4.8   |  23  |  1.40<H>    Ca    8.0<L>      08 Oct 2018 08:29  Phos  2.1     10-08  Mg     1.7     10-08    CAPILLARY BLOOD GLUCOSE  POCT Blood Glucose.: 162 mg/dL (08 Oct 2018 17:48)  POCT Blood Glucose.: 155 mg/dL (08 Oct 2018 16:32)  POCT Blood Glucose.: 169 mg/dL (08 Oct 2018 11:56)  POCT Blood Glucose.: 149 mg/dL (08 Oct 2018 08:46)  POCT Blood Glucose.: 176 mg/dL (08 Oct 2018 07:13)  POCT Blood Glucose.: 166 mg/dL (07 Oct 2018 22:16)      MEDICATIONS  (STANDING):  ALBUTerol/ipratropium for Nebulization 3 milliLiter(s) Nebulizer every 6 hours  buDESOnide 160 MICROgram(s)/formoterol 4.5 MICROgram(s) Inhaler 2 Puff(s) Inhalation two times a day  dextrose 5%. 1000 milliLiter(s) (50 mL/Hr) IV Continuous <Continuous>  dextrose 50% Injectable 12.5 Gram(s) IV Push once  dextrose 50% Injectable 25 Gram(s) IV Push once  dextrose 50% Injectable 25 Gram(s) IV Push once  ferrous    sulfate 325 milliGRAM(s) Oral daily  heparin  Injectable 5000 Unit(s) SubCutaneous every 12 hours  insulin lispro (HumaLOG) corrective regimen sliding scale   SubCutaneous three times a day before meals  insulin lispro (HumaLOG) corrective regimen sliding scale   SubCutaneous at bedtime  pantoprazole    Tablet 40 milliGRAM(s) Oral before breakfast  potassium phosphate IVPB 15 milliMole(s) IV Intermittent once    MEDICATIONS  (PRN):  acetaminophen   Tablet .. 650 milliGRAM(s) Oral every 6 hours PRN Mild Pain (1 - 3), Moderate Pain (4 - 6)  dextrose 40% Gel 15 Gram(s) Oral once PRN Blood Glucose LESS THAN 70 milliGRAM(s)/deciliter  glucagon  Injectable 1 milliGRAM(s) IntraMuscular once PRN Glucose LESS THAN 70 milligrams/deciliter

## 2018-10-08 NOTE — PROGRESS NOTE ADULT - SUBJECTIVE AND OBJECTIVE BOX
ID Progress note covering Dr. Zabala    Name: MALINDA SULTANA  Age: 89y  Gender: Female  MRN: [id number]    Interval History-- Events noted , doing well, afebrile more awake as per Son   Notes reviewed    Past Medical History--  Coronary artery disease with angina pectoris, unspecified vessel or lesion type, unspecified whether native or transplanted heart  Congestive heart failure  Asthma  Diabetes mellitus  HTN (hypertension)  No significant past surgical history      For details regarding the patient's social history, family history, and other miscellaneous elements, please refer the initial infectious diseases consultation and/or the admitting history and physical examination for this admission.    Allergies--  Allergies    No Known Allergies    Intolerances        Medications--  Antibiotics:    Immunologic:    Other:  acetaminophen   Tablet .. PRN  ALBUTerol/ipratropium for Nebulization  buDESOnide 160 MICROgram(s)/formoterol 4.5 MICROgram(s) Inhaler  dextrose 40% Gel PRN  dextrose 5%.  dextrose 50% Injectable  dextrose 50% Injectable  dextrose 50% Injectable  ferrous    sulfate  glucagon  Injectable PRN  heparin  Injectable  insulin lispro (HumaLOG) corrective regimen sliding scale  insulin lispro (HumaLOG) corrective regimen sliding scale  pantoprazole    Tablet      Review of Systems--  Review of systems unable to be obtained secondary to clinical condition.    Physical Examination--    Vital Signs: T(F): 98.2 (10-08-18 @ 14:28), Max: 98.3 (10-07-18 @ 20:43)  HR: 71 (10-08-18 @ 14:28)  BP: 118/84 (10-08-18 @ 14:28)  RR: 18 (10-08-18 @ 14:28)  SpO2: 97% (10-08-18 @ 14:28)  Wt(kg): --  General: well developed well nourished, in no acute distress  Eyes: sclera anicteric, pupils equal and reactive to light  ENMT: buccal mucosa moist, pharynx not injected  Neck: supple, trachea midline  Lungs: clear, no wheeze/rhonchi  Cardiovascular: regular rate and rhythm, S1 S2  Abdomen: soft, nontender, no organomegaly present, bowel sounds normal  Neurological:  awake, answers simple questions , Cranial Nerves II-XII grossly intact  Skin: no increased ecchymosis/petechiae/purpura  Lymph Nodes: no palpable cervical/supraclavicular lymph nodes enlargements  Extremities: no cyanosis/clubbing/edema      Laboratory Studies--  CBC                        10.0   10.44 )-----------( 111      ( 08 Oct 2018 08:29 )             30.2       Chemistries  10-08    144  |  115<H>  |  52<H>  ----------------------------<  138<H>  4.8   |  23  |  1.40<H>    Ca    8.0<L>      08 Oct 2018 08:29  Phos  2.1     10-08  Mg     1.7     10-08           Recent Cultures:    Culture - Blood (collected 06 Oct 2018 16:33)  Source: .Blood Blood-Peripheral  Preliminary Report (07 Oct 2018 17:01):    No growth to date.    Culture - Blood (collected 06 Oct 2018 16:32)  Source: .Blood Blood-Peripheral  Preliminary Report (07 Oct 2018 17:01):    No growth to date.    Culture - Urine (collected 05 Oct 2018 16:53)  Source: .Urine Clean Catch (Midstream)  Final Report (06 Oct 2018 22:41):    >100,000 CFU/ml Presumptive Candida albicans    Culture - Blood (collected 05 Oct 2018 16:29)  Source: .Blood Blood-Peripheral  Gram Stain (07 Oct 2018 02:24):    Growth in aerobic bottle: Gram Positive Rods  Preliminary Report (07 Oct 2018 02:24):    Growth in aerobic bottle: Gram Positive Rods    Culture - Blood (collected 05 Oct 2018 16:29)  Source: .Blood Blood-Peripheral  Gram Stain (06 Oct 2018 11:43):    Growth in aerobic bottle: Gram Positive Cocci in Clusters  Final Report (07 Oct 2018 12:09):    Growth in aerobic bottle: Coag Negative Staphylococcus    Single set isolate, possible contaminant. Contact    Microbiology if susceptibility testing clinically    indicated.    "Due to technical problems, Proteus sp. will Not be reported as part of    the BCID panel until further notice"    ***Blood Panel PCR results on this specimen are available    approximately 3 hours after the Gram stain result.***    Gram stain, PCR, and/or culture results may not always    correspond due to difference in methodologies.    ************************************************************    This PCR assay was performed using [employer].    The following targets are tested for: Enterococcus,    vancomycin resistant enterococci, Listeria monocytogenes,    coagulase negative staphylococci, S. aureus,    methicillin resistant S. aureus, Streptococcus agalactiae    (Group B), S. pneumoniae, S. pyogenes (Group A),    Acinetobacter baumannii, Enterobacter cloacae, E. coli,    Klebsiella oxytoca, K. pneumoniae, Proteus sp.,    Serratia marcescens, Haemophilus influenzae,    Neisseria meningitidis, Pseudomonas aeruginosa, Candida    albicans, C. glabrata, C krusei, C parapsilosis,    C. tropicalis and the KPC resistance gene.  Organism: Blood Culture PCR (07 Oct 2018 12:09)  Organism: Blood Culture PCR (07 Oct 2018 12:09)        Assessment--  88 yo F w/ PMH of DM type 2, HTN, COPD on 3L home O2, diastolic HFpEF (last echo 10/2017) presented to ED w/ lethargy progressing to unresponsiveness , she most likely has metabolic encephalopathy secondary to UTI most likely   Gram positive bacteremia likely skin contamination. Clinically she has improved     Blood cs with Coag negative staph, likely skin contaminant . Urine cs reflects colonization doubt she has fungal UTI     She most likely had dehydration from use of diuretics     Plan :   - observe off antibiotics   - trend renal fucntion   - dc planning   - trend CBC  - aspiration precautions     Continue with present regime .  Appropriate use of antibiotics and adverse effects reviewed.    I have discussed the above plan of care with patient/family in detail. They expressed understanding of the treatment plan . Risks, benefits and alternatives discussed in detail. I have asked if they have any questions or concerns and appropriately addressed them to the best of my ability .      > 35 minutes spent in direct patient care reviewing  the notes, lab data/ imaging , discussion with multidisciplinary team. All questions were addressed and answered to the best of my capacity .    Thank you for allowing me to participate in the care of your patient .        Jaclyn Barnes MD  [phone]

## 2018-10-08 NOTE — PROGRESS NOTE ADULT - PROBLEM SELECTOR PLAN 1
Suspect this was due to dehydration in the setting of poor po intake and diuresis.   Resolved at this time.   Had speech and swallow eval and was recommended c/w Dysphagia 1 w/ honey thick. This can likely be liberalized now that patient is more alert. Speech and swallow re-eval requested.   - Aspiration precautions  - bedrest, turn q 2

## 2018-10-09 ENCOUNTER — TRANSCRIPTION ENCOUNTER (OUTPATIENT)
Age: 83
End: 2018-10-09

## 2018-10-09 LAB
ANION GAP SERPL CALC-SCNC: 7 MMOL/L — SIGNIFICANT CHANGE UP (ref 5–17)
BUN SERPL-MCNC: 49 MG/DL — HIGH (ref 7–23)
CALCIUM SERPL-MCNC: 8.1 MG/DL — LOW (ref 8.5–10.1)
CHLORIDE SERPL-SCNC: 113 MMOL/L — HIGH (ref 96–108)
CO2 SERPL-SCNC: 23 MMOL/L — SIGNIFICANT CHANGE UP (ref 22–31)
CREAT SERPL-MCNC: 1.4 MG/DL — HIGH (ref 0.5–1.3)
GLUCOSE SERPL-MCNC: 118 MG/DL — HIGH (ref 70–99)
HCT VFR BLD CALC: 30.8 % — LOW (ref 34.5–45)
HGB BLD-MCNC: 9.7 G/DL — LOW (ref 11.5–15.5)
MCHC RBC-ENTMCNC: 28.3 PG — SIGNIFICANT CHANGE UP (ref 27–34)
MCHC RBC-ENTMCNC: 31.5 GM/DL — LOW (ref 32–36)
MCV RBC AUTO: 89.8 FL — SIGNIFICANT CHANGE UP (ref 80–100)
NRBC # BLD: 0 /100 WBCS — SIGNIFICANT CHANGE UP (ref 0–0)
PHOSPHATE SERPL-MCNC: 3 MG/DL — SIGNIFICANT CHANGE UP (ref 2.5–4.5)
PLATELET # BLD AUTO: 104 K/UL — LOW (ref 150–400)
POTASSIUM SERPL-MCNC: 4.5 MMOL/L — SIGNIFICANT CHANGE UP (ref 3.5–5.3)
POTASSIUM SERPL-SCNC: 4.5 MMOL/L — SIGNIFICANT CHANGE UP (ref 3.5–5.3)
RBC # BLD: 3.43 M/UL — LOW (ref 3.8–5.2)
RBC # FLD: 14.2 % — SIGNIFICANT CHANGE UP (ref 10.3–14.5)
SODIUM SERPL-SCNC: 143 MMOL/L — SIGNIFICANT CHANGE UP (ref 135–145)
WBC # BLD: 10.42 K/UL — SIGNIFICANT CHANGE UP (ref 3.8–10.5)
WBC # FLD AUTO: 10.42 K/UL — SIGNIFICANT CHANGE UP (ref 3.8–10.5)

## 2018-10-09 PROCEDURE — 99232 SBSQ HOSP IP/OBS MODERATE 35: CPT

## 2018-10-09 RX ORDER — NYSTATIN CREAM 100000 [USP'U]/G
1 CREAM TOPICAL
Qty: 0 | Refills: 0 | Status: DISCONTINUED | OUTPATIENT
Start: 2018-10-09 | End: 2018-10-13

## 2018-10-09 RX ADMIN — Medication 3 MILLILITER(S): at 07:41

## 2018-10-09 RX ADMIN — Medication 1: at 12:02

## 2018-10-09 RX ADMIN — HEPARIN SODIUM 5000 UNIT(S): 5000 INJECTION INTRAVENOUS; SUBCUTANEOUS at 05:42

## 2018-10-09 RX ADMIN — HEPARIN SODIUM 5000 UNIT(S): 5000 INJECTION INTRAVENOUS; SUBCUTANEOUS at 18:25

## 2018-10-09 RX ADMIN — Medication 3 MILLILITER(S): at 20:48

## 2018-10-09 RX ADMIN — BUDESONIDE AND FORMOTEROL FUMARATE DIHYDRATE 2 PUFF(S): 160; 4.5 AEROSOL RESPIRATORY (INHALATION) at 18:23

## 2018-10-09 RX ADMIN — Medication 3 MILLILITER(S): at 13:26

## 2018-10-09 RX ADMIN — Medication 1: at 16:46

## 2018-10-09 RX ADMIN — NYSTATIN CREAM 1 APPLICATION(S): 100000 CREAM TOPICAL at 18:25

## 2018-10-09 RX ADMIN — Medication 325 MILLIGRAM(S): at 12:03

## 2018-10-09 RX ADMIN — PANTOPRAZOLE SODIUM 40 MILLIGRAM(S): 20 TABLET, DELAYED RELEASE ORAL at 05:42

## 2018-10-09 NOTE — DISCHARGE NOTE ADULT - PATIENT PORTAL LINK FT
You can access the [facility] Patient Portal, offered by [facility], by registering with the following website: [email or web address]

## 2018-10-09 NOTE — PROGRESS NOTE ADULT - PROBLEM SELECTOR PLAN 4
- EF 65% on echo 10/2017  - Hold Lasix for now in setting of poor po intake  - Off fluids for now and monitor volume status.  - Repeat CXR tomorrow to ensure she is not becoming fluid overloaded while off Lasix.   - hold ARB due to SAMIA

## 2018-10-09 NOTE — DISCHARGE NOTE ADULT - MEDICATION SUMMARY - MEDICATIONS TO TAKE
I will START or STAY ON the medications listed below when I get home from the hospital:    Percocet 5/325 oral tablet  -- 1 tab(s) by mouth every 6 hours  -- Indication: For pain    losartan 25 mg oral tablet  -- 1 tab(s) by mouth once a day  -- Indication: For Essential hypertension    albuterol-ipratropium 2.5 mg-0.5 mg/3 mL inhalation solution  -- 3 milliliter(s) inhaled every 6 hours  -- Indication: For Chronic obstructive pulmonary disease, unspecified COPD type    fluticasone-salmeterol 250 mcg-50 mcg inhalation powder  -- 1 puff(s) inhaled 2 times a day  -- Indication: For Chronic obstructive pulmonary disease, unspecified COPD type    Lasix 40 mg oral tablet  -- 1 tab(s) by mouth once a day  -- Indication: For Chronic diastolic congestive heart failure    guaiFENesin 600 mg oral tablet, extended release  -- 1 tab(s) by mouth every 12 hours  -- Indication: For Chronic obstructive pulmonary disease, unspecified COPD type    ferrous sulfate 324 mg (65 mg elemental iron) oral delayed release tablet  -- 1 tab(s) by mouth once a day  -- Indication: For Anemia    omeprazole 20 mg oral delayed release capsule  -- 1 cap(s) by mouth once a day  -- Indication: For Need for prophylactic measure

## 2018-10-09 NOTE — PROGRESS NOTE ADULT - PROBLEM SELECTOR PLAN 3
UA positive, UCx growing Candida albicans. Likely represents colonization.  ?dysuria at home prior to presentation, but patient is a poor historian.  Continue to observe off abx.

## 2018-10-09 NOTE — PROGRESS NOTE ADULT - PROBLEM SELECTOR PLAN 1
Suspect this was due to dehydration in the setting of poor po intake and diuresis.   Resolved at this time.   Had speech and swallow eval and was recommended c/w Dysphagia 1 w/ honey thick.   - Aspiration precautions  - Patient is bedbound. Turn q 2

## 2018-10-09 NOTE — PROGRESS NOTE ADULT - SUBJECTIVE AND OBJECTIVE BOX
HPI:  89 yo F w/ PMH of DM type 2, HTN, chronic hypoxic respiratory failure due to COPD, on 3-4L home O2, diastolic HFpEF (last echo 10/2017) presented to ED w/ lethargy progressing to unresponsiveness accompanied by coughing/vomiting/dry heaves over the 3 days prior to admission.        INTERVAL HPI:  Patient seen and examined. Son at bedside. Patient alert and comfortable today. She has been eating. No acute events overnight. No respiratory distress.     REVIEW OF SYSTEMS:  Unable to obtain.    VITAL SIGNS:  Vital Signs Last 24 Hrs  T(C): 36.8 (09 Oct 2018 13:28), Max: 37.2 (09 Oct 2018 05:18)  T(F): 98.3 (09 Oct 2018 13:28), Max: 99 (09 Oct 2018 05:18)  HR: 96 (09 Oct 2018 13:38) (88 - 98)  BP: 132/81 (09 Oct 2018 13:28) (129/71 - 153/71)  BP(mean): --  RR: 20 (09 Oct 2018 13:28) (18 - 20)  SpO2: 94% (09 Oct 2018 13:28) (93% - 96%)    PHYSICAL EXAM:     GENERAL: no acute distress  HEENT: NC/AT, EOMI, neck supple, MMM  RESPIRATORY: decreased breath sounds bilaterally, no wheezing  CARDIOVASCULAR: RRR, +systolic murmur  ABDOMINAL: soft, non-tender, non-distended, positive bowel sounds   EXTREMITIES: no clubbing, cyanosis, or edema  NEUROLOGICAL: awake, alert, at baseline per family's assessment  SKIN: no rashes or lesions   MUSCULOSKELETAL: no gross joint deformity                        9.7    10.42 )-----------( 104      ( 09 Oct 2018 08:28 )             30.8   10-09    143  |  113<H>  |  49<H>  ----------------------------<  118<H>  4.5   |  23  |  1.40<H>    Ca    8.1<L>      09 Oct 2018 08:28  Phos  3.0     10-09  Mg     1.7     10-08    CAPILLARY BLOOD GLUCOSE  POCT Blood Glucose.: 164 mg/dL (09 Oct 2018 16:43)  POCT Blood Glucose.: 185 mg/dL (09 Oct 2018 11:51)  POCT Blood Glucose.: 132 mg/dL (09 Oct 2018 07:43)  POCT Blood Glucose.: 146 mg/dL (08 Oct 2018 21:08)    MEDICATIONS  (STANDING):  ALBUTerol/ipratropium for Nebulization 3 milliLiter(s) Nebulizer every 6 hours  buDESOnide 160 MICROgram(s)/formoterol 4.5 MICROgram(s) Inhaler 2 Puff(s) Inhalation two times a day  dextrose 5%. 1000 milliLiter(s) (50 mL/Hr) IV Continuous <Continuous>  dextrose 50% Injectable 12.5 Gram(s) IV Push once  dextrose 50% Injectable 25 Gram(s) IV Push once  dextrose 50% Injectable 25 Gram(s) IV Push once  ferrous    sulfate 325 milliGRAM(s) Oral daily  heparin  Injectable 5000 Unit(s) SubCutaneous every 12 hours  insulin lispro (HumaLOG) corrective regimen sliding scale   SubCutaneous three times a day before meals  insulin lispro (HumaLOG) corrective regimen sliding scale   SubCutaneous at bedtime  nystatin Powder 1 Application(s) Topical two times a day  pantoprazole    Tablet 40 milliGRAM(s) Oral before breakfast    MEDICATIONS  (PRN):  acetaminophen   Tablet .. 650 milliGRAM(s) Oral every 6 hours PRN Mild Pain (1 - 3), Moderate Pain (4 - 6)  dextrose 40% Gel 15 Gram(s) Oral once PRN Blood Glucose LESS THAN 70 milliGRAM(s)/deciliter  glucagon  Injectable 1 milliGRAM(s) IntraMuscular once PRN Glucose LESS THAN 70 milligrams/deciliter

## 2018-10-09 NOTE — ADVANCED PRACTICE NURSE CONSULT - ASSESSMENT
Vital Signs Last 24 Hrs  T(C): 37.2 (09 Oct 2018 05:18), Max: 37.2 (09 Oct 2018 05:18)  T(F): 99 (09 Oct 2018 05:18), Max: 99 (09 Oct 2018 05:18)  HR: 96 (09 Oct 2018 05:18) (71 - 98)  BP: 153/71 (09 Oct 2018 05:18) (118/84 - 153/71)  BP(mean): --  RR: 20 (09 Oct 2018 05:18) (18 - 20)  SpO2: 94% (09 Oct 2018 05:18) (94% - 97%)    Hemoglobin A1C, Whole Blood: 4.9 % (10-06-18 @ 12:52)   eGFR if : 39 mL/min/1.73M2 (10-09-18 @ 08:28)  eGFR if Non African American: 33 mL/min/1.73M2 (10-09-18 @ 08:28)  eGFR if Non African American: 33 mL/min/1.73M2 (10-08-18 @ 08:29)  eGFR if : 39 mL/min/1.73M2 (10-08-18 @ 08:29)  eGFR if Non African American: 31 mL/min/1.73M2 (10-07-18 @ 19:08)  eGFR if : 35 mL/min/1.73M2 (10-07-18 @ 19:08)  eGFR if Non African American: 33 mL/min/1.73M2 (10-07-18 @ 07:42)  eGFR if : 39 mL/min/1.73M2 (10-07-18 @ 07:42)  eGFR if Non African American: 28 mL/min/1.73M2 (10-06-18 @ 09:55)  eGFR if African American: 33 mL/min/1.73M2 (10-06-18 @ 09:55)      CAPILLARY BLOOD GLUCOSE      POCT Blood Glucose.: 132 mg/dL (09 Oct 2018 07:43)  POCT Blood Glucose.: 146 mg/dL (08 Oct 2018 21:08)  POCT Blood Glucose.: 162 mg/dL (08 Oct 2018 17:48)  POCT Blood Glucose.: 155 mg/dL (08 Oct 2018 16:32)  POCT Blood Glucose.: 169 mg/dL (08 Oct 2018 11:56)      DIET:

## 2018-10-09 NOTE — DISCHARGE NOTE ADULT - HOSPITAL COURSE
88 yo F w/ PMH of DM type 2, HTN, COPD on 3L home O2, diastolic HFpEF (last echo 10/2017) presented to ED w/ lethargy progressing to unresponsiveness accompanied by coughing/vomiting/dry heaves over the 3 days prior to admission.  Patient is not an English speaker, son was at bedside.  Patient was unresponsive to son, or multiple attempts to get her to follow commands.  Son reported patient was verbal, alert and oriented at baseline, and has only become non-verbal over that past 2 days.  He reported she had a cough, w/ yellowish sputum production, with dry heaves and diarrhea,  she has not been eating for several days. She also had been complaining of dysuria intermittently. She is incontinent at baseline.     In the ED pt was found to have a UTI and was admitted with acute metabolic encephalopathy 2/2 UTI. Pt was admitted to [facility]. continued on Rocephin Pt appeared hypovolemic and was started on IVF. Encephalopathy improved. Pt also had SAMIA. Which improved with IV resuscitation  .  Of note pt also with sacral wound which was present on admission that son requested be evaluated by wound care physican. Pt was seen by Wound care 88 yo F w/ PMH of DM type 2, HTN, COPD on 3L home O2, diastolic HFpEF (last echo 10/2017) presented to ED w/ lethargy progressing to unresponsiveness accompanied by coughing/vomiting/dry heaves over the 3 days prior to admission.  Patient is not an English speaker, son was at bedside.  Patient was unresponsive to son, or multiple attempts to get her to follow commands.  Son reported patient was verbal, alert and oriented at baseline, and has only become non-verbal over that past 2 days.  He reported she had a cough, w/ yellowish sputum production, with dry heaves and diarrhea,  she has not been eating for several days. She also had been complaining of dysuria intermittently. She is incontinent at baseline.     In the ED pt was found to have a UTI and was admitted with acute metabolic encephalopathy 2/2 UTI. Pt was admitted to [facility]. continued on Rocephin Pt appeared hypovolemic and was started on IVF. Encephalopathy improved. Pt also had SAMIA. Which improved with IV resuscitation. swallow evaluation adv for Dysphagia 1 doet with honey thick liquids. wound care.   Of note pt also with sacral wound which was present on admission that son requested be evaluated by wound care physican. Pt was seen by Wound care

## 2018-10-09 NOTE — ADVANCED PRACTICE NURSE CONSULT - REASON FOR CONSULT
89y    Female    Patient is a 89y old  Female who presents with a chief complaint of lethargy and unresponsiveness (08 Oct 2018 15:26)      HPI:  88 yo F w/ PMH of DM type 2, HTN, COPD on 3L home O2, diastolic HFpEF (last echo 10/2017) presented to ED w/ lethargy progressing to unresponsiveness accompanied by coughing/vomiting/dry heaves over the 3 days prior to admission.  Patient is not an English speaker, son is at bedside.  Patient is unresponsive to son, or multiple attempts to get her to follow commands.  Son reports patient is verbal, alert and oriented at baseline, and has only become non-verbal over that past 2 days.  He reports she had a cough, w/ yellowish sputum production, with dry heaves and diarrhea,  she has not been eating for several days. She also has been complaining of dysuria intermittently. She is incontinent at baseline.     In ED patient is normotensive, afebrile, EKG [id number] degree AV block pO2 88% on supplemental O2.  Labs significant for WBC 12.95 w/ L shift, Lactate 3.9, Na 147, Cl 113, Co2 20, Crt 1.70, BUN 56, Albumin 2.5, GFR 26.  CXR and CT show no acute pathology.  Urine is positive for ketones, protein 150, leuk, blood, WBC, RBC >50, few Epithelial cells, Bacteria TNTC. (05 Oct 2018 12:55)      PAST MEDICAL & SURGICAL HISTORY:  Coronary artery disease with angina pectoris, unspecified vessel or lesion type, unspecified whether native or transplanted heart  Congestive heart failure  Asthma  Diabetes mellitus  HTN (hypertension)  No significant past surgical history      MEDICATIONS  (STANDING):  ALBUTerol/ipratropium for Nebulization 3 milliLiter(s) Nebulizer every 6 hours  buDESOnide 160 MICROgram(s)/formoterol 4.5 MICROgram(s) Inhaler 2 Puff(s) Inhalation two times a day  dextrose 5%. 1000 milliLiter(s) (50 mL/Hr) IV Continuous <Continuous>  dextrose 50% Injectable 12.5 Gram(s) IV Push once  dextrose 50% Injectable 25 Gram(s) IV Push once  dextrose 50% Injectable 25 Gram(s) IV Push once  ferrous    sulfate 325 milliGRAM(s) Oral daily  heparin  Injectable 5000 Unit(s) SubCutaneous every 12 hours  insulin lispro (HumaLOG) corrective regimen sliding scale   SubCutaneous three times a day before meals  insulin lispro (HumaLOG) corrective regimen sliding scale   SubCutaneous at bedtime  pantoprazole    Tablet 40 milliGRAM(s) Oral before breakfast

## 2018-10-09 NOTE — DISCHARGE NOTE ADULT - SECONDARY DIAGNOSIS.
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

## 2018-10-09 NOTE — DISCHARGE NOTE ADULT - CARE PLAN
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  Secondary Diagnosis:	Coronary artery disease with angina pectoris, unspecified vessel or lesion type, unspecified whether native or transplanted heart  Secondary Diagnosis:	Urinary tract infection with hematuria, site unspecified Principal Discharge DX:	Acute metabolic encephalopathy  Goal:	resolved  Assessment and plan of treatment:	2/2 to UTI   Follow with PMD  Secondary Diagnosis:	Diabetes mellitus  Assessment and plan of treatment:	c/w home meds   follow with PMD  Secondary Diagnosis:	Chronic diastolic congestive heart failure  Assessment and plan of treatment:	c/w home meds   follow with PMD  Secondary Diagnosis:	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

## 2018-10-10 DIAGNOSIS — L89.152 PRESSURE ULCER OF SACRAL REGION, STAGE 2: ICD-10-CM

## 2018-10-10 DIAGNOSIS — L98.491 NON-PRESSURE CHRONIC ULCER OF SKIN OF OTHER SITES LIMITED TO BREAKDOWN OF SKIN: ICD-10-CM

## 2018-10-10 DIAGNOSIS — S31.000A UNSPECIFIED OPEN WOUND OF LOWER BACK AND PELVIS WITHOUT PENETRATION INTO RETROPERITONEUM, INITIAL ENCOUNTER: ICD-10-CM

## 2018-10-10 LAB
ANION GAP SERPL CALC-SCNC: 9 MMOL/L — SIGNIFICANT CHANGE UP (ref 5–17)
BUN SERPL-MCNC: 48 MG/DL — HIGH (ref 7–23)
CALCIUM SERPL-MCNC: 7.7 MG/DL — LOW (ref 8.5–10.1)
CHLORIDE SERPL-SCNC: 114 MMOL/L — HIGH (ref 96–108)
CO2 SERPL-SCNC: 20 MMOL/L — LOW (ref 22–31)
CREAT SERPL-MCNC: 1.5 MG/DL — HIGH (ref 0.5–1.3)
GLUCOSE SERPL-MCNC: 108 MG/DL — HIGH (ref 70–99)
HCT VFR BLD CALC: 29.2 % — LOW (ref 34.5–45)
HGB BLD-MCNC: 9.4 G/DL — LOW (ref 11.5–15.5)
MCHC RBC-ENTMCNC: 28.7 PG — SIGNIFICANT CHANGE UP (ref 27–34)
MCHC RBC-ENTMCNC: 32.2 GM/DL — SIGNIFICANT CHANGE UP (ref 32–36)
MCV RBC AUTO: 89 FL — SIGNIFICANT CHANGE UP (ref 80–100)
NRBC # BLD: 0 /100 WBCS — SIGNIFICANT CHANGE UP (ref 0–0)
PLATELET # BLD AUTO: 103 K/UL — LOW (ref 150–400)
POTASSIUM SERPL-MCNC: 4.4 MMOL/L — SIGNIFICANT CHANGE UP (ref 3.5–5.3)
POTASSIUM SERPL-SCNC: 4.4 MMOL/L — SIGNIFICANT CHANGE UP (ref 3.5–5.3)
RBC # BLD: 3.28 M/UL — LOW (ref 3.8–5.2)
RBC # FLD: 14.1 % — SIGNIFICANT CHANGE UP (ref 10.3–14.5)
SODIUM SERPL-SCNC: 143 MMOL/L — SIGNIFICANT CHANGE UP (ref 135–145)
WBC # BLD: 9.38 K/UL — SIGNIFICANT CHANGE UP (ref 3.8–10.5)
WBC # FLD AUTO: 9.38 K/UL — SIGNIFICANT CHANGE UP (ref 3.8–10.5)

## 2018-10-10 PROCEDURE — 99233 SBSQ HOSP IP/OBS HIGH 50: CPT

## 2018-10-10 PROCEDURE — 71045 X-RAY EXAM CHEST 1 VIEW: CPT | Mod: 26

## 2018-10-10 RX ADMIN — NYSTATIN CREAM 1 APPLICATION(S): 100000 CREAM TOPICAL at 06:00

## 2018-10-10 RX ADMIN — Medication 3 MILLILITER(S): at 14:12

## 2018-10-10 RX ADMIN — HEPARIN SODIUM 5000 UNIT(S): 5000 INJECTION INTRAVENOUS; SUBCUTANEOUS at 18:20

## 2018-10-10 RX ADMIN — Medication 3 MILLILITER(S): at 07:44

## 2018-10-10 RX ADMIN — BUDESONIDE AND FORMOTEROL FUMARATE DIHYDRATE 2 PUFF(S): 160; 4.5 AEROSOL RESPIRATORY (INHALATION) at 20:53

## 2018-10-10 RX ADMIN — Medication 3 MILLILITER(S): at 19:54

## 2018-10-10 RX ADMIN — BUDESONIDE AND FORMOTEROL FUMARATE DIHYDRATE 2 PUFF(S): 160; 4.5 AEROSOL RESPIRATORY (INHALATION) at 06:06

## 2018-10-10 RX ADMIN — Medication 1: at 12:27

## 2018-10-10 RX ADMIN — Medication 325 MILLIGRAM(S): at 11:58

## 2018-10-10 RX ADMIN — HEPARIN SODIUM 5000 UNIT(S): 5000 INJECTION INTRAVENOUS; SUBCUTANEOUS at 06:00

## 2018-10-10 RX ADMIN — NYSTATIN CREAM 1 APPLICATION(S): 100000 CREAM TOPICAL at 18:20

## 2018-10-10 RX ADMIN — PANTOPRAZOLE SODIUM 40 MILLIGRAM(S): 20 TABLET, DELAYED RELEASE ORAL at 06:00

## 2018-10-10 NOTE — CONSULT NOTE ADULT - PROBLEM SELECTOR RECOMMENDATION 9
alternating pressure mattress  frequent turning of patient  protective cream and allevyn border cover

## 2018-10-10 NOTE — PROGRESS NOTE ADULT - SUBJECTIVE AND OBJECTIVE BOX
Patient is a 89y old  Female who presents with a chief complaint of lethargy and unresponsiveness (09 Oct 2018 18:09)    88 yo F w/ PMH of DM type 2, HTN, chronic hypoxic respiratory failure due to COPD, on 3-4L home O2, diastolic HFpEF (last echo 10/2017) presented to ED w/ lethargy progressing to unresponsiveness accompanied by coughing/vomiting/dry heaves over the 3 days prior to admission.      INTERVAL HPI/OVERNIGHT EVENTS: pt states she is feeling well this am. Son at bedside concerned about chronic sacral wound - present on admission. Pt has been eating and drinking well.     MEDICATIONS  (STANDING):  ALBUTerol/ipratropium for Nebulization 3 milliLiter(s) Nebulizer every 6 hours  buDESOnide 160 MICROgram(s)/formoterol 4.5 MICROgram(s) Inhaler 2 Puff(s) Inhalation two times a day  dextrose 5%. 1000 milliLiter(s) (50 mL/Hr) IV Continuous <Continuous>  dextrose 50% Injectable 12.5 Gram(s) IV Push once  dextrose 50% Injectable 25 Gram(s) IV Push once  dextrose 50% Injectable 25 Gram(s) IV Push once  ferrous    sulfate 325 milliGRAM(s) Oral daily  heparin  Injectable 5000 Unit(s) SubCutaneous every 12 hours  insulin lispro (HumaLOG) corrective regimen sliding scale   SubCutaneous three times a day before meals  insulin lispro (HumaLOG) corrective regimen sliding scale   SubCutaneous at bedtime  nystatin Powder 1 Application(s) Topical two times a day  pantoprazole    Tablet 40 milliGRAM(s) Oral before breakfast    MEDICATIONS  (PRN):  acetaminophen   Tablet .. 650 milliGRAM(s) Oral every 6 hours PRN Mild Pain (1 - 3), Moderate Pain (4 - 6)  dextrose 40% Gel 15 Gram(s) Oral once PRN Blood Glucose LESS THAN 70 milliGRAM(s)/deciliter  glucagon  Injectable 1 milliGRAM(s) IntraMuscular once PRN Glucose LESS THAN 70 milligrams/deciliter      Allergies    No Known Allergies    Intolerances        REVIEW OF SYSTEMS:  CONSTITUTIONAL: denies fever, chills , weakness, fatigue  NECK: denies neck pain/stiffness  RESPIRATORY: denies cough, wheezing, SOB,  hemoptysis   CARDIOVASCULAR: denies chest pain, palpitations, leg swelling  GASTROINTESTINAL: denies abdominal pain,  N/V/D/C    GENITOURINARY: denies dysuria, frequency, urgency, hematuria  NEUROLOGICAL: denies headaches, dizziness, numbness, tingling  SKIN:   +sacral wound. +R great toe wound  MUSCULOSKELETAL: denies joint pain or swelling, muscle pain, extremity pain  PSYCHIATRIC: No depression, anxiety  REST OF REVIEW Of SYSTEM - All other ROS negative.    Height (cm): 152.4 (10-05 @ 09:44)  Weight (kg): 72.6 (10-05 @ 09:44)  BMI (kg/m2): 31.3 (10-05 @ 09:44)  BSA (m2): 1.7 (10-05 @ 09:44)  Vital Signs Last 24 Hrs  T(C): 37.2 (10 Oct 2018 05:00), Max: 37.2 (10 Oct 2018 05:00)  T(F): 98.9 (10 Oct 2018 05:00), Max: 98.9 (10 Oct 2018 05:00)  HR: 88 (10 Oct 2018 07:45) (88 - 102)  BP: 148/67 (10 Oct 2018 05:00) (132/81 - 149/61)  BP(mean): --  RR: 18 (10 Oct 2018 05:00) (18 - 20)  SpO2: 95% (10 Oct 2018 07:45) (91% - 96%)  [ ] room air   [ ] 02    PHYSICAL EXAM:  GENERAL:  Awake, Alert. No acute distresss  HEAD:  NCAT, EOMI, PERRLA  ENMT: MMM  NECK:  Supple , no JVD  CHEST/LUNG: wheezing b/l. good inspiratory effort, no accessory muscle use  HEART:  Regular rate and rhythm, No M/R/G  ABDOMEN:  soft, nontender, nondistended, positive bowel sounds   EXTREMITIES: No clubbing, cyanosis or edema, chronic le skin changes/ecchymoses  Neuro: No focal deficits     LABS:                        9.4    9.38  )-----------( 103      ( 10 Oct 2018 07:41 )             29.2     10 Oct 2018 07:41    143    |  114    |  48     ----------------------------<  108    4.4     |  20     |  1.50     Ca    7.7        10 Oct 2018 07:41        Hemoglobin A1C, Whole Blood: 4.9 % (10-06 @ 12:52)      CAPILLARY BLOOD GLUCOSE      POCT Blood Glucose.: 109 mg/dL (10 Oct 2018 07:36)  POCT Blood Glucose.: 191 mg/dL (09 Oct 2018 21:52)  POCT Blood Glucose.: 164 mg/dL (09 Oct 2018 16:43)  POCT Blood Glucose.: 185 mg/dL (09 Oct 2018 11:51)    Cultures  Culture Results:   No growth to date. (10-06 @ 16:33)  Culture Results:   No growth to date. (10-06 @ 16:32)  Culture Results:   >100,000 CFU/ml Presumptive Candida albicans (10-05 @ 16:53)  Culture Results:   Growth in aerobic bottle: Coag Negative Staphylococcus  Single set isolate, possible contaminant. Contact  Microbiology if susceptibility testing clinically  indicated.  "Due to technical problems, Proteus sp. will Not be reported as part of  the BCID panel until further notice"  ***Blood Panel PCR results on this specimen are available  approximately 3 hours after the Gram stain result.***  Gram stain, PCR, and/or culture results may not always  correspond due to difference in methodologies.  ************************************************************  This PCR assay was performed using [employer].  The following targets are tested for: Enterococcus,  vancomycin resistant enterococci, Listeria monocytogenes,  coagulase negative staphylococci, S. aureus,  methicillin resistant S. aureus, Streptococcus agalactiae  (Group B), S. pneumoniae, S. pyogenes (Group A),  Acinetobacter baumannii, Enterobacter cloacae, E. coli,  Klebsiella oxytoca, K. pneumoniae, Proteus sp.,  Serratia marcescens, Haemophilus influenzae,  Neisseria meningitidis, Pseudomonas aeruginosa, Candida  albicans, C. glabrata, C krusei, C parapsilosis,  C. tropicalis and the KPC resistance gene. (10-05 @ 16:29)  Culture Results:   Growth in aerobic bottle: Corynebacterium species "Susceptibilities not  performed" (10-05 @ 16:29) Patient is a 89y old  Female who presents with a chief complaint of lethargy and unresponsiveness (09 Oct 2018 18:09)    89 yo F w/ PMH of DM type 2, HTN, chronic hypoxic respiratory failure due to COPD, on 3-4L home O2, diastolic HFpEF (last echo 10/2017) presented to ED w/ lethargy progressing to unresponsiveness accompanied by coughing/vomiting/dry heaves over the 3 days prior to admission.      INTERVAL HPI/OVERNIGHT EVENTS: pt states she is feeling well this am. Son at bedside concerned about chronic sacral wound - present on admission. Pt has been eating and drinking well.     MEDICATIONS  (STANDING):  ALBUTerol/ipratropium for Nebulization 3 milliLiter(s) Nebulizer every 6 hours  buDESOnide 160 MICROgram(s)/formoterol 4.5 MICROgram(s) Inhaler 2 Puff(s) Inhalation two times a day  dextrose 5%. 1000 milliLiter(s) (50 mL/Hr) IV Continuous <Continuous>  dextrose 50% Injectable 12.5 Gram(s) IV Push once  dextrose 50% Injectable 25 Gram(s) IV Push once  dextrose 50% Injectable 25 Gram(s) IV Push once  ferrous    sulfate 325 milliGRAM(s) Oral daily  heparin  Injectable 5000 Unit(s) SubCutaneous every 12 hours  insulin lispro (HumaLOG) corrective regimen sliding scale   SubCutaneous three times a day before meals  insulin lispro (HumaLOG) corrective regimen sliding scale   SubCutaneous at bedtime  nystatin Powder 1 Application(s) Topical two times a day  pantoprazole    Tablet 40 milliGRAM(s) Oral before breakfast    MEDICATIONS  (PRN):  acetaminophen   Tablet .. 650 milliGRAM(s) Oral every 6 hours PRN Mild Pain (1 - 3), Moderate Pain (4 - 6)  dextrose 40% Gel 15 Gram(s) Oral once PRN Blood Glucose LESS THAN 70 milliGRAM(s)/deciliter  glucagon  Injectable 1 milliGRAM(s) IntraMuscular once PRN Glucose LESS THAN 70 milligrams/deciliter      Allergies    No Known Allergies    Intolerances        REVIEW OF SYSTEMS:  CONSTITUTIONAL: denies fever, chills , weakness, fatigue  NECK: denies neck pain/stiffness  RESPIRATORY: denies cough, wheezing, SOB,  hemoptysis   CARDIOVASCULAR: denies chest pain, palpitations, leg swelling  GASTROINTESTINAL: denies abdominal pain,  N/V/D/C    GENITOURINARY: denies dysuria, frequency, urgency, hematuria  NEUROLOGICAL: denies headaches, dizziness, numbness, tingling  SKIN:   +sacral wound. +R great toe wound  MUSCULOSKELETAL: denies joint pain or swelling, muscle pain, extremity pain  PSYCHIATRIC: No depression, anxiety  REST OF REVIEW Of SYSTEM - All other ROS negative.    Height (cm): 152.4 (10-05 @ 09:44)  Weight (kg): 72.6 (10-05 @ 09:44)  BMI (kg/m2): 31.3 (10-05 @ 09:44)  BSA (m2): 1.7 (10-05 @ 09:44)  Vital Signs Last 24 Hrs  T(C): 37.2 (10 Oct 2018 05:00), Max: 37.2 (10 Oct 2018 05:00)  T(F): 98.9 (10 Oct 2018 05:00), Max: 98.9 (10 Oct 2018 05:00)  HR: 88 (10 Oct 2018 07:45) (88 - 102)  BP: 148/67 (10 Oct 2018 05:00) (132/81 - 149/61)  BP(mean): --  RR: 18 (10 Oct 2018 05:00) (18 - 20)  SpO2: 95% (10 Oct 2018 07:45) (91% - 96%)  [ ] room air   [ ] 02    PHYSICAL EXAM:  GENERAL:  Awake, Alert. No acute distresss  HEAD:  NCAT, EOMI, PERRLA  ENMT: MMM  NECK:  Supple , no JVD  CHEST/LUNG: wheezing b/l. good inspiratory effort, no accessory muscle use  HEART:  Regular rate and rhythm, No M/R/G  ABDOMEN:  soft, nontender, nondistended, positive bowel sounds   EXTREMITIES: No clubbing, cyanosis or edema, chronic le skin changes/ecchymoses  Neuro: No focal deficits   SKIN: stage 2 sacrum. R great toe wound dressed c/d/i    LABS:                        9.4    9.38  )-----------( 103      ( 10 Oct 2018 07:41 )             29.2     10 Oct 2018 07:41    143    |  114    |  48     ----------------------------<  108    4.4     |  20     |  1.50     Ca    7.7        10 Oct 2018 07:41        Hemoglobin A1C, Whole Blood: 4.9 % (10-06 @ 12:52)      CAPILLARY BLOOD GLUCOSE      POCT Blood Glucose.: 109 mg/dL (10 Oct 2018 07:36)  POCT Blood Glucose.: 191 mg/dL (09 Oct 2018 21:52)  POCT Blood Glucose.: 164 mg/dL (09 Oct 2018 16:43)  POCT Blood Glucose.: 185 mg/dL (09 Oct 2018 11:51)    Cultures  Culture Results:   No growth to date. (10-06 @ 16:33)  Culture Results:   No growth to date. (10-06 @ 16:32)  Culture Results:   >100,000 CFU/ml Presumptive Candida albicans (10-05 @ 16:53)  Culture Results:   Growth in aerobic bottle: Coag Negative Staphylococcus  Single set isolate, possible contaminant. Contact  Microbiology if susceptibility testing clinically  indicated.  "Due to technical problems, Proteus sp. will Not be reported as part of  the BCID panel until further notice"  ***Blood Panel PCR results on this specimen are available  approximately 3 hours after the Gram stain result.***  Gram stain, PCR, and/or culture results may not always  correspond due to difference in methodologies.  ************************************************************  This PCR assay was performed using [employer].  The following targets are tested for: Enterococcus,  vancomycin resistant enterococci, Listeria monocytogenes,  coagulase negative staphylococci, S. aureus,  methicillin resistant S. aureus, Streptococcus agalactiae  (Group B), S. pneumoniae, S. pyogenes (Group A),  Acinetobacter baumannii, Enterobacter cloacae, E. coli,  Klebsiella oxytoca, K. pneumoniae, Proteus sp.,  Serratia marcescens, Haemophilus influenzae,  Neisseria meningitidis, Pseudomonas aeruginosa, Candida  albicans, C. glabrata, C krusei, C parapsilosis,  C. tropicalis and the KPC resistance gene. (10-05 @ 16:29)  Culture Results:   Growth in aerobic bottle: Corynebacterium species "Susceptibilities not  performed" (10-05 @ 16:29)

## 2018-10-10 NOTE — PROGRESS NOTE ADULT - ASSESSMENT
88 yo F w/ PMH of DM type 2, HTN, COPD on 3L home O2, diastolic HFpEF (last echo 10/2017) presented to ED w/ lethargy found to have + UA, admitted for AMS & suspected UTI    Problem/Plan - 1:  ·  Problem: Acute metabolic encephalopathy.  Plan: resolved. likely 2/2 dehydration in the setting of poor po intake and diuresis.   Had speech and swallow eval and was recommended c/w Dysphagia 1 w/ honey thick.   - Aspiration precautions  - Patient is bedbound. Turn q 2.     Problem/Plan - 2:  ·  Problem: Bacteremia.  Plan: Gram positive bacteremia. Likely represents contamination. Repeat blood cultures were negative.     Problem/Plan - 3:  ·  Problem: Urinary tract infection with hematuria, site unspecified.  Plan: UA positive, UCx growing Candida albicans. Likely represents colonization.  ?dysuria at home prior to presentation, but patient is a poor historian.  Doing well off antibiotics.    Problem/Plan - 4:  ·  Problem: Chronic diastolic congestive heart failure.  Plan: - EF 65% on echo 10/2017  - Lasix was held due to poor po intake. now pt with scattered wheezing on exam. obtain cxr. remainder of pt exam does not appear grossly overloaded- no jvd/le edema/hepatojugular reflux. continue to monitor volume status.  - hold ARB due to SAMIA.     Problem/Plan - 5:  ·  Problem: SAMIA (acute kidney injury).  Plan: Creatinine improved with IV fluids.   Now off fluids. Encourage po intake. Cr 1.5 this am from 1.4 prior  - avoid NSAIDs.     Problem/Plan - 6:  Problem: Type 2 diabetes mellitus with stage 3 chronic kidney disease, without long-term current use of insulin. Plan: - stable  - hold PO meds  - Continue Humalog coverage scale  - hypoglycemia protocol.    Problem/Plan - 7:  ·  Problem: Essential hypertension.  Plan: - Bp stable. holding losartan. If Cr remains stable will consider restarting as bp running 130's-150's systolic    Problem/Plan - 8:  ·  Problem: Chronic obstructive pulmonary disease, unspecified COPD type.  Plan: With chronic hypoxic respiratory failure, on 3-4L O2 at home, desaturation to 88% on presentation, now saturating satisfactorily on 2.5-3L/min.  - continue Duonebs.  - continue supplemental O2.     Problem/Plan - 9:  ·  Problem: Sacral wound.  Plan: - turn q 2h  - consult wound care.     Problem/Plan - 10:  Problem: Need for prophylactic measure. Plan; IMPROVE VTE Individual Risk Assessment        RISK                                                          Points  [  ] Previous VTE                                                3  [  ] Thrombophilia                                             2  [ x ] Lower limb paralysis                                   2        (unable to hold up >15 seconds)    [  ] Current Cancer                                            2         (within 6 months)  [ x ] Immobilization > 24 hrs                              1  [ x ] ICU/CCU stay > 24 hours                            1  [ x ] Age > 60                                                    1  IMPROVE VTE Score ___5___    Heparin for DVT prophylaxis. 89 yo F w/ PMH of DM type 2, HTN, COPD on 3L home O2, diastolic HFpEF (last echo 10/2017) presented to ED w/ lethargy found to have + UA, admitted for AMS & suspected UTI    Problem/Plan - 1:  ·  Problem: Acute metabolic encephalopathy.  Plan: resolved. likely 2/2 dehydration in the setting of poor po intake and diuresis.   Had speech and swallow eval and was recommended c/w Dysphagia 1 w/ honey thick.   - Aspiration precautions  - Patient is bedbound. Turn q 2.     Problem/Plan - 2:  ·  Problem: Bacteremia.  Plan: Gram positive bacteremia. Likely represents contamination. Repeat blood cultures were negative.     Problem/Plan - 3:  ·  Problem: Urinary tract infection with hematuria, site unspecified.  Plan: UA positive, UCx growing Candida albicans. Likely represents colonization.  ?dysuria at home prior to presentation, but patient is a poor historian.  Doing well off antibiotics.    Problem/Plan - 4:  ·  Problem: Chronic diastolic congestive heart failure.  Plan: - EF 65% on echo 10/2017  - Lasix was held due to poor po intake. now pt with scattered wheezing on exam. obtainde cxr. remainder of pt exam does not appear grossly overloaded- no jvd/le edema/hepatojugular reflux. continue to monitor volume status.  - hold ARB due to SAMIA.     Problem/Plan - 5:  ·  Problem: SAMIA (acute kidney injury).  Plan: Creatinine improved with IV fluids.   Now off fluids. Encourage po intake. Cr 1.5 this am from 1.4 prior  - avoid NSAIDs.     Problem/Plan - 6:  Problem: Type 2 diabetes mellitus with stage 3 chronic kidney disease, without long-term current use of insulin. Plan: - stable  - hold PO meds  - Continue Humalog coverage scale  - hypoglycemia protocol.    Problem/Plan - 7:  ·  Problem: Essential hypertension.  Plan: - Bp stable. holding losartan. If Cr remains stable will consider restarting as bp running 130's-150's systolic    Problem/Plan - 8:  ·  Problem: Chronic obstructive pulmonary disease, unspecified COPD type.  Plan: With chronic hypoxic respiratory failure, on 3-4L O2 at home, desaturation to 88% on presentation, this am sating well but with diffuse wheezing. CXR neg infiltrate or effusion  - c/w duonebs  - continue supplemental O2.     Problem/Plan - 9:  ·  Problem: Sacral wound.  Plan: - turn q 2h  - consult wound care.     Problem/Plan - 10:  Problem: Need for prophylactic measure. Plan; IMPROVE VTE Individual Risk Assessment        RISK                                                          Points  [  ] Previous VTE                                                3  [  ] Thrombophilia                                             2  [ x ] Lower limb paralysis                                   2        (unable to hold up >15 seconds)    [  ] Current Cancer                                            2         (within 6 months)  [ x ] Immobilization > 24 hrs                              1  [ x ] ICU/CCU stay > 24 hours                            1  [ x ] Age > 60                                                    1  IMPROVE VTE Score ___5___    Heparin for DVT prophylaxis.

## 2018-10-10 NOTE — CONSULT NOTE ADULT - SUBJECTIVE AND OBJECTIVE BOX
Chief Complaint: sacral ulcer    HPI: 89 y/o female with history of a stage 2 pressure sacral ulcer uncertain duration but present prior to hospitalization. Patient has been on an alternation pressure mattress at home    PAST MEDICAL & SURGICAL HISTORY:  Coronary artery disease with angina pectoris, unspecified vessel or lesion type, unspecified whether native or transplanted heart  Congestive heart failure  Asthma  Diabetes mellitus  HTN (hypertension)  No significant past surgical history      Allergies    No Known Allergies    Intolerances        MEDICATIONS  (STANDING):  ALBUTerol/ipratropium for Nebulization 3 milliLiter(s) Nebulizer every 6 hours  buDESOnide 160 MICROgram(s)/formoterol 4.5 MICROgram(s) Inhaler 2 Puff(s) Inhalation two times a day  dextrose 5%. 1000 milliLiter(s) (50 mL/Hr) IV Continuous <Continuous>  dextrose 50% Injectable 12.5 Gram(s) IV Push once  dextrose 50% Injectable 25 Gram(s) IV Push once  dextrose 50% Injectable 25 Gram(s) IV Push once  ferrous    sulfate 325 milliGRAM(s) Oral daily  heparin  Injectable 5000 Unit(s) SubCutaneous every 12 hours  insulin lispro (HumaLOG) corrective regimen sliding scale   SubCutaneous three times a day before meals  insulin lispro (HumaLOG) corrective regimen sliding scale   SubCutaneous at bedtime  nystatin Powder 1 Application(s) Topical two times a day  pantoprazole    Tablet 40 milliGRAM(s) Oral before breakfast    MEDICATIONS  (PRN):  acetaminophen   Tablet .. 650 milliGRAM(s) Oral every 6 hours PRN Mild Pain (1 - 3), Moderate Pain (4 - 6)  dextrose 40% Gel 15 Gram(s) Oral once PRN Blood Glucose LESS THAN 70 milliGRAM(s)/deciliter  glucagon  Injectable 1 milliGRAM(s) IntraMuscular once PRN Glucose LESS THAN 70 milligrams/deciliter      FAMILY HISTORY:  No pertinent family history in first degree relatives          ROS:  CONSTITUTIONAL: No fever, weight loss, or fatigue  EYES: No eye pain, visual disturbances, or discharge  ENMT:  No difficulty hearing, tinnitus, vertigo; No sinus or throat pain  NECK: No pain or stiffness  BREASTS: No pain, masses, or nipple discharge  RESPIRATORY: No cough, wheezing, chills or hemoptysis; No shortness of breath  CARDIOVASCULAR: No chest pain, palpitations, dizziness, or leg swelling  GASTROINTESTINAL: No abdominal or epigastric pain. No nausea, vomiting, or hematemesis; No diarrhea or constipation. No melena or hematochezia.  GENITOURINARY: No dysuria, frequency, hematuria, or incontinence  NEUROLOGICAL: No headaches, memory loss, loss of strength, numbness, or tremors  SKIN: No itching, burning, rashes, or lesions   LYMPH NODES: No enlarged glands  ENDOCRINE: No heat or cold intolerance; No hair loss  MUSCULOSKELETAL: No joint pain or swelling; No muscle, back, or extremity pain  PSYCHIATRIC: No depression, anxiety, mood swings, or difficulty sleeping  HEME/LYMPH: No easy bruising, or bleeding gums  ALLERGY AND IMMUNOLOGIC: No hives or eczema    PHYSICAL EXAM-      Vital Signs Last 24 Hrs  T(C): 36.9 (10 Oct 2018 14:01), Max: 37.2 (10 Oct 2018 05:00)  T(F): 98.4 (10 Oct 2018 14:01), Max: 98.9 (10 Oct 2018 05:00)  HR: 89 (10 Oct 2018 14:13) (88 - 102)  BP: 143/71 (10 Oct 2018 14:01) (143/71 - 149/61)  BP(mean): --  RR: 17 (10 Oct 2018 14:01) (17 - 19)  SpO2: 95% (10 Oct 2018 14:13) (91% - 96%)    Constitutional: well developed, well nourished, no apparent distress, alert, oriented x 3.   Pulmonary: no respiratory distress, normal respiratory rhythm and effort, lungs are clear to auscultation/percussion. No CVA tenderness.  Cardiovascular: heart rate normal, normal sinus rhythm; no murmurs, gallops, rubs, heaves or thrills   Abdomen: soft, non-tender, +BS, no guarding/rebound/rigidity.  Vascular:   ENMT:  Neck:  Extremites:  Skin: Approximately 2.5cm x 3.0cm superfical ulceration with epidermal defect and dermis intact.                            9.4    9.38  )-----------( 103      ( 10 Oct 2018 07:41 )             29.2     10-10    143  |  114<H>  |  48<H>  ----------------------------<  108<H>  4.4   |  20<L>  |  1.50<H>    Ca    7.7<L>      10 Oct 2018 07:41  Phos  3.0     10-09        Radiology      Impression:      Recommendations:

## 2018-10-11 LAB
ANION GAP SERPL CALC-SCNC: 5 MMOL/L — SIGNIFICANT CHANGE UP (ref 5–17)
BUN SERPL-MCNC: 48 MG/DL — HIGH (ref 7–23)
CALCIUM SERPL-MCNC: 7.6 MG/DL — LOW (ref 8.5–10.1)
CHLORIDE SERPL-SCNC: 113 MMOL/L — HIGH (ref 96–108)
CO2 SERPL-SCNC: 21 MMOL/L — LOW (ref 22–31)
CREAT SERPL-MCNC: 1.6 MG/DL — HIGH (ref 0.5–1.3)
CULTURE RESULTS: SIGNIFICANT CHANGE UP
CULTURE RESULTS: SIGNIFICANT CHANGE UP
GLUCOSE SERPL-MCNC: 179 MG/DL — HIGH (ref 70–99)
HCT VFR BLD CALC: 27.4 % — LOW (ref 34.5–45)
HGB BLD-MCNC: 9.2 G/DL — LOW (ref 11.5–15.5)
MCHC RBC-ENTMCNC: 29.3 PG — SIGNIFICANT CHANGE UP (ref 27–34)
MCHC RBC-ENTMCNC: 33.6 GM/DL — SIGNIFICANT CHANGE UP (ref 32–36)
MCV RBC AUTO: 87.3 FL — SIGNIFICANT CHANGE UP (ref 80–100)
NRBC # BLD: 0 /100 WBCS — SIGNIFICANT CHANGE UP (ref 0–0)
PLATELET # BLD AUTO: 84 K/UL — LOW (ref 150–400)
POTASSIUM SERPL-MCNC: 4.8 MMOL/L — SIGNIFICANT CHANGE UP (ref 3.5–5.3)
POTASSIUM SERPL-SCNC: 4.8 MMOL/L — SIGNIFICANT CHANGE UP (ref 3.5–5.3)
RBC # BLD: 3.14 M/UL — LOW (ref 3.8–5.2)
RBC # FLD: 14.2 % — SIGNIFICANT CHANGE UP (ref 10.3–14.5)
SODIUM SERPL-SCNC: 139 MMOL/L — SIGNIFICANT CHANGE UP (ref 135–145)
SPECIMEN SOURCE: SIGNIFICANT CHANGE UP
SPECIMEN SOURCE: SIGNIFICANT CHANGE UP
WBC # BLD: 6.97 K/UL — SIGNIFICANT CHANGE UP (ref 3.8–10.5)
WBC # FLD AUTO: 6.97 K/UL — SIGNIFICANT CHANGE UP (ref 3.8–10.5)

## 2018-10-11 PROCEDURE — 99233 SBSQ HOSP IP/OBS HIGH 50: CPT

## 2018-10-11 RX ORDER — LOSARTAN POTASSIUM 100 MG/1
25 TABLET, FILM COATED ORAL DAILY
Qty: 0 | Refills: 0 | Status: DISCONTINUED | OUTPATIENT
Start: 2018-10-11 | End: 2018-10-13

## 2018-10-11 RX ADMIN — Medication 3 MILLILITER(S): at 07:43

## 2018-10-11 RX ADMIN — Medication 650 MILLIGRAM(S): at 11:17

## 2018-10-11 RX ADMIN — Medication 3 MILLILITER(S): at 19:39

## 2018-10-11 RX ADMIN — Medication 650 MILLIGRAM(S): at 12:00

## 2018-10-11 RX ADMIN — Medication 325 MILLIGRAM(S): at 11:17

## 2018-10-11 RX ADMIN — PANTOPRAZOLE SODIUM 40 MILLIGRAM(S): 20 TABLET, DELAYED RELEASE ORAL at 06:33

## 2018-10-11 RX ADMIN — HEPARIN SODIUM 5000 UNIT(S): 5000 INJECTION INTRAVENOUS; SUBCUTANEOUS at 06:33

## 2018-10-11 RX ADMIN — BUDESONIDE AND FORMOTEROL FUMARATE DIHYDRATE 2 PUFF(S): 160; 4.5 AEROSOL RESPIRATORY (INHALATION) at 19:05

## 2018-10-11 RX ADMIN — Medication 3 MILLILITER(S): at 02:03

## 2018-10-11 RX ADMIN — Medication 1: at 12:45

## 2018-10-11 RX ADMIN — BUDESONIDE AND FORMOTEROL FUMARATE DIHYDRATE 2 PUFF(S): 160; 4.5 AEROSOL RESPIRATORY (INHALATION) at 06:34

## 2018-10-11 RX ADMIN — HEPARIN SODIUM 5000 UNIT(S): 5000 INJECTION INTRAVENOUS; SUBCUTANEOUS at 17:45

## 2018-10-11 RX ADMIN — NYSTATIN CREAM 1 APPLICATION(S): 100000 CREAM TOPICAL at 07:39

## 2018-10-11 RX ADMIN — Medication 3 MILLILITER(S): at 14:07

## 2018-10-11 RX ADMIN — NYSTATIN CREAM 1 APPLICATION(S): 100000 CREAM TOPICAL at 17:40

## 2018-10-11 RX ADMIN — LOSARTAN POTASSIUM 25 MILLIGRAM(S): 100 TABLET, FILM COATED ORAL at 17:39

## 2018-10-11 NOTE — PROGRESS NOTE ADULT - SUBJECTIVE AND OBJECTIVE BOX
Patient is a 89y old  Female who presents with a chief complaint of lethargy and unresponsiveness (10 Oct 2018 15:18)      INTERVAL HPI/OVERNIGHT EVENTS: Feeling well this am. Has been OOB to chair. Good PO intake. Denies shortness of breath, wheezing. +Cough , improved from prior.     MEDICATIONS  (STANDING):  ALBUTerol/ipratropium for Nebulization 3 milliLiter(s) Nebulizer every 6 hours  buDESOnide 160 MICROgram(s)/formoterol 4.5 MICROgram(s) Inhaler 2 Puff(s) Inhalation two times a day  dextrose 5%. 1000 milliLiter(s) (50 mL/Hr) IV Continuous <Continuous>  dextrose 50% Injectable 12.5 Gram(s) IV Push once  dextrose 50% Injectable 25 Gram(s) IV Push once  dextrose 50% Injectable 25 Gram(s) IV Push once  ferrous    sulfate 325 milliGRAM(s) Oral daily  heparin  Injectable 5000 Unit(s) SubCutaneous every 12 hours  insulin lispro (HumaLOG) corrective regimen sliding scale   SubCutaneous three times a day before meals  insulin lispro (HumaLOG) corrective regimen sliding scale   SubCutaneous at bedtime  nystatin Powder 1 Application(s) Topical two times a day  pantoprazole    Tablet 40 milliGRAM(s) Oral before breakfast    MEDICATIONS  (PRN):  acetaminophen   Tablet .. 650 milliGRAM(s) Oral every 6 hours PRN Mild Pain (1 - 3), Moderate Pain (4 - 6)  dextrose 40% Gel 15 Gram(s) Oral once PRN Blood Glucose LESS THAN 70 milliGRAM(s)/deciliter  glucagon  Injectable 1 milliGRAM(s) IntraMuscular once PRN Glucose LESS THAN 70 milligrams/deciliter      Allergies    No Known Allergies    Intolerances        REVIEW OF SYSTEMS:  CONSTITUTIONAL: denies fever, chills , weakness, fatigue  EYES: denies eye pain, visual disturbances  ENMT:  denies ear pain, sinus pain, nose bleeds  NECK: denies neck pain/stiffness  RESPIRATORY: denies wheezing, SOB,  hemoptysis +cough  CARDIOVASCULAR: denies chest pain, palpitations, leg swelling  GASTROINTESTINAL: denies abdominal pain,  N/V/D/C    GENITOURINARY: denies dysuria, frequency, urgency, hematuria  NEUROLOGICAL: denies headaches, dizziness, numbness, tingling  SKIN:   +sacral wound + toe wound  MUSCULOSKELETAL: denies joint pain or swelling, muscle pain, extremity pain  PSYCHIATRIC: No depression, anxiety  REST OF REVIEW Of SYSTEM - All other ROS negative.    Height (cm): 152.4 (10-05 @ 09:44)  Weight (kg): 72.6 (10-05 @ 09:44)  BMI (kg/m2): 31.3 (10-05 @ 09:44)  BSA (m2): 1.7 (10-05 @ 09:44)  Vital Signs Last 24 Hrs  T(C): 37 (11 Oct 2018 04:35), Max: 37 (11 Oct 2018 04:35)  T(F): 98.6 (11 Oct 2018 04:35), Max: 98.6 (11 Oct 2018 04:35)  HR: 95 (11 Oct 2018 07:43) (89 - 115)  BP: 149/73 (11 Oct 2018 04:35) (143/71 - 157/76)  BP(mean): --  RR: 18 (11 Oct 2018 04:35) (17 - 18)  SpO2: 95% (11 Oct 2018 07:43) (94% - 98%)  [ ] room air   [ ] 02    PHYSICAL EXAM:  GENERAL:  No acute distresss , A&O x 3  HEAD:  NCAT, EOMI, PERRLA  ENMT: MMM  NECK:  Supple , no JVD  CHEST/LUNG: CTA B/L , No W/R/R  HEART:  Regular rate and rhythm, No M/R/G  ABDOMEN:  soft, nontender, nondistended, positive bowel sounds   EXTREMITIES: No clubbing, cyanosis or edema  Neuro: No focal deficits   SKIN: Stage 2 Pressue injury Sacrum    LABS:      Ca    7.7        10 Oct 2018 07:41        Hemoglobin A1C, Whole Blood: 4.9 % (10-06 @ 12:52)      CAPILLARY BLOOD GLUCOSE      POCT Blood Glucose.: 175 mg/dL (11 Oct 2018 12:15)  POCT Blood Glucose.: 109 mg/dL (11 Oct 2018 07:32)  POCT Blood Glucose.: 155 mg/dL (10 Oct 2018 21:39)  POCT Blood Glucose.: 122 mg/dL (10 Oct 2018 16:38)    Cultures  Culture Results:   No growth to date. (10-06 @ 16:33)  Culture Results:   No growth to date. (10-06 @ 16:32)  Culture Results:   >100,000 CFU/ml Presumptive Candida albicans (10-05 @ 16:53)  Culture Results:   Growth in aerobic bottle: Coag Negative Staphylococcus  Single set isolate, possible contaminant. Contact  Microbiology if susceptibility testing clinically  indicated.  "Due to technical problems, Proteus sp. will Not be reported as part of  the BCID panel until further notice"  ***Blood Panel PCR results on this specimen are available  approximately 3 hours after the Gram stain result.***  Gram stain, PCR, and/or culture results may not always  correspond due to difference in methodologies.  ************************************************************  This PCR assay was performed using [employer].  The following targets are tested for: Enterococcus,  vancomycin resistant enterococci, Listeria monocytogenes,  coagulase negative staphylococci, S. aureus,  methicillin resistant S. aureus, Streptococcus agalactiae  (Group B), S. pneumoniae, S. pyogenes (Group A),  Acinetobacter baumannii, Enterobacter cloacae, E. coli,  Klebsiella oxytoca, K. pneumoniae, Proteus sp.,  Serratia marcescens, Haemophilus influenzae,  Neisseria meningitidis, Pseudomonas aeruginosa, Candida  albicans, C. glabrata, C krusei, C parapsilosis,  C. tropicalis and the KPC resistance gene. (10-05 @ 16:29)  Culture Results:   Growth in aerobic bottle: Corynebacterium species "Susceptibilities not  performed" (10-05 @ 16:29) Patient is a 89y old  Female who presents with a chief complaint of lethargy and unresponsiveness (10 Oct 2018 15:18)      INTERVAL HPI/OVERNIGHT EVENTS: Feeling well this am. Has been OOB to chair. Good PO intake. Denies shortness of breath, wheezing. +Cough , improved from prior.     MEDICATIONS  (STANDING):  ALBUTerol/ipratropium for Nebulization 3 milliLiter(s) Nebulizer every 6 hours  buDESOnide 160 MICROgram(s)/formoterol 4.5 MICROgram(s) Inhaler 2 Puff(s) Inhalation two times a day  dextrose 5%. 1000 milliLiter(s) (50 mL/Hr) IV Continuous <Continuous>  dextrose 50% Injectable 12.5 Gram(s) IV Push once  dextrose 50% Injectable 25 Gram(s) IV Push once  dextrose 50% Injectable 25 Gram(s) IV Push once  ferrous    sulfate 325 milliGRAM(s) Oral daily  heparin  Injectable 5000 Unit(s) SubCutaneous every 12 hours  insulin lispro (HumaLOG) corrective regimen sliding scale   SubCutaneous three times a day before meals  insulin lispro (HumaLOG) corrective regimen sliding scale   SubCutaneous at bedtime  nystatin Powder 1 Application(s) Topical two times a day  pantoprazole    Tablet 40 milliGRAM(s) Oral before breakfast    MEDICATIONS  (PRN):  acetaminophen   Tablet .. 650 milliGRAM(s) Oral every 6 hours PRN Mild Pain (1 - 3), Moderate Pain (4 - 6)  dextrose 40% Gel 15 Gram(s) Oral once PRN Blood Glucose LESS THAN 70 milliGRAM(s)/deciliter  glucagon  Injectable 1 milliGRAM(s) IntraMuscular once PRN Glucose LESS THAN 70 milligrams/deciliter      Allergies    No Known Allergies    Intolerances        REVIEW OF SYSTEMS:  CONSTITUTIONAL: denies fever, chills , weakness, fatigue  EYES: denies eye pain, visual disturbances  ENMT:  denies ear pain, sinus pain, nose bleeds  NECK: denies neck pain/stiffness  RESPIRATORY: denies wheezing, SOB,  hemoptysis +cough  CARDIOVASCULAR: denies chest pain, palpitations, leg swelling  GASTROINTESTINAL: denies abdominal pain,  N/V/D/C    GENITOURINARY: denies dysuria, frequency, urgency, hematuria  NEUROLOGICAL: denies headaches, dizziness, numbness, tingling  SKIN:   +sacral wound + toe wound  MUSCULOSKELETAL: denies joint pain or swelling, muscle pain, extremity pain  PSYCHIATRIC: No depression, anxiety  REST OF REVIEW Of SYSTEM - All other ROS negative.    Height (cm): 152.4 (10-05 @ 09:44)  Weight (kg): 72.6 (10-05 @ 09:44)  BMI (kg/m2): 31.3 (10-05 @ 09:44)  BSA (m2): 1.7 (10-05 @ 09:44)  Vital Signs Last 24 Hrs  T(C): 37 (11 Oct 2018 04:35), Max: 37 (11 Oct 2018 04:35)  T(F): 98.6 (11 Oct 2018 04:35), Max: 98.6 (11 Oct 2018 04:35)  HR: 95 (11 Oct 2018 07:43) (89 - 115)  BP: 149/73 (11 Oct 2018 04:35) (143/71 - 157/76)  BP(mean): --  RR: 18 (11 Oct 2018 04:35) (17 - 18)  SpO2: 95% (11 Oct 2018 07:43) (94% - 98%)  [ ] room air   [ ] 02    PHYSICAL EXAM:  GENERAL:  No acute distresss , A&O x 3  HEAD:  NCAT, EOMI, PERRLA  ENMT: MMM  NECK:  Supple , no JVD  CHEST/LUNG: transmitted upper airway sounds, No W/R/R  HEART:  Regular rate and rhythm, No M/R/G  ABDOMEN:  soft, nontender, nondistended, positive bowel sounds   EXTREMITIES: No clubbing, cyanosis or edema  Neuro: No focal deficits   SKIN: Stage 2 Pressue injury Sacrum    LABS:      Ca    7.7        10 Oct 2018 07:41        Hemoglobin A1C, Whole Blood: 4.9 % (10-06 @ 12:52)      CAPILLARY BLOOD GLUCOSE      POCT Blood Glucose.: 175 mg/dL (11 Oct 2018 12:15)  POCT Blood Glucose.: 109 mg/dL (11 Oct 2018 07:32)  POCT Blood Glucose.: 155 mg/dL (10 Oct 2018 21:39)  POCT Blood Glucose.: 122 mg/dL (10 Oct 2018 16:38)    Cultures  Culture Results:   No growth to date. (10-06 @ 16:33)  Culture Results:   No growth to date. (10-06 @ 16:32)  Culture Results:   >100,000 CFU/ml Presumptive Candida albicans (10-05 @ 16:53)  Culture Results:   Growth in aerobic bottle: Coag Negative Staphylococcus  Single set isolate, possible contaminant. Contact  Microbiology if susceptibility testing clinically  indicated.  "Due to technical problems, Proteus sp. will Not be reported as part of  the BCID panel until further notice"  ***Blood Panel PCR results on this specimen are available  approximately 3 hours after the Gram stain result.***  Gram stain, PCR, and/or culture results may not always  correspond due to difference in methodologies.  ************************************************************  This PCR assay was performed using [employer].  The following targets are tested for: Enterococcus,  vancomycin resistant enterococci, Listeria monocytogenes,  coagulase negative staphylococci, S. aureus,  methicillin resistant S. aureus, Streptococcus agalactiae  (Group B), S. pneumoniae, S. pyogenes (Group A),  Acinetobacter baumannii, Enterobacter cloacae, E. coli,  Klebsiella oxytoca, K. pneumoniae, Proteus sp.,  Serratia marcescens, Haemophilus influenzae,  Neisseria meningitidis, Pseudomonas aeruginosa, Candida  albicans, C. glabrata, C krusei, C parapsilosis,  C. tropicalis and the KPC resistance gene. (10-05 @ 16:29)  Culture Results:   Growth in aerobic bottle: Corynebacterium species "Susceptibilities not  performed" (10-05 @ 16:29)

## 2018-10-11 NOTE — CHART NOTE - NSCHARTNOTEFT_GEN_A_CORE
Assessment: Pt seen for nutrition follow up. Per chart, pt is 89 Y/O F with PMH of type 2 DM, HTN, COPD on 3L home O2, diastolic HFpEF presented to ED 10/5 with lethargy progressing to unresponsiveness accompanied by coughing/vomiting/dry heaves over the 3 days prior to admission. Pt interviewed with son at bedside who provided subjective information.     Per son, mother with improving po intake typically consuming 50-75% of meal tray. States she is tolerating current diet consistency (dysphagia 1 pureed with honey thickened liquids) well. He also brings food from home (soup) and is aware that it must be thickened with honey thickening packets. Son offered no food preferences at this time. No N/V/diarrhea/constipation. Last BM today 10/11.     Factors impacting intake: [ ] none [ ] nausea  [ ] vomiting [ ] diarrhea [ ] constipation  [ ]chewing problems [X] swallowing issues  [X] other: SLP evaluation 10/5 recommending dysphagia 1 pureed with honey thickened liquids.     Diet Presciption: Diet, Dysphagia 1 Pureed-Honey Consistency Fluid:   Consistent Carbohydrate {No Snacks}  Halal (10-09-18 @ 15:39)    Intake: 50-75% of meal tray    Current Weight: 149 pounds (10/10) vs. admission wt of 160 pounds. Questionable accuracy of admission wt as pt's daily weights 156 pounds (10/7), 156 pounds  (10/8), 149 pounds (10/10). RD to continue to trend weights.    Pertinent Medications: MEDICATIONS  (STANDING):  ALBUTerol/ipratropium for Nebulization 3 milliLiter(s) Nebulizer every 6 hours  buDESOnide 160 MICROgram(s)/formoterol 4.5 MICROgram(s) Inhaler 2 Puff(s) Inhalation two times a day  dextrose 5%. 1000 milliLiter(s) (50 mL/Hr) IV Continuous <Continuous>  dextrose 50% Injectable 12.5 Gram(s) IV Push once  dextrose 50% Injectable 25 Gram(s) IV Push once  dextrose 50% Injectable 25 Gram(s) IV Push once  ferrous    sulfate 325 milliGRAM(s) Oral daily  heparin  Injectable 5000 Unit(s) SubCutaneous every 12 hours  insulin lispro (HumaLOG) corrective regimen sliding scale   SubCutaneous three times a day before meals  insulin lispro (HumaLOG) corrective regimen sliding scale   SubCutaneous at bedtime  losartan 25 milliGRAM(s) Oral daily  nystatin Powder 1 Application(s) Topical two times a day  pantoprazole    Tablet 40 milliGRAM(s) Oral before breakfast    MEDICATIONS  (PRN):  acetaminophen   Tablet .. 650 milliGRAM(s) Oral every 6 hours PRN Mild Pain (1 - 3), Moderate Pain (4 - 6)  dextrose 40% Gel 15 Gram(s) Oral once PRN Blood Glucose LESS THAN 70 milliGRAM(s)/deciliter  glucagon  Injectable 1 milliGRAM(s) IntraMuscular once PRN Glucose LESS THAN 70 milligrams/deciliter    Pertinent Labs: 10-11 Na139 mmol/L Glu 179 mg/dL<H> K+ 4.8 mmol/L Cr  1.60 mg/dL<H> BUN 48 mg/dL<H> 10-09 Phos 3.0 mg/dL 10-05 Alb 2.5 g/dL<L> 10-06 [id number] 4.9 %     CAPILLARY BLOOD GLUCOSE      POCT Blood Glucose.: 175 mg/dL (11 Oct 2018 12:15)  POCT Blood Glucose.: 109 mg/dL (11 Oct 2018 07:32)  POCT Blood Glucose.: 155 mg/dL (10 Oct 2018 21:39)  POCT Blood Glucose.: 122 mg/dL (10 Oct 2018 16:38)    Skin: stage 2 sacrum, no edema noted    Estimated Needs:   [X] no change since previous assessment  [ ] recalculated:     Previous Nutrition Diagnosis:   [X] Swallowing Difficulty     Nutrition Diagnosis is [X] ongoing  - being addressed with modified diet consistency     New Nutrition Diagnosis: [X] not applicable       Interventions:   Recommend  [ ] Change Diet To:  [ ] Nutrition Supplement  [ ] Nutrition Support  [X] Other: Continue current diet Dysphagia 1 pureed with honey thickened liquids, consistent carbohydrate, Halal. Provide pt preferences within dietary restrictions. Maintain aspiration precautions and monitor tolerance of prescribed diet consistency.     Monitoring and Evaluation:   [X] PO intake [ x ] Tolerance to diet prescription [ x ] weights [ x ] labs[ x ] follow up per protocol  [X] other: RD to remain available.

## 2018-10-11 NOTE — PROGRESS NOTE ADULT - ASSESSMENT
89 yo F w/ PMH of DM type 2, HTN, COPD on 3L home O2, diastolic HFpEF (last echo 10/2017) presented to ED w/ lethargy found to have + UA, admitted for AMS & suspected UTI    Problem/Plan - 1:  ·  Problem: Acute metabolic encephalopathy.  Plan: resolved. likely 2/2 dehydration in the setting of poor po intake and diuresis.   Had speech and swallow eval and was recommended c/w Dysphagia 1 w/ honey thick.   - Aspiration precautions  - Patient is bedbound. Turn q 2.     Problem/Plan - 2:  ·  Problem: Bacteremia.  Plan:  Contaminant. Repeat blood cultures were negative.     Problem/Plan - 3:  ·  Problem: Urinary tract infection with hematuria, site unspecified.  Plan: UA positive, UCx growing Candida albicans. Likely represents colonization.  ?dysuria at home prior to presentation, but patient is a poor historian.  Doing well off antibiotics.    Problem/Plan - 4:  ·  Problem: Chronic diastolic congestive heart failure.  Plan: - EF 65% on echo 10/2017  - Appears euvolemic  - hold ARB due to SAMIA.     Problem/Plan - 5:  ·  Problem: SAMIA (acute kidney injury).  Plan: Creatinine improved with IV fluids.   Now off fluids. Encourage po intake.   - avoid NSAIDs.     Problem/Plan - 6:  Problem: Type 2 diabetes mellitus with stage 3 chronic kidney disease, without long-term current use of insulin. Plan: - stable  - hold PO meds  - Continue Humalog coverage scale  - hypoglycemia protocol.    Problem/Plan - 7:  ·  Problem: Essential hypertension.  Plan: - Bp elevated. f/u bmp. Restart ARB. trend cr.    Problem/Plan - 8:  ·  Problem: Chronic obstructive pulmonary disease, unspecified COPD type.  Plan: Stable  - c/w duonebs  - continue supplemental O2.     Problem/Plan - 9:  ·  Problem: Sacral wound.  Plan: - turn q 2h  - Wound care following    Problem/Plan - 10:  Problem: Need for prophylactic measure. Plan; IMPROVE VTE Individual Risk Assessment        RISK                                                          Points  [  ] Previous VTE                                                3  [  ] Thrombophilia                                             2  [ x ] Lower limb paralysis                                   2        (unable to hold up >15 seconds)    [  ] Current Cancer                                            2         (within 6 months)  [ x ] Immobilization > 24 hrs                              1  [ x ] ICU/CCU stay > 24 hours                            1  [ x ] Age > 60                                                    1  IMPROVE VTE Score ___5___    Heparin for DVT prophylaxis.

## 2018-10-12 LAB
ANION GAP SERPL CALC-SCNC: 7 MMOL/L — SIGNIFICANT CHANGE UP (ref 5–17)
BUN SERPL-MCNC: 42 MG/DL — HIGH (ref 7–23)
CALCIUM SERPL-MCNC: 8.2 MG/DL — LOW (ref 8.5–10.1)
CHLORIDE SERPL-SCNC: 112 MMOL/L — HIGH (ref 96–108)
CO2 SERPL-SCNC: 21 MMOL/L — LOW (ref 22–31)
CREAT SERPL-MCNC: 1.5 MG/DL — HIGH (ref 0.5–1.3)
GLUCOSE SERPL-MCNC: 102 MG/DL — HIGH (ref 70–99)
HCT VFR BLD CALC: 30.6 % — LOW (ref 34.5–45)
HGB BLD-MCNC: 9.7 G/DL — LOW (ref 11.5–15.5)
MCHC RBC-ENTMCNC: 28.5 PG — SIGNIFICANT CHANGE UP (ref 27–34)
MCHC RBC-ENTMCNC: 31.7 GM/DL — LOW (ref 32–36)
MCV RBC AUTO: 90 FL — SIGNIFICANT CHANGE UP (ref 80–100)
NRBC # BLD: 0 /100 WBCS — SIGNIFICANT CHANGE UP (ref 0–0)
PLATELET # BLD AUTO: 100 K/UL — LOW (ref 150–400)
POTASSIUM SERPL-MCNC: 4.9 MMOL/L — SIGNIFICANT CHANGE UP (ref 3.5–5.3)
POTASSIUM SERPL-SCNC: 4.9 MMOL/L — SIGNIFICANT CHANGE UP (ref 3.5–5.3)
RBC # BLD: 3.4 M/UL — LOW (ref 3.8–5.2)
RBC # FLD: 14.1 % — SIGNIFICANT CHANGE UP (ref 10.3–14.5)
SODIUM SERPL-SCNC: 140 MMOL/L — SIGNIFICANT CHANGE UP (ref 135–145)
WBC # BLD: 7.51 K/UL — SIGNIFICANT CHANGE UP (ref 3.8–10.5)
WBC # FLD AUTO: 7.51 K/UL — SIGNIFICANT CHANGE UP (ref 3.8–10.5)

## 2018-10-12 PROCEDURE — 99233 SBSQ HOSP IP/OBS HIGH 50: CPT

## 2018-10-12 RX ADMIN — Medication 325 MILLIGRAM(S): at 15:12

## 2018-10-12 RX ADMIN — Medication 3 MILLILITER(S): at 19:09

## 2018-10-12 RX ADMIN — Medication 1: at 17:30

## 2018-10-12 RX ADMIN — BUDESONIDE AND FORMOTEROL FUMARATE DIHYDRATE 2 PUFF(S): 160; 4.5 AEROSOL RESPIRATORY (INHALATION) at 06:31

## 2018-10-12 RX ADMIN — PANTOPRAZOLE SODIUM 40 MILLIGRAM(S): 20 TABLET, DELAYED RELEASE ORAL at 06:29

## 2018-10-12 RX ADMIN — BUDESONIDE AND FORMOTEROL FUMARATE DIHYDRATE 2 PUFF(S): 160; 4.5 AEROSOL RESPIRATORY (INHALATION) at 18:02

## 2018-10-12 RX ADMIN — Medication 3 MILLILITER(S): at 07:45

## 2018-10-12 RX ADMIN — HEPARIN SODIUM 5000 UNIT(S): 5000 INJECTION INTRAVENOUS; SUBCUTANEOUS at 18:00

## 2018-10-12 RX ADMIN — NYSTATIN CREAM 1 APPLICATION(S): 100000 CREAM TOPICAL at 18:01

## 2018-10-12 RX ADMIN — NYSTATIN CREAM 1 APPLICATION(S): 100000 CREAM TOPICAL at 06:29

## 2018-10-12 RX ADMIN — LOSARTAN POTASSIUM 25 MILLIGRAM(S): 100 TABLET, FILM COATED ORAL at 06:30

## 2018-10-12 RX ADMIN — Medication 600 MILLIGRAM(S): at 18:02

## 2018-10-12 RX ADMIN — Medication 3 MILLILITER(S): at 01:43

## 2018-10-12 RX ADMIN — Medication 3 MILLILITER(S): at 14:25

## 2018-10-12 RX ADMIN — HEPARIN SODIUM 5000 UNIT(S): 5000 INJECTION INTRAVENOUS; SUBCUTANEOUS at 06:29

## 2018-10-12 NOTE — PROGRESS NOTE ADULT - ASSESSMENT
89 yo F w/ PMH of DM type 2, HTN, COPD on 3L home O2, diastolic HFpEF (last echo 10/2017) presented to ED w/ lethargy found to have + UA, admitted for AMS & suspected UTI    Problem/Plan - 1:  ·  Problem: Acute metabolic encephalopathy.  Plan: resolved. likely 2/2 dehydration in the setting of poor po intake and diuresis.   Had speech and swallow eval and was recommended c/w Dysphagia 1 w/ honey thick.   - Aspiration precautions  - Patient is bedbound. Turn q 2.     Problem/Plan - 2:  ·  Problem: Bacteremia.  Plan:  Contaminant. Repeat blood cultures were negative.     Problem/Plan - 3:  ·  Problem: Urinary tract infection with hematuria, site unspecified.  Plan: UA positive, UCx growing Candida albicans. Likely represents colonization.  ?dysuria at home prior to presentation, but patient is a poor historian.  Doing well off antibiotics.    Problem/Plan - 4:  ·  Problem: Chronic diastolic congestive heart failure.  Plan: - EF 65% on echo 10/2017  - Appears euvolemic  - hold ARB due to SAMIA.     Problem/Plan - 5:  ·  Problem: SAMIA (acute kidney injury).  Plan: Creatinine improved with IV fluids.   Now off fluids. Encourage po intake.   - avoid NSAIDs.     Problem/Plan - 6:  Problem: Type 2 diabetes mellitus with stage 3 chronic kidney disease, without long-term current use of insulin. Plan: - stable  - hold PO meds  - Continue Humalog coverage scale  - hypoglycemia protocol.    Problem/Plan - 7:  ·  Problem: Essential hypertension.  Plan: - Bp elevated. f/u bmp. Restart ARB. trend cr.    Problem/Plan - 8:  ·  Problem: Chronic obstructive pulmonary disease, unspecified COPD type.  Plan: Stable  - increased oral secretions. Suction PRN. mucinex. incentive spirometry.  - c/w duonebs  - continue supplemental O2.     Problem/Plan - 9:  ·  Problem: Sacral wound.  Plan: - turn q 2h  - Wound care following    Problem/Plan - 10:  Problem: Need for prophylactic measure. Plan; IMPROVE VTE Individual Risk Assessment        RISK                                                          Points  [  ] Previous VTE                                                3  [  ] Thrombophilia                                             2  [ x ] Lower limb paralysis                                   2        (unable to hold up >15 seconds)    [  ] Current Cancer                                            2         (within 6 months)  [ x ] Immobilization > 24 hrs                              1  [ x ] ICU/CCU stay > 24 hours                            1  [ x ] Age > 60                                                    1  IMPROVE VTE Score ___5___    Heparin for DVT prophylaxis.    DISPO: D/c Planning

## 2018-10-12 NOTE — PROGRESS NOTE ADULT - SUBJECTIVE AND OBJECTIVE BOX
Patient is a 89y old  Female who presents with a chief complaint of lethargy and unresponsiveness (11 Oct 2018 12:47)    89 yo F w/ PMH of DM type 2, HTN, COPD on 3L home O2, diastolic HFpEF (last echo 10/2017) presented to ED w/ lethargy found to have + UA. admit for AMS & UTI    INTERVAL HPI/OVERNIGHT EVENTS: doing well overnight. Per son at bedside she has a lot of secretions and has not been coughing effectively to clear them. Pt with good PO intake. Denies cp, sob, n/v/d/c    MEDICATIONS  (STANDING):  ALBUTerol/ipratropium for Nebulization 3 milliLiter(s) Nebulizer every 6 hours  buDESOnide 160 MICROgram(s)/formoterol 4.5 MICROgram(s) Inhaler 2 Puff(s) Inhalation two times a day  dextrose 5%. 1000 milliLiter(s) (50 mL/Hr) IV Continuous <Continuous>  dextrose 50% Injectable 12.5 Gram(s) IV Push once  dextrose 50% Injectable 25 Gram(s) IV Push once  dextrose 50% Injectable 25 Gram(s) IV Push once  ferrous    sulfate 325 milliGRAM(s) Oral daily  guaiFENesin  milliGRAM(s) Oral every 12 hours  heparin  Injectable 5000 Unit(s) SubCutaneous every 12 hours  insulin lispro (HumaLOG) corrective regimen sliding scale   SubCutaneous three times a day before meals  insulin lispro (HumaLOG) corrective regimen sliding scale   SubCutaneous at bedtime  losartan 25 milliGRAM(s) Oral daily  nystatin Powder 1 Application(s) Topical two times a day  pantoprazole    Tablet 40 milliGRAM(s) Oral before breakfast    MEDICATIONS  (PRN):  acetaminophen   Tablet .. 650 milliGRAM(s) Oral every 6 hours PRN Mild Pain (1 - 3), Moderate Pain (4 - 6)  dextrose 40% Gel 15 Gram(s) Oral once PRN Blood Glucose LESS THAN 70 milliGRAM(s)/deciliter  glucagon  Injectable 1 milliGRAM(s) IntraMuscular once PRN Glucose LESS THAN 70 milligrams/deciliter      Allergies    No Known Allergies    Intolerances        REVIEW OF SYSTEMS:  CONSTITUTIONAL: denies fever, chills , weakness, fatigue  EYES: denies eye pain, visual disturbances  ENMT:  denies ear pain, sinus pain, nose bleeds  NECK: denies neck pain/stiffness  RESPIRATORY: denies wheezing, SOB,  hemoptysis +cough  CARDIOVASCULAR: denies chest pain, palpitations, leg swelling  GASTROINTESTINAL: denies abdominal pain,  N/V/D/C    GENITOURINARY: denies dysuria, frequency, urgency, hematuria  NEUROLOGICAL: denies headaches, dizziness, numbness, tingling  SKIN:   denies new rash or lesions  MUSCULOSKELETAL: denies joint pain or swelling, muscle pain, extremity pain  PSYCHIATRIC: No depression, anxiety  REST OF REVIEW Of SYSTEM - All other ROS negative.    Height (cm): 152.4 (10-05 @ 09:44)  Weight (kg): 72.6 (10-05 @ 09:44)  BMI (kg/m2): 31.3 (10-05 @ 09:44)  BSA (m2): 1.7 (10-05 @ 09:44)  Vital Signs Last 24 Hrs  T(C): 37.1 (12 Oct 2018 06:12), Max: 37.3 (11 Oct 2018 20:57)  T(F): 98.7 (12 Oct 2018 06:12), Max: 99.1 (11 Oct 2018 20:57)  HR: 93 (12 Oct 2018 07:45) (84 - 97)  BP: 158/70 (12 Oct 2018 06:12) (139/66 - 160/87)  BP(mean): --  RR: 20 (12 Oct 2018 06:12) (17 - 20)  SpO2: 98% (12 Oct 2018 07:45) (92% - 99%)  [ ] room air   [ ] 02    PHYSICAL EXAM:  GENERAL:  No acute distresss , A&O x 3  HEAD:  NCAT, EOMI, PERRLA  ENMT: MMM  NECK:  Supple , no JVD  CHEST/LUNG: transmitted upper airway sounds, no w/r/r  HEART:  Regular rate and rhythm, No M/R/G  ABDOMEN:  soft, nontender, nondistended, positive bowel sounds   EXTREMITIES: No clubbing, cyanosis or edema  SKIN: Stage 2 Sacrum  Neuro: No focal deficits     LABS:                        9.7    7.51  )-----------( 100      ( 12 Oct 2018 08:55 )             30.6     12 Oct 2018 08:55    140    |  112    |  42     ----------------------------<  102    4.9     |  21     |  1.50     Ca    8.2        12 Oct 2018 08:55        Hemoglobin A1C, Whole Blood: 4.9 % (10-06 @ 12:52)      CAPILLARY BLOOD GLUCOSE      POCT Blood Glucose.: 103 mg/dL (12 Oct 2018 07:30)  POCT Blood Glucose.: 158 mg/dL (11 Oct 2018 22:08)  POCT Blood Glucose.: 136 mg/dL (11 Oct 2018 17:01)  POCT Blood Glucose.: 175 mg/dL (11 Oct 2018 12:15)    Cultures  Culture Results:   No growth at 5 days. (10-06 @ 16:33)  Culture Results:   No growth at 5 days. (10-06 @ 16:32)  Culture Results:   >100,000 CFU/ml Presumptive Candida albicans (10-05 @ 16:53)  Culture Results:   Growth in aerobic bottle: Coag Negative Staphylococcus  Single set isolate, possible contaminant. Contact  Microbiology if susceptibility testing clinically  indicated.  "Due to technical problems, Proteus sp. will Not be reported as part of  the BCID panel until further notice"  ***Blood Panel PCR results on this specimen are available  approximately 3 hours after the Gram stain result.***  Gram stain, PCR, and/or culture results may not always  correspond due to difference in methodologies.  ************************************************************  This PCR assay was performed using [employer].  The following targets are tested for: Enterococcus,  vancomycin resistant enterococci, Listeria monocytogenes,  coagulase negative staphylococci, S. aureus,  methicillin resistant S. aureus, Streptococcus agalactiae  (Group B), S. pneumoniae, S. pyogenes (Group A),  Acinetobacter baumannii, Enterobacter cloacae, E. coli,  Klebsiella oxytoca, K. pneumoniae, Proteus sp.,  Serratia marcescens, Haemophilus influenzae,  Neisseria meningitidis, Pseudomonas aeruginosa, Candida  albicans, C. glabrata, C krusei, C parapsilosis,  C. tropicalis and the KPC resistance gene. (10-05 @ 16:29)  Culture Results:   Growth in aerobic bottle: Corynebacterium species "Susceptibilities not  performed" (10-05 @ 16:29)

## 2018-10-12 NOTE — PROGRESS NOTE ADULT - REASON FOR ADMISSION
lethargy and unresponsiveness

## 2018-10-13 VITALS
SYSTOLIC BLOOD PRESSURE: 171 MMHG | OXYGEN SATURATION: 100 % | TEMPERATURE: 98 F | HEART RATE: 88 BPM | DIASTOLIC BLOOD PRESSURE: 67 MMHG | RESPIRATION RATE: 20 BRPM

## 2018-10-13 PROCEDURE — 81001 URINALYSIS AUTO W/SCOPE: CPT

## 2018-10-13 PROCEDURE — 85610 PROTHROMBIN TIME: CPT

## 2018-10-13 PROCEDURE — 83036 HEMOGLOBIN GLYCOSYLATED A1C: CPT

## 2018-10-13 PROCEDURE — 71045 X-RAY EXAM CHEST 1 VIEW: CPT

## 2018-10-13 PROCEDURE — 99239 HOSP IP/OBS DSCHRG MGMT >30: CPT

## 2018-10-13 PROCEDURE — 85027 COMPLETE CBC AUTOMATED: CPT

## 2018-10-13 PROCEDURE — 87150 DNA/RNA AMPLIFIED PROBE: CPT

## 2018-10-13 PROCEDURE — 84100 ASSAY OF PHOSPHORUS: CPT

## 2018-10-13 PROCEDURE — 84145 PROCALCITONIN (PCT): CPT

## 2018-10-13 PROCEDURE — 99231 SBSQ HOSP IP/OBS SF/LOW 25: CPT

## 2018-10-13 PROCEDURE — 83605 ASSAY OF LACTIC ACID: CPT

## 2018-10-13 PROCEDURE — 83735 ASSAY OF MAGNESIUM: CPT

## 2018-10-13 PROCEDURE — 80048 BASIC METABOLIC PNL TOTAL CA: CPT

## 2018-10-13 PROCEDURE — 70450 CT HEAD/BRAIN W/O DYE: CPT

## 2018-10-13 PROCEDURE — 82803 BLOOD GASES ANY COMBINATION: CPT

## 2018-10-13 PROCEDURE — 82962 GLUCOSE BLOOD TEST: CPT

## 2018-10-13 PROCEDURE — 93005 ELECTROCARDIOGRAM TRACING: CPT

## 2018-10-13 PROCEDURE — 87040 BLOOD CULTURE FOR BACTERIA: CPT

## 2018-10-13 PROCEDURE — 94640 AIRWAY INHALATION TREATMENT: CPT

## 2018-10-13 PROCEDURE — 85730 THROMBOPLASTIN TIME PARTIAL: CPT

## 2018-10-13 PROCEDURE — 94760 N-INVAS EAR/PLS OXIMETRY 1: CPT

## 2018-10-13 PROCEDURE — 87086 URINE CULTURE/COLONY COUNT: CPT

## 2018-10-13 PROCEDURE — 99285 EMERGENCY DEPT VISIT HI MDM: CPT | Mod: 25

## 2018-10-13 PROCEDURE — 80053 COMPREHEN METABOLIC PANEL: CPT

## 2018-10-13 PROCEDURE — 99221 1ST HOSP IP/OBS SF/LOW 40: CPT

## 2018-10-13 PROCEDURE — 36415 COLL VENOUS BLD VENIPUNCTURE: CPT

## 2018-10-13 RX ADMIN — LOSARTAN POTASSIUM 25 MILLIGRAM(S): 100 TABLET, FILM COATED ORAL at 06:47

## 2018-10-13 RX ADMIN — NYSTATIN CREAM 1 APPLICATION(S): 100000 CREAM TOPICAL at 06:48

## 2018-10-13 RX ADMIN — BUDESONIDE AND FORMOTEROL FUMARATE DIHYDRATE 2 PUFF(S): 160; 4.5 AEROSOL RESPIRATORY (INHALATION) at 06:49

## 2018-10-13 RX ADMIN — Medication 3 MILLILITER(S): at 01:52

## 2018-10-13 RX ADMIN — Medication 3 MILLILITER(S): at 13:54

## 2018-10-13 RX ADMIN — Medication 600 MILLIGRAM(S): at 06:47

## 2018-10-13 RX ADMIN — HEPARIN SODIUM 5000 UNIT(S): 5000 INJECTION INTRAVENOUS; SUBCUTANEOUS at 06:48

## 2018-10-13 RX ADMIN — PANTOPRAZOLE SODIUM 40 MILLIGRAM(S): 20 TABLET, DELAYED RELEASE ORAL at 06:47

## 2018-10-13 RX ADMIN — BUDESONIDE AND FORMOTEROL FUMARATE DIHYDRATE 2 PUFF(S): 160; 4.5 AEROSOL RESPIRATORY (INHALATION) at 06:48

## 2018-10-13 RX ADMIN — Medication 3 MILLILITER(S): at 07:36

## 2018-10-13 RX ADMIN — Medication 325 MILLIGRAM(S): at 15:11

## 2018-10-15 ENCOUNTER — APPOINTMENT (OUTPATIENT)
Dept: HOME HEALTH SERVICES | Facility: HOME HEALTH | Age: 83
End: 2018-10-15

## 2018-10-15 VITALS
RESPIRATION RATE: 16 BRPM | HEART RATE: 78 BPM | TEMPERATURE: 97.8 F | SYSTOLIC BLOOD PRESSURE: 142 MMHG | DIASTOLIC BLOOD PRESSURE: 76 MMHG | OXYGEN SATURATION: 96 %

## 2018-10-16 ENCOUNTER — MEDICATION RENEWAL (OUTPATIENT)
Age: 83
End: 2018-10-16

## 2018-10-16 RX ORDER — ONDANSETRON 4 MG/1
4 TABLET, ORALLY DISINTEGRATING ORAL
Qty: 84 | Refills: 0 | Status: ACTIVE | COMMUNITY
Start: 2018-10-04

## 2018-10-16 RX ORDER — OMEPRAZOLE 20 MG/1
20 TABLET, DELAYED RELEASE ORAL
Qty: 1 | Refills: 2 | Status: ACTIVE | COMMUNITY
Start: 2018-08-07

## 2018-10-16 RX ORDER — GABAPENTIN 300 MG/1
300 CAPSULE ORAL
Qty: 270 | Refills: 0 | Status: ACTIVE | COMMUNITY
Start: 2017-09-11

## 2018-10-16 RX ORDER — LIDOCAINE 5% 700 MG/1
5 PATCH TOPICAL
Refills: 0 | Status: ACTIVE | COMMUNITY
Start: 2017-10-11

## 2018-10-16 RX ORDER — BLOOD SUGAR DIAGNOSTIC
STRIP MISCELLANEOUS DAILY
Qty: 50 | Refills: 5 | Status: ACTIVE | COMMUNITY
Start: 2017-10-26

## 2018-10-16 RX ORDER — CHLORHEXIDINE GLUCONATE 4 %
325 (65 FE) LIQUID (ML) TOPICAL
Qty: 100 | Refills: 1 | Status: ACTIVE | COMMUNITY
Start: 2018-07-30

## 2018-10-16 RX ORDER — CALCIUM CARBONATE/VITAMIN D3 600 MG-20
600-800 TABLET ORAL
Qty: 90 | Refills: 1 | Status: ACTIVE | COMMUNITY
Start: 2017-01-09

## 2018-10-16 RX ORDER — PEN NEEDLE, DIABETIC 29 G X1/2"
32G X 4 MM NEEDLE, DISPOSABLE MISCELLANEOUS
Qty: 100 | Refills: 0 | Status: ACTIVE | COMMUNITY
Start: 2017-10-13

## 2018-10-16 RX ORDER — NYSTATIN 100000 U/G
100000 OINTMENT TOPICAL
Qty: 30 | Refills: 0 | Status: ACTIVE | COMMUNITY
Start: 2018-02-08

## 2018-10-16 RX ORDER — OMEPRAZOLE 20 MG/1
20 TABLET, DELAYED RELEASE ORAL
Qty: 1 | Refills: 0 | Status: ACTIVE | COMMUNITY
Start: 2018-08-07

## 2018-10-16 RX ORDER — ISOSORBIDE MONONITRATE 20 MG/1
20 TABLET ORAL
Qty: 180 | Refills: 3 | Status: ACTIVE | COMMUNITY
Start: 2017-11-06

## 2018-10-16 RX ORDER — SENNOSIDES 8.6 MG/1
8.6 CAPSULE, GELATIN COATED ORAL
Qty: 30 | Refills: 4 | Status: ACTIVE | COMMUNITY
Start: 2017-07-11

## 2018-10-16 RX ORDER — GLIPIZIDE 5 MG/1
5 TABLET ORAL DAILY
Qty: 45 | Refills: 3 | Status: ACTIVE | COMMUNITY
Start: 2017-10-25

## 2018-10-16 RX ORDER — LOSARTAN POTASSIUM 25 MG/1
25 TABLET, FILM COATED ORAL DAILY
Qty: 45 | Refills: 3 | Status: ACTIVE | COMMUNITY
Start: 2017-10-25 | End: 1900-01-01

## 2018-10-16 RX ORDER — FLUTICASONE PROPIONATE AND SALMETEROL 50; 250 UG/1; UG/1
250-50 POWDER RESPIRATORY (INHALATION) TWICE DAILY
Qty: 1 | Refills: 2 | Status: ACTIVE | COMMUNITY
Start: 2017-10-23

## 2018-10-21 ENCOUNTER — MOBILE ON CALL (OUTPATIENT)
Age: 83
End: 2018-10-21

## 2018-10-21 ENCOUNTER — CLINICAL ADVICE (OUTPATIENT)
Age: 83
End: 2018-10-21

## 2018-10-21 DIAGNOSIS — Z87.09 PERSONAL HISTORY OF OTHER DISEASES OF THE RESPIRATORY SYSTEM: ICD-10-CM

## 2018-10-21 DIAGNOSIS — R53.81 OTHER MALAISE: ICD-10-CM

## 2018-10-21 DIAGNOSIS — Z87.440 PERSONAL HISTORY OF URINARY (TRACT) INFECTIONS: Chronic | ICD-10-CM

## 2018-10-22 ENCOUNTER — APPOINTMENT (OUTPATIENT)
Dept: HOME HEALTH SERVICES | Facility: HOME HEALTH | Age: 83
End: 2018-10-22

## 2018-10-22 ENCOUNTER — INPATIENT (INPATIENT)
Facility: HOSPITAL | Age: 83
LOS: 11 days | DRG: 441 | End: 2018-11-03
Attending: HOSPITALIST | Admitting: HOSPITALIST
Payer: MEDICAID

## 2018-10-22 ENCOUNTER — TRANSCRIPTION ENCOUNTER (OUTPATIENT)
Age: 83
End: 2018-10-22

## 2018-10-22 VITALS
DIASTOLIC BLOOD PRESSURE: 60 MMHG | SYSTOLIC BLOOD PRESSURE: 120 MMHG | TEMPERATURE: 98.2 F | OXYGEN SATURATION: 94 % | HEART RATE: 102 BPM | RESPIRATION RATE: 16 BRPM

## 2018-10-22 VITALS
TEMPERATURE: 97 F | OXYGEN SATURATION: 99 % | SYSTOLIC BLOOD PRESSURE: 157 MMHG | RESPIRATION RATE: 20 BRPM | HEIGHT: 65 IN | WEIGHT: 160.06 LBS | DIASTOLIC BLOOD PRESSURE: 75 MMHG | HEART RATE: 94 BPM

## 2018-10-22 LAB
ALBUMIN SERPL ELPH-MCNC: 2 G/DL — LOW (ref 3.3–5)
ALP SERPL-CCNC: 136 U/L — HIGH (ref 40–120)
ALT FLD-CCNC: 21 U/L — SIGNIFICANT CHANGE UP (ref 12–78)
ANION GAP SERPL CALC-SCNC: 7 MMOL/L — SIGNIFICANT CHANGE UP (ref 5–17)
APPEARANCE UR: CLEAR — SIGNIFICANT CHANGE UP
APTT BLD: 25.9 SEC — LOW (ref 27.5–37.4)
AST SERPL-CCNC: 26 U/L — SIGNIFICANT CHANGE UP (ref 15–37)
BACTERIA # UR AUTO: ABNORMAL
BASOPHILS # BLD AUTO: 0.04 K/UL — SIGNIFICANT CHANGE UP (ref 0–0.2)
BASOPHILS NFR BLD AUTO: 0.3 % — SIGNIFICANT CHANGE UP (ref 0–2)
BILIRUB SERPL-MCNC: 0.4 MG/DL — SIGNIFICANT CHANGE UP (ref 0.2–1.2)
BILIRUB UR-MCNC: ABNORMAL
BUN SERPL-MCNC: 64 MG/DL — HIGH (ref 7–23)
CALCIUM SERPL-MCNC: 8 MG/DL — LOW (ref 8.5–10.1)
CHLORIDE SERPL-SCNC: 120 MMOL/L — HIGH (ref 96–108)
CO2 SERPL-SCNC: 21 MMOL/L — LOW (ref 22–31)
COLOR SPEC: SIGNIFICANT CHANGE UP
COMMENT - URINE: SIGNIFICANT CHANGE UP
CREAT SERPL-MCNC: 2 MG/DL — HIGH (ref 0.5–1.3)
DIFF PNL FLD: ABNORMAL
EOSINOPHIL # BLD AUTO: 0.15 K/UL — SIGNIFICANT CHANGE UP (ref 0–0.5)
EOSINOPHIL NFR BLD AUTO: 1.3 % — SIGNIFICANT CHANGE UP (ref 0–6)
EPI CELLS # UR: ABNORMAL
GLUCOSE SERPL-MCNC: 110 MG/DL — HIGH (ref 70–99)
GLUCOSE UR QL: NEGATIVE — SIGNIFICANT CHANGE UP
HCT VFR BLD CALC: 29.5 % — LOW (ref 34.5–45)
HGB BLD-MCNC: 9.5 G/DL — LOW (ref 11.5–15.5)
HYALINE CASTS # UR AUTO: ABNORMAL /LPF
IMM GRANULOCYTES NFR BLD AUTO: 0.4 % — SIGNIFICANT CHANGE UP (ref 0–1.5)
INR BLD: 1.23 RATIO — HIGH (ref 0.88–1.16)
KETONES UR-MCNC: NEGATIVE — SIGNIFICANT CHANGE UP
LACTATE SERPL-SCNC: 1.4 MMOL/L — SIGNIFICANT CHANGE UP (ref 0.7–2)
LEUKOCYTE ESTERASE UR-ACNC: ABNORMAL
LIDOCAIN IGE QN: 117 U/L — SIGNIFICANT CHANGE UP (ref 73–393)
LYMPHOCYTES # BLD AUTO: 1.47 K/UL — SIGNIFICANT CHANGE UP (ref 1–3.3)
LYMPHOCYTES # BLD AUTO: 12.8 % — LOW (ref 13–44)
MCHC RBC-ENTMCNC: 28.5 PG — SIGNIFICANT CHANGE UP (ref 27–34)
MCHC RBC-ENTMCNC: 32.2 GM/DL — SIGNIFICANT CHANGE UP (ref 32–36)
MCV RBC AUTO: 88.6 FL — SIGNIFICANT CHANGE UP (ref 80–100)
MONOCYTES # BLD AUTO: 0.95 K/UL — HIGH (ref 0–0.9)
MONOCYTES NFR BLD AUTO: 8.3 % — SIGNIFICANT CHANGE UP (ref 2–14)
NEUTROPHILS # BLD AUTO: 8.8 K/UL — HIGH (ref 1.8–7.4)
NEUTROPHILS NFR BLD AUTO: 76.9 % — SIGNIFICANT CHANGE UP (ref 43–77)
NITRITE UR-MCNC: NEGATIVE — SIGNIFICANT CHANGE UP
NRBC # BLD: 0 /100 WBCS — SIGNIFICANT CHANGE UP (ref 0–0)
PH UR: 5 — SIGNIFICANT CHANGE UP (ref 5–8)
PLATELET # BLD AUTO: 147 K/UL — LOW (ref 150–400)
POTASSIUM SERPL-MCNC: 4.3 MMOL/L — SIGNIFICANT CHANGE UP (ref 3.5–5.3)
POTASSIUM SERPL-SCNC: 4.3 MMOL/L — SIGNIFICANT CHANGE UP (ref 3.5–5.3)
PROT SERPL-MCNC: 6.8 G/DL — SIGNIFICANT CHANGE UP (ref 6–8.3)
PROT UR-MCNC: 25 MG/DL
PROTHROM AB SERPL-ACNC: 13.5 SEC — HIGH (ref 9.8–12.7)
RBC # BLD: 3.33 M/UL — LOW (ref 3.8–5.2)
RBC # FLD: 14.9 % — HIGH (ref 10.3–14.5)
RBC CASTS # UR COMP ASSIST: SIGNIFICANT CHANGE UP /HPF (ref 0–4)
SODIUM SERPL-SCNC: 148 MMOL/L — HIGH (ref 135–145)
SP GR SPEC: 1.01 — SIGNIFICANT CHANGE UP (ref 1.01–1.02)
UROBILINOGEN FLD QL: NEGATIVE — SIGNIFICANT CHANGE UP
WBC # BLD: 11.46 K/UL — HIGH (ref 3.8–10.5)
WBC # FLD AUTO: 11.46 K/UL — HIGH (ref 3.8–10.5)
WBC UR QL: SIGNIFICANT CHANGE UP

## 2018-10-22 PROCEDURE — 93010 ELECTROCARDIOGRAM REPORT: CPT

## 2018-10-22 PROCEDURE — 99285 EMERGENCY DEPT VISIT HI MDM: CPT

## 2018-10-22 PROCEDURE — 99223 1ST HOSP IP/OBS HIGH 75: CPT | Mod: GC,AI

## 2018-10-22 RX ORDER — IPRATROPIUM/ALBUTEROL SULFATE 18-103MCG
3 AEROSOL WITH ADAPTER (GRAM) INHALATION ONCE
Qty: 0 | Refills: 0 | Status: COMPLETED | OUTPATIENT
Start: 2018-10-22 | End: 2018-10-23

## 2018-10-22 RX ORDER — SODIUM CHLORIDE 9 MG/ML
1000 INJECTION INTRAMUSCULAR; INTRAVENOUS; SUBCUTANEOUS
Qty: 0 | Refills: 0 | Status: COMPLETED | OUTPATIENT
Start: 2018-10-22 | End: 2018-10-23

## 2018-10-22 RX ORDER — PIPERACILLIN AND TAZOBACTAM 4; .5 G/20ML; G/20ML
3.38 INJECTION, POWDER, LYOPHILIZED, FOR SOLUTION INTRAVENOUS ONCE
Qty: 0 | Refills: 0 | Status: COMPLETED | OUTPATIENT
Start: 2018-10-22 | End: 2018-10-22

## 2018-10-22 RX ADMIN — PIPERACILLIN AND TAZOBACTAM 200 GRAM(S): 4; .5 INJECTION, POWDER, LYOPHILIZED, FOR SOLUTION INTRAVENOUS at 23:42

## 2018-10-22 RX ADMIN — SODIUM CHLORIDE 1000 MILLILITER(S): 9 INJECTION INTRAMUSCULAR; INTRAVENOUS; SUBCUTANEOUS at 23:42

## 2018-10-22 NOTE — H&P ADULT - PROBLEM SELECTOR PLAN 9
on 3L O2 at home  - continue duonebs q4 standing  - continue supplemental O2  - due to acute change in mental status need to r/o CO2 retention??  - may require BIPAP  - remote with continuous pulse ox. - bedrest, turn q 2. - bedrest, turn q 2.  wound care consult  prealbumin  barrier cream

## 2018-10-22 NOTE — H&P ADULT - ATTENDING COMMENTS
Seen and examined by attending MD, agree with above and edited to reflect my findings. Seen and examined by attending MD, agree with above and edited to reflect my findings.    In addition to above A/P will add:    11: Diarrhea. recent ABX use, need C. Dif assay

## 2018-10-22 NOTE — H&P ADULT - PROBLEM SELECTOR PLAN 8
on 2.5L O2 at home. Currently sat well on 4L NC.  - duonebs q6 standing  - continue supplemental O2  - remote with continuous pulse ox. on 2.5L O2 at home. Currently sat well on 4L NC.  - duonebs q6 standing  - continue supplemental O2

## 2018-10-22 NOTE — H&P ADULT - NSHPREVIEWOFSYSTEMS_GEN_ALL_CORE
CONSTITUTIONAL: denies fever, chills, fatigue, weakness  HEENT: denies blurred vision, sore throat  SKIN: denies new lesions, rash  CARDIOVASCULAR: denies chest pain, chest pressure, palpitations  RESPIRATORY: denies shortness of breath, sputum production  GASTROINTESTINAL: denies nausea, vomiting, diarrhea, abdominal pain  GENITOURINARY: denies dysuria, discharge  NEUROLOGICAL: denies numbness, headache, focal weakness  MUSCULOSKELETAL: denies new joint pain, muscle aches  HEMATOLOGIC: denies gross bleeding, bruising  LYMPHATICS: denies enlarged lymph nodes, extremity swelling  PSYCHIATRIC: denies recent changes in anxiety, depression  ENDOCRINOLOGIC: denies sweating, cold or heat intolerance unable to obtain due to patients baseline mental status

## 2018-10-22 NOTE — ED PROVIDER NOTE - CONSTITUTIONAL, MLM
normal... Well appearing, well nourished, awake, alert, min verbal at this time and in no apparent distress.

## 2018-10-22 NOTE — H&P ADULT - PROBLEM SELECTOR PROBLEM 5
Coronary artery disease with angina pectoris, unspecified vessel or lesion type, unspecified whether native or transplanted heart Congestive heart failure

## 2018-10-22 NOTE — H&P ADULT - ASSESSMENT
89 yo F w/ PMH of DM type 2, HTN, COPD on 3L home O2, diastolic HFpEF (last echo 10/2017) presented to ED Pt is a 88 yo F w/ PMH of DM type 2, HTN, COPD on 2.5L home O2, diastolic HFpEF (last echo 10/2017) presented to ED p/w ams, weakness, dysuria, dec po x past ~ 3 days likely 2/2 UTI

## 2018-10-22 NOTE — H&P ADULT - NSHPPHYSICALEXAM_GEN_ALL_CORE
T(C): 36.2 (10-22-18 @ 22:46), Max: 36.2 (10-22-18 @ 22:46)  HR: 62 (10-22-18 @ 22:46) (62 - 62)  BP: 146/75 (10-22-18 @ 22:46) (146/75 - 146/75)  RR: 20 (10-22-18 @ 22:46) (20 - 20)  SpO2: 99% (10-22-18 @ 22:46) (99% - 99%)    GENERAL: patient appears well, no acute distress, appropriate, pleasant  EYES: sclera clear, no exudates  ENMT: oropharynx clear without erythema, no exudates, moist mucous membranes  NECK: supple, soft, no thyromegaly noted  LUNGS: good air entry bilaterally, clear to auscultation, symmetric breath sounds, no wheezing or rhonchi appreciated  HEART: soft S1/S2, regular rate and rhythm, no murmurs noted, no lower extremity edema  GASTROINTESTINAL: abdomen is soft, nontender, nondistended, normoactive bowel sounds, no palpable masses  INTEGUMENT: good skin turgor, no lesions noted  MUSCULOSKELETAL: no clubbing or cyanosis, no obvious deformity  NEUROLOGIC: awake, alert, oriented x3, good muscle tone in 4 extremities, no obvious sensory deficits  PSYCHIATRIC: mood is good, affect is congruent, linear and logical thought process  HEME/LYMPH: no palpable supraclavicular nodules, no obvious ecchymosis or petechiae T(C): 36.2 (10-22-18 @ 22:46), Max: 36.2 (10-22-18 @ 22:46)  HR: 62 (10-22-18 @ 22:46) (62 - 62)  BP: 146/75 (10-22-18 @ 22:46) (146/75 - 146/75)  RR: 20 (10-22-18 @ 22:46) (20 - 20)  SpO2: 99% (10-22-18 @ 22:46) (99% - 99%)    GENERAL: frail elderly woman, moaning, disoriented, altered  EYES: sclera clear, no exudates  NECK: supple, soft, no thyromegaly noted  LUNGS: decreased breath sounds b/l, symmetric breath sounds, no wheezing or rhonchi appreciated  HEART: soft S1/S2, regular rate, + 2/6 systolic murmur, +1 lower extremity edema  GASTROINTESTINAL: abdomen is soft, nontender, nondistended, normoactive bowel sounds  INTEGUMENT: warm, dry, normal color, stage 2 sacral decubitus ulcer  MUSCULOSKELETAL: no clubbing or cyanosis, no obvious deformity  NEUROLOGIC: awake, disoriented, does not follow simple commands, incoherent, responds to painful stimuli, nonverbal  VAGINAL: no obvious discharge, diaper rash present, vulvar erythema, no satellite lesions noted T(C): 36.2 (10-22-18 @ 22:46), Max: 36.2 (10-22-18 @ 22:46)  HR: 62 (10-22-18 @ 22:46) (62 - 62)  BP: 146/75 (10-22-18 @ 22:46) (146/75 - 146/75)  RR: 20 (10-22-18 @ 22:46) (20 - 20)  SpO2: 99% (10-22-18 @ 22:46) (99% - 99%)    GENERAL: frail elderly woman, moaning, disoriented, altered  EYES: sclera clear, no exudates  NECK: supple, soft, no thyromegaly noted  LUNGS: decreased breath sounds b/l, symmetric breath sounds, no wheezing or rhonchi appreciated  HEART: soft S1/S2, regular rate, + 2/6 systolic murmur, +1 lower extremity edema  GASTROINTESTINAL: abdomen is hard, nontender, nondistended, normoactive bowel sounds  INTEGUMENT: warm, dry, normal color, stage 2 sacral decubitus ulcer  MUSCULOSKELETAL: no clubbing or cyanosis, no obvious deformity  NEUROLOGIC: awake, disoriented, does not follow simple commands, incoherent, responds to painful stimuli, nonverbal  VAGINAL: no obvious discharge, diaper rash present, vulvar erythema, no satellite lesions noted, possible uterine prolapse

## 2018-10-22 NOTE — H&P ADULT - PROBLEM SELECTOR PLAN 6
- EF 65% on echo 10/2017  - continue furosemide convert to IV due to lethargy- 20IV QD  - monitor cr closely while diuresing  - hold ARB due to SAMIA. -Hold home medications:   - low dose insuline sliding scale  -Hypoglycemia Protocol, ERICA, Mike AC&HS.  -Diet: Consistent Carbs w/ Evening Snack.  -Follow up HbA1c. -Hold home medications:   - low dose insuline sliding scale  -Hypoglycemia Protocol, ERICA, Mike AC&HS.  -Follow up HbA1c.

## 2018-10-22 NOTE — H&P ADULT - PROBLEM SELECTOR PLAN 4
2/2 to dehydration  -Plan as above  -Hold _____ 2/2 to dehydration  -Plan as above  -Hold nephrotoxic agents

## 2018-10-22 NOTE — H&P ADULT - PROBLEM SELECTOR PLAN 5
- EF 65% on echo 10/2017  - Hold furosemide in the setting of SAMIA  - will monitor for signs of fluid overload  - hold ARB due to SAMIA.

## 2018-10-22 NOTE — H&P ADULT - PROBLEM SELECTOR PLAN 1
Likely 2/2 poor PO intake Likely 2/2 poor PO intake/UTI. Currently having difficulty swallowing. UA: moderate leukocyte esterase, trace blood, moderate epithelial, bacteria few, hyaline 0-2, yeast present  -speech and swallow eval- c/w Dysphagia 1 w/ honey thick on previous admission, will continue diet  - Aspiration precautions  - bedrest, turn q 2.  -fu am cbc, f/u blood cultures, urine culture & sensitivity Likely 2/2 poor PO intake/UTI. Currently having difficulty swallowing. UA: moderate leukocyte esterase, trace blood, moderate epithelial, bacteria few, hyaline 0-2, yeast present  -speech and swallow eval- c/w Dysphagia 1 w/ honey thick on previous admission, will continue diet  - Aspiration precautions  - bedrest, turn q 2.  -fu am cbc, f/u blood cultures, urine culture & sensitivity  - due to antibiotic use and recent diarrhea will order Cdiff

## 2018-10-22 NOTE — H&P ADULT - PROBLEM SELECTOR PLAN 10
IMPROVE VTE Individual Risk Assessment        RISK                                                          Points  [  ] Previous VTE                                                3  [  ] Thrombophilia                                             2  [ x ] Lower limb paralysis                                   2        (unable to hold up >15 seconds)    [  ] Current Cancer                                            2         (within 6 months)  [ x ] Immobilization > 24 hrs                              1  [  ] ICU/CCU stay > 24 hours                            1  [ x ] Age > 60                                                    1  IMPROVE VTE Score _____4____  Heparin for DVT prophylaxis.

## 2018-10-22 NOTE — H&P ADULT - PROBLEM SELECTOR PLAN 2
2/2 poor PO intake  -Continue IVF  -Continue to monitor BMP 2/2 poor PO intake  -Continue IVF  -Continue to monitor BMP  - monitor for signs of fluid overload given CHF hx

## 2018-10-22 NOTE — ED PROVIDER NOTE - OBJECTIVE STATEMENT
88 yo F p/w ams, weakness, dec po x past ~ 3 days. Pt with hx recent admission for ams, similar to current. Pt was diag and treated for UTI. Pt dc 6 days ago. pt was initially doing well, but then developed sx 3 days ago. Pt unable to give hx. no other hx avail.

## 2018-10-22 NOTE — ED ADULT NURSE NOTE - CHIEF COMPLAINT QUOTE
brought in by EMS from home for altered mental status x 3 days, patient recently admitted to hospital for UTI. as per son patient has been unable to take antibiotics for infection. DNR/DNI as per SHERRI.

## 2018-10-22 NOTE — ED PROVIDER NOTE - CARE PLAN
Principal Discharge DX:	Altered mental status, unspecified altered mental status type  Goal:	ro UTI  Secondary Diagnosis:	Dehydration  Secondary Diagnosis:	Renal insufficiency

## 2018-10-22 NOTE — H&P ADULT - NSHPSOCIALHISTORY_GEN_ALL_CORE
Patient lives at home w/ son, has 24hr aide.  Patient is bedbound.  Son at bedside denies hx of smoking, alcohol or illicit substance use. Patient lives at home w/ son, has 24hr aide.  Patient is bedbound.  Son at bedside denies hx of smoking, alcohol or illicit substance use.  pt received flu vaccination

## 2018-10-22 NOTE — H&P ADULT - PROBLEM SELECTOR PLAN 7
-Hold home medications:   -Continue  -Hypoglycemia Protocol, Mike MALDONADO AC&HS.  -Diet: Consistent Carbs w/ Evening Snack.  -Follow up HbA1c. BP stable.   - hold losartan in setting of SAMIA. monitor Cr.

## 2018-10-22 NOTE — ED ADULT TRIAGE NOTE - CHIEF COMPLAINT QUOTE
brought in by EMS from home for altered mental status x 3 days, patient recently admitted to hospital for UTI. as per son patient has been unable to take antibiotics for infection. DNR/DNI as per SHERRI brought in by EMS from home for altered mental status x 3 days, patient recently admitted to hospital for UTI. as per son patient has been unable to take antibiotics for infection. DNR/DNI as per SHERRI.

## 2018-10-22 NOTE — ED PROVIDER NOTE - ENMT, MLM
Airway patent, Nasal mucosa clear. Mouth with normal mucosa. Throat has no vesicles, no oropharyngeal exudates and uvula is midline. MM Moist. Non-toxic, well appearing. neck supple

## 2018-10-22 NOTE — H&P ADULT - PROBLEM SELECTOR PLAN 3
Patient was prescribed ______  -Received zosyn in the ED. Continue rocephin  -F/u am cbc, f/u blood cultures, urine culture & sensitivity Patient was prescribed Bactrim. Took 2 doses. unable to swallow to take 3rd dose  -Received zosyn in the ED. Start rocephin  -F/u am cbc, f/u blood cultures, urine culture & sensitivity Patient was prescribed Bactrim. Took 2 doses. unable to swallow to take 3rd dose  -Received zosyn in the ED. Start rocephin  -prior Cx with candida  -F/u am cbc, f/u blood cultures, urine culture & sensitivity

## 2018-10-22 NOTE — ED ADULT NURSE NOTE - OBJECTIVE STATEMENT
Patient brought in by ambulance from home as reported was altered mental status was being treated for UTI on antibiotic but wasn't working for her seen and evaluated by MD with orders made and carried out blood drawn and sent to lab due medications given as per order.

## 2018-10-22 NOTE — ED ADULT NURSE NOTE - NSIMPLEMENTINTERV_GEN_ALL_ED
Implemented All Fall with Harm Risk Interventions:  [place] to call system. Call bell, personal items and telephone within reach. Instruct patient to call for assistance. Room bathroom lighting operational. Non-slip footwear when patient is off stretcher. Physically safe environment: no spills, clutter or unnecessary equipment. Stretcher in lowest position, wheels locked, appropriate side rails in place. Provide visual cue, wrist band, yellow gown, etc. Monitor gait and stability. Monitor for mental status changes and reorient to person, place, and time. Review medications for side effects contributing to fall risk. Reinforce activity limits and safety measures with patient and family. Provide visual clues: red socks.

## 2018-10-22 NOTE — H&P ADULT - HISTORY OF PRESENT ILLNESS
88 yo F w/ PMH of DM type 2, HTN, COPD on 3L home O2, diastolic HFpEF (last echo 10/2017) presented to ED p/w ams, weakness, dec po x past ~ 3 days. Pt with hx recent admission for ams, similar to current. Pt was diag and treated for UTI. Pt dc 6 days ago. pt was initially doing well, but then developed sx 3 days ago. Pt unable to give hx. no other hx avail.    In the ED, temp 97.1, HR 62, /75, RR 20, Spo2 99 on supplemental o2. WBC 11.46, Hg 9.5, hct 29.5, platelet 147, PT 13.5, INR 1.23, PTT 25.9, Na 148, K 4.3, Cl 120, Co2 21, BUN 64, Cr 2.00, glucose 110, calcium 8.0, alk phos 136, lactate 1.4, lipase 117. UA: small bilirubin, moderate leukocyte esterase, trace blood, moderate epithelial, bacteria few, hyaline 0-2    Imaging:  CXR and CT head no cont ordered  EKG:    In the ED, received zosyn 3.375 mg IV x 1, NaCl 0.9% 1000mL x 2, duoneb 3 mL x 1, Hx obtained from son and other family members as pt was altered.  Pt is a 89 yo F w/ PMH of DM type 2, HTN, COPD on 2.5L home O2, diastolic HFpEF (last echo 10/2017) presented to ED p/w ams, weakness, dysuria, dec po x past ~ 3 days. Of note Pt was recently admitted for ams, similar to current presentation and was d/c 6 days ago. On previous admission Pt was diagnosed and treated for UTI. Pt was initially doing well, but then developed burning with urination, AMS, and diarrhea x 3 days ago. Family reports that pt was given Bactrim for unresolved UTI, but that she was only able to take 2 doses, as she could not swallow the 3rd dose this AM. Pt has poor PO intake and EMS came to her house last night to give her IVF due to dehydration. Of note patient is normally bedbound and requires 24/7 home health aid. Pt is incontinent at baseline and wears diapers. Denies any Fevers, but endorses chills, and increased fatigue. Denies any SOB, CP, N/V. Family reports pt normally is able to converse without issue, but has been incoherent for ~ the past 3 days    In the ED, temp 97.1, HR 62, /75, RR 20, Spo2 99 on supplemental o2. WBC 11.46, Hg 9.5, hct 29.5, platelet 147, PT 13.5, INR 1.23, PTT 25.9, Na 148, K 4.3, Cl 120, Co2 21, BUN 64, Cr 2.00, glucose 110, calcium 8.0, alk phos 136, lactate 1.4, lipase 117. UA: small bilirubin, moderate leukocyte esterase, trace blood, moderate epithelial, bacteria few, hyaline 0-2, yeast present    Imaging:  CXR and CT head no cont ordered  EKG: NSR    In the ED, received zosyn 3.375 mg IV x 1, NaCl 0.9% 1000mL x 2, duoneb 3 mL x 1, Hx obtained from son and other family members as pt was altered.  Pt is a 89 yo F w/ PMH of DM type 2, HTN, COPD on 2.5L home O2, diastolic HFpEF (last echo 10/2017) presented to ED p/w ams, weakness, dysuria, dec po x past ~ 3 days. Of note Pt was recently admitted for ams, similar to current presentation and was d/c 6 days ago. On previous admission Pt was diagnosed and treated for UTI. Pt was initially doing well, but then developed burning with urination, AMS, and watery diarrhea x 3 days ago. Family reports that pt was given Bactrim for unresolved UTI, but that she was only able to take 2 doses, as she could not swallow the 3rd dose this AM. Pt has poor PO intake and EMS came to her house last night to give her IVF due to dehydration. Of note patient is normally bedbound and requires 24/7 home health aid. Pt is incontinent at baseline and wears diapers. Denies any Fevers, but endorses chills, and increased fatigue. Denies any SOB, CP, N/V. Family reports pt normally is able to converse without issue, but has been incoherent for ~ the past 3 days    In the ED, temp 97.1, HR 62, /75, RR 20, Spo2 99 on supplemental o2. WBC 11.46, Hg 9.5, hct 29.5, platelet 147, PT 13.5, INR 1.23, PTT 25.9, Na 148, K 4.3, Cl 120, Co2 21, BUN 64, Cr 2.00, glucose 110, calcium 8.0, alk phos 136, lactate 1.4, lipase 117. UA: small bilirubin, moderate leukocyte esterase, trace blood, moderate epithelial, bacteria few, hyaline 0-2, yeast present    Imaging:  CXR and CT head no cont ordered  EKG: NSR    In the ED, received zosyn 3.375 mg IV x 1, NaCl 0.9% 1000mL x 2, duoneb 3 mL x 1,

## 2018-10-23 ENCOUNTER — CLINICAL ADVICE (OUTPATIENT)
Age: 83
End: 2018-10-23

## 2018-10-23 ENCOUNTER — APPOINTMENT (OUTPATIENT)
Dept: HOME HEALTH SERVICES | Facility: HOME HEALTH | Age: 83
End: 2018-10-23

## 2018-10-23 DIAGNOSIS — R33.9 RETENTION OF URINE, UNSPECIFIED: ICD-10-CM

## 2018-10-23 DIAGNOSIS — J44.9 CHRONIC OBSTRUCTIVE PULMONARY DISEASE, UNSPECIFIED: ICD-10-CM

## 2018-10-23 DIAGNOSIS — E86.0 DEHYDRATION: ICD-10-CM

## 2018-10-23 DIAGNOSIS — R41.82 ALTERED MENTAL STATUS, UNSPECIFIED: ICD-10-CM

## 2018-10-23 DIAGNOSIS — E11.9 TYPE 2 DIABETES MELLITUS WITHOUT COMPLICATIONS: ICD-10-CM

## 2018-10-23 DIAGNOSIS — I10 ESSENTIAL (PRIMARY) HYPERTENSION: ICD-10-CM

## 2018-10-23 DIAGNOSIS — Z29.9 ENCOUNTER FOR PROPHYLACTIC MEASURES, UNSPECIFIED: ICD-10-CM

## 2018-10-23 DIAGNOSIS — N39.0 URINARY TRACT INFECTION, SITE NOT SPECIFIED: ICD-10-CM

## 2018-10-23 DIAGNOSIS — I50.9 HEART FAILURE, UNSPECIFIED: ICD-10-CM

## 2018-10-23 DIAGNOSIS — N17.9 ACUTE KIDNEY FAILURE, UNSPECIFIED: ICD-10-CM

## 2018-10-23 DIAGNOSIS — I25.119 ATHEROSCLEROTIC HEART DISEASE OF NATIVE CORONARY ARTERY WITH UNSPECIFIED ANGINA PECTORIS: ICD-10-CM

## 2018-10-23 DIAGNOSIS — L89.152 PRESSURE ULCER OF SACRAL REGION, STAGE 2: ICD-10-CM

## 2018-10-23 LAB
ANION GAP SERPL CALC-SCNC: 7 MMOL/L — SIGNIFICANT CHANGE UP (ref 5–17)
BASE EXCESS BLDA CALC-SCNC: -7.9 MMOL/L — LOW (ref -2–2)
BASOPHILS # BLD AUTO: 0.04 K/UL — SIGNIFICANT CHANGE UP (ref 0–0.2)
BASOPHILS NFR BLD AUTO: 0.4 % — SIGNIFICANT CHANGE UP (ref 0–2)
BLOOD GAS COMMENTS ARTERIAL: SIGNIFICANT CHANGE UP
BUN SERPL-MCNC: 59 MG/DL — HIGH (ref 7–23)
CALCIUM SERPL-MCNC: 7.9 MG/DL — LOW (ref 8.5–10.1)
CHLORIDE SERPL-SCNC: 122 MMOL/L — HIGH (ref 96–108)
CO2 SERPL-SCNC: 20 MMOL/L — LOW (ref 22–31)
CREAT SERPL-MCNC: 1.9 MG/DL — HIGH (ref 0.5–1.3)
EOSINOPHIL # BLD AUTO: 0.1 K/UL — SIGNIFICANT CHANGE UP (ref 0–0.5)
EOSINOPHIL NFR BLD AUTO: 0.9 % — SIGNIFICANT CHANGE UP (ref 0–6)
GLUCOSE SERPL-MCNC: 105 MG/DL — HIGH (ref 70–99)
HBA1C BLD-MCNC: 5.1 % — SIGNIFICANT CHANGE UP (ref 4–5.6)
HCO3 BLDA-SCNC: 18 MMOL/L — LOW (ref 23–27)
HCT VFR BLD CALC: 26.5 % — LOW (ref 34.5–45)
HGB BLD-MCNC: 8.5 G/DL — LOW (ref 11.5–15.5)
HOROWITZ INDEX BLDA+IHG-RTO: 32 — SIGNIFICANT CHANGE UP
IMM GRANULOCYTES NFR BLD AUTO: 0.5 % — SIGNIFICANT CHANGE UP (ref 0–1.5)
LYMPHOCYTES # BLD AUTO: 0.91 K/UL — LOW (ref 1–3.3)
LYMPHOCYTES # BLD AUTO: 8.5 % — LOW (ref 13–44)
MCHC RBC-ENTMCNC: 28.9 PG — SIGNIFICANT CHANGE UP (ref 27–34)
MCHC RBC-ENTMCNC: 32.1 GM/DL — SIGNIFICANT CHANGE UP (ref 32–36)
MCV RBC AUTO: 90.1 FL — SIGNIFICANT CHANGE UP (ref 80–100)
MONOCYTES # BLD AUTO: 0.78 K/UL — SIGNIFICANT CHANGE UP (ref 0–0.9)
MONOCYTES NFR BLD AUTO: 7.3 % — SIGNIFICANT CHANGE UP (ref 2–14)
NEUTROPHILS # BLD AUTO: 8.77 K/UL — HIGH (ref 1.8–7.4)
NEUTROPHILS NFR BLD AUTO: 82.4 % — HIGH (ref 43–77)
PCO2 BLDA: 31 MMHG — LOW (ref 32–46)
PH BLDA: 7.35 — SIGNIFICANT CHANGE UP (ref 7.35–7.45)
PLATELET # BLD AUTO: 121 K/UL — LOW (ref 150–400)
PO2 BLDA: 105 MMHG — SIGNIFICANT CHANGE UP (ref 74–108)
POTASSIUM SERPL-MCNC: 4 MMOL/L — SIGNIFICANT CHANGE UP (ref 3.5–5.3)
POTASSIUM SERPL-SCNC: 4 MMOL/L — SIGNIFICANT CHANGE UP (ref 3.5–5.3)
PROCALCITONIN SERPL-MCNC: 0.42 NG/ML — HIGH (ref 0–0.04)
RBC # BLD: 2.94 M/UL — LOW (ref 3.8–5.2)
RBC # FLD: 15 % — HIGH (ref 10.3–14.5)
SAO2 % BLDA: 98 % — HIGH (ref 92–96)
SODIUM SERPL-SCNC: 149 MMOL/L — HIGH (ref 135–145)
WBC # BLD: 10.65 K/UL — HIGH (ref 3.8–10.5)
WBC # FLD AUTO: 10.65 K/UL — HIGH (ref 3.8–10.5)

## 2018-10-23 PROCEDURE — 70450 CT HEAD/BRAIN W/O DYE: CPT | Mod: 26

## 2018-10-23 PROCEDURE — 71045 X-RAY EXAM CHEST 1 VIEW: CPT | Mod: 26

## 2018-10-23 PROCEDURE — 71250 CT THORAX DX C-: CPT | Mod: 26

## 2018-10-23 PROCEDURE — 99233 SBSQ HOSP IP/OBS HIGH 50: CPT

## 2018-10-23 RX ORDER — ALBUTEROL 90 UG/1
2 AEROSOL, METERED ORAL EVERY 6 HOURS
Qty: 0 | Refills: 0 | Status: DISCONTINUED | OUTPATIENT
Start: 2018-10-23 | End: 2018-10-23

## 2018-10-23 RX ORDER — HEPARIN SODIUM 5000 [USP'U]/ML
5000 INJECTION INTRAVENOUS; SUBCUTANEOUS EVERY 12 HOURS
Qty: 0 | Refills: 0 | Status: DISCONTINUED | OUTPATIENT
Start: 2018-10-23 | End: 2018-11-01

## 2018-10-23 RX ORDER — DEXTROSE 50 % IN WATER 50 %
12.5 SYRINGE (ML) INTRAVENOUS ONCE
Qty: 0 | Refills: 0 | Status: DISCONTINUED | OUTPATIENT
Start: 2018-10-23 | End: 2018-11-01

## 2018-10-23 RX ORDER — NYSTATIN CREAM 100000 [USP'U]/G
1 CREAM TOPICAL
Qty: 0 | Refills: 0 | Status: DISCONTINUED | OUTPATIENT
Start: 2018-10-23 | End: 2018-10-25

## 2018-10-23 RX ORDER — DEXTROSE 50 % IN WATER 50 %
25 SYRINGE (ML) INTRAVENOUS ONCE
Qty: 0 | Refills: 0 | Status: DISCONTINUED | OUTPATIENT
Start: 2018-10-23 | End: 2018-11-01

## 2018-10-23 RX ORDER — CEFTRIAXONE 500 MG/1
1 INJECTION, POWDER, FOR SOLUTION INTRAMUSCULAR; INTRAVENOUS EVERY 24 HOURS
Qty: 0 | Refills: 0 | Status: DISCONTINUED | OUTPATIENT
Start: 2018-10-23 | End: 2018-10-25

## 2018-10-23 RX ORDER — SODIUM CHLORIDE 9 MG/ML
1000 INJECTION, SOLUTION INTRAVENOUS
Qty: 0 | Refills: 0 | Status: DISCONTINUED | OUTPATIENT
Start: 2018-10-23 | End: 2018-11-01

## 2018-10-23 RX ORDER — DEXTROSE 50 % IN WATER 50 %
15 SYRINGE (ML) INTRAVENOUS ONCE
Qty: 0 | Refills: 0 | Status: DISCONTINUED | OUTPATIENT
Start: 2018-10-23 | End: 2018-11-01

## 2018-10-23 RX ORDER — GLUCAGON INJECTION, SOLUTION 0.5 MG/.1ML
1 INJECTION, SOLUTION SUBCUTANEOUS ONCE
Qty: 0 | Refills: 0 | Status: DISCONTINUED | OUTPATIENT
Start: 2018-10-23 | End: 2018-11-01

## 2018-10-23 RX ORDER — SODIUM CHLORIDE 9 MG/ML
1000 INJECTION, SOLUTION INTRAVENOUS
Qty: 0 | Refills: 0 | Status: DISCONTINUED | OUTPATIENT
Start: 2018-10-23 | End: 2018-10-24

## 2018-10-23 RX ORDER — TIOTROPIUM BROMIDE 18 UG/1
1 CAPSULE ORAL; RESPIRATORY (INHALATION) DAILY
Qty: 0 | Refills: 0 | Status: DISCONTINUED | OUTPATIENT
Start: 2018-10-23 | End: 2018-10-23

## 2018-10-23 RX ORDER — PANTOPRAZOLE SODIUM 20 MG/1
40 TABLET, DELAYED RELEASE ORAL
Qty: 0 | Refills: 0 | Status: DISCONTINUED | OUTPATIENT
Start: 2018-10-23 | End: 2018-10-28

## 2018-10-23 RX ORDER — IPRATROPIUM/ALBUTEROL SULFATE 18-103MCG
3 AEROSOL WITH ADAPTER (GRAM) INHALATION EVERY 6 HOURS
Qty: 0 | Refills: 0 | Status: DISCONTINUED | OUTPATIENT
Start: 2018-10-23 | End: 2018-11-01

## 2018-10-23 RX ORDER — SODIUM CHLORIDE 9 MG/ML
1000 INJECTION, SOLUTION INTRAVENOUS
Qty: 0 | Refills: 0 | Status: DISCONTINUED | OUTPATIENT
Start: 2018-10-23 | End: 2018-10-25

## 2018-10-23 RX ORDER — INSULIN LISPRO 100/ML
VIAL (ML) SUBCUTANEOUS
Qty: 0 | Refills: 0 | Status: DISCONTINUED | OUTPATIENT
Start: 2018-10-23 | End: 2018-10-24

## 2018-10-23 RX ORDER — FERROUS SULFATE 325(65) MG
325 TABLET ORAL DAILY
Qty: 0 | Refills: 0 | Status: DISCONTINUED | OUTPATIENT
Start: 2018-10-23 | End: 2018-11-01

## 2018-10-23 RX ADMIN — PIPERACILLIN AND TAZOBACTAM 3.38 GRAM(S): 4; .5 INJECTION, POWDER, LYOPHILIZED, FOR SOLUTION INTRAVENOUS at 00:20

## 2018-10-23 RX ADMIN — Medication 600 MILLIGRAM(S): at 05:53

## 2018-10-23 RX ADMIN — Medication 3 MILLILITER(S): at 00:20

## 2018-10-23 RX ADMIN — NYSTATIN CREAM 1 APPLICATION(S): 100000 CREAM TOPICAL at 17:44

## 2018-10-23 RX ADMIN — SODIUM CHLORIDE 75 MILLILITER(S): 9 INJECTION, SOLUTION INTRAVENOUS at 01:21

## 2018-10-23 RX ADMIN — SODIUM CHLORIDE 1000 MILLILITER(S): 9 INJECTION INTRAMUSCULAR; INTRAVENOUS; SUBCUTANEOUS at 01:07

## 2018-10-23 RX ADMIN — PANTOPRAZOLE SODIUM 40 MILLIGRAM(S): 20 TABLET, DELAYED RELEASE ORAL at 05:53

## 2018-10-23 RX ADMIN — Medication 3 MILLILITER(S): at 07:53

## 2018-10-23 RX ADMIN — HEPARIN SODIUM 5000 UNIT(S): 5000 INJECTION INTRAVENOUS; SUBCUTANEOUS at 17:42

## 2018-10-23 RX ADMIN — CEFTRIAXONE 100 GRAM(S): 500 INJECTION, POWDER, FOR SOLUTION INTRAMUSCULAR; INTRAVENOUS at 01:47

## 2018-10-23 RX ADMIN — NYSTATIN CREAM 1 APPLICATION(S): 100000 CREAM TOPICAL at 05:53

## 2018-10-23 RX ADMIN — Medication 3 MILLILITER(S): at 15:13

## 2018-10-23 RX ADMIN — HEPARIN SODIUM 5000 UNIT(S): 5000 INJECTION INTRAVENOUS; SUBCUTANEOUS at 05:53

## 2018-10-23 RX ADMIN — SODIUM CHLORIDE 50 MILLILITER(S): 9 INJECTION, SOLUTION INTRAVENOUS at 11:51

## 2018-10-23 RX ADMIN — SODIUM CHLORIDE 1000 MILLILITER(S): 9 INJECTION INTRAMUSCULAR; INTRAVENOUS; SUBCUTANEOUS at 00:43

## 2018-10-23 RX ADMIN — Medication 3 MILLILITER(S): at 20:47

## 2018-10-23 NOTE — GOALS OF CARE CONVERSATION - PERSONAL ADVANCE DIRECTIVE - CONVERSATION DETAILS
met pt w son, Oracio, provided molst in ER, reviewed, no decision regarding MIREILLE, molst updated, redone: son agrees to dnr dni no decision regarding feeding tube, wants treatment, IV flds, antibiotics, no dialysis, wants pressors if needed.  Dr Baires completed molst, order received.  contact # given. son wants to continue medical treatment for pt, will return home when medically stable.

## 2018-10-23 NOTE — PATIENT PROFILE ADULT - HOW PATIENT ADDRESSED, PROFILE
MALINDA Subjective:      History was provided by the patient and patient was brought in for Rash (face)  .    HPI:  Rash  Patient presents with a rash. Symptoms have been present for a few days. The rash is located on the forehead. Since then it has spread to the chin, nose and left axilla. Parent has tried nothing for initial treatment and the rash has worsened. Discomfort is mild. Patient does not have a fever. Recent illnesses: none. Sick contacts: contacts w/ similar symptoms.      Review of Systems   Constitutional: Negative for fatigue and fever.   HENT: Negative for congestion and rhinorrhea.    Respiratory: Negative for cough and wheezing.    Skin: Positive for rash and wound.       Objective:     Physical Exam   Constitutional: He appears well-developed and well-nourished. No distress.   HENT:   Head: Normocephalic and atraumatic.   Right Ear: External ear normal.   Left Ear: External ear normal.   Nose: Nose normal.   Mouth/Throat: Oropharynx is clear and moist.   Neck: Neck supple. No thyromegaly present.   Cardiovascular: Normal rate, regular rhythm, normal heart sounds and intact distal pulses.    No murmur heard.  Pulmonary/Chest: Effort normal and breath sounds normal. No respiratory distress. He has no wheezes.   Abdominal: Soft. Bowel sounds are normal. He exhibits no distension. There is no tenderness.   Lymphadenopathy:     He has no cervical adenopathy.   Skin: Skin is warm and dry. Rash (erythematous circular denuded lesions with honey-colored crusts on forehead, nose, chin and left axilla) noted.       Assessment:        1. Impetigo         Plan:       Prescription given per Meds and Orders.  Symptomatic care discussed.  Call or RTC if symptoms persist or worsen.

## 2018-10-23 NOTE — PROGRESS NOTE ADULT - SUBJECTIVE AND OBJECTIVE BOX
HPI:  Pt is a 88 yo F w/ PMH of DM type 2, HTN, COPD on 2.5L home O2, diastolic HFpEF (last echo 10/2017) presented to ED p/w ams, weakness, dysuria, dec po x past ~ 3 days.     REVIEW OF SYSTEMS:  unable to obtain.    VITAL SIGNS:  Vital Signs Last 24 Hrs  T(C): 36.8 (10-23-18 @ 14:50), Max: 36.8 (10-23-18 @ 14:50)  T(F): 98.3 (10-23-18 @ 14:50), Max: 98.3 (10-23-18 @ 14:50)  HR: 91 (10-23-18 @ 14:50) (62 - 97)  BP: 134/63 (10-23-18 @ 14:50) (132/67 - 156/65)  BP(mean): --  RR: 16 (10-23-18 @ 14:50) (16 - 20)  SpO2: 92% (10-23-18 @ 14:50) (92% - 99%)      PHYSICAL EXAM:     GENERAL: chronically ill elderly female, no acute distress  HEENT: NC/AT, EOMI, neck supple, MMM  RESPIRATORY: decreased breath sounds bilaterally, no rhonchi or wheezes noted.  CARDIOVASCULAR: RRR, no murmurs, gallops, rubs  ABDOMINAL: soft, non-tender, non-distended, positive bowel sounds   EXTREMITIES: no clubbing, cyanosis, or edema  NEUROLOGICAL: somnolent, responsive to painful stimuli  MUSCULOSKELETAL: no gross joint deformity                          8.5    10.65 )-----------( 121      ( 23 Oct 2018 06:38 )             26.5     10-23    149<H>  |  122<H>  |  59<H>  ----------------------------<  105<H>  4.0   |  20<L>  |  1.90<H>    Ca    7.9<L>      23 Oct 2018 06:38    TPro  6.8  /  Alb  2.0<L>  /  TBili  0.4  /  DBili  x   /  AST  26  /  ALT  21  /  AlkPhos  136<H>  10-22    PT/INR - ( 22 Oct 2018 23:14 )   PT: 13.5 sec;   INR: 1.23 ratio         PTT - ( 22 Oct 2018 23:14 )  PTT:25.9 sec    CAPILLARY BLOOD GLUCOSE  POCT Blood Glucose.: 101 mg/dL (23 Oct 2018 16:42)  POCT Blood Glucose.: 101 mg/dL (23 Oct 2018 11:54)  POCT Blood Glucose.: 107 mg/dL (23 Oct 2018 08:28)    < from: CT Chest No Cont (10.23.18 @ 14:31) >  IMPRESSION: Density in the inferior lingula with bronchiectasis may   represent scarring although infiltrate is not clearly excluded  Large hiatal hernia  Upper abdominal ascites again seen          MEDICATIONS  (STANDING):  ALBUTerol/ipratropium for Nebulization 3 milliLiter(s) Nebulizer every 6 hours  cefTRIAXone   IVPB 1 Gram(s) IV Intermittent every 24 hours  dextrose 5%. 1000 milliLiter(s) (50 mL/Hr) IV Continuous <Continuous>  dextrose 50% Injectable 12.5 Gram(s) IV Push once  dextrose 50% Injectable 25 Gram(s) IV Push once  dextrose 50% Injectable 25 Gram(s) IV Push once  ferrous    sulfate 325 milliGRAM(s) Oral daily  guaiFENesin  milliGRAM(s) Oral every 12 hours  heparin  Injectable 5000 Unit(s) SubCutaneous every 12 hours  insulin lispro (HumaLOG) corrective regimen sliding scale   SubCutaneous three times a day before meals  lactated ringers. 1000 milliLiter(s) (75 mL/Hr) IV Continuous <Continuous>  nystatin Cream 1 Application(s) Topical two times a day  pantoprazole    Tablet 40 milliGRAM(s) Oral before breakfast  sodium chloride 0.45%. 1000 milliLiter(s) (50 mL/Hr) IV Continuous <Continuous>    MEDICATIONS  (PRN):  dextrose 40% Gel 15 Gram(s) Oral once PRN Blood Glucose LESS THAN 70 milliGRAM(s)/deciliter  glucagon  Injectable 1 milliGRAM(s) IntraMuscular once PRN Glucose LESS THAN 70 milligrams/deciliter

## 2018-10-23 NOTE — PROGRESS NOTE ADULT - ASSESSMENT
Pt is a 89 yo F w/ PMH of DM type 2, HTN, COPD on 2.5L home O2, diastolic HFpEF (last echo 10/2017) presented to ED p/w AMS, weakness, dysuria, decreased po intake x past ~ 3 days.

## 2018-10-23 NOTE — DIETITIAN INITIAL EVALUATION ADULT. - SIGNS/SYMPTOMS
as evidenced by pressure injury as evidenced by past abnormal swallow study with dysphagia and now with dehydration poor PO intake

## 2018-10-23 NOTE — CONSULT NOTE ADULT - SUBJECTIVE AND OBJECTIVE BOX
HPI:  Hx obtained from son and other family members as pt was altered.  Pt is a 88 yo F w/ PMH of DM type 2, HTN, COPD on 2.5L home O2, diastolic HFpEF (last echo 10/2017) presented to ED p/w ams, weakness, dysuria, dec po x past ~ 3 days. Of note Pt was recently admitted for ams, similar to current presentation and was d/c 6 days ago. On previous admission Pt was diagnosed and treated for UTI. Pt was initially doing well, but then developed burning with urination, AMS, and watery diarrhea x 3 days ago. Family reports that pt was given Bactrim for unresolved UTI, but that she was only able to take 2 doses, as she could not swallow the 3rd dose this AM. Pt has poor PO intake and EMS came to her house last night to give her IVF due to dehydration. Of note patient is normally bedbound and requires 24/7 home health aid. Pt is incontinent at baseline and wears diapers. Denies any Fevers, but endorses chills, and increased fatigue. Denies any SOB, CP, N/V. Family reports pt normally is able to converse without issue, but has been incoherent for ~ the past 3 days    In the ED, temp 97.1, HR 62, /75, RR 20, Spo2 99 on supplemental o2. WBC 11.46, Hg 9.5, hct 29.5, platelet 147, PT 13.5, INR 1.23, PTT 25.9, Na 148, K 4.3, Cl 120, Co2 21, BUN 64, Cr 2.00, glucose 110, calcium 8.0, alk phos 136, lactate 1.4, lipase 117. UA: small bilirubin, moderate leukocyte esterase, trace blood, moderate epithelial, bacteria few, hyaline 0-2, yeast present    Urology was called for urinary incontinence, could not be catheterized by nurses.  However, after a large amount of urinary leaking, pvr was 0 cc on bladder scan.            PAST MEDICAL & SURGICAL HISTORY:  Coronary artery disease with angina pectoris, unspecified vessel or lesion type, unspecified whether native or transplanted heart  Congestive heart failure  Asthma  Diabetes mellitus  HTN (hypertension)  No significant past surgical history      REVIEW OF SYSTEMS:    CONSTITUTIONAL: No weakness, fevers or chills  EYES/ENT: No visual changes;  No vertigo or throat pain   NECK: No pain or stiffness  RESPIRATORY: No cough, wheezing, hemoptysis; No shortness of breath  CARDIOVASCULAR: No chest pain or palpitations  GASTROINTESTINAL: No abdominal or epigastric pain. No nausea, vomiting, or hematemesis; No diarrhea or constipation. No melena or hematochezia.  GENITOURINARY: No dysuria, frequency or hematuria  NEUROLOGICAL: No numbness or weakness  SKIN: No itching, burning, rashes, or lesions   All other review of systems is negative unless indicated above.    MEDICATIONS  (STANDING):  ALBUTerol/ipratropium for Nebulization 3 milliLiter(s) Nebulizer every 6 hours  cefTRIAXone   IVPB 1 Gram(s) IV Intermittent every 24 hours  dextrose 5%. 1000 milliLiter(s) (50 mL/Hr) IV Continuous <Continuous>  dextrose 50% Injectable 12.5 Gram(s) IV Push once  dextrose 50% Injectable 25 Gram(s) IV Push once  dextrose 50% Injectable 25 Gram(s) IV Push once  ferrous    sulfate 325 milliGRAM(s) Oral daily  guaiFENesin  milliGRAM(s) Oral every 12 hours  heparin  Injectable 5000 Unit(s) SubCutaneous every 12 hours  insulin lispro (HumaLOG) corrective regimen sliding scale   SubCutaneous three times a day before meals  lactated ringers. 1000 milliLiter(s) (75 mL/Hr) IV Continuous <Continuous>  nystatin Cream 1 Application(s) Topical two times a day  pantoprazole    Tablet 40 milliGRAM(s) Oral before breakfast  sodium chloride 0.45%. 1000 milliLiter(s) (50 mL/Hr) IV Continuous <Continuous>    MEDICATIONS  (PRN):  dextrose 40% Gel 15 Gram(s) Oral once PRN Blood Glucose LESS THAN 70 milliGRAM(s)/deciliter  glucagon  Injectable 1 milliGRAM(s) IntraMuscular once PRN Glucose LESS THAN 70 milligrams/deciliter      Allergies    No Known Allergies    Intolerances        SOCIAL HISTORY:    FAMILY HISTORY:  No pertinent family history in first degree relatives      Vital Signs Last 24 Hrs  T(C): 36.8 (23 Oct 2018 14:50), Max: 36.8 (23 Oct 2018 14:50)  T(F): 98.3 (23 Oct 2018 14:50), Max: 98.3 (23 Oct 2018 14:50)  HR: 91 (23 Oct 2018 14:50) (62 - 97)  BP: 134/63 (23 Oct 2018 14:50) (132/67 - 156/65)  BP(mean): --  RR: 16 (23 Oct 2018 14:50) (16 - 20)  SpO2: 92% (23 Oct 2018 14:50) (92% - 99%)    PHYSICAL EXAM:    Pt not verbally responsive.  Abd soft, no masses  Pelvic with nurse chaperone:  apparent signif prolapse of uterus and cervix, to the introitus, bladder not full    LABS:                        8.5    10.65 )-----------( 121      ( 23 Oct 2018 06:38 )             26.5     10    149<H>  |  122<H>  |  59<H>  ----------------------------<  105<H>  4.0   |  20<L>  |  1.90<H>    Ca    7.9<L>      23 Oct 2018 06:38    TPro  6.8  /  Alb  2.0<L>  /  TBili  0.4  /  DBili  x   /  AST  26  /  ALT  21  /  AlkPhos  136<H>  1022    PT/INR - ( 22 Oct 2018 23:14 )   PT: 13.5 sec;   INR: 1.23 ratio         PTT - ( 22 Oct 2018 23:14 )  PTT:25.9 sec  Urinalysis Basic - ( 22 Oct 2018 23:32 )    Color: Pale Yellow / Appearance: Clear / S.010 / pH: x  Gluc: x / Ketone: Negative  / Bili: Small / Urobili: Negative   Blood: x / Protein: 25 mg/dL / Nitrite: Negative   Leuk Esterase: Moderate / RBC: 0-2 /HPF / WBC 0-2   Sq Epi: x / Non Sq Epi: Moderate / Bacteria: Few      Urine Culture:   Hemoglobin: 8.5 g/dL (10-23 @ 06:38)  Hematocrit: 26.5 % (10-23 @ 06:38)  Hemoglobin: 9.5 g/dL (10-22 @ 23:14)  Hematocrit: 29.5 % (10-22 @ 23:14)      RADIOLOGY & ADDITIONAL STUDIES:

## 2018-10-23 NOTE — PROGRESS NOTE ADULT - PROBLEM SELECTOR PLAN 1
Suspect metabolic encephalopathy 2/2 dehydration from poor PO intake in setting of diuresis. Doubt that she has an active infection at this time. UA bland (although she did get two doses of Bactrim).   ?infiltrate on CXR, but CT overall unremarkable. PCT mildly elevated.   -Continue Ceftriaxone for now.   -Follow up cultures.  -D/C if cultures negative  - Aspiration precautions  -bedrest, turn q 2.  -diarrhea has resolved--no bowel movements since admission--cancel stool studies.

## 2018-10-23 NOTE — DIETITIAN INITIAL EVALUATION ADULT. - PROBLEM SELECTOR PLAN 2
2/2 poor PO intake  -Continue IVF  -Continue to monitor BMP  - monitor for signs of fluid overload given CHF hx

## 2018-10-23 NOTE — DIETITIAN INITIAL EVALUATION ADULT. - PROBLEM SELECTOR PLAN 1
Likely 2/2 poor PO intake/UTI. Currently having difficulty swallowing. UA: moderate leukocyte esterase, trace blood, moderate epithelial, bacteria few, hyaline 0-2, yeast present  -speech and swallow eval- c/w Dysphagia 1 w/ honey thick on previous admission, will continue diet  - Aspiration precautions  - bedrest, turn q 2.  -fu am cbc, f/u blood cultures, urine culture & sensitivity  - due to antibiotic use and recent diarrhea will order Cdiff

## 2018-10-23 NOTE — DIETITIAN INITIAL EVALUATION ADULT. - OTHER INFO
patients family knowledgeable about puree honey thick diet. now with dehydration and change in mental status no eating. diarrhea DNR no decision on tube feeding. family none to RD from last admit.

## 2018-10-23 NOTE — DIETITIAN INITIAL EVALUATION ADULT. - PROBLEM SELECTOR PLAN 6
-Hold home medications:   - low dose insuline sliding scale  -Hypoglycemia Protocol, ERICA, Mike AC&HS.  -Follow up HbA1c.

## 2018-10-23 NOTE — CONSULT NOTE ADULT - SUBJECTIVE AND OBJECTIVE BOX
Infectious Diseases Consult by Jaclyn Barnes MD    Reason for Consult :AMS rule out UTI    HPI:  Hx obtained from son and other family members as pt was altered.  Pt is a 89 yo F w/ PMH of DM type 2, HTN, COPD on 2.5L home O2, diastolic HFpEF (last echo 10/2017) presented to ED p/w ams, weakness, poor urine out put , dec po x past ~ 3 days. Of note Pt was recently admitted for ams, similar to current presentation and was d/c on 10/13. On previous admission Pt was diagnosed and treated for possible UTI although cs showed gissel albicans  . Pt was initially doing well, but then developed burning with urination, AMS, and watery diarrhea x 3 days ago. Family reports that pt was given Bactrim for unresolved UTI, but that she was only able to take 2 doses, as she could not swallow the 3rd dose this AM. Pt has poor PO intake and EMS came to her house last night to give her IVF due to dehydration. Of note patient is normally bedbound and requires 24/7 home health aid. Pt is incontinent at baseline and wears diapers. Denies any Fevers, but endorses chills, and increased fatigue. Denies any SOB, CP, N/V. Family reports pt normally is able to converse without issue, but has been incoherent for ~ the past 3 days    In the ED, temp 97.1, HR 62, /75, RR 20, Spo2 99 on supplemental o2. WBC 11.46, Hg 9.5, hct 29.5, platelet 147, PT 13.5, INR 1.23, PTT 25.9, Na 148, K 4.3, Cl 120, Co2 21, BUN 64, Cr 2.00, glucose 110, calcium 8.0, alk phos 136, lactate 1.4, lipase 117. UA: small bilirubin, moderate leukocyte esterase, trace blood, moderate epithelial, bacteria few, hyaline 0-2, yeast present. Staright cath done in ER showed 400 cc out put .    In the ED, received zosyn 3.375 mg IV x 1, NaCl 0.9% 1000mL x 2, duoneb 3 mL x 1, (22 Oct 2018 23:52)    CXR was read as possible left pneumonia , CT chest is negative     Past Medical & Surgical Hx:  PAST MEDICAL & SURGICAL HISTORY:  Coronary artery disease with angina pectoris, unspecified vessel or lesion type, unspecified whether native or transplanted heart  Congestive heart failure  Asthma  Diabetes mellitus  HTN (hypertension)  No significant past surgical history      Social History-- Disabled, lives with family , bed bound needs assistance in all ADL   EtOH: denies   Tobacco: denies   Drug Use: denies     FAMILY HISTORY:  No pertinent family history in first degree relatives      Allergies    No Known Allergies    Intolerances        Home/ Out patient  Medications :  Home Medications:  albuterol-ipratropium 2.5 mg-0.5 mg/3 mL inhalation solution: 3 milliliter(s) inhaled every 6 hours (05 Oct 2018 15:17)  ferrous sulfate 324 mg (65 mg elemental iron) oral delayed release tablet: 1 tab(s) orally once a day (05 Oct 2018 15:17)  fluticasone-salmeterol 250 mcg-50 mcg inhalation powder: 1 puff(s) inhaled 2 times a day (05 Oct 2018 15:17)  Lasix 40 mg oral tablet: 1 tab(s) orally once a day (05 Oct 2018 15:17)  losartan 25 mg oral tablet: 1 tab(s) orally once a day (05 Oct 2018 15:17)  omeprazole 20 mg oral delayed release capsule: 1 cap(s) orally once a day (05 Oct 2018 15:17)  Percocet 5/325 oral tablet: 1 tab(s) orally every 6 hours (05 Oct 2018 15:17)      Current Inpatient Medications :    ANTIBIOTICS:   cefTRIAXone   IVPB 1 Gram(s) IV Intermittent every 24 hours      OTHER RELEVANT MEDICATIONS :  ALBUTerol/ipratropium for Nebulization 3 milliLiter(s) Nebulizer every 6 hours  dextrose 40% Gel 15 Gram(s) Oral once PRN  dextrose 5%. 1000 milliLiter(s) IV Continuous <Continuous>  dextrose 50% Injectable 12.5 Gram(s) IV Push once  dextrose 50% Injectable 25 Gram(s) IV Push once  dextrose 50% Injectable 25 Gram(s) IV Push once  ferrous    sulfate 325 milliGRAM(s) Oral daily  glucagon  Injectable 1 milliGRAM(s) IntraMuscular once PRN  guaiFENesin  milliGRAM(s) Oral every 12 hours  heparin  Injectable 5000 Unit(s) SubCutaneous every 12 hours  insulin lispro (HumaLOG) corrective regimen sliding scale   SubCutaneous three times a day before meals  lactated ringers. 1000 milliLiter(s) IV Continuous <Continuous>  nystatin Cream 1 Application(s) Topical two times a day  pantoprazole    Tablet 40 milliGRAM(s) Oral before breakfast  sodium chloride 0.45%. 1000 milliLiter(s) IV Continuous <Continuous>      ROS:  Unable to obtain due to : advanced dementia       I&O's Detail    22 Oct 2018 07:01  -  23 Oct 2018 07:00  --------------------------------------------------------  IN:    lactated ringers.: 375 mL  Total IN: 375 mL    OUT:  Total OUT: 0 mL    Total NET: 375 mL          Physical Exam:  Vital Signs Last 24 Hrs  T(C): 36.8 (23 Oct 2018 14:50), Max: 36.8 (23 Oct 2018 14:50)  T(F): 98.3 (23 Oct 2018 14:50), Max: 98.3 (23 Oct 2018 14:50)  HR: 91 (23 Oct 2018 14:50) (62 - 97)  BP: 134/63 (23 Oct 2018 14:50) (132/67 - 156/65)  BP(mean): --  RR: 16 (23 Oct 2018 14:50) (16 - 20)  SpO2: 92% (23 Oct 2018 14:50) (92% - 99%)  Height (cm): 165.1 (10-22 @ 22:46)  Weight (kg): 70 (10-23 @ 03:32)  BMI (kg/m2): 25.7 (10-23 @ 03:32)  BSA (m2): 1.77 (10-23 @ 03:32)    General: Elderly frail appearing female confused  in no acute distress  Eyes: sclera anicteric, pupils equal and reactive to light  ENMT: buccal mucosa moist, pharynx not injected  Neck: supple, trachea midline  Lungs: coarse breath sounds without rhonchi  Cardiovascular: regular rate and rhythm, S1 S2  Abdomen: soft, nontender, no organomegaly present, bowel sounds normal  Neurological:  obtunded, agitated   Skin:no increased ecchymosis/petechiae/purpura  Lymph Nodes: no palpable cervical/supraclavicular lymph nodes enlargements  Extremities: no cyanosis/clubbing/edema    Labs:                   8.5    10.65  )----------(  121       ( 23 Oct 2018 06:38 )               26.5      149    |  122    |  59     ----------------------------<  105        ( 23 Oct 2018 06:38 )  4.0     |  20     |  1.90     Ca    7.9        ( 23 Oct 2018 06:38 )    TPro  6.8    /  Alb  2.0    /  TBili  0.4    /  DBili  x      /  AST  26     /  ALT  21     /  AlkPhos  136    ( 22 Oct 2018 23:14 )    LIVER FUNCTIONS - ( 22 Oct 2018 23:14 )  Alb: 2.0 g/dL / Pro: 6.8 g/dL / ALK PHOS: 136 U/L / ALT: 21 U/L / AST: 26 U/L / GGT: x           PT/INR -  13.5 sec / 1.23 ratio   ( 22 Oct 2018 23:14 )       PTT -  25.9 sec   ( 22 Oct 2018 23:14 )    CAPILLARY BLOOD GLUCOSE      POCT Blood Glucose.: 101 mg/dL (23 Oct 2018 11:54)  POCT Blood Glucose.: 107 mg/dL (23 Oct 2018 08:28)        Urinalysis Basic - ( 22 Oct 2018 23:32 )    Color: Pale Yellow / Appearance: Clear / S.010 / pH: x  Gluc: x / Ketone: Negative  / Bili: Small / Urobili: Negative   Blood: x / Protein: 25 mg/dL / Nitrite: Negative   Leuk Esterase: Moderate / RBC: 0-2 /HPF / WBC 0-2   Sq Epi: x / Non Sq Epi: Moderate / Bacteria: Few        RADIOLOGY & ADDITIONAL STUDIES:  CT Chest No Cont (10.23.18 @ 14:31) >  FINDINGS:      There is no significant axillary, hilar, or mediastinal lymphadenopathy.   There is no pleural or pericardial effusion. There is tracheobronchial   calcification. There is atherosclerotic calcification of the aorta and   its branches. There is coronary artery calcification.    There is a punctate calcified granuloma left upper lobe. Additionally   there is density with bronchiectasis in the lingula less prominent than   on the prior study which may represent area of scarring versus   infiltrate..    The osseous structures demonstrate osteopenia and degenerative change.    The upper abdomen is remarkable for a large hiatal hernia. Small amount   of ascites is seen. There is relative atrophy of the left kidney. There   is extensive vascular calcification..    IMPRESSION: Density in the inferior lingula with bronchiectasis may   represent scarring although infiltrate is not clearly excluded  Large hiatal hernia  Upper abdominal ascites again seen      CT Head No Cont (10.23.18 @ 01:18) >  FINDINGS:  There is mild diffuse parenchymal volume loss. There are areas of low   attenuation in the periventricular subcortical white matter likely   related to severe chronic microvascular ischemic changes.    Small chronic infarct right cerebellum without significant change    There is no gross acute intracranial hemorrhage, parenchymal mass, mass   effect or midline shift. There is no acute territorial infarct. There is   no hydrocephalus.    The cranium is intact.     The visualized paranasal sinuses are   well-aerated.    IMPRESSION:  Limited exam due to motion    No gross acute intracranial hemorrhage.    Xray Chest 1 View AP/PA (10.23.18 @ 00:12) >  Impression: Left basal retrocardiac consolidation/atelectasis.      Assessment :   89 yo F w/ PMH of DM type 2, HTN, CKD stage 4 ,COPD on 3L home O2, diastolic HFpEF (last echo 10/2017) presented to ED w/ lethargy progressing to unresponsiveness , she most likely has metabolic encephalopathy secondary to UTI and dehydration from poor oral intake. Her UA is not impressive and she my be colonized with yeast . She  likely has uterine prolapse which may cause urinary retention  .    Doubt she has pneumonia . She has not had any BM since admission and has hx of chronic constipation. Will dc C diff and isolation     Plan :   - bladder scan patient , may need mendez if PVR over 300 and urology evaluation   - if uterine prolase persists then consider Gyn evaluation for vaginal pessary   - follow cs if urine cs negative dc antibiotics   - may need diflucan if persistent candida in urine   - speech evacuation     Continue with present regime .  Appropriate use of antibiotics and adverse effects reviewed.      I have discussed the above plan of care with patient's family in detail. They expressed understanding of the treatment plan . Risks, benefits and alternatives discussed in detail. I have asked if they have any questions or concerns and appropriately addressed them to the best of my ability . She is a Full code       > 55 minutes spent in direct patient care reviewing  the notes, lab data/ imaging , discussion with multidisciplinary team. All questions were addressed and answered to the best of my capacity .    Thank you for allowing me to participate in the care of your patient .      Jaclyn Barnes MD  [phone]

## 2018-10-23 NOTE — PROGRESS NOTE ADULT - PROBLEM SELECTOR PLAN 2
2/2 poor PO intake  -Hold Lasix   -Continue gentle IV fluids and monitor for signs of fluid overload.  -Continue to monitor BMP.

## 2018-10-23 NOTE — DIETITIAN INITIAL EVALUATION ADULT. - PROBLEM SELECTOR PLAN 3
Patient was prescribed Bactrim. Took 2 doses. unable to swallow to take 3rd dose  -Received zosyn in the ED. Start rocephin  -prior Cx with candida  -F/u am cbc, f/u blood cultures, urine culture & sensitivity

## 2018-10-24 ENCOUNTER — APPOINTMENT (OUTPATIENT)
Dept: HOME HEALTH SERVICES | Facility: HOME HEALTH | Age: 83
End: 2018-10-24

## 2018-10-24 LAB
-  COAGULASE NEGATIVE STAPHYLOCOCCUS: SIGNIFICANT CHANGE UP
ALBUMIN SERPL ELPH-MCNC: 1.8 G/DL — LOW (ref 3.3–5)
ALP SERPL-CCNC: 124 U/L — HIGH (ref 40–120)
ALT FLD-CCNC: 18 U/L — SIGNIFICANT CHANGE UP (ref 12–78)
ANION GAP SERPL CALC-SCNC: 11 MMOL/L — SIGNIFICANT CHANGE UP (ref 5–17)
AST SERPL-CCNC: 24 U/L — SIGNIFICANT CHANGE UP (ref 15–37)
BASOPHILS # BLD AUTO: 0.05 K/UL — SIGNIFICANT CHANGE UP (ref 0–0.2)
BASOPHILS NFR BLD AUTO: 0.3 % — SIGNIFICANT CHANGE UP (ref 0–2)
BILIRUB SERPL-MCNC: 0.5 MG/DL — SIGNIFICANT CHANGE UP (ref 0.2–1.2)
BUN SERPL-MCNC: 62 MG/DL — HIGH (ref 7–23)
CALCIUM SERPL-MCNC: 7.7 MG/DL — LOW (ref 8.5–10.1)
CHLORIDE SERPL-SCNC: 123 MMOL/L — HIGH (ref 96–108)
CO2 SERPL-SCNC: 16 MMOL/L — LOW (ref 22–31)
CREAT SERPL-MCNC: 2.1 MG/DL — HIGH (ref 0.5–1.3)
EOSINOPHIL # BLD AUTO: 0.09 K/UL — SIGNIFICANT CHANGE UP (ref 0–0.5)
EOSINOPHIL NFR BLD AUTO: 0.6 % — SIGNIFICANT CHANGE UP (ref 0–6)
GLUCOSE SERPL-MCNC: 73 MG/DL — SIGNIFICANT CHANGE UP (ref 70–99)
GRAM STN FLD: SIGNIFICANT CHANGE UP
HCT VFR BLD CALC: 28.3 % — LOW (ref 34.5–45)
HGB BLD-MCNC: 9 G/DL — LOW (ref 11.5–15.5)
IMM GRANULOCYTES NFR BLD AUTO: 0.7 % — SIGNIFICANT CHANGE UP (ref 0–1.5)
LYMPHOCYTES # BLD AUTO: 1 K/UL — SIGNIFICANT CHANGE UP (ref 1–3.3)
LYMPHOCYTES # BLD AUTO: 6.7 % — LOW (ref 13–44)
MAGNESIUM SERPL-MCNC: 2 MG/DL — SIGNIFICANT CHANGE UP (ref 1.6–2.6)
MCHC RBC-ENTMCNC: 28.4 PG — SIGNIFICANT CHANGE UP (ref 27–34)
MCHC RBC-ENTMCNC: 31.8 GM/DL — LOW (ref 32–36)
MCV RBC AUTO: 89.3 FL — SIGNIFICANT CHANGE UP (ref 80–100)
METHOD TYPE: SIGNIFICANT CHANGE UP
MONOCYTES # BLD AUTO: 1.02 K/UL — HIGH (ref 0–0.9)
MONOCYTES NFR BLD AUTO: 6.8 % — SIGNIFICANT CHANGE UP (ref 2–14)
NEUTROPHILS # BLD AUTO: 12.76 K/UL — HIGH (ref 1.8–7.4)
NEUTROPHILS NFR BLD AUTO: 84.9 % — HIGH (ref 43–77)
NRBC # BLD: 0 /100 WBCS — SIGNIFICANT CHANGE UP (ref 0–0)
PHOSPHATE SERPL-MCNC: 3.3 MG/DL — SIGNIFICANT CHANGE UP (ref 2.5–4.5)
PLATELET # BLD AUTO: 140 K/UL — LOW (ref 150–400)
POTASSIUM SERPL-MCNC: 4.1 MMOL/L — SIGNIFICANT CHANGE UP (ref 3.5–5.3)
POTASSIUM SERPL-SCNC: 4.1 MMOL/L — SIGNIFICANT CHANGE UP (ref 3.5–5.3)
PROT SERPL-MCNC: 6.3 G/DL — SIGNIFICANT CHANGE UP (ref 6–8.3)
RBC # BLD: 3.17 M/UL — LOW (ref 3.8–5.2)
RBC # FLD: 15.1 % — HIGH (ref 10.3–14.5)
SODIUM SERPL-SCNC: 150 MMOL/L — HIGH (ref 135–145)
SPECIMEN SOURCE: SIGNIFICANT CHANGE UP
WBC # BLD: 15.03 K/UL — HIGH (ref 3.8–10.5)
WBC # FLD AUTO: 15.03 K/UL — HIGH (ref 3.8–10.5)

## 2018-10-24 PROCEDURE — 76770 US EXAM ABDO BACK WALL COMP: CPT | Mod: 26

## 2018-10-24 PROCEDURE — 76775 US EXAM ABDO BACK WALL LIM: CPT | Mod: 26

## 2018-10-24 PROCEDURE — 99233 SBSQ HOSP IP/OBS HIGH 50: CPT

## 2018-10-24 PROCEDURE — 70450 CT HEAD/BRAIN W/O DYE: CPT | Mod: 26

## 2018-10-24 RX ORDER — SODIUM CHLORIDE 9 MG/ML
1000 INJECTION, SOLUTION INTRAVENOUS
Qty: 0 | Refills: 0 | Status: DISCONTINUED | OUTPATIENT
Start: 2018-10-24 | End: 2018-10-25

## 2018-10-24 RX ORDER — VANCOMYCIN HCL 1 G
1000 VIAL (EA) INTRAVENOUS ONCE
Qty: 0 | Refills: 0 | Status: COMPLETED | OUTPATIENT
Start: 2018-10-24 | End: 2018-10-24

## 2018-10-24 RX ADMIN — NYSTATIN CREAM 1 APPLICATION(S): 100000 CREAM TOPICAL at 19:39

## 2018-10-24 RX ADMIN — Medication 250 MILLIGRAM(S): at 13:03

## 2018-10-24 RX ADMIN — CEFTRIAXONE 100 GRAM(S): 500 INJECTION, POWDER, FOR SOLUTION INTRAMUSCULAR; INTRAVENOUS at 01:00

## 2018-10-24 RX ADMIN — Medication 3 MILLILITER(S): at 13:53

## 2018-10-24 RX ADMIN — Medication 3 MILLILITER(S): at 19:57

## 2018-10-24 RX ADMIN — NYSTATIN CREAM 1 APPLICATION(S): 100000 CREAM TOPICAL at 05:49

## 2018-10-24 RX ADMIN — Medication 3 MILLILITER(S): at 09:48

## 2018-10-24 RX ADMIN — HEPARIN SODIUM 5000 UNIT(S): 5000 INJECTION INTRAVENOUS; SUBCUTANEOUS at 17:55

## 2018-10-24 RX ADMIN — SODIUM CHLORIDE 75 MILLILITER(S): 9 INJECTION, SOLUTION INTRAVENOUS at 10:35

## 2018-10-24 RX ADMIN — Medication 3 MILLILITER(S): at 01:37

## 2018-10-24 RX ADMIN — HEPARIN SODIUM 5000 UNIT(S): 5000 INJECTION INTRAVENOUS; SUBCUTANEOUS at 05:48

## 2018-10-24 RX ADMIN — PANTOPRAZOLE SODIUM 40 MILLIGRAM(S): 20 TABLET, DELAYED RELEASE ORAL at 05:49

## 2018-10-24 NOTE — ADVANCED PRACTICE NURSE CONSULT - REASON FOR CONSULT
89y    Female    Patient is a 89y old  Female who presents with a chief complaint of weakness, decreased PO intake (24 Oct 2018 08:09)      HPI:  Hx obtained from son and other family members as pt was altered.  Pt is a 88 yo F w/ PMH of DM type 2, HTN, COPD on 2.5L home O2, diastolic HFpEF (last echo 10/2017) presented to ED p/w ams, weakness, dysuria, dec po x past ~ 3 days. Of note Pt was recently admitted for ams, similar to current presentation and was d/c 6 days ago. On previous admission Pt was diagnosed and treated for UTI. Pt was initially doing well, but then developed burning with urination, AMS, and watery diarrhea x 3 days ago. Family reports that pt was given Bactrim for unresolved UTI, but that she was only able to take 2 doses, as she could not swallow the 3rd dose this AM. Pt has poor PO intake and EMS came to her house last night to give her IVF due to dehydration. Of note patient is normally bedbound and requires 24/7 home health aid. Pt is incontinent at baseline and wears diapers. Denies any Fevers, but endorses chills, and increased fatigue. Denies any SOB, CP, N/V. Family reports pt normally is able to converse without issue, but has been incoherent for ~ the past 3 days    In the ED, temp 97.1, HR 62, /75, RR 20, Spo2 99 on supplemental o2. WBC 11.46, Hg 9.5, hct 29.5, platelet 147, PT 13.5, INR 1.23, PTT 25.9, Na 148, K 4.3, Cl 120, Co2 21, BUN 64, Cr 2.00, glucose 110, calcium 8.0, alk phos 136, lactate 1.4, lipase 117. UA: small bilirubin, moderate leukocyte esterase, trace blood, moderate epithelial, bacteria few, hyaline 0-2, yeast present    Imaging:  CXR and CT head no cont ordered  EKG: NSR    In the ED, received zosyn 3.375 mg IV x 1, NaCl 0.9% 1000mL x 2, duoneb 3 mL x 1, (22 Oct 2018 23:52)      PAST MEDICAL & SURGICAL HISTORY:  Coronary artery disease with angina pectoris, unspecified vessel or lesion type, unspecified whether native or transplanted heart  Congestive heart failure  Asthma  Diabetes mellitus  HTN (hypertension)  No significant past surgical history      MEDICATIONS  (STANDING):  ALBUTerol/ipratropium for Nebulization 3 milliLiter(s) Nebulizer every 6 hours  cefTRIAXone   IVPB 1 Gram(s) IV Intermittent every 24 hours  dextrose 5%. 1000 milliLiter(s) (50 mL/Hr) IV Continuous <Continuous>  dextrose 50% Injectable 12.5 Gram(s) IV Push once  dextrose 50% Injectable 25 Gram(s) IV Push once  dextrose 50% Injectable 25 Gram(s) IV Push once  ferrous    sulfate 325 milliGRAM(s) Oral daily  guaiFENesin  milliGRAM(s) Oral every 12 hours  heparin  Injectable 5000 Unit(s) SubCutaneous every 12 hours  lactated ringers. 1000 milliLiter(s) (75 mL/Hr) IV Continuous <Continuous>  nystatin Cream 1 Application(s) Topical two times a day  pantoprazole    Tablet 40 milliGRAM(s) Oral before breakfast  sodium chloride 0.45%. 1000 milliLiter(s) (50 mL/Hr) IV Continuous <Continuous>

## 2018-10-24 NOTE — PROGRESS NOTE ADULT - PROBLEM SELECTOR PLAN 2
2/2 poor PO intake  -Hold Lasix   -Continue gentle IV fluids--change to D5 given her hypernatremia, and monitor for signs of fluid overload.  -Continue to monitor BMP.

## 2018-10-24 NOTE — ADVANCED PRACTICE NURSE CONSULT - RECOMMEDATIONS
89 yo T2 with a1c 5.1%, BG 84 mg/dL, Hypoglycemia Risk  Case discussed with Dr. Baires  Insulin corrective scale discontinue  Goal 100-180 mg/dL

## 2018-10-24 NOTE — PROGRESS NOTE ADULT - SUBJECTIVE AND OBJECTIVE BOX
neuro cons dict.  AMS LIKELY METABOLIC /SAMIA.  CVA LESS LIKELY ALTHOUGH HAS LEFT FACIAL ASYMMETRY .  REPEAT CT HEAD.

## 2018-10-24 NOTE — PROGRESS NOTE ADULT - ASSESSMENT
Pt is a 88 yo F w/ PMH of DM type 2, HTN, COPD on 2.5L home O2, diastolic HFpEF (last echo 10/2017) presented to ED p/w AMS, weakness, dysuria, decreased po intake x past ~ 3 days.

## 2018-10-24 NOTE — PROGRESS NOTE ADULT - PROBLEM SELECTOR PLAN 1
Suspect metabolic encephalopathy 2/2 dehydration from poor PO intake in setting of diuresis. Doubt that she has an active infection at this time. UA bland (although she did get two doses of Bactrim).   ?infiltrate on CXR, but CT overall unremarkable. PCT mildly elevated.   -Continue Ceftriaxone for now.   -F/u urine culture.  -Blood culture growing GPC in clusters in one bottle, likely contaminant. Doubt she has sepsis. Will give one dose of Vancomycin and send repeat cultures.   -D/C Rocephin in urine culture negative.  - Aspiration precautions  -bedrest, turn q 2.  -diarrhea has resolved--stool studies cancelled.

## 2018-10-24 NOTE — PROGRESS NOTE ADULT - SUBJECTIVE AND OBJECTIVE BOX
HPI:  Pt is a 88 yo F w/ PMH of DM type 2, HTN, COPD on 2.5L home O2, diastolic HFpEF (last echo 10/2017) presented to ED p/w ams, weakness, dysuria, dec po x past ~ 3 days.     INTERVAL EVENTS:   Patient seen and examined. Remains lethargic today. There was concern for urinary retention yesterday, but PVR eventually measured as zero and patient continues to void. Blood cultures resulted positive this AM. GPC in clusters in one bottle--likely contaminant.     REVIEW OF SYSTEMS:  unable to obtain.    VITAL SIGNS:  Vital Signs Last 24 Hrs  T(C): 36.7 (24 Oct 2018 05:26), Max: 37 (23 Oct 2018 20:29)  T(F): 98.1 (24 Oct 2018 05:26), Max: 98.6 (23 Oct 2018 20:29)  HR: 107 (24 Oct 2018 05:26) (90 - 107)  BP: 141/75 (24 Oct 2018 05:26) (128/62 - 141/75)  BP(mean): --  RR: 16 (24 Oct 2018 05:26) (16 - 18)  SpO2: 95% (24 Oct 2018 05:26) (92% - 98%)      PHYSICAL EXAM:     GENERAL: chronically ill elderly female, no acute distress  HEENT: NC/AT, EOMI, neck supple, MMM  RESPIRATORY: decreased breath sounds bilaterally, no rhonchi or wheezes noted.  CARDIOVASCULAR: RRR, no murmurs, gallops, rubs  ABDOMINAL: soft, non-tender, non-distended, positive bowel sounds   EXTREMITIES: no clubbing, cyanosis, or edema  NEUROLOGICAL: somnolent, responsive to painful stimuli  MUSCULOSKELETAL: no gross joint deformity                          9.0    15.03 )-----------( 140      ( 24 Oct 2018 09:30 )             28.3   10-24    150<H>  |  123<H>  |  62<H>  ----------------------------<  73  4.1   |  16<L>  |  2.10<H>    Ca    7.7<L>      24 Oct 2018 09:30  Mg     2.0     10-24    TPro  x   /  Alb  1.8<L>  /  TBili  x   /  DBili  x   /  AST  24  /  ALT  18  /  AlkPhos  x   10-24    CAPILLARY BLOOD GLUCOSE      POCT Blood Glucose.: 84 mg/dL (24 Oct 2018 07:29)  POCT Blood Glucose.: 101 mg/dL (23 Oct 2018 16:42)  POCT Blood Glucose.: 101 mg/dL (23 Oct 2018 11:54)    < from: CT Chest No Cont (10.23.18 @ 14:31) >  IMPRESSION: Density in the inferior lingula with bronchiectasis may   represent scarring although infiltrate is not clearly excluded  Large hiatal hernia  Upper abdominal ascites again seen          MEDICATIONS  (STANDING):  ALBUTerol/ipratropium for Nebulization 3 milliLiter(s) Nebulizer every 6 hours  cefTRIAXone   IVPB 1 Gram(s) IV Intermittent every 24 hours  dextrose 5%. 1000 milliLiter(s) (50 mL/Hr) IV Continuous <Continuous>  dextrose 50% Injectable 12.5 Gram(s) IV Push once  dextrose 50% Injectable 25 Gram(s) IV Push once  dextrose 50% Injectable 25 Gram(s) IV Push once  ferrous    sulfate 325 milliGRAM(s) Oral daily  guaiFENesin  milliGRAM(s) Oral every 12 hours  heparin  Injectable 5000 Unit(s) SubCutaneous every 12 hours  lactated ringers. 1000 milliLiter(s) (75 mL/Hr) IV Continuous <Continuous>  nystatin Cream 1 Application(s) Topical two times a day  pantoprazole    Tablet 40 milliGRAM(s) Oral before breakfast  sodium chloride 0.45%. 1000 milliLiter(s) (50 mL/Hr) IV Continuous <Continuous>  vancomycin  IVPB 1000 milliGRAM(s) IV Intermittent once    MEDICATIONS  (PRN):  dextrose 40% Gel 15 Gram(s) Oral once PRN Blood Glucose LESS THAN 70 milliGRAM(s)/deciliter  glucagon  Injectable 1 milliGRAM(s) IntraMuscular once PRN Glucose LESS THAN 70 milligrams/deciliter

## 2018-10-24 NOTE — PROGRESS NOTE ADULT - SUBJECTIVE AND OBJECTIVE BOX
ID Progress note     Name: MALINDA SULTANA  Age: 89y  Gender: Female  MRN: [id number]    Interval History-- Events noted , remains confused and agitated son at bedside . Afebrile . Bladder scan PVR only 75 cc, seen by Urology no need for mendez   Notes reviewed    Past Medical History--  Coronary artery disease with angina pectoris, unspecified vessel or lesion type, unspecified whether native or transplanted heart  Congestive heart failure  Asthma  Diabetes mellitus  HTN (hypertension)  No significant past surgical history      For details regarding the patient's social history, family history, and other miscellaneous elements, please refer the initial infectious diseases consultation and/or the admitting history and physical examination for this admission.    Allergies--  Allergies    No Known Allergies    Intolerances        Medications--  Antibiotics:  cefTRIAXone   IVPB 1 Gram(s) IV Intermittent every 24 hours    Immunologic:    Other:  ALBUTerol/ipratropium for Nebulization  dextrose 40% Gel PRN  dextrose 5%.  dextrose 50% Injectable  dextrose 50% Injectable  dextrose 50% Injectable  ferrous    sulfate  glucagon  Injectable PRN  guaiFENesin ER  heparin  Injectable  lactated ringers.  nystatin Cream  pantoprazole    Tablet  sodium chloride 0.45%.      Review of Systems--  Review of systems unable to be obtained secondary to clinical condition.    Physical Examination--    Vital Signs: T(F): 98.1 (10-24-18 @ 05:26), Max: 98.6 (10-23-18 @ 20:29)  HR: 107 (10-24-18 @ 05:26)  BP: 141/75 (10-24-18 @ 05:26)  RR: 16 (10-24-18 @ 05:26)  SpO2: 95% (10-24-18 @ 05:26)  Wt(kg): --  General: Elderly frail appearing female confused  in no acute distress  Eyes: sclera anicteric, pupils equal and reactive to light  ENMT: buccal mucosa moist, pharynx not injected  Neck: supple, trachea midline  Lungs: coarse breath sounds without rhonchi  Cardiovascular: regular rate and rhythm, S1 S2  Abdomen: soft, nontender, no organomegaly present, bowel sounds normal  Neurological:  obtunded, agitated   Skin:no increased ecchymosis/petechiae/purpura  Lymph Nodes: no palpable cervical/supraclavicular lymph nodes enlargements  Extremities: no cyanosis/clubbing/edema    Laboratory Studies--  CBC                        8.5    10.65 )-----------( 121      ( 23 Oct 2018 06:38 )             26.5       Chemistries  10-23    149<H>  |  122<H>  |  59<H>  ----------------------------<  105<H>  4.0   |  20<L>  |  1.90<H>    Ca    7.9<L>      23 Oct 2018 06:38    TPro  6.8  /  Alb  2.0<L>  /  TBili  0.4  /  DBili  x   /  AST  26  /  ALT  21  /  AlkPhos  136<H>  10-22    CAPILLARY BLOOD GLUCOSE      POCT Blood Glucose.: 84 mg/dL (24 Oct 2018 07:29)  POCT Blood Glucose.: 101 mg/dL (23 Oct 2018 16:42)  POCT Blood Glucose.: 101 mg/dL (23 Oct 2018 11:54)  POCT Blood Glucose.: 107 mg/dL (23 Oct 2018 08:28)      Radiology:    CT Chest No Cont (10.23.18 @ 14:31) >  IMPRESSION: Density in the inferior lingula with bronchiectasis may   represent scarring although infiltrate is not clearly excluded  Large hiatal hernia  Upper abdominal ascites again seen    CT Head No Cont (10.23.18 @ 01:18) >  FINDINGS:  There is mild diffuse parenchymal volume loss. There are areas of low   attenuation in the periventricular subcortical white matter likely   related to severe chronic microvascular ischemic changes.    Small chronic infarct right cerebellum without significant change    There is no gross acute intracranial hemorrhage, parenchymal mass, mass   effect or midline shift. There is no acute territorial infarct. There is   no hydrocephalus.    The cranium is intact.     The visualized paranasal sinuses are   well-aerated.    IMPRESSION:  Limited exam due to motion    No gross acute intracranial hemorrhage.    Xray Chest 1 View AP/PA (10.23.18 @ 00:12) >  Impression: Left basal retrocardiac consolidation/atelectasis.      Assessment :   89 yo F w/ PMH of DM type 2, HTN, CKD stage 4 ,COPD on 3L home O2, diastolic HFpEF (last echo 10/2017) presented to ED w/ lethargy progressing to unresponsiveness , she most likely has metabolic encephalopathy secondary to UTI and dehydration from poor oral intake. Her UA is not impressive and she my be colonized with yeast . She  likely has uterine prolapse which may cause urinary retention  .    Doubt she has pneumonia . She has not had any BM since admission and has hx of chronic constipation. Will dc C diff and isolation     Plan :   - if uterine prolase persists then consider Gyn evaluation for vaginal pessary   - follow cs if urine cs negative dc antibiotics   - may need diflucan if persistent candida in urine   - speech evaluation   - consider neurology follow up    Continue with present regime .  Appropriate use of antibiotics and adverse effects reviewed.    I have discussed the above plan of care with patient's family in detail. They expressed understanding of the treatment plan . Risks, benefits and alternatives discussed in detail. I have asked if they have any questions or concerns and appropriately addressed them to the best of my ability .      > 35 minutes spent in direct patient care reviewing  the notes, lab data/ imaging , discussion with multidisciplinary team. All questions were addressed and answered to the best of my capacity .    Thank you for allowing me to participate in the care of your patient .        Jaclyn Barnes MD  [phone]

## 2018-10-24 NOTE — ADVANCED PRACTICE NURSE CONSULT - ASSESSMENT
Vital Signs Last 24 Hrs  T(C): 36.7 (24 Oct 2018 05:26), Max: 37 (23 Oct 2018 20:29)  T(F): 98.1 (24 Oct 2018 05:26), Max: 98.6 (23 Oct 2018 20:29)  HR: 107 (24 Oct 2018 05:26) (90 - 107)  BP: 141/75 (24 Oct 2018 05:26) (128/62 - 141/75)  BP(mean): --  RR: 16 (24 Oct 2018 05:26) (16 - 18)  SpO2: 95% (24 Oct 2018 05:26) (92% - 98%)    Hemoglobin A1C, Whole Blood: 5.1 % (10-23-18 @ 07:53)  Hemoglobin A1C, Whole Blood: 4.9 % (10-06-18 @ 12:52)   eGFR if Non African American: 23 mL/min/1.73M2 (10-23-18 @ 06:38)  eGFR if : 27 mL/min/1.73M2 (10-23-18 @ 06:38)  eGFR if Non African American: 22 mL/min/1.73M2 (10-22-18 @ 23:14)  eGFR if : 25 mL/min/1.73M2 (10-22-18 @ 23:14)      CAPILLARY BLOOD GLUCOSE      POCT Blood Glucose.: 84 mg/dL (24 Oct 2018 07:29)  POCT Blood Glucose.: 101 mg/dL (23 Oct 2018 16:42)  POCT Blood Glucose.: 101 mg/dL (23 Oct 2018 11:54)      DIET:

## 2018-10-25 ENCOUNTER — TRANSCRIPTION ENCOUNTER (OUTPATIENT)
Age: 83
End: 2018-10-25

## 2018-10-25 DIAGNOSIS — R18.8 OTHER ASCITES: ICD-10-CM

## 2018-10-25 LAB
ANION GAP SERPL CALC-SCNC: 11 MMOL/L — SIGNIFICANT CHANGE UP (ref 5–17)
BASE EXCESS BLDA CALC-SCNC: -9.7 MMOL/L — LOW (ref -2–2)
BLOOD GAS COMMENTS ARTERIAL: SIGNIFICANT CHANGE UP
BUN SERPL-MCNC: 62 MG/DL — HIGH (ref 7–23)
CALCIUM SERPL-MCNC: 7.6 MG/DL — LOW (ref 8.5–10.1)
CHLORIDE SERPL-SCNC: 120 MMOL/L — HIGH (ref 96–108)
CO2 SERPL-SCNC: 16 MMOL/L — LOW (ref 22–31)
CREAT SERPL-MCNC: 2.4 MG/DL — HIGH (ref 0.5–1.3)
CULTURE RESULTS: SIGNIFICANT CHANGE UP
GLUCOSE SERPL-MCNC: 158 MG/DL — HIGH (ref 70–99)
HCO3 BLDA-SCNC: 17 MMOL/L — LOW (ref 23–27)
HCT VFR BLD CALC: 27.6 % — LOW (ref 34.5–45)
HGB BLD-MCNC: 8.8 G/DL — LOW (ref 11.5–15.5)
HOROWITZ INDEX BLDA+IHG-RTO: 21 — SIGNIFICANT CHANGE UP
MCHC RBC-ENTMCNC: 28.3 PG — SIGNIFICANT CHANGE UP (ref 27–34)
MCHC RBC-ENTMCNC: 31.9 GM/DL — LOW (ref 32–36)
MCV RBC AUTO: 88.7 FL — SIGNIFICANT CHANGE UP (ref 80–100)
NRBC # BLD: 0 /100 WBCS — SIGNIFICANT CHANGE UP (ref 0–0)
ORGANISM # SPEC MICROSCOPIC CNT: SIGNIFICANT CHANGE UP
ORGANISM # SPEC MICROSCOPIC CNT: SIGNIFICANT CHANGE UP
PCO2 BLDA: 36 MMHG — SIGNIFICANT CHANGE UP (ref 32–46)
PH BLDA: 7.27 — LOW (ref 7.35–7.45)
PLATELET # BLD AUTO: 150 K/UL — SIGNIFICANT CHANGE UP (ref 150–400)
PO2 BLDA: 87 MMHG — SIGNIFICANT CHANGE UP (ref 74–108)
POTASSIUM SERPL-MCNC: 4 MMOL/L — SIGNIFICANT CHANGE UP (ref 3.5–5.3)
POTASSIUM SERPL-SCNC: 4 MMOL/L — SIGNIFICANT CHANGE UP (ref 3.5–5.3)
RBC # BLD: 3.11 M/UL — LOW (ref 3.8–5.2)
RBC # FLD: 15.3 % — HIGH (ref 10.3–14.5)
SAO2 % BLDA: 95 % — SIGNIFICANT CHANGE UP (ref 92–96)
SODIUM SERPL-SCNC: 147 MMOL/L — HIGH (ref 135–145)
SPECIMEN SOURCE: SIGNIFICANT CHANGE UP
WBC # BLD: 20 K/UL — HIGH (ref 3.8–10.5)
WBC # FLD AUTO: 20 K/UL — HIGH (ref 3.8–10.5)

## 2018-10-25 PROCEDURE — 99233 SBSQ HOSP IP/OBS HIGH 50: CPT

## 2018-10-25 PROCEDURE — 71045 X-RAY EXAM CHEST 1 VIEW: CPT | Mod: 26,76

## 2018-10-25 PROCEDURE — 76705 ECHO EXAM OF ABDOMEN: CPT | Mod: 26

## 2018-10-25 PROCEDURE — 71045 X-RAY EXAM CHEST 1 VIEW: CPT | Mod: 26

## 2018-10-25 RX ORDER — LACTULOSE 10 G/15ML
45 SOLUTION ORAL
Qty: 0 | Refills: 0 | Status: DISCONTINUED | OUTPATIENT
Start: 2018-10-25 | End: 2018-10-25

## 2018-10-25 RX ORDER — PIPERACILLIN AND TAZOBACTAM 4; .5 G/20ML; G/20ML
3.38 INJECTION, POWDER, LYOPHILIZED, FOR SOLUTION INTRAVENOUS EVERY 12 HOURS
Qty: 0 | Refills: 0 | Status: DISCONTINUED | OUTPATIENT
Start: 2018-10-25 | End: 2018-11-01

## 2018-10-25 RX ORDER — FLUCONAZOLE 150 MG/1
200 TABLET ORAL ONCE
Qty: 0 | Refills: 0 | Status: COMPLETED | OUTPATIENT
Start: 2018-10-25 | End: 2018-10-25

## 2018-10-25 RX ORDER — LACTULOSE 10 G/15ML
45 SOLUTION ORAL
Qty: 0 | Refills: 0 | Status: DISCONTINUED | OUTPATIENT
Start: 2018-10-25 | End: 2018-10-27

## 2018-10-25 RX ORDER — FLUCONAZOLE 150 MG/1
100 TABLET ORAL EVERY 24 HOURS
Qty: 0 | Refills: 0 | Status: DISCONTINUED | OUTPATIENT
Start: 2018-10-26 | End: 2018-10-27

## 2018-10-25 RX ORDER — PIPERACILLIN AND TAZOBACTAM 4; .5 G/20ML; G/20ML
3.38 INJECTION, POWDER, LYOPHILIZED, FOR SOLUTION INTRAVENOUS ONCE
Qty: 0 | Refills: 0 | Status: COMPLETED | OUTPATIENT
Start: 2018-10-25 | End: 2018-10-25

## 2018-10-25 RX ORDER — SODIUM BICARBONATE 1 MEQ/ML
0.05 SYRINGE (ML) INTRAVENOUS
Qty: 50 | Refills: 0 | Status: DISCONTINUED | OUTPATIENT
Start: 2018-10-25 | End: 2018-10-27

## 2018-10-25 RX ORDER — FLUCONAZOLE 150 MG/1
TABLET ORAL
Qty: 0 | Refills: 0 | Status: DISCONTINUED | OUTPATIENT
Start: 2018-10-25 | End: 2018-10-27

## 2018-10-25 RX ORDER — LACTULOSE 10 G/15ML
200 SOLUTION ORAL ONCE
Qty: 0 | Refills: 0 | Status: COMPLETED | OUTPATIENT
Start: 2018-10-25 | End: 2018-10-25

## 2018-10-25 RX ADMIN — Medication 3 MILLILITER(S): at 07:26

## 2018-10-25 RX ADMIN — LACTULOSE 200 GRAM(S): 10 SOLUTION ORAL at 15:04

## 2018-10-25 RX ADMIN — HEPARIN SODIUM 5000 UNIT(S): 5000 INJECTION INTRAVENOUS; SUBCUTANEOUS at 05:58

## 2018-10-25 RX ADMIN — NYSTATIN CREAM 1 APPLICATION(S): 100000 CREAM TOPICAL at 05:59

## 2018-10-25 RX ADMIN — Medication 3 MILLILITER(S): at 13:37

## 2018-10-25 RX ADMIN — Medication 3 MILLILITER(S): at 01:25

## 2018-10-25 RX ADMIN — SODIUM CHLORIDE 75 MILLILITER(S): 9 INJECTION, SOLUTION INTRAVENOUS at 00:46

## 2018-10-25 RX ADMIN — PIPERACILLIN AND TAZOBACTAM 25 GRAM(S): 4; .5 INJECTION, POWDER, LYOPHILIZED, FOR SOLUTION INTRAVENOUS at 18:46

## 2018-10-25 RX ADMIN — Medication 75 MEQ/KG/HR: at 14:12

## 2018-10-25 RX ADMIN — FLUCONAZOLE 100 MILLIGRAM(S): 150 TABLET ORAL at 16:06

## 2018-10-25 RX ADMIN — CEFTRIAXONE 100 GRAM(S): 500 INJECTION, POWDER, FOR SOLUTION INTRAMUSCULAR; INTRAVENOUS at 00:50

## 2018-10-25 RX ADMIN — PIPERACILLIN AND TAZOBACTAM 200 GRAM(S): 4; .5 INJECTION, POWDER, LYOPHILIZED, FOR SOLUTION INTRAVENOUS at 14:51

## 2018-10-25 RX ADMIN — Medication 3 MILLILITER(S): at 20:46

## 2018-10-25 NOTE — PROGRESS NOTE ADULT - PROBLEM SELECTOR PLAN 2
2/2 poor PO intake  -Hold Lasix   -Continue gentle IV fluids--change to D5W with bicarb by Renal as she now has metabolic acidosis.  -Continue to monitor BMP.

## 2018-10-25 NOTE — PROGRESS NOTE ADULT - SUBJECTIVE AND OBJECTIVE BOX
ID Progress note     Name: MALINDA SULTANA  Age: 89y  Gender: Female  MRN: [id number]    Interval History-- Events noted, remains obtunded, poorly responsive, seen by Neurology , had repeat ct scan negative for any acute infarct. Noted to have Ammonia of over 200. No known hx of liver disease . Remains NPO as obtunded   Notes reviewed    Past Medical History--  Coronary artery disease with angina pectoris, unspecified vessel or lesion type, unspecified whether native or transplanted heart  Congestive heart failure  Asthma  Diabetes mellitus  HTN (hypertension)  No significant past surgical history      For details regarding the patient's social history, family history, and other miscellaneous elements, please refer the initial infectious diseases consultation and/or the admitting history and physical examination for this admission.    Allergies--  Allergies    No Known Allergies    Intolerances        Medications--  Antibiotics:  cefTRIAXone   IVPB 1 Gram(s) IV Intermittent every 24 hours  rifaximin 550 milliGRAM(s) Oral two times a day    Immunologic:    Other:  ALBUTerol/ipratropium for Nebulization  dextrose 40% Gel PRN  dextrose 5%.  dextrose 50% Injectable  dextrose 50% Injectable  dextrose 50% Injectable  ferrous    sulfate  glucagon  Injectable PRN  guaiFENesin ER  heparin  Injectable  lactulose Retention Enema  lactulose Syrup  nystatin Cream  pantoprazole    Tablet  sodium bicarbonate  Infusion      Review of Systems--  Review of systems unable to be obtained secondary to clinical condition.    Physical Examination--    Vital Signs: T(F): 97.5 (10-25-18 @ 04:58), Max: 98.4 (10-24-18 @ 20:44)  HR: 90 (10-25-18 @ 13:40)  BP: 149/32 (10-25-18 @ 04:58)  RR: 17 (10-25-18 @ 04:58)  SpO2: 96% (10-25-18 @ 07:30)  Wt(kg): --  General: elderly poorly responsive female in no acute distress.  HEENT: AT/NC. PERRL. EOMI. Anicteric. Conjunctiva pink and moist. Oropharynx clear. Dentition fair.  Neck: Not rigid. No sense of mass.  Nodes: None palpable.  Lungs: Coarse breath sounds  bilaterally without rales, wheezing or rhonchi  Heart: Regular rate and rhythm. No Murmur. No rub. No gallop. No palpable thrill.  Abdomen: Bowel sounds present and normoactive. Soft. Nondistended. Nontender. No sense of mass. No organomegaly.  Back: No spinal tenderness. No costovertebral angle tenderness.   Extremities: No cyanosis or clubbing. No edema.   Skin: Warm. Dry. Good turgor. No rash. No vasculitic stigmata.  Psychiatric: Appropriate affect and mood for situation.         Laboratory Studies--  CBC                        8.8    20.00 )-----------( 150      ( 25 Oct 2018 08:53 )             27.6       Chemistries  10-25    147<H>  |  120<H>  |  62<H>  ----------------------------<  158<H>  4.0   |  16<L>  |  2.40<H>    Ca    7.6<L>      25 Oct 2018 08:53  Phos  3.3     10-24  Mg     2.0     10-24    TPro  6.3  /  Alb  1.8<L>  /  TBili  0.5  /  DBili  x   /  AST  24  /  ALT  18  /  AlkPhos  124<H>  10-24    Ammonia, Serum (10.25.18 @ 09:06)    Ammonia, Serum: 203 umol/L    Procalcitonin, Serum (10.25.18 @ 08:53)    Procalcitonin, Serum: 0.85:     CAPILLARY BLOOD GLUCOSE      POCT Blood Glucose.: 174 mg/dL (25 Oct 2018 12:09)  POCT Blood Glucose.: 174 mg/dL (25 Oct 2018 07:29)  POCT Blood Glucose.: 151 mg/dL (24 Oct 2018 21:32)  POCT Blood Glucose.: 139 mg/dL (24 Oct 2018 16:47)    Culture Data    Culture - Blood (collected 23 Oct 2018 09:17)  Source: .Blood Blood-Peripheral  Gram Stain (24 Oct 2018 08:39):    Growth in anaerobic bottle: Gram Positive Cocci in Clusters  Final Report (25 Oct 2018 11:39):    Growth in anaerobic bottle: Coag Negative Staphylococcus    Single set isolate, possible contaminant. Contact    Microbiology if susceptibility testing clinically    indicated.    "Due to technical problems, Proteus sp. will Not be reported as part of    theBCID panel until further notice"    ***Blood Panel PCR results on this specimen are available    approximately 3 hours after the Gram stain result.***    Gram stain, PCR, and/or culture results may not always    correspond due to difference in methodologies.    ************************************************************    This PCR assay was performed using [employer].    The following targets are tested for: Enterococcus,    vancomycin resistant enterococci, Listeria monocytogenes,    coagulase negative staphylococci, S. aureus,    methicillin resistant S. aureus, Streptococcus agalactiae    (Group B), S. pneumoniae, S. pyogenes (Group A),    Acinetobacter baumannii, Enterobacter cloacae, E. coli,    Klebsiella oxytoca, K. pneumoniae, Proteus sp.,    Serratia marcescens, Haemophilus influenzae,    Neisseria meningitidis, Pseudomonas aeruginosa, Candida    albicans, C. glabrata, C krusei, C parapsilosis,    C. tropicalis and the KPC resistance gene.  Organism: Blood Culture PCR (25 Oct 2018 11:39)  Organism: Blood Culture PCR (25 Oct 2018 11:39)    Culture - Blood (collected 23 Oct 2018 09:17)  Source: .Blood Blood-Peripheral  Preliminary Report (24 Oct 2018 10:01):    No growth to date.    Culture - Urine (collected 23 Oct 2018 09:11)  Source: .Urine Catheterized  Preliminary Report (25 Oct 2018 10:22):    10,000 - 49,000 CFU/mL Presumptive Candida albicans        Radiology:    Xray Chest 1 View- PORTABLE-Urgent (10.25.18 @ 09:44) >  There is a vaguely defined infiltrate off left heart border. On prior   study of October 22 heart and rotation obscured this area.    CAT scan of October 23 show density in the region with bronchiectasis   consistent with acute and/or chronic finding.    Spurring of the inferior left calculi again noted.    IMPRESSION: Lingular area density again noted.    US Renal (10.24.18 @ 12:39) >  IMPRESSION: Nonvisualization of the left kidney. No hydronephrosis of the   right kidney.    Abdominal and pelvic ascites.    CT Head No Cont (10.24.18 @ 12:04) >  Very limited. Severe motion degradation of images.  No change ventricular size and configuration. Advanced chronic   microvascular changes cerebral white matter similar to prior. No gross   intra-axial or extra-axial hemorrhage territorial infarct or edema. No   mass effect. Clear sinuses. No obvious displaced fracture.    Impression: Severely motion degraded study. No gross acute finding.   Follow-up as clinically indicated.      Assessment--  89 yo F w/ PMH of DM type 2, HTN, CKD stage 4 ,COPD on 3L home O2, diastolic HFpEF (last echo 10/2017) presented to ED w/ lethargy progressing to unresponsiveness , she most likely has metabolic encephalopathy secondary to UTI and dehydration from poor oral intake. Her UA is not impressive and she my be colonized with yeast . She  likely has uterine prolapse which may cause urinary retention  .     She has metabolic encephaloathy secondary to hepatic encephalopathy , the etiology may be SAMIA or underlying undiagnosed liver disease.    She is at high risk for aspiration because of her mental status and leukocytosis could be from it     Family is persistent in treating the Candiduria     Plan :   - will check for hepatitis serology   - change antibiotics to Zosyn q 12   - aspiration precautions   - insert NGT as unable to take any po meds or meals   - if uterine prolase persists then consider Gyn evaluation for vaginal pessary   - speech evaluation   - consider neurology follow up    Continue with present regime .  Appropriate use of antibiotics and adverse effects reviewed.    I have discussed the above plan of care with patient's family in detail. They expressed understanding of the treatment plan . Risks, benefits and alternatives discussed in detail. I have asked if they have any questions or concerns and appropriately addressed them to the best of my ability .      > 35 minutes spent in direct patient care reviewing  the notes, lab data/ imaging , discussion with multidisciplinary team. All questions were addressed and answered to the best of my capacity .    Thank you for allowing me to participate in the care of your patient .        Jaclyn Barnes MD  [phone] ID Progress note     Name: MALINDA SULTANA  Age: 89y  Gender: Female  MRN: [id number]    Interval History-- Events noted, remains obtunded, poorly responsive, seen by Neurology , had repeat ct scan negative for any acute infarct. Noted to have Ammonia of over 200. No known hx of liver disease . Remains NPO as obtunded   Notes reviewed    Past Medical History--  Coronary artery disease with angina pectoris, unspecified vessel or lesion type, unspecified whether native or transplanted heart  Congestive heart failure  Asthma  Diabetes mellitus  HTN (hypertension)  No significant past surgical history      For details regarding the patient's social history, family history, and other miscellaneous elements, please refer the initial infectious diseases consultation and/or the admitting history and physical examination for this admission.    Allergies--  Allergies    No Known Allergies    Intolerances        Medications--  Antibiotics:  cefTRIAXone   IVPB 1 Gram(s) IV Intermittent every 24 hours  rifaximin 550 milliGRAM(s) Oral two times a day    Immunologic:    Other:  ALBUTerol/ipratropium for Nebulization  dextrose 40% Gel PRN  dextrose 5%.  dextrose 50% Injectable  dextrose 50% Injectable  dextrose 50% Injectable  ferrous    sulfate  glucagon  Injectable PRN  guaiFENesin ER  heparin  Injectable  lactulose Retention Enema  lactulose Syrup  nystatin Cream  pantoprazole    Tablet  sodium bicarbonate  Infusion      Review of Systems--  Review of systems unable to be obtained secondary to clinical condition.    Physical Examination--    Vital Signs: T(F): 97.5 (10-25-18 @ 04:58), Max: 98.4 (10-24-18 @ 20:44)  HR: 90 (10-25-18 @ 13:40)  BP: 149/32 (10-25-18 @ 04:58)  RR: 17 (10-25-18 @ 04:58)  SpO2: 96% (10-25-18 @ 07:30)  Wt(kg): --  General: elderly poorly responsive female in no acute distress.  HEENT: AT/NC. PERRL. EOMI. Anicteric. Conjunctiva pink and moist. Oropharynx clear. Dentition fair.  Neck: Not rigid. No sense of mass.  Nodes: None palpable.  Lungs: Coarse breath sounds  bilaterally without rales, wheezing or rhonchi  Heart: Regular rate and rhythm. No Murmur. No rub. No gallop. No palpable thrill.  Abdomen: Bowel sounds present and normoactive. Soft. Nondistended. Nontender. No sense of mass. No organomegaly.  Back: No spinal tenderness. No costovertebral angle tenderness.   Extremities: No cyanosis or clubbing. No edema.   Skin: Warm. Dry. Good turgor. No rash. No vasculitic stigmata.  Psychiatric: Appropriate affect and mood for situation.         Laboratory Studies--  CBC                        8.8    20.00 )-----------( 150      ( 25 Oct 2018 08:53 )             27.6       Chemistries  10-25    147<H>  |  120<H>  |  62<H>  ----------------------------<  158<H>  4.0   |  16<L>  |  2.40<H>    Ca    7.6<L>      25 Oct 2018 08:53  Phos  3.3     10-24  Mg     2.0     10-24    TPro  6.3  /  Alb  1.8<L>  /  TBili  0.5  /  DBili  x   /  AST  24  /  ALT  18  /  AlkPhos  124<H>  10-24    Ammonia, Serum (10.25.18 @ 09:06)    Ammonia, Serum: 203 umol/L    Procalcitonin, Serum (10.25.18 @ 08:53)    Procalcitonin, Serum: 0.85:     CAPILLARY BLOOD GLUCOSE      POCT Blood Glucose.: 174 mg/dL (25 Oct 2018 12:09)  POCT Blood Glucose.: 174 mg/dL (25 Oct 2018 07:29)  POCT Blood Glucose.: 151 mg/dL (24 Oct 2018 21:32)  POCT Blood Glucose.: 139 mg/dL (24 Oct 2018 16:47)    Culture Data    Culture - Blood (collected 23 Oct 2018 09:17)  Source: .Blood Blood-Peripheral  Gram Stain (24 Oct 2018 08:39):    Growth in anaerobic bottle: Gram Positive Cocci in Clusters  Final Report (25 Oct 2018 11:39):    Growth in anaerobic bottle: Coag Negative Staphylococcus    Single set isolate, possible contaminant. Contact    Microbiology if susceptibility testing clinically    indicated.    "Due to technical problems, Proteus sp. will Not be reported as part of    theBCID panel until further notice"    ***Blood Panel PCR results on this specimen are available    approximately 3 hours after the Gram stain result.***    Gram stain, PCR, and/or culture results may not always    correspond due to difference in methodologies.    ************************************************************    This PCR assay was performed using [employer]    The following targets are tested for: Enterococcus,    vancomycin resistant enterococci, Listeria monocytogenes,    coagulase negative staphylococci, S. aureus,    methicillin resistant S. aureus, Streptococcus agalactiae    (Group B), S. pneumoniae, S. pyogenes (Group A),    Acinetobacter baumannii, Enterobacter cloacae, E. coli,    Klebsiella oxytoca, K. pneumoniae, Proteus sp.,    Serratia marcescens, Haemophilus influenzae,    Neisseria meningitidis, Pseudomonas aeruginosa, Candida    albicans, C. glabrata, C krusei, C parapsilosis,    C. tropicalis and the KPC resistance gene.  Organism: Blood Culture PCR (25 Oct 2018 11:39)  Organism: Blood Culture PCR (25 Oct 2018 11:39)    Culture - Blood (collected 23 Oct 2018 09:17)  Source: .Blood Blood-Peripheral  Preliminary Report (24 Oct 2018 10:01):    No growth to date.    Culture - Urine (collected 23 Oct 2018 09:11)  Source: .Urine Catheterized  Preliminary Report (25 Oct 2018 10:22):    10,000 - 49,000 CFU/mL Presumptive Candida albicans        Radiology:    Xray Chest 1 View- PORTABLE-Urgent (10.25.18 @ 09:44) >  There is a vaguely defined infiltrate off left heart border. On prior   study of October 22 heart and rotation obscured this area.    CAT scan of October 23 show density in the region with bronchiectasis   consistent with acute and/or chronic finding.    Spurring of the inferior left calculi again noted.    IMPRESSION: Lingular area density again noted.    US Renal (10.24.18 @ 12:39) >  IMPRESSION: Nonvisualization of the left kidney. No hydronephrosis of the   right kidney.    Abdominal and pelvic ascites.    CT Head No Cont (10.24.18 @ 12:04) >  Very limited. Severe motion degradation of images.  No change ventricular size and configuration. Advanced chronic   microvascular changes cerebral white matter similar to prior. No gross   intra-axial or extra-axial hemorrhage territorial infarct or edema. No   mass effect. Clear sinuses. No obvious displaced fracture.    Impression: Severely motion degraded study. No gross acute finding.   Follow-up as clinically indicated.      Assessment--  88 yo F w/ PMH of DM type 2, HTN, CKD stage 4 ,COPD on 3L home O2, diastolic HFpEF (last echo 10/2017) presented to ED w/ lethargy progressing to unresponsiveness , she most likely has metabolic encephalopathy secondary to UTI and dehydration from poor oral intake. Her UA is not impressive and she my be colonized with yeast . She  likely has uterine prolapse which may cause urinary retention  .     She has metabolic encephaloathy secondary to hepatic encephalopathy , the etiology may be SAMIA or underlying undiagnosed liver disease.    She is at high risk for aspiration because of her mental status and leukocytosis could be from it     Family is persistent in treating the Candiduria     Coag negative staph bacteremia likely skin contamination   Plan :   - will check for hepatitis serology   - change antibiotics to Zosyn q 12   - aspiration precautions   - insert NGT as unable to take any po meds or meals   - if uterine prolase persists then consider Gyn evaluation for vaginal pessary   - speech evaluation   - consider neurology follow up    Continue with present regime .  Appropriate use of antibiotics and adverse effects reviewed.    I have discussed the above plan of care with patient's family in detail. They expressed understanding of the treatment plan . Risks, benefits and alternatives discussed in detail. I have asked if they have any questions or concerns and appropriately addressed them to the best of my ability .      > 35 minutes spent in direct patient care reviewing  the notes, lab data/ imaging , discussion with multidisciplinary team. All questions were addressed and answered to the best of my capacity .    Thank you for allowing me to participate in the care of your patient .        Jaclyn Barnes MD  [phone]

## 2018-10-25 NOTE — PROGRESS NOTE ADULT - SUBJECTIVE AND OBJECTIVE BOX
Neurology Follow up note    MALINDA [id number]    HPI:  Hx obtained from son and other family members as pt was altered.  Pt is a 89 yo F w/ PMH of DM type 2, HTN, COPD on 2.5L home O2, diastolic HFpEF (last echo 10/2017) presented to ED p/w ams, weakness, dysuria, dec po x past ~ 3 days. Of note Pt was recently admitted for ams, similar to current presentation and was d/c 6 days ago. On previous admission Pt was diagnosed and treated for UTI. Pt was initially doing well, but then developed burning with urination, AMS, and watery diarrhea x 3 days ago. Family reports that pt was given Bactrim for unresolved UTI, but that she was only able to take 2 doses, as she could not swallow the 3rd dose this AM. Pt has poor PO intake and EMS came to her house last night to give her IVF due to dehydration. Of note patient is normally bedbound and requires 24/7 home health aid. Pt is incontinent at baseline and wears diapers. Denies any Fevers, but endorses chills, and increased fatigue. Denies any SOB, CP, N/V. Family reports pt normally is able to converse without issue, but has been incoherent for ~ the past 3 days    In the ED, temp 97.1, HR 62, /75, RR 20, Spo2 99 on supplemental o2. WBC 11.46, Hg 9.5, hct 29.5, platelet 147, PT 13.5, INR 1.23, PTT 25.9, Na 148, K 4.3, Cl 120, Co2 21, BUN 64, Cr 2.00, glucose 110, calcium 8.0, alk phos 136, lactate 1.4, lipase 117. UA: small bilirubin, moderate leukocyte esterase, trace blood, moderate epithelial, bacteria few, hyaline 0-2, yeast present    Imaging:  CXR and CT head no cont ordered  EKG: NSR    In the ED, received zosyn 3.375 mg IV x 1, NaCl 0.9% 1000mL x 2, duoneb 3 mL x 1, (22 Oct 2018 23:52)      Interval History - no improvement of mental status,    Patient is seen, chart was reviewed and case was discussed with the treatment team.  Pt is not in any distress.   Lying on bed comfortably.   No events reported overnight.   No clinical seizure was reported.  Sitting on chair bed comfortably.    is at bedside.    Vital Signs Last 24 Hrs  T(C): 36.4 (25 Oct 2018 04:58), Max: 36.9 (24 Oct 2018 20:44)  T(F): 97.5 (25 Oct 2018 04:58), Max: 98.4 (24 Oct 2018 20:44)  HR: 92 (25 Oct 2018 07:30) (90 - 102)  BP: 149/32 (25 Oct 2018 04:58) (132/74 - 153/73)  BP(mean): --  RR: 17 (25 Oct 2018 04:58) (17 - 18)  SpO2: 96% (25 Oct 2018 07:30) (96% - 98%)            On Neurological Examination:    Mental Status - Pt is unresponsive to verbal commands.  moaning and grimacing with pain,  not following commands.      Cranial Nerves - Pupils 3 mm equal and reactive to light, extraocular eye movements intact. Pt has no visual field deficit.  Pt has ?left facial asymmetry. Facial sensation is intact.Tongue - is in midline.    Muscle tone -     Motor Exam - UE 3/5 LE 1-2/5 ?LEFT WEAKER THAN RIGHT.    Sensory Exam - . Pt withdraws all extremities equally on stimulation.         coordination:    Finger to nose: normal      Deep tendon Reflexes - 2 plus all over.       Neck Supple -  Yes.     MEDICATIONS    ALBUTerol/ipratropium for Nebulization 3 milliLiter(s) Nebulizer every 6 hours  cefTRIAXone   IVPB 1 Gram(s) IV Intermittent every 24 hours  dextrose 40% Gel 15 Gram(s) Oral once PRN  dextrose 5%. 1000 milliLiter(s) IV Continuous <Continuous>  dextrose 5%. 1000 milliLiter(s) IV Continuous <Continuous>  dextrose 50% Injectable 12.5 Gram(s) IV Push once  dextrose 50% Injectable 25 Gram(s) IV Push once  dextrose 50% Injectable 25 Gram(s) IV Push once  ferrous    sulfate 325 milliGRAM(s) Oral daily  glucagon  Injectable 1 milliGRAM(s) IntraMuscular once PRN  guaiFENesin  milliGRAM(s) Oral every 12 hours  heparin  Injectable 5000 Unit(s) SubCutaneous every 12 hours  lactated ringers. 1000 milliLiter(s) IV Continuous <Continuous>  nystatin Cream 1 Application(s) Topical two times a day  pantoprazole    Tablet 40 milliGRAM(s) Oral before breakfast      Allergies    No Known Allergies    Intolerances        LABS:  CBC Full  -  ( 25 Oct 2018 08:53 )  WBC Count : 20.00 K/uL  Hemoglobin : 8.8 g/dL  Hematocrit : 27.6 %  Platelet Count - Automated : 150 K/uL  Mean Cell Volume : 88.7 fl  Mean Cell Hemoglobin : 28.3 pg  Mean Cell Hemoglobin Concentration : 31.9 gm/dL  Auto Neutrophil # : x  Auto Lymphocyte # : x  Auto Monocyte # : x        10-25    147<H>  |  120<H>  |  62<H>  ----------------------------<  158<H>  4.0   |  16<L>  |  2.40<H>    Ca    7.6<L>      25 Oct 2018 08:53  Phos  3.3     10-24  Mg     2.0     10-24    TPro  6.3  /  Alb  1.8<L>  /  TBili  0.5  /  DBili  x   /  AST  24  /  ALT  18  /  AlkPhos  124<H>  10-24    Hemoglobin A1C:     Vitamin B12     RADIOLOGY  < from: CT Head No Cont (10.24.18 @ 12:04) >    EXAM:  CT BRAIN                            PROCEDURE DATE:  10/24/2018          INTERPRETATION:  History: Altered mental status.    Noncontrast head CT. Prior 10/23/2018.    Very limited. Severe motion degradation of images.  No change ventricular size and configuration. Advanced chronic   microvascular changes cerebral white matter similar to prior. No gross   intra-axial or extra-axial hemorrhage territorial infarct or edema. No   mass effect. Clear sinuses. No obvious displaced fracture.    Impression: Severely motion degraded study. No gross acute finding.   Follow-up as clinically indicated.            EDELMIRA STEVENS M.D., ATTENDING RADIOLOGIST  This document has been electronically signed. Oct 24 2018 12:18PM             ASSESSMENT AND PLAN:      ams likely metabolic .  ?asymmetrical left side weakness;  ct head x2 negative for cva.      X-RAYS CHEST,.  ABG TODAY.  FOLLOW UP LYTES.  would brain mri as she would require sedation.  Plan of care was discussed with family. Questions answered.  Would continue to follow.

## 2018-10-25 NOTE — PROGRESS NOTE ADULT - SUBJECTIVE AND OBJECTIVE BOX
HPI:  Pt is a 88 yo F w/ PMH of DM type 2, HTN, COPD on 2.5L home O2, diastolic HFpEF (last echo 10/2017) presented to ED p/w ams, weakness, dysuria, dec po x past ~ 3 days.     INTERVAL EVENTS:   Patient seen and examined. Remains lethargic today. Ammonia checked this AM and found to be 200.     REVIEW OF SYSTEMS:  unable to obtain.    VITAL SIGNS:  T(C): 36.4 (25 Oct 2018 14:13), Max: 36.9 (24 Oct 2018 20:44)  T(F): 97.5 (25 Oct 2018 14:13), Max: 98.4 (24 Oct 2018 20:44)  HR: 97 (25 Oct 2018 14:13) (90 - 102)  BP: 130/51 (25 Oct 2018 14:13) (130/51 - 149/32)  BP(mean): --  RR: 18 (25 Oct 2018 14:13) (17 - 18)  SpO2: 93% (25 Oct 2018 14:13) (93% - 98%)    PHYSICAL EXAM:     GENERAL: chronically ill elderly female, no acute distress  HEENT: NC/AT, EOMI, neck supple, MMM  RESPIRATORY: decreased breath sounds bilaterally, audible secretions in airways.   CARDIOVASCULAR: RRR, no murmurs, gallops, rubs  ABDOMINAL: soft, non-tender, non-distended, positive bowel sounds   EXTREMITIES: no clubbing, cyanosis, or edema  NEUROLOGICAL: lethargic, responsive to painful stimuli  MUSCULOSKELETAL: no gross joint deformity       CAPILLARY BLOOD GLUCOSE      POCT Blood Glucose.: 174 mg/dL (25 Oct 2018 12:09)  POCT Blood Glucose.: 174 mg/dL (25 Oct 2018 07:29)  POCT Blood Glucose.: 151 mg/dL (24 Oct 2018 21:32)    < from: CT Chest No Cont (10.23.18 @ 14:31) >  IMPRESSION: Density in the inferior lingula with bronchiectasis may   represent scarring although infiltrate is not clearly excluded  Large hiatal hernia  Upper abdominal ascites again seen      MEDICATIONS  (STANDING):  ALBUTerol/ipratropium for Nebulization 3 milliLiter(s) Nebulizer every 6 hours  dextrose 5%. 1000 milliLiter(s) (50 mL/Hr) IV Continuous <Continuous>  dextrose 50% Injectable 12.5 Gram(s) IV Push once  dextrose 50% Injectable 25 Gram(s) IV Push once  dextrose 50% Injectable 25 Gram(s) IV Push once  ferrous    sulfate 325 milliGRAM(s) Oral daily  fluconAZOLE IVPB      guaiFENesin  milliGRAM(s) Oral every 12 hours  heparin  Injectable 5000 Unit(s) SubCutaneous every 12 hours  lactulose Syrup 45 Gram(s) Oral four times a day  pantoprazole    Tablet 40 milliGRAM(s) Oral before breakfast  piperacillin/tazobactam IVPB. 3.375 Gram(s) IV Intermittent every 12 hours  rifaximin 550 milliGRAM(s) Oral two times a day  sodium bicarbonate  Infusion 0.054 mEq/kG/Hr (75 mL/Hr) IV Continuous <Continuous>    MEDICATIONS  (PRN):  dextrose 40% Gel 15 Gram(s) Oral once PRN Blood Glucose LESS THAN 70 milliGRAM(s)/deciliter  glucagon  Injectable 1 milliGRAM(s) IntraMuscular once PRN Glucose LESS THAN 70 milligrams/deciliter

## 2018-10-25 NOTE — PROGRESS NOTE ADULT - PROBLEM SELECTOR PLAN 1
Now suspect metabolic encephalopathy/hepatic encephalopathy. Also likely due to SAMIA from PO intake in setting of diuresis.   She could also be aspirating at this point   ?infiltrate on CXR. Changed to Zosyn today to cover possible HCAP/aspiration pneumonia.   --urine culture shows persistence of Candida--started on Fluconazole by ID today.  -F/u urine culture.  -Blood culture growing GPC in clusters in one bottle, likely contaminant. Repeat cultures are pending.  - Aspiration precautions  -bedrest, turn q 2.

## 2018-10-25 NOTE — CHART NOTE - NSCHARTNOTEFT_GEN_A_CORE
RN called as patient's NG tube was coming out and coiled in her mouth.  Patient was seen and examined at bedside.  NG tube was removed prior to arrival.  Patient's son was at bedside.  NG tube was placed earlier due to decreased PO intake, however patient has large hiatal hernia and NG tube placement was very difficult.  Patient's son would like to defer NG tube placement until the morning as patient seems comfortable right now.  Patient has hyperammonia and has already received lactulose fleet. RN called as patient's NG tube was coming out and coiled in her mouth.  Patient was seen and examined at bedside.  NG tube was removed prior to arrival.  Patient's son was at bedside.  NG tube was placed earlier due to decreased PO intake, however patient has large hiatal hernia and NG tube placement was very difficult.  Patient received lactulose enema.  Nurse was signed out that patient's family did not want to use NG tube until placement was confirmed.      Patient's son would like to defer NG tube placement until the morning as patient seems comfortable right now.  Patient has hyperammonia and has already received lactulose fleet. RN called as patient's NG tube was coming out and coiled in her mouth.  Patient was seen and examined at bedside.  NG tube was removed prior to arrival.  Patient's son was at bedside.  NG tube was placed earlier due to decreased PO intake, however patient has large hiatal hernia and NG tube placement was very difficult.  Patient received lactulose enema.  Nurse was signed out that patient's family did not want to use NG tube until placement was confirmed.      Patient's son would like to defer NG tube placement until the morning.  Patient has hyperammonia and has already received lactulose fleet at 3pm, but has not yet received lactulose syrup.  Extensive conversation was had with patient's family regarding importance of patient receiving lactulose due to her high ammonia levels, but family would like to defer for now as already tried multiple times today with little success.  Family would prefer not to have patient go through that right now and try again in the morning.  Family was informed that patient's mental status could be highly affected by high levels of ammonia, and that condition could deteriorate, family understands and would like to wait to see if new medications placed on today will help patient.  Family was informed that condition would not improve immediately given SAMIA, family understands and would still like to wait.  NG tube deferred until tomorrow morning.

## 2018-10-25 NOTE — CONSULT NOTE ADULT - SUBJECTIVE AND OBJECTIVE BOX
Chief Complaint:  Patient is a 89y old  Female who presents with a chief complaint of weakness, decreased PO intake Hx obtained from son and other family members as pt was altered.  Pt is a 88 yo F w/ PMH of DM type 2, HTN, COPD on 2.5L home O2, diastolic HFpEF (last echo 10/2017) presented to ED p/w ams, weakness, dysuria, dec po x past ~ 3 days. Of note Pt was recently admitted for ams, similar to current presentation and was d/c 6 days ago. On previous admission Pt was diagnosed and treated for UTI. Pt was initially doing well, but then developed burning with urination, AMS, and watery diarrhea x 3 days ago. Family reports that pt was given Bactrim for unresolved UTI, but that she was only able to take 2 doses, as she could not swallow the 3rd dose this AM. Pt has poor PO intake and EMS came to her house last night to give her IVF due to dehydration. Of note patient is normally bedbound and requires 24/7 home health aid. Pt is incontinent at baseline and wears diapers. Denies any Fevers, but endorses chills, and increased fatigue. Denies any SOB, CP, N/V. Family reports pt normally is able to converse without issue, but has been incoherent for ~ the past 3 days    In the ED, temp 97.1, HR 62, /75, RR 20, Spo2 99 on supplemental o2. WBC 11.46, Hg 9.5, hct 29.5, platelet 147, PT 13.5, INR 1.23, PTT 25.9, Na 148, K 4.3, Cl 120, Co2 21, BUN 64, Cr 2.00, glucose 110, calcium 8.0, alk phos 136, lactate 1.4, lipase 117. UA: small bilirubin, moderate leukocyte esterase, trace blood, moderate epithelial, bacteria few, hyaline 0-2, yeast present    Imaging:  CXR and CT head no cont ordered  EKG: NSR    In the ED, received zosyn 3.375 mg IV x 1, NaCl 0.9% 1000mL x 2, duoneb 3 mL x 1,     Review of Systems:  Review of Systems: unable to obtain due to patients baseline mental status	    Allergies:  No Known Allergies      Medications:  ALBUTerol/ipratropium for Nebulization 3 milliLiter(s) Nebulizer every 6 hours  dextrose 40% Gel 15 Gram(s) Oral once PRN  dextrose 5%. 1000 milliLiter(s) IV Continuous <Continuous>  dextrose 50% Injectable 12.5 Gram(s) IV Push once  dextrose 50% Injectable 25 Gram(s) IV Push once  dextrose 50% Injectable 25 Gram(s) IV Push once  ferrous    sulfate 325 milliGRAM(s) Oral daily  fluconAZOLE IVPB      glucagon  Injectable 1 milliGRAM(s) IntraMuscular once PRN  guaiFENesin  milliGRAM(s) Oral every 12 hours  heparin  Injectable 5000 Unit(s) SubCutaneous every 12 hours  lactulose Syrup 45 Gram(s) Oral two times a day  pantoprazole    Tablet 40 milliGRAM(s) Oral before breakfast  piperacillin/tazobactam IVPB. 3.375 Gram(s) IV Intermittent every 12 hours  rifaximin 550 milliGRAM(s) Oral two times a day  rifaximin 550 milliGRAM(s) Oral two times a day  sodium bicarbonate  Infusion 0.054 mEq/kG/Hr IV Continuous <Continuous>      PMHX/PSHX:  Coronary artery disease with angina pectoris, unspecified vessel or lesion type, unspecified whether native or transplanted heart  Congestive heart failure  Asthma  Diabetes mellitus  HTN (hypertension)  No significant past surgical history      Family history:  No pertinent family history in first degree relatives      Social History:     ROS:     General:  No wt loss, fevers, chills, night sweats, fatigue,   Eyes:  Good vision, no reported pain  ENT:  No sore throat, pain, runny nose, dysphagia  CV:  No pain, palpitations, hypo/hypertension  Resp:  No dyspnea, cough, tachypnea, wheezing  GI:  No pain, No nausea, No vomiting, No diarrhea, No constipation, No weight loss, No fever, No pruritis, No rectal bleeding, No tarry stools, No dysphagia,  :  No pain, bleeding, incontinence, nocturia  Muscle:  No pain, weakness  Neuro:  No weakness, tingling, memory problems  Psych:  No fatigue, insomnia, mood problems, depression  Endocrine:  No polyuria, polydipsia, cold/heat intolerance  Heme:  No petechiae, ecchymosis, easy bruisability  Skin:  No rash, tattoos, scars, edema      PHYSICAL EXAM:   Vital Signs:  Vital Signs Last 24 Hrs  T(C): 36.4 (25 Oct 2018 14:13), Max: 36.9 (24 Oct 2018 20:44)  T(F): 97.5 (25 Oct 2018 14:13), Max: 98.4 (24 Oct 2018 20:44)  HR: 97 (25 Oct 2018 14:13) (90 - 102)  BP: 130/51 (25 Oct 2018 14:13) (130/51 - 149/32)  BP(mean): --  RR: 18 (25 Oct 2018 14:13) (17 - 18)  SpO2: 93% (25 Oct 2018 14:13) (93% - 98%)  Daily     Daily Weight in k.3 (25 Oct 2018 04:58)    GENERAL:  Appears stated age, well-groomed, well-nourished, no distress  HEENT:  NC/AT,  conjunctivae clear and pink, no thyromegaly, nodules, adenopathy, no JVD, sclera -anicteric  CHEST:  Full & symmetric excursion, no increased effort, breath sounds clear  HEART:  Regular rhythm, S1, S2, no murmur/rub/S3/S4, no abdominal bruit, no edema  ABDOMEN:  Soft, non-tender, non-distended, normoactive bowel sounds,  no masses ,no hepato-splenomegaly, no signs of chronic liver disease  EXTEREMITIES:  no cyanosis,clubbing or edema  SKIN:  No rash/erythema/ecchymoses/petechiae/wounds/abscess/warm/dry  NEURO:  Alert, oriented, no asterixis, no tremor, no encephalopathy    LABS:                        8.8    20.00 )-----------( 150      ( 25 Oct 2018 08:53 )             27.6     10-    147<H>  |  120<H>  |  62<H>  ----------------------------<  158<H>  4.0   |  16<L>  |  2.40<H>    Ca    7.6<L>      25 Oct 2018 08:53  Phos  3.3     10-24  Mg     2.0     10-24    TPro  6.3  /  Alb  1.8<L>  /  TBili  0.5  /  DBili  x   /  AST  24  /  ALT  18  /  AlkPhos  124<H>  10-24    LIVER FUNCTIONS - ( 24 Oct 2018 09:30 )  Alb: 1.8 g/dL / Pro: 6.3 g/dL / ALK PHOS: 124 U/L / ALT: 18 U/L / AST: 24 U/L / GGT: x               Amylase Serum--      Lipase serum--       [id number]      Imaging:

## 2018-10-25 NOTE — PROGRESS NOTE ADULT - ATTENDING COMMENTS
Hyperammonemia: Level found to be 203 today. Attempts to place NGT unsuccessful. Now placed, but present in distal esophagus. Unable to pass farther due to patient's large hiatal hernia. Discussed with GI and will discuss with family risks vs. benefits of giving Lactulose and Rifaxamin via tube where it is currently placed, given her extremely high ammonia level.

## 2018-10-25 NOTE — CHART NOTE - NSCHARTNOTEFT_GEN_A_CORE
Assessment: Pt seen for verbal nutrition assessment to begin tube feeding regimen. Per chart review, pt is 89 Y/O F with PMH of DM type 2, HTN, COPD on 2.5L home O2, diastolic HFpEF (last echo 10/2017) presented to ED p/w ams, weakness, dysuria, decreased po x past ~ 3 days; now with metabolic encephalopathy.     Factors impacting intake: [ ] none [ ] nausea  [ ] vomiting [ ] diarrhea [ ] constipation  [ ]chewing problems [X] swallowing issues  [X] other: on modified diet consistency; altered mental status now with hepatic encephalopathy      Diet, Dysphagia 1 Pureed-Honey Consistency Fluid:   Consistent Carbohydrate {No Snacks}  Halal  No Carb Prosource (1pkg = 15gms Protein)     Qty per Day:  30ml daily (10-23-18 @ 11:40)    Intake: poor, total feed    Current Weight: Weight (kg): 70 (10-23 @ 03:32)  % Weight Change    Pertinent Medications: MEDICATIONS  (STANDING):  ALBUTerol/ipratropium for Nebulization 3 milliLiter(s) Nebulizer every 6 hours  dextrose 5%. 1000 milliLiter(s) (50 mL/Hr) IV Continuous <Continuous>  dextrose 50% Injectable 12.5 Gram(s) IV Push once  dextrose 50% Injectable 25 Gram(s) IV Push once  dextrose 50% Injectable 25 Gram(s) IV Push once  ferrous    sulfate 325 milliGRAM(s) Oral daily  fluconAZOLE IVPB      fluconAZOLE IVPB 200 milliGRAM(s) IV Intermittent once  guaiFENesin  milliGRAM(s) Oral every 12 hours  heparin  Injectable 5000 Unit(s) SubCutaneous every 12 hours  lactulose Syrup 45 Gram(s) Oral two times a day  pantoprazole    Tablet 40 milliGRAM(s) Oral before breakfast  piperacillin/tazobactam IVPB. 3.375 Gram(s) IV Intermittent every 12 hours  rifaximin 550 milliGRAM(s) Oral two times a day  sodium bicarbonate  Infusion 0.054 mEq/kG/Hr (75 mL/Hr) IV Continuous <Continuous>    MEDICATIONS  (PRN):  dextrose 40% Gel 15 Gram(s) Oral once PRN Blood Glucose LESS THAN 70 milliGRAM(s)/deciliter  glucagon  Injectable 1 milliGRAM(s) IntraMuscular once PRN Glucose LESS THAN 70 milligrams/deciliter    Pertinent Labs: 10-25 Na147 mmol/L<H> Glu 158 mg/dL<H> K+ 4.0 mmol/L Cr  2.40 mg/dL<H> BUN 62 mg/dL<H> 10-24 Phos 3.3 mg/dL 10-24 Alb 1.8 g/dL<L> 10-23 [id number] 5.1 %     CAPILLARY BLOOD GLUCOSE      POCT Blood Glucose.: 174 mg/dL (25 Oct 2018 12:09)  POCT Blood Glucose.: 174 mg/dL (25 Oct 2018 07:29)  POCT Blood Glucose.: 151 mg/dL (24 Oct 2018 21:32)  POCT Blood Glucose.: 139 mg/dL (24 Oct 2018 16:47)    Skin: +1 generalized, LR leg    Estimated Needs:   [ ] no change since previous assessment  [X] recalculated:     Previous Nutrition Diagnosis:   [X] Increased Nutrient Needs [X] Swallowing Difficulty    Nutrition Diagnosis is [X] ongoing  - being addressed with plan for tube feeds    New Nutrition Diagnosis: [X] not applicable       Interventions:   Recommend  [X] Change Diet To: NPO with tube feeding -  [ ] Nutrition Supplement  [X] Nutrition Support: Recommend Suplena via tolerated route @ 40 ml/hr x 24 hours with Nocarb prosource 30 cc once daily which provides 1728 kcal, 58 grams of protein per day (25 kcal/kg, 0.8 grams of protein) of admission body wt 70 kg. Recommend starting tube feed regimen at 20 ml/hr and increasing by 5 ml every 4 hours until goal of 40 ml/hr x 24 hours is met. Monitor tube feeding tolerance and maintain aspiration precautions.    [ ] Other:     Monitoring and Evaluation:   [ ] PO intake [ x ] Tolerance to diet prescription [ x ] weights [ x ] labs[ x ] follow up per protocol  [ ] other:

## 2018-10-25 NOTE — CONSULT NOTE ADULT - SUBJECTIVE AND OBJECTIVE BOX
Patient is a 89y old  Female who presents with a chief complaint of weakness, decreased PO intake (25 Oct 2018 11:10)   Acute  renal failure.    HPI:  Hx obtained from son and other family members as pt was altered.  Pt is a 88 yo F w/ PMH of DM type 2, HTN, COPD on 2.5L home O2, diastolic HFpEF (last echo 10/2017) presented to ED p/w ams, weakness, dysuria, dec po x past ~ 3 days. Of note Pt was recently admitted for ams, similar to current presentation and was d/c 6 days ago. On previous admission Pt was diagnosed and treated for UTI. Pt was initially doing well, but then developed burning with urination, AMS, and watery diarrhea x 3 days ago. Family reports that pt was given Bactrim for unresolved UTI, but that she was only able to take 2 doses, as she could not swallow the 3rd dose this AM. Pt has poor PO intake and EMS came to her house last night to give her IVF due to dehydration. Of note patient is normally bedbound and requires 24/7 home health aid. Pt is incontinent at baseline and wears diapers. Denies any Fevers, but endorses chills, and increased fatigue. Denies any SOB, CP, N/V. Family reports pt normally is able to converse without issue, but has been incoherent for ~ the past 3 days    In the ED, temp 97.1, HR 62, /75, RR 20, Spo2 99 on supplemental o2. WBC 11.46, Hg 9.5, hct 29.5, platelet 147, PT 13.5, INR 1.23, PTT 25.9, Na 148, K 4.3, Cl 120, Co2 21, BUN 64, Cr 2.00, glucose 110, calcium 8.0, alk phos 136, lactate 1.4, lipase 117. UA: small bilirubin, moderate leukocyte esterase, trace blood, moderate epithelial, bacteria few, hyaline 0-2, yeast present    Imaging:  CXR and CT head no cont ordered  EKG: NSR    In the ED, received zosyn 3.375 mg IV x 1, NaCl 0.9% 1000mL x 2, duoneb 3 mL x 1, (22 Oct 2018 23:52)   Hx renal stones x1 not aware of type, no other renal  problems; elevated creatinine on admission.    Renal consulted for Acute on CKD. The patient is unable to provide history. Chart reviewed. Discussed with the Son at bedside. The patient has had very poor intake since her last hospital admission which was 1-2 weeks ago.     PAST MEDICAL & SURGICAL HISTORY:  Coronary artery disease with angina pectoris, unspecified vessel or lesion type, unspecified whether native or transplanted heart  Congestive heart failure  Asthma  Diabetes mellitus  HTN (hypertension)  No significant past surgical history   DM 2, [place], hypothyroidism, prior DVT and IVC filter; Cholecystectomy    FAMILY HISTORY:  No pertinent family history in first degree relatives  [place]    Social History:Non smoker    MEDICATIONS  (STANDING):  ALBUTerol/ipratropium for Nebulization 3 milliLiter(s) Nebulizer every 6 hours  cefTRIAXone   IVPB 1 Gram(s) IV Intermittent every 24 hours  dextrose 5%. 1000 milliLiter(s) (50 mL/Hr) IV Continuous <Continuous>  dextrose 5%. 1000 milliLiter(s) (75 mL/Hr) IV Continuous <Continuous>  dextrose 50% Injectable 12.5 Gram(s) IV Push once  dextrose 50% Injectable 25 Gram(s) IV Push once  dextrose 50% Injectable 25 Gram(s) IV Push once  ferrous    sulfate 325 milliGRAM(s) Oral daily  guaiFENesin  milliGRAM(s) Oral every 12 hours  heparin  Injectable 5000 Unit(s) SubCutaneous every 12 hours  lactated ringers. 1000 milliLiter(s) (75 mL/Hr) IV Continuous <Continuous>  nystatin Cream 1 Application(s) Topical two times a day  pantoprazole    Tablet 40 milliGRAM(s) Oral before breakfast    MEDICATIONS  (PRN):  dextrose 40% Gel 15 Gram(s) Oral once PRN Blood Glucose LESS THAN 70 milliGRAM(s)/deciliter  glucagon  Injectable 1 milliGRAM(s) IntraMuscular once PRN Glucose LESS THAN 70 milligrams/deciliter   Meds reviewed    Allergies    No Known Allergies    Intolerances         REVIEW OF SYSTEMS:  Unable to assess due to mental status    Vital Signs Last 24 Hrs  T(C): 36.4 (25 Oct 2018 04:58), Max: 36.9 (24 Oct 2018 20:44)  T(F): 97.5 (25 Oct 2018 04:58), Max: 98.4 (24 Oct 2018 20:44)  HR: 92 (25 Oct 2018 07:30) (90 - 102)  BP: 149/32 (25 Oct 2018 04:58) (132/74 - 153/73)  BP(mean): --  RR: 17 (25 Oct 2018 04:58) (17 - 18)  SpO2: 96% (25 Oct 2018 07:30) (96% - 98%)  Daily     Daily Weight in k.3 (25 Oct 2018 04:58)    PHYSICAL EXAM:    GENERAL: appears chronically ill, no distress  HEAD:  Atraumatic, Normocephalic  EYES: Conjunctiva and sclera clear  ENMT: Dry oral mucosa  NECK: Supple  NERVOUS SYSTEM:  Difficult to arouse  CHEST/LUNG: Scattered wheezing B/L  HEART: Regular rate and rhythm; No murmurs, rubs, or gallops  ABDOMEN: Soft, Nontender, Nondistended; Bowel sounds present  EXTREMITIES:  No edema  LYMPH: No lymphadenopathy noted  SKIN: No rashes or lesions, pale    LABS:                        8.8    20.00 )-----------( 150      ( 25 Oct 2018 08:53 )             27.6     10-25    147<H>  |  120<H>  |  62<H>  ----------------------------<  158<H>  4.0   |  16<L>  |  2.40<H>    Ca    7.6<L>      25 Oct 2018 08:53  Phos  3.3     10-24  Mg     2.0     10-24    TPro  6.3  /  Alb  1.8<L>  /  TBili  0.5  /  DBili  x   /  AST  24  /  ALT  18  /  AlkPhos  124<H>  10-24          ABG - ( 23 Oct 2018 17:38 )  pH, Arterial: 7.35  pH, Blood: x     /  pCO2: 31    /  pO2: 105   / HCO3: 18    / Base Excess: -7.9  /  SaO2: 98                  RADIOLOGY & ADDITIONAL TESTS:

## 2018-10-26 DIAGNOSIS — L89.153 PRESSURE ULCER OF SACRAL REGION, STAGE 3: ICD-10-CM

## 2018-10-26 DIAGNOSIS — Z74.01 BED CONFINEMENT STATUS: ICD-10-CM

## 2018-10-26 DIAGNOSIS — G93.40 ENCEPHALOPATHY, UNSPECIFIED: ICD-10-CM

## 2018-10-26 DIAGNOSIS — K44.9 DIAPHRAGMATIC HERNIA WITHOUT OBSTRUCTION OR GANGRENE: ICD-10-CM

## 2018-10-26 LAB
AMMONIA BLD-MCNC: 205 UMOL/L — HIGH (ref 11–32)
ANION GAP SERPL CALC-SCNC: 9 MMOL/L — SIGNIFICANT CHANGE UP (ref 5–17)
BASOPHILS # BLD AUTO: 0 K/UL — SIGNIFICANT CHANGE UP (ref 0–0.2)
BASOPHILS NFR BLD AUTO: 0 % — SIGNIFICANT CHANGE UP (ref 0–2)
BUN SERPL-MCNC: 66 MG/DL — HIGH (ref 7–23)
CALCIUM SERPL-MCNC: 7.9 MG/DL — LOW (ref 8.5–10.1)
CHLORIDE SERPL-SCNC: 117 MMOL/L — HIGH (ref 96–108)
CO2 SERPL-SCNC: 20 MMOL/L — LOW (ref 22–31)
CREAT SERPL-MCNC: 2.5 MG/DL — HIGH (ref 0.5–1.3)
CULTURE RESULTS: SIGNIFICANT CHANGE UP
CULTURE RESULTS: SIGNIFICANT CHANGE UP
EOSINOPHIL # BLD AUTO: 0 K/UL — SIGNIFICANT CHANGE UP (ref 0–0.5)
EOSINOPHIL NFR BLD AUTO: 0 % — SIGNIFICANT CHANGE UP (ref 0–6)
GLUCOSE SERPL-MCNC: 146 MG/DL — HIGH (ref 70–99)
HCT VFR BLD CALC: 28.3 % — LOW (ref 34.5–45)
HGB BLD-MCNC: 9 G/DL — LOW (ref 11.5–15.5)
IRON SATN MFR SERPL: 56 % — HIGH (ref 14–50)
IRON SATN MFR SERPL: 70 UG/DL — SIGNIFICANT CHANGE UP (ref 30–160)
LYMPHOCYTES # BLD AUTO: 0.5 K/UL — LOW (ref 1–3.3)
LYMPHOCYTES # BLD AUTO: 2 % — LOW (ref 13–44)
MANUAL SMEAR VERIFICATION: SIGNIFICANT CHANGE UP
MCHC RBC-ENTMCNC: 28.8 PG — SIGNIFICANT CHANGE UP (ref 27–34)
MCHC RBC-ENTMCNC: 31.8 GM/DL — LOW (ref 32–36)
MCV RBC AUTO: 90.4 FL — SIGNIFICANT CHANGE UP (ref 80–100)
MONOCYTES # BLD AUTO: 1 K/UL — HIGH (ref 0–0.9)
MONOCYTES NFR BLD AUTO: 4 % — SIGNIFICANT CHANGE UP (ref 2–14)
NEUTROPHILS # BLD AUTO: 23.54 K/UL — HIGH (ref 1.8–7.4)
NEUTROPHILS NFR BLD AUTO: 91 % — HIGH (ref 43–77)
NEUTS BAND # BLD: 3 % — SIGNIFICANT CHANGE UP (ref 0–8)
NRBC # BLD: 0 — SIGNIFICANT CHANGE UP
NRBC # BLD: SIGNIFICANT CHANGE UP /100 WBCS (ref 0–0)
OB PNL STL: NEGATIVE — SIGNIFICANT CHANGE UP
PLAT MORPH BLD: NORMAL — SIGNIFICANT CHANGE UP
PLATELET # BLD AUTO: 132 K/UL — LOW (ref 150–400)
POTASSIUM SERPL-MCNC: 3.7 MMOL/L — SIGNIFICANT CHANGE UP (ref 3.5–5.3)
POTASSIUM SERPL-SCNC: 3.7 MMOL/L — SIGNIFICANT CHANGE UP (ref 3.5–5.3)
RBC # BLD: 3.13 M/UL — LOW (ref 3.8–5.2)
RBC # BLD: 3.16 M/UL — LOW (ref 3.8–5.2)
RBC # FLD: 15.4 % — HIGH (ref 10.3–14.5)
RBC BLD AUTO: SIGNIFICANT CHANGE UP
RETICS #: 51.5 K/UL — SIGNIFICANT CHANGE UP (ref 25–125)
RETICS/RBC NFR: 1.6 % — SIGNIFICANT CHANGE UP (ref 0.5–2.5)
SODIUM SERPL-SCNC: 146 MMOL/L — HIGH (ref 135–145)
SPECIMEN SOURCE: SIGNIFICANT CHANGE UP
SPECIMEN SOURCE: SIGNIFICANT CHANGE UP
TIBC SERPL-MCNC: 126 UG/DL — LOW (ref 220–430)
TOXIC GRANULES BLD QL SMEAR: PRESENT — SIGNIFICANT CHANGE UP
UIBC SERPL-MCNC: 56 UG/DL — LOW (ref 110–370)
WBC # BLD: 25.04 K/UL — HIGH (ref 3.8–10.5)
WBC # FLD AUTO: 25.04 K/UL — HIGH (ref 3.8–10.5)

## 2018-10-26 PROCEDURE — 99222 1ST HOSP IP/OBS MODERATE 55: CPT

## 2018-10-26 PROCEDURE — 71045 X-RAY EXAM CHEST 1 VIEW: CPT | Mod: 26

## 2018-10-26 PROCEDURE — 99233 SBSQ HOSP IP/OBS HIGH 50: CPT

## 2018-10-26 PROCEDURE — 74176 CT ABD & PELVIS W/O CONTRAST: CPT | Mod: 26

## 2018-10-26 RX ADMIN — Medication 75 MEQ/KG/HR: at 13:30

## 2018-10-26 RX ADMIN — HEPARIN SODIUM 5000 UNIT(S): 5000 INJECTION INTRAVENOUS; SUBCUTANEOUS at 06:33

## 2018-10-26 RX ADMIN — HEPARIN SODIUM 5000 UNIT(S): 5000 INJECTION INTRAVENOUS; SUBCUTANEOUS at 19:00

## 2018-10-26 RX ADMIN — LACTULOSE 45 GRAM(S): 10 SOLUTION ORAL at 18:59

## 2018-10-26 RX ADMIN — Medication 600 MILLIGRAM(S): at 19:00

## 2018-10-26 RX ADMIN — FLUCONAZOLE 100 MILLIGRAM(S): 150 TABLET ORAL at 13:31

## 2018-10-26 RX ADMIN — PIPERACILLIN AND TAZOBACTAM 25 GRAM(S): 4; .5 INJECTION, POWDER, LYOPHILIZED, FOR SOLUTION INTRAVENOUS at 18:59

## 2018-10-26 RX ADMIN — Medication 3 MILLILITER(S): at 07:55

## 2018-10-26 RX ADMIN — PIPERACILLIN AND TAZOBACTAM 25 GRAM(S): 4; .5 INJECTION, POWDER, LYOPHILIZED, FOR SOLUTION INTRAVENOUS at 06:33

## 2018-10-26 NOTE — PROGRESS NOTE ADULT - PROBLEM SELECTOR PLAN 2
2/2 poor PO intake  -Hold Lasix   -Continue gentle IV fluids--D5W with bicarb by Renal, as she now has metabolic acidosis.  -Continue to monitor BMP. 2/2 poor PO intake  -Hold Lasix   -Continue gentle IV fluids--D5W with bicarb by Renal, as she now has metabolic acidosis.  -Continue to monitor BMP.  -Hypernatremia--likely due to poor po intake--continue IVF.

## 2018-10-26 NOTE — CONSULT NOTE ADULT - SUBJECTIVE AND OBJECTIVE BOX
[facility] - John Tariq, No, Nirav, Chito Tapia  Office Number: [phone]    Initial Consult Note    CHIEF COMPLAINT: Patient is a 89y old  Female who presents with a chief complaint of weakness, decreased PO intake (26 Oct 2018 15:03)      HPI:  Hx obtained from son and other family members as pt was altered.  Pt is a 89 yo F w/ PMH of DM type 2, HTN, COPD on 2.5L home O2, diastolic HFpEF (last echo 10/2017) presented to ED p/w ams, weakness, dysuria, dec po x past ~ 3 days. Of note Pt was recently admitted for ams, similar to current presentation and was d/c 6 days ago. On previous admission Pt was diagnosed and treated for UTI. Pt was initially doing well, but then developed burning with urination, AMS, and watery diarrhea x 3 days ago. Family reports that pt was given Bactrim for unresolved UTI, but that she was only able to take 2 doses, as she could not swallow the 3rd dose this AM. Pt has poor PO intake and EMS came to her house last night to give her IVF due to dehydration. Of note patient is normally bedbound and requires 24/7 home health aid. Pt is incontinent at baseline and wears diapers. Denies any Fevers, but endorses chills, and increased fatigue. Denies any SOB, CP, N/V. Family reports pt normally is able to converse without issue, but has been incoherent for ~ the past 3 days    In the ED, temp 97.1, HR 62, /75, RR 20, Spo2 99 on supplemental o2. WBC 11.46, Hg 9.5, hct 29.5, platelet 147, PT 13.5, INR 1.23, PTT 25.9, Na 148, K 4.3, Cl 120, Co2 21, BUN 64, Cr 2.00, glucose 110, calcium 8.0, alk phos 136, lactate 1.4, lipase 117. UA: small bilirubin, moderate leukocyte esterase, trace blood, moderate epithelial, bacteria few, hyaline 0-2, yeast present    Imaging:  CXR and CT head no cont ordered  EKG: NSR    In the ED, received zosyn 3.375 mg IV x 1, NaCl 0.9% 1000mL x 2, duoneb 3 mL x 1, (22 Oct 2018 23:52)    Called to evaluate for cardiac clearance prior to endoscopic PEG placement  NSTEMI last year. Preserved lv function as on 10/2017      PAST MEDICAL & SURGICAL HISTORY:  Coronary artery disease with angina pectoris, unspecified vessel or lesion type, unspecified whether native or transplanted heart  Congestive heart failure  Asthma  Diabetes mellitus  HTN (hypertension)  No significant past surgical history      SOCIAL HISTORY:  No tobacco, ethanol, or drug abuse.    FAMILY HISTORY:  No pertinent family history in first degree relatives    No family history of acute MI or sudden cardiac death.    MEDICATIONS  (STANDING):  ALBUTerol/ipratropium for Nebulization 3 milliLiter(s) Nebulizer every 6 hours  dextrose 5%. 1000 milliLiter(s) (50 mL/Hr) IV Continuous <Continuous>  dextrose 50% Injectable 12.5 Gram(s) IV Push once  dextrose 50% Injectable 25 Gram(s) IV Push once  dextrose 50% Injectable 25 Gram(s) IV Push once  ferrous    sulfate 325 milliGRAM(s) Oral daily  fluconAZOLE IVPB      fluconAZOLE IVPB 100 milliGRAM(s) IV Intermittent every 24 hours  guaiFENesin  milliGRAM(s) Oral every 12 hours  heparin  Injectable 5000 Unit(s) SubCutaneous every 12 hours  lactulose Syrup 45 Gram(s) Oral four times a day  pantoprazole    Tablet 40 milliGRAM(s) Oral before breakfast  piperacillin/tazobactam IVPB. 3.375 Gram(s) IV Intermittent every 12 hours  rifaximin 550 milliGRAM(s) Oral two times a day  sodium bicarbonate  Infusion 0.054 mEq/kG/Hr (75 mL/Hr) IV Continuous <Continuous>    MEDICATIONS  (PRN):  dextrose 40% Gel 15 Gram(s) Oral once PRN Blood Glucose LESS THAN 70 milliGRAM(s)/deciliter  glucagon  Injectable 1 milliGRAM(s) IntraMuscular once PRN Glucose LESS THAN 70 milligrams/deciliter      Allergies    No Known Allergies    Intolerances        REVIEW OF SYSTEMS:    CONSTITUTIONAL: No weakness, fevers or chills  EYES/ENT: No visual changes;  No vertigo or throat pain   NECK: No pain or stiffness  RESPIRATORY: No cough, wheezing, hemoptysis; No shortness of breath  CARDIOVASCULAR: No chest pain or palpitations  GASTROINTESTINAL: No abdominal pain. No nausea, vomiting, or hematemesis; No diarrhea or constipation. No melena or hematochezia.  GENITOURINARY: No dysuria, frequency or hematuria  NEUROLOGICAL: No numbness or weakness  SKIN: No itching or rash  All other review of systems is negative unless indicated above    VITAL SIGNS:   Vital Signs Last 24 Hrs  T(C): 36.7 (26 Oct 2018 14:47), Max: 36.7 (25 Oct 2018 22:58)  T(F): 98.1 (26 Oct 2018 14:47), Max: 98.1 (26 Oct 2018 14:47)  HR: 90 (26 Oct 2018 14:47) (90 - 98)  BP: 123/45 (26 Oct 2018 14:47) (120/62 - 137/55)  BP(mean): --  RR: 23 (26 Oct 2018 14:47) (18 - 23)  SpO2: 97% (26 Oct 2018 14:47) (95% - 98%)    I&O's Summary    25 Oct 2018 07:01  -  26 Oct 2018 07:00  --------------------------------------------------------  IN: 900 mL / OUT: 0 mL / NET: 900 mL        On Exam:    Constitutional: bedbound, not responsive  Lungs:  Non-labored, breath sounds are audible, + mild wheezing in all lung fields  Cardiovascular: RRR.  S1 and S2 positive.  No murmurs, rubs, gallops or clicks  Gastrointestinal: Bowel Sounds present, soft, nontender.   Lymph: No peripheral edema.   Neurological: lethargic, unable to assess  Psych:  lethargic, unable to assess    LABS: All Labs Reviewed:                        9.0    25.04 )-----------( 132      ( 26 Oct 2018 09:14 )             28.3                         8.8    20.00 )-----------( 150      ( 25 Oct 2018 08:53 )             27.6                         9.0    15.03 )-----------( 140      ( 24 Oct 2018 09:30 )             28.3     26 Oct 2018 09:14    146    |  117    |  66     ----------------------------<  146    3.7     |  20     |  2.50   25 Oct 2018 08:53    147    |  120    |  62     ----------------------------<  158    4.0     |  16     |  2.40   24 Oct 2018 09:30    150    |  123    |  62     ----------------------------<  73     4.1     |  16     |  2.10     Ca    7.9        26 Oct 2018 09:14  Ca    7.6        25 Oct 2018 08:53  Ca    7.7        24 Oct 2018 09:30  Phos  3.3       24 Oct 2018 09:30  Mg     2.0       24 Oct 2018 09:30    TPro  6.3    /  Alb  1.8    /  TBili  0.5    /  DBili  x      /  AST  24     /  ALT  18     /  AlkPhos  124    24 Oct 2018 09:30          Blood Culture: Organism --  Gram Stain Blood -- Gram Stain --  Specimen Source .Blood Blood-Venous  Culture-Blood --    Organism Blood Culture PCR  Gram Stain Blood -- Gram Stain   Growth in anaerobic bottle: Gram Positive Cocci in Clusters  Specimen Source .Blood Blood-Peripheral  Culture-Blood --    Organism --  Gram Stain Blood -- Gram Stain --  Specimen Source .Urine Catheterized  Culture-Blood --        10-25 @ 08:53  TSH: 0.06      RADIOLOGY:    EKG: SR, poor r wave progression, lad

## 2018-10-26 NOTE — CONSULT NOTE ADULT - SUBJECTIVE AND OBJECTIVE BOX
Chief Complaint:     HPI:     PAST MEDICAL & SURGICAL HISTORY:  Coronary artery disease with angina pectoris, unspecified vessel or lesion type, unspecified whether native or transplanted heart  Congestive heart failure  Asthma  Diabetes mellitus  HTN (hypertension)  No significant past surgical history      Allergies    No Known Allergies    Intolerances        MEDICATIONS  (STANDING):  ALBUTerol/ipratropium for Nebulization 3 milliLiter(s) Nebulizer every 6 hours  dextrose 5%. 1000 milliLiter(s) (50 mL/Hr) IV Continuous <Continuous>  dextrose 50% Injectable 12.5 Gram(s) IV Push once  dextrose 50% Injectable 25 Gram(s) IV Push once  dextrose 50% Injectable 25 Gram(s) IV Push once  ferrous    sulfate 325 milliGRAM(s) Oral daily  fluconAZOLE IVPB      fluconAZOLE IVPB 100 milliGRAM(s) IV Intermittent every 24 hours  guaiFENesin  milliGRAM(s) Oral every 12 hours  heparin  Injectable 5000 Unit(s) SubCutaneous every 12 hours  lactulose Syrup 45 Gram(s) Oral four times a day  pantoprazole    Tablet 40 milliGRAM(s) Oral before breakfast  piperacillin/tazobactam IVPB. 3.375 Gram(s) IV Intermittent every 12 hours  rifaximin 550 milliGRAM(s) Oral two times a day  sodium bicarbonate  Infusion 0.054 mEq/kG/Hr (75 mL/Hr) IV Continuous <Continuous>    MEDICATIONS  (PRN):  dextrose 40% Gel 15 Gram(s) Oral once PRN Blood Glucose LESS THAN 70 milliGRAM(s)/deciliter  glucagon  Injectable 1 milliGRAM(s) IntraMuscular once PRN Glucose LESS THAN 70 milligrams/deciliter      FAMILY HISTORY:  No pertinent family history in first degree relatives          ROS:  CONSTITUTIONAL: No fever, weight loss, or fatigue  EYES: No eye pain, visual disturbances, or discharge  ENMT:  No difficulty hearing, tinnitus, vertigo; No sinus or throat pain  NECK: No pain or stiffness  BREASTS: No pain, masses, or nipple discharge  RESPIRATORY: No cough, wheezing, chills or hemoptysis; No shortness of breath  CARDIOVASCULAR: No chest pain, palpitations, dizziness, or leg swelling  GASTROINTESTINAL: No abdominal or epigastric pain. No nausea, vomiting, or hematemesis; No diarrhea or constipation. No melena or hematochezia.  GENITOURINARY: No dysuria, frequency, hematuria, or incontinence  NEUROLOGICAL: No headaches, memory loss, loss of strength, numbness, or tremors  SKIN: No itching, burning, rashes, or lesions   LYMPH NODES: No enlarged glands  ENDOCRINE: No heat or cold intolerance; No hair loss  MUSCULOSKELETAL: No joint pain or swelling; No muscle, back, or extremity pain  PSYCHIATRIC: No depression, anxiety, mood swings, or difficulty sleeping  HEME/LYMPH: No easy bruising, or bleeding gums  ALLERGY AND IMMUNOLOGIC: No hives or eczema    PHYSICAL EXAM-      Vital Signs Last 24 Hrs  T(C): 36.6 (26 Oct 2018 13:30), Max: 36.7 (25 Oct 2018 22:58)  T(F): 97.8 (26 Oct 2018 13:30), Max: 98 (25 Oct 2018 22:58)  HR: 98 (26 Oct 2018 13:30) (91 - 98)  BP: 120/62 (26 Oct 2018 13:30) (120/62 - 137/55)  BP(mean): --  RR: 20 (26 Oct 2018 13:30) (18 - 20)  SpO2: 95% (26 Oct 2018 13:30) (95% - 98%)    Constitutional:   no apparent distress, alert, bedbound.   Pulmonary: no respiratory distress, normal respiratory rhythm and effort, lungs are clear to auscultation/percussion. No CVA tenderness.  Cardiovascular: heart rate normal, normal sinus rhythm; no murmurs, gallops, rubs, heaves or thrills   Abdomen: soft, non-tender, +BS, no guarding/rebound/rigidity.                                              9.0    25.04 )-----------( 132      ( 26 Oct 2018 09:14 )             28.3     10-26    146<H>  |  117<H>  |  66<H>  ----------------------------<  146<H>  3.7   |  20<L>  |  2.50<H>    Ca    7.9<L>      26 Oct 2018 09:14        Radiology Chief Complaint: sacral press. ulcer    HPI: 89 yo F, admitted to the medical service for weakness and decrease oral intake. Pt is bedridden. Asked to see pt for a sacral pressure ulcer. Family at bedside.    PAST MEDICAL & SURGICAL HISTORY:  Coronary artery disease with angina pectoris, unspecified vessel or lesion type, unspecified whether native or transplanted heart  Congestive heart failure  Asthma  Diabetes mellitus  HTN (hypertension)  No significant past surgical history      Allergies    No Known Allergies    Intolerances        MEDICATIONS  (STANDING):  ALBUTerol/ipratropium for Nebulization 3 milliLiter(s) Nebulizer every 6 hours  dextrose 5%. 1000 milliLiter(s) (50 mL/Hr) IV Continuous <Continuous>  dextrose 50% Injectable 12.5 Gram(s) IV Push once  dextrose 50% Injectable 25 Gram(s) IV Push once  dextrose 50% Injectable 25 Gram(s) IV Push once  ferrous    sulfate 325 milliGRAM(s) Oral daily  fluconAZOLE IVPB      fluconAZOLE IVPB 100 milliGRAM(s) IV Intermittent every 24 hours  guaiFENesin  milliGRAM(s) Oral every 12 hours  heparin  Injectable 5000 Unit(s) SubCutaneous every 12 hours  lactulose Syrup 45 Gram(s) Oral four times a day  pantoprazole    Tablet 40 milliGRAM(s) Oral before breakfast  piperacillin/tazobactam IVPB. 3.375 Gram(s) IV Intermittent every 12 hours  rifaximin 550 milliGRAM(s) Oral two times a day  sodium bicarbonate  Infusion 0.054 mEq/kG/Hr (75 mL/Hr) IV Continuous <Continuous>    MEDICATIONS  (PRN):  dextrose 40% Gel 15 Gram(s) Oral once PRN Blood Glucose LESS THAN 70 milliGRAM(s)/deciliter  glucagon  Injectable 1 milliGRAM(s) IntraMuscular once PRN Glucose LESS THAN 70 milligrams/deciliter      FAMILY HISTORY:  No pertinent family history in first degree relatives          ROS:  CONSTITUTIONAL: No fever, weight loss, or fatigue  EYES: No eye pain, visual disturbances, or discharge  ENMT:  No difficulty hearing, tinnitus, vertigo; No sinus or throat pain  NECK: No pain or stiffness  BREASTS: No pain, masses, or nipple discharge  RESPIRATORY: No cough, wheezing, chills or hemoptysis; No shortness of breath  CARDIOVASCULAR: No chest pain, palpitations, dizziness, or leg swelling  GASTROINTESTINAL: No abdominal or epigastric pain. No nausea, vomiting, or hematemesis; No diarrhea or constipation. No melena or hematochezia.  GENITOURINARY: No dysuria, frequency, hematuria, or incontinence  NEUROLOGICAL: No headaches, memory loss, loss of strength, numbness, or tremors  SKIN: No itching, burning, rashes, or lesions   LYMPH NODES: No enlarged glands  ENDOCRINE: No heat or cold intolerance; No hair loss  MUSCULOSKELETAL: No joint pain or swelling; No muscle, back, or extremity pain  PSYCHIATRIC: No depression, anxiety, mood swings, or difficulty sleeping  HEME/LYMPH: No easy bruising, or bleeding gums  ALLERGY AND IMMUNOLOGIC: No hives or eczema    PHYSICAL EXAM-      Vital Signs Last 24 Hrs  T(C): 36.6 (26 Oct 2018 13:30), Max: 36.7 (25 Oct 2018 22:58)  T(F): 97.8 (26 Oct 2018 13:30), Max: 98 (25 Oct 2018 22:58)  HR: 98 (26 Oct 2018 13:30) (91 - 98)  BP: 120/62 (26 Oct 2018 13:30) (120/62 - 137/55)  BP(mean): --  RR: 20 (26 Oct 2018 13:30) (18 - 20)  SpO2: 95% (26 Oct 2018 13:30) (95% - 98%)    Constitutional:   no apparent distress, bedbound.   Pulmonary: no respiratory distress, normal respiratory rhythm and effort, lungs are clear to auscultation/percussion. No CVA tenderness.  Cardiovascular: heart rate normal, normal sinus rhythm; no murmurs, gallops, rubs, heaves or thrills   Abdomen: soft, non-tender, +BS, no guarding/rebound/rigidity.    Both shoulders, hips, elbow, knees, and heels-no open ulcers.    Sacrum-shallow, clean, dry, small; 1.2x0.7cm size; no cellulitis/edema/erythema; base-pink,; level-skin, sq.  There is also 2 smaller suprficial ulcers caudal to this main one, both measuring about 5-6mm. Base-pink.                                              9.0    25.04 )-----------( 132      ( 26 Oct 2018 09:14 )             28.3     10-26    146<H>  |  117<H>  |  66<H>  ----------------------------<  146<H>  3.7   |  20<L>  |  2.50<H>    Ca    7.9<L>      26 Oct 2018 09:14        Radiology

## 2018-10-26 NOTE — PROGRESS NOTE ADULT - PROBLEM SELECTOR PLAN 2
US showed +ascites and unremarkable liver  ?etiology, also w chf  check axr and ct a/p  check hepatitis serologies  may need IR tap if in line w goc  monitor abd exam  will follow

## 2018-10-26 NOTE — PROGRESS NOTE ADULT - PROBLEM SELECTOR PLAN 3
2/2 to dehydration and it is also possible an infectious process could be driving this.   -Plan as above  -Hold nephrotoxic agents  - Renal following

## 2018-10-26 NOTE — CONSULT NOTE ADULT - SUBJECTIVE AND OBJECTIVE BOX
Hematology/Oncology consult     Patient is seen and examined  notes/labs reviewed  pt family is at bedside and d/w family.  consult dictated,  Job #    contact #  son/daughter----  phone #    IMPRESSION:      RECOMMENDATION:              ,thanks for courtsey of this consult,will follow this pt with you for hem/onc issue while pt is in hospital. Hematology/Oncology consult     Patient is seen and examined  notes/labs reviewed  pt family--son and family is at bedside and d/w family.  consult dictated,  Job # [id number]    IMPRESSION:  anemia  ams  bedbound  copd/chf  hx of prbc transfusion before  cirrhosis liver?  renal failure  leukocytosis--wbc rising and id/pcp are on the case    RECOMMENDATION:  anemia w/u, monitor h/h, lap score  elevated wbc--care as per pcp/id  gi/dvtp as per pcp  d/w family, d/w dr saundra arguello/beulah ,thanks for courtsey of this consult,will follow this pt with you for hem/onc issue while pt is in hospital.

## 2018-10-26 NOTE — CONSULT NOTE ADULT - PROBLEM SELECTOR RECOMMENDATION 6
Hiatal hernia  asp risk high  likely contributing to atelectasis and limiting pulm reserve  supportive measures and PPI

## 2018-10-26 NOTE — DISCHARGE NOTE ADULT - CARE PLAN
Principal Discharge DX:	Altered mental status, unspecified altered mental status type  Secondary Diagnosis:	Cirrhosis  Secondary Diagnosis:	COPD (chronic obstructive pulmonary disease)  Secondary Diagnosis:	Anemia Principal Discharge DX:	Altered mental status, unspecified altered mental status type  Goal:	multifactorial sec to metabolic encephalopathy sec to uremia hepatorenal failure  Assessment and plan of treatment:	comfort care  Secondary Diagnosis:	Cirrhosis  Goal:	unknown etiology  Assessment and plan of treatment:	hepatorenal failure / on comfort  Secondary Diagnosis:	COPD (chronic obstructive pulmonary disease)  Goal:	chr resp failure  Assessment and plan of treatment:	comfort care  Secondary Diagnosis:	Anemia  Goal:	chr  Assessment and plan of treatment:	comfort  care

## 2018-10-26 NOTE — PROGRESS NOTE ADULT - ASSESSMENT
Acute on CKD Stage 4  Sepsis/PNA/UTI  Hypernatremia  Dehydration  CHF/COPD     -Rise in creatinine likely related to rise in WBC signifying underlying sepsis  -Due to acidosis and hypernatremia, We adjusted the IVF to sodium bicarb 50meq in D5W @ 75cc/hr. Keep IVF x 1 more day   -Abx, ID eval and work up  -Urine studies ordered; still pending  -Renal sono shows no hydronephrosis  -Daily chemistries  -No indication for RRT    Thank you

## 2018-10-26 NOTE — CONSULT NOTE ADULT - SUBJECTIVE AND OBJECTIVE BOX
Date/Time Patient Seen:  		  Referring MD:   Data Reviewed	       Patient is a 89y old  Female who presents with a chief complaint of weakness, decreased PO intake (26 Oct 2018 08:56)      Subjective/HPI     in bed  seen and examined, vs and meds reviewed, ams, family at the bedside    H and P reviewed    planned for EGD today    H&P Adult [Charted Location: [address]] [Authored: 22-Oct-2018 23:52]- for Visit: [id number], Complete, Revised, Signed in Full, General    History and Physical:   Source of Information	Chart(s), Child, Other Family Member  Comments/Contacts	Oracio (son/HCP) [phone]  Outpatient Providers	Dr. Sen Beltrán Calls     Language:  · Patient/Family of Limited English Proficiency	No       History of Present Illness:  Reason for Admission: weakness, decreased PO intake  History of Present Illness:   Hx obtained from son and other family members as pt was altered.  Pt is a 89 yo F w/ PMH of DM type 2, HTN, COPD on 2.5L home O2, diastolic HFpEF (last echo 10/2017) presented to ED p/w ams, weakness, dysuria, dec po x past ~ 3 days. Of note Pt was recently admitted for ams, similar to current presentation and was d/c 6 days ago. On previous admission Pt was diagnosed and treated for UTI. Pt was initially doing well, but then developed burning with urination, AMS, and watery diarrhea x 3 days ago. Family reports that pt was given Bactrim for unresolved UTI, but that she was only able to take 2 doses, as she could not swallow the 3rd dose this AM. Pt has poor PO intake and EMS came to her house last night to give her IVF due to dehydration. Of note patient is normally bedbound and requires 24/7 home health aid. Pt is incontinent at baseline and wears diapers. Denies any Fevers, but endorses chills, and increased fatigue. Denies any SOB, CP, N/V. Family reports pt normally is able to converse without issue, but has been incoherent for ~ the past 3 days    In the ED, temp 97.1, HR 62, /75, RR 20, Spo2 99 on supplemental o2. WBC 11.46, Hg 9.5, hct 29.5, platelet 147, PT 13.5, INR 1.23, PTT 25.9, Na 148, K 4.3, Cl 120, Co2 21, BUN 64, Cr 2.00, glucose 110, calcium 8.0, alk phos 136, lactate 1.4, lipase 117. UA: small bilirubin, moderate leukocyte esterase, trace blood, moderate epithelial, bacteria few, hyaline 0-2, yeast present    Imaging:  CXR and CT head no cont ordered  EKG: NSR    In the ED, received zosyn 3.375 mg IV x 1, NaCl 0.9% 1000mL x 2, duoneb 3 mL x 1,    PAST MEDICAL & SURGICAL HISTORY:  Coronary artery disease with angina pectoris, unspecified vessel or lesion type, unspecified whether native or transplanted heart  Congestive heart failure  Asthma  Diabetes mellitus  HTN (hypertension)  No significant past surgical history        Medication list         MEDICATIONS  (STANDING):  ALBUTerol/ipratropium for Nebulization 3 milliLiter(s) Nebulizer every 6 hours  dextrose 5%. 1000 milliLiter(s) (50 mL/Hr) IV Continuous <Continuous>  dextrose 50% Injectable 12.5 Gram(s) IV Push once  dextrose 50% Injectable 25 Gram(s) IV Push once  dextrose 50% Injectable 25 Gram(s) IV Push once  ferrous    sulfate 325 milliGRAM(s) Oral daily  fluconAZOLE IVPB      fluconAZOLE IVPB 100 milliGRAM(s) IV Intermittent every 24 hours  guaiFENesin  milliGRAM(s) Oral every 12 hours  heparin  Injectable 5000 Unit(s) SubCutaneous every 12 hours  lactulose Syrup 45 Gram(s) Oral four times a day  pantoprazole    Tablet 40 milliGRAM(s) Oral before breakfast  piperacillin/tazobactam IVPB. 3.375 Gram(s) IV Intermittent every 12 hours  rifaximin 550 milliGRAM(s) Oral two times a day  sodium bicarbonate  Infusion 0.054 mEq/kG/Hr (75 mL/Hr) IV Continuous <Continuous>    MEDICATIONS  (PRN):  dextrose 40% Gel 15 Gram(s) Oral once PRN Blood Glucose LESS THAN 70 milliGRAM(s)/deciliter  glucagon  Injectable 1 milliGRAM(s) IntraMuscular once PRN Glucose LESS THAN 70 milligrams/deciliter         Vitals log        ICU Vital Signs Last 24 Hrs  T(C): 36.4 (26 Oct 2018 04:55), Max: 36.7 (25 Oct 2018 22:58)  T(F): 97.6 (26 Oct 2018 04:55), Max: 98 (25 Oct 2018 22:58)  HR: 97 (26 Oct 2018 04:55) (90 - 97)  BP: 129/58 (26 Oct 2018 04:55) (129/58 - 137/55)  BP(mean): --  ABP: --  ABP(mean): --  RR: 20 (26 Oct 2018 04:55) (18 - 20)  SpO2: 95% (26 Oct 2018 04:55) (93% - 98%)           Input and Output:  I&O's Detail    25 Oct 2018 07:01  -  26 Oct 2018 07:00  --------------------------------------------------------  IN:    sodium bicarbonate  Infusion: 900 mL  Total IN: 900 mL    OUT:  Total OUT: 0 mL    Total NET: 900 mL          Lab Data                        9.0    25.04 )-----------( 132      ( 26 Oct 2018 09:14 )             28.3     10-26    146<H>  |  117<H>  |  66<H>  ----------------------------<  146<H>  3.7   |  20<L>  |  2.50<H>    Ca    7.9<L>      26 Oct 2018 09:14      ABG - ( 25 Oct 2018 12:59 )  pH, Arterial: 7.27  pH, Blood: x     /  pCO2: 36    /  pO2: 87    / HCO3: 17    / Base Excess: -9.7  /  SaO2: 95                      Review of Systems	      Objective     Physical Examination    heart s1s2  lung dec BS  kyphosis  frail  weak      Pertinent Lab findings & Imaging      Samantha:  NO   Adequate UO     I&O's Detail    25 Oct 2018 07:01  -  26 Oct 2018 07:00  --------------------------------------------------------  IN:    sodium bicarbonate  Infusion: 900 mL  Total IN: 900 mL    OUT:  Total OUT: 0 mL    Total NET: 900 mL               Discussed with:     Cultures:	        Radiology      EXAM:  US ABDOMEN LIMITED                            PROCEDURE DATE:  10/25/2018          INTERPRETATION:  CLINICAL INFORMATION: Ascites and elevated ammonia   elevated    COMPARISON: None available.    TECHNIQUE: Sonography of the abdomen, limited. Portable technique is   utilized.    FINDINGS:    Limited evaluation of the abdomen demonstrates no gross liver   abnormality. There is ascites. A cyst is seen in the left renal fossa.   Kidneys themselves are not well identified. Venous structures and aorta   are not seen.    IMPRESSION:     Limited by portable technique and patient condition. Ascites is noted.   Liver is grossly unremarkable.                ISIS FAUST M.D.,ATTENDING RADIOLOGIST  This document has been electronically signed. Oct 25 2018  2:25PM              EXAM:  CT ABDOMEN AND PELVIS                            PROCEDURE DATE:  10/26/2018          INTERPRETATION:   CT ABDOMEN AND PELVIS    CLINICAL INFORMATION:  Abdominal pain, ascites and altered mental status.    PROCEDURE:  Using multislice helical CT, without intravenous or oral   contrast 2.5 mm sections were obtained from the domes of the diaphragm to   the ischial tuberosities.  Coronal reformatted images were performed.    COMPARISON: Abdominal ultrasound 10/25/2018.    FINDINGS: Evaluation of the solid organ parenchyma is limited by the lack   of intravenous contrast.      There is streak artifact related to patient's body habitus and patient's   arms by the side degrading image quality limiting evaluation.    There is trace pleural effusion/atelectasis right lung base.  There are left hemidiaphragmatic pleural based calcifications.    There is nodular contour of the liver with caudate lobe hypertrophy   suggestive of cirrhotic morphology.  There is a small to moderate amount of perihepatic ascites.  Cholelithiasis is noted.  The spleen, adrenal glands appear unremarkable.  There is atrophy of the nonenhanced pancreas.    There is a large, approximate 7 cm exophytic cyst lower pole left kidney.   There are smaller, indeterminate heterogeneous lesions at the mid to   lower pole the left kidney.  No hydroureteronephrosis is noted.    There is arteriosclerotic calcification of the abdominal aorta, which is   normal in caliber.  No enlarged retroperitoneal lymphadenopathy is noted.    Evaluation of the stomach and gastrointestinal tract is limited without   distention.  There is a probable hiatal hernia. There is thickening of the transverse   portion of the duodenum with periduodenal stranding, correlate clinically   for duodenitis. There are fluid-filled loops of small bowel with probable   mild bowel wall thickening. There is colonic wall thickening,   particularly at the level of the ascending and transverse colon through   splenic flexure and sigmoid colon; findings nonspecific, may be   associated with cirrhosis/hypoproteinemia.  No bowel obstruction is noted.  There is a moderate volume of pelvic ascites.  No bowel obstruction is noted.  There is distended rectum, which may be filled with liquid feces, cannot   exclude underlying mass, correlate clinically.  There is induration of the adjacent skin/subcutaneous tissues, correlate   for ulcer.    There is generalized subcutaneous anasarca.    The urinary bladder appears unremarkable.  No pelvic mass is noted.    There are degenerative changes of the lumbar spine.    Impression:    Cirrhotic morphology.    Moderate volume of abdominal pelvic ascites.    Bowel wall thickening and mesenteric stranding, nonspecific may be   associated with cirrhosis/hypoproteinemia.    Distended, liquid feces filled rectum as discussed, cannot exclude   underlying mass, recommend further clinical correlation.    Other findings as discussed above.                                  ROSA GUZMAN M.D., ATTENDING RADIOLOGIST  This document has been electronically signed. Oct 26 2018  9:34AM

## 2018-10-26 NOTE — PROGRESS NOTE ADULT - PROBLEM SELECTOR PLAN 1
Now suspect metabolic encephalopathy/hepatic encephalopathy. Also likely due to SAMIA from PO intake in setting of diuresis.   She could also be aspirating at this point   ?infiltrate on CXR. Continue Zosyn for possible HCAP/aspiration pneumonia.   --urine culture shows persistence of Candida--patieint is on Fluconazole.   -F/u urine culture.  -Blood culture growing GPC in clusters in one bottle, likely contaminant. Repeat cultures are negative.  - Aspiration precautions  - bedrest, turn q 2.

## 2018-10-26 NOTE — CONSULT NOTE ADULT - PROBLEM SELECTOR PROBLEM 1
COPD (chronic obstructive pulmonary disease)
Altered mental status, unspecified altered mental status type
Pressure ulcer of sacral region, stage 3

## 2018-10-26 NOTE — PROGRESS NOTE ADULT - SUBJECTIVE AND OBJECTIVE BOX
NEPHROLOGY INTERVAL HPI/OVERNIGHT EVENTS:  HPI:  Hx obtained from son and other family members as pt was altered.  Pt is a 89 yo F w/ PMH of DM type 2, HTN, COPD on 2.5L home O2, diastolic HFpEF (last echo 10/2017) presented to ED p/w ams, weakness, dysuria, dec po x past ~ 3 days. Of note Pt was recently admitted for ams, similar to current presentation and was d/c 6 days ago. On previous admission Pt was diagnosed and treated for UTI. Pt was initially doing well, but then developed burning with urination, AMS, and watery diarrhea x 3 days ago. Family reports that pt was given Bactrim for unresolved UTI, but that she was only able to take 2 doses, as she could not swallow the 3rd dose this AM. Pt has poor PO intake and EMS came to her house last night to give her IVF due to dehydration. Of note patient is normally bedbound and requires 24/7 home health aid. Pt is incontinent at baseline and wears diapers. Denies any Fevers, but endorses chills, and increased fatigue. Denies any SOB, CP, N/V. Family reports pt normally is able to converse without issue, but has been incoherent for ~ the past 3 days    Follow up SAMIA/CKD/Hypernatremia  No c/o     PAST MEDICAL & SURGICAL HISTORY:  Coronary artery disease with angina pectoris, unspecified vessel or lesion type, unspecified whether native or transplanted heart  Congestive heart failure  Asthma  Diabetes mellitus  HTN (hypertension)  No significant past surgical history      MEDICATIONS  (STANDING):  ALBUTerol/ipratropium for Nebulization 3 milliLiter(s) Nebulizer every 6 hours  dextrose 5%. 1000 milliLiter(s) (50 mL/Hr) IV Continuous <Continuous>  dextrose 50% Injectable 12.5 Gram(s) IV Push once  dextrose 50% Injectable 25 Gram(s) IV Push once  dextrose 50% Injectable 25 Gram(s) IV Push once  ferrous    sulfate 325 milliGRAM(s) Oral daily  fluconAZOLE IVPB      fluconAZOLE IVPB 100 milliGRAM(s) IV Intermittent every 24 hours  guaiFENesin  milliGRAM(s) Oral every 12 hours  heparin  Injectable 5000 Unit(s) SubCutaneous every 12 hours  lactulose Syrup 45 Gram(s) Oral four times a day  pantoprazole    Tablet 40 milliGRAM(s) Oral before breakfast  piperacillin/tazobactam IVPB. 3.375 Gram(s) IV Intermittent every 12 hours  rifaximin 550 milliGRAM(s) Oral two times a day  sodium bicarbonate  Infusion 0.054 mEq/kG/Hr (75 mL/Hr) IV Continuous <Continuous>    MEDICATIONS  (PRN):  dextrose 40% Gel 15 Gram(s) Oral once PRN Blood Glucose LESS THAN 70 milliGRAM(s)/deciliter  glucagon  Injectable 1 milliGRAM(s) IntraMuscular once PRN Glucose LESS THAN 70 milligrams/deciliter      Allergies    No Known Allergies    Intolerances        Vital Signs Last 24 Hrs  T(C): 36.7 (26 Oct 2018 14:47), Max: 36.7 (25 Oct 2018 22:58)  T(F): 98.1 (26 Oct 2018 14:47), Max: 98.1 (26 Oct 2018 14:47)  HR: 90 (26 Oct 2018 14:47) (90 - 98)  BP: 123/45 (26 Oct 2018 14:47) (120/62 - 137/55)  BP(mean): --  RR: 23 (26 Oct 2018 14:47) (18 - 23)  SpO2: 97% (26 Oct 2018 14:47) (95% - 98%)  Daily     Daily Weight in k.2 (26 Oct 2018 04:55)    PHYSICAL EXAM:  GENERAL: appears chronically ill, no distress  HEAD:  Atraumatic, Normocephalic  EYES: Conjunctiva and sclera clear  ENMT: Dry oral mucosa  NECK: Supple  NERVOUS SYSTEM:  Difficult to arouse  CHEST/LUNG: Scattered wheezing B/L  HEART: Regular rate and rhythm; No murmurs, rubs, or gallops  ABDOMEN: Soft, Nontender, Nondistended; Bowel sounds present  EXTREMITIES:  No edema  LYMPH: No lymphadenopathy noted  SKIN: No rashes or lesions, pale      LABS:                        9.0    25.04 )-----------( 132      ( 26 Oct 2018 09:14 )             28.3     10-    146<H>  |  117<H>  |  66<H>  ----------------------------<  146<H>  3.7   |  20<L>  |  2.50<H>    Ca    7.9<L>      26 Oct 2018 09:14            ABG - ( 25 Oct 2018 12:59 )  pH, Arterial: 7.27  pH, Blood: x     /  pCO2: 36    /  pO2: 87    / HCO3: 17    / Base Excess: -9.7  /  SaO2: 95                    RADIOLOGY & ADDITIONAL TESTS:

## 2018-10-26 NOTE — DISCHARGE NOTE ADULT - HOSPITAL COURSE
Pt is a 89 yo F w/ PMH of DM type 2, HTN, COPD on 2.5L home O2, diastolic HFpEF (last echo 10/2017) presented to ED p/w ams, weakness, dysuria, decreased oral intake x past ~ 3 days. Of note Pt was recently admitted for ams, similar to current presentation and was d/c 6 days prior to this admission. Previously she was admitted for suspected UTI and metabolic encephalopathy. During that admission her urine culture grew Candida. It was felt to be colonization and thusly, antibiotics were discontinued. Patient was treated with IV hydration and improved to her baseline mental status. Following that admission, she was doing well, but then developed t was initially doing well, but then developed dysuria and AMS, along with watery diarrhea x 3 days leading up to this admission. SHe was prescribed Bactrim by  her [facility] physician, but  she was only able to take 2 doses, as she could not swallow the 3rd dose the morning of presentation. EMS visit home the night before this presentation to give IVF for dehydration.    In the ED, temp 97.1, HR 62, /75, RR 20, Spo2 99 on supplemental o2. WBC 11.46, Hg 9.5, hct 29.5, platelet 147, PT 13.5, INR 1.23, PTT 25.9, Na 148, K 4.3, Cl 120, Co2 21, BUN 64, Cr 2.00, glucose 110, calcium 8.0, alk phos 136, lactate 1.4, lipase 117. UA: small bilirubin, moderate leukocyte esterase, trace blood, moderate epithelial, bacteria few, hyaline 0-2, yeast present    She was admitted for metabolic encephalopathy suspected to be due to UTI and SAMIA. Her Lasix was held and patient was hydrated. She was treated with Ceftriaxone. She was evaluated by ID and it was not suspected that she had an active UTI and recommended continuing IV Ceftriaxone until cultures resulted. Patient's creatinine showed modest improvement and then began to worsen. Her mental status remained unchanged. She was seen by Neurology who attributed this to acute metabolic encephalopathy due to SAMIA and possible infection. She had repeat CT head that showed no acute pathology but was limited due to movement artifact. MRI could not be performed as patient could not stay still. Ceftriaxone was changed to Zosyn to better cover possible aspiration pneumonia. She was also started on Fluconazole for treatment of her Candiduria.    When her mental status change persisted, additional testing sent and ammonia level was found to be 203. Multiple attempts were made to place NG tube as patient was too lethargic to take lactulose and Xifaxin by mouth. All attempts were unsuccessful due to her large hiatal hernia. She then had NG tube placed via endoscopy by Dr. Zuniga, and then started receiving Lactulose and Xifaxin as well as tube feeds. She had an abdominal CT which showed a cirrhotic liver and moderate amount of ascites in abdomen. Pt is a 89 yo F w/ PMH of DM type 2, HTN, COPD on 2.5L home O2, diastolic HFpEF (last echo 10/2017) presented to ED p/w ams, weakness, dysuria, decreased oral intake x past ~ 3 days. Of note Pt was recently admitted for ams, similar to current presentation and was d/c 6 days prior to this admission. Previously she was admitted for suspected UTI and metabolic encephalopathy. During that admission her urine culture grew Candida. It was felt to be colonization and thusly, antibiotics were discontinued. Patient was treated with IV hydration and improved to her baseline mental status. Following that admission, she was doing well, but then developed t was initially doing well, but then developed dysuria and AMS, along with watery diarrhea x 3 days leading up to this admission. SHe was prescribed Bactrim by  her [facility] physician, but  she was only able to take 2 doses, as she could not swallow the 3rd dose the morning of presentation. EMS visit home the night before this presentation to give IVF for dehydration.    In the ED, temp 97.1, HR 62, /75, RR 20, Spo2 99 on supplemental o2. WBC 11.46, Hg 9.5, hct 29.5, platelet 147, PT 13.5, INR 1.23, PTT 25.9, Na 148, K 4.3, Cl 120, Co2 21, BUN 64, Cr 2.00, glucose 110, calcium 8.0, alk phos 136, lactate 1.4, lipase 117. UA: small bilirubin, moderate leukocyte esterase, trace blood, moderate epithelial, bacteria few, hyaline 0-2, yeast present    She was admitted for metabolic encephalopathy suspected to be due to UTI and SAMIA. Her Lasix was held and patient was hydrated. She was treated with Ceftriaxone. She was evaluated by ID and it was not suspected that she had an active UTI and recommended continuing IV Ceftriaxone until cultures resulted. Patient's creatinine showed modest improvement and then began to worsen. Her mental status remained unchanged. She was seen by Neurology who attributed this to acute metabolic encephalopathy due to SAMIA and possible infection. She had repeat CT head that showed no acute pathology but was limited due to movement artifact. MRI could not be performed as patient could not stay still. Ceftriaxone was changed to Zosyn to better cover possible aspiration pneumonia. She was also started on Fluconazole for treatment of her Candiduria.    When her mental status change persisted, additional testing sent and ammonia level was found to be 203. Multiple attempts were made to place NG tube as patient was too lethargic to take lactulose and Xifaxin by mouth. All attempts were unsuccessful due to her large hiatal hernia. She then had NG tube placed via endoscopy by Dr. Zuniga, and then started receiving Lactulose and Xifaxin as well as tube feeds. She had an abdominal CT which showed a cirrhotic liver and moderate amount of ascites in abdomen. . 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  now ansarca fluid over lode   with sever protein caloric malnutrition   sec to liver cirrhosis unknown etiology  .  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     asp pna   iv abx zosyne    blood cult neg to date .  uti candida on  iv antifungal   tt  .    high ammonia / cirrhosis on lactulose via ng tube    recurrent hyper ammonia   .   leucocytosis sec to possible  aspiration pna   littler better on iv abx zosyne ,hold tube feed  .   hepatorenal syndrome -   cont octreotide/midodrine/albumin   but worsening gfr / oliguric , as per gi dr styles and renal dr díaz .      IVF with sodium bicarb; acidemia is severe; mainly driven by CO2 retention; +BiPAP but patient's current mental status will make this measure less effective,   -Renal sono shows no hydronephrosis , hypokalemia replaced.   -No indication for RRT; poor prognosis. Not a candidate for dialysis as the risks far outweigh the benefits in this case as per renal dr díaz . pt son  hcp  bedside  aware with all tt plan . hospisce evaluation referred  this time . Pt is a 89 yo F w/ PMH of DM type 2, HTN, COPD on 2.5L home O2, diastolic HFpEF (last echo 10/2017) presented to ED p/w ams, weakness, dysuria, decreased oral intake x past ~ 3 days. Of note Pt was recently admitted for ams, similar to current presentation and was d/c 6 days prior to this admission. Previously she was admitted for suspected UTI and metabolic encephalopathy. During that admission her urine culture grew Candida. It was felt to be colonization and thusly, antibiotics were discontinued. Patient was treated with IV hydration and improved to her baseline mental status. Following that admission, she was doing well, but then developed t was initially doing well, but then developed dysuria and AMS, along with watery diarrhea x 3 days leading up to this admission. SHe was prescribed Bactrim by  her [facility] physician, but  she was only able to take 2 doses, as she could not swallow the 3rd dose the morning of presentation. EMS visit home the night before this presentation to give IVF for dehydration.    In the ED, temp 97.1, HR 62, /75, RR 20, Spo2 99 on supplemental o2. WBC 11.46, Hg 9.5, hct 29.5, platelet 147, PT 13.5, INR 1.23, PTT 25.9, Na 148, K 4.3, Cl 120, Co2 21, BUN 64, Cr 2.00, glucose 110, calcium 8.0, alk phos 136, lactate 1.4, lipase 117. UA: small bilirubin, moderate leukocyte esterase, trace blood, moderate epithelial, bacteria few, hyaline 0-2, yeast present    She was admitted for metabolic encephalopathy suspected to be due to UTI and SAMIA. Her Lasix was held and patient was hydrated. She was treated with Ceftriaxone. She was evaluated by ID and it was not suspected that she had an active UTI and recommended continuing IV Ceftriaxone until cultures resulted. Patient's creatinine showed modest improvement and then began to worsen. Her mental status remained unchanged. She was seen by Neurology who attributed this to acute metabolic encephalopathy due to SAMIA and possible infection. She had repeat CT head that showed no acute pathology but was limited due to movement artifact. MRI could not be performed as patient could not stay still. Ceftriaxone was changed to Zosyn to better cover possible aspiration pneumonia. She was also started on Fluconazole for treatment of her Candiduria.    When her mental status change persisted, additional testing sent and ammonia level was found to be 203. Multiple attempts were made to place NG tube as patient was too lethargic to take lactulose and Xifaxin by mouth. All attempts were unsuccessful due to her large hiatal hernia. She then had NG tube placed via endoscopy by Dr. Zuniga, and then started receiving Lactulose and Xifaxin as well as tube feeds. She had an abdominal CT which showed a cirrhotic liver and moderate amount of ascites in abdomen. . 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  now ansarca fluid over lode   with sever protein caloric malnutrition   sec to liver cirrhosis unknown etiology  .  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     asp pna   iv abx zosyne    blood cult neg to date .  uti candida on  iv antifungal   tt  .    high ammonia / cirrhosis on lactulose via ng tube    recurrent hyper ammonia   .   leucocytosis sec to possible  aspiration pna   littler better on iv abx zosyne ,hold tube feed  .   hepatorenal syndrome -   cont octreotide/midodrine/albumin   but worsening gfr / oliguric , as per gi dr styles and renal dr díaz .      IVF with sodium bicarb; acidemia is severe; mainly driven by CO2 retention; +BiPAP but patient's current mental status will make this measure less effective,   -Renal sono shows no hydronephrosis , hypokalemia replaced.  pt failed swallow evaluation  sec to metabolic encephalopathy and uremia .   - poor prognosis. Not a candidate for dialysis as the risks far outweigh the benefits in this case as per renal dr díaz . pt son  hcp  bedside  aware with all tt plan . hosp course  with over all very poor prognosis   hospices evaluation referred  this time   pt family all 4  son    bedside  does  not want peg  tube for feed  , no aggressive interventions want  comfort care    end of life care   , no lab , no  vital , no bipap , only  ng tube  for  comfort  care meds and  fluid  , iv fluid very gentle , iv morphine ,  want to transfer her in pt hospices care Monday Pt is a 88 yo F w/ PMH of DM type 2, HTN, COPD on 2.5L home O2, diastolic HFpEF (last echo 10/2017) presented to ED p/w ams, weakness, dysuria, decreased oral intake x past ~ 3 days. Of note Pt was recently admitted for ams, similar to current presentation and was d/c 6 days prior to this admission. Previously she was admitted for suspected UTI and metabolic encephalopathy. During that admission her urine culture grew Candida. It was felt to be colonization and thusly, antibiotics were discontinued. Patient was treated with IV hydration and improved to her baseline mental status. Following that admission, she was doing well, but then developed t was initially doing well, but then developed dysuria and AMS, along with watery diarrhea x 3 days leading up to this admission. SHe was prescribed Bactrim by  her [facility] physician, but  she was only able to take 2 doses, as she could not swallow the 3rd dose the morning of presentation. EMS visit home the night before this presentation to give IVF for dehydration.    In the ED, temp 97.1, HR 62, /75, RR 20, Spo2 99 on supplemental o2. WBC 11.46, Hg 9.5, hct 29.5, platelet 147, PT 13.5, INR 1.23, PTT 25.9, Na 148, K 4.3, Cl 120, Co2 21, BUN 64, Cr 2.00, glucose 110, calcium 8.0, alk phos 136, lactate 1.4, lipase 117. UA: small bilirubin, moderate leukocyte esterase, trace blood, moderate epithelial, bacteria few, hyaline 0-2, yeast present    She was admitted for metabolic encephalopathy suspected to be due to UTI and SAMIA. Her Lasix was held and patient was hydrated. She was treated with Ceftriaxone. She was evaluated by ID and it was not suspected that she had an active UTI and recommended continuing IV Ceftriaxone until cultures resulted. Patient's creatinine showed modest improvement and then began to worsen. Her mental status remained unchanged. She was seen by Neurology who attributed this to acute metabolic encephalopathy due to SAMIA and possible infection. She had repeat CT head that showed no acute pathology but was limited due to movement artifact. MRI could not be performed as patient could not stay still. Ceftriaxone was changed to Zosyn to better cover possible aspiration pneumonia. She was also started on Fluconazole for treatment of her Candiduria.    When her mental status change persisted, additional testing sent and ammonia level was found to be 203. Multiple attempts were made to place NG tube as patient was too lethargic to take lactulose and Xifaxin by mouth. All attempts were unsuccessful due to her large hiatal hernia. She then had NG tube placed via endoscopy by Dr. Zuniga, and then started receiving Lactulose and Xifaxin as well as tube feeds. She had an abdominal CT which showed a cirrhotic liver and moderate amount of ascites in abdomen. .   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  now ansarca fluid over lode   with sever protein caloric malnutrition   sec to liver cirrhosis unknown etiology  .  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     asp pna   iv abx zosyne    blood cult neg to date .  uti candida on  iv antifungal   tt  .    high ammonia / cirrhosis on lactulose via ng tube    recurrent hyper ammonia   .   leucocytosis sec to possible  aspiration pna   littler better on iv abx zosyne ,hold tube feed  .   hepatorenal syndrome -   cont octreotide/midodrine/albumin   but worsening gfr / oliguric , as per gi dr styles and renal dr díaz .      IVF with sodium bicarb; acidemia is severe; mainly driven by CO2 retention; +BiPAP but patient's current mental status will make this measure less effective,   -Renal sono shows no hydronephrosis , hypokalemia replaced.  pt failed swallow evaluation  sec to metabolic encephalopathy and uremia .   - poor prognosis. Not a candidate for dialysis as the risks far outweigh the benefits in this case as per renal dr díaz . pt son  hcp  bedside  aware with all tt plan . hosp course  with over all very poor prognosis   hospices evaluation referred  this time   pt family all 4  son    bedside  does  not want peg  tube for feed  , no aggressive interventions want  comfort care    end of life care   , no lab , no  vital , no bipap , only  ng tube  for  comfort  care meds and  fluid  , iv fluid very gentle , iv morphine ,  want to transfer her in pt hospices care Monday Pt is a 89 yo F w/ PMH of DM type 2, HTN, COPD on 2.5L home O2, diastolic HFpEF (last echo 10/2017) presented to ED p/w ams, weakness, dysuria, decreased oral intake x past ~ 3 days. Of note Pt was recently admitted for ams, similar to current presentation and was d/c 6 days prior to this admission. Previously she was admitted for suspected UTI and metabolic encephalopathy. During that admission her urine culture grew Candida. It was felt to be colonization and thusly, antibiotics were discontinued. Patient was treated with IV hydration and improved to her baseline mental status. Following that admission, she was doing well, but then developed t was initially doing well, but then developed dysuria and AMS, along with watery diarrhea x 3 days leading up to this admission. SHe was prescribed Bactrim by  her [facility] physician, but  she was only able to take 2 doses, as she could not swallow the 3rd dose the morning of presentation. EMS visit home the night before this presentation to give IVF for dehydration.    In the ED, temp 97.1, HR 62, /75, RR 20, Spo2 99 on supplemental o2. WBC 11.46, Hg 9.5, hct 29.5, platelet 147, PT 13.5, INR 1.23, PTT 25.9, Na 148, K 4.3, Cl 120, Co2 21, BUN 64, Cr 2.00, glucose 110, calcium 8.0, alk phos 136, lactate 1.4, lipase 117. UA: small bilirubin, moderate leukocyte esterase, trace blood, moderate epithelial, bacteria few, hyaline 0-2, yeast present    She was admitted for metabolic encephalopathy suspected to be due to UTI and SAMIA. Her Lasix was held and patient was hydrated. She was treated with Ceftriaxone. She was evaluated by ID and it was not suspected that she had an active UTI and recommended continuing IV Ceftriaxone until cultures resulted. Patient's creatinine showed modest improvement and then began to worsen. Her mental status remained unchanged. She was seen by Neurology who attributed this to acute metabolic encephalopathy due to SAMIA and possible infection. She had repeat CT head that showed no acute pathology but was limited due to movement artifact. MRI could not be performed as patient could not stay still. Ceftriaxone was changed to Zosyn to better cover possible aspiration pneumonia. She was also started on Fluconazole for treatment of her Candiduria.    When her mental status change persisted, additional testing sent and ammonia level was found to be 203. Multiple attempts were made to place NG tube as patient was too lethargic to take lactulose and Xifaxin by mouth. All attempts were unsuccessful due to her large hiatal hernia. She then had NG tube placed via endoscopy by Dr. Zuniga, and then started receiving Lactulose and Xifaxin as well as tube feeds. She had an abdominal CT which showed a cirrhotic liver and moderate amount of ascites in abdomen. .   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  now ansarca fluid over lode   with sever protein caloric malnutrition   sec to liver cirrhosis unknown etiology  .  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     asp pna   iv abx zosyne    blood cult neg to date .  uti candida on  iv antifungal   tt  .    high ammonia / cirrhosis on lactulose via ng tube    recurrent hyper ammonia   .   leucocytosis sec to possible  aspiration pna   littler better on iv abx zosyne ,hold tube feed  .   hepatorenal syndrome -   cont octreotide/midodrine/albumin   but worsening gfr / oliguric , as per gi dr styles and renal dr díaz .      IVF with sodium bicarb; acidemia is severe; mainly driven by CO2 retention; +BiPAP but patient's current mental status will make this measure less effective,   -Renal sono shows no hydronephrosis , hypokalemia replaced.  pt failed swallow evaluation  sec to metabolic encephalopathy and uremia .   - poor prognosis. Not a candidate for dialysis as the risks far outweigh the benefits in this case as per renal dr díaz . pt son  hcp  bedside  aware with all tt plan . hosp course  with over all very poor prognosis   hospices evaluation referred  this time   pt family all 4  son    bedside  does  not want peg  tube for feed  , no aggressive interventions want  comfort care    end of life care   , no lab , no  vital , no bipap , only  ng tube  for  comfort  care meds and  fluid  , iv fluid very gentle , iv morphine ,  want to transfer her in pt hospices care.  Pt.  on 11/3/18 at 13:20 2/2 cardiopulmonary arrest.

## 2018-10-26 NOTE — CONSULT NOTE ADULT - PROBLEM SELECTOR PROBLEM 2
Coronary artery disease with angina pectoris, unspecified vessel or lesion type, unspecified whether native or transplanted heart
Other ascites

## 2018-10-26 NOTE — PROGRESS NOTE ADULT - SUBJECTIVE AND OBJECTIVE BOX
HPI:  Pt is a 88 yo F w/ PMH of DM type 2, HTN, COPD on 2.5L home O2, diastolic HFpEF (last echo 10/2017) presented to ED p/w ams, weakness, dysuria, dec po x past ~ 3 days.     INTERVAL EVENTS:   Patient seen and examined. Remains lethargic today. NG tube found to be out of position during night and it was removed. Family refused replacement of tube and wanted to wait until this morning.      REVIEW OF SYSTEMS:  unable to obtain.    VITAL SIGNS:  Vital Signs Last 24 Hrs  T(C): 36.6 (26 Oct 2018 13:30), Max: 36.7 (25 Oct 2018 22:58)  T(F): 97.8 (26 Oct 2018 13:30), Max: 98 (25 Oct 2018 22:58)  HR: 98 (26 Oct 2018 13:30) (91 - 98)  BP: 120/62 (26 Oct 2018 13:30) (120/62 - 137/55)  BP(mean): --  RR: 20 (26 Oct 2018 13:30) (18 - 20)  SpO2: 95% (26 Oct 2018 13:30) (93% - 98%)  PHYSICAL EXAM:     GENERAL: chronically ill elderly female, no acute distress  HEENT: NC/AT, EOMI, neck supple, MMM  RESPIRATORY: decreased breath sounds bilaterally, audible secretions in airways.   CARDIOVASCULAR: RRR, no murmurs, gallops, rubs  ABDOMINAL: soft, non-tender, non-distended, positive bowel sounds   EXTREMITIES: no clubbing, cyanosis, or edema  NEUROLOGICAL: lethargic, responsive to painful stimuli  MUSCULOSKELETAL: no gross joint deformity       CAPILLARY BLOOD GLUCOSE  POCT Blood Glucose.: 174 mg/dL (25 Oct 2018 12:09)  POCT Blood Glucose.: 174 mg/dL (25 Oct 2018 07:29)  POCT Blood Glucose.: 151 mg/dL (24 Oct 2018 21:32)    < from: CT Chest No Cont (10.23.18 @ 14:31) >  IMPRESSION: Density in the inferior lingula with bronchiectasis may   represent scarring although infiltrate is not clearly excluded  Large hiatal hernia  Upper abdominal ascites again seen      MEDICATIONS  (STANDING):  ALBUTerol/ipratropium for Nebulization 3 milliLiter(s) Nebulizer every 6 hours  dextrose 5%. 1000 milliLiter(s) (50 mL/Hr) IV Continuous <Continuous>  dextrose 50% Injectable 12.5 Gram(s) IV Push once  dextrose 50% Injectable 25 Gram(s) IV Push once  dextrose 50% Injectable 25 Gram(s) IV Push once  ferrous    sulfate 325 milliGRAM(s) Oral daily  fluconAZOLE IVPB      fluconAZOLE IVPB 100 milliGRAM(s) IV Intermittent every 24 hours  guaiFENesin  milliGRAM(s) Oral every 12 hours  heparin  Injectable 5000 Unit(s) SubCutaneous every 12 hours  lactulose Syrup 45 Gram(s) Oral four times a day  pantoprazole    Tablet 40 milliGRAM(s) Oral before breakfast  piperacillin/tazobactam IVPB. 3.375 Gram(s) IV Intermittent every 12 hours  rifaximin 550 milliGRAM(s) Oral two times a day  sodium bicarbonate  Infusion 0.054 mEq/kG/Hr (75 mL/Hr) IV Continuous <Continuous>    MEDICATIONS  (PRN):  dextrose 40% Gel 15 Gram(s) Oral once PRN Blood Glucose LESS THAN 70 milliGRAM(s)/deciliter  glucagon  Injectable 1 milliGRAM(s) IntraMuscular once PRN Glucose LESS THAN 70 milligrams/deciliter

## 2018-10-26 NOTE — DISCHARGE NOTE ADULT - PATIENT PORTAL LINK FT
You can access the [facility] Patient Portal, offered by [facility], by registering with the following website: [email or web address]

## 2018-10-26 NOTE — PROGRESS NOTE ADULT - ATTENDING COMMENTS
Hyperammonemia: Level found to be up to 205 today. Due to patient's anatomy (primarily her hiatal hernia), it was difficult to place NG tube and when done it could not be advanced into the stomach. Initially the plan was to give Lactulose and Rifaxin through the NG tube with its tip at the distal end of the esophagus. The tube was displaced, and eventually removed. Patient now with access to provide meds or nutrition.     After extensive discussion with family, they have agreed with endoscopic placement of an NG tube for both meds (including Rifaximin and Lactulose) in efforts to bring down her ammonia level. The risks vs. benefits were discussed in detail and they are proceeding with this intervention knowing that it is high risk. They have voiced understanding of this. There are no absolute contraindications to proceeding with endoscopy as it is felt that the risks outweigh the benefits at this time. Family aware she may need to remain intubated post-procedure and they wish to proceed. She will be evaluated by cardio and pulm prior to procedure taking place. Hyperammonemia: Level found to be up to 205 today. Due to patient's anatomy (primarily her hiatal hernia), it was difficult to place NG tube and when done it could not be advanced into the stomach. Initially the plan was to give Lactulose and Rifaxin through the NG tube with its tip at the distal end of the esophagus. The tube was displaced, and eventually removed. Patient now without access to provide meds or nutrition.     After extensive discussion with family, they have agreed with endoscopic placement of an NG tube for both meds (including Rifaximin and Lactulose) in efforts to bring down her ammonia level. The risks vs. benefits were discussed in detail and they are proceeding with this intervention knowing that it is high risk. They have voiced understanding of this. There are no absolute contraindications to proceeding with endoscopy as it is felt that the risks outweigh the benefits at this time. Family aware she may need to remain intubated post-procedure and they wish to proceed. She will be evaluated by cardio and pulm prior to procedure taking place.

## 2018-10-26 NOTE — PROGRESS NOTE ADULT - SUBJECTIVE AND OBJECTIVE BOX
INTERVAL HPI/OVERNIGHT EVENTS:  pt seen and examined  lethargic in bed unable to participate in ros  per overnight rn, ngt taken out as malpositioned  no vomiting overnight, no s/s overt gib, +bm  no new labs to assess afebrile overnight    MEDICATIONS  (STANDING):  ALBUTerol/ipratropium for Nebulization 3 milliLiter(s) Nebulizer every 6 hours  dextrose 5%. 1000 milliLiter(s) (50 mL/Hr) IV Continuous <Continuous>  dextrose 50% Injectable 12.5 Gram(s) IV Push once  dextrose 50% Injectable 25 Gram(s) IV Push once  dextrose 50% Injectable 25 Gram(s) IV Push once  ferrous    sulfate 325 milliGRAM(s) Oral daily  fluconAZOLE IVPB      fluconAZOLE IVPB 100 milliGRAM(s) IV Intermittent every 24 hours  guaiFENesin  milliGRAM(s) Oral every 12 hours  heparin  Injectable 5000 Unit(s) SubCutaneous every 12 hours  lactulose Syrup 45 Gram(s) Oral four times a day  pantoprazole    Tablet 40 milliGRAM(s) Oral before breakfast  piperacillin/tazobactam IVPB. 3.375 Gram(s) IV Intermittent every 12 hours  rifaximin 550 milliGRAM(s) Oral two times a day  sodium bicarbonate  Infusion 0.054 mEq/kG/Hr (75 mL/Hr) IV Continuous <Continuous>    MEDICATIONS  (PRN):  dextrose 40% Gel 15 Gram(s) Oral once PRN Blood Glucose LESS THAN 70 milliGRAM(s)/deciliter  glucagon  Injectable 1 milliGRAM(s) IntraMuscular once PRN Glucose LESS THAN 70 milligrams/deciliter      Allergies    No Known Allergies    Intolerances        Review of Systems:  unable to obtain    Vital Signs Last 24 Hrs  T(C): 36.4 (26 Oct 2018 04:55), Max: 36.7 (25 Oct 2018 22:58)  T(F): 97.6 (26 Oct 2018 04:55), Max: 98 (25 Oct 2018 22:58)  HR: 97 (26 Oct 2018 04:55) (90 - 97)  BP: 129/58 (26 Oct 2018 04:55) (129/58 - 137/55)  BP(mean): --  RR: 20 (26 Oct 2018 04:55) (18 - 20)  SpO2: 95% (26 Oct 2018 04:55) (93% - 98%)    PHYSICAL EXAM:    Constitutional: NAD, lying in bed lethargic  HEENT:ncat  Neck: No LAD  Respiratory: dyspneic   Cardiovascular: S1 and S2, RRR  Gastrointestinal: soft appears nt mild dt  Extremities: No peripheral edema  Vascular: 2+ peripheral pulses  Neurological: lethargic minimally responsive  Skin: No rashes      LABS:                        8.8    20.00 )-----------( 150      ( 25 Oct 2018 08:53 )             27.6     10-25    147<H>  |  120<H>  |  62<H>  ----------------------------<  158<H>  4.0   |  16<L>  |  2.40<H>    Ca    7.6<L>      25 Oct 2018 08:53  Phos  3.3     10-24  Mg     2.0     10-24    TPro  6.3  /  Alb  1.8<L>  /  TBili  0.5  /  DBili  x   /  AST  24  /  ALT  18  /  AlkPhos  124<H>  10-24          RADIOLOGY & ADDITIONAL TESTS:

## 2018-10-26 NOTE — DISCHARGE NOTE ADULT - PLAN OF CARE
multifactorial sec to metabolic encephalopathy sec to uremia hepatorenal failure comfort care unknown etiology hepatorenal failure / on comfort chr resp failure chr comfort  care

## 2018-10-26 NOTE — PROGRESS NOTE ADULT - PROBLEM SELECTOR PLAN 1
multifactorial  needs ngt placement if family agreeable  rec lactulose enemas in interim  trend ammonia daily  xifaxamin/lactulose  monitor stool output  neuro and id following  going for mri brain  aspiration prec  further care per primary team

## 2018-10-26 NOTE — CHART NOTE - NSCHARTNOTEFT_GEN_A_CORE
RN called as patient coughed up some blood clots.  Patient seen and examined at bedside.  NG tube was placed earlier this evening, could possibly be traumatic NG tube placement causing blood in oral cavity.  RN performed suction, very minimal serosanguinous secretions suctioned.  Very minimal blood noted on clothes, no blood on sheets but were changed, as per RN very minimal blood on bed sheets as well.  Patient concerned if NG tube was misplaced considering that patient started coughing a lot after receiving medications.      Vital Signs Last 24 Hrs  T(C): 36.3 (26 Oct 2018 20:11), Max: 36.7 (26 Oct 2018 14:47)  T(F): 97.4 (26 Oct 2018 20:11), Max: 98.1 (26 Oct 2018 14:47)  HR: 95 (26 Oct 2018 20:11) (84 - 98)  BP: 146/50 (26 Oct 2018 20:11) (120/62 - 146/50)  RR: 24 (26 Oct 2018 20:11) (20 - 24)  SpO2: 92% (26 Oct 2018 20:11) (92% - 100%)    Physical Exam:  General: no acute distress, lethargic  Respiratory: coarse breath sounds b/l  CV: RRR, +S1/S2, no murmurs, rubs or gallops  Abdominal: Soft, NT, ND +BSx4    Plan:  -Ordered stat abdominal exam to r/o misplacement of NG tube   -CBC ordered, but family refused to have blood drawn now RN called as patient coughed up some blood clots.  Patient seen and examined at bedside.  NG tube was placed earlier this evening, could possibly be traumatic NG tube placement causing blood in oral cavity.  RN performed suction, very minimal serosanguinous secretions suctioned.  Very minimal blood noted on clothes, no blood on sheets but were changed, as per RN very minimal blood on bed sheets as well.  Patient concerned if NG tube was misplaced considering that patient started coughing a lot after receiving medications.      Vital Signs Last 24 Hrs  T(C): 36.3 (26 Oct 2018 20:11), Max: 36.7 (26 Oct 2018 14:47)  T(F): 97.4 (26 Oct 2018 20:11), Max: 98.1 (26 Oct 2018 14:47)  HR: 95 (26 Oct 2018 20:11) (84 - 98)  BP: 146/50 (26 Oct 2018 20:11) (120/62 - 146/50)  RR: 24 (26 Oct 2018 20:11) (20 - 24)  SpO2: 92% (26 Oct 2018 20:11) (92% - 100%)    Physical Exam:  General: no acute distress, lethargic  Respiratory: coarse breath sounds b/l  CV: RRR, +S1/S2, no murmurs, rubs or gallops  Abdominal: Soft, NT, ND +BSx4    Plan:  -Ordered stat abdominal exam to r/o misplacement of NG tube   -CBC ordered, but family refused to have blood drawn now    ---------------------------  Addendum:  10/26/2018 23:20  -Abdominal xray showed tip of NG tube in stomach  -NG tube placed on intermittent suction  -GI Dr. Zuniga saw patient, will have blue dye screening test done at midnight and clamp for 2 hours to see persistence of the dye  -Will hold feeds for now and continue in the morning, family agrees given current status would prefer to wait until the morning to have feeds done

## 2018-10-26 NOTE — CONSULT NOTE ADULT - PROBLEM SELECTOR RECOMMENDATION 5
encephalopathy  AMS  supportive care and regimen  medical team and GI unable to pass NG tube  not tolerating Lactulose CO  prognosis guarded to poor  asp prec  HOB elev  oral and skin hygiene

## 2018-10-26 NOTE — CONSULT NOTE ADULT - PROBLEM SELECTOR RECOMMENDATION 9
copd  chronic o2 support  on NEBS  high risk for aspiration  moderate to high risk for aspiration and procedure, EGD, discussed with family  keep HOB elev  seasonal vaccination
ag alginate/allevyn border  frequent rotation  offloading
ngt check cxr  ammonia daily  xifaxamin/lactulose

## 2018-10-26 NOTE — CONSULT NOTE ADULT - ASSESSMENT
Acute on CKD Stage 4  Sepsis/PNA/UTI  Hypernatremia  Dehydration    -Rise in creatinine likely related to rise in WBC signifying underlying sepsis  -Due to acidosis and hypernatremia, will adjust the IVF to sodium bicarb 50meq in D5W @ 75cc/hr  -Abx, ID eval and work up  -Urine studies ordered  -Renal sono shows no hydronephrosis  -Daily chemistries    Thank you    D/W Dr. Baires and Son at bedside
Mrs. Ha is a 88 yo F w/ PMH of DM type 2, HTN, COPD on 2.5L home O2, diastolic HFpEF (last echo 10/2017) presented to ED initially presenting AMS, weakness, dysuria, decreased po intake x past ~ 3 days, now with significant metabolic/hepatic encephalopathy.  Called to evaluate for cardiac clearance prior to endoscopic PEG placement.    - Unable to evaluate exercise tolerance or functional status  - LV function normal as of 10/2017  - EKG with SR, LAD and poor r wave progression, unchanged from 2016.  - Wheezing on exam but no sign of significant volume overload  - Hold Lasix in the setting of SAMIA and dehydration  - Given her breathing and overall clinical status, she is very high risk for any procedure. That being said, there is no active cardiac contraindication, and no evidence of CHF or significant valvular pathology.  - Family aware that she is a high risk for all procedures, and of the risk of death/intubation  - will follow with you
Rec no mendez insertion at this time, as pt is not in retention and continues to have signif urinary incont.    Thank you, please re-consult if needed.
Sacral pressure ulcer-stage 3.

## 2018-10-27 DIAGNOSIS — D64.9 ANEMIA, UNSPECIFIED: ICD-10-CM

## 2018-10-27 DIAGNOSIS — I50.9 HEART FAILURE, UNSPECIFIED: ICD-10-CM

## 2018-10-27 DIAGNOSIS — K74.60 UNSPECIFIED CIRRHOSIS OF LIVER: ICD-10-CM

## 2018-10-27 LAB
A1AT SERPL-MCNC: 173 MG/DL — SIGNIFICANT CHANGE UP (ref 90–200)
AFP-TM SERPL-MCNC: 25.5 NG/ML — HIGH
ALBUMIN SERPL ELPH-MCNC: 1.4 G/DL — LOW (ref 3.3–5)
ALP SERPL-CCNC: 111 U/L — SIGNIFICANT CHANGE UP (ref 40–120)
ALT FLD-CCNC: 20 U/L — SIGNIFICANT CHANGE UP (ref 12–78)
AMMONIA BLD-MCNC: 309 UMOL/L — HIGH (ref 11–32)
AMMONIA BLD-MCNC: 315 UMOL/L — HIGH (ref 11–32)
ANION GAP SERPL CALC-SCNC: 10 MMOL/L — SIGNIFICANT CHANGE UP (ref 5–17)
ANION GAP SERPL CALC-SCNC: 11 MMOL/L — SIGNIFICANT CHANGE UP (ref 5–17)
APTT BLD: 38.3 SEC — HIGH (ref 27.5–37.4)
AST SERPL-CCNC: 21 U/L — SIGNIFICANT CHANGE UP (ref 15–37)
BASE EXCESS BLDA CALC-SCNC: -11.5 MMOL/L — LOW (ref -2–2)
BASOPHILS # BLD AUTO: 0 K/UL — SIGNIFICANT CHANGE UP (ref 0–0.2)
BASOPHILS NFR BLD AUTO: 0 % — SIGNIFICANT CHANGE UP (ref 0–2)
BILIRUB SERPL-MCNC: 0.4 MG/DL — SIGNIFICANT CHANGE UP (ref 0.2–1.2)
BLOOD GAS COMMENTS ARTERIAL: SIGNIFICANT CHANGE UP
BLOOD GAS COMMENTS ARTERIAL: SIGNIFICANT CHANGE UP
BUN SERPL-MCNC: 69 MG/DL — HIGH (ref 7–23)
BUN SERPL-MCNC: 71 MG/DL — HIGH (ref 7–23)
CALCIUM SERPL-MCNC: 7.7 MG/DL — LOW (ref 8.5–10.1)
CALCIUM SERPL-MCNC: 8 MG/DL — LOW (ref 8.5–10.1)
CERULOPLASMIN SERPL-MCNC: 21 MG/DL — SIGNIFICANT CHANGE UP (ref 20–60)
CHLORIDE SERPL-SCNC: 118 MMOL/L — HIGH (ref 96–108)
CHLORIDE SERPL-SCNC: 119 MMOL/L — HIGH (ref 96–108)
CHLORIDE UR-SCNC: <20 MMOL/L — SIGNIFICANT CHANGE UP
CO2 SERPL-SCNC: 18 MMOL/L — LOW (ref 22–31)
CO2 SERPL-SCNC: 20 MMOL/L — LOW (ref 22–31)
CREAT ?TM UR-MCNC: 87 MG/DL — SIGNIFICANT CHANGE UP
CREAT SERPL-MCNC: 3.5 MG/DL — HIGH (ref 0.5–1.3)
CREAT SERPL-MCNC: 3.7 MG/DL — HIGH (ref 0.5–1.3)
EOSINOPHIL # BLD AUTO: 0 K/UL — SIGNIFICANT CHANGE UP (ref 0–0.5)
EOSINOPHIL NFR BLD AUTO: 0 % — SIGNIFICANT CHANGE UP (ref 0–6)
GLUCOSE SERPL-MCNC: 178 MG/DL — HIGH (ref 70–99)
GLUCOSE SERPL-MCNC: 181 MG/DL — HIGH (ref 70–99)
HAPTOGLOB SERPL-MCNC: 54 MG/DL — SIGNIFICANT CHANGE UP (ref 34–200)
HCO3 BLDA-SCNC: 15 MMOL/L — LOW (ref 23–27)
HCT VFR BLD CALC: 26.9 % — LOW (ref 34.5–45)
HGB BLD-MCNC: 8.5 G/DL — LOW (ref 11.5–15.5)
HOROWITZ INDEX BLDA+IHG-RTO: 32 — SIGNIFICANT CHANGE UP
HYPOCHROMIA BLD QL: SLIGHT — SIGNIFICANT CHANGE UP
INR BLD: 1.43 RATIO — HIGH (ref 0.88–1.16)
IRON SATN MFR SERPL: 52 % — HIGH (ref 14–50)
IRON SATN MFR SERPL: 71 UG/DL — SIGNIFICANT CHANGE UP (ref 30–160)
LACTATE SERPL-SCNC: 2.9 MMOL/L — HIGH (ref 0.7–2)
LDH SERPL L TO P-CCNC: 279 U/L — HIGH (ref 50–242)
LG PLATELETS BLD QL AUTO: SLIGHT — SIGNIFICANT CHANGE UP
LYMPHOCYTES # BLD AUTO: 1.66 K/UL — SIGNIFICANT CHANGE UP (ref 1–3.3)
LYMPHOCYTES # BLD AUTO: 5 % — LOW (ref 13–44)
MANUAL SMEAR VERIFICATION: SIGNIFICANT CHANGE UP
MCHC RBC-ENTMCNC: 28.3 PG — SIGNIFICANT CHANGE UP (ref 27–34)
MCHC RBC-ENTMCNC: 31.6 GM/DL — LOW (ref 32–36)
MCV RBC AUTO: 89.7 FL — SIGNIFICANT CHANGE UP (ref 80–100)
MONOCYTES # BLD AUTO: 1.32 K/UL — HIGH (ref 0–0.9)
MONOCYTES NFR BLD AUTO: 4 % — SIGNIFICANT CHANGE UP (ref 2–14)
NEUTROPHILS # BLD AUTO: 30.13 K/UL — HIGH (ref 1.8–7.4)
NEUTROPHILS NFR BLD AUTO: 86 % — HIGH (ref 43–77)
NEUTS BAND # BLD: 5 % — SIGNIFICANT CHANGE UP (ref 0–8)
NRBC # BLD: 0 — SIGNIFICANT CHANGE UP
NRBC # BLD: SIGNIFICANT CHANGE UP /100 WBCS (ref 0–0)
OSMOLALITY UR: 330 MOS/KG — SIGNIFICANT CHANGE UP (ref 50–1200)
PCO2 BLDA: 56 MMHG — HIGH (ref 32–46)
PH BLDA: 7.1 — CRITICAL LOW (ref 7.35–7.45)
PLAT MORPH BLD: NORMAL — SIGNIFICANT CHANGE UP
PLATELET # BLD AUTO: 137 K/UL — LOW (ref 150–400)
PO2 BLDA: 108 MMHG — SIGNIFICANT CHANGE UP (ref 74–108)
POTASSIUM SERPL-MCNC: 3 MMOL/L — LOW (ref 3.5–5.3)
POTASSIUM SERPL-MCNC: 3 MMOL/L — LOW (ref 3.5–5.3)
POTASSIUM SERPL-SCNC: 3 MMOL/L — LOW (ref 3.5–5.3)
POTASSIUM SERPL-SCNC: 3 MMOL/L — LOW (ref 3.5–5.3)
PROT ?TM UR-MCNC: 83 MG/DL — HIGH (ref 0–12)
PROT ?TM UR-MCNC: 83 MG/DL — HIGH (ref 0–12)
PROT SERPL-MCNC: 5.4 G/DL — LOW (ref 6–8.3)
PROT SERPL-MCNC: 5.4 G/DL — LOW (ref 6–8.3)
PROT SERPL-MCNC: 5.5 G/DL — LOW (ref 6–8.3)
PROT/CREAT UR-RTO: 1 RATIO — HIGH (ref 0–0.2)
PROTHROM AB SERPL-ACNC: 15.7 SEC — HIGH (ref 9.8–12.7)
RBC # BLD: 3 M/UL — LOW (ref 3.8–5.2)
RBC # FLD: 15.4 % — HIGH (ref 10.3–14.5)
RBC BLD AUTO: ABNORMAL
SAO2 % BLDA: 96 % — SIGNIFICANT CHANGE UP (ref 92–96)
SODIUM SERPL-SCNC: 147 MMOL/L — HIGH (ref 135–145)
SODIUM SERPL-SCNC: 149 MMOL/L — HIGH (ref 135–145)
SODIUM UR-SCNC: <20 MMOL/L — SIGNIFICANT CHANGE UP
TIBC SERPL-MCNC: 136 UG/DL — LOW (ref 220–430)
TOXIC GRANULES BLD QL SMEAR: PRESENT — SIGNIFICANT CHANGE UP
UIBC SERPL-MCNC: 65 UG/DL — LOW (ref 110–370)
UUN UR-MCNC: 438 MG/DL — SIGNIFICANT CHANGE UP
WBC # BLD: 33.11 K/UL — HIGH (ref 3.8–10.5)
WBC # FLD AUTO: 33.11 K/UL — HIGH (ref 3.8–10.5)

## 2018-10-27 PROCEDURE — 99232 SBSQ HOSP IP/OBS MODERATE 35: CPT

## 2018-10-27 PROCEDURE — 99291 CRITICAL CARE FIRST HOUR: CPT

## 2018-10-27 PROCEDURE — 99233 SBSQ HOSP IP/OBS HIGH 50: CPT | Mod: GC

## 2018-10-27 RX ORDER — POTASSIUM CHLORIDE 20 MEQ
40 PACKET (EA) ORAL ONCE
Qty: 0 | Refills: 0 | Status: COMPLETED | OUTPATIENT
Start: 2018-10-27 | End: 2018-10-28

## 2018-10-27 RX ORDER — VANCOMYCIN HCL 1 G
125 VIAL (EA) INTRAVENOUS EVERY 6 HOURS
Qty: 0 | Refills: 0 | Status: DISCONTINUED | OUTPATIENT
Start: 2018-10-27 | End: 2018-10-29

## 2018-10-27 RX ORDER — POTASSIUM CHLORIDE 20 MEQ
40 PACKET (EA) ORAL ONCE
Qty: 0 | Refills: 0 | Status: DISCONTINUED | OUTPATIENT
Start: 2018-10-27 | End: 2018-10-27

## 2018-10-27 RX ORDER — SODIUM CHLORIDE 9 MG/ML
1000 INJECTION, SOLUTION INTRAVENOUS
Qty: 0 | Refills: 0 | Status: DISCONTINUED | OUTPATIENT
Start: 2018-10-27 | End: 2018-10-27

## 2018-10-27 RX ORDER — FLUCONAZOLE 150 MG/1
100 TABLET ORAL EVERY 24 HOURS
Qty: 0 | Refills: 0 | Status: DISCONTINUED | OUTPATIENT
Start: 2018-10-27 | End: 2018-11-01

## 2018-10-27 RX ORDER — LACTULOSE 10 G/15ML
200 SOLUTION ORAL ONCE
Qty: 0 | Refills: 0 | Status: COMPLETED | OUTPATIENT
Start: 2018-10-27 | End: 2018-10-27

## 2018-10-27 RX ORDER — POTASSIUM CHLORIDE 20 MEQ
10 PACKET (EA) ORAL
Qty: 0 | Refills: 0 | Status: COMPLETED | OUTPATIENT
Start: 2018-10-27 | End: 2018-10-27

## 2018-10-27 RX ORDER — SODIUM BICARBONATE 1 MEQ/ML
0.21 SYRINGE (ML) INTRAVENOUS
Qty: 150 | Refills: 0 | Status: DISCONTINUED | OUTPATIENT
Start: 2018-10-27 | End: 2018-10-28

## 2018-10-27 RX ORDER — SODIUM BICARBONATE 1 MEQ/ML
50 SYRINGE (ML) INTRAVENOUS ONCE
Qty: 0 | Refills: 0 | Status: COMPLETED | OUTPATIENT
Start: 2018-10-27 | End: 2018-10-27

## 2018-10-27 RX ORDER — SODIUM CHLORIDE 9 MG/ML
1000 INJECTION, SOLUTION INTRAVENOUS ONCE
Qty: 0 | Refills: 0 | Status: COMPLETED | OUTPATIENT
Start: 2018-10-27 | End: 2018-10-27

## 2018-10-27 RX ORDER — ALBUMIN HUMAN 25 %
50 VIAL (ML) INTRAVENOUS EVERY 6 HOURS
Qty: 0 | Refills: 0 | Status: COMPLETED | OUTPATIENT
Start: 2018-10-27 | End: 2018-10-29

## 2018-10-27 RX ORDER — LACTULOSE 10 G/15ML
60 SOLUTION ORAL
Qty: 0 | Refills: 0 | Status: DISCONTINUED | OUTPATIENT
Start: 2018-10-27 | End: 2018-10-28

## 2018-10-27 RX ORDER — MIDODRINE HYDROCHLORIDE 2.5 MG/1
15 TABLET ORAL EVERY 8 HOURS
Qty: 0 | Refills: 0 | Status: DISCONTINUED | OUTPATIENT
Start: 2018-10-27 | End: 2018-10-28

## 2018-10-27 RX ORDER — OCTREOTIDE ACETATE 200 UG/ML
100 INJECTION, SOLUTION INTRAVENOUS; SUBCUTANEOUS EVERY 8 HOURS
Qty: 0 | Refills: 0 | Status: DISCONTINUED | OUTPATIENT
Start: 2018-10-27 | End: 2018-11-01

## 2018-10-27 RX ORDER — METOCLOPRAMIDE HCL 10 MG
5 TABLET ORAL EVERY 8 HOURS
Qty: 0 | Refills: 0 | Status: DISCONTINUED | OUTPATIENT
Start: 2018-10-27 | End: 2018-11-01

## 2018-10-27 RX ADMIN — SODIUM CHLORIDE 75 MILLILITER(S): 9 INJECTION, SOLUTION INTRAVENOUS at 12:39

## 2018-10-27 RX ADMIN — Medication 325 MILLIGRAM(S): at 12:40

## 2018-10-27 RX ADMIN — HEPARIN SODIUM 5000 UNIT(S): 5000 INJECTION INTRAVENOUS; SUBCUTANEOUS at 18:19

## 2018-10-27 RX ADMIN — LACTULOSE 45 GRAM(S): 10 SOLUTION ORAL at 00:00

## 2018-10-27 RX ADMIN — OCTREOTIDE ACETATE 100 MICROGRAM(S): 200 INJECTION, SOLUTION INTRAVENOUS; SUBCUTANEOUS at 22:12

## 2018-10-27 RX ADMIN — Medication 50 MILLIEQUIVALENT(S): at 18:43

## 2018-10-27 RX ADMIN — MIDODRINE HYDROCHLORIDE 15 MILLIGRAM(S): 2.5 TABLET ORAL at 19:31

## 2018-10-27 RX ADMIN — Medication 100 MILLIEQUIVALENT(S): at 23:46

## 2018-10-27 RX ADMIN — LACTULOSE 45 GRAM(S): 10 SOLUTION ORAL at 05:49

## 2018-10-27 RX ADMIN — Medication 3 MILLILITER(S): at 07:48

## 2018-10-27 RX ADMIN — SODIUM CHLORIDE 75 MILLILITER(S): 9 INJECTION, SOLUTION INTRAVENOUS at 16:04

## 2018-10-27 RX ADMIN — Medication 100 MILLIEQUIVALENT(S): at 13:35

## 2018-10-27 RX ADMIN — LACTULOSE 60 GRAM(S): 10 SOLUTION ORAL at 18:20

## 2018-10-27 RX ADMIN — Medication 100 MILLIEQUIVALENT(S): at 21:33

## 2018-10-27 RX ADMIN — HEPARIN SODIUM 5000 UNIT(S): 5000 INJECTION INTRAVENOUS; SUBCUTANEOUS at 05:48

## 2018-10-27 RX ADMIN — LACTULOSE 45 GRAM(S): 10 SOLUTION ORAL at 12:39

## 2018-10-27 RX ADMIN — Medication 125 MILLIGRAM(S): at 23:21

## 2018-10-27 RX ADMIN — Medication 100 MILLIEQUIVALENT(S): at 21:34

## 2018-10-27 RX ADMIN — Medication 100 MEQ/KG/HR: at 19:09

## 2018-10-27 RX ADMIN — PIPERACILLIN AND TAZOBACTAM 25 GRAM(S): 4; .5 INJECTION, POWDER, LYOPHILIZED, FOR SOLUTION INTRAVENOUS at 07:23

## 2018-10-27 RX ADMIN — Medication 125 MILLIGRAM(S): at 18:21

## 2018-10-27 RX ADMIN — FLUCONAZOLE 100 MILLIGRAM(S): 150 TABLET ORAL at 14:51

## 2018-10-27 RX ADMIN — SODIUM CHLORIDE 2000 MILLILITER(S): 9 INJECTION, SOLUTION INTRAVENOUS at 18:20

## 2018-10-27 RX ADMIN — Medication 3 MILLILITER(S): at 15:17

## 2018-10-27 RX ADMIN — LACTULOSE 60 GRAM(S): 10 SOLUTION ORAL at 14:51

## 2018-10-27 RX ADMIN — Medication 600 MILLIGRAM(S): at 18:19

## 2018-10-27 RX ADMIN — Medication 3 MILLILITER(S): at 21:10

## 2018-10-27 RX ADMIN — Medication 5 MILLIGRAM(S): at 13:35

## 2018-10-27 RX ADMIN — Medication 100 MILLIEQUIVALENT(S): at 16:55

## 2018-10-27 RX ADMIN — PIPERACILLIN AND TAZOBACTAM 25 GRAM(S): 4; .5 INJECTION, POWDER, LYOPHILIZED, FOR SOLUTION INTRAVENOUS at 18:21

## 2018-10-27 RX ADMIN — Medication 5 MILLIGRAM(S): at 22:11

## 2018-10-27 RX ADMIN — Medication 3 MILLILITER(S): at 03:02

## 2018-10-27 RX ADMIN — LACTULOSE 200 GRAM(S): 10 SOLUTION ORAL at 15:25

## 2018-10-27 NOTE — PROGRESS NOTE ADULT - ASSESSMENT
Mrs. Ha is a 89 yo F w/ PMH of DM type 2, HTN, COPD on 2.5L home O2, diastolic HFpEF (last echo 10/2017) presented to ED initially presenting AMS, weakness, dysuria, decreased po intake x past ~ 3 days, now with significant metabolic/hepatic encephalopathy.  Called to evaluate for cardiac clearance prior to endoscopic PEG placement.  s/p NGT placement, tolerated well.    - Unable to evaluate exercise tolerance or functional status  - LV function normal as of 10/2017  - EKG with SR, LAD and poor r wave progression, unchanged from 2016.  - Rare NSVT on telemetry.  - No sign of volume overload  - Hold Lasix in the setting of SAMIA and dehydration  - Given her breathing and overall clinical status, she is very high risk for any procedure.   - Family aware that she is a high risk for all procedures and that fact that she is critically ill.  - will follow with you

## 2018-10-27 NOTE — CONSULT NOTE ADULT - SUBJECTIVE AND OBJECTIVE BOX
Patient is a 89y old  Female who presents with a chief complaint of weakness, decreased PO intake (27 Oct 2018 19:01)    HPI:  89 yo F with Hx COPD, chronic hypoxemic respiratory failure, dCHF, DM2 admitted 10/22 with AMS and diarrhea, suspected to have UTI.  Over the course of her admission she has developed worsening SAMIA and worsening leukocytosis, worsening mental status, and rising NH3.  She has been on empiric Abx with vanc, CTX, zosyn, and fluconazole, so far Cx revealing only candiduria.  Yesterday NGT placed on started on lactulose and rifaximin.        Allergies: No Known Allergies    PAST MEDICAL & SURGICAL HISTORY:  Coronary artery disease   Congestive heart failure  Asthma/COPD  Diabetes mellitus  HTN (hypertension)  No significant past surgical history      Review of Systems: UTO    T(F): 97.4 (10-27-18 @ 13:41), Max: 98.1 (10-27-18 @ 05:06)  HR: 85 (10-27-18 @ 19:49) (80 - 90)  BP: 90/54 (10-27-18 @ 19:49) (90/54 - 123/56)  RR: 20 (10-27-18 @ 13:41) (20 - 20)  SpO2: 94% (10-27-18 @ 15:18)  Wt(kg): --    CAPILLARY BLOOD GLUCOSE      POCT Blood Glucose.: 186 mg/dL (27 Oct 2018 17:13)          Physical Exam:   BP 79/40s  Gen: frail elderly, chronically ill  Neuro: unresponse to voice or touch, does now withdraw to painful stimuli   HEENT: PERRL  CVS: RRR  Resp: rhonchi b/l  Abd: soft, NTND  Ext: anasarca    Meds:  fluconAZOLE IVPB 100 milliGRAM(s) IV Intermittent every 24 hours  piperacillin/tazobactam IVPB. 3.375 Gram(s) IV Intermittent every 12 hours  rifaximin 550 milliGRAM(s) Oral two times a day  vancomycin    Solution 125 milliGRAM(s) Oral every 6 hours    midodrine 15 milliGRAM(s) Oral every 8 hours     dextrose 40% Gel 15 Gram(s) Oral once PRN  dextrose 50% Injectable 12.5 Gram(s) IV Push once  dextrose 50% Injectable 25 Gram(s) IV Push once  dextrose 50% Injectable 25 Gram(s) IV Push once  glucagon  Injectable 1 milliGRAM(s) IntraMuscular once PRN  octreotide  Injectable 100 MICROGram(s) SubCutaneous every 8 hours     ALBUTerol/ipratropium for Nebulization 3 milliLiter(s) Nebulizer every 6 hours  guaiFENesin  milliGRAM(s) Oral every 12 hours     metoclopramide Injectable 5 milliGRAM(s) IV Push every 8 hours        heparin  Injectable 5000 Unit(s) SubCutaneous every 12 hours     lactulose Retention Enema 200 Gram(s) Rectal once  lactulose Syrup 60 Gram(s) Oral four times a day  pantoprazole    Tablet 40 milliGRAM(s) Oral before breakfast        albumin human 25% IVPB 50 milliLiter(s) IV Intermittent every 6 hours  dextrose 5%. 1000 milliLiter(s) IV Continuous <Continuous>  ferrous    sulfate 325 milliGRAM(s) Oral daily  potassium chloride   Powder 40 milliEquivalent(s) Oral once  potassium chloride  10 mEq/100 mL IVPB 10 milliEquivalent(s) IV Intermittent every 1 hour  sodium bicarbonate  Infusion 0.214 mEq/kG/Hr IV Continuous <Continuous>                                    8.5    33.11 )-----------( 137      ( 27 Oct 2018 10:21 )             26.9     Bands 5.0    10-27    149<H>  |  119<H>  |  69<H>  ----------------------------<  178<H>  3.0<L>   |  20<L>  |  3.70<H>    Ca    8.0<L>      27 Oct 2018 17:12    TPro  5.5<L>  /  Alb  1.4<L>  /  TBili  0.4  /  DBili  .20  /  AST  21  /  ALT  20  /  AlkPhos  111  10-27    Lactate 2.9           10-27 @ 19:12          PT/INR - ( 27 Oct 2018 10:21 )   PT: 15.7 sec;   INR: 1.43 ratio         PTT - ( 27 Oct 2018 10:21 )  PTT:38.3 sec    .Stool Feces   GI PCR Results: NOT detected  *******Please Note:*******  GI panel PCR evaluates for:  Campylobacter, Plesiomonas shigelloides, Salmonella,  Vibrio, Yersinia enterocolitica, Enteroaggregative  Escherichia coli (EAEC), Enteropathogenic E.coli (EPEC),  Enterotoxigenic E. coli (ETEC) lt/st, Shiga-like  toxin-producing E. coli (STEC) stx1/stx2,  Shigella/ Enteroinvasive E. coli (EIEC), Cryptosporidium,  Cyclospora cayetanensis, Entamoeba histolytica,  Giardia lamblia, Adenovirus F 40/41, Astrovirus,  Norovirus GI/GII, Rotavirus A, Sapovirus -- 10-26 @ 21:55  .Blood Blood-Venous   No growth to date. -- 10-24 @ 15:35  .Blood Blood-Peripheral   No growth to date.   Growth in anaerobic bottle: Gram Positive Cocci in Clusters 10-23 @ 09:17  .Urine Catheterized   10,000 - 49,000 CFU/mL Presumptive Candida albicans -- 10-23 @ 09:11        ABG - ( 27 Oct 2018 17:32 )  pH, Arterial: 7.10  pH, Blood: x     /  pCO2: 56    /  pO2: 108   / HCO3: 15    / Base Excess: -11.5 /  SaO2: 96            Radiology:   < from: Xray Chest 1 View- PORTABLE-Urgent (10.26.18 @ 17:35) >  IMPRESSION: Interval adjustment of the NG tube with the NG tube tip now   in the distal stomach.    Unchanged small left-sided pleural effusion.    < end of copied text >    < from: CT Abdomen and Pelvis No Cont (10.26.18 @ 08:28) >  Impression:    Cirrhotic morphology.    Moderate volume of abdominal pelvic ascites.    Bowel wall thickening and mesenteric stranding, nonspecific may be   associated with cirrhosis/hypoproteinemia.    Distended, liquid feces filled rectum as discussed, cannot exclude   underlying mass, recommend further clinical correlation.    Other findings as discussed above.    < end of copied text >

## 2018-10-27 NOTE — PROGRESS NOTE ADULT - SUBJECTIVE AND OBJECTIVE BOX
[facility] - John Tariq Grossman, [place], [place], [place]  Office Number:  [phone]    Unable to obtain interval history, not response  s/p endoscopic NGT placement    ROS: negative unless otherwise mentioned.    Telemetry:  SR, rare NSVT    MEDICATIONS  (STANDING):  ALBUTerol/ipratropium for Nebulization 3 milliLiter(s) Nebulizer every 6 hours  dextrose 5%. 1000 milliLiter(s) (50 mL/Hr) IV Continuous <Continuous>  dextrose 50% Injectable 12.5 Gram(s) IV Push once  dextrose 50% Injectable 25 Gram(s) IV Push once  dextrose 50% Injectable 25 Gram(s) IV Push once  ferrous    sulfate 325 milliGRAM(s) Oral daily  fluconAZOLE IVPB 100 milliGRAM(s) IV Intermittent every 24 hours  guaiFENesin  milliGRAM(s) Oral every 12 hours  heparin  Injectable 5000 Unit(s) SubCutaneous every 12 hours  lactated ringers. 1000 milliLiter(s) (75 mL/Hr) IV Continuous <Continuous>  lactulose Retention Enema 200 Gram(s) Rectal once  lactulose Retention Enema 200 Gram(s) Rectal once  lactulose Syrup 60 Gram(s) Oral four times a day  metoclopramide Injectable 5 milliGRAM(s) IV Push every 8 hours  pantoprazole    Tablet 40 milliGRAM(s) Oral before breakfast  piperacillin/tazobactam IVPB. 3.375 Gram(s) IV Intermittent every 12 hours  potassium chloride  10 mEq/100 mL IVPB 10 milliEquivalent(s) IV Intermittent every 2 hours  rifaximin 550 milliGRAM(s) Oral two times a day    MEDICATIONS  (PRN):  dextrose 40% Gel 15 Gram(s) Oral once PRN Blood Glucose LESS THAN 70 milliGRAM(s)/deciliter  glucagon  Injectable 1 milliGRAM(s) IntraMuscular once PRN Glucose LESS THAN 70 milligrams/deciliter      Allergies    No Known Allergies    Intolerances        Vital Signs Last 24 Hrs  T(C): 36.3 (27 Oct 2018 13:41), Max: 36.7 (26 Oct 2018 14:47)  T(F): 97.4 (27 Oct 2018 13:41), Max: 98.1 (26 Oct 2018 14:47)  HR: 88 (27 Oct 2018 13:41) (81 - 95)  BP: 123/56 (27 Oct 2018 13:41) (93/52 - 146/50)  BP(mean): --  RR: 20 (27 Oct 2018 13:41) (20 - 24)  SpO2: 90% (27 Oct 2018 13:41) (90% - 100%)    I&O's Summary      ON EXAM:    Constitutional: bedbound, not responsive  Lungs:  Non-labored, breath sounds are audible, + mild wheezing in all lung fields  Cardiovascular: RRR.  S1 and S2 positive.  No murmurs, rubs, gallops or clicks  Gastrointestinal: Bowel Sounds present, soft, nontender.   Lymph: No peripheral edema.   Neurological: lethargic, unable to assess  Psych:  lethargic, unable to assess    LABS: All Labs Reviewed:                        8.5    33.11 )-----------( 137      ( 27 Oct 2018 10:21 )             26.9                         9.0    25.04 )-----------( 132      ( 26 Oct 2018 09:14 )             28.3                         8.8    20.00 )-----------( 150      ( 25 Oct 2018 08:53 )             27.6     27 Oct 2018 10:21    147    |  118    |  71     ----------------------------<  181    3.0     |  18     |  3.50   26 Oct 2018 09:14    146    |  117    |  66     ----------------------------<  146    3.7     |  20     |  2.50   25 Oct 2018 08:53    147    |  120    |  62     ----------------------------<  158    4.0     |  16     |  2.40     Ca    7.7        27 Oct 2018 10:21  Ca    7.9        26 Oct 2018 09:14  Ca    7.6        25 Oct 2018 08:53    TPro  5.5    /  Alb  1.4    /  TBili  0.4    /  DBili  .20    /  AST  21     /  ALT  20     /  AlkPhos  111    27 Oct 2018 10:21  TPro  5.4    /  Alb  x      /  TBili  x      /  DBili  x      /  AST  x      /  ALT  x      /  AlkPhos  x      26 Oct 2018 19:09    PT/INR - ( 27 Oct 2018 10:21 )   PT: 15.7 sec;   INR: 1.43 ratio         PTT - ( 27 Oct 2018 10:21 )  PTT:38.3 sec      Blood Culture: Organism --  Gram Stain Blood -- Gram Stain --  Specimen Source .Stool Feces  Culture-Blood --    Organism --  Gram Stain Blood -- Gram Stain --  Specimen Source .Blood Blood-Venous  Culture-Blood --    Organism Blood Culture PCR  Gram Stain Blood -- Gram Stain   Growth in anaerobic bottle: Gram Positive Cocci in Clusters  Specimen Source .Blood Blood-Peripheral  Culture-Blood --    Organism --  Gram Stain Blood -- Gram Stain --  Specimen Source .Urine Catheterized  Culture-Blood --        10-25 @ 08:53  TSH: 0.06

## 2018-10-27 NOTE — PROGRESS NOTE ADULT - PROBLEM SELECTOR PLAN 2
2/2 poor PO intake  -Hold Lasix , stable strict is/ os   -Continue gentle IV fluids-with bicarb by Renal, metabolic acidosis.  -Continue to monitor BMP.  -Hypernatremia--likely due to poor po intake--continue IVF.

## 2018-10-27 NOTE — PROGRESS NOTE ADULT - SUBJECTIVE AND OBJECTIVE BOX
Patient is a 89y old  Female who presents with a chief complaint of weakness, decreased PO intake (27 Oct 2018 13:51)      HPI:  Hx obtained from son and other family members as pt was altered.  Pt is a 88 yo F w/ PMH of DM type 2, HTN, COPD on 2.5L home O2, diastolic HFpEF (last echo 10/2017) presented to ED p/w ams, weakness, dysuria, dec po x past ~ 3 days. Of note Pt was recently admitted for ams, similar to current presentation and was d/c 6 days ago. On previous admission Pt was diagnosed and treated for UTI. Pt was initially doing well, but then developed burning with urination, AMS, and watery diarrhea x 3 days ago. Family reports that pt was given Bactrim for unresolved UTI, but that she was only able to take 2 doses, as she could not swallow the 3rd dose this AM. Pt has poor PO intake and EMS came to her house last night to give her IVF due to dehydration. Of note patient is normally bedbound and requires 24/7 home health aid. Pt is incontinent at baseline and wears diapers. Denies any Fevers, but endorses chills, and increased fatigue. Denies any SOB, CP, N/V. Family reports pt normally is able to converse without issue, but has been incoherent for ~ the past 3 days.     weakness, dysuria, decreased po intake x past ~ 3 days. admitted with metabolic encephalopathy multifactorial ak/atn on ckd3 , cirrhosis high ammonia   .   pt seen and examine today -pt with ng tube , mild resp distress , mendez placed for urinary retation , no fever , npo hold tube feed , loose stool .        INTERVAL HPI/OVERNIGHT EVENTS:  T(C): 36.3 (10-27-18 @ 13:41), Max: 36.7 (10-26-18 @ 14:47)  HR: 88 (10-27-18 @ 13:41) (81 - 95)  BP: 123/56 (10-27-18 @ 13:41) (93/52 - 146/50)  RR: 20 (10-27-18 @ 13:41) (20 - 24)  SpO2: 90% (10-27-18 @ 13:41) (90% - 100%)  Wt(kg): --  I&O's Summary      REVIEW OF SYSTEMS:   unable  to obtain   as encephalopathic     PHYSICAL EXAM:  GENERAL: resp  distress , lethargic   HEAD:  Atraumatic, Normocephalic  EYES: EOMI, PERRLA, conjunctiva and sclera clear  ENMT:  dry  mucous membranes,  No lesions  NECK: Supple,   NERVOUS SYSTEM:  Alert & Oriented X0,   CHEST/LUNG: percussion bilaterally rt side side coarse  ; No rales, rhonchi, wheezing,   HEART: Regular rate and rhythm; No murmurs, no tachy   ABDOMEN: Soft,  Nondistended; Bowel sounds present / no tenderness   EXTREMITIES:  2+ Peripheral Pulses, No clubbing, cyanosis,  1 pulse pitting   edema    SKIN: No rashes or lesions/stage 3buttock     MEDICATIONS  (STANDING):  ALBUTerol/ipratropium for Nebulization 3 milliLiter(s) Nebulizer every 6 hours  dextrose 5%. 1000 milliLiter(s) (50 mL/Hr) IV Continuous <Continuous>  dextrose 50% Injectable 12.5 Gram(s) IV Push once  dextrose 50% Injectable 25 Gram(s) IV Push once  dextrose 50% Injectable 25 Gram(s) IV Push once  ferrous    sulfate 325 milliGRAM(s) Oral daily  fluconAZOLE IVPB 100 milliGRAM(s) IV Intermittent every 24 hours  guaiFENesin  milliGRAM(s) Oral every 12 hours  heparin  Injectable 5000 Unit(s) SubCutaneous every 12 hours  lactated ringers. 1000 milliLiter(s) (75 mL/Hr) IV Continuous <Continuous>  lactulose Retention Enema 200 Gram(s) Rectal once  lactulose Retention Enema 200 Gram(s) Rectal once  lactulose Syrup 60 Gram(s) Oral four times a day  metoclopramide Injectable 5 milliGRAM(s) IV Push every 8 hours  pantoprazole    Tablet 40 milliGRAM(s) Oral before breakfast  piperacillin/tazobactam IVPB. 3.375 Gram(s) IV Intermittent every 12 hours  potassium chloride  10 mEq/100 mL IVPB 10 milliEquivalent(s) IV Intermittent every 2 hours  rifaximin 550 milliGRAM(s) Oral two times a day    MEDICATIONS  (PRN):  dextrose 40% Gel 15 Gram(s) Oral once PRN Blood Glucose LESS THAN 70 milliGRAM(s)/deciliter  glucagon  Injectable 1 milliGRAM(s) IntraMuscular once PRN Glucose LESS THAN 70 milligrams/deciliter      LABS:                        8.5    33.11 )-----------( 137      ( 27 Oct 2018 10:21 )             26.9     10-27    147<H>  |  118<H>  |  71<H>  ----------------------------<  181<H>  3.0<L>   |  18<L>  |  3.50<H>    Ca    7.7<L>      27 Oct 2018 10:21    TPro  5.5<L>  /  Alb  1.4<L>  /  TBili  0.4  /  DBili  .20  /  AST  21  /  ALT  20  /  AlkPhos  111  10-27    PT/INR - ( 27 Oct 2018 10:21 )   PT: 15.7 sec;   INR: 1.43 ratio         PTT - ( 27 Oct 2018 10:21 )  PTT:38.3 sec          POCT Blood Glucose.: 208 mg/dL (27 Oct 2018 11:46)  POCT Blood Glucose.: 196 mg/dL (27 Oct 2018 07:56)  POCT Blood Glucose.: 151 mg/dL (26 Oct 2018 17:50)      10-26 @ 21:55   GI PCR Results: NOT detected  *******Please Note:*******  GI panel PCR evaluates for:  Campylobacter, Plesiomonas shigelloides, Salmonella,  Vibrio, Yersinia enterocolitica, Enteroaggregative  Escherichia coli (EAEC), Enteropathogenic E.coli (EPEC),  Enterotoxigenic E. coli (ETEC) lt/st, Shiga-like  toxin-producing E. coli (STEC) stx1/stx2,  Shigella/ Enteroinvasive E. coli (EIEC), Cryptosporidium,  Cyclospora cayetanensis, Entamoeba histolytica,  Giardia lamblia, Adenovirus F 40/41, Astrovirus,  Norovirus GI/GII, Rotavirus A, Sapovirus  --  --  10-24 @ 15:35   No growth to date.  --  --          RADIOLOGY & ADDITIONAL TESTS:    Imaging Personally Reviewed:     no new xray   Advance Directives:  dnr/dni     Palliative Care: care appropriate Patient is a 89y old  Female who presents with a chief complaint of weakness, decreased PO intake (27 Oct 2018 13:51)      HPI:  Hx obtained from son and other family members as pt was altered.  Pt is a 89 yo F w/ PMH of DM type 2, HTN, COPD on 2.5L home O2, diastolic HFpEF (last echo 10/2017) presented to ED p/w ams, weakness, dysuria, dec po x past ~ 3 days. Of note Pt was recently admitted for ams, similar to current presentation and was d/c 6 days ago. On previous admission Pt was diagnosed and treated for UTI. Pt was initially doing well, but then developed burning with urination, AMS, and watery diarrhea x 3 days ago. Family reports that pt was given Bactrim for unresolved UTI, but that she was only able to take 2 doses, as she could not swallow the 3rd dose this AM. Pt has poor PO intake and EMS came to her house last night to give her IVF due to dehydration. Of note patient is normally bedbound and requires 24/7 home health aid. Pt is incontinent at baseline and wears diapers. Denies any Fevers, but endorses chills, and increased fatigue. Denies any SOB, CP, N/V. Family reports pt normally is able to converse without issue, but has been incoherent for ~ the past 3 days.     weakness, dysuria, decreased po intake x past ~ 3 days. admitted with metabolic acidosis  metabolic encephalopathy multifactorial ak/atn on ckd3 , cirrhosis high ammonia   .   pt seen and examine today -pt with ng tube , mild resp distress , mendez placed for urinary retation , no fever , npo hold tube feed , loose stool .        INTERVAL HPI/OVERNIGHT EVENTS:  T(C): 36.3 (10-27-18 @ 13:41), Max: 36.7 (10-26-18 @ 14:47)  HR: 88 (10-27-18 @ 13:41) (81 - 95)  BP: 123/56 (10-27-18 @ 13:41) (93/52 - 146/50)  RR: 20 (10-27-18 @ 13:41) (20 - 24)  SpO2: 90% (10-27-18 @ 13:41) (90% - 100%)  Wt(kg): --  I&O's Summary      REVIEW OF SYSTEMS:   unable  to obtain   as encephalopathic     PHYSICAL EXAM:  GENERAL: resp  distress , lethargic   HEAD:  Atraumatic, Normocephalic  EYES: EOMI, PERRLA, conjunctiva and sclera clear  ENMT:  dry  mucous membranes,  No lesions  NECK: Supple,   NERVOUS SYSTEM:  Alert & Oriented X0,   CHEST/LUNG: percussion bilaterally rt side side coarse  ; No rales, rhonchi, wheezing,   HEART: Regular rate and rhythm; No murmurs, no tachy   ABDOMEN: Soft,  Nondistended; Bowel sounds present / no tenderness   EXTREMITIES:  2+ Peripheral Pulses, No clubbing, cyanosis,  1 pulse pitting   edema bl low ext     SKIN: No rashes or lesions/stage 3buttock     MEDICATIONS  (STANDING):  ALBUTerol/ipratropium for Nebulization 3 milliLiter(s) Nebulizer every 6 hours  dextrose 5%. 1000 milliLiter(s) (50 mL/Hr) IV Continuous <Continuous>  dextrose 50% Injectable 12.5 Gram(s) IV Push once  dextrose 50% Injectable 25 Gram(s) IV Push once  dextrose 50% Injectable 25 Gram(s) IV Push once  ferrous    sulfate 325 milliGRAM(s) Oral daily  fluconAZOLE IVPB 100 milliGRAM(s) IV Intermittent every 24 hours  guaiFENesin  milliGRAM(s) Oral every 12 hours  heparin  Injectable 5000 Unit(s) SubCutaneous every 12 hours  lactated ringers. 1000 milliLiter(s) (75 mL/Hr) IV Continuous <Continuous>  lactulose Retention Enema 200 Gram(s) Rectal once  lactulose Retention Enema 200 Gram(s) Rectal once  lactulose Syrup 60 Gram(s) Oral four times a day  metoclopramide Injectable 5 milliGRAM(s) IV Push every 8 hours  pantoprazole    Tablet 40 milliGRAM(s) Oral before breakfast  piperacillin/tazobactam IVPB. 3.375 Gram(s) IV Intermittent every 12 hours  potassium chloride  10 mEq/100 mL IVPB 10 milliEquivalent(s) IV Intermittent every 2 hours  rifaximin 550 milliGRAM(s) Oral two times a day    MEDICATIONS  (PRN):  dextrose 40% Gel 15 Gram(s) Oral once PRN Blood Glucose LESS THAN 70 milliGRAM(s)/deciliter  glucagon  Injectable 1 milliGRAM(s) IntraMuscular once PRN Glucose LESS THAN 70 milligrams/deciliter      LABS:                        8.5    33.11 )-----------( 137      ( 27 Oct 2018 10:21 )             26.9     10-27    147<H>  |  118<H>  |  71<H>  ----------------------------<  181<H>  3.0<L>   |  18<L>  |  3.50<H>    Ca    7.7<L>      27 Oct 2018 10:21    TPro  5.5<L>  /  Alb  1.4<L>  /  TBili  0.4  /  DBili  .20  /  AST  21  /  ALT  20  /  AlkPhos  111  10-27    PT/INR - ( 27 Oct 2018 10:21 )   PT: 15.7 sec;   INR: 1.43 ratio         PTT - ( 27 Oct 2018 10:21 )  PTT:38.3 sec          POCT Blood Glucose.: 208 mg/dL (27 Oct 2018 11:46)  POCT Blood Glucose.: 196 mg/dL (27 Oct 2018 07:56)  POCT Blood Glucose.: 151 mg/dL (26 Oct 2018 17:50)      10-26 @ 21:55   GI PCR Results: NOT detected  *******Please Note:*******  GI panel PCR evaluates for:  Campylobacter, Plesiomonas shigelloides, Salmonella,  Vibrio, Yersinia enterocolitica, Enteroaggregative  Escherichia coli (EAEC), Enteropathogenic E.coli (EPEC),  Enterotoxigenic E. coli (ETEC) lt/st, Shiga-like  toxin-producing E. coli (STEC) stx1/stx2,  Shigella/ Enteroinvasive E. coli (EIEC), Cryptosporidium,  Cyclospora cayetanensis, Entamoeba histolytica,  Giardia lamblia, Adenovirus F 40/41, Astrovirus,  Norovirus GI/GII, Rotavirus A, Sapovirus  --  --  10-24 @ 15:35   No growth to date.  --  --          RADIOLOGY & ADDITIONAL TESTS:    Imaging Personally Reviewed:     no new xray   Advance Directives:  dnr/dni     Palliative Care: care appropriate

## 2018-10-27 NOTE — PROGRESS NOTE ADULT - ASSESSMENT
Acute on CKD Stage 4  Sepsis/PNA/UTI  Hypernatremia  Dehydration  CHF/COPD   Metabolic Acidosis    -Rise in creatinine likely related to rise in WBC signifying underlying sepsis  -Renal indices appear to be reaching a plateau  -Continue with IVF: sodium bicarb 50meq in D5W @ 75cc/hr; Adding free water 200cc q6hrs; If creatinine starts to improve on repeat labs, will hold IVF  -Abx per ID  -Urine studies ordered; still pending  -Renal sono shows no hydronephrosis  -Daily chemistries  -No indication for RRT  -Pending PEG?    Thank you

## 2018-10-27 NOTE — PROGRESS NOTE ADULT - SUBJECTIVE AND OBJECTIVE BOX
Repeat labs reviewed.    Appears to be hepatorenal syndrome.    IV Bicarb drip  Midodrine  IV Albumin  Octreotide    Poor prognosis overall; No role for renal replacement therapy. Recommend conservative renal management.     Serial labs/ABG    D/W Dr. Aquino

## 2018-10-27 NOTE — CONSULT NOTE ADULT - PROVIDER SPECIALTY LIST ADULT
Cardiology
Critical Care
Gastroenterology
Heme/Onc
Infectious Disease
Pulmonology
Urology
Wound Care
Nephrology

## 2018-10-27 NOTE — PROGRESS NOTE ADULT - SUBJECTIVE AND OBJECTIVE BOX
The patient was  evaluated. Pt unresponsive   89y Female    Vital Signs Last 24 Hrs  T(C): 36.7 (27 Oct 2018 05:06), Max: 36.7 (26 Oct 2018 14:47)  T(F): 98.1 (27 Oct 2018 05:06), Max: 98.1 (26 Oct 2018 14:47)  HR: 81 (27 Oct 2018 05:06) (81 - 98)  BP: 93/52 (27 Oct 2018 05:06) (93/52 - 146/50)  BP(mean): --  RR: 20 (27 Oct 2018 05:06) (20 - 24)  SpO2: 95% (27 Oct 2018 05:06) (92% - 100%)    Pt seen, doing well, no anesthesia complications or complaints noted or reported.   No Nausea    No additional recommendations.     Pain well controlled

## 2018-10-27 NOTE — CONSULT NOTE ADULT - CONSULT REASON
Sacrum pressure ulcer
AMS rule out UTI
SAMIA, hepatic encephalopathy
ams  on chronic o2 at home  dysphagia  hiatal hernia
anemia
cardiac clearance
urinary incont
Renal failure
ascites

## 2018-10-27 NOTE — PROGRESS NOTE ADULT - ATTENDING COMMENTS
pt seen and examine see above -f DM type 2, HTN, COPD on 2.5L home O2, diastolic HFpEF (last echo 10/2017) presented to ED p/w AMS, weakness, dysuria, decreased po intake x past ~ 3 days. admitted with metabolic encephalopathy multifactorial ak/atn on ckd3  r/o hepatorenal syndrome  , metabolic acidosis , on d5 fluid bicarb / repeat bmp 5pm , urine electrolyte . chr resp failure hx copd - repeat abg today for check hypercarbia , neb tt . high ammonia / cirrhosis on lactulose via ng tube fu repeat ammonia tonight . leucocytosis sec to possible  aspiration pna on iv abx zosyne , blood cult neg , fu procalcitonin , hold tube feed . over all poor prognosis pt is dnr/dni , asked icu evaluation family aware . pt seen and examine see above -f DM type 2, HTN, COPD on 2.5L home O2, diastolic HFpEF (last echo 10/2017) presented to ED p/w AMS, weakness, dysuria, decreased po intake x past ~ 3 days. admitted with metabolic encephalopathy multifactorial ak/atn on ckd3  r/o hepatorenal syndrome  , metabolic acidosis , on d5 fluid bicarb / repeat bmp 5pm , urine electrolyte . chr resp failure hx copd - repeat abg today for check hypercarbia , neb tt . high ammonia / cirrhosis on lactulose via ng tube fu repeat ammonia tonight . leucocytosis sec to possible  aspiration pna on iv abx zosyne , blood cult neg , fu procalcitonin , hold tube feed . over all poor prognosis pt is dnr/dni , asked icu evaluation family aware . 4.35 pm  revaluated  pt repeat abg sever metabolic and resp acidosis  ,  prem / atn worsening  hypotensive shock now   liter of bolus  started bicarb drip by icu repeat electrolyte , pt have unknown etiology cirrhosis and ascites as per original ct abd / pelvis. very poor prognosis .

## 2018-10-27 NOTE — CONSULT NOTE ADULT - ATTENDING COMMENTS
88 yo F with Hx asthma/COPD, chronic hypoxemic respiratory failure, dCHF, DM2, also likely undiagnosed cirrhosis based on CT abd (nodular liver, ascites, high NH3) admitted with diarrhea and generalized complaints, now with progressive hepatic/uremic encephalopathy causing obtundation, SAMIA, and hypotension.  Likely has a component of volume depletion from diarrhea contributing to hypotension and SAMIA, but SAMIA may also be due to HRS.  Severe acidemia on ABG with mostly metabolic but also respiratory acidosis.    --hypotension, bolus LR ordered  trial of albumin  --hypercapnic respiratory failure  not a BiPAP candidate due to severe uremic/hepatic encephalopathy, and pt is DNR/DNI  --SAMIA, Sue consistent with prerenal v HRS  bolus IVF as above  empiric treatment for HRS with midodrine, albumin and octreotide  give 1 amp bicarb now and start bicarb gtt  hypokalemia, K repletion  --cirrhosis with severe HE  agree with lactulose and xifaximin  workup serologies pending  --unclear source of infection, and unclear cause of worsening leukocytosis  ?Cdiff though on numerous laxatives  --given pt's comorbidities and baseline frail state, with cirrhosis and worsening SAMIA with severe encephalopathy suspect that pt will likely not survive this acute illness, even if we escalate care with life support (ie vasopressors).  She is DNR/DNI.  I would recommend to continue the above measures but would not escalate care to vasopressors, central line, etc  --I discussed with her son MICHELLE Narayanan, and several grandchildren including a granddaughter and grandson who are both MDs.  I discussed that she is gravely ill and we will attempt the above measures but they may not work.  I also recommended that care not be escalated to life support measures.    --At this point we will continue the above care on the floor with remote tele.  Escalation of care to ICU with vasopressors, central line is unlikely to change the ultimate outcome.  Discussed in detail with Dr. Durán and pt's RN.  --pt critically ill. CC time 60min

## 2018-10-27 NOTE — PROGRESS NOTE ADULT - SUBJECTIVE AND OBJECTIVE BOX
INTERVAL HPI/OVERNIGHT EVENTS:  pt seen and examined earlier this am family present, overnight events noted  sp egd yesterday w ngt placement  pt lethargic in bed  per overnight rn, pt getting lactulose and having +bms, no blue dye noted upon suctioning, no vomiting  afebrile overnight labs pending  later chart note reviewed- pt w blue dye in mouth per nursing    MEDICATIONS  (STANDING):  ALBUTerol/ipratropium for Nebulization 3 milliLiter(s) Nebulizer every 6 hours  dextrose 5%. 1000 milliLiter(s) (50 mL/Hr) IV Continuous <Continuous>  dextrose 50% Injectable 12.5 Gram(s) IV Push once  dextrose 50% Injectable 25 Gram(s) IV Push once  dextrose 50% Injectable 25 Gram(s) IV Push once  ferrous    sulfate 325 milliGRAM(s) Oral daily  fluconAZOLE IVPB      fluconAZOLE IVPB 100 milliGRAM(s) IV Intermittent every 24 hours  guaiFENesin  milliGRAM(s) Oral every 12 hours  heparin  Injectable 5000 Unit(s) SubCutaneous every 12 hours  lactulose Syrup 45 Gram(s) Oral four times a day  pantoprazole    Tablet 40 milliGRAM(s) Oral before breakfast  piperacillin/tazobactam IVPB. 3.375 Gram(s) IV Intermittent every 12 hours  rifaximin 550 milliGRAM(s) Oral two times a day  sodium bicarbonate  Infusion 0.054 mEq/kG/Hr (75 mL/Hr) IV Continuous <Continuous>    MEDICATIONS  (PRN):  dextrose 40% Gel 15 Gram(s) Oral once PRN Blood Glucose LESS THAN 70 milliGRAM(s)/deciliter  glucagon  Injectable 1 milliGRAM(s) IntraMuscular once PRN Glucose LESS THAN 70 milligrams/deciliter      Allergies    No Known Allergies    Intolerances        Review of Systems:    unable to obtain    Vital Signs Last 24 Hrs  T(C): 36.7 (27 Oct 2018 05:06), Max: 36.7 (26 Oct 2018 14:47)  T(F): 98.1 (27 Oct 2018 05:06), Max: 98.1 (26 Oct 2018 14:47)  HR: 81 (27 Oct 2018 05:06) (81 - 98)  BP: 93/52 (27 Oct 2018 05:06) (93/52 - 146/50)  BP(mean): --  RR: 20 (27 Oct 2018 05:06) (20 - 24)  SpO2: 95% (27 Oct 2018 05:06) (92% - 100%)    PHYSICAL EXAM:    Constitutional: NAD, lying in bed lethargic +ngt  HEENT: ncat  Neck: No LAD  Respiratory: dyspneic   Cardiovascular: S1 and S2, RRR  Gastrointestinal: soft appears nt mild dt  Extremities: No peripheral edema  Vascular: 2+ peripheral pulses  Neurological: lethargic  Skin: No rashes    LABS:                        9.0    25.04 )-----------( 132      ( 26 Oct 2018 09:14 )             28.3     10-26    146<H>  |  117<H>  |  66<H>  ----------------------------<  146<H>  3.7   |  20<L>  |  2.50<H>    Ca    7.9<L>      26 Oct 2018 09:14    TPro  5.4<L>  /  Alb  x   /  TBili  x   /  DBili  x   /  AST  x   /  ALT  x   /  AlkPhos  x   10-26          RADIOLOGY & ADDITIONAL TESTS:

## 2018-10-27 NOTE — PROGRESS NOTE ADULT - SUBJECTIVE AND OBJECTIVE BOX
Progress Note:   · Provider Specialty	Neurology	    Neurology Follow up note    MALINDA [id number]    HPI:  Hx obtained from son and other family members as pt was altered.  Pt is a 88 yo F w/ PMH of DM type 2, HTN, COPD on 2.5L home O2, diastolic HFpEF (last echo 10/2017) presented to ED p/w ams, weakness, dysuria, dec po x past ~ 3 days. Of note Pt was recently admitted for ams, similar to current presentation and was d/c 6 days ago. On previous admission Pt was diagnosed and treated for UTI. Pt was initially doing well, but then developed burning with urination, AMS, and watery diarrhea x 3 days ago. Family reports that pt was given Bactrim for unresolved UTI, but that she was only able to take 2 doses, as she could not swallow the 3rd dose this AM. Pt has poor PO intake and EMS came to her house last night to give her IVF due to dehydration. Of note patient is normally bedbound and requires 24/7 home health aid. Pt is incontinent at baseline and wears diapers. Denies any Fevers, but endorses chills, and increased fatigue. Denies any SOB, CP, N/V. Family reports pt normally is able to converse without issue, but has been incoherent for ~ the past 3 days    In the ED, temp 97.1, HR 62, /75, RR 20, Spo2 99 on supplemental o2. WBC 11.46, Hg 9.5, hct 29.5, platelet 147, PT 13.5, INR 1.23, PTT 25.9, Na 148, K 4.3, Cl 120, Co2 21, BUN 64, Cr 2.00, glucose 110, calcium 8.0, alk phos 136, lactate 1.4, lipase 117. UA: small bilirubin, moderate leukocyte esterase, trace blood, moderate epithelial, bacteria few, hyaline 0-2, yeast present    Imaging:  CXR and CT head no cont ordered  EKG: NSR    In the ED, received zosyn 3.375 mg IV x 1, NaCl 0.9% 1000mL x 2, duoneb 3 mL x 1, (22 Oct 2018 23:52)      Interval History - no new events.    Patient is seen, chart was reviewed and case was discussed with the treatment team.  Pt is not in any distress.   Lying on bed comfortably.   No events reported overnight.   No clinical seizure was reported.  Sitting on chair bed comfortably.    is at bedside.    Vital Signs Last 24 Hrs  T(C): 36.3 (27 Oct 2018 13:41), Max: 36.7 (27 Oct 2018 05:06)  T(F): 97.4 (27 Oct 2018 13:41), Max: 98.1 (27 Oct 2018 05:06)  HR: 80 (27 Oct 2018 15:18) (80 - 95)  BP: 123/56 (27 Oct 2018 13:41) (93/52 - 146/50)  BP(mean): --  RR: 20 (27 Oct 2018 13:41) (20 - 24)  SpO2: 94% (27 Oct 2018 15:18) (90% - 98%)            On Neurological Examination:    Mental Status - Pt is unresponsive to verbal commands.  moaning and grimacing with pain,  not following commands.      Cranial Nerves - Pupils 3 mm equal and reactive to light, extraocular eye movements intact. Pt has no visual field deficit.  Pt has ?left facial asymmetry. Facial sensation is intact.Tongue - is in midline.    Muscle tone - normal    Motor Exam - UE 3/5 LE 1-2/5 ?LEFT WEAKER THAN RIGHT.    Sensory Exam - . Pt withdraws all extremities equally on stimulation.         coordination:    Finger to nose: normal      Deep tendon Reflexes - 2 plus all over.       Neck Supple -  Yes.     MEDICATIONS    ALBUTerol/ipratropium for Nebulization 3 milliLiter(s) Nebulizer every 6 hours  cefTRIAXone   IVPB 1 Gram(s) IV Intermittent every 24 hours  dextrose 40% Gel 15 Gram(s) Oral once PRN  dextrose 5%. 1000 milliLiter(s) IV Continuous <Continuous>  dextrose 5%. 1000 milliLiter(s) IV Continuous <Continuous>  dextrose 50% Injectable 12.5 Gram(s) IV Push once  dextrose 50% Injectable 25 Gram(s) IV Push once  dextrose 50% Injectable 25 Gram(s) IV Push once  ferrous    sulfate 325 milliGRAM(s) Oral daily  glucagon  Injectable 1 milliGRAM(s) IntraMuscular once PRN  guaiFENesin  milliGRAM(s) Oral every 12 hours  heparin  Injectable 5000 Unit(s) SubCutaneous every 12 hours  lactated ringers. 1000 milliLiter(s) IV Continuous <Continuous>  nystatin Cream 1 Application(s) Topical two times a day  pantoprazole    Tablet 40 milliGRAM(s) Oral before breakfast      Allergies    No Known Allergies    Intolerances                  Vitamin B12     RADIOLOGY  < from: CT Head No Cont (10.24.18 @ 12:04) >    EXAM:  CT BRAIN                            PROCEDURE DATE:  10/24/2018          INTERPRETATION:  History: Altered mental status.    Noncontrast head CT. Prior 10/23/2018.    Very limited. Severe motion degradation of images.  No change ventricular size and configuration. Advanced chronic   microvascular changes cerebral white matter similar to prior. No gross   intra-axial or extra-axial hemorrhage territorial infarct or edema. No   mass effect. Clear sinuses. No obvious displaced fracture.    Impression: Severely motion degraded study. No gross acute finding.   Follow-up as clinically indicated.            EDELMIRA STEVENS M.D., ATTENDING RADIOLOGIST  This document has been electronically signed. Oct 24 2018 12:18PM             ASSESSMENT AND PLAN:      ams likely metabolic including hepatic ammonia level over 300.  ?asymmetrical left side weakness;  ct head x2 negative for cva.      continue lactulose.  follow up cmp and ammonia level.  Plan of care was discussed with family. Questions answered.  Would continue to follow.

## 2018-10-27 NOTE — CHART NOTE - NSCHARTNOTEFT_GEN_A_CORE
RN called as patient had blue dye present in oral cavity and stool this morning.  Patient had blue dye test last night at midnight, NG tube clamped for 2 hours, suction started after and no blue dye was suctioned.  However this morning blue dye present in stool (normal) but also present in oral cavity and nurse suctioned out dye.  Will hold feeds for the morning until patient is re-evaluated by GI.

## 2018-10-27 NOTE — CONSULT NOTE ADULT - CONSULT REQUESTED DATE/TIME
26-Oct-2018
23-Oct-2018
23-Oct-2018 15:23
25-Oct-2018 19:05
26-Oct-2018 08:51
26-Oct-2018 12:51
26-Oct-2018 15:38
27-Oct-2018 16:00
25-Oct-2018 11:53

## 2018-10-27 NOTE — PROGRESS NOTE ADULT - SUBJECTIVE AND OBJECTIVE BOX
Date/Time Patient Seen:  		  Referring MD:   Data Reviewed	       Patient is a 89y old  Female who presents with a chief complaint of weakness, decreased PO intake (27 Oct 2018 06:12)  in bed  seen and examined  vs and meds reviewed  events noted        Subjective/HPI     PAST MEDICAL & SURGICAL HISTORY:  Coronary artery disease with angina pectoris, unspecified vessel or lesion type, unspecified whether native or transplanted heart  Congestive heart failure  Asthma  Diabetes mellitus  HTN (hypertension)  No significant past surgical history        Medication list         MEDICATIONS  (STANDING):  ALBUTerol/ipratropium for Nebulization 3 milliLiter(s) Nebulizer every 6 hours  dextrose 5%. 1000 milliLiter(s) (50 mL/Hr) IV Continuous <Continuous>  dextrose 50% Injectable 12.5 Gram(s) IV Push once  dextrose 50% Injectable 25 Gram(s) IV Push once  dextrose 50% Injectable 25 Gram(s) IV Push once  ferrous    sulfate 325 milliGRAM(s) Oral daily  fluconAZOLE IVPB      fluconAZOLE IVPB 100 milliGRAM(s) IV Intermittent every 24 hours  guaiFENesin  milliGRAM(s) Oral every 12 hours  heparin  Injectable 5000 Unit(s) SubCutaneous every 12 hours  lactulose Syrup 45 Gram(s) Oral four times a day  pantoprazole    Tablet 40 milliGRAM(s) Oral before breakfast  piperacillin/tazobactam IVPB. 3.375 Gram(s) IV Intermittent every 12 hours  rifaximin 550 milliGRAM(s) Oral two times a day  sodium bicarbonate  Infusion 0.054 mEq/kG/Hr (75 mL/Hr) IV Continuous <Continuous>    MEDICATIONS  (PRN):  dextrose 40% Gel 15 Gram(s) Oral once PRN Blood Glucose LESS THAN 70 milliGRAM(s)/deciliter  glucagon  Injectable 1 milliGRAM(s) IntraMuscular once PRN Glucose LESS THAN 70 milligrams/deciliter         Vitals log        ICU Vital Signs Last 24 Hrs  T(C): 36.7 (27 Oct 2018 05:06), Max: 36.7 (26 Oct 2018 14:47)  T(F): 98.1 (27 Oct 2018 05:06), Max: 98.1 (26 Oct 2018 14:47)  HR: 81 (27 Oct 2018 05:06) (81 - 98)  BP: 93/52 (27 Oct 2018 05:06) (93/52 - 146/50)  BP(mean): --  ABP: --  ABP(mean): --  RR: 20 (27 Oct 2018 05:06) (20 - 24)  SpO2: 95% (27 Oct 2018 05:06) (92% - 100%)           Input and Output:  I&O's Detail      Lab Data                        9.0    25.04 )-----------( 132      ( 26 Oct 2018 09:14 )             28.3     10-26    146<H>  |  117<H>  |  66<H>  ----------------------------<  146<H>  3.7   |  20<L>  |  2.50<H>    Ca    7.9<L>      26 Oct 2018 09:14    TPro  5.4<L>  /  Alb  x   /  TBili  x   /  DBili  x   /  AST  x   /  ALT  x   /  AlkPhos  x   10-26    ABG - ( 25 Oct 2018 12:59 )  pH, Arterial: 7.27  pH, Blood: x     /  pCO2: 36    /  pO2: 87    / HCO3: 17    / Base Excess: -9.7  /  SaO2: 95                      Review of Systems	      Objective     Physical Examination  frail  weak  kyphosis  lung dec BS  NG tube        Pertinent Lab findings & Imaging      Singh:  NO   Adequate UO     I&O's Detail           Discussed with:     Cultures:	  Culture Results:   GI PCR Results: NOT detected  *******Please Note:*******  GI panel PCR evaluates for:  Campylobacter, Plesiomonas shigelloides, Salmonella,  Vibrio, Yersinia enterocolitica, Enteroaggregative  Escherichia coli (EAEC), Enteropathogenic E.coli (EPEC),  Enterotoxigenic E. coli (ETEC) lt/st, Shiga-like  toxin-producing E. coli (STEC) stx1/stx2,  Shigella/ Enteroinvasive E. coli (EIEC), Cryptosporidium,  Cyclospora cayetanensis, Entamoeba histolytica,  Giardia lamblia, Adenovirus F 40/41, Astrovirus,  Norovirus GI/GII, Rotavirus A, Sapovirus (10-26 @ 21:55)        Radiology

## 2018-10-27 NOTE — PROGRESS NOTE ADULT - PROBLEM SELECTOR PLAN 1
multifactorial, component of HE  am labs pending  CT showed cirrhotic liver and mod volume of abdominal pelvic ascites  sp egd w endoscopic placement of ngt  trend ammonia daily, trend lfts  cont xifaxamin bid and lactulose- titrate for 4 soft bms per day  cont ppi  f/u liver serologies, hepatitis serologies   will likely need IR tap next week for further eval if family agreeable  monitor stool output/monitor abd exam  neuro and id following  aspiration prec, elev hob   npo/ivf for now, initiatiion of ngt feeds to be dw attg  further care per primary team

## 2018-10-27 NOTE — PROGRESS NOTE ADULT - SUBJECTIVE AND OBJECTIVE BOX
NEPHROLOGY INTERVAL HPI/OVERNIGHT EVENTS:  HPI:  Hx obtained from son and other family members as pt was altered.  Pt is a 89 yo F w/ PMH of DM type 2, HTN, COPD on 2.5L home O2, diastolic HFpEF (last echo 10/2017) presented to ED p/w ams, weakness, dysuria, dec po x past ~ 3 days. Of note Pt was recently admitted for ams, similar to current presentation and was d/c 6 days ago. On previous admission Pt was diagnosed and treated for UTI. Pt was initially doing well, but then developed burning with urination, AMS, and watery diarrhea x 3 days ago. Family reports that pt was given Bactrim for unresolved UTI, but that she was only able to take 2 doses, as she could not swallow the 3rd dose this AM. Pt has poor PO intake and EMS came to her house last night to give her IVF due to dehydration. Of note patient is normally bedbound and requires 24/7 home health aid. Pt is incontinent at baseline and wears diapers. Denies any Fevers, but endorses chills, and increased fatigue. Denies any SOB, CP, N/V. Family reports pt normally is able to converse without issue, but has been incoherent for ~ the past 3 days    In the ED, temp 97.1, HR 62, /75, RR 20, Spo2 99 on supplemental o2. WBC 11.46, Hg 9.5, hct 29.5, platelet 147, PT 13.5, INR 1.23, PTT 25.9, Na 148, K 4.3, Cl 120, Co2 21, BUN 64, Cr 2.00, glucose 110, calcium 8.0, alk phos 136, lactate 1.4, lipase 117. 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 Not actively bleeding at this time    Imaging:  CXR and CT head no cont ordered  EKG: NSR    In the ED, received zosyn 3.375 mg IV x 1, NaCl 0.9% 1000mL x 2, duoneb 3 mL x 1, (22 Oct 2018 23:52)      PAST MEDICAL & SURGICAL HISTORY:  Coronary artery disease with angina pectoris, unspecified vessel or lesion type, unspecified whether native or transplanted heart  Congestive heart failure  Asthma  Diabetes mellitus  HTN (hypertension)  No significant past surgical history      MEDICATIONS  (STANDING):  ALBUTerol/ipratropium for Nebulization 3 milliLiter(s) Nebulizer every 6 hours  dextrose 5%. 1000 milliLiter(s) (50 mL/Hr) IV Continuous <Continuous>  dextrose 50% Injectable 12.5 Gram(s) IV Push once  dextrose 50% Injectable 25 Gram(s) IV Push once  dextrose 50% Injectable 25 Gram(s) IV Push once  ferrous    sulfate 325 milliGRAM(s) Oral daily  fluconAZOLE IVPB      fluconAZOLE IVPB 100 milliGRAM(s) IV Intermittent every 24 hours  guaiFENesin  milliGRAM(s) Oral every 12 hours  heparin  Injectable 5000 Unit(s) SubCutaneous every 12 hours  lactulose Syrup 45 Gram(s) Oral four times a day  pantoprazole    Tablet 40 milliGRAM(s) Oral before breakfast  piperacillin/tazobactam IVPB. 3.375 Gram(s) IV Intermittent every 12 hours  rifaximin 550 milliGRAM(s) Oral two times a day  sodium bicarbonate  Infusion 0.054 mEq/kG/Hr (75 mL/Hr) IV Continuous <Continuous>    MEDICATIONS  (PRN):  dextrose 40% Gel 15 Gram(s) Oral once PRN Blood Glucose LESS THAN 70 milliGRAM(s)/deciliter  glucagon  Injectable 1 milliGRAM(s) IntraMuscular once PRN Glucose LESS THAN 70 milligrams/deciliter      Allergies    No Known Allergies    Intolerances        Vital Signs Last 24 Hrs  T(C): 36.7 (27 Oct 2018 05:06), Max: 36.7 (26 Oct 2018 14:47)  T(F): 98.1 (27 Oct 2018 05:06), Max: 98.1 (26 Oct 2018 14:47)  HR: 81 (27 Oct 2018 05:06) (81 - 98)  BP: 93/52 (27 Oct 2018 05:06) (93/52 - 146/50)  BP(mean): --  RR: 20 (27 Oct 2018 05:06) (20 - 24)  SpO2: 95% (27 Oct 2018 05:06) (92% - 100%)  Daily     Daily Weight in k.2 (26 Oct 2018 21:36)    PHYSICAL EXAM:  GENERAL: appears chronically ill, no distress  HEAD:  Atraumatic, Normocephalic  EYES: Conjunctiva and sclera clear  ENMT: Dry oral mucosa; +NGT  NECK: Supple  NERVOUS SYSTEM:  Difficult to arouse  CHEST/LUNG: Scattered wheezing B/L  HEART: Regular rate and rhythm; No murmurs, rubs, or gallops  ABDOMEN: Soft, Nontender, Nondistended; Bowel sounds present  EXTREMITIES:  No edema  LYMPH: No lymphadenopathy noted  SKIN: No rashes or lesions, pale      LABS:                        9.0    25.04 )-----------( 132      ( 26 Oct 2018 09:14 )             28.3     10-26    146<H>  |  117<H>  |  66<H>  ----------------------------<  146<H>  3.7   |  20<L>  |  2.50<H>    Ca    7.9<L>      26 Oct 2018 09:14    TPro  5.4<L>  /  Alb  x   /  TBili  x   /  DBili  x   /  AST  x   /  ALT  x   /  AlkPhos  x   10-26          ABG - ( 25 Oct 2018 12:59 )  pH, Arterial: 7.27  pH, Blood: x     /  pCO2: 36    /  pO2: 87    / HCO3: 17    / Base Excess: -9.7  /  SaO2: 95                  RADIOLOGY & ADDITIONAL TESTS:

## 2018-10-27 NOTE — PROGRESS NOTE ADULT - PROBLEM SELECTOR PLAN 1
dysphagia, aspiration, enceph, kyphosis, copd, frail and weak elderly  asp prec  HOB elev  NG in  coughed up blood, likely due to traumatic NG insertion  oral hygiene  skin care  on emp ABX  PPI  prognosis poor, pt is bedbound, family wishes to cont medical supportive care  pt is DNR  will follow and monitor  monitor vs and HD and Sat  GI and Renal and Cardio and Heme follow up

## 2018-10-27 NOTE — PROGRESS NOTE ADULT - PROBLEM SELECTOR PLAN 1
Now suspect metabolic encephalopathy/hepatic encephalopathy.   with possible aspiration   infiltrate on CXR. Continue Zosyn for possible HCAP/aspiration pneumonia.   --urine culture shows Candida--patieint is on Fluconazole.   -  -Blood culture growing GPC in clusters in one bottle, likely contaminant. Repeat cultures are negative.  - Aspiration precautions  - bedrest, turn q 2. hold Now suspect metabolic encephalopathy/hepatic encephalopathy.   with possible aspiration   infiltrate on CXR. Continue Zosyn for possible aspiration pneumonia.   --urine culture shows Candida--patieint is on Fluconazole.   -  -Blood culture growing GPC in clusters in one bottle, likely contaminant. Repeat cultures are negative.  - Aspiration precautions  - bedrest, turn q 2. hold

## 2018-10-27 NOTE — CONSULT NOTE ADULT - REASON FOR ADMISSION
weakness, decreased PO intake

## 2018-10-27 NOTE — PROGRESS NOTE ADULT - PROBLEM SELECTOR PLAN 3
2/2 to dehydration vs urinary retention post obstructive   atn  -Hold nephrotoxic agents  - Renal following dr choi

## 2018-10-28 DIAGNOSIS — N18.4 CHRONIC KIDNEY DISEASE, STAGE 4 (SEVERE): ICD-10-CM

## 2018-10-28 LAB
ALBUMIN SERPL ELPH-MCNC: 1.4 G/DL — LOW (ref 3.3–5)
ALP SERPL-CCNC: 109 U/L — SIGNIFICANT CHANGE UP (ref 40–120)
ALT FLD-CCNC: 17 U/L — SIGNIFICANT CHANGE UP (ref 12–78)
AMMONIA BLD-MCNC: 119 UMOL/L — HIGH (ref 11–32)
AMMONIA BLD-MCNC: 274 UMOL/L — HIGH (ref 11–32)
ANION GAP SERPL CALC-SCNC: 10 MMOL/L — SIGNIFICANT CHANGE UP (ref 5–17)
ANION GAP SERPL CALC-SCNC: 11 MMOL/L — SIGNIFICANT CHANGE UP (ref 5–17)
ANION GAP SERPL CALC-SCNC: 13 MMOL/L — SIGNIFICANT CHANGE UP (ref 5–17)
AST SERPL-CCNC: 19 U/L — SIGNIFICANT CHANGE UP (ref 15–37)
BASE EXCESS BLDA CALC-SCNC: -10.8 MMOL/L — LOW (ref -2–2)
BASOPHILS # BLD AUTO: 0.03 K/UL — SIGNIFICANT CHANGE UP (ref 0–0.2)
BASOPHILS NFR BLD AUTO: 0.1 % — SIGNIFICANT CHANGE UP (ref 0–2)
BILIRUB SERPL-MCNC: 0.4 MG/DL — SIGNIFICANT CHANGE UP (ref 0.2–1.2)
BLD GP AB SCN SERPL QL: SIGNIFICANT CHANGE UP
BLOOD GAS COMMENTS ARTERIAL: SIGNIFICANT CHANGE UP
BLOOD GAS COMMENTS ARTERIAL: SIGNIFICANT CHANGE UP
BUN SERPL-MCNC: 66 MG/DL — HIGH (ref 7–23)
BUN SERPL-MCNC: 67 MG/DL — HIGH (ref 7–23)
BUN SERPL-MCNC: 70 MG/DL — HIGH (ref 7–23)
CALCIUM SERPL-MCNC: 7.4 MG/DL — LOW (ref 8.5–10.1)
CALCIUM SERPL-MCNC: 7.5 MG/DL — LOW (ref 8.5–10.1)
CALCIUM SERPL-MCNC: 8 MG/DL — LOW (ref 8.5–10.1)
CHLORIDE SERPL-SCNC: 117 MMOL/L — HIGH (ref 96–108)
CHLORIDE SERPL-SCNC: 119 MMOL/L — HIGH (ref 96–108)
CHLORIDE SERPL-SCNC: 120 MMOL/L — HIGH (ref 96–108)
CO2 SERPL-SCNC: 20 MMOL/L — LOW (ref 22–31)
CO2 SERPL-SCNC: 20 MMOL/L — LOW (ref 22–31)
CO2 SERPL-SCNC: 25 MMOL/L — SIGNIFICANT CHANGE UP (ref 22–31)
CREAT SERPL-MCNC: 3.7 MG/DL — HIGH (ref 0.5–1.3)
CREAT SERPL-MCNC: 4 MG/DL — HIGH (ref 0.5–1.3)
CREAT SERPL-MCNC: 4.2 MG/DL — HIGH (ref 0.5–1.3)
CULTURE RESULTS: SIGNIFICANT CHANGE UP
EOSINOPHIL # BLD AUTO: 0.13 K/UL — SIGNIFICANT CHANGE UP (ref 0–0.5)
EOSINOPHIL NFR BLD AUTO: 0.5 % — SIGNIFICANT CHANGE UP (ref 0–6)
FERRITIN SERPL-MCNC: 189 NG/ML — HIGH (ref 15–150)
GLUCOSE SERPL-MCNC: 164 MG/DL — HIGH (ref 70–99)
GLUCOSE SERPL-MCNC: 182 MG/DL — HIGH (ref 70–99)
GLUCOSE SERPL-MCNC: 205 MG/DL — HIGH (ref 70–99)
HCO3 BLDA-SCNC: 15 MMOL/L — LOW (ref 23–27)
HCT VFR BLD CALC: 22.8 % — LOW (ref 34.5–45)
HCT VFR BLD CALC: 26 % — LOW (ref 34.5–45)
HGB BLD-MCNC: 7.4 G/DL — LOW (ref 11.5–15.5)
HGB BLD-MCNC: 8.3 G/DL — LOW (ref 11.5–15.5)
HOROWITZ INDEX BLDA+IHG-RTO: 50 — SIGNIFICANT CHANGE UP
IMM GRANULOCYTES NFR BLD AUTO: 1.9 % — HIGH (ref 0–1.5)
LACTATE SERPL-SCNC: 2.3 MMOL/L — HIGH (ref 0.7–2)
LYMPHOCYTES # BLD AUTO: 1.25 K/UL — SIGNIFICANT CHANGE UP (ref 1–3.3)
LYMPHOCYTES # BLD AUTO: 4.7 % — LOW (ref 13–44)
MAGNESIUM SERPL-MCNC: 2 MG/DL — SIGNIFICANT CHANGE UP (ref 1.6–2.6)
MCHC RBC-ENTMCNC: 28.5 PG — SIGNIFICANT CHANGE UP (ref 27–34)
MCHC RBC-ENTMCNC: 28.9 PG — SIGNIFICANT CHANGE UP (ref 27–34)
MCHC RBC-ENTMCNC: 31.9 GM/DL — LOW (ref 32–36)
MCHC RBC-ENTMCNC: 32.5 GM/DL — SIGNIFICANT CHANGE UP (ref 32–36)
MCV RBC AUTO: 87.7 FL — SIGNIFICANT CHANGE UP (ref 80–100)
MCV RBC AUTO: 90.6 FL — SIGNIFICANT CHANGE UP (ref 80–100)
MITOCHONDRIA AB SER-ACNC: SIGNIFICANT CHANGE UP
MONOCYTES # BLD AUTO: 1.13 K/UL — HIGH (ref 0–0.9)
MONOCYTES NFR BLD AUTO: 4.3 % — SIGNIFICANT CHANGE UP (ref 2–14)
NEUTROPHILS # BLD AUTO: 23.49 K/UL — HIGH (ref 1.8–7.4)
NEUTROPHILS NFR BLD AUTO: 88.5 % — HIGH (ref 43–77)
NRBC # BLD: 0 /100 WBCS — SIGNIFICANT CHANGE UP (ref 0–0)
NRBC # BLD: 0 /100 WBCS — SIGNIFICANT CHANGE UP (ref 0–0)
PCO2 BLDA: 68 MMHG — HIGH (ref 32–46)
PH BLDA: 7.05 — CRITICAL LOW (ref 7.35–7.45)
PHOSPHATE SERPL-MCNC: 5.9 MG/DL — HIGH (ref 2.5–4.5)
PLATELET # BLD AUTO: 117 K/UL — LOW (ref 150–400)
PLATELET # BLD AUTO: 146 K/UL — LOW (ref 150–400)
PO2 BLDA: 92 MMHG — SIGNIFICANT CHANGE UP (ref 74–108)
POTASSIUM SERPL-MCNC: 3 MMOL/L — LOW (ref 3.5–5.3)
POTASSIUM SERPL-MCNC: 3.3 MMOL/L — LOW (ref 3.5–5.3)
POTASSIUM SERPL-MCNC: 3.4 MMOL/L — LOW (ref 3.5–5.3)
POTASSIUM SERPL-SCNC: 3 MMOL/L — LOW (ref 3.5–5.3)
POTASSIUM SERPL-SCNC: 3.3 MMOL/L — LOW (ref 3.5–5.3)
POTASSIUM SERPL-SCNC: 3.4 MMOL/L — LOW (ref 3.5–5.3)
PROT SERPL-MCNC: 5.4 G/DL — LOW (ref 6–8.3)
RBC # BLD: 2.6 M/UL — LOW (ref 3.8–5.2)
RBC # BLD: 2.87 M/UL — LOW (ref 3.8–5.2)
RBC # FLD: 15.3 % — HIGH (ref 10.3–14.5)
RBC # FLD: 15.4 % — HIGH (ref 10.3–14.5)
SAO2 % BLDA: 95 % — SIGNIFICANT CHANGE UP (ref 92–96)
SMOOTH MUSCLE AB SER-ACNC: ABNORMAL
SODIUM SERPL-SCNC: 150 MMOL/L — HIGH (ref 135–145)
SODIUM SERPL-SCNC: 151 MMOL/L — HIGH (ref 135–145)
SODIUM SERPL-SCNC: 154 MMOL/L — HIGH (ref 135–145)
SPECIMEN SOURCE: SIGNIFICANT CHANGE UP
WBC # BLD: 26.53 K/UL — HIGH (ref 3.8–10.5)
WBC # BLD: 39.44 K/UL — HIGH (ref 3.8–10.5)
WBC # FLD AUTO: 26.53 K/UL — HIGH (ref 3.8–10.5)
WBC # FLD AUTO: 39.44 K/UL — HIGH (ref 3.8–10.5)

## 2018-10-28 PROCEDURE — 71045 X-RAY EXAM CHEST 1 VIEW: CPT | Mod: 26

## 2018-10-28 PROCEDURE — 99233 SBSQ HOSP IP/OBS HIGH 50: CPT | Mod: GC

## 2018-10-28 RX ORDER — LACTULOSE 10 G/15ML
45 SOLUTION ORAL THREE TIMES A DAY
Qty: 0 | Refills: 0 | Status: DISCONTINUED | OUTPATIENT
Start: 2018-10-28 | End: 2018-10-29

## 2018-10-28 RX ORDER — POTASSIUM CHLORIDE 20 MEQ
10 PACKET (EA) ORAL
Qty: 0 | Refills: 0 | Status: COMPLETED | OUTPATIENT
Start: 2018-10-28 | End: 2018-10-28

## 2018-10-28 RX ORDER — SODIUM BICARBONATE 1 MEQ/ML
100 SYRINGE (ML) INTRAVENOUS ONCE
Qty: 0 | Refills: 0 | Status: COMPLETED | OUTPATIENT
Start: 2018-10-28 | End: 2018-10-28

## 2018-10-28 RX ORDER — MIDODRINE HYDROCHLORIDE 2.5 MG/1
5 TABLET ORAL ONCE
Qty: 0 | Refills: 0 | Status: COMPLETED | OUTPATIENT
Start: 2018-10-28 | End: 2018-10-28

## 2018-10-28 RX ORDER — POTASSIUM CHLORIDE 20 MEQ
40 PACKET (EA) ORAL ONCE
Qty: 0 | Refills: 0 | Status: DISCONTINUED | OUTPATIENT
Start: 2018-10-28 | End: 2018-10-28

## 2018-10-28 RX ORDER — SODIUM BICARBONATE 1 MEQ/ML
0.08 SYRINGE (ML) INTRAVENOUS
Qty: 75 | Refills: 0 | Status: DISCONTINUED | OUTPATIENT
Start: 2018-10-28 | End: 2018-10-28

## 2018-10-28 RX ORDER — MIDODRINE HYDROCHLORIDE 2.5 MG/1
20 TABLET ORAL EVERY 8 HOURS
Qty: 0 | Refills: 0 | Status: DISCONTINUED | OUTPATIENT
Start: 2018-10-28 | End: 2018-10-31

## 2018-10-28 RX ORDER — POTASSIUM CHLORIDE 20 MEQ
40 PACKET (EA) ORAL ONCE
Qty: 0 | Refills: 0 | Status: COMPLETED | OUTPATIENT
Start: 2018-10-28 | End: 2018-10-28

## 2018-10-28 RX ORDER — PANTOPRAZOLE SODIUM 20 MG/1
40 TABLET, DELAYED RELEASE ORAL DAILY
Qty: 0 | Refills: 0 | Status: DISCONTINUED | OUTPATIENT
Start: 2018-10-28 | End: 2018-10-31

## 2018-10-28 RX ORDER — SODIUM BICARBONATE 1 MEQ/ML
0.16 SYRINGE (ML) INTRAVENOUS
Qty: 150 | Refills: 0 | Status: DISCONTINUED | OUTPATIENT
Start: 2018-10-28 | End: 2018-10-29

## 2018-10-28 RX ORDER — LACTULOSE 10 G/15ML
45 SOLUTION ORAL
Qty: 0 | Refills: 0 | Status: DISCONTINUED | OUTPATIENT
Start: 2018-10-28 | End: 2018-10-28

## 2018-10-28 RX ADMIN — Medication 3 MILLILITER(S): at 01:36

## 2018-10-28 RX ADMIN — Medication 40 MILLIEQUIVALENT(S): at 02:57

## 2018-10-28 RX ADMIN — OCTREOTIDE ACETATE 100 MICROGRAM(S): 200 INJECTION, SOLUTION INTRAVENOUS; SUBCUTANEOUS at 13:38

## 2018-10-28 RX ADMIN — Medication 100 MILLIEQUIVALENT(S): at 02:57

## 2018-10-28 RX ADMIN — LACTULOSE 45 GRAM(S): 10 SOLUTION ORAL at 13:32

## 2018-10-28 RX ADMIN — Medication 125 MILLIGRAM(S): at 23:02

## 2018-10-28 RX ADMIN — PIPERACILLIN AND TAZOBACTAM 25 GRAM(S): 4; .5 INJECTION, POWDER, LYOPHILIZED, FOR SOLUTION INTRAVENOUS at 22:01

## 2018-10-28 RX ADMIN — Medication 40 MILLIEQUIVALENT(S): at 01:41

## 2018-10-28 RX ADMIN — Medication 75 MEQ/KG/HR: at 23:41

## 2018-10-28 RX ADMIN — Medication 50 MILLILITER(S): at 01:13

## 2018-10-28 RX ADMIN — LACTULOSE 60 GRAM(S): 10 SOLUTION ORAL at 05:26

## 2018-10-28 RX ADMIN — Medication 50 MILLILITER(S): at 12:11

## 2018-10-28 RX ADMIN — Medication 3 MILLILITER(S): at 20:44

## 2018-10-28 RX ADMIN — Medication 100 MILLIEQUIVALENT(S): at 04:04

## 2018-10-28 RX ADMIN — PANTOPRAZOLE SODIUM 40 MILLIGRAM(S): 20 TABLET, DELAYED RELEASE ORAL at 12:11

## 2018-10-28 RX ADMIN — OCTREOTIDE ACETATE 100 MICROGRAM(S): 200 INJECTION, SOLUTION INTRAVENOUS; SUBCUTANEOUS at 05:26

## 2018-10-28 RX ADMIN — Medication 100 MILLIEQUIVALENT(S): at 05:24

## 2018-10-28 RX ADMIN — Medication 5 MILLIGRAM(S): at 22:05

## 2018-10-28 RX ADMIN — Medication 5 MILLIGRAM(S): at 05:27

## 2018-10-28 RX ADMIN — OCTREOTIDE ACETATE 100 MICROGRAM(S): 200 INJECTION, SOLUTION INTRAVENOUS; SUBCUTANEOUS at 22:55

## 2018-10-28 RX ADMIN — FLUCONAZOLE 100 MILLIGRAM(S): 150 TABLET ORAL at 14:45

## 2018-10-28 RX ADMIN — Medication 75 MEQ/KG/HR: at 06:17

## 2018-10-28 RX ADMIN — Medication 600 MILLIGRAM(S): at 17:55

## 2018-10-28 RX ADMIN — Medication 125 MILLIGRAM(S): at 12:11

## 2018-10-28 RX ADMIN — Medication 100 MEQ/KG/HR: at 03:13

## 2018-10-28 RX ADMIN — HEPARIN SODIUM 5000 UNIT(S): 5000 INJECTION INTRAVENOUS; SUBCUTANEOUS at 05:26

## 2018-10-28 RX ADMIN — PIPERACILLIN AND TAZOBACTAM 25 GRAM(S): 4; .5 INJECTION, POWDER, LYOPHILIZED, FOR SOLUTION INTRAVENOUS at 05:28

## 2018-10-28 RX ADMIN — Medication 50 MILLILITER(S): at 19:32

## 2018-10-28 RX ADMIN — LACTULOSE 45 GRAM(S): 10 SOLUTION ORAL at 22:05

## 2018-10-28 RX ADMIN — MIDODRINE HYDROCHLORIDE 20 MILLIGRAM(S): 2.5 TABLET ORAL at 05:26

## 2018-10-28 RX ADMIN — MIDODRINE HYDROCHLORIDE 5 MILLIGRAM(S): 2.5 TABLET ORAL at 01:48

## 2018-10-28 RX ADMIN — Medication 3 MILLILITER(S): at 07:50

## 2018-10-28 RX ADMIN — MIDODRINE HYDROCHLORIDE 20 MILLIGRAM(S): 2.5 TABLET ORAL at 22:07

## 2018-10-28 RX ADMIN — Medication 5 MILLIGRAM(S): at 13:37

## 2018-10-28 RX ADMIN — Medication 125 MILLIGRAM(S): at 17:55

## 2018-10-28 RX ADMIN — Medication 3 MILLILITER(S): at 15:45

## 2018-10-28 RX ADMIN — Medication 40 MILLIEQUIVALENT(S): at 22:07

## 2018-10-28 RX ADMIN — Medication 100 MILLIEQUIVALENT(S): at 02:56

## 2018-10-28 RX ADMIN — Medication 125 MILLIGRAM(S): at 05:28

## 2018-10-28 RX ADMIN — Medication 50 MILLILITER(S): at 05:24

## 2018-10-28 RX ADMIN — HEPARIN SODIUM 5000 UNIT(S): 5000 INJECTION INTRAVENOUS; SUBCUTANEOUS at 17:55

## 2018-10-28 RX ADMIN — MIDODRINE HYDROCHLORIDE 20 MILLIGRAM(S): 2.5 TABLET ORAL at 13:38

## 2018-10-28 RX ADMIN — Medication 75 MEQ/KG/HR: at 08:41

## 2018-10-28 NOTE — PROGRESS NOTE ADULT - PROBLEM SELECTOR PLAN 1
Now suspect metabolic encephalopathy/hepatic encephalopathy.   with possible aspiration   infiltrate on CXR. Continue Zosyn for possible aspiration pneumonia.   --urine culture shows Candida--patieint is on Fluconazole.   -  -Blood culture growing GPC in clusters in one bottle, likely contaminant. Repeat cultures are negative.  - Aspiration precautions  - bedrest, turn q 2. hold

## 2018-10-28 NOTE — PROGRESS NOTE ADULT - SUBJECTIVE AND OBJECTIVE BOX
Patient is a 89y old  Female who presents with a chief complaint of weakness, decreased PO intake (28 Oct 2018 11:59)      HPI:  Hx obtained from son and other family members as pt was altered.  Pt is a 89 yo F w/ PMH of DM type 2, HTN, COPD on 2.5L home O2, diastolic HFpEF (last echo 10/2017) presented to ED p/w ams, weakness, dysuria, dec po x past ~ 3 days. Of note Pt was recently admitted for ams, similar to current presentation and was d/c 6 days ago. On previous admission Pt was diagnosed and treated for UTI. Pt was initially doing well, but then developed burning with urination, AMS, and watery diarrhea x 3 days ago. Family reports that pt was given Bactrim for unresolved UTI, but that she was only able to take 2 doses, as she could not swallow the 3rd dose this AM. Pt has poor PO intake and EMS came to her house last night to give her IVF due to dehydration. Of note patient is normally bedbound and requires 24/7 home health aid. Pt is incontinent at baseline and wears diapers. Denies any Fevers, but endorses chills, and increased fatigue. Denies any SOB, CP, N/V. Family reports pt normally is able to converse without issue, but has been incoherent for ~ the past 3 days    weakness, dysuria, decreased po intake x past ~ 3 days. admitted with metabolic acidosis  metabolic encephalopathy multifactorial prem on ckd3 now with hepatorenal syndrom , cirrhosis and ascites  unknown etiology high ammonia   .   pt seen and examine today -  on bipap  /resp distress  encephalopathic  , mendez  voiding  very low  no fever , npo hold tube feed , loose stool still present  .        INTERVAL HPI/OVERNIGHT EVENTS:  T(C): 35.1 (10-28-18 @ 05:00), Max: 36.3 (10-27-18 @ 13:41)  HR: 84 (10-28-18 @ 08:15) (56 - 99)  BP: 112/45 (10-28-18 @ 06:36) (72/41 - 123/56)  RR: 20 (10-28-18 @ 05:00) (20 - 22)  SpO2: 95% (10-28-18 @ 08:15) (56% - 99%)  Wt(kg): --  I&O's Summary    27 Oct 2018 07:01  -  28 Oct 2018 07:00  --------------------------------------------------------  IN: 1150 mL / OUT: 200 mL / NET: 950 mL      REVIEW OF SYSTEMS:   unable  to obtain   as encephalopathic     PHYSICAL EXAM:  GENERAL: on  bipap   HEAD:  Atraumatic, Normocephalic  EYES: EOMI, PERRLA, conjunctiva and sclera clear  ENMT:  dry  mucous membranes,  No lesions  NECK: Supple,   NERVOUS SYSTEM:  Alert & Oriented X0,   CHEST/LUNG: percussion bilaterally  coarse   ; No rales, rhonchi, wheezing,   HEART: Regular rate and rhythm; No murmurs, no tachy   ABDOMEN: Soft,  Nondistended; Bowel sounds present / no tenderness   EXTREMITIES:  2+ Peripheral Pulses, No clubbing, cyanosis,  1 pulse pitting   edema bl low ext   gu mendez   SKIN: No rashes or lesions/stage 3buttock         MEDICATIONS  (STANDING):  albumin human 25% IVPB 50 milliLiter(s) IV Intermittent every 6 hours  ALBUTerol/ipratropium for Nebulization 3 milliLiter(s) Nebulizer every 6 hours  dextrose 5%. 1000 milliLiter(s) (50 mL/Hr) IV Continuous <Continuous>  dextrose 50% Injectable 12.5 Gram(s) IV Push once  dextrose 50% Injectable 25 Gram(s) IV Push once  dextrose 50% Injectable 25 Gram(s) IV Push once  ferrous    sulfate 325 milliGRAM(s) Oral daily  fluconAZOLE IVPB 100 milliGRAM(s) IV Intermittent every 24 hours  guaiFENesin  milliGRAM(s) Oral every 12 hours  heparin  Injectable 5000 Unit(s) SubCutaneous every 12 hours  lactulose Syrup 45 Gram(s) Oral three times a day  metoclopramide Injectable 5 milliGRAM(s) IV Push every 8 hours  midodrine 20 milliGRAM(s) Oral every 8 hours  octreotide  Injectable 100 MICROGram(s) SubCutaneous every 8 hours  pantoprazole  Injectable 40 milliGRAM(s) IV Push daily  piperacillin/tazobactam IVPB. 3.375 Gram(s) IV Intermittent every 12 hours  rifaximin 550 milliGRAM(s) Oral two times a day  sodium bicarbonate  Infusion 0.161 mEq/kG/Hr (75 mL/Hr) IV Continuous <Continuous>  vancomycin    Solution 125 milliGRAM(s) Oral every 6 hours    MEDICATIONS  (PRN):  dextrose 40% Gel 15 Gram(s) Oral once PRN Blood Glucose LESS THAN 70 milliGRAM(s)/deciliter  glucagon  Injectable 1 milliGRAM(s) IntraMuscular once PRN Glucose LESS THAN 70 milligrams/deciliter      LABS:                        7.9    32.53 )-----------( 124      ( 28 Oct 2018 09:12 )             24.5     10-28    150<H>  |  117<H>  |  67<H>  ----------------------------<  164<H>  3.4<L>   |  20<L>  |  4.00<H>    Ca    7.5<L>      28 Oct 2018 08:23  Phos  5.9     10-28  Mg     2.0     10-28    TPro  5.4<L>  /  Alb  1.4<L>  /  TBili  0.4  /  DBili  x   /  AST  19  /  ALT  17  /  AlkPhos  109  10-28    PT/INR - ( 27 Oct 2018 10:21 )   PT: 15.7 sec;   INR: 1.43 ratio         PTT - ( 27 Oct 2018 10:21 )  PTT:38.3 sec    CAPILLARY BLOOD GLUCOSE      POCT Blood Glucose.: 235 mg/dL (28 Oct 2018 07:14)  POCT Blood Glucose.: 210 mg/dL (27 Oct 2018 21:39)  POCT Blood Glucose.: 186 mg/dL (27 Oct 2018 17:13)    ABG - ( 28 Oct 2018 01:11 )  pH, Arterial: 7.05  pH, Blood: x     /  pCO2: 68    /  pO2: 92    / HCO3: 15    / Base Excess: -10.8 /  SaO2: 95                10-26 @ 21:55   GI PCR Results: NOT detected  *******Please Note:*******  GI panel PCR evaluates for:  Campylobacter, Plesiomonas shigelloides, Salmonella,  Vibrio, Yersinia enterocolitica, Enteroaggregative  Escherichia coli (EAEC), Enteropathogenic E.coli (EPEC),  Enterotoxigenic E. coli (ETEC) lt/st, Shiga-like  toxin-producing E. coli (STEC) stx1/stx2,  Shigella/ Enteroinvasive E. coli (EIEC), Cryptosporidium,  Cyclospora cayetanensis, Entamoeba histolytica,  Giardia lamblia, Adenovirus F 40/41, Astrovirus,  Norovirus GI/GII, Rotavirus A, Sapovirus  --  --  10-24 @ 15:35   No growth to date.  --  --          RADIOLOGY & ADDITIONAL TESTS:    Imaging Personally Reviewed:     Impression:    Trace to small left pleural effusion. Increased markings in the left mid   to lower lung may represent pneumonia        Advance Directives:    dnr / dni   Palliative Care:  appropraite

## 2018-10-28 NOTE — PROGRESS NOTE ADULT - SUBJECTIVE AND OBJECTIVE BOX
HPI:  Hx obtained from son and other family members as pt was altered.  Pt is a 89 yo F w/ PMH of DM type 2, HTN, COPD on 2.5L home O2, diastolic HFpEF (last echo 10/2017) presented to ED p/w ams, weakness, dysuria, dec po x past ~ 3 days. Of note Pt was recently admitted for ams, similar to current presentation and was d/c 6 days ago. On previous admission Pt was diagnosed and treated for UTI. Pt was initially doing well, but then developed burning with urination, AMS, and watery diarrhea x 3 days ago. Family reports that pt was given Bactrim for unresolved UTI, but that she was only able to take 2 doses, as she could not swallow the 3rd dose this AM. Pt has poor PO intake and EMS came to her house last night to give her IVF due to dehydration. Of note patient is normally bedbound and requires 24/7 home health aid. Pt is incontinent at baseline and wears diapers. Denies any Fevers, but endorses chills, and increased fatigue. Denies any SOB, CP, N/V. Family reports pt normally is able to converse without issue, but has been incoherent for ~ the past 3 days    In the ED, temp 97.1, HR 62, /75, RR 20, Spo2 99 on supplemental o2. WBC 11.46, Hg 9.5, hct 29.5, platelet 147, PT 13.5, INR 1.23, PTT 25.9, Na 148, K 4.3, Cl 120, Co2 21, BUN 64, Cr 2.00, glucose 110, calcium 8.0, alk phos 136, lactate 1.4, lipase 117. UA: small bilirubin, moderate leukocyte esterase, trace blood, moderate epithelial, bacteria few, hyaline 0-2, yeast present    Imaging:  CXR and CT head no cont ordered  EKG: NSR    In the ED, received zosyn 3.375 mg IV x 1, NaCl 0.9% 1000mL x 2, duoneb 3 mL x 1, (22 Oct 2018 23:52)     Pt is seen and examined  pt is awake and lying in bed under a warming blanket on BiPAP;  Family at the bedside    ROS: NA    MEDICATIONS  (STANDING):  albumin human 25% IVPB 50 milliLiter(s) IV Intermittent every 6 hours  ALBUTerol/ipratropium for Nebulization 3 milliLiter(s) Nebulizer every 6 hours  dextrose 5%. 1000 milliLiter(s) (50 mL/Hr) IV Continuous <Continuous>  dextrose 50% Injectable 12.5 Gram(s) IV Push once  dextrose 50% Injectable 25 Gram(s) IV Push once  dextrose 50% Injectable 25 Gram(s) IV Push once  ferrous    sulfate 325 milliGRAM(s) Oral daily  fluconAZOLE IVPB 100 milliGRAM(s) IV Intermittent every 24 hours  guaiFENesin  milliGRAM(s) Oral every 12 hours  heparin  Injectable 5000 Unit(s) SubCutaneous every 12 hours  lactulose Syrup 45 Gram(s) Oral three times a day  metoclopramide Injectable 5 milliGRAM(s) IV Push every 8 hours  midodrine 20 milliGRAM(s) Oral every 8 hours  octreotide  Injectable 100 MICROGram(s) SubCutaneous every 8 hours  pantoprazole  Injectable 40 milliGRAM(s) IV Push daily  piperacillin/tazobactam IVPB. 3.375 Gram(s) IV Intermittent every 12 hours  rifaximin 550 milliGRAM(s) Oral two times a day  sodium bicarbonate  Infusion 0.161 mEq/kG/Hr (75 mL/Hr) IV Continuous <Continuous>  vancomycin    Solution 125 milliGRAM(s) Oral every 6 hours    MEDICATIONS  (PRN):  dextrose 40% Gel 15 Gram(s) Oral once PRN Blood Glucose LESS THAN 70 milliGRAM(s)/deciliter  glucagon  Injectable 1 milliGRAM(s) IntraMuscular once PRN Glucose LESS THAN 70 milligrams/deciliter      Allergies    No Known Allergies    Intolerances        Vital Signs Last 24 Hrs  T(C): 35.1 (28 Oct 2018 14:01), Max: 35.1 (28 Oct 2018 05:00)  T(F): 95.2 (28 Oct 2018 14:01), Max: 95.2 (28 Oct 2018 14:01)  HR: 80 (28 Oct 2018 15:47) (56 - 99)  BP: 106/44 (28 Oct 2018 14:01) (72/41 - 112/45)  BP(mean): --  RR: 19 (28 Oct 2018 14:01) (19 - 22)  SpO2: 96% (28 Oct 2018 15:47) (56% - 100%)    PHYSICAL EXAM  General: adult in NAD  HEENT: clear oropharynx, anicteric sclera, pink conjunctiva  Neck: supple  CV: normal S1/S2 with no murmur rubs or gallops  Lungs: positive air movement b/l ant lungs,clear to auscultation, no wheezes, no rales  Abdomen: soft non-tender non-distended, no hepatosplenomegaly  Ext: no clubbing cyanosis or edema  Skin: no rashes and no petechiae  Neuro: alert and oriented X 4, no focal deficits  LABS:                          7.4    26.53 )-----------( 117      ( 28 Oct 2018 16:33 )             22.8         Mean Cell Volume : 87.7 fl  Mean Cell Hemoglobin : 28.5 pg  Mean Cell Hemoglobin Concentration : 32.5 gm/dL  Auto Neutrophil # : 23.49 K/uL  Auto Lymphocyte # : 1.25 K/uL  Auto Monocyte # : 1.13 K/uL  Auto Eosinophil # : 0.13 K/uL  Auto Basophil # : 0.03 K/uL  Auto Neutrophil % : 88.5 %  Auto Lymphocyte % : 4.7 %  Auto Monocyte % : 4.3 %  Auto Eosinophil % : 0.5 %  Auto Basophil % : 0.1 %    Serial CBC's  10-28 @ 16:33  Hct-22.8 / Hgb-7.4 / Plat-117 / RBC-2.60 / WBC-26.53          Serial CBC's  10-28 @ 09:12  Hct-24.5 / Hgb-7.9 / Plat-124 / RBC-2.75 / WBC-32.53          Serial CBC's  10-28 @ 01:32  Hct-26.0 / Hgb-8.3 / Plat-146 / RBC-2.87 / WBC-39.44          Serial CBC's  10-27 @ 10:21  Hct-26.9 / Hgb-8.5 / Plat-137 / RBC-3.00 / WBC-33.11          Serial CBC's  10-26 @ 14:43  Hct--- / Hgb--- / Plat--- / RBC-3.16 / WBC---            10-28    154<H>  |  119<H>  |  70<H>  ----------------------------<  205<H>  3.0<L>   |  25  |  4.20<H>    Ca    7.4<L>      28 Oct 2018 16:33  Phos  5.9     10-28  Mg     2.0     10-28    TPro  5.4<L>  /  Alb  1.4<L>  /  TBili  0.4  /  DBili  x   /  AST  19  /  ALT  17  /  AlkPhos  109  10-28      PT/INR - ( 27 Oct 2018 10:21 )   PT: 15.7 sec;   INR: 1.43 ratio         PTT - ( 27 Oct 2018 10:21 )  PTT:38.3 sec    Ferritin, Serum: 189 ng/mL (10-27 @ 16:04)  Iron - Total Binding Capacity.: 136 ug/dL (10-26 @ 19:09)  Iron - Total Binding Capacity.: 126 ug/dL (10-26 @ 19:09)  Reticulocyte Percent: 1.6 % (10-26 @ 14:43)  Vitamin B12, Serum: >2000 pg/mL (10-25 @ 10:44)  Folate, Serum: 14.6 ng/mL (10-25 @ 10:44)            BLOOD SMEAR INTERPRETATION:       RADIOLOGY & ADDITIONAL STUDIES:

## 2018-10-28 NOTE — PROGRESS NOTE ADULT - ASSESSMENT
Patient is an 89 years old woman with sepsis, hypotension, respiratory failure and renal failure. Patient was found to have also Liver cirrhosis and hepatic encephalopathy.

## 2018-10-28 NOTE — PROGRESS NOTE ADULT - PROBLEM SELECTOR PLAN 3
2/2 to dehydration vs urinary retention post obstructive   now hepatorenal   -Hold nephrotoxic agents  - Renal following dr choi

## 2018-10-28 NOTE — PROGRESS NOTE ADULT - ATTENDING COMMENTS
pt seen and examine see above -f DM type 2, HTN, COPD on 2.5L home O2, diastolic HFpEF (last echo 10/2017) presented to ED p/w AMS, weakness, dysuria,  recent diarrhea decreased po intake x past ~ 3 days. 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  high ammonia / cirrhosis on lactulose via ng tube fu repeat  lab . . leucocytosis sec to possible  aspiration pna on iv abx zosyne , blood cult neg , hold tube feed  . anemia  will give transfusion   1 unit  prbc    fu hb . hepatorenal syndrome -   ammonia minimally improved   cont octreotide/midodrine/albumin  npo/ivf , as per gi dr styles   f/u liver serologies   not a candidate for rrt. over all prognosis is very poor family aware. 2 son is flying from [place]  family wish to provide supportive care until then  will follow and cont supportive care  pt is at high risk for death, abrupt decompensation present  family aware bedside. repeat lab 5 pm / discuss with dr mae renal on phn will fu  lab result.

## 2018-10-28 NOTE — PROGRESS NOTE ADULT - SUBJECTIVE AND OBJECTIVE BOX
Neurology Follow up note    MALINDA [id number]    HPI:  Hx obtained from son and other family members as pt was altered.  Pt is a 88 yo F w/ PMH of DM type 2, HTN, COPD on 2.5L home O2, diastolic HFpEF (last echo 10/2017) presented to ED p/w ams, weakness, dysuria, dec po x past ~ 3 days. Of note Pt was recently admitted for ams, similar to current presentation and was d/c 6 days ago. On previous admission Pt was diagnosed and treated for UTI. Pt was initially doing well, but then developed burning with urination, AMS, and watery diarrhea x 3 days ago. Family reports that pt was given Bactrim for unresolved UTI, but that she was only able to take 2 doses, as she could not swallow the 3rd dose this AM. Pt has poor PO intake and EMS came to her house last night to give her IVF due to dehydration. Of note patient is normally bedbound and requires 24/7 home health aid. Pt is incontinent at baseline and wears diapers. Denies any Fevers, but endorses chills, and increased fatigue. Denies any SOB, CP, N/V. Family reports pt normally is able to converse without issue, but has been incoherent for ~ the past 3 days    In the ED, temp 97.1, HR 62, /75, RR 20, Spo2 99 on supplemental o2. WBC 11.46, Hg 9.5, hct 29.5, platelet 147, PT 13.5, INR 1.23, PTT 25.9, Na 148, K 4.3, Cl 120, Co2 21, BUN 64, Cr 2.00, glucose 110, calcium 8.0, alk phos 136, lactate 1.4, lipase 117. UA: small bilirubin, moderate leukocyte esterase, trace blood, moderate epithelial, bacteria few, hyaline 0-2, yeast present    Imaging:  CXR and CT head no cont ordered  EKG: NSR    In the ED, received zosyn 3.375 mg IV x 1, NaCl 0.9% 1000mL x 2, duoneb 3 mL x 1, (22 Oct 2018 23:52)      Interval History - became hypothermic.    Patient is seen, chart was reviewed and case was discussed with the treatment team.  Pt is not in any distress.   Lying on bed comfortably.   No events reported overnight.   No clinical seizure was reported.  Sitting on chair bed comfortably.    is at bedside.    Vital Signs Last 24 Hrs  T(C): 35.1 (28 Oct 2018 14:01), Max: 35.1 (28 Oct 2018 05:00)  T(F): 95.2 (28 Oct 2018 14:01), Max: 95.2 (28 Oct 2018 14:01)  HR: 80 (28 Oct 2018 15:47) (56 - 99)  BP: 106/44 (28 Oct 2018 14:01) (72/41 - 112/45)  BP(mean): --  RR: 19 (28 Oct 2018 14:01) (19 - 22)  SpO2: 96% (28 Oct 2018 15:47) (56% - 100%)            On Neurological Examination:    Mental Status - Pt is unresponsive to verbal commands.  no moaning grimacing noted today.  not following commands.      Cranial Nerves - Pupils 3 mm equal and reactive to light,   Pt has ?left facial asymmetry.     Muscle tone - normal    Motor Exam - minimal withdrawal to plantar stimulation.    Sensory Exam - . Pt withdraws all extremities equally on stimulation.         coordination:    Finger to nose: normal      Deep tendon Reflexes - 2 plus all over.       Neck Supple -  Yes.     MEDICATIONS    ALBUTerol/ipratropium for Nebulization 3 milliLiter(s) Nebulizer every 6 hours  cefTRIAXone   IVPB 1 Gram(s) IV Intermittent every 24 hours  dextrose 40% Gel 15 Gram(s) Oral once PRN  dextrose 5%. 1000 milliLiter(s) IV Continuous <Continuous>  dextrose 5%. 1000 milliLiter(s) IV Continuous <Continuous>  dextrose 50% Injectable 12.5 Gram(s) IV Push once  dextrose 50% Injectable 25 Gram(s) IV Push once  dextrose 50% Injectable 25 Gram(s) IV Push once  ferrous    sulfate 325 milliGRAM(s) Oral daily  glucagon  Injectable 1 milliGRAM(s) IntraMuscular once PRN  guaiFENesin  milliGRAM(s) Oral every 12 hours  heparin  Injectable 5000 Unit(s) SubCutaneous every 12 hours  lactated ringers. 1000 milliLiter(s) IV Continuous <Continuous>  nystatin Cream 1 Application(s) Topical two times a day  pantoprazole    Tablet 40 milliGRAM(s) Oral before breakfast      Allergies    No Known Allergies    Intolerances                  Vitamin B12     RADIOLOGY  < from: CT Head No Cont (10.24.18 @ 12:04) >    EXAM:  CT BRAIN                            PROCEDURE DATE:  10/24/2018          INTERPRETATION:  History: Altered mental status.    Noncontrast head CT. Prior 10/23/2018.    Very limited. Severe motion degradation of images.  No change ventricular size and configuration. Advanced chronic   microvascular changes cerebral white matter similar to prior. No gross   intra-axial or extra-axial hemorrhage territorial infarct or edema. No   mass effect. Clear sinuses. No obvious displaced fracture.    Impression: Severely motion degraded study. No gross acute finding.   Follow-up as clinically indicated.            EDELMIRA STEVENS M.D., ATTENDING RADIOLOGIST  This document has been electronically signed. Oct 24 2018 12:18PM             ASSESSMENT AND PLAN:      ams likely metabolic including hepatic ammonia level   hypothermia.  respiratory failure on BIPAP.  ct head x2 negative for cva.      sepsis work up in progress.  dw family and dr grady in detail.  continue lactulose.  follow up cmp and ammonia level.  Plan of care was discussed with family. Questions answered.  Would continue to follow.

## 2018-10-28 NOTE — PROGRESS NOTE ADULT - PROBLEM SELECTOR PLAN 2
2/2 poor PO intake  -Hold Lasix , stable strict is/ os   -Continue gentle IV fluids-with bicarb with sever metabolic acidosis 12 hr urine out pt 200 / oliguric   -Hypernatremia--likely due to poor po intake  ng tube fluid  started .

## 2018-10-28 NOTE — PROGRESS NOTE ADULT - SUBJECTIVE AND OBJECTIVE BOX
INTERVAL HPI/OVERNIGHT EVENTS:  pt seen and examined, family present  per overnight rn pt roberto'd by icu, on bipap, bicarb gtt, hypothermic and hypotensive  having +bms, 2nd lactulose enema held  labs noted    MEDICATIONS  (STANDING):  albumin human 25% IVPB 50 milliLiter(s) IV Intermittent every 6 hours  ALBUTerol/ipratropium for Nebulization 3 milliLiter(s) Nebulizer every 6 hours  dextrose 5%. 1000 milliLiter(s) (50 mL/Hr) IV Continuous <Continuous>  dextrose 50% Injectable 12.5 Gram(s) IV Push once  dextrose 50% Injectable 25 Gram(s) IV Push once  dextrose 50% Injectable 25 Gram(s) IV Push once  ferrous    sulfate 325 milliGRAM(s) Oral daily  fluconAZOLE IVPB 100 milliGRAM(s) IV Intermittent every 24 hours  guaiFENesin  milliGRAM(s) Oral every 12 hours  heparin  Injectable 5000 Unit(s) SubCutaneous every 12 hours  lactulose Syrup 60 Gram(s) Oral four times a day  metoclopramide Injectable 5 milliGRAM(s) IV Push every 8 hours  midodrine 20 milliGRAM(s) Oral every 8 hours  octreotide  Injectable 100 MICROGram(s) SubCutaneous every 8 hours  pantoprazole    Tablet 40 milliGRAM(s) Oral before breakfast  piperacillin/tazobactam IVPB. 3.375 Gram(s) IV Intermittent every 12 hours  rifaximin 550 milliGRAM(s) Oral two times a day  sodium bicarbonate  Infusion 0.161 mEq/kG/Hr (75 mL/Hr) IV Continuous <Continuous>  vancomycin    Solution 125 milliGRAM(s) Oral every 6 hours    MEDICATIONS  (PRN):  dextrose 40% Gel 15 Gram(s) Oral once PRN Blood Glucose LESS THAN 70 milliGRAM(s)/deciliter  glucagon  Injectable 1 milliGRAM(s) IntraMuscular once PRN Glucose LESS THAN 70 milligrams/deciliter      Allergies    No Known Allergies    Intolerances        Review of Systems:    unable to obtain      Vital Signs Last 24 Hrs  T(C): 35.1 (28 Oct 2018 05:00), Max: 36.3 (27 Oct 2018 13:41)  T(F): 95.1 (28 Oct 2018 05:00), Max: 97.4 (27 Oct 2018 13:41)  HR: 84 (28 Oct 2018 08:15) (56 - 99)  BP: 112/45 (28 Oct 2018 06:36) (72/41 - 123/56)  BP(mean): --  RR: 20 (28 Oct 2018 05:00) (20 - 22)  SpO2: 95% (28 Oct 2018 08:15) (56% - 99%)    PHYSICAL EXAM:    Constitutional: NAD, lying in bed +ngt on bipap  HEENT: ncat  Neck: No LAD  Respiratory: dec bs  Cardiovascular: S1 and S2  Gastrointestinal: soft nt mild dt  Extremities: No peripheral edema  Neurological: lethargic  Skin: No rashes      LABS:                        7.9    32.53 )-----------( 124      ( 28 Oct 2018 09:12 )             24.5     10-28    150<H>  |  117<H>  |  67<H>  ----------------------------<  164<H>  3.4<L>   |  20<L>  |  4.00<H>    Ca    7.5<L>      28 Oct 2018 08:23  Phos  5.9     10-28  Mg     2.0     10-28    TPro  5.4<L>  /  Alb  1.4<L>  /  TBili  0.4  /  DBili  x   /  AST  19  /  ALT  17  /  AlkPhos  109  10-28    PT/INR - ( 27 Oct 2018 10:21 )   PT: 15.7 sec;   INR: 1.43 ratio         PTT - ( 27 Oct 2018 10:21 )  PTT:38.3 sec      RADIOLOGY & ADDITIONAL TESTS:

## 2018-10-28 NOTE — PROGRESS NOTE ADULT - ASSESSMENT
Acute on CKD Stage 4  Hepatorenal Syndrome  Liver Cirrhosis  Sepsis/PNA/UTI  Hypernatremia  Hypokalemia  Dehydration  CHF/COPD   Metabolic Acidosis    -Renal indices are worsening; oliguric  -IVF with sodium bicarb; acidemia is severe; mainly driven by CO2 retention; +BiPAP but patient's current mental status will make this measure less effective, but being DNI, there are no other options  -Midodrine  -IV Albumin  -Octreotide  -Unable to give Lasix to force diurese because of severe hypotension as well  -Abx per ID  -Urine studies reviewed  -Renal sono shows no hydronephrosis  -Daily chemistries  -No indication for RRT; poor prognosis. Not a candidate for dialysis as the risks far outweigh the benefits in this case. I discussed this matter with son at length at bedside who understood clearly. We agreed to conservative renal management as is currently being given    Thank you

## 2018-10-28 NOTE — PROGRESS NOTE ADULT - SUBJECTIVE AND OBJECTIVE BOX
MALINDA SULTANA is a 89yFemale , patient examined and chart reviewed.      INTERVAL HPI/ OVERNIGHT EVENTS:   Nonresponsive. On BIPAP.  Hypothermic.    PAST MEDICAL & SURGICAL HISTORY:  Coronary artery disease with angina pectoris, unspecified vessel or lesion type, unspecified whether native or transplanted heart  Congestive heart failure  Asthma  Diabetes mellitus  HTN (hypertension)  No significant past surgical history    For details regarding the patient's social history, family history, and other miscellaneous elements, please refer the initial infectious diseases consultation and/or the admitting history and physical examination for this admission.    ROS:  Unable to obtain due to : pt's condition    Current inpatient medications :    ANTIBIOTICS/RELEVANT:  fluconAZOLE IVPB 100 milliGRAM(s) IV Intermittent every 24 hours  piperacillin/tazobactam IVPB. 3.375 Gram(s) IV Intermittent every 12 hours  rifaximin 550 milliGRAM(s) Oral two times a day  vancomycin    Solution 125 milliGRAM(s) Oral every 6 hours    albumin human 25% IVPB 50 milliLiter(s) IV Intermittent every 6 hours  ALBUTerol/ipratropium for Nebulization 3 milliLiter(s) Nebulizer every 6 hours  dextrose 40% Gel 15 Gram(s) Oral once PRN  dextrose 5%. 1000 milliLiter(s) IV Continuous <Continuous>  dextrose 50% Injectable 12.5 Gram(s) IV Push once  dextrose 50% Injectable 25 Gram(s) IV Push once  dextrose 50% Injectable 25 Gram(s) IV Push once  ferrous    sulfate 325 milliGRAM(s) Oral daily  glucagon  Injectable 1 milliGRAM(s) IntraMuscular once PRN  guaiFENesin  milliGRAM(s) Oral every 12 hours  heparin  Injectable 5000 Unit(s) SubCutaneous every 12 hours  lactulose Syrup 45 Gram(s) Oral three times a day  metoclopramide Injectable 5 milliGRAM(s) IV Push every 8 hours  midodrine 20 milliGRAM(s) Oral every 8 hours  octreotide  Injectable 100 MICROGram(s) SubCutaneous every 8 hours  pantoprazole  Injectable 40 milliGRAM(s) IV Push daily  sodium bicarbonate  Infusion 0.161 mEq/kG/Hr IV Continuous <Continuous>      Objective:    10-27 @ 07:01  -  10-28 @ 07:00  --------------------------------------------------------  IN: 1150 mL / OUT: 200 mL / NET: 950 mL      T(C): 35.1 (10-28-18 @ 05:00), Max: 36.3 (10-27-18 @ 13:41)  HR: 84 (10-28-18 @ 08:15) (56 - 99)  BP: 112/45 (10-28-18 @ 06:36) (72/41 - 123/56)  RR: 20 (10-28-18 @ 05:00) (20 - 22)  SpO2: 95% (10-28-18 @ 08:15) (56% - 99%)  Wt(kg): --      Physical Exam:  General: nonresponsive   Neck: supple, trachea midline  Lungs: Decreased, no wheeze/rhonchi  Cardiovascular: regular rate and rhythm, S1 S2  Abdomen: soft, nontender, no organomegaly present, bowel sounds normal  Neurological: unresponsive  Skin: no increased ecchymosis/petechiae/purpura  Lymph Nodes: no palpable cervical/supraclavicular lymph nodes enlargements  Extremities: Trace edema    LABS:                          7.9    32.53 )-----------( 124      ( 28 Oct 2018 09:12 )             24.5       10-28    150<H>  |  117<H>  |  67<H>  ----------------------------<  164<H>  3.4<L>   |  20<L>  |  4.00<H>    Ca    7.5<L>      28 Oct 2018 08:23  Phos  5.9     10-28  Mg     2.0     10-28    TPro  5.4<L>  /  Alb  1.4<L>  /  TBili  0.4  /  DBili  x   /  AST  19  /  ALT  17  /  AlkPhos  109  10-28      PT/INR - ( 27 Oct 2018 10:21 )   PT: 15.7 sec;   INR: 1.43 ratio         PTT - ( 27 Oct 2018 10:21 )  PTT:38.3 sec      ABG - ( 28 Oct 2018 01:11 )  pH, Arterial: 7.05  pH, Blood: x     /  pCO2: 68    /  pO2: 92    / HCO3: 15    / Base Excess: -10.8 /  SaO2: 95          MICROBIOLOGY:    GI PCR Panel, Stool (collected 26 Oct 2018 21:55)  Source: .Stool Feces  Final Report (26 Oct 2018 23:45):    GI PCR Results: NOT detected    *******Please Note:*******    GI panel PCR evaluates for:    Campylobacter, Plesiomonas shigelloides, Salmonella,    Vibrio, Yersinia enterocolitica, Enteroaggregative    Escherichia coli (EAEC), Enteropathogenic E.coli (EPEC),    Enterotoxigenic E. coli (ETEC) lt/st, Shiga-like    toxin-producing E. coli (STEC) stx1/stx2,    Shigella/ Enteroinvasive E. coli (EIEC), Cryptosporidium,    Cyclospora cayetanensis, Entamoeba histolytica,    Giardia lamblia, Adenovirus F 40/41, Astrovirus,    Norovirus GI/GII, Rotavirus A, Sapovirus      RADIOLOGY & ADDITIONAL STUDIES:    EXAM:  XR CHEST PORTABLE ROUTINE 1V                            PROCEDURE DATE:  10/28/2018          INTERPRETATION:  Exam Date: 10/28/2018 7:40 AM    History: Shortness of breath    Technique: 2 frontal portable views of the chest with comparison to    10/26/2018    Findings:    NG tube into the stomach. The heart is mildly enlarged. Trace to small   left pleural effusion. Increased markings in the left mid to lower lung   may represent pneumonia. Degenerative changes of the visualized osseous   structures.        Impression:    Trace to small left pleural effusion. Increased markings in the left mid   to lower lung may represent pneumonia    Assessment :  88 yo F w/ PMH of DM type 2, HTN, CKD stage 4 ,COPD on 3L home O2, diastolic HFpEF (last echo   10/2017) presented to ED w/ lethargy progressing to unresponsiveness.  Poss Asp pneumonia  Candiuria  She has metabolic encephalopathy secondary to hepatic encephalopathy, the etiology may be SAMIA   or underlying undiagnosed liver disease.   Developing metabolic acidosis from SAMIA which is  worsening  Acute hypercapnia respiratory failure multifactorial with multisystem organ failure    Plan :   - continue with IV Zosyn and diflucan  - aspiration precautions   - BIPAP per pulm  - Prognosis poor    D/w Dr Durán    Continue with present regiment.  Appropriate use of antibiotics and adverse effects reviewed.      I have discussed the above plan of care with patient/ family in detail. They expressed understanding of the  treatment plan . Risks, benefits and alternatives discussed in detail. I have asked if they have any questions or concerns and appropriately addressed them to the best of my ability .    > 35 minutes were spent in direct patient care reviewing notes, medications ,labs data/ imaging , discussion with multidisciplinary team.    Thank you for allowing me to participate in care of your patient .    Raymond Zabala MD  Infectious Disease  [phone]

## 2018-10-28 NOTE — CHART NOTE - NSCHARTNOTEFT_GEN_A_CORE
HPI:  Pt is a 89 yo F w/ PMH of DM type 2, HTN, COPD on 2.5L home O2, diastolic HFpEF (last echo 10/2017) presented to ED p/w ams, weakness, dysuria, dec po intake, ? hepatic encephelopathy  Called by RD to see patient for critical lactate of 2.9. Per chart review, patients course was complicated by SAMIA, Severe acidemia on ABG, hypotension, hypercapnic respiratory failure, cirrhosis with severe HE, ?Cdiff. Patient was evaluated by ICU attending and started on bolus of LR albumin for hypotension, bicarb amp and bicarb gtt for acidemia, and continued on lactulose and Rifaximin for HE.  Patient seen and examined at bedside with ICU PA Sigifredo Ordonez. Patient is not verbal and does not respond to basic commands.     CHART REVIEW:    Pt seen and examined at bedside      Physical exam  PHYSICAL EXAM:  Gen: frail elderly,  appears chronically ill  Neuro: unresponsive to voice or touch, does not withdraw to painful stimuli   HEENT: PERRL  CVS: RRR  Resp: course breath sounds b/l  Abd: soft, NTND        Assessment  Pt is a 89 yo F w/ PMH of DM type 2, HTN, COPD on 2.5L home O2, diastolic HFpEF (last echo 10/2017) presented to ED p/w ams, weakness, dysuria, dec po intake, ? hepatic encephelopathy  Called by RD to see patient for critical lactate of 2.9. Per chart review, patients course was complicated by SAMIA, Severe acidemia on ABG, hypotension, hypercapnic respiratory failure, cirrhosis with severe HE, ?Cdiff    Plan  Repeat blood work shows elevated WBC count   Potassium improved to 3.3. Additional dose ordered  SAMIA is stable   Lactate improved to 2.3   ABG reveals worsening acidemia with CO2 retention  Additional doses of bicarb ordered   Will continue current management as ordered and repeat labs in AM  - Discussed case w/RN who is aware and will contact MD pérez/further concerns should they arise    Discussed w/ HPI:  Pt is a 89 yo F w/ PMH of DM type 2, HTN, COPD on 2.5L home O2, diastolic HFpEF (last echo 10/2017) presented to ED p/w ams, weakness, dysuria, dec po intake, ? hepatic encephelopathy  Called by RD to see patient for critical lactate of 2.9. Per chart review, patients course was complicated by SAMIA, Severe acidemia on ABG, hypotension, hypercapnic respiratory failure, cirrhosis with severe HE, ?Cdiff. Patient was evaluated by ICU attending and started on bolus of LR albumin for hypotension, bicarb amp and bicarb gtt for acidemia, and continued on lactulose and Rifaximin for HE.  Patient seen and examined at bedside with ICU PA Sigifredo Ordonez. Patient is not verbal and does not respond to basic commands.     CHART REVIEW:    Pt seen and examined at bedside      Physical exam  PHYSICAL EXAM:  Gen: frail elderly,  appears chronically ill  Neuro: unresponsive to voice or touch, does not withdraw to painful stimuli   HEENT: PERRL  CVS: RRR  Resp: course breath sounds b/l  Abd: soft, NTND        Assessment  Pt is a 89 yo F w/ PMH of DM type 2, HTN, COPD on 2.5L home O2, diastolic HFpEF (last echo 10/2017) presented to ED p/w ams, weakness, dysuria, dec po intake, ? hepatic encephelopathy  Called by RD to see patient for critical lactate of 2.9. Per chart review, patients course was complicated by SAMIA, Severe acidemia on ABG, hypotension, hypercapnic respiratory failure, cirrhosis with severe HE, ?Cdiff    Plan  Repeat blood work shows elevated WBC count   Potassium improved to 3.3. Additional dose ordered  SAMIA is stable   Lactate improved to 2.3   ABG reveals worsening acidemia with CO2 retention  Additional doses of bicarb ordered   Will continue current management as ordered and repeat labs in AM  - Discussed case w/RN who is aware and will contact MD w/further concerns should they arise    Discussed w/Dr. Reyes

## 2018-10-28 NOTE — PROGRESS NOTE ADULT - PROBLEM SELECTOR PLAN 1
resp failure  sepsis  hiatal hernia  family at bedside  pt is dying  discussed condition with family  one son is flying from [place] on Thursday, they wish to provide supportive care until then  will follow and cont supportive care  pt is at high risk for death, abrupt decompensation  cont curr regimen, nebs, abx, bipap, warming blanket, assist with ADL  prognosis is very poor, pt is DNR DNI  will follow

## 2018-10-28 NOTE — PROGRESS NOTE ADULT - PROBLEM SELECTOR PLAN 1
Anemia is multifactorial due to renal failure and sepsis. Patient will receive 1 unit of PRBC today. Likely will benefit from Procrit 12635 Units TIW considering renal function.   Patient's condition and prognosis appear to be very poor.

## 2018-10-28 NOTE — PROGRESS NOTE ADULT - SUBJECTIVE AND OBJECTIVE BOX
Date/Time Patient Seen:  		  Referring MD:   Data Reviewed	       Patient is a 89y old  Female who presents with a chief complaint of weakness, decreased PO intake (28 Oct 2018 06:09)    in bed  seen and examined    Subjective/HPI     PAST MEDICAL & SURGICAL HISTORY:  Coronary artery disease with angina pectoris, unspecified vessel or lesion type, unspecified whether native or transplanted heart  Congestive heart failure  Asthma  Diabetes mellitus  HTN (hypertension)  No significant past surgical history        Medication list         MEDICATIONS  (STANDING):  albumin human 25% IVPB 50 milliLiter(s) IV Intermittent every 6 hours  ALBUTerol/ipratropium for Nebulization 3 milliLiter(s) Nebulizer every 6 hours  dextrose 5%. 1000 milliLiter(s) (50 mL/Hr) IV Continuous <Continuous>  dextrose 50% Injectable 12.5 Gram(s) IV Push once  dextrose 50% Injectable 25 Gram(s) IV Push once  dextrose 50% Injectable 25 Gram(s) IV Push once  ferrous    sulfate 325 milliGRAM(s) Oral daily  fluconAZOLE IVPB 100 milliGRAM(s) IV Intermittent every 24 hours  guaiFENesin  milliGRAM(s) Oral every 12 hours  heparin  Injectable 5000 Unit(s) SubCutaneous every 12 hours  lactulose Syrup 60 Gram(s) Oral four times a day  metoclopramide Injectable 5 milliGRAM(s) IV Push every 8 hours  midodrine 20 milliGRAM(s) Oral every 8 hours  octreotide  Injectable 100 MICROGram(s) SubCutaneous every 8 hours  pantoprazole    Tablet 40 milliGRAM(s) Oral before breakfast  piperacillin/tazobactam IVPB. 3.375 Gram(s) IV Intermittent every 12 hours  rifaximin 550 milliGRAM(s) Oral two times a day  sodium bicarbonate  Infusion 0.161 mEq/kG/Hr (75 mL/Hr) IV Continuous <Continuous>  vancomycin    Solution 125 milliGRAM(s) Oral every 6 hours    MEDICATIONS  (PRN):  dextrose 40% Gel 15 Gram(s) Oral once PRN Blood Glucose LESS THAN 70 milliGRAM(s)/deciliter  glucagon  Injectable 1 milliGRAM(s) IntraMuscular once PRN Glucose LESS THAN 70 milligrams/deciliter         Vitals log        ICU Vital Signs Last 24 Hrs  T(C): 35.1 (28 Oct 2018 05:00), Max: 36.3 (27 Oct 2018 13:41)  T(F): 95.1 (28 Oct 2018 05:00), Max: 97.4 (27 Oct 2018 13:41)  HR: 89 (28 Oct 2018 06:36) (56 - 99)  BP: 112/45 (28 Oct 2018 06:36) (72/41 - 123/56)  BP(mean): --  ABP: --  ABP(mean): --  RR: 20 (28 Oct 2018 05:00) (20 - 22)  SpO2: 99% (28 Oct 2018 06:36) (56% - 99%)           Input and Output:  I&O's Detail    27 Oct 2018 07:01  -  28 Oct 2018 07:00  --------------------------------------------------------  IN:    Albumin 25%: 100 mL    sodium bicarbonate  Infusion: 150 mL    sodium bicarbonate  Infusion: 300 mL    Solution: 600 mL  Total IN: 1150 mL    OUT:    Ureteral Catheter: 200 mL  Total OUT: 200 mL    Total NET: 950 mL          Lab Data                        8.3    39.44 )-----------( 146      ( 28 Oct 2018 01:32 )             26.0     10-28    151<H>  |  120<H>  |  66<H>  ----------------------------<  182<H>  3.3<L>   |  20<L>  |  3.70<H>    Ca    8.0<L>      28 Oct 2018 01:32  Phos  5.9     10-28  Mg     2.0     10-28    TPro  5.4<L>  /  Alb  1.4<L>  /  TBili  0.4  /  DBili  x   /  AST  19  /  ALT  17  /  AlkPhos  109  10-28    ABG - ( 28 Oct 2018 01:11 )  pH, Arterial: 7.05  pH, Blood: x     /  pCO2: 68    /  pO2: 92    / HCO3: 15    / Base Excess: -10.8 /  SaO2: 95                      Review of Systems	      Objective     Physical Examination    heart s1s2  lung dec BS      Pertinent Lab findings & Imaging      Samantha:  NO   Adequate UO     I&O's Detail    27 Oct 2018 07:01  -  28 Oct 2018 07:00  --------------------------------------------------------  IN:    Albumin 25%: 100 mL    sodium bicarbonate  Infusion: 150 mL    sodium bicarbonate  Infusion: 300 mL    Solution: 600 mL  Total IN: 1150 mL    OUT:    Ureteral Catheter: 200 mL  Total OUT: 200 mL    Total NET: 950 mL               Discussed with:     Cultures:	        Radiology

## 2018-10-28 NOTE — PROGRESS NOTE ADULT - SUBJECTIVE AND OBJECTIVE BOX
NEPHROLOGY INTERVAL HPI/OVERNIGHT EVENTS:  HPI:  Hx obtained from son and other family members as pt was altered.  Pt is a 89 yo F w/ PMH of DM type 2, HTN, COPD on 2.5L home O2, diastolic HFpEF (last echo 10/2017) presented to ED p/w ams, weakness, dysuria, dec po x past ~ 3 days. Of note Pt was recently admitted for ams, similar to current presentation and was d/c 6 days ago. On previous admission Pt was diagnosed and treated for UTI. Pt was initially doing well, but then developed burning with urination, AMS, and watery diarrhea x 3 days ago. Family reports that pt was given Bactrim for unresolved UTI, but that she was only able to take 2 doses, as she could not swallow the 3rd dose this AM. Pt has poor PO intake and EMS came to her house last night to give her IVF due to dehydration. Of note patient is normally bedbound and requires 24/7 home health aid. Pt is incontinent at baseline and wears diapers. Denies any Fevers, but endorses chills, and increased fatigue. Denies any SOB, CP, N/V. Family reports pt normally is able to converse without issue, but has been incoherent for ~ the past 3 days    Follow up Acute on CKD Stage 4/Hepatorenal syndrome  On BIPAP; AMS.       PAST MEDICAL & SURGICAL HISTORY:  Coronary artery disease with angina pectoris, unspecified vessel or lesion type, unspecified whether native or transplanted heart  Congestive heart failure  Asthma  Diabetes mellitus  HTN (hypertension)  No significant past surgical history      MEDICATIONS  (STANDING):  albumin human 25% IVPB 50 milliLiter(s) IV Intermittent every 6 hours  ALBUTerol/ipratropium for Nebulization 3 milliLiter(s) Nebulizer every 6 hours  dextrose 5%. 1000 milliLiter(s) (50 mL/Hr) IV Continuous <Continuous>  dextrose 50% Injectable 12.5 Gram(s) IV Push once  dextrose 50% Injectable 25 Gram(s) IV Push once  dextrose 50% Injectable 25 Gram(s) IV Push once  ferrous    sulfate 325 milliGRAM(s) Oral daily  fluconAZOLE IVPB 100 milliGRAM(s) IV Intermittent every 24 hours  guaiFENesin  milliGRAM(s) Oral every 12 hours  heparin  Injectable 5000 Unit(s) SubCutaneous every 12 hours  lactulose Syrup 60 Gram(s) Oral four times a day  metoclopramide Injectable 5 milliGRAM(s) IV Push every 8 hours  midodrine 20 milliGRAM(s) Oral every 8 hours  octreotide  Injectable 100 MICROGram(s) SubCutaneous every 8 hours  pantoprazole    Tablet 40 milliGRAM(s) Oral before breakfast  piperacillin/tazobactam IVPB. 3.375 Gram(s) IV Intermittent every 12 hours  rifaximin 550 milliGRAM(s) Oral two times a day  sodium bicarbonate  Infusion 0.08 mEq/kG/Hr (75 mL/Hr) IV Continuous <Continuous>  vancomycin    Solution 125 milliGRAM(s) Oral every 6 hours    MEDICATIONS  (PRN):  dextrose 40% Gel 15 Gram(s) Oral once PRN Blood Glucose LESS THAN 70 milliGRAM(s)/deciliter  glucagon  Injectable 1 milliGRAM(s) IntraMuscular once PRN Glucose LESS THAN 70 milligrams/deciliter      Allergies    No Known Allergies    Intolerances        Vital Signs Last 24 Hrs  T(C): 35.1 (28 Oct 2018 05:00), Max: 36.3 (27 Oct 2018 13:41)  T(F): 95.1 (28 Oct 2018 05:00), Max: 97.4 (27 Oct 2018 13:41)  HR: 88 (28 Oct 2018 05:00) (56 - 99)  BP: 72/41 (28 Oct 2018 05:00) (72/41 - 123/56)  BP(mean): --  RR: 20 (28 Oct 2018 05:00) (20 - 22)  SpO2: 91% (28 Oct 2018 05:00) (56% - 97%)  Daily     Daily     PHYSICAL EXAM:  GENERAL: Ill appearing, +BiPAP  HEAD:  Atraumatic, Normocephalic  EYES: Conjunctiva and sclera clear  ENMT: Moist mucous membranes  NECK: Supple  NERVOUS SYSTEM:  Not responsive to tactile or verbal stimuli  CHEST/LUNG: Scattered rhonchi B/L  HEART: Regular rate and rhythm; No murmurs, rubs, or gallops  ABDOMEN: Soft, Nondistended; Bowel sounds present  EXTREMITIES:  2+ Peripheral Pulses, No clubbing, cyanosis, or edema  SKIN: No rashes or lesions    LABS:                        8.3    39.44 )-----------( 146      ( 28 Oct 2018 01:32 )             26.0     10-28    151<H>  |  120<H>  |  66<H>  ----------------------------<  182<H>  3.3<L>   |  20<L>  |  3.70<H>    Ca    8.0<L>      28 Oct 2018 01:32  Phos  5.9     10-28  Mg     2.0     10-28    TPro  5.4<L>  /  Alb  1.4<L>  /  TBili  0.4  /  DBili  x   /  AST  19  /  ALT  17  /  AlkPhos  109  10-28    PT/INR - ( 27 Oct 2018 10:21 )   PT: 15.7 sec;   INR: 1.43 ratio         PTT - ( 27 Oct 2018 10:21 )  PTT:38.3 sec    Magnesium, Serum: 2.0 mg/dL (10-28 @ 01:32)  Phosphorus Level, Serum: 5.9 mg/dL (10-28 @ 01:32)    ABG - ( 28 Oct 2018 01:11 )  pH, Arterial: 7.05  pH, Blood: x     /  pCO2: 68    /  pO2: 92    / HCO3: 15    / Base Excess: -10.8 /  SaO2: 95                  RADIOLOGY & ADDITIONAL TESTS:

## 2018-10-28 NOTE — PROGRESS NOTE ADULT - PROBLEM SELECTOR PLAN 1
multifactorial, worsening cirrhosis w HE c/b HRS  CT showed cirrhotic liver and mod volume of abdominal pelvic ascites  sp egd w endoscopic placement of ngt  ammonia minimally improved, worsening renal fxn  cont xifaxamin/lactulose as tolerated  cont ppi  cont octreotide/midodrine/albumin  npo/ivf  f/u liver serologies  neuro and id following  renal recs appreciated, not a candidate for rrt  aspiration prec, elev hob   dw attg, supportive care from gi perspective  rest of care per primary team

## 2018-10-28 NOTE — PROGRESS NOTE ADULT - ASSESSMENT
Pt is a 88 yo F w/ PMH of DM type 2, HTN, COPD on 2.5L home O2, diastolic HFpEF (last echo 10/2017) presented to ED p/w AMS, weakness, dysuria, decreased po intake x past ~ 3 days   with prem now hepatorenal syndrome / multiorgan failure  sever metabolic encephalopathy .

## 2018-10-29 PROBLEM — R53.81 MALAISE: Status: ACTIVE | Noted: 2018-10-29

## 2018-10-29 PROBLEM — Z87.09 HISTORY OF ACUTE BRONCHITIS: Status: RESOLVED | Noted: 2017-03-26 | Resolved: 2018-10-29

## 2018-10-29 LAB
% ALBUMIN: 36.3 % — SIGNIFICANT CHANGE UP
% ALPHA 1: 6.6 % — SIGNIFICANT CHANGE UP
% ALPHA 2: 8.1 % — SIGNIFICANT CHANGE UP
% BETA: 19.7 % — SIGNIFICANT CHANGE UP
% GAMMA: 29.3 % — SIGNIFICANT CHANGE UP
% M SPIKE: SIGNIFICANT CHANGE UP %
ALBUMIN SERPL ELPH-MCNC: 2 G/DL — LOW (ref 3.6–5.5)
ALBUMIN/GLOB SERPL ELPH: 0.6 RATIO — SIGNIFICANT CHANGE UP
ALPHA1 GLOB SERPL ELPH-MCNC: 0.4 G/DL — SIGNIFICANT CHANGE UP (ref 0.1–0.4)
ALPHA2 GLOB SERPL ELPH-MCNC: 0.4 G/DL — LOW (ref 0.5–1)
AMMONIA BLD-MCNC: 64 UMOL/L — HIGH (ref 11–32)
ANION GAP SERPL CALC-SCNC: 12 MMOL/L — SIGNIFICANT CHANGE UP (ref 5–17)
B-GLOBULIN SERPL ELPH-MCNC: 1.1 G/DL — HIGH (ref 0.5–1)
BASE EXCESS BLDA CALC-SCNC: -0.5 MMOL/L — SIGNIFICANT CHANGE UP (ref -2–2)
BASOPHILS # BLD AUTO: 0.04 K/UL — SIGNIFICANT CHANGE UP (ref 0–0.2)
BASOPHILS NFR BLD AUTO: 0.2 % — SIGNIFICANT CHANGE UP (ref 0–2)
BLOOD GAS COMMENTS ARTERIAL: SIGNIFICANT CHANGE UP
BLOOD GAS COMMENTS ARTERIAL: SIGNIFICANT CHANGE UP
BUN SERPL-MCNC: 68 MG/DL — HIGH (ref 7–23)
CALCIUM SERPL-MCNC: 7.7 MG/DL — LOW (ref 8.5–10.1)
CHLORIDE SERPL-SCNC: 117 MMOL/L — HIGH (ref 96–108)
CO2 SERPL-SCNC: 25 MMOL/L — SIGNIFICANT CHANGE UP (ref 22–31)
CREAT SERPL-MCNC: 4.4 MG/DL — HIGH (ref 0.5–1.3)
CULTURE RESULTS: SIGNIFICANT CHANGE UP
CULTURE RESULTS: SIGNIFICANT CHANGE UP
EOSINOPHIL # BLD AUTO: 0.19 K/UL — SIGNIFICANT CHANGE UP (ref 0–0.5)
EOSINOPHIL NFR BLD AUTO: 0.9 % — SIGNIFICANT CHANGE UP (ref 0–6)
GAMMA GLOBULIN: 1.6 G/DL — SIGNIFICANT CHANGE UP (ref 0.6–1.6)
GLUCOSE SERPL-MCNC: 138 MG/DL — HIGH (ref 70–99)
HCO3 BLDA-SCNC: 24 MMOL/L — SIGNIFICANT CHANGE UP (ref 23–27)
HCT VFR BLD CALC: 27.6 % — LOW (ref 34.5–45)
HGB BLD-MCNC: 8.9 G/DL — LOW (ref 11.5–15.5)
HOROWITZ INDEX BLDA+IHG-RTO: 50 — SIGNIFICANT CHANGE UP
IMM GRANULOCYTES NFR BLD AUTO: 1.1 % — SIGNIFICANT CHANGE UP (ref 0–1.5)
INTERPRETATION 24H UR IFE-IMP: SIGNIFICANT CHANGE UP
INTERPRETATION SERPL IFE-IMP: SIGNIFICANT CHANGE UP
LKM AB SER-ACNC: 1.4 UNITS — SIGNIFICANT CHANGE UP (ref 0–20)
LYMPHOCYTES # BLD AUTO: 1.59 K/UL — SIGNIFICANT CHANGE UP (ref 1–3.3)
LYMPHOCYTES # BLD AUTO: 7.8 % — LOW (ref 13–44)
M-SPIKE: SIGNIFICANT CHANGE UP G/DL (ref 0–0)
MCHC RBC-ENTMCNC: 28.4 PG — SIGNIFICANT CHANGE UP (ref 27–34)
MCHC RBC-ENTMCNC: 32.2 GM/DL — SIGNIFICANT CHANGE UP (ref 32–36)
MCV RBC AUTO: 88.2 FL — SIGNIFICANT CHANGE UP (ref 80–100)
MONOCYTES # BLD AUTO: 1.07 K/UL — HIGH (ref 0–0.9)
MONOCYTES NFR BLD AUTO: 5.2 % — SIGNIFICANT CHANGE UP (ref 2–14)
NEUTROPHILS # BLD AUTO: 17.37 K/UL — HIGH (ref 1.8–7.4)
NEUTROPHILS NFR BLD AUTO: 84.8 % — HIGH (ref 43–77)
NRBC # BLD: 0 /100 WBCS — SIGNIFICANT CHANGE UP (ref 0–0)
PCO2 BLDA: 30 MMHG — LOW (ref 32–46)
PH BLDA: 7.49 — HIGH (ref 7.35–7.45)
PLATELET # BLD AUTO: 105 K/UL — LOW (ref 150–400)
PO2 BLDA: 141 MMHG — HIGH (ref 74–108)
POTASSIUM SERPL-MCNC: 3.2 MMOL/L — LOW (ref 3.5–5.3)
POTASSIUM SERPL-SCNC: 3.2 MMOL/L — LOW (ref 3.5–5.3)
PROT PATTERN SERPL ELPH-IMP: SIGNIFICANT CHANGE UP
RBC # BLD: 3.13 M/UL — LOW (ref 3.8–5.2)
RBC # FLD: 15.4 % — HIGH (ref 10.3–14.5)
SAO2 % BLDA: 100 % — HIGH (ref 92–96)
SODIUM SERPL-SCNC: 154 MMOL/L — HIGH (ref 135–145)
SPECIMEN SOURCE: SIGNIFICANT CHANGE UP
SPECIMEN SOURCE: SIGNIFICANT CHANGE UP
WBC # BLD: 20.49 K/UL — HIGH (ref 3.8–10.5)
WBC # FLD AUTO: 20.49 K/UL — HIGH (ref 3.8–10.5)

## 2018-10-29 PROCEDURE — 99233 SBSQ HOSP IP/OBS HIGH 50: CPT

## 2018-10-29 PROCEDURE — 99232 SBSQ HOSP IP/OBS MODERATE 35: CPT

## 2018-10-29 RX ORDER — LACTULOSE 10 G/15ML
30 SOLUTION ORAL THREE TIMES A DAY
Qty: 0 | Refills: 0 | Status: DISCONTINUED | OUTPATIENT
Start: 2018-10-29 | End: 2018-10-29

## 2018-10-29 RX ORDER — ERYTHROPOIETIN 10000 [IU]/ML
10000 INJECTION, SOLUTION INTRAVENOUS; SUBCUTANEOUS
Qty: 0 | Refills: 0 | Status: DISCONTINUED | OUTPATIENT
Start: 2018-10-29 | End: 2018-11-01

## 2018-10-29 RX ORDER — POTASSIUM CHLORIDE 20 MEQ
20 PACKET (EA) ORAL ONCE
Qty: 0 | Refills: 0 | Status: COMPLETED | OUTPATIENT
Start: 2018-10-29 | End: 2018-10-29

## 2018-10-29 RX ORDER — SODIUM BICARBONATE 1 MEQ/ML
0.05 SYRINGE (ML) INTRAVENOUS
Qty: 50 | Refills: 0 | Status: DISCONTINUED | OUTPATIENT
Start: 2018-10-29 | End: 2018-10-31

## 2018-10-29 RX ORDER — LACTULOSE 10 G/15ML
45 SOLUTION ORAL THREE TIMES A DAY
Qty: 0 | Refills: 0 | Status: DISCONTINUED | OUTPATIENT
Start: 2018-10-29 | End: 2018-11-01

## 2018-10-29 RX ADMIN — Medication 50 MILLILITER(S): at 13:10

## 2018-10-29 RX ADMIN — Medication 5 MILLIGRAM(S): at 13:06

## 2018-10-29 RX ADMIN — Medication 600 MILLIGRAM(S): at 18:15

## 2018-10-29 RX ADMIN — Medication 5 MILLIGRAM(S): at 22:24

## 2018-10-29 RX ADMIN — Medication 125 MILLIGRAM(S): at 11:49

## 2018-10-29 RX ADMIN — Medication 1 DROP(S): at 17:20

## 2018-10-29 RX ADMIN — HEPARIN SODIUM 5000 UNIT(S): 5000 INJECTION INTRAVENOUS; SUBCUTANEOUS at 05:33

## 2018-10-29 RX ADMIN — Medication 75 MEQ/KG/HR: at 15:17

## 2018-10-29 RX ADMIN — Medication 5 MILLIGRAM(S): at 05:35

## 2018-10-29 RX ADMIN — Medication 125 MILLIGRAM(S): at 05:33

## 2018-10-29 RX ADMIN — Medication 3 MILLILITER(S): at 07:49

## 2018-10-29 RX ADMIN — Medication 3 MILLILITER(S): at 14:06

## 2018-10-29 RX ADMIN — Medication 50 MILLILITER(S): at 18:13

## 2018-10-29 RX ADMIN — Medication 3 MILLILITER(S): at 01:41

## 2018-10-29 RX ADMIN — OCTREOTIDE ACETATE 100 MICROGRAM(S): 200 INJECTION, SOLUTION INTRAVENOUS; SUBCUTANEOUS at 22:31

## 2018-10-29 RX ADMIN — ERYTHROPOIETIN 10000 UNIT(S): 10000 INJECTION, SOLUTION INTRAVENOUS; SUBCUTANEOUS at 18:13

## 2018-10-29 RX ADMIN — HEPARIN SODIUM 5000 UNIT(S): 5000 INJECTION INTRAVENOUS; SUBCUTANEOUS at 18:15

## 2018-10-29 RX ADMIN — FLUCONAZOLE 100 MILLIGRAM(S): 150 TABLET ORAL at 14:17

## 2018-10-29 RX ADMIN — LACTULOSE 45 GRAM(S): 10 SOLUTION ORAL at 05:33

## 2018-10-29 RX ADMIN — Medication 3 MILLILITER(S): at 20:11

## 2018-10-29 RX ADMIN — PANTOPRAZOLE SODIUM 40 MILLIGRAM(S): 20 TABLET, DELAYED RELEASE ORAL at 11:49

## 2018-10-29 RX ADMIN — LACTULOSE 45 GRAM(S): 10 SOLUTION ORAL at 22:24

## 2018-10-29 RX ADMIN — Medication 50 MILLILITER(S): at 05:32

## 2018-10-29 RX ADMIN — Medication 20 MILLIEQUIVALENT(S): at 11:49

## 2018-10-29 RX ADMIN — OCTREOTIDE ACETATE 100 MICROGRAM(S): 200 INJECTION, SOLUTION INTRAVENOUS; SUBCUTANEOUS at 05:34

## 2018-10-29 RX ADMIN — LACTULOSE 45 GRAM(S): 10 SOLUTION ORAL at 13:06

## 2018-10-29 RX ADMIN — Medication 325 MILLIGRAM(S): at 11:49

## 2018-10-29 RX ADMIN — PIPERACILLIN AND TAZOBACTAM 25 GRAM(S): 4; .5 INJECTION, POWDER, LYOPHILIZED, FOR SOLUTION INTRAVENOUS at 18:34

## 2018-10-29 RX ADMIN — OCTREOTIDE ACETATE 100 MICROGRAM(S): 200 INJECTION, SOLUTION INTRAVENOUS; SUBCUTANEOUS at 13:10

## 2018-10-29 RX ADMIN — MIDODRINE HYDROCHLORIDE 20 MILLIGRAM(S): 2.5 TABLET ORAL at 13:50

## 2018-10-29 RX ADMIN — PIPERACILLIN AND TAZOBACTAM 25 GRAM(S): 4; .5 INJECTION, POWDER, LYOPHILIZED, FOR SOLUTION INTRAVENOUS at 11:34

## 2018-10-29 RX ADMIN — MIDODRINE HYDROCHLORIDE 20 MILLIGRAM(S): 2.5 TABLET ORAL at 22:24

## 2018-10-29 RX ADMIN — Medication 50 MILLILITER(S): at 01:25

## 2018-10-29 NOTE — PROGRESS NOTE ADULT - SUBJECTIVE AND OBJECTIVE BOX
Date/Time Patient Seen:  		  Referring MD:   Data Reviewed	       Patient is a 89y old  Female who presents with a chief complaint of weakness, decreased PO intake (28 Oct 2018 16:58)  in bed  seen and examined  on bipap    Subjective/HPI     PAST MEDICAL & SURGICAL HISTORY:  Coronary artery disease with angina pectoris, unspecified vessel or lesion type, unspecified whether native or transplanted heart  Congestive heart failure  Asthma  Diabetes mellitus  HTN (hypertension)  No significant past surgical history        Medication list         MEDICATIONS  (STANDING):  albumin human 25% IVPB 50 milliLiter(s) IV Intermittent every 6 hours  ALBUTerol/ipratropium for Nebulization 3 milliLiter(s) Nebulizer every 6 hours  dextrose 5%. 1000 milliLiter(s) (50 mL/Hr) IV Continuous <Continuous>  dextrose 50% Injectable 12.5 Gram(s) IV Push once  dextrose 50% Injectable 25 Gram(s) IV Push once  dextrose 50% Injectable 25 Gram(s) IV Push once  ferrous    sulfate 325 milliGRAM(s) Oral daily  fluconAZOLE IVPB 100 milliGRAM(s) IV Intermittent every 24 hours  guaiFENesin  milliGRAM(s) Oral every 12 hours  heparin  Injectable 5000 Unit(s) SubCutaneous every 12 hours  lactulose Syrup 45 Gram(s) Oral three times a day  metoclopramide Injectable 5 milliGRAM(s) IV Push every 8 hours  midodrine 20 milliGRAM(s) Oral every 8 hours  octreotide  Injectable 100 MICROGram(s) SubCutaneous every 8 hours  pantoprazole  Injectable 40 milliGRAM(s) IV Push daily  piperacillin/tazobactam IVPB. 3.375 Gram(s) IV Intermittent every 12 hours  rifaximin 550 milliGRAM(s) Oral two times a day  sodium bicarbonate  Infusion 0.161 mEq/kG/Hr (75 mL/Hr) IV Continuous <Continuous>  vancomycin    Solution 125 milliGRAM(s) Oral every 6 hours    MEDICATIONS  (PRN):  dextrose 40% Gel 15 Gram(s) Oral once PRN Blood Glucose LESS THAN 70 milliGRAM(s)/deciliter  glucagon  Injectable 1 milliGRAM(s) IntraMuscular once PRN Glucose LESS THAN 70 milligrams/deciliter         Vitals log        ICU Vital Signs Last 24 Hrs  T(C): 36.4 (29 Oct 2018 04:26), Max: 36.4 (29 Oct 2018 04:26)  T(F): 97.5 (29 Oct 2018 04:26), Max: 97.5 (29 Oct 2018 04:26)  HR: 84 (29 Oct 2018 04:26) (80 - 89)  BP: 147/66 (29 Oct 2018 04:26) (106/44 - 147/66)  BP(mean): --  ABP: --  ABP(mean): --  RR: 18 (29 Oct 2018 04:26) (18 - 19)  SpO2: 94% (29 Oct 2018 04:26) (94% - 100%)           Input and Output:  I&O's Detail    27 Oct 2018 07:01  -  28 Oct 2018 07:00  --------------------------------------------------------  IN:    Albumin 25%: 100 mL    sodium bicarbonate  Infusion: 150 mL    sodium bicarbonate  Infusion: 300 mL    Solution: 600 mL  Total IN: 1150 mL    OUT:    Ureteral Catheter: 200 mL  Total OUT: 200 mL    Total NET: 950 mL      28 Oct 2018 07:01  -  29 Oct 2018 06:19  --------------------------------------------------------  IN:    Albumin 25%: 50 mL    sodium bicarbonate  Infusion: 900 mL    Solution: 50 mL  Total IN: 1000 mL    OUT:    Ureteral Catheter: 100 mL  Total OUT: 100 mL    Total NET: 900 mL          Lab Data                        7.4    26.53 )-----------( 117      ( 28 Oct 2018 16:33 )             22.8     10-28    154<H>  |  119<H>  |  70<H>  ----------------------------<  205<H>  3.0<L>   |  25  |  4.20<H>    Ca    7.4<L>      28 Oct 2018 16:33  Phos  5.9     10-28  Mg     2.0     10-28    TPro  5.4<L>  /  Alb  1.4<L>  /  TBili  0.4  /  DBili  x   /  AST  19  /  ALT  17  /  AlkPhos  109  10-28    ABG - ( 28 Oct 2018 01:11 )  pH, Arterial: 7.05  pH, Blood: x     /  pCO2: 68    /  pO2: 92    / HCO3: 15    / Base Excess: -10.8 /  SaO2: 95                      Review of Systems	      Objective     Physical Examination  heart s1s2  lung dec BS  on bipap        Pertinent Lab findings & Imaging      Samantha:  NO   Adequate UO     I&O's Detail    27 Oct 2018 07:01  -  28 Oct 2018 07:00  --------------------------------------------------------  IN:    Albumin 25%: 100 mL    sodium bicarbonate  Infusion: 150 mL    sodium bicarbonate  Infusion: 300 mL    Solution: 600 mL  Total IN: 1150 mL    OUT:    Ureteral Catheter: 200 mL  Total OUT: 200 mL    Total NET: 950 mL      28 Oct 2018 07:01  -  29 Oct 2018 06:19  --------------------------------------------------------  IN:    Albumin 25%: 50 mL    sodium bicarbonate  Infusion: 900 mL    Solution: 50 mL  Total IN: 1000 mL    OUT:    Ureteral Catheter: 100 mL  Total OUT: 100 mL    Total NET: 900 mL               Discussed with:     Cultures:	        Radiology

## 2018-10-29 NOTE — PROGRESS NOTE ADULT - ASSESSMENT
Acute on CKD Stage 4  Hepatorenal Syndrome  Liver Cirrhosis  Sepsis/PNA/UTI  Hypernatremia  Hypokalemia  Dehydration  CHF/COPD   Metabolic Acidosis    -Renal indices are worsening; oliguric  -IVF with sodium bicarb; acidemia is severe; mainly driven by CO2 retention; +BiPAP but patient's current mental status will make this measure less effective, but being DNI, there are no other options  -Midodrine  -IV Albumin  -Octreotide  -Unable to give Lasix to force diurese because of severe hypotension as well  -Abx per ID  -Urine studies reviewed  -Renal sono shows no hydronephrosis  -Daily chemistries  -No indication for RRT; poor prognosis. Not a candidate for dialysis as the risks far outweigh the benefits in this case. I discussed this matter with son at length at bedside who understood clearly. We agreed to conservative renal management as is currently being given    Thank you Acute on CKD Stage 4  Hepatorenal Syndrome  Liver Cirrhosis  Sepsis/PNA/UTI  Hypernatremia  Hypokalemia  Dehydration  CHF/COPD   Metabolic Acidosis    -Renal indices are worsening; oliguric  -IVF with sodium bicarb; acidemia is severe; mainly driven by CO2 retention; +BiPAP but patient's current mental status will make this measure less effective, but being DNI, there are no other options  -Midodrine  -IV Albumin  -Octreotide  -Unable to give Lasix to force diurese because of severe hypotension as well as hypernatremia  -Abx per ID  -Urine studies reviewed  -Renal sono shows no hydronephrosis  -Daily chemistries  -No indication for RRT; poor prognosis. Not a candidate for dialysis as the risks far outweigh the benefits in this case. I discussed this matter with son at length at bedside who understood clearly. We agreed to conservative renal management as is currently being given       spent more than 50 min altogether, 35 min in pt assessment and plan and rest in related discussion with son     Thank you Acute on CKD Stage 4  Hepatorenal Syndrome  Liver Cirrhosis  Sepsis/PNA/UTI  Hypernatremia  Hypokalemia  Dehydration  CHF/COPD   Metabolic Acidosis   better  Hypernatremia    -Renal indices are worsening; oliguric  -IVF with sodium bicarb; acidemia is severe; mainly driven by CO2 retention; +BiPAP but patient's current mental status will make this measure less effective, but being DNI, there are no other options  -Midodrine  -IV Albumin  -Octreotide  -Unable to give Lasix to force diurese because of severe hypotension as well as hypernatremia  -Abx per ID  -Urine studies reviewed  -Renal sono shows no hydronephrosis  -Daily chemistries  -No indication for RRT; poor prognosis. Not a candidate for dialysis as the risks far outweigh the benefits in this case. I discussed this matter with son at length at bedside who understood clearly. We agreed to conservative renal management as is currently being given       spent more than 50 min altogether, 35 min in pt assessment and plan and rest in related discussion with son   Changed IVF to 50 mEq NaHCO3 in a liter of D5W @ 75 as Na is high and acidosis is better    Thank you

## 2018-10-29 NOTE — PROGRESS NOTE ADULT - SUBJECTIVE AND OBJECTIVE BOX
INTERVAL HPI/OVERNIGHT EVENTS:  pt seen and examined, family present  on bipap  per overnight rn sp 1u prbc, no vomiting, +green bms on lactulose, tf held  labs pending    MEDICATIONS  (STANDING):  albumin human 25% IVPB 50 milliLiter(s) IV Intermittent every 6 hours  ALBUTerol/ipratropium for Nebulization 3 milliLiter(s) Nebulizer every 6 hours  dextrose 5%. 1000 milliLiter(s) (50 mL/Hr) IV Continuous <Continuous>  dextrose 50% Injectable 12.5 Gram(s) IV Push once  dextrose 50% Injectable 25 Gram(s) IV Push once  dextrose 50% Injectable 25 Gram(s) IV Push once  ferrous    sulfate 325 milliGRAM(s) Oral daily  fluconAZOLE IVPB 100 milliGRAM(s) IV Intermittent every 24 hours  guaiFENesin  milliGRAM(s) Oral every 12 hours  heparin  Injectable 5000 Unit(s) SubCutaneous every 12 hours  lactulose Syrup 45 Gram(s) Oral three times a day  metoclopramide Injectable 5 milliGRAM(s) IV Push every 8 hours  midodrine 20 milliGRAM(s) Oral every 8 hours  octreotide  Injectable 100 MICROGram(s) SubCutaneous every 8 hours  pantoprazole  Injectable 40 milliGRAM(s) IV Push daily  piperacillin/tazobactam IVPB. 3.375 Gram(s) IV Intermittent every 12 hours  rifaximin 550 milliGRAM(s) Oral two times a day  sodium bicarbonate  Infusion 0.161 mEq/kG/Hr (75 mL/Hr) IV Continuous <Continuous>  vancomycin    Solution 125 milliGRAM(s) Oral every 6 hours    MEDICATIONS  (PRN):  dextrose 40% Gel 15 Gram(s) Oral once PRN Blood Glucose LESS THAN 70 milliGRAM(s)/deciliter  glucagon  Injectable 1 milliGRAM(s) IntraMuscular once PRN Glucose LESS THAN 70 milligrams/deciliter      Allergies    No Known Allergies    Intolerances        Review of Systems:  unable to obtain    Vital Signs Last 24 Hrs  T(C): 36.4 (29 Oct 2018 04:26), Max: 36.4 (29 Oct 2018 04:26)  T(F): 97.5 (29 Oct 2018 04:26), Max: 97.5 (29 Oct 2018 04:26)  HR: 86 (29 Oct 2018 07:51) (80 - 88)  BP: 147/66 (29 Oct 2018 04:26) (106/44 - 147/66)  BP(mean): --  RR: 18 (29 Oct 2018 04:26) (18 - 19)  SpO2: 97% (29 Oct 2018 07:51) (94% - 100%)    PHYSICAL EXAM:    Constitutional: NAD, lying in bed +ngt on bipap  HEENT: ncat  Neck: No LAD  Respiratory: dec bs  Cardiovascular: S1 and S2  Gastrointestinal: soft appears nt +dt  Extremities: No peripheral edema  Neurological: lethargic  Skin: No rashes    LABS:                        7.4    26.53 )-----------( 117      ( 28 Oct 2018 16:33 )             22.8     10-28    154<H>  |  119<H>  |  70<H>  ----------------------------<  205<H>  3.0<L>   |  25  |  4.20<H>    Ca    7.4<L>      28 Oct 2018 16:33  Phos  5.9     10-28  Mg     2.0     10-28    TPro  5.4<L>  /  Alb  1.4<L>  /  TBili  0.4  /  DBili  x   /  AST  19  /  ALT  17  /  AlkPhos  109  10-28    PT/INR - ( 27 Oct 2018 10:21 )   PT: 15.7 sec;   INR: 1.43 ratio         PTT - ( 27 Oct 2018 10:21 )  PTT:38.3 sec      RADIOLOGY & ADDITIONAL TESTS:

## 2018-10-29 NOTE — PROGRESS NOTE ADULT - PROBLEM SELECTOR PLAN 1
pt is dying  no need for ABG  on BIPAP  pt is DNR DNI  monitor vs and Sat  prognosis very poor, family is aware  on emp ABX  family wishes to extend support until Thursday - son flying from [facility]  cont supportive measures, pt is dying

## 2018-10-29 NOTE — PROGRESS NOTE ADULT - SUBJECTIVE AND OBJECTIVE BOX
ID Progress note     Name: MALINDA SULTANA  Age: 89y  Gender: Female  MRN: [id number]    Interval History-- Events noted doing poorly , remains unresponsive , on VM , has NGT placed , getting all meds thru NGT. Episode of hypothermia yesterday . Renal function worsening . Son at bedside .  Notes reviewed    Past Medical History--  Coronary artery disease with angina pectoris, unspecified vessel or lesion type, unspecified whether native or transplanted heart  Congestive heart failure  Asthma  Diabetes mellitus  HTN (hypertension)  No significant past surgical history      For details regarding the patient's social history, family history, and other miscellaneous elements, please refer the initial infectious diseases consultation and/or the admitting history and physical examination for this admission.    Allergies--  Allergies    No Known Allergies    Intolerances        Medications--  Antibiotics:  fluconAZOLE IVPB 100 milliGRAM(s) IV Intermittent every 24 hours  piperacillin/tazobactam IVPB. 3.375 Gram(s) IV Intermittent every 12 hours  rifaximin 550 milliGRAM(s) Oral two times a day  vancomycin    Solution 125 milliGRAM(s) Oral every 6 hours    Immunologic:    Other:  albumin human 25% IVPB  ALBUTerol/ipratropium for Nebulization  dextrose 40% Gel PRN  dextrose 5%.  dextrose 50% Injectable  dextrose 50% Injectable  dextrose 50% Injectable  ferrous    sulfate  glucagon  Injectable PRN  guaiFENesin ER  heparin  Injectable  lactulose Syrup  metoclopramide Injectable  midodrine  octreotide  Injectable  pantoprazole  Injectable  sodium bicarbonate  Infusion      Review of Systems--  Review of systems unable to be obtained secondary to clinical condition.    Physical Examination--    Vital Signs: T(F): 97.5 (10-29-18 @ 04:26), Max: 97.5 (10-29-18 @ 04:26)  HR: 86 (10-29-18 @ 08:11)  BP: 147/66 (10-29-18 @ 04:26)  RR: 18 (10-29-18 @ 04:26)  SpO2: 97% (10-29-18 @ 08:11)  Wt(kg): --  General: poorly responsive , family says she is moving her arms,   HEENT: AT/NC. PERRL. EOMI. Anicteric. Conjunctiva pink and moist. NGT + poor oral hygiene  Neck: Not rigid. No sense of mass.  Nodes: None palpable.  Lungs: Coarse breath sounds  bilaterally without rales, wheezing or rhonchi  Heart: Regular rate and rhythm. No Murmur. No rub. No gallop. No palpable thrill.  Abdomen: Bowel sounds present and normoactive. Soft. Nondistended. Nontender. No sense of mass. No organomegaly.  Back: No spinal tenderness. No costovertebral angle tenderness.   Extremities: No cyanosis or clubbing. No edema.   Skin: Warm. Dry. Good turgor. No rash. No vasculitic stigmata.  Psychiatric: Appropriate affect and mood for situation.         Laboratory Studies--  CBC                        8.9    20.49 )-----------( 105      ( 29 Oct 2018 09:54 )             27.6       Chemistries  10-29    154<H>  |  117<H>  |  68<H>  ----------------------------<  138<H>  3.2<L>   |  25  |  4.40<H>    Ca    7.7<L>      29 Oct 2018 09:54  Phos  5.9     10-28  Mg     2.0     10-28    TPro  5.4<L>  /  Alb  1.4<L>  /  TBili  0.4  /  DBili  x   /  AST  19  /  ALT  17  /  AlkPhos  109  10-28    Ammonia, Serum (10.29.18 @ 09:54)    Ammonia, Serum: 64 umol/L    Blood Gas Profile - Arterial in AM (10.29.18 @ 06:40)    pH, Arterial: 7.49    pCO2, Arterial: 30 mmHg    pO2, Arterial: 141 mmHg    HCO3, Arterial: 24 mmol/L    Base Excess, Arterial: -0.5 mmol/L    Oxygen Saturation, Arterial: 100 %    FIO2, Arterial: 50.0    Blood Gas Comments Arterial: Left. Radial.PMAT        Recent Cultures:    GI PCR Panel, Stool (collected 26 Oct 2018 21:55)  Source: .Stool Feces  Final Report (26 Oct 2018 23:45):    GI PCR Results: NOT detected    *******Please Note:*******    GI panel PCR evaluates for:    Campylobacter, Plesiomonas shigelloides, Salmonella,    Vibrio, Yersinia enterocolitica, Enteroaggregative    Escherichia coli (EAEC), Enteropathogenic E.coli (EPEC),    Enterotoxigenic E. coli (ETEC) lt/st, Shiga-like    toxin-producing E. coli (STEC) stx1/stx2,    Shigella/ Enteroinvasive E. coli (EIEC), Cryptosporidium,    Cyclospora cayetanensis, Entamoeba histolytica,    Giardia lamblia, Adenovirus F 40/41, Astrovirus,    Norovirus GI/GII, Rotavirus A, Sapovirus        Radiology:  Xray Chest 1 View- PORTABLE-Routine (10.28.18 @ 07:40) >  Findings:    NG tube into the stomach. The heart is mildly enlarged. Trace to small   left pleural effusion. Increased markings in the left mid to lower lung   may represent pneumonia. Degenerative changes of the visualized osseous   structures.        Assessment--  Assessment :  89 yo F w/ PMH of DM type 2, HTN, CKD stage 4 ,COPD on 3L home O2, diastolic HFpEF (last echo   10/2017) presented to ED w/ lethargy progressing to unresponsiveness. Ct head x 2 negative   Poss Asp pneumonia  Candiuria  She has metabolic encephalopathy secondary to hepatic encephalopathy, the etiology may be SAMIA   or underlying undiagnosed liver disease.   Developing metabolic acidosis from SAMIA which is  worsening  Acute hypercapnia respiratory failure multifactorial with multisystem organ failure    Plan :   - continue with IV Zosyn and diflucan x 7 days   - aspiration precautions   - BIPAP per pulm  - Prognosis poor  - Nephrology follow up     Continue with present regiment.  Appropriate use of antibiotics and adverse effects reviewed.      I have discussed the above plan of care with patient 's family in detail. They expressed understanding of the treatment plan . Risks, benefits and alternatives discussed in detail. I have asked if they have any questions or concerns and appropriately addressed them to the best of my ability .      > 35 minutes spent in direct patient care reviewing  the notes, lab data/ imaging , discussion with multidisciplinary team. All questions were addressed and answered to the best of my capacity .    Thank you for allowing me to participate in the care of your patient .        Jaclyn Barnes MD  [phone]

## 2018-10-29 NOTE — PROGRESS NOTE ADULT - ATTENDING COMMENTS
pt seen and examine see above -f DM type 2, HTN, COPD on 2.5L home O2, diastolic HFpEF (last echo 10/2017) presented to ED p/w AMS, weakness, dysuria,  recent diarrhea decreased po intake x past ~ 3 days.   admitted with  ams sec to   muti factorial   metabolic encephalopathy  prem now  with hepatorenal syndrome  with liver cirrhosis  unknown etiology  , sever metabolic acidosis / lactic acidosis  s/p   fluid bicarb   serum    acidosis little better  .    asp pna   iv abx zosyne   .  uti candida on antifungal    .    high ammonia / cirrhosis on lactulose via ng tube   repeat ammonia better  .   leucocytosis sec to possible  aspiration pna on iv abx zosyne , blood cult neg , hold tube feed  .   anemia of chr renal dis  - fu hb  s/p 1 unit prbc  .   hepatorenal syndrome -   cont octreotide/midodrine/albumin , as per gi dr styles    not a candidate for rrt. over all prognosis is very poor family aware. 2 son is flying from [place]  family wish to provide supportive care until then  IVF with sodium bicarb; acidemia is severe; mainly driven by CO2 retention; +BiPAP but patient's current mental status will make this measure less effective,   -Renal sono shows no hydronephrosis  -No indication for RRT; poor prognosis. Not a candidate for dialysis as the risks far outweigh the benefits in this case as per renal dr díaz . pt son aware with all tt plan . will start hospisce evaluation if pt son agree.

## 2018-10-29 NOTE — PROGRESS NOTE ADULT - PROBLEM SELECTOR PLAN 3
on ckd3  2/2 to dehydration    and now hepatorenal  with liver cirrhosis unknown etiology    -Hold nephrotoxic agents  - Renal following dr choi/ arjun

## 2018-10-29 NOTE — PROGRESS NOTE ADULT - ASSESSMENT
Patient is an 89 years old woman with sepsis, hypotension, respiratory failure and renal failure. Patient was found to have also Liver cirrhosis and hepatic encephalopathy. Hematology was consulted for anemia.

## 2018-10-29 NOTE — PROGRESS NOTE ADULT - SUBJECTIVE AND OBJECTIVE BOX
[facility] - John Tariq, No, Nirav, Chito Tapia Savella  Office Number:  [phone]    Patient resting comfortably in bed in NAD.  She is on BIPAP.  Unable to provide any history.      F/U for:  Diastolic CHF      MEDICATIONS  (STANDING):  albumin human 25% IVPB 50 milliLiter(s) IV Intermittent every 6 hours  ALBUTerol/ipratropium for Nebulization 3 milliLiter(s) Nebulizer every 6 hours  dextrose 5%. 1000 milliLiter(s) (50 mL/Hr) IV Continuous <Continuous>  dextrose 50% Injectable 12.5 Gram(s) IV Push once  dextrose 50% Injectable 25 Gram(s) IV Push once  dextrose 50% Injectable 25 Gram(s) IV Push once  ferrous    sulfate 325 milliGRAM(s) Oral daily  fluconAZOLE IVPB 100 milliGRAM(s) IV Intermittent every 24 hours  guaiFENesin  milliGRAM(s) Oral every 12 hours  heparin  Injectable 5000 Unit(s) SubCutaneous every 12 hours  lactulose Syrup 45 Gram(s) Oral three times a day  metoclopramide Injectable 5 milliGRAM(s) IV Push every 8 hours  midodrine 20 milliGRAM(s) Oral every 8 hours  octreotide  Injectable 100 MICROGram(s) SubCutaneous every 8 hours  pantoprazole  Injectable 40 milliGRAM(s) IV Push daily  piperacillin/tazobactam IVPB. 3.375 Gram(s) IV Intermittent every 12 hours  rifaximin 550 milliGRAM(s) Oral two times a day  sodium bicarbonate  Infusion 0.161 mEq/kG/Hr (75 mL/Hr) IV Continuous <Continuous>  vancomycin    Solution 125 milliGRAM(s) Oral every 6 hours    MEDICATIONS  (PRN):  dextrose 40% Gel 15 Gram(s) Oral once PRN Blood Glucose LESS THAN 70 milliGRAM(s)/deciliter  glucagon  Injectable 1 milliGRAM(s) IntraMuscular once PRN Glucose LESS THAN 70 milligrams/deciliter      Allergies    No Known Allergies    Intolerances        Vital Signs Last 24 Hrs  T(C): 36.4 (29 Oct 2018 04:26), Max: 36.4 (29 Oct 2018 04:26)  T(F): 97.5 (29 Oct 2018 04:26), Max: 97.5 (29 Oct 2018 04:26)  HR: 86 (29 Oct 2018 08:11) (80 - 88)  BP: 147/66 (29 Oct 2018 04:26) (106/44 - 147/66)  BP(mean): --  RR: 18 (29 Oct 2018 04:26) (18 - 19)  SpO2: 97% (29 Oct 2018 08:11) (94% - 100%)    I&O's Summary    28 Oct 2018 07:01  -  29 Oct 2018 07:00  --------------------------------------------------------  IN: 1850 mL / OUT: 250 mL / NET: 1600 mL        ON EXAM:    Constitutional: bedbound, not responsive  Lungs:  Non-labored, breath sounds are audible, + mild wheezing in all lung fields  Cardiovascular: RRR.  S1 and S2 positive.  No murmurs, rubs, gallops or clicks  Gastrointestinal: Bowel Sounds present, soft, nontender.   Lymph: No peripheral edema.   Neurological: lethargic, unable to assess  Psych:  lethargic, unable to assess      LABS: All Labs Reviewed:                        7.4    26.53 )-----------( 117      ( 28 Oct 2018 16:33 )             22.8                         7.9    32.53 )-----------( 124      ( 28 Oct 2018 09:12 )             24.5                         8.3    39.44 )-----------( 146      ( 28 Oct 2018 01:32 )             26.0     28 Oct 2018 16:33    154    |  119    |  70     ----------------------------<  205    3.0     |  25     |  4.20   28 Oct 2018 08:23    150    |  117    |  67     ----------------------------<  164    3.4     |  20     |  4.00   28 Oct 2018 01:32    151    |  120    |  66     ----------------------------<  182    3.3     |  20     |  3.70     Ca    7.4        28 Oct 2018 16:33  Ca    7.5        28 Oct 2018 08:23  Ca    8.0        28 Oct 2018 01:32  Phos  5.9       28 Oct 2018 01:32  Mg     2.0       28 Oct 2018 01:32    TPro  5.4    /  Alb  1.4    /  TBili  0.4    /  DBili  x      /  AST  19     /  ALT  17     /  AlkPhos  109    28 Oct 2018 01:32  TPro  5.5    /  Alb  1.4    /  TBili  0.4    /  DBili  .20    /  AST  21     /  ALT  20     /  AlkPhos  111    27 Oct 2018 10:21  TPro  5.4    /  Alb  x      /  TBili  x      /  DBili  x      /  AST  x      /  ALT  x      /  AlkPhos  x      26 Oct 2018 19:09    PT/INR - ( 27 Oct 2018 10:21 )   PT: 15.7 sec;   INR: 1.43 ratio         PTT - ( 27 Oct 2018 10:21 )  PTT:38.3 sec      Blood Culture: Organism --  Gram Stain Blood -- Gram Stain --  Specimen Source .Stool Feces  Culture-Blood --    Organism --  Gram Stain Blood -- Gram Stain --  Specimen Source .Blood Blood-Venous  Culture-Blood --

## 2018-10-29 NOTE — PROGRESS NOTE ADULT - PROBLEM SELECTOR PLAN 1
Anemia is multifactorial--likely due to renal failure and sepsis.   s/p 1 unit of PRBC 10/28/2018  abn spep--patient son declines any further work up or treatment for abn spep at this time  will add Procrit 13101 Units TIW--M/W/F and d/w procrit if hb is over 10  Patient's condition and overall prognosis appear to be very poor.   I d/w patient son at bedside at length.

## 2018-10-29 NOTE — PROGRESS NOTE ADULT - PROBLEM SELECTOR PLAN 2
2/2 poor PO intake  -Hold Lasix , stable strict is/ os   -Continue gentle IV fluids-with s/p bicarb with sever metabolic acidosis little better .   -Hypernatremia   fluid with  now dextrose  fu bmp  .

## 2018-10-29 NOTE — PROGRESS NOTE ADULT - ASSESSMENT
Mrs. Ha is a 89 yo F w/ PMH of DM type 2, HTN, COPD on 2.5L home O2, diastolic HFpEF (last echo 10/2017) presented to ED initially presenting AMS, weakness, dysuria, decreased po intake x past ~ 3 days, now with significant metabolic/hepatic encephalopathy.  Called to evaluate for cardiac clearance prior to endoscopic PEG placement.  s/p NGT placement, tolerated well.    - LV function normal as of 10/2017  - EKG with SR, LAD and poor r wave progression, unchanged from 2016.  - No sign of volume overload  - Hold Lasix in the setting of SAMIA and dehydration  - Continue BIPAP for respiratory support  - Family aware that she is critically ill.  Maintaining support until family can see her Thursday  - Poor prognosis. Actively dying.  - will sign off.  call with any questions.

## 2018-10-29 NOTE — PROGRESS NOTE ADULT - SUBJECTIVE AND OBJECTIVE BOX
Patient is a 89y old  Female who presents with a chief complaint of weakness, decreased PO intake (29 Oct 2018 12:52)      HPI:  Hx obtained from son and other family members as pt was altered.  Pt is a 89 yo F w/ PMH of DM type 2, HTN, COPD on 2.5L home O2, diastolic HFpEF (last echo 10/2017) presented to ED p/w ams, weakness, dysuria, dec po x past ~ 3 days. Of note Pt was recently admitted for ams, similar to current presentation and was d/c 6 days ago. On previous admission Pt was diagnosed and treated for UTI. Pt was initially doing well, but then developed burning with urination, AMS, and watery diarrhea x 3 days ago. Family reports that pt was given Bactrim for unresolved UTI, but that she was only able to take 2 doses, as she could not swallow the 3rd dose this AM. Pt has poor PO intake and EMS came to her house last night to give her IVF due to dehydration. Of note patient is normally bedbound and requires 24/7 home health aid. Pt is incontinent at baseline and wears diapers. Denies any Fevers, but endorses chills, and increased fatigue. Denies any SOB, CP, N/V. Family reports pt normally is able to converse without issue, but has been incoherent for ~ the past 3 days    weakness, dysuria, decreased po intake x past ~ 3 days. admitted with   a ms  sec to metabolic encephalopathy multifactorial prem on ckd3 now with hepatorenal syndrome , cirrhosis and ascites  unknown etiology high ammonia   .   pt seen and examine today -  on bipap   encephalopathic  , mendez   oliguric , no fever , npo hold tube feed , loose stool still present  .        INTERVAL HPI/OVERNIGHT EVENTS:  T(C): 36.4 (10-29-18 @ 04:26), Max: 36.4 (10-29-18 @ 04:26)  HR: 86 (10-29-18 @ 08:11) (80 - 88)  BP: 147/66 (10-29-18 @ 04:26) (106/44 - 147/66)  RR: 18 (10-29-18 @ 04:26) (18 - 19)  SpO2: 97% (10-29-18 @ 08:11) (94% - 100%)  Wt(kg): --  I&O's Summary    28 Oct 2018 07:01  -  29 Oct 2018 07:00  --------------------------------------------------------  IN: 1850 mL / OUT: 250 mL / NET: 1600 mL  REVIEW OF SYSTEMS:   unable  to obtain   as encephalopathic     PHYSICAL EXAM:  GENERAL: on  bipap /encephalopathic   HEAD:  Atraumatic, Normocephalic  EYES: EOMI, PERRLA, conjunctiva and sclera clear  ENMT:  moist   mucous membranes,  No lesions  NECK: Supple,   NERVOUS SYSTEM:  Alert & Oriented X0,   CHEST/LUNG: percussion  rt lower  coarse    ; No rales, rhonchi, wheezing,   HEART: Regular rate and rhythm; No murmurs, no tachy   ABDOMEN: Soft,  Nondistended; Bowel sounds present / no tenderness   EXTREMITIES:  2+ Peripheral Pulses, No clubbing, cyanosis,  1 pulse pitting   edema bl low ext   gu mendez   SKIN: No rashes or lesions/stage 3buttock  clean           MEDICATIONS  (STANDING):  albumin human 25% IVPB 50 milliLiter(s) IV Intermittent every 6 hours  ALBUTerol/ipratropium for Nebulization 3 milliLiter(s) Nebulizer every 6 hours  dextrose 5%. 1000 milliLiter(s) (50 mL/Hr) IV Continuous <Continuous>  dextrose 50% Injectable 12.5 Gram(s) IV Push once  dextrose 50% Injectable 25 Gram(s) IV Push once  dextrose 50% Injectable 25 Gram(s) IV Push once  ferrous    sulfate 325 milliGRAM(s) Oral daily  fluconAZOLE IVPB 100 milliGRAM(s) IV Intermittent every 24 hours  guaiFENesin  milliGRAM(s) Oral every 12 hours  heparin  Injectable 5000 Unit(s) SubCutaneous every 12 hours  lactulose Syrup 45 Gram(s) Oral three times a day  metoclopramide Injectable 5 milliGRAM(s) IV Push every 8 hours  midodrine 20 milliGRAM(s) Oral every 8 hours  octreotide  Injectable 100 MICROGram(s) SubCutaneous every 8 hours  pantoprazole  Injectable 40 milliGRAM(s) IV Push daily  piperacillin/tazobactam IVPB. 3.375 Gram(s) IV Intermittent every 12 hours  rifaximin 550 milliGRAM(s) Oral two times a day  sodium bicarbonate  Infusion 0.054 mEq/kG/Hr (75 mL/Hr) IV Continuous <Continuous>  vancomycin    Solution 125 milliGRAM(s) Oral every 6 hours    MEDICATIONS  (PRN):  dextrose 40% Gel 15 Gram(s) Oral once PRN Blood Glucose LESS THAN 70 milliGRAM(s)/deciliter  glucagon  Injectable 1 milliGRAM(s) IntraMuscular once PRN Glucose LESS THAN 70 milligrams/deciliter      LABS:                        8.9    20.49 )-----------( 105      ( 29 Oct 2018 09:54 )             27.6     10-29    154<H>  |  117<H>  |  68<H>  ----------------------------<  138<H>  3.2<L>   |  25  |  4.40<H>    Ca    7.7<L>      29 Oct 2018 09:54  Phos  5.9     10-28  Mg     2.0     10-28    TPro  5.4<L>  /  Alb  1.4<L>  /  TBili  0.4  /  DBili  x   /  AST  19  /  ALT  17  /  AlkPhos  109  10-28        CAPILLARY BLOOD GLUCOSE      POCT Blood Glucose.: 161 mg/dL (29 Oct 2018 11:40)  POCT Blood Glucose.: 162 mg/dL (29 Oct 2018 07:28)  POCT Blood Glucose.: 199 mg/dL (28 Oct 2018 23:52)    ABG - ( 29 Oct 2018 06:40 )  pH, Arterial: 7.49  pH, Blood: x     /  pCO2: 30    /  pO2: 141   / HCO3: 24    / Base Excess: -0.5  /  SaO2: 100               10-26 @ 21:55   GI PCR Results: NOT detected  *******Please Note:*******  GI panel PCR evaluates for:  Campylobacter, Plesiomonas shigelloides, Salmonella,  Vibrio, Yersinia enterocolitica, Enteroaggregative  Escherichia coli (EAEC), Enteropathogenic E.coli (EPEC),  Enterotoxigenic E. coli (ETEC) lt/st, Shiga-like  toxin-producing E. coli (STEC) stx1/stx2,  Shigella/ Enteroinvasive E. coli (EIEC), Cryptosporidium,  Cyclospora cayetanensis, Entamoeba histolytica,  Giardia lamblia, Adenovirus F 40/41, Astrovirus,  Norovirus GI/GII, Rotavirus A, Sapovirus  --  --          RADIOLOGY & ADDITIONAL TESTS:    Imaging Personally Reviewed:     no new test   Advance Directives:     dnr / dni   Palliative Care: appropraite

## 2018-10-29 NOTE — PROGRESS NOTE ADULT - SUBJECTIVE AND OBJECTIVE BOX
Patient is a 89y old  Female who presents with a chief complaint of weakness, decreased PO intake (29 Oct 2018 13:41)       Pt is seen and examined  pt is awake and lying in bed/out of bed to chair  pt seems comfortable and denies any complaints at this time    HPI:  Hx obtained from son and other family members as pt was altered.  Pt is a 89 yo F w/ PMH of DM type 2, HTN, COPD on 2.5L home O2, diastolic HFpEF (last echo 10/2017) presented to ED p/w ams, weakness, dysuria, dec po x past ~ 3 days. Of note Pt was recently admitted for ams, similar to current presentation and was d/c 6 days ago. On previous admission Pt was diagnosed and treated for UTI. Pt was initially doing well, but then developed burning with urination, AMS, and watery diarrhea x 3 days ago. Family reports that pt was given Bactrim for unresolved UTI, but that she was only able to take 2 doses, as she could not swallow the 3rd dose this AM. Pt has poor PO intake and EMS came to her house last night to give her IVF due to dehydration. Of note patient is normally bedbound and requires 24/7 home health aid. Pt is incontinent at baseline and wears diapers. Denies any Fevers, but endorses chills, and increased fatigue. Denies any SOB, CP, N/V. Family reports pt normally is able to converse without issue, but has been incoherent for ~ the past 3 days    In the ED, temp 97.1, HR 62, /75, RR 20, Spo2 99 on supplemental o2. WBC 11.46, Hg 9.5, hct 29.5, platelet 147, PT 13.5, INR 1.23, PTT 25.9, Na 148, K 4.3, Cl 120, Co2 21, BUN 64, Cr 2.00, glucose 110, calcium 8.0, alk phos 136, lactate 1.4, lipase 117. UA: small bilirubin, moderate leukocyte esterase, trace blood, moderate epithelial, bacteria few, hyaline 0-2, yeast present    Imaging:  CXR and CT head no cont ordered  EKG: NSR    In the ED, received zosyn 3.375 mg IV x 1, NaCl 0.9% 1000mL x 2, duoneb 3 mL x 1, (22 Oct 2018 23:52)         ROS:  Negative except for:    MEDICATIONS  (STANDING):  albumin human 25% IVPB 50 milliLiter(s) IV Intermittent every 6 hours  ALBUTerol/ipratropium for Nebulization 3 milliLiter(s) Nebulizer every 6 hours  dextrose 5%. 1000 milliLiter(s) (50 mL/Hr) IV Continuous <Continuous>  dextrose 50% Injectable 12.5 Gram(s) IV Push once  dextrose 50% Injectable 25 Gram(s) IV Push once  dextrose 50% Injectable 25 Gram(s) IV Push once  ferrous    sulfate 325 milliGRAM(s) Oral daily  fluconAZOLE IVPB 100 milliGRAM(s) IV Intermittent every 24 hours  guaiFENesin  milliGRAM(s) Oral every 12 hours  heparin  Injectable 5000 Unit(s) SubCutaneous every 12 hours  lactulose Syrup 45 Gram(s) Oral three times a day  metoclopramide Injectable 5 milliGRAM(s) IV Push every 8 hours  midodrine 20 milliGRAM(s) Oral every 8 hours  octreotide  Injectable 100 MICROGram(s) SubCutaneous every 8 hours  pantoprazole  Injectable 40 milliGRAM(s) IV Push daily  piperacillin/tazobactam IVPB. 3.375 Gram(s) IV Intermittent every 12 hours  rifaximin 550 milliGRAM(s) Oral two times a day  sodium bicarbonate  Infusion 0.054 mEq/kG/Hr (75 mL/Hr) IV Continuous <Continuous>    MEDICATIONS  (PRN):  dextrose 40% Gel 15 Gram(s) Oral once PRN Blood Glucose LESS THAN 70 milliGRAM(s)/deciliter  glucagon  Injectable 1 milliGRAM(s) IntraMuscular once PRN Glucose LESS THAN 70 milligrams/deciliter      Allergies    No Known Allergies    Intolerances        Vital Signs Last 24 Hrs  T(C): 36.4 (29 Oct 2018 13:42), Max: 36.4 (29 Oct 2018 04:26)  T(F): 97.5 (29 Oct 2018 13:42), Max: 97.5 (29 Oct 2018 04:26)  HR: 89 (29 Oct 2018 14:07) (80 - 90)  BP: 114/63 (29 Oct 2018 13:42) (112/66 - 147/66)  BP(mean): --  RR: 16 (29 Oct 2018 13:42) (16 - 18)  SpO2: 98% (29 Oct 2018 14:07) (94% - 99%)    PHYSICAL EXAM  General: adult in NAD  HEENT: clear oropharynx, anicteric sclera, pink conjunctiva  Neck: supple  CV: normal S1/S2 with no murmur rubs or gallops  Lungs: positive air movement b/l ant lungs,clear to auscultation, no wheezes, no rales  Abdomen: soft non-tender non-distended, no hepatosplenomegaly  Ext: no clubbing cyanosis or edema  Skin: no rashes and no petechiae  Neuro: alert and oriented X 4, no focal deficits  LABS:                          8.9    20.49 )-----------( 105      ( 29 Oct 2018 09:54 )             27.6         Mean Cell Volume : 88.2 fl  Mean Cell Hemoglobin : 28.4 pg  Mean Cell Hemoglobin Concentration : 32.2 gm/dL  Auto Neutrophil # : 17.37 K/uL  Auto Lymphocyte # : 1.59 K/uL  Auto Monocyte # : 1.07 K/uL  Auto Eosinophil # : 0.19 K/uL  Auto Basophil # : 0.04 K/uL  Auto Neutrophil % : 84.8 %  Auto Lymphocyte % : 7.8 %  Auto Monocyte % : 5.2 %  Auto Eosinophil % : 0.9 %  Auto Basophil % : 0.2 %    Serial CBC's  10-29 @ 09:54  Hct-27.6 / Hgb-8.9 / Plat-105 / RBC-3.13 / WBC-20.49          Serial CBC's  10-28 @ 16:33  Hct-22.8 / Hgb-7.4 / Plat-117 / RBC-2.60 / WBC-26.53          Serial CBC's  10-28 @ 09:12  Hct-24.5 / Hgb-7.9 / Plat-124 / RBC-2.75 / WBC-32.53          Serial CBC's  10-28 @ 01:32  Hct-26.0 / Hgb-8.3 / Plat-146 / RBC-2.87 / WBC-39.44          Serial CBC's  10-27 @ 10:21  Hct-26.9 / Hgb-8.5 / Plat-137 / RBC-3.00 / WBC-33.11            10-29    154<H>  |  117<H>  |  68<H>  ----------------------------<  138<H>  3.2<L>   |  25  |  4.40<H>    Ca    7.7<L>      29 Oct 2018 09:54  Phos  5.9     10-28  Mg     2.0     10-28    TPro  5.4<L>  /  Alb  1.4<L>  /  TBili  0.4  /  DBili  x   /  AST  19  /  ALT  17  /  AlkPhos  109  10-28          Ferritin, Serum: 189 ng/mL (10-27-18 @ 16:04)  Iron - Total Binding Capacity.: 136 ug/dL (10-26-18 @ 19:09)  Iron - Total Binding Capacity.: 126 ug/dL (10-26-18 @ 19:09)  Reticulocyte Percent: 1.6 % (10-26-18 @ 14:43)  Vitamin B12, Serum: >2000 pg/mL (10-25-18 @ 10:44)  Folate, Serum: 14.6 ng/mL (10-25-18 @ 10:44)      Serum Protein Electrophoresis Interp: Weak Gamma Migrating Paraprotein Identified (10-26-18 @ 19:09)  Immunofixation, Serum:   Weak IgG Lambda Band Identified    Reference Range: None Detected (10-26-18 @ 19:09)            BLOOD SMEAR INTERPRETATION:       RADIOLOGY & ADDITIONAL STUDIES: Patient is a 89y old  Female who presents with a chief complaint of weakness, decreased PO intake (29 Oct 2018 13:41)       Pt is seen and examined  patient son and family members are at bedside  events from earlier noted, was on bipap, now o2 support, renal function remain abnormal/worsening, nephrology is following--son and family aware of poor prognosis   s/p 1 unit prbc transfusion yesterday    HPI:  Hx obtained from son and other family members as pt was altered.  Pt is a 88 yo F w/ PMH of DM type 2, HTN, COPD on 2.5L home O2, diastolic HFpEF (last echo 10/2017) presented to ED p/w ams, weakness, dysuria, dec po x past ~ 3 days. Of note Pt was recently admitted for ams, similar to current presentation and was d/c 6 days ago. On previous admission Pt was diagnosed and treated for UTI. Pt was initially doing well, but then developed burning with urination, AMS, and watery diarrhea x 3 days ago. Family reports that pt was given Bactrim for unresolved UTI, but that she was only able to take 2 doses, as she could not swallow the 3rd dose this AM. Pt has poor PO intake and EMS came to her house last night to give her IVF due to dehydration. Of note patient is normally bedbound and requires 24/7 home health aid. Pt is incontinent at baseline and wears diapers. Denies any Fevers, but endorses chills, and increased fatigue. Denies any SOB, CP, N/V. Family reports pt normally is able to converse without issue, but has been incoherent for ~ the past 3 days    In the ED, temp 97.1, HR 62, /75, RR 20, Spo2 99 on supplemental o2. WBC 11.46, Hg 9.5, hct 29.5, platelet 147, PT 13.5, INR 1.23, PTT 25.9, Na 148, K 4.3, Cl 120, Co2 21, BUN 64, Cr 2.00, glucose 110, calcium 8.0, alk phos 136, lactate 1.4, lipase 117. UA: small bilirubin, moderate leukocyte esterase, trace blood, moderate epithelial, bacteria few, hyaline 0-2, yeast present    Imaging:  CXR and CT head no cont ordered  EKG: NSR    In the ED, received zosyn 3.375 mg IV x 1, NaCl 0.9% 1000mL x 2, duoneb 3 mL x 1, (22 Oct 2018 23:52)           MEDICATIONS  (STANDING):  albumin human 25% IVPB 50 milliLiter(s) IV Intermittent every 6 hours  ALBUTerol/ipratropium for Nebulization 3 milliLiter(s) Nebulizer every 6 hours  dextrose 5%. 1000 milliLiter(s) (50 mL/Hr) IV Continuous <Continuous>  dextrose 50% Injectable 12.5 Gram(s) IV Push once  dextrose 50% Injectable 25 Gram(s) IV Push once  dextrose 50% Injectable 25 Gram(s) IV Push once  ferrous    sulfate 325 milliGRAM(s) Oral daily  fluconAZOLE IVPB 100 milliGRAM(s) IV Intermittent every 24 hours  guaiFENesin  milliGRAM(s) Oral every 12 hours  heparin  Injectable 5000 Unit(s) SubCutaneous every 12 hours  lactulose Syrup 45 Gram(s) Oral three times a day  metoclopramide Injectable 5 milliGRAM(s) IV Push every 8 hours  midodrine 20 milliGRAM(s) Oral every 8 hours  octreotide  Injectable 100 MICROGram(s) SubCutaneous every 8 hours  pantoprazole  Injectable 40 milliGRAM(s) IV Push daily  piperacillin/tazobactam IVPB. 3.375 Gram(s) IV Intermittent every 12 hours  rifaximin 550 milliGRAM(s) Oral two times a day  sodium bicarbonate  Infusion 0.054 mEq/kG/Hr (75 mL/Hr) IV Continuous <Continuous>    MEDICATIONS  (PRN):  dextrose 40% Gel 15 Gram(s) Oral once PRN Blood Glucose LESS THAN 70 milliGRAM(s)/deciliter  glucagon  Injectable 1 milliGRAM(s) IntraMuscular once PRN Glucose LESS THAN 70 milligrams/deciliter      Allergies    No Known Allergies    Intolerances        Vital Signs Last 24 Hrs  T(C): 36.4 (29 Oct 2018 13:42), Max: 36.4 (29 Oct 2018 04:26)  T(F): 97.5 (29 Oct 2018 13:42), Max: 97.5 (29 Oct 2018 04:26)  HR: 89 (29 Oct 2018 14:07) (80 - 90)  BP: 114/63 (29 Oct 2018 13:42) (112/66 - 147/66)  BP(mean): --  RR: 16 (29 Oct 2018 13:42) (16 - 18)  SpO2: 98% (29 Oct 2018 14:07) (94% - 99%)    PHYSICAL EXAM  General: adult in NAD  HEENT: clear oropharynx, anicteric sclera, pink conjunctiva  Neck: supple  CV: normal S1/S2 with no murmur rubs or gallops  Lungs: positive air movement b/l ant lungs,clear to auscultation, no wheezes, no rales  Abdomen: soft non-tender non-distended, no hepatosplenomegaly  Ext: no clubbing cyanosis or edema  Skin: no rashes and no petechiae  Neuro: alert and oriented X 4, no focal deficits  LABS:                          8.9    20.49 )-----------( 105      ( 29 Oct 2018 09:54 )             27.6         Mean Cell Volume : 88.2 fl  Mean Cell Hemoglobin : 28.4 pg  Mean Cell Hemoglobin Concentration : 32.2 gm/dL  Auto Neutrophil # : 17.37 K/uL  Auto Lymphocyte # : 1.59 K/uL  Auto Monocyte # : 1.07 K/uL  Auto Eosinophil # : 0.19 K/uL  Auto Basophil # : 0.04 K/uL  Auto Neutrophil % : 84.8 %  Auto Lymphocyte % : 7.8 %  Auto Monocyte % : 5.2 %  Auto Eosinophil % : 0.9 %  Auto Basophil % : 0.2 %    Serial CBC's  10-29 @ 09:54  Hct-27.6 / Hgb-8.9 / Plat-105 / RBC-3.13 / WBC-20.49          Serial CBC's  10-28 @ 16:33  Hct-22.8 / Hgb-7.4 / Plat-117 / RBC-2.60 / WBC-26.53          Serial CBC's  10-28 @ 09:12  Hct-24.5 / Hgb-7.9 / Plat-124 / RBC-2.75 / WBC-32.53          Serial CBC's  10-28 @ 01:32  Hct-26.0 / Hgb-8.3 / Plat-146 / RBC-2.87 / WBC-39.44          Serial CBC's  10-27 @ 10:21  Hct-26.9 / Hgb-8.5 / Plat-137 / RBC-3.00 / WBC-33.11            10-29    154<H>  |  117<H>  |  68<H>  ----------------------------<  138<H>  3.2<L>   |  25  |  4.40<H>    Ca    7.7<L>      29 Oct 2018 09:54  Phos  5.9     10-28  Mg     2.0     10-28    TPro  5.4<L>  /  Alb  1.4<L>  /  TBili  0.4  /  DBili  x   /  AST  19  /  ALT  17  /  AlkPhos  109  10-28          Ferritin, Serum: 189 ng/mL (10-27-18 @ 16:04)  Iron - Total Binding Capacity.: 136 ug/dL (10-26-18 @ 19:09)  Iron - Total Binding Capacity.: 126 ug/dL (10-26-18 @ 19:09)  Reticulocyte Percent: 1.6 % (10-26-18 @ 14:43)  Vitamin B12, Serum: >2000 pg/mL (10-25-18 @ 10:44)  Folate, Serum: 14.6 ng/mL (10-25-18 @ 10:44)      Serum Protein Electrophoresis Interp: Weak Gamma Migrating Paraprotein Identified (10-26-18 @ 19:09)  Immunofixation, Serum:   Weak IgG Lambda Band Identified    Reference Range: None Detected (10-26-18 @ 19:09)

## 2018-10-29 NOTE — PROGRESS NOTE ADULT - PROBLEM SELECTOR PLAN 1
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

## 2018-10-29 NOTE — CHART NOTE - NSCHARTNOTEFT_GEN_A_CORE
Assessment: Pt on BIPAP. TF held at this time. Per MD, pt actively dying, supportive care [place].     Factors impacting intake: [ ] none [ ] nausea  [ ] vomiting [ ] diarrhea [ ] constipation  [ ]chewing problems [x ] swallowing issues  [ ] other:     Diet Presciption: Diet, NPO with Tube Feed:   Tube Feeding Modality: Nasogastric  Suplena with Carb Steady  Total Volume for 24 Hours (mL): 960  Continuous  Starting Tube Feed Rate {mL per Hour}: 20  Increase Tube Feed Rate by (mL): 5     Every 4 hours  Until Goal Tube Feed Rate (mL per Hour): 40  Tube Feed Duration (in Hours): 24  Tube Feed Start Time: 00:00  No Carb Prosource (1pkg = 15gms Protein)     Qty per Day:  1 (10-25-18 @ 15:42)    Intake: None, TF held with BIPAP    Current Weight: Weight (kg): 70 (10-23 @ 03:32)      Pertinent Medications: MEDICATIONS  (STANDING):  albumin human 25% IVPB 50 milliLiter(s) IV Intermittent every 6 hours  ALBUTerol/ipratropium for Nebulization 3 milliLiter(s) Nebulizer every 6 hours  dextrose 5%. 1000 milliLiter(s) (50 mL/Hr) IV Continuous <Continuous>  dextrose 50% Injectable 12.5 Gram(s) IV Push once  dextrose 50% Injectable 25 Gram(s) IV Push once  dextrose 50% Injectable 25 Gram(s) IV Push once  ferrous    sulfate 325 milliGRAM(s) Oral daily  fluconAZOLE IVPB 100 milliGRAM(s) IV Intermittent every 24 hours  guaiFENesin  milliGRAM(s) Oral every 12 hours  heparin  Injectable 5000 Unit(s) SubCutaneous every 12 hours  lactulose Syrup 45 Gram(s) Oral three times a day  metoclopramide Injectable 5 milliGRAM(s) IV Push every 8 hours  midodrine 20 milliGRAM(s) Oral every 8 hours  octreotide  Injectable 100 MICROGram(s) SubCutaneous every 8 hours  pantoprazole  Injectable 40 milliGRAM(s) IV Push daily  piperacillin/tazobactam IVPB. 3.375 Gram(s) IV Intermittent every 12 hours  rifaximin 550 milliGRAM(s) Oral two times a day  sodium bicarbonate  Infusion 0.161 mEq/kG/Hr (75 mL/Hr) IV Continuous <Continuous>  vancomycin    Solution 125 milliGRAM(s) Oral every 6 hours    MEDICATIONS  (PRN):  dextrose 40% Gel 15 Gram(s) Oral once PRN Blood Glucose LESS THAN 70 milliGRAM(s)/deciliter  glucagon  Injectable 1 milliGRAM(s) IntraMuscular once PRN Glucose LESS THAN 70 milligrams/deciliter    Pertinent Labs: 10-28 Na154 mmol/L<H> Glu 205 mg/dL<H> K+ 3.0 mmol/L<L> Cr  4.20 mg/dL<H> BUN 70 mg/dL<H> 10-28 Phos 5.9 mg/dL<H> 10-28 Alb 1.4 g/dL<L> 10-23 [id number] 5.1 %     CAPILLARY BLOOD GLUCOSE      POCT Blood Glucose.: 162 mg/dL (29 Oct 2018 07:28)  POCT Blood Glucose.: 199 mg/dL (28 Oct 2018 23:52)    Skin: st 2 pressure injuries, sacrum noted    Estimated Needs:   [x ] no change since previous assessment  [ ] recalculated:     Previous Nutrition Diagnosis: (x) swallowing difficulty  [ ] Inadequate Energy Intake [ ]Inadequate Oral Intake [ ] Excessive Energy Intake   [ ] Underweight [x ] Increased Nutrient Needs [ ] Overweight/Obesity   [ ] Altered GI Function [ ] Unintended Weight Loss [ ] Food & Nutrition Related Knowledge Deficit [ ] Malnutrition     Nutrition Diagnosis is [x ] ongoing  [ ] resolved [ ] not applicable     New Nutrition Diagnosis: [x ] not applicable       Interventions:   Recommend  [ ] Change Diet To:  [ ] Nutrition Supplement  [ ] Nutrition Support  [x ] Other: Will follow for any needs, currently receiving supportive care    Monitoring and Evaluation:   [ ] PO intake [ x ] Tolerance to diet prescription [ x ] weights [ x ] labs[ x ] follow up per protocol  [ ] other:

## 2018-10-29 NOTE — PROGRESS NOTE ADULT - SUBJECTIVE AND OBJECTIVE BOX
NEPHROLOGY INTERVAL HPI/OVERNIGHT EVENTS:  HPI:  Hx obtained from son and other family members as pt was altered.  Pt is a 89 yo F w/ PMH of DM type 2, HTN, COPD on 2.5L home O2, diastolic HFpEF (last echo 10/2017) presented to ED p/w ams, weakness, dysuria, dec po x past ~ 3 days. Of note Pt was recently admitted for ams, similar to current presentation and was d/c 6 days ago. On previous admission Pt was diagnosed and treated for UTI. Pt was initially doing well, but then developed burning with urination, AMS, and watery diarrhea x 3 days ago. Family reports that pt was given Bactrim for unresolved UTI, but that she was only able to take 2 doses, as she could not swallow the 3rd dose this AM. Pt has poor PO intake and EMS came to her house last night to give her IVF due to dehydration. Of note patient is normally bedbound and requires 24/7 home health aid. Pt is incontinent at baseline and wears diapers. Denies any Fevers, but endorses chills, and increased fatigue. Denies any SOB, CP, N/V. Family reports pt normally is able to converse without issue, but has been incoherent for ~ the past 3 days    In the ED, temp 97.1, HR 62, /75, RR 20, Spo2 99 on supplemental o2. WBC 11.46, Hg 9.5, hct 29.5, platelet 147, PT 13.5, INR 1.23, PTT 25.9, Na 148, K 4.3, Cl 120, Co2 21, BUN 64, Cr 2.00, glucose 110, calcium 8.0, alk phos 136, lactate 1.4, lipase 117. UA: small bilirubin, moderate leukocyte esterase, trace blood, moderate epithelial, bacteria few, hyaline 0-2, yeast present    Imaging:  CXR and CT head no cont ordered  EKG: NSR    In the ED, received zosyn 3.375 mg IV x 1, NaCl 0.9% 1000mL x 2, duoneb 3 mL x 1, (22 Oct 2018 23:52)      PAST MEDICAL & SURGICAL HISTORY:  Coronary artery disease with angina pectoris, unspecified vessel or lesion type, unspecified whether native or transplanted heart  Congestive heart failure  Asthma  Diabetes mellitus  HTN (hypertension)  No significant past surgical history      MEDICATIONS  (STANDING):  albumin human 25% IVPB 50 milliLiter(s) IV Intermittent every 6 hours  ALBUTerol/ipratropium for Nebulization 3 milliLiter(s) Nebulizer every 6 hours  dextrose 5%. 1000 milliLiter(s) (50 mL/Hr) IV Continuous <Continuous>  dextrose 50% Injectable 12.5 Gram(s) IV Push once  dextrose 50% Injectable 25 Gram(s) IV Push once  dextrose 50% Injectable 25 Gram(s) IV Push once  ferrous    sulfate 325 milliGRAM(s) Oral daily  fluconAZOLE IVPB 100 milliGRAM(s) IV Intermittent every 24 hours  guaiFENesin  milliGRAM(s) Oral every 12 hours  heparin  Injectable 5000 Unit(s) SubCutaneous every 12 hours  lactulose Syrup 45 Gram(s) Oral three times a day  metoclopramide Injectable 5 milliGRAM(s) IV Push every 8 hours  midodrine 20 milliGRAM(s) Oral every 8 hours  octreotide  Injectable 100 MICROGram(s) SubCutaneous every 8 hours  pantoprazole  Injectable 40 milliGRAM(s) IV Push daily  piperacillin/tazobactam IVPB. 3.375 Gram(s) IV Intermittent every 12 hours  rifaximin 550 milliGRAM(s) Oral two times a day  sodium bicarbonate  Infusion 0.161 mEq/kG/Hr (75 mL/Hr) IV Continuous <Continuous>  vancomycin    Solution 125 milliGRAM(s) Oral every 6 hours    MEDICATIONS  (PRN):  dextrose 40% Gel 15 Gram(s) Oral once PRN Blood Glucose LESS THAN 70 milliGRAM(s)/deciliter  glucagon  Injectable 1 milliGRAM(s) IntraMuscular once PRN Glucose LESS THAN 70 milligrams/deciliter      Allergies    No Known Allergies    Intolerances        Vital Signs Last 24 Hrs  T(C): 36.4 (29 Oct 2018 04:26), Max: 36.4 (29 Oct 2018 04:26)  T(F): 97.5 (29 Oct 2018 04:26), Max: 97.5 (29 Oct 2018 04:26)  HR: 86 (29 Oct 2018 08:11) (80 - 88)  BP: 147/66 (29 Oct 2018 04:26) (106/44 - 147/66)  BP(mean): --  RR: 18 (29 Oct 2018 04:26) (18 - 19)  SpO2: 97% (29 Oct 2018 08:11) (94% - 100%)  Daily     Daily     PHYSICAL EXAM:    GENERAL: NAD, well-groomed, well-developed  HEAD:  Atraumatic, Normocephalic  EYES: EOMI, PERRLA, conjunctiva and sclera clear  ENMT: No tonsillar erythema, exudates, or enlargement; Moist mucous membranes, Good dentition, No lesions  NECK: Supple, No JVD, Normal thyroid  NERVOUS SYSTEM:  Alert & Oriented X3, Good concentration; Motor Strength 5/5 B/L upper and lower extremities; DTRs 2+ intact and symmetric  CHEST/LUNG: Clear to percussion bilaterally; No rales, rhonchi, wheezing, or rubs  HEART: Regular rate and rhythm; No murmurs, rubs, or gallops  ABDOMEN: Soft, Nontender, Nondistended; Bowel sounds present  EXTREMITIES:  2+ Peripheral Pulses, No clubbing, cyanosis, or edema  SKIN: No rashes or lesions    LABS:                        7.4    26.53 )-----------( 117      ( 28 Oct 2018 16:33 )             22.8     10-28    154<H>  |  119<H>  |  70<H>  ----------------------------<  205<H>  3.0<L>   |  25  |  4.20<H>    Ca    7.4<L>      28 Oct 2018 16:33  Phos  5.9     10-28  Mg     2.0     10-28    TPro  5.4<L>  /  Alb  1.4<L>  /  TBili  0.4  /  DBili  x   /  AST  19  /  ALT  17  /  AlkPhos  109  10-28    PT/INR - ( 27 Oct 2018 10:21 )   PT: 15.7 sec;   INR: 1.43 ratio         PTT - ( 27 Oct 2018 10:21 )  PTT:38.3 sec      ABG - ( 29 Oct 2018 06:40 )  pH, Arterial: 7.49  pH, Blood: x     /  pCO2: 30    /  pO2: 141   / HCO3: 24    / Base Excess: -0.5  /  SaO2: 100                 RADIOLOGY & ADDITIONAL TESTS: NEPHROLOGY INTERVAL HPI/OVERNIGHT EVENTS:  HPI:  Hx obtained from son and other family members as pt was altered.  Pt is a 88 yo F w/ PMH of DM type 2, HTN, COPD on 2.5L home O2, diastolic HFpEF (last echo 10/2017) presented to ED p/w ams, weakness, dysuria, dec po x past ~ 3 days. Of note Pt was recently admitted for ams, similar to current presentation and was d/c 6 days ago. On previous admission Pt was diagnosed and treated for UTI. Pt was initially doing well, but then developed burning with urination, AMS, and watery diarrhea x 3 days ago. Family reports that pt was given Bactrim for unresolved UTI, but that she was only able to take 2 doses, as she could not swallow the 3rd dose this AM. Pt has poor PO intake and EMS came to her house last night to give her IVF due to dehydration. Of note patient is normally bedbound and requires 24/7 home health aid. Pt is incontinent at baseline and wears diapers. Denies any Fevers, but endorses chills, and increased fatigue. Denies any SOB, CP, N/V. Family reports pt normally is able to converse without issue, but has been incoherent for ~ the past 3 days    In the ED, temp 97.1, HR 62, /75, RR 20, Spo2 99 on supplemental o2. WBC 11.46, Hg 9.5, hct 29.5, platelet 147, PT 13.5, INR 1.23, PTT 25.9, Na 148, K 4.3, Cl 120, Co2 21, BUN 64, Cr 2.00, glucose 110, calcium 8.0, alk phos 136, lactate 1.4, lipase 117. UA: small bilirubin, moderate leukocyte esterase, trace blood, moderate epithelial, bacteria few, hyaline 0-2, yeast present    Imaging:  CXR and CT head no cont ordered  EKG: NSR    In the ED, received zosyn 3.375 mg IV x 1, NaCl 0.9% 1000mL x 2, duoneb 3 mL x 1, (22 Oct 2018 23:52)      PAST MEDICAL & SURGICAL HISTORY:  Coronary artery disease with angina pectoris, unspecified vessel or lesion type, unspecified whether native or transplanted heart  Congestive heart failure  Asthma  Diabetes mellitus  HTN (hypertension)  No significant past surgical history      MEDICATIONS  (STANDING):  albumin human 25% IVPB 50 milliLiter(s) IV Intermittent every 6 hours  ALBUTerol/ipratropium for Nebulization 3 milliLiter(s) Nebulizer every 6 hours  dextrose 5%. 1000 milliLiter(s) (50 mL/Hr) IV Continuous <Continuous>  dextrose 50% Injectable 12.5 Gram(s) IV Push once  dextrose 50% Injectable 25 Gram(s) IV Push once  dextrose 50% Injectable 25 Gram(s) IV Push once  ferrous    sulfate 325 milliGRAM(s) Oral daily  fluconAZOLE IVPB 100 milliGRAM(s) IV Intermittent every 24 hours  guaiFENesin  milliGRAM(s) Oral every 12 hours  heparin  Injectable 5000 Unit(s) SubCutaneous every 12 hours  lactulose Syrup 45 Gram(s) Oral three times a day  metoclopramide Injectable 5 milliGRAM(s) IV Push every 8 hours  midodrine 20 milliGRAM(s) Oral every 8 hours  octreotide  Injectable 100 MICROGram(s) SubCutaneous every 8 hours  pantoprazole  Injectable 40 milliGRAM(s) IV Push daily  piperacillin/tazobactam IVPB. 3.375 Gram(s) IV Intermittent every 12 hours  rifaximin 550 milliGRAM(s) Oral two times a day  sodium bicarbonate  Infusion 0.161 mEq/kG/Hr (75 mL/Hr) IV Continuous <Continuous>  vancomycin    Solution 125 milliGRAM(s) Oral every 6 hours    MEDICATIONS  (PRN):  dextrose 40% Gel 15 Gram(s) Oral once PRN Blood Glucose LESS THAN 70 milliGRAM(s)/deciliter  glucagon  Injectable 1 milliGRAM(s) IntraMuscular once PRN Glucose LESS THAN 70 milligrams/deciliter      Allergies    No Known Allergies    Intolerances        Vital Signs Last 24 Hrs  T(C): 36.4 (29 Oct 2018 04:26), Max: 36.4 (29 Oct 2018 04:26)  T(F): 97.5 (29 Oct 2018 04:26), Max: 97.5 (29 Oct 2018 04:26)  HR: 86 (29 Oct 2018 08:11) (80 - 88)  BP: 147/66 (29 Oct 2018 04:26) (106/44 - 147/66)  BP(mean): --  RR: 18 (29 Oct 2018 04:26) (18 - 19)  SpO2: 97% (29 Oct 2018 08:11) (94% - 100%)  Daily     Daily     PHYSICAL EXAM:    GENERAL: NAD, well-groomed, well-developed  HEAD:  Atraumatic, Normocephalic  EYES: EOMI, PERRLA, conjunctiva and sclera clear  ENMT: No tonsillar erythema, exudates, or enlargement; Moist mucous membranes, Good dentition, No lesions  NECK: Supple, No JVD, Normal thyroid  NERVOUS SYSTEM:  Alert & Oriented X3, Good concentration; Motor Strength 5/5 B/L upper and lower extremities; DTRs 2+ intact and symmetric  CHEST/LUNG: Clear to percussion bilaterally; No rales, rhonchi, wheezing, or rubs  HEART: Regular rate and rhythm; No murmurs, rubs, or gallops  ABDOMEN: Soft, Nontender, Nondistended; Bowel sounds present  EXTREMITIES:  2+ Peripheral Pulses, No clubbing, cyanosis, or edema  SKIN: No rashes or lesions    LABS:             10/29/18: Hb 8.9, cr 4.4, k 3.2, Na 154  ----------------------------------------------             7.4    26.53 )-----------( 117      ( 28 Oct 2018 16:33 )             22.8     10-28    154<H>  |  119<H>  |  70<H>  ----------------------------<  205<H>  3.0<L>   |  25  |  4.20<H>    Ca    7.4<L>      28 Oct 2018 16:33  Phos  5.9     10-28  Mg     2.0     10-28    TPro  5.4<L>  /  Alb  1.4<L>  /  TBili  0.4  /  DBili  x   /  AST  19  /  ALT  17  /  AlkPhos  109  10-28    PT/INR - ( 27 Oct 2018 10:21 )   PT: 15.7 sec;   INR: 1.43 ratio         PTT - ( 27 Oct 2018 10:21 )  PTT:38.3 sec      ABG - ( 29 Oct 2018 06:40 )  pH, Arterial: 7.49  pH, Blood: x     /  pCO2: 30    /  pO2: 141   / HCO3: 24    / Base Excess: -0.5  /  SaO2: 100                 RADIOLOGY & ADDITIONAL TESTS:

## 2018-10-29 NOTE — PROGRESS NOTE ADULT - PROBLEM SELECTOR PLAN 1
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  on iv  Zosyn for possible aspiration pneumonia.   --urine culture shows Candida--patieint is on Fluconazole.   -  -Blood culture growing GPC in clusters in one bottle, likely contaminant. Repeat cultures are negative.  - Aspiration precautions

## 2018-10-30 DIAGNOSIS — D69.6 THROMBOCYTOPENIA, UNSPECIFIED: ICD-10-CM

## 2018-10-30 LAB
ALBUMIN SERPL ELPH-MCNC: 2.3 G/DL — LOW (ref 3.3–5)
ALP SERPL-CCNC: 109 U/L — SIGNIFICANT CHANGE UP (ref 40–120)
ALT FLD-CCNC: 21 U/L — SIGNIFICANT CHANGE UP (ref 12–78)
AMMONIA BLD-MCNC: 80 UMOL/L — HIGH (ref 11–32)
ANION GAP SERPL CALC-SCNC: 13 MMOL/L — SIGNIFICANT CHANGE UP (ref 5–17)
AST SERPL-CCNC: 28 U/L — SIGNIFICANT CHANGE UP (ref 15–37)
BILIRUB SERPL-MCNC: 0.7 MG/DL — SIGNIFICANT CHANGE UP (ref 0.2–1.2)
BUN SERPL-MCNC: 70 MG/DL — HIGH (ref 7–23)
CALCIUM SERPL-MCNC: 7.7 MG/DL — LOW (ref 8.5–10.1)
CHLORIDE SERPL-SCNC: 117 MMOL/L — HIGH (ref 96–108)
CO2 SERPL-SCNC: 23 MMOL/L — SIGNIFICANT CHANGE UP (ref 22–31)
CREAT SERPL-MCNC: 4.7 MG/DL — HIGH (ref 0.5–1.3)
GLUCOSE SERPL-MCNC: 136 MG/DL — HIGH (ref 70–99)
LAP WBC-ACNC: SIGNIFICANT CHANGE UP
MAGNESIUM SERPL-MCNC: 1.9 MG/DL — SIGNIFICANT CHANGE UP (ref 1.6–2.6)
PHOSPHATE SERPL-MCNC: 4.1 MG/DL — SIGNIFICANT CHANGE UP (ref 2.5–4.5)
POTASSIUM SERPL-MCNC: 3.1 MMOL/L — LOW (ref 3.5–5.3)
POTASSIUM SERPL-SCNC: 3.1 MMOL/L — LOW (ref 3.5–5.3)
PROT SERPL-MCNC: 6.1 G/DL — SIGNIFICANT CHANGE UP (ref 6–8.3)
SODIUM SERPL-SCNC: 153 MMOL/L — HIGH (ref 135–145)

## 2018-10-30 PROCEDURE — 99233 SBSQ HOSP IP/OBS HIGH 50: CPT | Mod: GC

## 2018-10-30 RX ORDER — POTASSIUM CHLORIDE 20 MEQ
40 PACKET (EA) ORAL EVERY 4 HOURS
Qty: 0 | Refills: 0 | Status: COMPLETED | OUTPATIENT
Start: 2018-10-30 | End: 2018-10-30

## 2018-10-30 RX ORDER — POTASSIUM CHLORIDE 20 MEQ
40 PACKET (EA) ORAL EVERY 4 HOURS
Qty: 0 | Refills: 0 | Status: DISCONTINUED | OUTPATIENT
Start: 2018-10-30 | End: 2018-10-30

## 2018-10-30 RX ADMIN — PIPERACILLIN AND TAZOBACTAM 25 GRAM(S): 4; .5 INJECTION, POWDER, LYOPHILIZED, FOR SOLUTION INTRAVENOUS at 06:03

## 2018-10-30 RX ADMIN — HEPARIN SODIUM 5000 UNIT(S): 5000 INJECTION INTRAVENOUS; SUBCUTANEOUS at 17:23

## 2018-10-30 RX ADMIN — LACTULOSE 45 GRAM(S): 10 SOLUTION ORAL at 06:00

## 2018-10-30 RX ADMIN — Medication 325 MILLIGRAM(S): at 13:38

## 2018-10-30 RX ADMIN — Medication 3 MILLILITER(S): at 14:20

## 2018-10-30 RX ADMIN — LACTULOSE 45 GRAM(S): 10 SOLUTION ORAL at 13:38

## 2018-10-30 RX ADMIN — Medication 3 MILLILITER(S): at 19:58

## 2018-10-30 RX ADMIN — OCTREOTIDE ACETATE 100 MICROGRAM(S): 200 INJECTION, SOLUTION INTRAVENOUS; SUBCUTANEOUS at 22:47

## 2018-10-30 RX ADMIN — Medication 75 MEQ/KG/HR: at 06:02

## 2018-10-30 RX ADMIN — HEPARIN SODIUM 5000 UNIT(S): 5000 INJECTION INTRAVENOUS; SUBCUTANEOUS at 06:01

## 2018-10-30 RX ADMIN — OCTREOTIDE ACETATE 100 MICROGRAM(S): 200 INJECTION, SOLUTION INTRAVENOUS; SUBCUTANEOUS at 13:39

## 2018-10-30 RX ADMIN — Medication 3 MILLILITER(S): at 08:00

## 2018-10-30 RX ADMIN — PANTOPRAZOLE SODIUM 40 MILLIGRAM(S): 20 TABLET, DELAYED RELEASE ORAL at 11:35

## 2018-10-30 RX ADMIN — Medication 1 DROP(S): at 06:06

## 2018-10-30 RX ADMIN — Medication 5 MILLIGRAM(S): at 13:38

## 2018-10-30 RX ADMIN — MIDODRINE HYDROCHLORIDE 20 MILLIGRAM(S): 2.5 TABLET ORAL at 06:01

## 2018-10-30 RX ADMIN — OCTREOTIDE ACETATE 100 MICROGRAM(S): 200 INJECTION, SOLUTION INTRAVENOUS; SUBCUTANEOUS at 06:05

## 2018-10-30 RX ADMIN — Medication 600 MILLIGRAM(S): at 17:23

## 2018-10-30 RX ADMIN — Medication 3 MILLILITER(S): at 02:31

## 2018-10-30 RX ADMIN — Medication 1 DROP(S): at 18:15

## 2018-10-30 RX ADMIN — Medication 5 MILLIGRAM(S): at 22:47

## 2018-10-30 RX ADMIN — Medication 40 MILLIEQUIVALENT(S): at 13:57

## 2018-10-30 RX ADMIN — Medication 600 MILLIGRAM(S): at 06:01

## 2018-10-30 RX ADMIN — Medication 5 MILLIGRAM(S): at 06:01

## 2018-10-30 RX ADMIN — Medication 40 MILLIEQUIVALENT(S): at 17:20

## 2018-10-30 RX ADMIN — PIPERACILLIN AND TAZOBACTAM 25 GRAM(S): 4; .5 INJECTION, POWDER, LYOPHILIZED, FOR SOLUTION INTRAVENOUS at 17:20

## 2018-10-30 RX ADMIN — FLUCONAZOLE 100 MILLIGRAM(S): 150 TABLET ORAL at 13:57

## 2018-10-30 RX ADMIN — LACTULOSE 45 GRAM(S): 10 SOLUTION ORAL at 22:46

## 2018-10-30 NOTE — PROGRESS NOTE ADULT - SUBJECTIVE AND OBJECTIVE BOX
Date/Time Patient Seen:  		  Referring MD:   Data Reviewed	       Patient is a 89y old  Female who presents with a chief complaint of weakness, decreased PO intake (29 Oct 2018 16:27)  in bed  seen and examined  vs and meds reviewed      Subjective/HPI     PAST MEDICAL & SURGICAL HISTORY:  Coronary artery disease with angina pectoris, unspecified vessel or lesion type, unspecified whether native or transplanted heart  Congestive heart failure  Asthma  Diabetes mellitus  HTN (hypertension)  No significant past surgical history        Medication list         MEDICATIONS  (STANDING):  ALBUTerol/ipratropium for Nebulization 3 milliLiter(s) Nebulizer every 6 hours  dextrose 5%. 1000 milliLiter(s) (50 mL/Hr) IV Continuous <Continuous>  dextrose 50% Injectable 12.5 Gram(s) IV Push once  dextrose 50% Injectable 25 Gram(s) IV Push once  dextrose 50% Injectable 25 Gram(s) IV Push once  epoetin margie Injectable 93422 Unit(s) SubCutaneous <User Schedule>  ferrous    sulfate 325 milliGRAM(s) Oral daily  fluconAZOLE IVPB 100 milliGRAM(s) IV Intermittent every 24 hours  guaiFENesin  milliGRAM(s) Oral every 12 hours  heparin  Injectable 5000 Unit(s) SubCutaneous every 12 hours  lactulose Syrup 45 Gram(s) Oral three times a day  metoclopramide Injectable 5 milliGRAM(s) IV Push every 8 hours  midodrine 20 milliGRAM(s) Oral every 8 hours  octreotide  Injectable 100 MICROGram(s) SubCutaneous every 8 hours  pantoprazole  Injectable 40 milliGRAM(s) IV Push daily  piperacillin/tazobactam IVPB. 3.375 Gram(s) IV Intermittent every 12 hours  rifaximin 550 milliGRAM(s) Oral two times a day  sodium bicarbonate  Infusion 0.054 mEq/kG/Hr (75 mL/Hr) IV Continuous <Continuous>    MEDICATIONS  (PRN):  dextrose 40% Gel 15 Gram(s) Oral once PRN Blood Glucose LESS THAN 70 milliGRAM(s)/deciliter  glucagon  Injectable 1 milliGRAM(s) IntraMuscular once PRN Glucose LESS THAN 70 milligrams/deciliter         Vitals log        ICU Vital Signs Last 24 Hrs  T(C): 36.3 (30 Oct 2018 04:45), Max: 36.4 (29 Oct 2018 13:42)  T(F): 97.4 (30 Oct 2018 04:45), Max: 97.5 (29 Oct 2018 13:42)  HR: 81 (30 Oct 2018 04:45) (80 - 91)  BP: 127/77 (30 Oct 2018 04:45) (114/63 - 130/70)  BP(mean): --  ABP: --  ABP(mean): --  RR: 17 (30 Oct 2018 04:45) (16 - 17)  SpO2: 100% (30 Oct 2018 04:45) (88% - 100%)           Input and Output:  I&O's Detail    29 Oct 2018 07:01  -  30 Oct 2018 07:00  --------------------------------------------------------  IN:    sodium bicarbonate  Infusion: 675 mL  Total IN: 675 mL    OUT:    Ureteral Catheter: 425 mL  Total OUT: 425 mL    Total NET: 250 mL          Lab Data                        8.9    20.49 )-----------( 105      ( 29 Oct 2018 09:54 )             27.6     10-29    154<H>  |  117<H>  |  68<H>  ----------------------------<  138<H>  3.2<L>   |  25  |  4.40<H>    Ca    7.7<L>      29 Oct 2018 09:54      ABG - ( 29 Oct 2018 06:40 )  pH, Arterial: 7.49  pH, Blood: x     /  pCO2: 30    /  pO2: 141   / HCO3: 24    / Base Excess: -0.5  /  SaO2: 100                     Review of Systems	      Objective     Physical Examination    ng tube in  frail  confused  heart s1s2  lung dec BS    Pertinent Lab findings & Imaging      Samantha:  NO   Adequate UO     I&O's Detail    29 Oct 2018 07:01  -  30 Oct 2018 07:00  --------------------------------------------------------  IN:    sodium bicarbonate  Infusion: 675 mL  Total IN: 675 mL    OUT:    Ureteral Catheter: 425 mL  Total OUT: 425 mL    Total NET: 250 mL               Discussed with:     Cultures:	        Radiology

## 2018-10-30 NOTE — PROGRESS NOTE ADULT - ATTENDING COMMENTS
pt seen and examine see above -89  f advance  age  recurrent hospitalization   DM type 2, HTN, COPD on 2.5L home O2, diastolic HFpEF (last echo 10/2017) presented to ED p/w AMS, weakness, dysuria,  recent diarrhea decreased po intake x past ~ 3 days.   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  now ansarca fluid over lode   with sever protein caloric malnutrition   sec to liver cirrhosis unknown etiology  .  thrombocytopenia  and anemia of chr dis sec to renal dis on epogen  hb stbale post 1 unit transfusion   fu platlet  closely on dvt prophy .     asp pna   iv abx zosyne    blood cult neg to date .  uti candida on  iv antifungal   tt  .    high ammonia / cirrhosis on lactulose via ng tube    recurrent hyper ammonia   .   leucocytosis sec to possible  aspiration pna   littler better on iv abx zosyne ,hold tube feed  .   hepatorenal syndrome -   cont octreotide/midodrine/albumin   but worsening gfr / oliguric , as per gi dr styles and renal dr díaz .      IVF with sodium bicarb; acidemia is severe; mainly driven by CO2 retention; +BiPAP but patient's current mental status will make this measure less effective,   -Renal sono shows no hydronephrosis  -No indication for RRT; poor prognosis. Not a candidate for dialysis as the risks far outweigh the benefits in this case as per renal dr díaz . pt son  hcp  bedside  aware with all tt plan . hospisce evaluation referred  this time . pt seen and examine see above -89  f advance  age  recurrent hospitalization   DM type 2, HTN, COPD on 2.5L home O2, diastolic HFpEF (last echo 10/2017) presented to ED p/w AMS, weakness, dysuria,  recent diarrhea decreased po intake x past ~ 3 days.   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  now ansarca fluid over lode   with sever protein caloric malnutrition   sec to liver cirrhosis unknown etiology  .  thrombocytopenia  and anemia of chr dis sec to renal dis on epogen  hb stbale post 1 unit transfusion   fu platlet  closely on dvt prophy .     asp pna   iv abx zosyne    blood cult neg to date .  uti candida on  iv antifungal   tt  .    high ammonia / cirrhosis on lactulose via ng tube    recurrent hyper ammonia   .   leucocytosis sec to possible  aspiration pna   littler better on iv abx zosyne ,hold tube feed  .   hepatorenal syndrome -   cont octreotide/midodrine/albumin   but worsening gfr / oliguric , as per gi dr styles and renal dr díaz .      IVF with sodium bicarb; acidemia is severe; mainly driven by CO2 retention; +BiPAP but patient's current mental status will make this measure less effective,   -Renal sono shows no hydronephrosis , hypokalemia replaced.   -No indication for RRT; poor prognosis. Not a candidate for dialysis as the risks far outweigh the benefits in this case as per renal dr díaz . pt son  hcp  bedside  aware with all tt plan . hospisce evaluation referred  this time .

## 2018-10-30 NOTE — PROGRESS NOTE ADULT - SUBJECTIVE AND OBJECTIVE BOX
INTERVAL HPI/OVERNIGHT EVENTS:  pt seen and examined family present  lethargic in bed  per overnight rn no vomiting, +bms  no new labs to assess     MEDICATIONS  (STANDING):  ALBUTerol/ipratropium for Nebulization 3 milliLiter(s) Nebulizer every 6 hours  dextrose 5%. 1000 milliLiter(s) (50 mL/Hr) IV Continuous <Continuous>  dextrose 50% Injectable 12.5 Gram(s) IV Push once  dextrose 50% Injectable 25 Gram(s) IV Push once  dextrose 50% Injectable 25 Gram(s) IV Push once  epoetin margie Injectable 67235 Unit(s) SubCutaneous <User Schedule>  ferrous    sulfate 325 milliGRAM(s) Oral daily  fluconAZOLE IVPB 100 milliGRAM(s) IV Intermittent every 24 hours  guaiFENesin  milliGRAM(s) Oral every 12 hours  heparin  Injectable 5000 Unit(s) SubCutaneous every 12 hours  lactulose Syrup 45 Gram(s) Oral three times a day  metoclopramide Injectable 5 milliGRAM(s) IV Push every 8 hours  midodrine 20 milliGRAM(s) Oral every 8 hours  octreotide  Injectable 100 MICROGram(s) SubCutaneous every 8 hours  pantoprazole  Injectable 40 milliGRAM(s) IV Push daily  piperacillin/tazobactam IVPB. 3.375 Gram(s) IV Intermittent every 12 hours  rifaximin 550 milliGRAM(s) Oral two times a day  sodium bicarbonate  Infusion 0.054 mEq/kG/Hr (75 mL/Hr) IV Continuous <Continuous>    MEDICATIONS  (PRN):  dextrose 40% Gel 15 Gram(s) Oral once PRN Blood Glucose LESS THAN 70 milliGRAM(s)/deciliter  glucagon  Injectable 1 milliGRAM(s) IntraMuscular once PRN Glucose LESS THAN 70 milligrams/deciliter      Allergies    No Known Allergies    Intolerances        Review of Systems:    unable to obtain      Vital Signs Last 24 Hrs  T(C): 36.3 (30 Oct 2018 04:45), Max: 36.4 (29 Oct 2018 13:42)  T(F): 97.4 (30 Oct 2018 04:45), Max: 97.5 (29 Oct 2018 13:42)  HR: 81 (30 Oct 2018 04:45) (80 - 91)  BP: 127/77 (30 Oct 2018 04:45) (114/63 - 130/70)  BP(mean): --  RR: 17 (30 Oct 2018 04:45) (16 - 17)  SpO2: 100% (30 Oct 2018 04:45) (88% - 100%)    PHYSICAL EXAM:    Constitutional: NAD, lying in bed +ngt   HEENT: ncat  Neck: No LAD  Respiratory: dec bs  Cardiovascular: S1 and S2  Gastrointestinal: soft nt mild dt  Extremities: No peripheral edema  Neurological: lethargic  Skin: No rashes      LABS:                        8.9    20.49 )-----------( 105      ( 29 Oct 2018 09:54 )             27.6     10-29    154<H>  |  117<H>  |  68<H>  ----------------------------<  138<H>  3.2<L>   |  25  |  4.40<H>    Ca    7.7<L>      29 Oct 2018 09:54            RADIOLOGY & ADDITIONAL TESTS:

## 2018-10-30 NOTE — PROGRESS NOTE ADULT - PROBLEM SELECTOR PLAN 3
on ckd3  2/2 to dehydration    and now hepatorenal  with liver cirrhosis unknown etiology  /prognosis poor   -Hold nephrotoxic agents  - Renal following dr choi/ arjun not candidate for hd

## 2018-10-30 NOTE — PROGRESS NOTE ADULT - SUBJECTIVE AND OBJECTIVE BOX
ID Progress note     Name: MALINDA SULTANA  Age: 89y  Gender: Female  MRN: [id number]    Interval History-- Event noted, remains poorly responsive, family at bedside , worsening renal function . Awaiting son 's arrival from [facility]   Notes reviewed    Past Medical History--  Coronary artery disease with angina pectoris, unspecified vessel or lesion type, unspecified whether native or transplanted heart  Congestive heart failure  Asthma  Diabetes mellitus  HTN (hypertension)  No significant past surgical history      For details regarding the patient's social history, family history, and other miscellaneous elements, please refer the initial infectious diseases consultation and/or the admitting history and physical examination for this admission.    Allergies--  Allergies    No Known Allergies    Intolerances        Medications--  Antibiotics:  fluconAZOLE IVPB 100 milliGRAM(s) IV Intermittent every 24 hours  piperacillin/tazobactam IVPB. 3.375 Gram(s) IV Intermittent every 12 hours  rifaximin 550 milliGRAM(s) Oral two times a day    Immunologic:  epoetin margie Injectable 96080 Unit(s) SubCutaneous <User Schedule>    Other:  ALBUTerol/ipratropium for Nebulization  dextrose 40% Gel PRN  dextrose 5%.  dextrose 50% Injectable  dextrose 50% Injectable  dextrose 50% Injectable  ferrous    sulfate  glucagon  Injectable PRN  guaiFENesin ER  heparin  Injectable  lactulose Syrup  metoclopramide Injectable  midodrine  octreotide  Injectable  pantoprazole  Injectable  sodium bicarbonate  Infusion      Review of Systems--  Review of systems unable to be obtained secondary to clinical condition.    Physical Examination--    Vital Signs: T(F): 97.1 (10-30-18 @ 13:15), Max: 97.5 (10-29-18 @ 13:42)  HR: 78 (10-30-18 @ 13:15)  BP: 140/55 (10-30-18 @ 13:15)  RR: 18 (10-30-18 @ 13:15)  SpO2: 97% (10-30-18 @ 13:15)  Wt(kg): --  General: poorly responsive , family says she is moving her arms,   HEENT: AT/NC. PERRL. EOMI. Anicteric. Conjunctiva pink and moist. NGT + poor oral hygiene  Neck: Not rigid. No sense of mass.  Nodes: None palpable.  Lungs: Coarse breath sounds  bilaterally without rales, wheezing or rhonchi  Heart: Regular rate and rhythm. No Murmur. No rub. No gallop. No palpable thrill.  Abdomen: Bowel sounds present and normoactive. Soft. Nondistended. Nontender. No sense of mass. No organomegaly.  Back: No spinal tenderness. No costovertebral angle tenderness.   Extremities: No cyanosis or clubbing. No edema.   Skin: Warm. Dry. Good turgor. No rash. No vasculitic stigmata.  Psychiatric: Appropriate affect and mood for situation.     Laboratory Studies--  CBC                        9.5    15.81 )-----------( 64       ( 30 Oct 2018 09:44 )             28.7       Chemistries  10-30    153<H>  |  117<H>  |  70<H>  ----------------------------<  136<H>  3.1<L>   |  23  |  4.70<H>    Ca    7.7<L>      30 Oct 2018 08:51  Phos  4.1     10-30  Mg     1.9     10-30    TPro  6.1  /  Alb  2.3<L>  /  TBili  0.7  /  DBili  x   /  AST  28  /  ALT  21  /  AlkPhos  109  10-30      Culture Data    GI PCR Panel, Stool (collected 26 Oct 2018 21:55)  Source: .Stool Feces  Final Report (26 Oct 2018 23:45):    GI PCR Results: NOT detected    *******Please Note:*******    GI panel PCR evaluates for:    Campylobacter, Plesiomonas shigelloides, Salmonella,    Vibrio, Yersinia enterocolitica, Enteroaggregative    Escherichia coli (EAEC), Enteropathogenic E.coli (EPEC),    Enterotoxigenic E. coli (ETEC) lt/st, Shiga-like    toxin-producing E. coli (STEC) stx1/stx2,    Shigella/ Enteroinvasive E. coli (EIEC), Cryptosporidium,    Cyclospora cayetanensis, Entamoeba histolytica,    Giardia lamblia, Adenovirus F 40/41, Astrovirus,    Norovirus GI/GII, Rotavirus A, Sapovirus    Culture - Blood (collected 24 Oct 2018 15:35)  Source: .Blood Blood-Venous  Final Report (29 Oct 2018 16:00):    No growth at 5 days.    Culture - Blood (collected 24 Oct 2018 15:35)  Source: .Blood Blood-Venous  Final Report (29 Oct 2018 16:00):    No growth at 5 days.    Assessment :  88 yo F w/ PMH of DM type 2, HTN, CKD stage 4 ,COPD on 3L home O2, diastolic HFpEF (last echo   10/2017) presented to ED w/ lethargy progressing to unresponsiveness. Ct head x 2 negative   Poss Asp pneumonia  Candiuria  She has metabolic encephalopathy secondary to hepatic encephalopathy, the etiology may be SAMIA   or underlying undiagnosed liver disease.   Developing metabolic acidosis from SAMIA which is  worsening  Acute hypercapnia respiratory failure multifactorial with multisystem organ failure    Plan :   - continue with IV Zosyn and diflucan x 7 days   - aspiration precautions   - BIPAP per pulm  - Prognosis poor  - Nephrology follow up     Continue with present regiment.  Appropriate use of antibiotics and adverse effects reviewed.    I have discussed the above plan of care with patient's extended family in detail. They expressed understanding of the treatment plan . Risks, benefits and alternatives discussed in detail. I have asked if they have any questions or concerns and appropriately addressed them to the best of my ability .      > 25 minutes spent in direct patient care reviewing  the notes, lab data/ imaging , discussion with multidisciplinary team. All questions were addressed and answered to the best of my capacity .    Thank you for allowing me to participate in the care of your patient .        Jaclyn Barnes MD  [phone]

## 2018-10-30 NOTE — PROGRESS NOTE ADULT - PROBLEM SELECTOR PLAN 1
Anemia is multifactorial--likely due to renal failure and sepsis.   s/p 1 unit of PRBC 10/28/2018  hb 9.5 today  Patient is on procrit 67347 Units TIW--M/W/F and d/w procrit if hb is over 10  abn spep--patient son declines any further work up or treatment for abn spep at this time

## 2018-10-30 NOTE — PROGRESS NOTE ADULT - PROBLEM SELECTOR PLAN 1
pt is dying  multiple organ dysfunction  off BIPAP this am  NG tube in  supportive medical regimen  assist with ADL  oral hygiene  skin care  serial labs  serial PE  prognosis very poor  discussed with SON  multiple specialties following  another son due to arrive on thursday  will follow

## 2018-10-30 NOTE — PROGRESS NOTE ADULT - ASSESSMENT
Acute on CKD Stage 4  Hepatorenal Syndrome  Liver Cirrhosis  Sepsis/PNA/UTI  Hypernatremia  Hypokalemia  Dehydration  CHF/COPD   Metabolic Acidosis   better  Hypernatremia    -Renal indices are worsening; oliguric  -IVF with sodium bicarb; acidemia is severe; mainly driven by CO2 retention; +BiPAP but patient's current mental status will make this measure less effective, but being DNI, there are no other options  -Midodrine  -IV Albumin  -Octreotide  -Unable to give Lasix to force diurese because of severe hypotension as well as hypernatremia  -Abx per ID  -Urine studies reviewed  -Renal sono shows no hydronephrosis  -Daily chemistries  -No indication for RRT; poor prognosis. Not a candidate for dialysis as the risks far outweigh the benefits in this case. I discussed this matter with son at length at bedside who understood clearly. We agreed to conservative renal management as is currently being given       spent more than 50 min altogether, 35 min in pt assessment and plan and rest in related discussion with son   Changed IVF to  D5W @ 75 as Na is high and acidosis is better and hence bicarb removed today     Thank you Acute on CKD Stage 4  Hepatorenal Syndrome  Liver Cirrhosis  Sepsis/PNA/UTI  Hypernatremia  Hypokalemia  Dehydration  CHF/COPD   Metabolic Acidosis   better  Hypernatremia    -Renal indices are worsening; oliguric  -IVF no bicarb needed, with high Na, ok to use D5W at 30  -Octreotide  -Will try lasix to force diurese now that BPs are better  -Abx per ID  -Daily chemistries  -No indication for RRT; poor prognosis.  May cause more problems with Intermittent HD    Thank you

## 2018-10-30 NOTE — PROGRESS NOTE ADULT - SUBJECTIVE AND OBJECTIVE BOX
Patient is a 89y old  Female who presents with a chief complaint of weakness, decreased PO intake (30 Oct 2018 07:54)      HPI:  Hx obtained from son and other family members as pt was altered.  Pt is a 88 yo F w/ PMH of DM type 2, HTN, COPD on 2.5L home O2, diastolic HFpEF (last echo 10/2017) presented to ED p/w ams, weakness, dysuria, dec po x past ~ 3 days. Of note Pt was recently admitted for ams, similar to current presentation and was d/c 6 days ago. On previous admission Pt was diagnosed and treated for UTI. Pt was initially doing well, but then developed burning with urination, AMS, and watery diarrhea x 3 days ago. Family reports that pt was given Bactrim for unresolved UTI, but that she was only able to take 2 doses, as she could not swallow the 3rd dose this AM. Pt has poor PO intake and EMS came to her house last night to give her IVF due to dehydration. Of note patient is normally bedbound and requires 24/7 home health aid. Pt is incontinent at baseline and wears diapers. Denies any Fevers, but endorses chills, and increased fatigue. Denies any SOB, CP, N/V. Family reports pt normally is able to converse without issue, but has been incoherent for ~ the past 3 days    weakness, dysuria, decreased po intake x past ~ 3 days.   admitted with   a ms  sec to metabolic encephalopathy multifactorial prem on ckd3 now with hepatorenal syndrome , cirrhosis and ascites  unknown etiology high ammonia  , acute on chr hypercarbic resp failure   .   pt seen and examine today -  still   little arousal  , mendez   oliguric , no fever , npo hold tube feed ,  mild resp distress.       INTERVAL HPI/OVERNIGHT EVENTS:  T(C): 36.2 (10-30-18 @ 13:15), Max: 36.4 (10-29-18 @ 13:42)  HR: 78 (10-30-18 @ 13:15) (78 - 91)  BP: 140/55 (10-30-18 @ 13:15) (114/63 - 140/55)  RR: 18 (10-30-18 @ 13:15) (16 - 18)  SpO2: 97% (10-30-18 @ 13:15) (88% - 100%)  Wt(kg): --  I&O's Summary    29 Oct 2018 07:01  -  30 Oct 2018 07:00  --------------------------------------------------------  IN: 825 mL / OUT: 425 mL / NET: 400 mL      28 Oct 2018 07:01  -  29 Oct 2018 07:00  --------------------------------------------------------  IN: 1850 mL / OUT: 250 mL / NET: 1600 mL  REVIEW OF SYSTEMS:   unable  to obtain   as lethargic     PHYSICAL EXAM:  GENERAL: mild resp distress , lethargic   HEAD:  Atraumatic, Normocephalic  EYES: EOMI, PERRLA, conjunctiva and sclera clear  ENMT:  moist   mucous membranes,  No lesions  NECK: Supple,   NERVOUS SYSTEM:  Alert & Oriented X0, open eye on deep pain full stimuli   CHEST/LUNG: percussion  rt lower  coarse at bases     ; No rales, rhonchi, wheezing,   HEART: Regular rate and rhythm; No murmurs, no tachy   ABDOMEN: Soft,  Nondistended; Bowel sounds present / no tenderness   EXTREMITIES:  2+ Peripheral Pulses, No clubbing, cyanosis,  1 pulse pitting   edema bl low ext   gu mendez   SKIN: No rashes or lesions/stage 3buttock  clean       MEDICATIONS  (STANDING):  ALBUTerol/ipratropium for Nebulization 3 milliLiter(s) Nebulizer every 6 hours  dextrose 5%. 1000 milliLiter(s) (50 mL/Hr) IV Continuous <Continuous>  dextrose 50% Injectable 12.5 Gram(s) IV Push once  dextrose 50% Injectable 25 Gram(s) IV Push once  dextrose 50% Injectable 25 Gram(s) IV Push once  epoetin margie Injectable 26102 Unit(s) SubCutaneous <User Schedule>  ferrous    sulfate 325 milliGRAM(s) Oral daily  fluconAZOLE IVPB 100 milliGRAM(s) IV Intermittent every 24 hours  guaiFENesin  milliGRAM(s) Oral every 12 hours  heparin  Injectable 5000 Unit(s) SubCutaneous every 12 hours  lactulose Syrup 45 Gram(s) Oral three times a day  metoclopramide Injectable 5 milliGRAM(s) IV Push every 8 hours  midodrine 20 milliGRAM(s) Oral every 8 hours  octreotide  Injectable 100 MICROGram(s) SubCutaneous every 8 hours  pantoprazole  Injectable 40 milliGRAM(s) IV Push daily  piperacillin/tazobactam IVPB. 3.375 Gram(s) IV Intermittent every 12 hours  rifaximin 550 milliGRAM(s) Oral two times a day  sodium bicarbonate  Infusion 0.054 mEq/kG/Hr (75 mL/Hr) IV Continuous <Continuous>    MEDICATIONS  (PRN):  dextrose 40% Gel 15 Gram(s) Oral once PRN Blood Glucose LESS THAN 70 milliGRAM(s)/deciliter  glucagon  Injectable 1 milliGRAM(s) IntraMuscular once PRN Glucose LESS THAN 70 milligrams/deciliter      LABS:                        9.5    15.81 )-----------( 64       ( 30 Oct 2018 09:44 )             28.7     10-30    153<H>  |  117<H>  |  70<H>  ----------------------------<  136<H>  3.1<L>   |  23  |  4.70<H>    Ca    7.7<L>      30 Oct 2018 08:51  Phos  4.1     10-30  Mg     1.9     10-30    TPro  6.1  /  Alb  2.3<L>  /  TBili  0.7  /  DBili  x   /  AST  28  /  ALT  21  /  AlkPhos  109  10-30        CAPILLARY BLOOD GLUCOSE      POCT Blood Glucose.: 137 mg/dL (30 Oct 2018 11:45)  POCT Blood Glucose.: 170 mg/dL (30 Oct 2018 07:54)  POCT Blood Glucose.: 154 mg/dL (29 Oct 2018 21:28)  POCT Blood Glucose.: 156 mg/dL (29 Oct 2018 16:52)    ABG - ( 29 Oct 2018 06:40 )  pH, Arterial: 7.49  pH, Blood: x     /  pCO2: 30    /  pO2: 141   / HCO3: 24    / Base Excess: -0.5  /  SaO2: 100               10-26 @ 21:55   GI PCR Results: NOT detected  *******Please Note:*******  GI panel PCR evaluates for:  Campylobacter, Plesiomonas shigelloides, Salmonella,  Vibrio, Yersinia enterocolitica, Enteroaggregative  Escherichia coli (EAEC), Enteropathogenic E.coli (EPEC),  Enterotoxigenic E. coli (ETEC) lt/st, Shiga-like  toxin-producing E. coli (STEC) stx1/stx2,  Shigella/ Enteroinvasive E. coli (EIEC), Cryptosporidium,  Cyclospora cayetanensis, Entamoeba histolytica,  Giardia lamblia, Adenovirus F 40/41, Astrovirus,  Norovirus GI/GII, Rotavirus A, Sapovirus  --  --          RADIOLOGY & ADDITIONAL TESTS:    Imaging Personally Reviewed:      no new test   Advance Directives:     dnr / dni   Palliative Care:  appropriate

## 2018-10-30 NOTE — GOALS OF CARE CONVERSATION - PERSONAL ADVANCE DIRECTIVE - CONVERSATION DETAILS
[facility] :   11 am  Was asked by FLORES Jenkins to give family information about hospice care . Met with Oracio Pt's son , He reported that he would like his wife present and asked if I can return later . I returned at 1:30 pm however he was not present.  .  Shonda Alvarado RN CHPN

## 2018-10-30 NOTE — PROGRESS NOTE ADULT - PROBLEM SELECTOR PLAN 2
2/2 poor PO intake   -Hold Lasix , stable strict is/ os   -tt with  iv fluid bicarb with sever metabolic acidosis little better .   -Hypernatremia  progressive on fluid  poor prognosis with liver and renal failure this time

## 2018-10-30 NOTE — PROGRESS NOTE ADULT - PROBLEM SELECTOR PLAN 2
platelets are slow/steady dropping  platelets are at  54 k today  patient is on heparin s/c  will check naomi antibody  hold heparin if platelets are under  k or bleeding, follow naomi antibody results  Patient's condition and overall prognosis appear to be very poor.   case d/w pt 2 sons at bedside (one came from [place] today)

## 2018-10-30 NOTE — PROGRESS NOTE ADULT - SUBJECTIVE AND OBJECTIVE BOX
Neurology Follow up note    MALINDA [id number]    HPI:  Hx obtained from son and other family members as pt was altered.  Pt is a 88 yo F w/ PMH of DM type 2, HTN, COPD on 2.5L home O2, diastolic HFpEF (last echo 10/2017) presented to ED p/w ams, weakness, dysuria, dec po x past ~ 3 days. Of note Pt was recently admitted for ams, similar to current presentation and was d/c 6 days ago. On previous admission Pt was diagnosed and treated for UTI. Pt was initially doing well, but then developed burning with urination, AMS, and watery diarrhea x 3 days ago. Family reports that pt was given Bactrim for unresolved UTI, but that she was only able to take 2 doses, as she could not swallow the 3rd dose this AM. Pt has poor PO intake and EMS came to her house last night to give her IVF due to dehydration. Of note patient is normally bedbound and requires 24/7 home health aid. Pt is incontinent at baseline and wears diapers. Denies any Fevers, but endorses chills, and increased fatigue. Denies any SOB, CP, N/V. Family reports pt normally is able to converse without issue, but has been incoherent for ~ the past 3 days    In the ED, temp 97.1, HR 62, /75, RR 20, Spo2 99 on supplemental o2. WBC 11.46, Hg 9.5, hct 29.5, platelet 147, PT 13.5, INR 1.23, PTT 25.9, Na 148, K 4.3, Cl 120, Co2 21, BUN 64, Cr 2.00, glucose 110, calcium 8.0, alk phos 136, lactate 1.4, lipase 117. UA: small bilirubin, moderate leukocyte esterase, trace blood, moderate epithelial, bacteria few, hyaline 0-2, yeast present    Imaging:  CXR and CT head no cont ordered  EKG: NSR    In the ED, received zosyn 3.375 mg IV x 1, NaCl 0.9% 1000mL x 2, duoneb 3 mL x 1, (22 Oct 2018 23:52)      Interval History - more responsive today,    Patient is seen, chart was reviewed and case was discussed with the treatment team.  Pt is not in any distress.   Lying on bed comfortably.   No events reported overnight.   No clinical seizure was reported.  Sitting on chair bed comfortably.    is at bedside.    Vital Signs Last 24 Hrs  T(C): 36.2 (30 Oct 2018 13:15), Max: 36.3 (29 Oct 2018 22:19)  T(F): 97.1 (30 Oct 2018 13:15), Max: 97.4 (30 Oct 2018 04:45)  HR: 84 (30 Oct 2018 14:07) (78 - 91)  BP: 140/55 (30 Oct 2018 13:15) (127/77 - 140/55)  BP(mean): --  RR: 18 (30 Oct 2018 13:15) (16 - 18)  SpO2: 96% (30 Oct 2018 14:07) (88% - 100%)          On Neurological Examination:    Mental Status - Pt is awake but not following commands,     Speech -  Non verbal.    Cranial Nerves - Pupils 3 mm equal and reactive to light,.  Pt has no rfacial asymmetry.    Muscle tone - is normal      mostly withdrawal to pain right >left.      Deep tendon Reflexes - 2 plus all over.      Neck Supple -  Yes.     MEDICATIONS    ALBUTerol/ipratropium for Nebulization 3 milliLiter(s) Nebulizer every 6 hours  artificial  tears Solution 1 Drop(s) Both EYES two times a day  dextrose 40% Gel 15 Gram(s) Oral once PRN  dextrose 5%. 1000 milliLiter(s) IV Continuous <Continuous>  dextrose 50% Injectable 12.5 Gram(s) IV Push once  dextrose 50% Injectable 25 Gram(s) IV Push once  dextrose 50% Injectable 25 Gram(s) IV Push once  epoetin margie Injectable 10578 Unit(s) SubCutaneous <User Schedule>  ferrous    sulfate 325 milliGRAM(s) Oral daily  fluconAZOLE IVPB 100 milliGRAM(s) IV Intermittent every 24 hours  glucagon  Injectable 1 milliGRAM(s) IntraMuscular once PRN  guaiFENesin  milliGRAM(s) Oral every 12 hours  heparin  Injectable 5000 Unit(s) SubCutaneous every 12 hours  lactulose Syrup 45 Gram(s) Oral three times a day  metoclopramide Injectable 5 milliGRAM(s) IV Push every 8 hours  midodrine 20 milliGRAM(s) Oral every 8 hours  octreotide  Injectable 100 MICROGram(s) SubCutaneous every 8 hours  pantoprazole  Injectable 40 milliGRAM(s) IV Push daily  piperacillin/tazobactam IVPB. 3.375 Gram(s) IV Intermittent every 12 hours  rifaximin 550 milliGRAM(s) Oral two times a day  sodium bicarbonate  Infusion 0.054 mEq/kG/Hr IV Continuous <Continuous>      Allergies    No Known Allergies    Intolerances        LABS:  CBC Full  -  ( 30 Oct 2018 09:44 )  WBC Count : 15.81 K/uL  Hemoglobin : 9.5 g/dL  Hematocrit : 28.7 %  Platelet Count - Automated : 64 K/uL  Mean Cell Volume : 87.0 fl  Mean Cell Hemoglobin : 28.8 pg  Mean Cell Hemoglobin Concentration : 33.1 gm/dL  Auto Neutrophil # : 12.68 K/uL  Auto Lymphocyte # : 1.70 K/uL  Auto Monocyte # : 0.95 K/uL  Auto Eosinophil # : 0.27 K/uL        10-30    153<H>  |  117<H>  |  70<H>  ----------------------------<  136<H>  3.1<L>   |  23  |  4.70<H>    Ca    7.7<L>      30 Oct 2018 08:51  Phos  4.1     10-30  Mg     1.9     10-30    TPro  6.1  /  Alb  2.3<L>  /  TBili  0.7  /  DBili  x   /  AST  28  /  ALT  21  /  AlkPhos  109  10-30    Hemoglobin A1C:     Vitamin B12     RADIOLOGY    ASSESSMENT AND PLAN:      metabolic encephalopathy improving ammonia level but   bun/creatinine rising.    follow up lytes and ammonia level  nephrolgy /pulmonary follow up appreciated.  dw patient son and dr grady.  Would continue to follow.

## 2018-10-30 NOTE — PROGRESS NOTE ADULT - SUBJECTIVE AND OBJECTIVE BOX
Patient is a 89y old  Female who presents with a chief complaint of weakness, decreased PO intake (30 Oct 2018 17:36)       Pt is seen and examined  pt is awake and lying in bed/out of bed to chair  pt seems comfortable and denies any complaints at this time    HPI:  Hx obtained from son and other family members as pt was altered.  Pt is a 88 yo F w/ PMH of DM type 2, HTN, COPD on 2.5L home O2, diastolic HFpEF (last echo 10/2017) presented to ED p/w ams, weakness, dysuria, dec po x past ~ 3 days. Of note Pt was recently admitted for ams, similar to current presentation and was d/c 6 days ago. On previous admission Pt was diagnosed and treated for UTI. Pt was initially doing well, but then developed burning with urination, AMS, and watery diarrhea x 3 days ago. Family reports that pt was given Bactrim for unresolved UTI, but that she was only able to take 2 doses, as she could not swallow the 3rd dose this AM. Pt has poor PO intake and EMS came to her house last night to give her IVF due to dehydration. Of note patient is normally bedbound and requires 24/7 home health aid. Pt is incontinent at baseline and wears diapers. Denies any Fevers, but endorses chills, and increased fatigue. Denies any SOB, CP, N/V. Family reports pt normally is able to converse without issue, but has been incoherent for ~ the past 3 days    In the ED, temp 97.1, HR 62, /75, RR 20, Spo2 99 on supplemental o2. WBC 11.46, Hg 9.5, hct 29.5, platelet 147, PT 13.5, INR 1.23, PTT 25.9, Na 148, K 4.3, Cl 120, Co2 21, BUN 64, Cr 2.00, glucose 110, calcium 8.0, alk phos 136, lactate 1.4, lipase 117. UA: small bilirubin, moderate leukocyte esterase, trace blood, moderate epithelial, bacteria few, hyaline 0-2, yeast present    Imaging:  CXR and CT head no cont ordered  EKG: NSR    In the ED, received zosyn 3.375 mg IV x 1, NaCl 0.9% 1000mL x 2, duoneb 3 mL x 1, (22 Oct 2018 23:52)         ROS:  Negative except for:    MEDICATIONS  (STANDING):  ALBUTerol/ipratropium for Nebulization 3 milliLiter(s) Nebulizer every 6 hours  artificial  tears Solution 1 Drop(s) Both EYES two times a day  dextrose 5%. 1000 milliLiter(s) (50 mL/Hr) IV Continuous <Continuous>  dextrose 50% Injectable 12.5 Gram(s) IV Push once  dextrose 50% Injectable 25 Gram(s) IV Push once  dextrose 50% Injectable 25 Gram(s) IV Push once  epoetin margie Injectable 54561 Unit(s) SubCutaneous <User Schedule>  ferrous    sulfate 325 milliGRAM(s) Oral daily  fluconAZOLE IVPB 100 milliGRAM(s) IV Intermittent every 24 hours  guaiFENesin  milliGRAM(s) Oral every 12 hours  heparin  Injectable 5000 Unit(s) SubCutaneous every 12 hours  lactulose Syrup 45 Gram(s) Oral three times a day  metoclopramide Injectable 5 milliGRAM(s) IV Push every 8 hours  midodrine 20 milliGRAM(s) Oral every 8 hours  octreotide  Injectable 100 MICROGram(s) SubCutaneous every 8 hours  pantoprazole  Injectable 40 milliGRAM(s) IV Push daily  piperacillin/tazobactam IVPB. 3.375 Gram(s) IV Intermittent every 12 hours  rifaximin 550 milliGRAM(s) Oral two times a day  sodium bicarbonate  Infusion 0.054 mEq/kG/Hr (75 mL/Hr) IV Continuous <Continuous>    MEDICATIONS  (PRN):  dextrose 40% Gel 15 Gram(s) Oral once PRN Blood Glucose LESS THAN 70 milliGRAM(s)/deciliter  glucagon  Injectable 1 milliGRAM(s) IntraMuscular once PRN Glucose LESS THAN 70 milligrams/deciliter      Allergies    No Known Allergies    Intolerances        Vital Signs Last 24 Hrs  T(C): 36.2 (30 Oct 2018 13:15), Max: 36.3 (29 Oct 2018 22:19)  T(F): 97.1 (30 Oct 2018 13:15), Max: 97.4 (30 Oct 2018 04:45)  HR: 84 (30 Oct 2018 14:07) (78 - 91)  BP: 140/55 (30 Oct 2018 13:15) (127/77 - 140/55)  BP(mean): --  RR: 18 (30 Oct 2018 13:15) (16 - 18)  SpO2: 96% (30 Oct 2018 14:07) (88% - 100%)    PHYSICAL EXAM  General: adult in NAD  HEENT: clear oropharynx, anicteric sclera, pink conjunctiva  Neck: supple  CV: normal S1/S2 with no murmur rubs or gallops  Lungs: positive air movement b/l ant lungs,clear to auscultation, no wheezes, no rales  Abdomen: soft non-tender non-distended, no hepatosplenomegaly  Ext: no clubbing cyanosis or edema  Skin: no rashes and no petechiae  Neuro: alert and oriented X 4, no focal deficits  LABS:                          9.5    15.81 )-----------( 64       ( 30 Oct 2018 09:44 )             28.7         Mean Cell Volume : 87.0 fl  Mean Cell Hemoglobin : 28.8 pg  Mean Cell Hemoglobin Concentration : 33.1 gm/dL  Auto Neutrophil # : 12.68 K/uL  Auto Lymphocyte # : 1.70 K/uL  Auto Monocyte # : 0.95 K/uL  Auto Eosinophil # : 0.27 K/uL  Auto Basophil # : 0.04 K/uL  Auto Neutrophil % : 80.1 %  Auto Lymphocyte % : 10.8 %  Auto Monocyte % : 6.0 %  Auto Eosinophil % : 1.7 %  Auto Basophil % : 0.3 %    Serial CBC's  10-30 @ 09:44  Hct-28.7 / Hgb-9.5 / Plat-64 / RBC-3.30 / WBC-15.81          Serial CBC's  10-29 @ 09:54  Hct-27.6 / Hgb-8.9 / Plat-105 / RBC-3.13 / WBC-20.49          Serial CBC's  10-28 @ 16:33  Hct-22.8 / Hgb-7.4 / Plat-117 / RBC-2.60 / WBC-26.53          Serial CBC's  10-28 @ 09:12  Hct-24.5 / Hgb-7.9 / Plat-124 / RBC-2.75 / WBC-32.53          Serial CBC's  10-28 @ 01:32  Hct-26.0 / Hgb-8.3 / Plat-146 / RBC-2.87 / WBC-39.44            10-30    153<H>  |  117<H>  |  70<H>  ----------------------------<  136<H>  3.1<L>   |  23  |  4.70<H>    Ca    7.7<L>      30 Oct 2018 08:51  Phos  4.1     10-30  Mg     1.9     10-30    TPro  6.1  /  Alb  2.3<L>  /  TBili  0.7  /  DBili  x   /  AST  28  /  ALT  21  /  AlkPhos  109  10-30          Ferritin, Serum: 189 ng/mL (10-27-18 @ 16:04)  Iron - Total Binding Capacity.: 136 ug/dL (10-26-18 @ 19:09)  Iron - Total Binding Capacity.: 126 ug/dL (10-26-18 @ 19:09)  Reticulocyte Percent: 1.6 % (10-26-18 @ 14:43)  Vitamin B12, Serum: >2000 pg/mL (10-25-18 @ 10:44)  Folate, Serum: 14.6 ng/mL (10-25-18 @ 10:44)      Serum Protein Electrophoresis Interp: Weak Gamma Migrating Paraprotein Identified (10-26-18 @ 19:09)  Immunofixation, Serum:   Weak IgG Lambda Band Identified    Reference Range: None Detected (10-26-18 @ 19:09)            BLOOD SMEAR INTERPRETATION:       RADIOLOGY & ADDITIONAL STUDIES: Patient is a 89y old  Female who presents with a chief complaint of weakness, decreased PO intake (30 Oct 2018 17:36)       Pt is seen and examined, lethargic  family--2 sons are at bedside, another brother is coming from [place] on thursday as per son  pt is lying in bed    HPI:  Hx obtained from son and other family members as pt was altered.  Pt is a 89 yo F w/ PMH of DM type 2, HTN, COPD on 2.5L home O2, diastolic HFpEF (last echo 10/2017) presented to ED p/w ams, weakness, dysuria, dec po x past ~ 3 days. Of note Pt was recently admitted for ams, similar to current presentation and was d/c 6 days ago. On previous admission Pt was diagnosed and treated for UTI. Pt was initially doing well, but then developed burning with urination, AMS, and watery diarrhea x 3 days ago. Family reports that pt was given Bactrim for unresolved UTI, but that she was only able to take 2 doses, as she could not swallow the 3rd dose this AM. Pt has poor PO intake and EMS came to her house last night to give her IVF due to dehydration. Of note patient is normally bedbound and requires 24/7 home health aid. Pt is incontinent at baseline and wears diapers. Denies any Fevers, but endorses chills, and increased fatigue. Denies any SOB, CP, N/V. Family reports pt normally is able to converse without issue, but has been incoherent for ~ the past 3 days    In the ED, temp 97.1, HR 62, /75, RR 20, Spo2 99 on supplemental o2. WBC 11.46, Hg 9.5, hct 29.5, platelet 147, PT 13.5, INR 1.23, PTT 25.9, Na 148, K 4.3, Cl 120, Co2 21, BUN 64, Cr 2.00, glucose 110, calcium 8.0, alk phos 136, lactate 1.4, lipase 117. UA: small bilirubin, moderate leukocyte esterase, trace blood, moderate epithelial, bacteria few, hyaline 0-2, yeast present    Imaging:  CXR and CT head no cont ordered  EKG: NSR    In the ED, received zosyn 3.375 mg IV x 1, NaCl 0.9% 1000mL x 2, duoneb 3 mL x 1, (22 Oct 2018 23:52)         ROS:  Negative except for:    MEDICATIONS  (STANDING):  ALBUTerol/ipratropium for Nebulization 3 milliLiter(s) Nebulizer every 6 hours  artificial  tears Solution 1 Drop(s) Both EYES two times a day  dextrose 5%. 1000 milliLiter(s) (50 mL/Hr) IV Continuous <Continuous>  dextrose 50% Injectable 12.5 Gram(s) IV Push once  dextrose 50% Injectable 25 Gram(s) IV Push once  dextrose 50% Injectable 25 Gram(s) IV Push once  epoetin margie Injectable 96157 Unit(s) SubCutaneous <User Schedule>  ferrous    sulfate 325 milliGRAM(s) Oral daily  fluconAZOLE IVPB 100 milliGRAM(s) IV Intermittent every 24 hours  guaiFENesin  milliGRAM(s) Oral every 12 hours  heparin  Injectable 5000 Unit(s) SubCutaneous every 12 hours  lactulose Syrup 45 Gram(s) Oral three times a day  metoclopramide Injectable 5 milliGRAM(s) IV Push every 8 hours  midodrine 20 milliGRAM(s) Oral every 8 hours  octreotide  Injectable 100 MICROGram(s) SubCutaneous every 8 hours  pantoprazole  Injectable 40 milliGRAM(s) IV Push daily  piperacillin/tazobactam IVPB. 3.375 Gram(s) IV Intermittent every 12 hours  rifaximin 550 milliGRAM(s) Oral two times a day  sodium bicarbonate  Infusion 0.054 mEq/kG/Hr (75 mL/Hr) IV Continuous <Continuous>    MEDICATIONS  (PRN):  dextrose 40% Gel 15 Gram(s) Oral once PRN Blood Glucose LESS THAN 70 milliGRAM(s)/deciliter  glucagon  Injectable 1 milliGRAM(s) IntraMuscular once PRN Glucose LESS THAN 70 milligrams/deciliter      Allergies    No Known Allergies    Intolerances        Vital Signs Last 24 Hrs  T(C): 36.2 (30 Oct 2018 13:15), Max: 36.3 (29 Oct 2018 22:19)  T(F): 97.1 (30 Oct 2018 13:15), Max: 97.4 (30 Oct 2018 04:45)  HR: 84 (30 Oct 2018 14:07) (78 - 91)  BP: 140/55 (30 Oct 2018 13:15) (127/77 - 140/55)  BP(mean): --  RR: 18 (30 Oct 2018 13:15) (16 - 18)  SpO2: 96% (30 Oct 2018 14:07) (88% - 100%)    PHYSICAL EXAM  General: adult in NAD  HEENT: clear oropharynx, anicteric sclera, pink conjunctiva  Neck: supple  CV: normal S1/S2 with no murmur rubs or gallops  Lungs: positive air movement b/l ant lungs,clear to auscultation, no wheezes, no rales  Abdomen: soft non-tender non-distended, no hepatosplenomegaly  Ext: no clubbing cyanosis or edema  Skin: no rashes and no petechiae  Neuro: alert and oriented X 4, no focal deficits  LABS:                          9.5    15.81 )-----------( 64       ( 30 Oct 2018 09:44 )             28.7         Mean Cell Volume : 87.0 fl  Mean Cell Hemoglobin : 28.8 pg  Mean Cell Hemoglobin Concentration : 33.1 gm/dL  Auto Neutrophil # : 12.68 K/uL  Auto Lymphocyte # : 1.70 K/uL  Auto Monocyte # : 0.95 K/uL  Auto Eosinophil # : 0.27 K/uL  Auto Basophil # : 0.04 K/uL  Auto Neutrophil % : 80.1 %  Auto Lymphocyte % : 10.8 %  Auto Monocyte % : 6.0 %  Auto Eosinophil % : 1.7 %  Auto Basophil % : 0.3 %    Serial CBC's  10-30 @ 09:44  Hct-28.7 / Hgb-9.5 / Plat-64 / RBC-3.30 / WBC-15.81          Serial CBC's  10-29 @ 09:54  Hct-27.6 / Hgb-8.9 / Plat-105 / RBC-3.13 / WBC-20.49          Serial CBC's  10-28 @ 16:33  Hct-22.8 / Hgb-7.4 / Plat-117 / RBC-2.60 / WBC-26.53          Serial CBC's  10-28 @ 09:12  Hct-24.5 / Hgb-7.9 / Plat-124 / RBC-2.75 / WBC-32.53          Serial CBC's  10-28 @ 01:32  Hct-26.0 / Hgb-8.3 / Plat-146 / RBC-2.87 / WBC-39.44            10-30    153<H>  |  117<H>  |  70<H>  ----------------------------<  136<H>  3.1<L>   |  23  |  4.70<H>    Ca    7.7<L>      30 Oct 2018 08:51  Phos  4.1     10-30  Mg     1.9     10-30    TPro  6.1  /  Alb  2.3<L>  /  TBili  0.7  /  DBili  x   /  AST  28  /  ALT  21  /  AlkPhos  109  10-30          Ferritin, Serum: 189 ng/mL (10-27-18 @ 16:04)  Iron - Total Binding Capacity.: 136 ug/dL (10-26-18 @ 19:09)  Iron - Total Binding Capacity.: 126 ug/dL (10-26-18 @ 19:09)  Reticulocyte Percent: 1.6 % (10-26-18 @ 14:43)  Vitamin B12, Serum: >2000 pg/mL (10-25-18 @ 10:44)  Folate, Serum: 14.6 ng/mL (10-25-18 @ 10:44)      Serum Protein Electrophoresis Interp: Weak Gamma Migrating Paraprotein Identified (10-26-18 @ 19:09)  Immunofixation, Serum:   Weak IgG Lambda Band Identified    Reference Range: None Detected (10-26-18 @ 19:09)

## 2018-10-30 NOTE — PROGRESS NOTE ADULT - SUBJECTIVE AND OBJECTIVE BOX
NEPHROLOGY INTERVAL HPI/OVERNIGHT EVENTS:  HPI:  Hx obtained from son and other family members as pt was altered.  Pt is a 89 yo F w/ PMH of DM type 2, HTN, COPD on 2.5L home O2, diastolic HFpEF (last echo 10/2017) presented to ED p/w ams, weakness, dysuria, dec po x past ~ 3 days. Of note Pt was recently admitted for ams, similar to current presentation and was d/c 6 days ago. On previous admission Pt was diagnosed and treated for UTI. Pt was initially doing well, but then developed burning with urination, AMS, and watery diarrhea x 3 days ago. Family reports that pt was given Bactrim for unresolved UTI, but that she was only able to take 2 doses, as she could not swallow the 3rd dose this AM. Pt has poor PO intake and EMS came to her house last night to give her IVF due to dehydration. Of note patient is normally bedbound and requires 24/7 home health aid. Pt is incontinent at baseline and wears diapers. Denies any Fevers, but endorses chills, and increased fatigue. Denies any SOB, CP, N/V. Family reports pt normally is able to converse without issue, but has been incoherent for ~ the past 3 days    In the ED, temp 97.1, HR 62, /75, RR 20, Spo2 99 on supplemental o2. WBC 11.46, Hg 9.5, hct 29.5, platelet 147, PT 13.5, INR 1.23, PTT 25.9, Na 148, K 4.3, Cl 120, Co2 21, BUN 64, Cr 2.00, glucose 110, calcium 8.0, alk phos 136, lactate 1.4, lipase 117. UA: small bilirubin, moderate leukocyte esterase, trace blood, moderate epithelial, bacteria few, hyaline 0-2, yeast present    Imaging:  CXR and CT head no cont ordered  EKG: NSR    In the ED, received zosyn 3.375 mg IV x 1, NaCl 0.9% 1000mL x 2, duoneb 3 mL x 1, (22 Oct 2018 23:52)      PAST MEDICAL & SURGICAL HISTORY:  Coronary artery disease with angina pectoris, unspecified vessel or lesion type, unspecified whether native or transplanted heart  Congestive heart failure  Asthma  Diabetes mellitus  HTN (hypertension)  No significant past surgical history      MEDICATIONS  (STANDING):  ALBUTerol/ipratropium for Nebulization 3 milliLiter(s) Nebulizer every 6 hours  artificial  tears Solution 1 Drop(s) Both EYES two times a day  dextrose 5%. 1000 milliLiter(s) (50 mL/Hr) IV Continuous <Continuous>  dextrose 50% Injectable 12.5 Gram(s) IV Push once  dextrose 50% Injectable 25 Gram(s) IV Push once  dextrose 50% Injectable 25 Gram(s) IV Push once  epoetin margie Injectable 98695 Unit(s) SubCutaneous <User Schedule>  ferrous    sulfate 325 milliGRAM(s) Oral daily  fluconAZOLE IVPB 100 milliGRAM(s) IV Intermittent every 24 hours  guaiFENesin  milliGRAM(s) Oral every 12 hours  heparin  Injectable 5000 Unit(s) SubCutaneous every 12 hours  lactulose Syrup 45 Gram(s) Oral three times a day  metoclopramide Injectable 5 milliGRAM(s) IV Push every 8 hours  midodrine 20 milliGRAM(s) Oral every 8 hours  octreotide  Injectable 100 MICROGram(s) SubCutaneous every 8 hours  pantoprazole  Injectable 40 milliGRAM(s) IV Push daily  piperacillin/tazobactam IVPB. 3.375 Gram(s) IV Intermittent every 12 hours  potassium chloride   Powder 40 milliEquivalent(s) Oral every 4 hours  rifaximin 550 milliGRAM(s) Oral two times a day  sodium bicarbonate  Infusion 0.054 mEq/kG/Hr (75 mL/Hr) IV Continuous <Continuous>    MEDICATIONS  (PRN):  dextrose 40% Gel 15 Gram(s) Oral once PRN Blood Glucose LESS THAN 70 milliGRAM(s)/deciliter  glucagon  Injectable 1 milliGRAM(s) IntraMuscular once PRN Glucose LESS THAN 70 milligrams/deciliter      Allergies    No Known Allergies    Intolerances        Vital Signs Last 24 Hrs  T(C): 36.2 (30 Oct 2018 13:15), Max: 36.3 (29 Oct 2018 22:19)  T(F): 97.1 (30 Oct 2018 13:15), Max: 97.4 (30 Oct 2018 04:45)  HR: 78 (30 Oct 2018 13:15) (78 - 91)  BP: 140/55 (30 Oct 2018 13:15) (127/77 - 140/55)  BP(mean): --  RR: 18 (30 Oct 2018 13:15) (16 - 18)  SpO2: 97% (30 Oct 2018 13:15) (88% - 100%)  Daily     Daily     PHYSICAL EXAM:    GENERAL: NAD, well-groomed, well-developed  HEAD:  Atraumatic, Normocephalic  EYES: EOMI, PERRLA, conjunctiva and sclera clear  ENMT: No tonsillar erythema, exudates, or enlargement; Moist mucous membranes, Good dentition, No lesions  NECK: Supple, No JVD, Normal thyroid  NERVOUS SYSTEM:  Alert & Oriented X3, Good concentration; Motor Strength 5/5 B/L upper and lower extremities; DTRs 2+ intact and symmetric  CHEST/LUNG: Clear to percussion bilaterally; No rales, rhonchi, wheezing, or rubs  HEART: Regular rate and rhythm; No murmurs, rubs, or gallops  ABDOMEN: Soft, Nontender, Nondistended; Bowel sounds present  EXTREMITIES:  2+ Peripheral Pulses, No clubbing, cyanosis, or edema  SKIN: No rashes or lesions    LABS:                        9.5    15.81 )-----------( 64       ( 30 Oct 2018 09:44 )             28.7     10-30    153<H>  |  117<H>  |  70<H>  ----------------------------<  136<H>  3.1<L>   |  23  |  4.70<H>    Ca    7.7<L>      30 Oct 2018 08:51  Phos  4.1     10-30  Mg     1.9     10-30    TPro  6.1  /  Alb  2.3<L>  /  TBili  0.7  /  DBili  x   /  AST  28  /  ALT  21  /  AlkPhos  109  10-30    Magnesium, Serum: 1.9 mg/dL (10-30 @ 08:51)  Phosphorus Level, Serum: 4.1 mg/dL (10-30 @ 08:51)    ABG - ( 29 Oct 2018 06:40 )  pH, Arterial: 7.49  pH, Blood: x     /  pCO2: 30    /  pO2: 141   / HCO3: 24    / Base Excess: -0.5  /  SaO2: 100 NEPHROLOGY INTERVAL HPI/OVERNIGHT EVENTS:  HPI:  Hx obtained from son and other family members as pt was altered.  Pt is a 89 yo F w/ PMH of DM type 2, HTN, COPD on 2.5L home O2, diastolic HFpEF (last echo 10/2017) presented to ED p/w ams, weakness, dysuria, dec po x past ~ 3 days. Of note Pt was recently admitted for ams, similar to current presentation and was d/c 6 days ago. On previous admission Pt was diagnosed and treated for UTI. Pt was initially doing well, but then developed burning with urination, AMS, and watery diarrhea x 3 days ago. Family reports that pt was given Bactrim for unresolved UTI, but that she was only able to take 2 doses, as she could not swallow the 3rd dose this AM. Pt has poor PO intake and EMS came to her house last night to give her IVF due to dehydration. Of note patient is normally bedbound and requires 24/7 home health aid. Pt is incontinent at baseline and wears diapers. Denies any Fevers, but endorses chills, and increased fatigue. Denies any SOB, CP, N/V. Family reports pt normally is able to converse without issue, but has been incoherent for ~ the past 3 days    In the ED, temp 97.1, HR 62, /75, RR 20, Spo2 99 on supplemental o2. WBC 11.46, Hg 9.5, hct 29.5, platelet 147, PT 13.5, INR 1.23, PTT 25.9, Na 148, K 4.3, Cl 120, Co2 21, BUN 64, Cr 2.00, glucose 110, calcium 8.0, alk phos 136, lactate 1.4, lipase 117. UA: small bilirubin, moderate leukocyte esterase, trace blood, moderate epithelial, bacteria few, hyaline 0-2, yeast present    Imaging:  CXR and CT head no cont ordered  EKG: NSR    In the ED, received zosyn 3.375 mg IV x 1, NaCl 0.9% 1000mL x 2, duoneb 3 mL x 1, (22 Oct 2018 23:52)      PAST MEDICAL & SURGICAL HISTORY:  Coronary artery disease with angina pectoris, unspecified vessel or lesion type, unspecified whether native or transplanted heart  Congestive heart failure  Asthma  Diabetes mellitus  HTN (hypertension)  No significant past surgical history      MEDICATIONS  (STANDING):  ALBUTerol/ipratropium for Nebulization 3 milliLiter(s) Nebulizer every 6 hours  artificial  tears Solution 1 Drop(s) Both EYES two times a day  dextrose 5%. 1000 milliLiter(s) (50 mL/Hr) IV Continuous <Continuous>  dextrose 50% Injectable 12.5 Gram(s) IV Push once  dextrose 50% Injectable 25 Gram(s) IV Push once  dextrose 50% Injectable 25 Gram(s) IV Push once  epoetin margie Injectable 06464 Unit(s) SubCutaneous <User Schedule>  ferrous    sulfate 325 milliGRAM(s) Oral daily  fluconAZOLE IVPB 100 milliGRAM(s) IV Intermittent every 24 hours  guaiFENesin  milliGRAM(s) Oral every 12 hours  heparin  Injectable 5000 Unit(s) SubCutaneous every 12 hours  lactulose Syrup 45 Gram(s) Oral three times a day  metoclopramide Injectable 5 milliGRAM(s) IV Push every 8 hours  midodrine 20 milliGRAM(s) Oral every 8 hours  octreotide  Injectable 100 MICROGram(s) SubCutaneous every 8 hours  pantoprazole  Injectable 40 milliGRAM(s) IV Push daily  piperacillin/tazobactam IVPB. 3.375 Gram(s) IV Intermittent every 12 hours  potassium chloride   Powder 40 milliEquivalent(s) Oral every 4 hours  rifaximin 550 milliGRAM(s) Oral two times a day  sodium bicarbonate  Infusion 0.054 mEq/kG/Hr (75 mL/Hr) IV Continuous <Continuous>    MEDICATIONS  (PRN):  dextrose 40% Gel 15 Gram(s) Oral once PRN Blood Glucose LESS THAN 70 milliGRAM(s)/deciliter  glucagon  Injectable 1 milliGRAM(s) IntraMuscular once PRN Glucose LESS THAN 70 milligrams/deciliter      Allergies    No Known Allergies    Intolerances    Vital Signs Last 24 Hrs  T(C): 36.2 (30 Oct 2018 13:15), Max: 36.3 (29 Oct 2018 22:19)  T(F): 97.1 (30 Oct 2018 13:15), Max: 97.4 (30 Oct 2018 04:45)  HR: 78 (30 Oct 2018 13:15) (78 - 91)  BP: 140/55 (30 Oct 2018 13:15) (127/77 - 140/55)  BP(mean): --  RR: 18 (30 Oct 2018 13:15) (16 - 18)  SpO2: 97% (30 Oct 2018 13:15) (88% - 100%)  Daily     Daily     PHYSICAL EXAM:    GENERAL: NAD, well-groomed, well-developed  HEAD:  Atraumatic, Normocephalic  EYES: EOMI, PERRLA, conjunctiva and sclera clear  ENMT: No tonsillar erythema, exudates, or enlargement; Moist mucous membranes, Good dentition, No lesions  NECK: Supple, No JVD, Normal thyroid  NERVOUS SYSTEM:  Alert & Oriented X3, Good concentration; Motor Strength 5/5 B/L upper and lower extremities; DTRs 2+ intact and symmetric  CHEST/LUNG: Clear to percussion bilaterally; No rales, rhonchi, wheezing, or rubs  HEART: Regular rate and rhythm; No murmurs, rubs, or gallops  ABDOMEN: Soft, Nontender, Nondistended; Bowel sounds present  EXTREMITIES:  2+ Peripheral Pulses, No clubbing, cyanosis, or edema  SKIN: No rashes or lesions    LABS:                        9.5    15.81 )-----------( 64       ( 30 Oct 2018 09:44 )             28.7     10-30    153<H>  |  117<H>  |  70<H>  ----------------------------<  136<H>  3.1<L>   |  23  |  4.70<H>    Ca    7.7<L>      30 Oct 2018 08:51  Phos  4.1     10-30  Mg     1.9     10-30    TPro  6.1  /  Alb  2.3<L>  /  TBili  0.7  /  DBili  x   /  AST  28  /  ALT  21  /  AlkPhos  109  10-30    Magnesium, Serum: 1.9 mg/dL (10-30 @ 08:51)  Phosphorus Level, Serum: 4.1 mg/dL (10-30 @ 08:51)    ABG - ( 29 Oct 2018 06:40 )  pH, Arterial: 7.49  pH, Blood: x     /  pCO2: 30    /  pO2: 141   / HCO3: 24    / Base Excess: -0.5  /  SaO2: 100

## 2018-10-30 NOTE — PROGRESS NOTE ADULT - ASSESSMENT
Pt is a 89 yo F w/ PMH of DM type 2, HTN, COPD on 2.5L home O2, diastolic HFpEF (last echo 10/2017) presented to ED p/w AMS, weakness, dysuria, decreased po intake x past ~ 3 days   with prem now hepatorenal syndrome / multiorgan failure  sever metabolic encephalopathy  acute on chr hypercarbic resp failure .

## 2018-10-30 NOTE — PROGRESS NOTE ADULT - PROBLEM SELECTOR PLAN 1
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

## 2018-10-30 NOTE — PROGRESS NOTE ADULT - PROBLEM SELECTOR PLAN 1
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  on iv  Zosyn for possible aspiration pneumonia  still continue abx  id dr lindquist .   --urine culture shows Candida--patieint is on Fluconazole.   -  -Blood culture growing GPC in clusters in one bottle, likely contaminant. Repeat cultures are negative.  - Aspiration precautions

## 2018-10-31 LAB
AMMONIA BLD-MCNC: 65 UMOL/L — HIGH (ref 11–32)
ANION GAP SERPL CALC-SCNC: 10 MMOL/L — SIGNIFICANT CHANGE UP (ref 5–17)
ANION GAP SERPL CALC-SCNC: 10 MMOL/L — SIGNIFICANT CHANGE UP (ref 5–17)
APTT BLD: 52.6 SEC — HIGH (ref 28.5–37)
BASOPHILS # BLD AUTO: 0.02 K/UL — SIGNIFICANT CHANGE UP (ref 0–0.2)
BASOPHILS NFR BLD AUTO: 0.1 % — SIGNIFICANT CHANGE UP (ref 0–2)
BUN SERPL-MCNC: 66 MG/DL — HIGH (ref 7–23)
BUN SERPL-MCNC: 69 MG/DL — HIGH (ref 7–23)
CALCIUM SERPL-MCNC: 7.9 MG/DL — LOW (ref 8.5–10.1)
CALCIUM SERPL-MCNC: 8 MG/DL — LOW (ref 8.5–10.1)
CHLORIDE SERPL-SCNC: 121 MMOL/L — HIGH (ref 96–108)
CHLORIDE SERPL-SCNC: 122 MMOL/L — HIGH (ref 96–108)
CK MB BLD-MCNC: 6.5 % — HIGH (ref 0–3.5)
CK MB BLD-MCNC: 8.4 % — HIGH (ref 0–3.5)
CK MB CFR SERPL CALC: 1.3 NG/ML — SIGNIFICANT CHANGE UP (ref 0–3.6)
CK MB CFR SERPL CALC: 1.6 NG/ML — SIGNIFICANT CHANGE UP (ref 0–3.6)
CK SERPL-CCNC: 19 U/L — LOW (ref 26–192)
CK SERPL-CCNC: 20 U/L — LOW (ref 26–192)
CO2 SERPL-SCNC: 27 MMOL/L — SIGNIFICANT CHANGE UP (ref 22–31)
CO2 SERPL-SCNC: 27 MMOL/L — SIGNIFICANT CHANGE UP (ref 22–31)
CREAT SERPL-MCNC: 4.8 MG/DL — HIGH (ref 0.5–1.3)
CREAT SERPL-MCNC: 4.8 MG/DL — HIGH (ref 0.5–1.3)
EOSINOPHIL # BLD AUTO: 0.28 K/UL — SIGNIFICANT CHANGE UP (ref 0–0.5)
EOSINOPHIL NFR BLD AUTO: 2 % — SIGNIFICANT CHANGE UP (ref 0–6)
GLUCOSE SERPL-MCNC: 115 MG/DL — HIGH (ref 70–99)
GLUCOSE SERPL-MCNC: 96 MG/DL — SIGNIFICANT CHANGE UP (ref 70–99)
HCT VFR BLD CALC: 28.8 % — LOW (ref 34.5–45)
HGB BLD-MCNC: 9.4 G/DL — LOW (ref 11.5–15.5)
IMM GRANULOCYTES NFR BLD AUTO: 1.1 % — SIGNIFICANT CHANGE UP (ref 0–1.5)
INR BLD: 1.9 RATIO — HIGH (ref 0.88–1.16)
LYMPHOCYTES # BLD AUTO: 1.47 K/UL — SIGNIFICANT CHANGE UP (ref 1–3.3)
LYMPHOCYTES # BLD AUTO: 10.7 % — LOW (ref 13–44)
MAGNESIUM SERPL-MCNC: 1.9 MG/DL — SIGNIFICANT CHANGE UP (ref 1.6–2.6)
MAGNESIUM SERPL-MCNC: 2.1 MG/DL — SIGNIFICANT CHANGE UP (ref 1.6–2.6)
MCHC RBC-ENTMCNC: 28.3 PG — SIGNIFICANT CHANGE UP (ref 27–34)
MCHC RBC-ENTMCNC: 32.6 GM/DL — SIGNIFICANT CHANGE UP (ref 32–36)
MCV RBC AUTO: 86.7 FL — SIGNIFICANT CHANGE UP (ref 80–100)
MONOCYTES # BLD AUTO: 0.85 K/UL — SIGNIFICANT CHANGE UP (ref 0–0.9)
MONOCYTES NFR BLD AUTO: 6.2 % — SIGNIFICANT CHANGE UP (ref 2–14)
NEUTROPHILS # BLD AUTO: 10.98 K/UL — HIGH (ref 1.8–7.4)
NEUTROPHILS NFR BLD AUTO: 79.9 % — HIGH (ref 43–77)
NRBC # BLD: 0 /100 WBCS — SIGNIFICANT CHANGE UP (ref 0–0)
PF4 HEPARIN CMPLX AB SER-ACNC: NEGATIVE — SIGNIFICANT CHANGE UP
PF4 HEPARIN CMPLX AB SERPL QL IA: 0.19 ABS — SIGNIFICANT CHANGE UP
PHOSPHATE SERPL-MCNC: 4.2 MG/DL — SIGNIFICANT CHANGE UP (ref 2.5–4.5)
PLATELET # BLD AUTO: 61 K/UL — LOW (ref 150–400)
POTASSIUM SERPL-MCNC: 3.2 MMOL/L — LOW (ref 3.5–5.3)
POTASSIUM SERPL-MCNC: 3.3 MMOL/L — LOW (ref 3.5–5.3)
POTASSIUM SERPL-SCNC: 3.2 MMOL/L — LOW (ref 3.5–5.3)
POTASSIUM SERPL-SCNC: 3.3 MMOL/L — LOW (ref 3.5–5.3)
PROTHROM AB SERPL-ACNC: 22.1 SEC — HIGH (ref 10–12.9)
RBC # BLD: 3.32 M/UL — LOW (ref 3.8–5.2)
RBC # FLD: 15.4 % — HIGH (ref 10.3–14.5)
SODIUM SERPL-SCNC: 158 MMOL/L — HIGH (ref 135–145)
SODIUM SERPL-SCNC: 159 MMOL/L — HIGH (ref 135–145)
TROPONIN I SERPL-MCNC: 0.23 NG/ML — HIGH (ref 0.01–0.04)
TROPONIN I SERPL-MCNC: 0.26 NG/ML — HIGH (ref 0.01–0.04)
WBC # BLD: 13.75 K/UL — HIGH (ref 3.8–10.5)
WBC # FLD AUTO: 13.75 K/UL — HIGH (ref 3.8–10.5)

## 2018-10-31 PROCEDURE — 99233 SBSQ HOSP IP/OBS HIGH 50: CPT | Mod: GC

## 2018-10-31 RX ORDER — MIDODRINE HYDROCHLORIDE 2.5 MG/1
10 TABLET ORAL EVERY 8 HOURS
Qty: 0 | Refills: 0 | Status: DISCONTINUED | OUTPATIENT
Start: 2018-10-31 | End: 2018-11-01

## 2018-10-31 RX ORDER — NYSTATIN CREAM 100000 [USP'U]/G
1 CREAM TOPICAL EVERY 8 HOURS
Qty: 0 | Refills: 0 | Status: DISCONTINUED | OUTPATIENT
Start: 2018-10-31 | End: 2018-11-03

## 2018-10-31 RX ORDER — SODIUM CHLORIDE 9 MG/ML
1000 INJECTION, SOLUTION INTRAVENOUS
Qty: 0 | Refills: 0 | Status: DISCONTINUED | OUTPATIENT
Start: 2018-10-31 | End: 2018-11-01

## 2018-10-31 RX ORDER — POTASSIUM CHLORIDE 20 MEQ
10 PACKET (EA) ORAL
Qty: 0 | Refills: 0 | Status: COMPLETED | OUTPATIENT
Start: 2018-10-31 | End: 2018-10-31

## 2018-10-31 RX ORDER — FUROSEMIDE 40 MG
20 TABLET ORAL
Qty: 0 | Refills: 0 | Status: DISCONTINUED | OUTPATIENT
Start: 2018-10-31 | End: 2018-11-01

## 2018-10-31 RX ORDER — POTASSIUM CHLORIDE 20 MEQ
40 PACKET (EA) ORAL ONCE
Qty: 0 | Refills: 0 | Status: COMPLETED | OUTPATIENT
Start: 2018-10-31 | End: 2018-10-31

## 2018-10-31 RX ORDER — POTASSIUM CHLORIDE 20 MEQ
20 PACKET (EA) ORAL EVERY 4 HOURS
Qty: 0 | Refills: 0 | Status: DISCONTINUED | OUTPATIENT
Start: 2018-10-31 | End: 2018-10-31

## 2018-10-31 RX ORDER — POTASSIUM CHLORIDE 20 MEQ
10 PACKET (EA) ORAL ONCE
Qty: 0 | Refills: 0 | Status: COMPLETED | OUTPATIENT
Start: 2018-10-31 | End: 2018-10-31

## 2018-10-31 RX ADMIN — MIDODRINE HYDROCHLORIDE 20 MILLIGRAM(S): 2.5 TABLET ORAL at 06:06

## 2018-10-31 RX ADMIN — Medication 3 MILLILITER(S): at 20:16

## 2018-10-31 RX ADMIN — NYSTATIN CREAM 1 APPLICATION(S): 100000 CREAM TOPICAL at 22:16

## 2018-10-31 RX ADMIN — Medication 5 MILLIGRAM(S): at 06:07

## 2018-10-31 RX ADMIN — LACTULOSE 45 GRAM(S): 10 SOLUTION ORAL at 15:03

## 2018-10-31 RX ADMIN — PIPERACILLIN AND TAZOBACTAM 25 GRAM(S): 4; .5 INJECTION, POWDER, LYOPHILIZED, FOR SOLUTION INTRAVENOUS at 18:15

## 2018-10-31 RX ADMIN — FLUCONAZOLE 100 MILLIGRAM(S): 150 TABLET ORAL at 14:14

## 2018-10-31 RX ADMIN — Medication 100 MILLIEQUIVALENT(S): at 15:06

## 2018-10-31 RX ADMIN — SODIUM CHLORIDE 30 MILLILITER(S): 9 INJECTION, SOLUTION INTRAVENOUS at 04:08

## 2018-10-31 RX ADMIN — ERYTHROPOIETIN 10000 UNIT(S): 10000 INJECTION, SOLUTION INTRAVENOUS; SUBCUTANEOUS at 18:54

## 2018-10-31 RX ADMIN — HEPARIN SODIUM 5000 UNIT(S): 5000 INJECTION INTRAVENOUS; SUBCUTANEOUS at 06:07

## 2018-10-31 RX ADMIN — Medication 3 MILLILITER(S): at 07:42

## 2018-10-31 RX ADMIN — Medication 3 MILLILITER(S): at 02:05

## 2018-10-31 RX ADMIN — Medication 40 MILLIEQUIVALENT(S): at 07:00

## 2018-10-31 RX ADMIN — Medication 100 MILLIEQUIVALENT(S): at 11:04

## 2018-10-31 RX ADMIN — Medication 325 MILLIGRAM(S): at 11:58

## 2018-10-31 RX ADMIN — LACTULOSE 45 GRAM(S): 10 SOLUTION ORAL at 06:06

## 2018-10-31 RX ADMIN — PIPERACILLIN AND TAZOBACTAM 25 GRAM(S): 4; .5 INJECTION, POWDER, LYOPHILIZED, FOR SOLUTION INTRAVENOUS at 06:09

## 2018-10-31 RX ADMIN — Medication 5 MILLIGRAM(S): at 15:02

## 2018-10-31 RX ADMIN — LACTULOSE 45 GRAM(S): 10 SOLUTION ORAL at 22:15

## 2018-10-31 RX ADMIN — Medication 5 MILLIGRAM(S): at 22:19

## 2018-10-31 RX ADMIN — Medication 3 MILLILITER(S): at 14:41

## 2018-10-31 RX ADMIN — Medication 600 MILLIGRAM(S): at 06:07

## 2018-10-31 RX ADMIN — Medication 100 MILLIEQUIVALENT(S): at 11:56

## 2018-10-31 RX ADMIN — OCTREOTIDE ACETATE 100 MICROGRAM(S): 200 INJECTION, SOLUTION INTRAVENOUS; SUBCUTANEOUS at 22:16

## 2018-10-31 RX ADMIN — Medication 100 MILLIEQUIVALENT(S): at 13:27

## 2018-10-31 RX ADMIN — OCTREOTIDE ACETATE 100 MICROGRAM(S): 200 INJECTION, SOLUTION INTRAVENOUS; SUBCUTANEOUS at 06:06

## 2018-10-31 RX ADMIN — OCTREOTIDE ACETATE 100 MICROGRAM(S): 200 INJECTION, SOLUTION INTRAVENOUS; SUBCUTANEOUS at 15:30

## 2018-10-31 RX ADMIN — Medication 600 MILLIGRAM(S): at 18:15

## 2018-10-31 RX ADMIN — HEPARIN SODIUM 5000 UNIT(S): 5000 INJECTION INTRAVENOUS; SUBCUTANEOUS at 18:14

## 2018-10-31 RX ADMIN — Medication 20 MILLIGRAM(S): at 18:14

## 2018-10-31 NOTE — PROGRESS NOTE ADULT - SUBJECTIVE AND OBJECTIVE BOX
INTERVAL HPI/OVERNIGHT EVENTS:  pt seen and examined, family present  on bipap, lethargic, moaning in bed  per overnight rn +bms, no vomiting, no s/s overt gib  labs noted    MEDICATIONS  (STANDING):  ALBUTerol/ipratropium for Nebulization 3 milliLiter(s) Nebulizer every 6 hours  artificial  tears Solution 1 Drop(s) Both EYES two times a day  dextrose 5%. 1000 milliLiter(s) (30 mL/Hr) IV Continuous <Continuous>  dextrose 5%. 1000 milliLiter(s) (50 mL/Hr) IV Continuous <Continuous>  dextrose 50% Injectable 12.5 Gram(s) IV Push once  dextrose 50% Injectable 25 Gram(s) IV Push once  dextrose 50% Injectable 25 Gram(s) IV Push once  epoetin margie Injectable 92001 Unit(s) SubCutaneous <User Schedule>  ferrous    sulfate 325 milliGRAM(s) Oral daily  fluconAZOLE IVPB 100 milliGRAM(s) IV Intermittent every 24 hours  furosemide   Injectable 20 milliGRAM(s) IV Push two times a day  guaiFENesin  milliGRAM(s) Oral every 12 hours  heparin  Injectable 5000 Unit(s) SubCutaneous every 12 hours  lactulose Syrup 45 Gram(s) Oral three times a day  metoclopramide Injectable 5 milliGRAM(s) IV Push every 8 hours  midodrine 10 milliGRAM(s) Oral every 8 hours  octreotide  Injectable 100 MICROGram(s) SubCutaneous every 8 hours  pantoprazole  Injectable 40 milliGRAM(s) IV Push daily  piperacillin/tazobactam IVPB. 3.375 Gram(s) IV Intermittent every 12 hours  rifaximin 550 milliGRAM(s) Oral two times a day    MEDICATIONS  (PRN):  dextrose 40% Gel 15 Gram(s) Oral once PRN Blood Glucose LESS THAN 70 milliGRAM(s)/deciliter  glucagon  Injectable 1 milliGRAM(s) IntraMuscular once PRN Glucose LESS THAN 70 milligrams/deciliter      Allergies    No Known Allergies    Intolerances        Review of Systems:    unable to obtain    Vital Signs Last 24 Hrs  T(C): 36.3 (31 Oct 2018 04:45), Max: 36.3 (31 Oct 2018 04:45)  T(F): 97.4 (31 Oct 2018 04:45), Max: 97.4 (31 Oct 2018 04:45)  HR: 79 (31 Oct 2018 04:45) (78 - 88)  BP: 124/74 (31 Oct 2018 04:45) (124/74 - 143/74)  BP(mean): --  RR: 17 (31 Oct 2018 04:45) (17 - 19)  SpO2: 99% (31 Oct 2018 04:45) (95% - 99%)    PHYSICAL EXAM:    Constitutional: NAD, lying in bed moaning +ngt   HEENT: ncat  Neck: No LAD  Respiratory: dec bs  Cardiovascular: S1 and S2  Gastrointestinal: soft appears nt +dt  Extremities: No peripheral edema  Neurological: lethargic  Skin: No rashes        LABS:                        9.4    13.75 )-----------( 61       ( 31 Oct 2018 06:53 )             28.8     10-31    159<H>  |  122<H>  |  69<H>  ----------------------------<  96  3.2<L>   |  27  |  4.80<H>    Ca    7.9<L>      31 Oct 2018 06:53  Phos  4.2     10-31  Mg     2.1     10-31    TPro  6.1  /  Alb  2.3<L>  /  TBili  0.7  /  DBili  x   /  AST  28  /  ALT  21  /  AlkPhos  109  10-30    PT/INR - ( 31 Oct 2018 06:53 )   PT: 22.1 sec;   INR: 1.90 ratio         PTT - ( 31 Oct 2018 06:53 )  PTT:52.6 sec      RADIOLOGY & ADDITIONAL TESTS:

## 2018-10-31 NOTE — PROGRESS NOTE ADULT - SUBJECTIVE AND OBJECTIVE BOX
Date/Time Patient Seen:  		  Referring MD:   Data Reviewed	       Patient is a 89y old  Female who presents with a chief complaint of weakness, decreased PO intake (30 Oct 2018 18:46)  in bed  seen and examined      Subjective/HPI     PAST MEDICAL & SURGICAL HISTORY:  Coronary artery disease with angina pectoris, unspecified vessel or lesion type, unspecified whether native or transplanted heart  Congestive heart failure  Asthma  Diabetes mellitus  HTN (hypertension)  No significant past surgical history        Medication list         MEDICATIONS  (STANDING):  ALBUTerol/ipratropium for Nebulization 3 milliLiter(s) Nebulizer every 6 hours  artificial  tears Solution 1 Drop(s) Both EYES two times a day  dextrose 5%. 1000 milliLiter(s) (30 mL/Hr) IV Continuous <Continuous>  dextrose 5%. 1000 milliLiter(s) (50 mL/Hr) IV Continuous <Continuous>  dextrose 50% Injectable 12.5 Gram(s) IV Push once  dextrose 50% Injectable 25 Gram(s) IV Push once  dextrose 50% Injectable 25 Gram(s) IV Push once  epoetin margie Injectable 69964 Unit(s) SubCutaneous <User Schedule>  ferrous    sulfate 325 milliGRAM(s) Oral daily  fluconAZOLE IVPB 100 milliGRAM(s) IV Intermittent every 24 hours  furosemide   Injectable 20 milliGRAM(s) IV Push two times a day  guaiFENesin  milliGRAM(s) Oral every 12 hours  heparin  Injectable 5000 Unit(s) SubCutaneous every 12 hours  lactulose Syrup 45 Gram(s) Oral three times a day  metoclopramide Injectable 5 milliGRAM(s) IV Push every 8 hours  midodrine 10 milliGRAM(s) Oral every 8 hours  octreotide  Injectable 100 MICROGram(s) SubCutaneous every 8 hours  pantoprazole  Injectable 40 milliGRAM(s) IV Push daily  piperacillin/tazobactam IVPB. 3.375 Gram(s) IV Intermittent every 12 hours  rifaximin 550 milliGRAM(s) Oral two times a day    MEDICATIONS  (PRN):  dextrose 40% Gel 15 Gram(s) Oral once PRN Blood Glucose LESS THAN 70 milliGRAM(s)/deciliter  glucagon  Injectable 1 milliGRAM(s) IntraMuscular once PRN Glucose LESS THAN 70 milligrams/deciliter         Vitals log        ICU Vital Signs Last 24 Hrs  T(C): 36.3 (31 Oct 2018 04:45), Max: 36.3 (31 Oct 2018 04:45)  T(F): 97.4 (31 Oct 2018 04:45), Max: 97.4 (31 Oct 2018 04:45)  HR: 79 (31 Oct 2018 04:45) (78 - 88)  BP: 124/74 (31 Oct 2018 04:45) (124/74 - 143/74)  BP(mean): --  ABP: --  ABP(mean): --  RR: 17 (31 Oct 2018 04:45) (17 - 19)  SpO2: 99% (31 Oct 2018 04:45) (95% - 99%)           Input and Output:  I&O's Detail    30 Oct 2018 07:01  -  31 Oct 2018 07:00  --------------------------------------------------------  IN:    dextrose 5%.: 120 mL    sodium bicarbonate  Infusion: 525 mL  Total IN: 645 mL    OUT:    Ureteral Catheter: 650 mL  Total OUT: 650 mL    Total NET: -5 mL          Lab Data                        9.4    13.75 )-----------( 61       ( 31 Oct 2018 06:53 )             28.8     10-31    159<H>  |  122<H>  |  69<H>  ----------------------------<  96  3.2<L>   |  27  |  4.80<H>    Ca    7.9<L>      31 Oct 2018 06:53  Phos  4.2     10-31  Mg     2.1     10-31    TPro  6.1  /  Alb  2.3<L>  /  TBili  0.7  /  DBili  x   /  AST  28  /  ALT  21  /  AlkPhos  109  10-30      CARDIAC MARKERS ( 31 Oct 2018 01:06 )  .256 ng/mL / x     / 19 U/L / x     / 1.6 ng/mL        Review of Systems	      Objective     Physical Examination    ng tube  heart s1s2  lung dec BS    Pertinent Lab findings & Imaging      Samantha:  NO   Adequate UO     I&O's Detail    30 Oct 2018 07:01  -  31 Oct 2018 07:00  --------------------------------------------------------  IN:    dextrose 5%.: 120 mL    sodium bicarbonate  Infusion: 525 mL  Total IN: 645 mL    OUT:    Ureteral Catheter: 650 mL  Total OUT: 650 mL    Total NET: -5 mL               Discussed with:     Cultures:	        Radiology

## 2018-10-31 NOTE — PROGRESS NOTE ADULT - PROBLEM SELECTOR PLAN 1
multifactorial, worsening cirrhosis w HE c/b HRS  CT showed cirrhotic liver and mod volume of abdominal pelvic ascites  sp egd w endoscopic placement of ngt for med administration   not a candidate for IR drainage  elev afp, +sma; ?ai component of liver dz  cont xifaxamin/lactulose as tolerated  dc ppi in setting of thrombocytopenia  hold tube feeds given mental status  neuro, id, heme/onc following  renal recs appreciated, not a candidate for rrt  aspiration prec, elev hob   dw attg, supportive care from gi perspective  pall care following, poor ppx  seen by hospice care, eval ongoing  rest of care per primary team

## 2018-10-31 NOTE — CHART NOTE - NSCHARTNOTEFT_GEN_A_CORE
RN called as patient had 20 beats VTach.  Patient was seen at bedside, sleeping in no acute distress.  Patient now in NSR at 86bpm.  Ordered EKG STAT and reviewed, no acute changes compared to previous EKG.  Ordered for BMP, Mag, and Phos.  Will follow up on results and replete as needed. RN called as patient had 20 beats VTach.  Patient was seen at bedside, sleeping in no acute distress.  Patient now in NSR at 86bpm.  Ordered EKG STAT and reviewed, no acute changes compared to previous EKG.  Ordered for BMP, Mag, and Phos.  Will follow up on results and replete as needed.    Addendum:  10/31/18 2:08  -Trops elevated to 0.256, will order for another set of cardiac enzymes for 7am.  Likely due to demand ischemia given multiple comorbidities and severe renal dysfunction.  Nocturnist made aware   -Potassium at 3.2, will order for potassium chloride 40mg powder x1  -Patient hypernatremic at 158, will start patient on D5W at 30ml/hr, as per nephro  -Will continue to monitor patient  -RN to inform of any acute changes

## 2018-10-31 NOTE — PROGRESS NOTE ADULT - PROBLEM SELECTOR PLAN 1
sec to  metabolic encephalopathy  multi factorial   hepatic encephalopathy with liver cirrhosis  unknown etiology / uremia /hypercarbia      possible aspiration pneumonia infiltrate on CXR time of admission .  on iv  Zosyn day 7     id dr lindquist possible last day today  .   --urine culture shows Candida--patieint is on Fluconazole  .   -  -Blood culture  +GPC in clusters in one bottle, likely contaminant. Repeat cultures are negative.  - Aspiration precautions / npo .

## 2018-10-31 NOTE — PROGRESS NOTE ADULT - PROBLEM SELECTOR PLAN 2
Anemia is multifactorial--likely due to renal failure and sepsis.   s/p 1 unit of PRBC 10/28/2018  hb 9.4 today  Patient is on procrit 83487 Units TIW--M/W/F and d/w procrit if hb is over 10  abn spep--patient son declines any further work up or treatment for abn spep at this time.  Patient's condition and overall prognosis appears and remians to be very poor.   case d/w pt 2 sons at bedside (one is ophthalmology from  [place]).

## 2018-10-31 NOTE — PROGRESS NOTE ADULT - SUBJECTIVE AND OBJECTIVE BOX
ID Progress note     Name: MALINDA SULTANA  Age: 89y  Gender: Female  MRN: [id number]    Interval History--Events noted more awake but confused . Still with NGT in place . Son at bedside , Had generalized anasarca   Notes reviewed    Past Medical History--  Coronary artery disease with angina pectoris, unspecified vessel or lesion type, unspecified whether native or transplanted heart  Congestive heart failure  Asthma  Diabetes mellitus  HTN (hypertension)  No significant past surgical history      For details regarding the patient's social history, family history, and other miscellaneous elements, please refer the initial infectious diseases consultation and/or the admitting history and physical examination for this admission.    Allergies--  Allergies    No Known Allergies    Intolerances        Medications--  Antibiotics:  fluconAZOLE IVPB 100 milliGRAM(s) IV Intermittent every 24 hours  piperacillin/tazobactam IVPB. 3.375 Gram(s) IV Intermittent every 12 hours  rifaximin 550 milliGRAM(s) Oral two times a day    Immunologic:  epoetin margie Injectable 95335 Unit(s) SubCutaneous <User Schedule>    Other:  ALBUTerol/ipratropium for Nebulization  artificial  tears Solution  dextrose 40% Gel PRN  dextrose 5%.  dextrose 5%.  dextrose 50% Injectable  dextrose 50% Injectable  dextrose 50% Injectable  ferrous    sulfate  furosemide   Injectable  glucagon  Injectable PRN  guaiFENesin ER  heparin  Injectable  lactulose Syrup  metoclopramide Injectable  midodrine  octreotide  Injectable  potassium chloride  10 mEq/100 mL IVPB  potassium chloride  10 mEq/100 mL IVPB      Review of Systems--  Review of systems unable to be obtained secondary to clinical condition.    Physical Examination--    Vital Signs: T(F): 97.4 (10-31-18 @ 04:45), Max: 97.4 (10-31-18 @ 04:45)  HR: 79 (10-31-18 @ 04:45)  BP: 124/74 (10-31-18 @ 04:45)  RR: 17 (10-31-18 @ 04:45)  SpO2: 99% (10-31-18 @ 04:45)  Wt(kg): --  General: poorly responsive , moving around    HEENT: AT/NC. PERRL. EOMI. Anicteric. Conjunctiva pink and moist. NGT + poor oral hygiene  Neck: Not rigid. No sense of mass.  Nodes: None palpable.  Lungs: Coarse breath sounds  bilaterally without rales, wheezing or rhonchi  Heart: Regular rate and rhythm. No Murmur. No rub. No gallop. No palpable thrill.  Abdomen: Bowel sounds present and normoactive. Soft. Nondistended. Nontender. No sense of mass. No organomegaly.  Back: No spinal tenderness. No costovertebral angle tenderness.   Extremities: No cyanosis or clubbing. No edema.   Skin: Warm. Dry. Good turgor. No rash. No vasculitic stigmata.  Psychiatric: Appropriate affect and mood for situation.       Laboratory Studies--  CBC                        9.4    13.75 )-----------( 61       ( 31 Oct 2018 06:53 )             28.8       Chemistries  10-31    159<H>  |  122<H>  |  69<H>  ----------------------------<  96  3.2<L>   |  27  |  4.80<H>    Ca    7.9<L>      31 Oct 2018 06:53  Phos  4.2     10-31  Mg     2.1     10-31    TPro  6.1  /  Alb  2.3<L>  /  TBili  0.7  /  DBili  x   /  AST  28  /  ALT  21  /  AlkPhos  109  10-30    Ammonia, Serum (10.31.18 @ 06:53)    Ammonia, Serum: 65 umol/L      Culture Data    GI PCR Panel, Stool (collected 26 Oct 2018 21:55)  Source: .Stool Feces  Final Report (26 Oct 2018 23:45):    GI PCR Results: NOT detected    *******Please Note:*******    GI panel PCR evaluates for:    Campylobacter, Plesiomonas shigelloides, Salmonella,    Vibrio, Yersinia enterocolitica, Enteroaggregative    Escherichia coli (EAEC), Enteropathogenic E.coli (EPEC),    Enterotoxigenic E. coli (ETEC) lt/st, Shiga-like    toxin-producing E. coli (STEC) stx1/stx2,    Shigella/ Enteroinvasive E. coli (EIEC), Cryptosporidium,    Cyclospora cayetanensis, Entamoeba histolytica,    Giardia lamblia, Adenovirus F 40/41, Astrovirus,    Norovirus GI/GII, Rotavirus A, Sapovirus    Culture - Blood (collected 24 Oct 2018 15:35)  Source: .Blood Blood-Venous  Final Report (29 Oct 2018 16:00):    No growth at 5 days.    Culture - Blood (collected 24 Oct 2018 15:35)  Source: .Blood Blood-Venous  Final Report (29 Oct 2018 16:00):    No growth at 5 days.    Assessment :  88 yo F w/ PMH of DM type 2, HTN, CKD stage 4 ,COPD on 3L home O2, diastolic HFpEF (last echo   10/2017) presented to ED w/ lethargy progressing to unresponsiveness. Ct head x 2 negative   Poss Asp pneumonia  Candiuria  She has metabolic encephalopathy secondary to hepatic encephalopathy, the etiology may be SAMIA   or underlying undiagnosed liver disease.   Developing metabolic acidosis from SAMIA which is  worsening  Acute hypercapnia respiratory failure multifactorial with multisystem organ failure    if her mental status improves family may back away from comfort care     Plan :   - continue with IV Zosyn and diflucan x 7 days   - consider starting NGT feeds   - aspiration precautions   - BIPAP per pulm  - Prognosis poor  - Nephrology follow up     Continue with present regiment.  Appropriate use of antibiotics and adverse effects reviewed.    I have discussed the above plan of care with patient's family in detail. They expressed understanding of the treatment plan . Risks, benefits and alternatives discussed in detail. I have asked if they have any questions or concerns and appropriately addressed them to the best of my ability .      > 35 minutes spent in direct patient care reviewing  the notes, lab data/ imaging , discussion with multidisciplinary team. All questions were addressed and answered to the best of my capacity .    Thank you for allowing me to participate in the care of your patient .        Jaclyn Barnes MD  [phone]

## 2018-10-31 NOTE — PROGRESS NOTE ADULT - PROBLEM SELECTOR PLAN 1
platelets are slow/steady dropping  platelets are at 61 k today  smear examined  naomi antibody is negative  patient is on heparin s/c for dvt prophylaxis  hold heparin if platelets are under 50  k and/or if bleeding.  monitor daily cbc/platelets for now. platelets are slow/steady dropping  platelets are at 61 k today  smear requested again today  repeat ldh/hapto/retic in am, ldh was 279 earlier on 10/26  monitor pt/ptt  naomi antibody is negative  patient is on heparin s/c for dvt prophylaxis  hold heparin if platelets are under 50  k and/or if bleeding.  monitor daily cbc/platelets for now.

## 2018-10-31 NOTE — PROGRESS NOTE ADULT - SUBJECTIVE AND OBJECTIVE BOX
Patient is a 89y old  Female who presents with a chief complaint of weakness, decreased PO intake (31 Oct 2018 11:37)       Pt is seen and examined  pt is awake and lying in bed/out of bed to chair  pt seems comfortable and denies any complaints at this time    HPI:  Hx obtained from son and other family members as pt was altered.  Pt is a 88 yo F w/ PMH of DM type 2, HTN, COPD on 2.5L home O2, diastolic HFpEF (last echo 10/2017) presented to ED p/w ams, weakness, dysuria, dec po x past ~ 3 days. Of note Pt was recently admitted for ams, similar to current presentation and was d/c 6 days ago. On previous admission Pt was diagnosed and treated for UTI. Pt was initially doing well, but then developed burning with urination, AMS, and watery diarrhea x 3 days ago. Family reports that pt was given Bactrim for unresolved UTI, but that she was only able to take 2 doses, as she could not swallow the 3rd dose this AM. Pt has poor PO intake and EMS came to her house last night to give her IVF due to dehydration. Of note patient is normally bedbound and requires 24/7 home health aid. Pt is incontinent at baseline and wears diapers. Denies any Fevers, but endorses chills, and increased fatigue. Denies any SOB, CP, N/V. Family reports pt normally is able to converse without issue, but has been incoherent for ~ the past 3 days    In the ED, temp 97.1, HR 62, /75, RR 20, Spo2 99 on supplemental o2. WBC 11.46, Hg 9.5, hct 29.5, platelet 147, PT 13.5, INR 1.23, PTT 25.9, Na 148, K 4.3, Cl 120, Co2 21, BUN 64, Cr 2.00, glucose 110, calcium 8.0, alk phos 136, lactate 1.4, lipase 117. UA: small bilirubin, moderate leukocyte esterase, trace blood, moderate epithelial, bacteria few, hyaline 0-2, yeast present    Imaging:  CXR and CT head no cont ordered  EKG: NSR    In the ED, received zosyn 3.375 mg IV x 1, NaCl 0.9% 1000mL x 2, duoneb 3 mL x 1, (22 Oct 2018 23:52)         ROS:  Negative except for:    MEDICATIONS  (STANDING):  ALBUTerol/ipratropium for Nebulization 3 milliLiter(s) Nebulizer every 6 hours  artificial  tears Solution 1 Drop(s) Both EYES two times a day  dextrose 5%. 1000 milliLiter(s) (50 mL/Hr) IV Continuous <Continuous>  dextrose 5%. 1000 milliLiter(s) (30 mL/Hr) IV Continuous <Continuous>  dextrose 50% Injectable 12.5 Gram(s) IV Push once  dextrose 50% Injectable 25 Gram(s) IV Push once  dextrose 50% Injectable 25 Gram(s) IV Push once  epoetin margie Injectable 45017 Unit(s) SubCutaneous <User Schedule>  ferrous    sulfate 325 milliGRAM(s) Oral daily  fluconAZOLE IVPB 100 milliGRAM(s) IV Intermittent every 24 hours  furosemide   Injectable 20 milliGRAM(s) IV Push two times a day  guaiFENesin  milliGRAM(s) Oral every 12 hours  heparin  Injectable 5000 Unit(s) SubCutaneous every 12 hours  lactulose Syrup 45 Gram(s) Oral three times a day  metoclopramide Injectable 5 milliGRAM(s) IV Push every 8 hours  midodrine 10 milliGRAM(s) Oral every 8 hours  nystatin Powder 1 Application(s) Topical every 8 hours  octreotide  Injectable 100 MICROGram(s) SubCutaneous every 8 hours  piperacillin/tazobactam IVPB. 3.375 Gram(s) IV Intermittent every 12 hours  rifaximin 550 milliGRAM(s) Oral two times a day    MEDICATIONS  (PRN):  dextrose 40% Gel 15 Gram(s) Oral once PRN Blood Glucose LESS THAN 70 milliGRAM(s)/deciliter  glucagon  Injectable 1 milliGRAM(s) IntraMuscular once PRN Glucose LESS THAN 70 milligrams/deciliter      Allergies    No Known Allergies    Intolerances        Vital Signs Last 24 Hrs  T(C): 36.3 (31 Oct 2018 13:53), Max: 36.3 (31 Oct 2018 04:45)  T(F): 97.3 (31 Oct 2018 13:53), Max: 97.4 (31 Oct 2018 04:45)  HR: 77 (31 Oct 2018 13:53) (71 - 88)  BP: 123/66 (31 Oct 2018 13:53) (123/66 - 143/74)  BP(mean): --  RR: 16 (31 Oct 2018 13:53) (16 - 19)  SpO2: 96% (31 Oct 2018 13:53) (95% - 99%)    PHYSICAL EXAM  General: adult in NAD  HEENT: clear oropharynx, anicteric sclera, pink conjunctiva  Neck: supple  CV: normal S1/S2 with no murmur rubs or gallops  Lungs: positive air movement b/l ant lungs,clear to auscultation, no wheezes, no rales  Abdomen: soft non-tender non-distended, no hepatosplenomegaly  Ext: no clubbing cyanosis or edema  Skin: no rashes and no petechiae  Neuro: alert and oriented X 4, no focal deficits  LABS:                          9.4    13.75 )-----------( 61       ( 31 Oct 2018 06:53 )             28.8         Mean Cell Volume : 86.7 fl  Mean Cell Hemoglobin : 28.3 pg  Mean Cell Hemoglobin Concentration : 32.6 gm/dL  Auto Neutrophil # : 10.98 K/uL  Auto Lymphocyte # : 1.47 K/uL  Auto Monocyte # : 0.85 K/uL  Auto Eosinophil # : 0.28 K/uL  Auto Basophil # : 0.02 K/uL  Auto Neutrophil % : 79.9 %  Auto Lymphocyte % : 10.7 %  Auto Monocyte % : 6.2 %  Auto Eosinophil % : 2.0 %  Auto Basophil % : 0.1 %    Serial CBC's  10-31 @ 06:53  Hct-28.8 / Hgb-9.4 / Plat-61 / RBC-3.32 / WBC-13.75          Serial CBC's  10-30 @ 09:44  Hct-28.7 / Hgb-9.5 / Plat-64 / RBC-3.30 / WBC-15.81          Serial CBC's  10-29 @ 09:54  Hct-27.6 / Hgb-8.9 / Plat-105 / RBC-3.13 / WBC-20.49          Serial CBC's  10-28 @ 16:33  Hct-22.8 / Hgb-7.4 / Plat-117 / RBC-2.60 / WBC-26.53          Serial CBC's  10-28 @ 09:12  Hct-24.5 / Hgb-7.9 / Plat-124 / RBC-2.75 / WBC-32.53            10-31    159<H>  |  122<H>  |  69<H>  ----------------------------<  96  3.2<L>   |  27  |  4.80<H>    Ca    7.9<L>      31 Oct 2018 06:53  Phos  4.2     10-31  Mg     2.1     10-31    TPro  6.1  /  Alb  2.3<L>  /  TBili  0.7  /  DBili  x   /  AST  28  /  ALT  21  /  AlkPhos  109  10-30      PT/INR - ( 31 Oct 2018 06:53 )   PT: 22.1 sec;   INR: 1.90 ratio         PTT - ( 31 Oct 2018 06:53 )  PTT:52.6 sec    Ferritin, Serum: 189 ng/mL (10-27-18 @ 16:04)  Iron - Total Binding Capacity.: 136 ug/dL (10-26-18 @ 19:09)  Iron - Total Binding Capacity.: 126 ug/dL (10-26-18 @ 19:09)  Reticulocyte Percent: 1.6 % (10-26-18 @ 14:43)  Vitamin B12, Serum: >2000 pg/mL (10-25-18 @ 10:44)  Folate, Serum: 14.6 ng/mL (10-25-18 @ 10:44)      Serum Protein Electrophoresis Interp: Weak Gamma Migrating Paraprotein Identified (10-26-18 @ 19:09)  Immunofixation, Serum:   Weak IgG Lambda Band Identified    Reference Range: None Detected (10-26-18 @ 19:09)            BLOOD SMEAR INTERPRETATION:       RADIOLOGY & ADDITIONAL STUDIES: Patient is a 89y old  Female who presents with a chief complaint of weakness, decreased PO intake (31 Oct 2018 11:37)       Pt is seen and examined  pt is lying in bed, family at bedside.    HPI:  Hx obtained from son and other family members as pt was altered.  Pt is a 89 yo F w/ PMH of DM type 2, HTN, COPD on 2.5L home O2, diastolic HFpEF (last echo 10/2017) presented to ED p/w ams, weakness, dysuria, dec po x past ~ 3 days. Of note Pt was recently admitted for ams, similar to current presentation and was d/c 6 days ago. On previous admission Pt was diagnosed and treated for UTI. Pt was initially doing well, but then developed burning with urination, AMS, and watery diarrhea x 3 days ago. Family reports that pt was given Bactrim for unresolved UTI, but that she was only able to take 2 doses, as she could not swallow the 3rd dose this AM. Pt has poor PO intake and EMS came to her house last night to give her IVF due to dehydration. Of note patient is normally bedbound and requires 24/7 home health aid. Pt is incontinent at baseline and wears diapers. Denies any Fevers, but endorses chills, and increased fatigue. Denies any SOB, CP, N/V. Family reports pt normally is able to converse without issue, but has been incoherent for ~ the past 3 days    In the ED, temp 97.1, HR 62, /75, RR 20, Spo2 99 on supplemental o2. WBC 11.46, Hg 9.5, hct 29.5, platelet 147, PT 13.5, INR 1.23, PTT 25.9, Na 148, K 4.3, Cl 120, Co2 21, BUN 64, Cr 2.00, glucose 110, calcium 8.0, alk phos 136, lactate 1.4, lipase 117. UA: small bilirubin, moderate leukocyte esterase, trace blood, moderate epithelial, bacteria few, hyaline 0-2, yeast present    Imaging:  CXR and CT head no cont ordered  EKG: NSR    In the ED, received zosyn 3.375 mg IV x 1, NaCl 0.9% 1000mL x 2, duoneb 3 mL x 1, (22 Oct 2018 23:52)         MEDICATIONS  (STANDING):  ALBUTerol/ipratropium for Nebulization 3 milliLiter(s) Nebulizer every 6 hours  artificial  tears Solution 1 Drop(s) Both EYES two times a day  dextrose 5%. 1000 milliLiter(s) (50 mL/Hr) IV Continuous <Continuous>  dextrose 5%. 1000 milliLiter(s) (30 mL/Hr) IV Continuous <Continuous>  dextrose 50% Injectable 12.5 Gram(s) IV Push once  dextrose 50% Injectable 25 Gram(s) IV Push once  dextrose 50% Injectable 25 Gram(s) IV Push once  epoetin margie Injectable 54686 Unit(s) SubCutaneous <User Schedule>  ferrous    sulfate 325 milliGRAM(s) Oral daily  fluconAZOLE IVPB 100 milliGRAM(s) IV Intermittent every 24 hours  furosemide   Injectable 20 milliGRAM(s) IV Push two times a day  guaiFENesin  milliGRAM(s) Oral every 12 hours  heparin  Injectable 5000 Unit(s) SubCutaneous every 12 hours  lactulose Syrup 45 Gram(s) Oral three times a day  metoclopramide Injectable 5 milliGRAM(s) IV Push every 8 hours  midodrine 10 milliGRAM(s) Oral every 8 hours  nystatin Powder 1 Application(s) Topical every 8 hours  octreotide  Injectable 100 MICROGram(s) SubCutaneous every 8 hours  piperacillin/tazobactam IVPB. 3.375 Gram(s) IV Intermittent every 12 hours  rifaximin 550 milliGRAM(s) Oral two times a day    MEDICATIONS  (PRN):  dextrose 40% Gel 15 Gram(s) Oral once PRN Blood Glucose LESS THAN 70 milliGRAM(s)/deciliter  glucagon  Injectable 1 milliGRAM(s) IntraMuscular once PRN Glucose LESS THAN 70 milligrams/deciliter      Allergies    No Known Allergies    Intolerances        Vital Signs Last 24 Hrs  T(C): 36.3 (31 Oct 2018 13:53), Max: 36.3 (31 Oct 2018 04:45)  T(F): 97.3 (31 Oct 2018 13:53), Max: 97.4 (31 Oct 2018 04:45)  HR: 77 (31 Oct 2018 13:53) (71 - 88)  BP: 123/66 (31 Oct 2018 13:53) (123/66 - 143/74)  BP(mean): --  RR: 16 (31 Oct 2018 13:53) (16 - 19)  SpO2: 96% (31 Oct 2018 13:53) (95% - 99%)    PHYSICAL EXAM  General: adult in NAD  HEENT: clear oropharynx, anicteric sclera, pink conjunctiva  Neck: supple  CV: normal S1/S2 with no murmur rubs or gallops  Lungs: positive air movement b/l ant lungs,clear to auscultation, no wheezes, no rales  Abdomen: soft non-tender non-distended, no hepatosplenomegaly  Ext: no clubbing cyanosis or edema  Skin: no rashes and no petechiae  Neuro: alert and oriented X 4, no focal deficits  LABS:                          9.4    13.75 )-----------( 61       ( 31 Oct 2018 06:53 )             28.8         Mean Cell Volume : 86.7 fl  Mean Cell Hemoglobin : 28.3 pg  Mean Cell Hemoglobin Concentration : 32.6 gm/dL  Auto Neutrophil # : 10.98 K/uL  Auto Lymphocyte # : 1.47 K/uL  Auto Monocyte # : 0.85 K/uL  Auto Eosinophil # : 0.28 K/uL  Auto Basophil # : 0.02 K/uL  Auto Neutrophil % : 79.9 %  Auto Lymphocyte % : 10.7 %  Auto Monocyte % : 6.2 %  Auto Eosinophil % : 2.0 %  Auto Basophil % : 0.1 %    Serial CBC's  10-31 @ 06:53  Hct-28.8 / Hgb-9.4 / Plat-61 / RBC-3.32 / WBC-13.75          Serial CBC's  10-30 @ 09:44  Hct-28.7 / Hgb-9.5 / Plat-64 / RBC-3.30 / WBC-15.81          Serial CBC's  10-29 @ 09:54  Hct-27.6 / Hgb-8.9 / Plat-105 / RBC-3.13 / WBC-20.49          Serial CBC's  10-28 @ 16:33  Hct-22.8 / Hgb-7.4 / Plat-117 / RBC-2.60 / WBC-26.53          Serial CBC's  10-28 @ 09:12  Hct-24.5 / Hgb-7.9 / Plat-124 / RBC-2.75 / WBC-32.53            10-31    159<H>  |  122<H>  |  69<H>  ----------------------------<  96  3.2<L>   |  27  |  4.80<H>    Ca    7.9<L>      31 Oct 2018 06:53  Phos  4.2     10-31  Mg     2.1     10-31    TPro  6.1  /  Alb  2.3<L>  /  TBili  0.7  /  DBili  x   /  AST  28  /  ALT  21  /  AlkPhos  109  10-30      PT/INR - ( 31 Oct 2018 06:53 )   PT: 22.1 sec;   INR: 1.90 ratio         PTT - ( 31 Oct 2018 06:53 )  PTT:52.6 sec    Ferritin, Serum: 189 ng/mL (10-27-18 @ 16:04)  Iron - Total Binding Capacity.: 136 ug/dL (10-26-18 @ 19:09)  Iron - Total Binding Capacity.: 126 ug/dL (10-26-18 @ 19:09)  Reticulocyte Percent: 1.6 % (10-26-18 @ 14:43)  Vitamin B12, Serum: >2000 pg/mL (10-25-18 @ 10:44)  Folate, Serum: 14.6 ng/mL (10-25-18 @ 10:44)      Serum Protein Electrophoresis Interp: Weak Gamma Migrating Paraprotein Identified (10-26-18 @ 19:09)  Immunofixation, Serum:   Weak IgG Lambda Band Identified    Reference Range: None Detected (10-26-18 @ 19:09)    Heparin Induced Platelet Antibody (10.30.18 @ 23:42)    Heparin-PF4 AB EIA: 0.187 ABS    Heparin-PF4 AB: Negative

## 2018-10-31 NOTE — PROGRESS NOTE ADULT - SUBJECTIVE AND OBJECTIVE BOX
NEPHROLOGY INTERVAL HPI/OVERNIGHT EVENTS:  HPI:  Hx obtained from son and other family members as pt was altered.  Pt is a 89 yo F w/ PMH of DM type 2, HTN, COPD on 2.5L home O2, diastolic HFpEF (last echo 10/2017) presented to ED p/w ams, weakness, dysuria, dec po x past ~ 3 days. Of note Pt was recently admitted for ams, similar to current presentation and was d/c 6 days ago. On previous admission Pt was diagnosed and treated for UTI. Pt was initially doing well, but then developed burning with urination, AMS, and watery diarrhea x 3 days ago. Family reports that pt was given Bactrim for unresolved UTI, but that she was only able to take 2 doses, as she could not swallow the 3rd dose this AM. Pt has poor PO intake and EMS came to her house last night to give her IVF due to dehydration. Of note patient is normally bedbound and requires 24/7 home health aid. Pt is incontinent at baseline and wears diapers. Denies any Fevers, but endorses chills, and increased fatigue. Denies any SOB, CP, N/V. Family reports pt normally is able to converse without issue, but has been incoherent for ~ the past 3 days    In the ED, temp 97.1, HR 62, /75, RR 20, Spo2 99 on supplemental o2. WBC 11.46, Hg 9.5, hct 29.5, platelet 147, PT 13.5, INR 1.23, PTT 25.9, Na 148, K 4.3, Cl 120, Co2 21, BUN 64, Cr 2.00, glucose 110, calcium 8.0, alk phos 136, lactate 1.4, lipase 117. UA: small bilirubin, moderate leukocyte esterase, trace blood, moderate epithelial, bacteria few, hyaline 0-2, yeast present    Imaging:  CXR and CT head no cont ordered  EKG: NSR    In the ED, received zosyn 3.375 mg IV x 1, NaCl 0.9% 1000mL x 2, duoneb 3 mL x 1, (22 Oct 2018 23:52)      PAST MEDICAL & SURGICAL HISTORY:  Coronary artery disease with angina pectoris, unspecified vessel or lesion type, unspecified whether native or transplanted heart  Congestive heart failure  Asthma  Diabetes mellitus  HTN (hypertension)  No significant past surgical history      MEDICATIONS  (STANDING):  ALBUTerol/ipratropium for Nebulization 3 milliLiter(s) Nebulizer every 6 hours  artificial  tears Solution 1 Drop(s) Both EYES two times a day  dextrose 5%. 1000 milliLiter(s) (30 mL/Hr) IV Continuous <Continuous>  dextrose 5%. 1000 milliLiter(s) (50 mL/Hr) IV Continuous <Continuous>  dextrose 50% Injectable 12.5 Gram(s) IV Push once  dextrose 50% Injectable 25 Gram(s) IV Push once  dextrose 50% Injectable 25 Gram(s) IV Push once  epoetin margie Injectable 26039 Unit(s) SubCutaneous <User Schedule>  ferrous    sulfate 325 milliGRAM(s) Oral daily  fluconAZOLE IVPB 100 milliGRAM(s) IV Intermittent every 24 hours  furosemide   Injectable 20 milliGRAM(s) IV Push two times a day  guaiFENesin  milliGRAM(s) Oral every 12 hours  heparin  Injectable 5000 Unit(s) SubCutaneous every 12 hours  lactulose Syrup 45 Gram(s) Oral three times a day  metoclopramide Injectable 5 milliGRAM(s) IV Push every 8 hours  midodrine 10 milliGRAM(s) Oral every 8 hours  octreotide  Injectable 100 MICROGram(s) SubCutaneous every 8 hours  piperacillin/tazobactam IVPB. 3.375 Gram(s) IV Intermittent every 12 hours  rifaximin 550 milliGRAM(s) Oral two times a day    MEDICATIONS  (PRN):  dextrose 40% Gel 15 Gram(s) Oral once PRN Blood Glucose LESS THAN 70 milliGRAM(s)/deciliter  glucagon  Injectable 1 milliGRAM(s) IntraMuscular once PRN Glucose LESS THAN 70 milligrams/deciliter      Allergies    No Known Allergies    Intolerances        Vital Signs Last 24 Hrs  T(C): 36.3 (31 Oct 2018 04:45), Max: 36.3 (31 Oct 2018 04:45)  T(F): 97.4 (31 Oct 2018 04:45), Max: 97.4 (31 Oct 2018 04:45)  HR: 79 (31 Oct 2018 04:45) (78 - 88)  BP: 124/74 (31 Oct 2018 04:45) (124/74 - 143/74)  BP(mean): --  RR: 17 (31 Oct 2018 04:45) (17 - 19)  SpO2: 99% (31 Oct 2018 04:45) (95% - 99%)  Daily     Daily     PHYSICAL EXAM:    GENERAL: NAD, well-groomed, well-developed  HEAD:  Atraumatic, Normocephalic  EYES: EOMI, PERRLA, conjunctiva and sclera clear  ENMT: No tonsillar erythema, exudates, or enlargement; Moist mucous membranes, Good dentition, No lesions  NECK: Supple, No JVD, Normal thyroid  NERVOUS SYSTEM:  Alert & Oriented X3, Good concentration; Motor Strength 5/5 B/L upper and lower extremities; DTRs 2+ intact and symmetric  CHEST/LUNG: Clear to percussion bilaterally; No rales, rhonchi, wheezing, or rubs  HEART: Regular rate and rhythm; No murmurs, rubs, or gallops  ABDOMEN: Soft, Nontender, Nondistended; Bowel sounds present  EXTREMITIES:  2+ Peripheral Pulses, No clubbing, cyanosis, or edema  SKIN: No rashes or lesions    LABS:                        9.4    13.75 )-----------( 61       ( 31 Oct 2018 06:53 )             28.8     10-31    159<H>  |  122<H>  |  69<H>  ----------------------------<  96  3.2<L>   |  27  |  4.80<H>    Ca    7.9<L>      31 Oct 2018 06:53  Phos  4.2     10-31  Mg     2.1     10-31    TPro  6.1  /  Alb  2.3<L>  /  TBili  0.7  /  DBili  x   /  AST  28  /  ALT  21  /  AlkPhos  109  10-30    PT/INR - ( 31 Oct 2018 06:53 )   PT: 22.1 sec;   INR: 1.90 ratio         PTT - ( 31 Oct 2018 06:53 )  PTT:52.6 sec    Magnesium, Serum: 2.1 mg/dL (10-31 @ 06:53)  Magnesium, Serum: 1.9 mg/dL (10-31 @ 01:06)  Phosphorus Level, Serum: 4.2 mg/dL (10-31 @ 01:06)        RADIOLOGY & ADDITIONAL TESTS:

## 2018-10-31 NOTE — PROGRESS NOTE ADULT - PROBLEM SELECTOR PLAN 1
pt is dying  multi organ failure  on IVF and LASIX - consider one or another  renal and cardio follow up  on emp ABX  oral hygiene  skin care  NG tube in place  pt's sensorium is altered  supportive medical regimen  o2 support  keep sat > 88 pct  prognosis very poor  pt is DNR DNI  will follow

## 2018-10-31 NOTE — PROGRESS NOTE ADULT - ASSESSMENT
Acute on CKD Stage 4  Hepatorenal Syndrome  Liver Cirrhosis  Sepsis/PNA/UTI  Hypernatremia  Hypokalemia  Dehydration  CHF/COPD   Metabolic Acidosis   better  Hypernatremia    -Renal indices are worsening; oliguric  -IVF no bicarb needed, with high Na, ok to use D5W at 30  -Octreotide  -Will try lasix 20 bid, to gently diurese now that BPs are better, and midodrine is reduced to 10 mg tid from today am  -Abx per ID  -Daily chemistries  -No indication for RRT; poor prognosis.  May cause more problems with Intermittent HD  -replace k as needed    Thank you Acute on CKD Stage 4  Hepatorenal Syndrome  Liver Cirrhosis  Sepsis/PNA/UTI  Hypernatremia  Hypokalemia  Dehydration  CHF/COPD   Metabolic Acidosis   better  Hypernatremia    -Renal indices are worsening; oliguric  -IVF no bicarb needed, with high Na, ok to use D5W at 30 and added H2O at 150 ml q 6 hrs  -Octreotide  -Will try lasix, 20 bid, to gently diurese now that BPs are better, and midodrine is reduced to 10 mg tid from today am  -Abx per ID  -Daily chemistries  -No indication for RRT; poor prognosis.  May cause more problems with Intermittent HD  -replace k as needed    d/w son, overall prognosis bad with multiorgan failure. d/w Dr Barnes and Dr Durán: I agree with hospice care to keeep her dignified at  age 89 with MOF    Thank you

## 2018-10-31 NOTE — PROGRESS NOTE ADULT - PROBLEM SELECTOR PLAN 2
2/2 poor PO intake   stable strict is/ os   - s/p tt   iv fluid bicarb for  sever metabolic acidosis    .   -Hypernatremia  progressive on fluid  poor prognosis with liver and   persistent renal failure this time/ ng tube free water  added .

## 2018-10-31 NOTE — PROGRESS NOTE ADULT - SUBJECTIVE AND OBJECTIVE BOX
Patient is a 89y old  Female who presents with a chief complaint of weakness, decreased PO intake (31 Oct 2018 10:05)      HPI:  Hx obtained from son and other family members as pt was altered.  Pt is a 89 yo F w/ PMH of DM type 2, HTN, COPD on 2.5L home O2, diastolic HFpEF (last echo 10/2017) presented to ED p/w ams, weakness, dysuria, dec po x past ~ 3 days. Of note Pt was recently admitted for ams, similar to current presentation and was d/c 6 days ago. On previous admission Pt was diagnosed and treated for UTI. Pt was initially doing well, but then developed burning with urination, AMS, and watery diarrhea x 3 days ago. Family reports that pt was given Bactrim for unresolved UTI, but that she was only able to take 2 doses, as she could not swallow the 3rd dose this AM. Pt has poor PO intake and EMS came to her house last night to give her IVF due to dehydration. Of note patient is normally bedbound and requires 24/7 home health aid. Pt is incontinent at baseline and wears diapers. Denies any Fevers, but endorses chills, and increased fatigue. Denies any SOB, CP, N/V. Family reports pt normally is able to converse without issue, but has been incoherent for ~ the past 3 days     admitted with   a ms  sec to metabolic encephalopathy multifactorial prem on ckd3 now with hepatorenal syndrome , cirrhosis and ascites  unknown etiology high ammonia  , acute on chr hypercarbic resp failure   .   pt seen and examine today -   lethargic  , no fever , npo  ng tube for meds  on iv fluid ,  mild resp distress.         INTERVAL HPI/OVERNIGHT EVENTS:  T(C): 36.3 (10-31-18 @ 04:45), Max: 36.3 (10-31-18 @ 04:45)  HR: 79 (10-31-18 @ 04:45) (78 - 88)  BP: 124/74 (10-31-18 @ 04:45) (124/74 - 143/74)  RR: 17 (10-31-18 @ 04:45) (17 - 19)  SpO2: 99% (10-31-18 @ 04:45) (95% - 99%)  Wt(kg): --  I&O's Summary    30 Oct 2018 07:01  -  31 Oct 2018 07:00  --------------------------------------------------------  IN: 645 mL / OUT: 650 mL / NET: -5 mL      REVIEW OF SYSTEMS:   unable  to obtain   as lethargic     PHYSICAL EXAM:  GENERAL: mild resp distress , lethargic   HEAD:  Atraumatic, Normocephalic  EYES: EOMI, PERRLA, conjunctiva and sclera clear  ENMT:  moist   mucous membranes,  No lesions  NECK: Supple,   NERVOUS SYSTEM:  Alert & Oriented X0, open eye on deep pain full stimuli   CHEST/LUNG: percussion  rt lower  and lt  decrease     ; No rales, rhonchi, wheezing,   HEART: Regular rate and rhythm; No murmurs, no tachy   ABDOMEN: Soft,  Nondistended; Bowel sounds present / no tenderness   EXTREMITIES:  2+ Peripheral Pulses, No clubbing, cyanosis,  1 pulse pitting   edema bl low ext   gu mendez   SKIN: No rashes or lesions/stage 3buttock  clean           MEDICATIONS  (STANDING):  ALBUTerol/ipratropium for Nebulization 3 milliLiter(s) Nebulizer every 6 hours  artificial  tears Solution 1 Drop(s) Both EYES two times a day  dextrose 5%. 1000 milliLiter(s) (50 mL/Hr) IV Continuous <Continuous>  dextrose 5%. 1000 milliLiter(s) (30 mL/Hr) IV Continuous <Continuous>  dextrose 50% Injectable 12.5 Gram(s) IV Push once  dextrose 50% Injectable 25 Gram(s) IV Push once  dextrose 50% Injectable 25 Gram(s) IV Push once  epoetin margie Injectable 34618 Unit(s) SubCutaneous <User Schedule>  ferrous    sulfate 325 milliGRAM(s) Oral daily  fluconAZOLE IVPB 100 milliGRAM(s) IV Intermittent every 24 hours  furosemide   Injectable 20 milliGRAM(s) IV Push two times a day  guaiFENesin  milliGRAM(s) Oral every 12 hours  heparin  Injectable 5000 Unit(s) SubCutaneous every 12 hours  lactulose Syrup 45 Gram(s) Oral three times a day  metoclopramide Injectable 5 milliGRAM(s) IV Push every 8 hours  midodrine 10 milliGRAM(s) Oral every 8 hours  octreotide  Injectable 100 MICROGram(s) SubCutaneous every 8 hours  piperacillin/tazobactam IVPB. 3.375 Gram(s) IV Intermittent every 12 hours  potassium chloride  10 mEq/100 mL IVPB 10 milliEquivalent(s) IV Intermittent every 1 hour  potassium chloride  10 mEq/100 mL IVPB 10 milliEquivalent(s) IV Intermittent once  rifaximin 550 milliGRAM(s) Oral two times a day    MEDICATIONS  (PRN):  dextrose 40% Gel 15 Gram(s) Oral once PRN Blood Glucose LESS THAN 70 milliGRAM(s)/deciliter  glucagon  Injectable 1 milliGRAM(s) IntraMuscular once PRN Glucose LESS THAN 70 milligrams/deciliter      LABS:                        9.4    13.75 )-----------( 61       ( 31 Oct 2018 06:53 )             28.8     10-31    159<H>  |  122<H>  |  69<H>  ----------------------------<  96  3.2<L>   |  27  |  4.80<H>    Ca    7.9<L>      31 Oct 2018 06:53  Phos  4.2     10-31  Mg     2.1     10-31    TPro  6.1  /  Alb  2.3<L>  /  TBili  0.7  /  DBili  x   /  AST  28  /  ALT  21  /  AlkPhos  109  10-30    PT/INR - ( 31 Oct 2018 06:53 )   PT: 22.1 sec;   INR: 1.90 ratio         PTT - ( 31 Oct 2018 06:53 )  PTT:52.6 sec    CAPILLARY BLOOD GLUCOSE      POCT Blood Glucose.: 105 mg/dL (31 Oct 2018 07:24)  POCT Blood Glucose.: 117 mg/dL (30 Oct 2018 21:28)  POCT Blood Glucose.: 141 mg/dL (30 Oct 2018 16:39)  POCT Blood Glucose.: 137 mg/dL (30 Oct 2018 11:45)              RADIOLOGY & ADDITIONAL TESTS:    Imaging Personally Reviewed:   no new test     Advance Directives:  dnr/dni     Palliative Care:    hospisce appropraite

## 2018-10-31 NOTE — SWALLOW BEDSIDE ASSESSMENT ADULT - COMMENTS
Pt is 88 y/o female admitted with reduced alertness, poor PO intake and difficulty swallowing. Currently has NG tube  Educated family on severity of dysphagia and risks for aspiration as they report giving her water by mouth resulting in "choking." Given 1x ice chip, pt with minimal oral movements to orient to utensil. Noted complete anterior loss of bolus, no attempt at manipulation, no swallow reflex. Pt is not alert enough for additional PO trials. Recommend NPO as patient is not alert enough for PO intake, is at high risk for aspiration given most conservative PO textures.  Pt's son verbalizes understanding of the above and of NPO recommendation. D

## 2018-10-31 NOTE — PROVIDER CONTACT NOTE (CRITICAL VALUE NOTIFICATION) - ACTION/TREATMENT ORDERED:
MD will review
Will recheck, continue current fluids
MD will enter appropriate orders
see orders for follow up regarding critical

## 2018-10-31 NOTE — PROGRESS NOTE ADULT - ATTENDING COMMENTS
pt seen and examine see above   plan -89  f advance  age  recurrent hospitalization   DM type 2, HTN, COPD on 2.5L home O2, diastolic HFpEF (last echo 10/2017) presented to ED p/w AMS, weakness, dysuria,  recent diarrhea decreased po intake x past ~ 3 days.   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  now ansarca fluid over lode   with sever protein caloric malnutrition   sec to liver cirrhosis unknown etiology  . hypernatremia iv fluid and ng tube fluid added  fu electrolyte .    thrombocytopenia  and anemia of chr dis sec to renal dis on epogen  hb stbale post 1 unit transfusion   fu platlet  closely on dvt prophy .     asp pna   iv abx zosyne day 7    blood cult neg to date .  uti candida on  iv antifungal   tt  .    high ammonia / cirrhosis on lactulose via ng tube   .   hepatorenal syndrome -   cont octreotide/midodrine/albumin   but worsening gfr / oliguric   uremia , as per gi dr styles and renal dr díaz  not a candidate for hd  .    hypercarbia  CO2 retention; +BiPAP  but patient's current mental status will make this measure less effective,   hypokalemia replaced.    . pt son  hcp  bedside  aware with all tt plan . hospisce evaluation referred  this time  as over all prognosis very poor . speech  swallow evaluation  today depend on pt mental status if pass other vise tube feed start from am if family agree . over night  episode of vtech on monitor possible sec to electrolyte imbalance with prem fu closely. pt seen and examine see above   plan -89  f advance  age  recurrent hospitalization   DM type 2, HTN, COPD on 2.5L home O2, diastolic HFpEF (last echo 10/2017) presented to ED p/w AMS, weakness, dysuria,  recent diarrhea decreased po intake x past ~ 3 days.   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  now ansarca fluid over lode   with sever protein caloric malnutrition   sec to liver cirrhosis unknown etiology  . hypernatremia iv fluid and ng tube fluid added  fu electrolyte .    thrombocytopenia  and anemia of chr dis sec to renal dis on epogen  hb stbale post 1 unit transfusion   fu platlet  closely on dvt prophy .     asp pna   iv abx zosyne day 7    blood cult neg to date .  uti candida on  iv antifungal   tt  .    high ammonia / cirrhosis on lactulose via ng tube   .   hepatorenal syndrome -   cont octreotide/midodrine/albumin   but worsening gfr / oliguric   uremia , as per gi dr styles and renal dr díaz  not a candidate for hd  .    hypercarbia  CO2 retention; +BiPAP  but patient's current mental status will make this measure less effective,   hypokalemia replaced.    . pt son  hcp  bedside  aware with all tt plan . hospisce evaluation referred  this time  as over all prognosis very poor . speech  swallow evaluation  today depend on pt mental status if pass other vise tube feed start from am if family insist this time  .   episodes  of vtech on monitor possible sec to electrolyte imbalance with prem fu closely.

## 2018-11-01 LAB
AMMONIA BLD-MCNC: 43 UMOL/L — HIGH (ref 11–32)
ANION GAP SERPL CALC-SCNC: 9 MMOL/L — SIGNIFICANT CHANGE UP (ref 5–17)
APTT BLD: 70.3 SEC — HIGH (ref 27.5–36.3)
BASOPHILS # BLD AUTO: 0.04 K/UL — SIGNIFICANT CHANGE UP (ref 0–0.2)
BASOPHILS NFR BLD AUTO: 0.3 % — SIGNIFICANT CHANGE UP (ref 0–2)
BUN SERPL-MCNC: 67 MG/DL — HIGH (ref 7–23)
CALCIUM SERPL-MCNC: 8.2 MG/DL — LOW (ref 8.5–10.1)
CHLORIDE SERPL-SCNC: 126 MMOL/L — HIGH (ref 96–108)
CO2 SERPL-SCNC: 26 MMOL/L — SIGNIFICANT CHANGE UP (ref 22–31)
CREAT SERPL-MCNC: 5.1 MG/DL — HIGH (ref 0.5–1.3)
EOSINOPHIL # BLD AUTO: 0.42 K/UL — SIGNIFICANT CHANGE UP (ref 0–0.5)
EOSINOPHIL NFR BLD AUTO: 2.9 % — SIGNIFICANT CHANGE UP (ref 0–6)
FIBRINOGEN PPP-MCNC: 155 MG/DL — LOW (ref 350–510)
GLUCOSE SERPL-MCNC: 73 MG/DL — SIGNIFICANT CHANGE UP (ref 70–99)
HAPTOGLOB SERPL-MCNC: <20 MG/DL — LOW (ref 34–200)
HCT VFR BLD CALC: 29.1 % — LOW (ref 34.5–45)
HGB BLD-MCNC: 9.2 G/DL — LOW (ref 11.5–15.5)
IMM GRANULOCYTES NFR BLD AUTO: 0.8 % — SIGNIFICANT CHANGE UP (ref 0–1.5)
INR BLD: 2.24 RATIO — HIGH (ref 0.88–1.16)
LDH SERPL L TO P-CCNC: 257 U/L — HIGH (ref 50–242)
LYMPHOCYTES # BLD AUTO: 1.78 K/UL — SIGNIFICANT CHANGE UP (ref 1–3.3)
LYMPHOCYTES # BLD AUTO: 12.5 % — LOW (ref 13–44)
MCHC RBC-ENTMCNC: 28.1 PG — SIGNIFICANT CHANGE UP (ref 27–34)
MCHC RBC-ENTMCNC: 31.6 GM/DL — LOW (ref 32–36)
MCV RBC AUTO: 89 FL — SIGNIFICANT CHANGE UP (ref 80–100)
MONOCYTES # BLD AUTO: 0.98 K/UL — HIGH (ref 0–0.9)
MONOCYTES NFR BLD AUTO: 6.9 % — SIGNIFICANT CHANGE UP (ref 2–14)
NEUTROPHILS # BLD AUTO: 10.92 K/UL — HIGH (ref 1.8–7.4)
NEUTROPHILS NFR BLD AUTO: 76.6 % — SIGNIFICANT CHANGE UP (ref 43–77)
NRBC # BLD: 0 /100 WBCS — SIGNIFICANT CHANGE UP (ref 0–0)
PLATELET # BLD AUTO: 60 K/UL — LOW (ref 150–400)
POTASSIUM SERPL-MCNC: 3.4 MMOL/L — LOW (ref 3.5–5.3)
POTASSIUM SERPL-SCNC: 3.4 MMOL/L — LOW (ref 3.5–5.3)
PROTHROM AB SERPL-ACNC: 25.9 SEC — HIGH (ref 10–12.9)
RBC # BLD: 3.27 M/UL — LOW (ref 3.8–5.2)
RBC # BLD: 3.27 M/UL — LOW (ref 3.8–5.2)
RBC # FLD: 15.6 % — HIGH (ref 10.3–14.5)
RETICS #: 31.4 K/UL — SIGNIFICANT CHANGE UP (ref 25–125)
RETICS/RBC NFR: 1 % — SIGNIFICANT CHANGE UP (ref 0.5–2.5)
SODIUM SERPL-SCNC: 161 MMOL/L — CRITICAL HIGH (ref 135–145)
WBC # BLD: 14.26 K/UL — HIGH (ref 3.8–10.5)
WBC # FLD AUTO: 14.26 K/UL — HIGH (ref 3.8–10.5)

## 2018-11-01 PROCEDURE — 99232 SBSQ HOSP IP/OBS MODERATE 35: CPT

## 2018-11-01 PROCEDURE — 99233 SBSQ HOSP IP/OBS HIGH 50: CPT

## 2018-11-01 PROCEDURE — 71045 X-RAY EXAM CHEST 1 VIEW: CPT | Mod: 26

## 2018-11-01 RX ORDER — SODIUM CHLORIDE 9 MG/ML
1000 INJECTION, SOLUTION INTRAVENOUS
Qty: 0 | Refills: 0 | Status: DISCONTINUED | OUTPATIENT
Start: 2018-11-01 | End: 2018-11-01

## 2018-11-01 RX ORDER — MORPHINE SULFATE 50 MG/1
2 CAPSULE, EXTENDED RELEASE ORAL EVERY 4 HOURS
Qty: 0 | Refills: 0 | Status: DISCONTINUED | OUTPATIENT
Start: 2018-11-01 | End: 2018-11-02

## 2018-11-01 RX ORDER — SODIUM CHLORIDE 9 MG/ML
1000 INJECTION, SOLUTION INTRAVENOUS
Qty: 0 | Refills: 0 | Status: DISCONTINUED | OUTPATIENT
Start: 2018-11-01 | End: 2018-11-03

## 2018-11-01 RX ADMIN — OCTREOTIDE ACETATE 100 MICROGRAM(S): 200 INJECTION, SOLUTION INTRAVENOUS; SUBCUTANEOUS at 06:32

## 2018-11-01 RX ADMIN — Medication 3 MILLILITER(S): at 02:03

## 2018-11-01 RX ADMIN — Medication 5 MILLIGRAM(S): at 06:27

## 2018-11-01 RX ADMIN — NYSTATIN CREAM 1 APPLICATION(S): 100000 CREAM TOPICAL at 06:31

## 2018-11-01 RX ADMIN — HEPARIN SODIUM 5000 UNIT(S): 5000 INJECTION INTRAVENOUS; SUBCUTANEOUS at 06:27

## 2018-11-01 RX ADMIN — Medication 5 MILLIGRAM(S): at 13:51

## 2018-11-01 RX ADMIN — PIPERACILLIN AND TAZOBACTAM 25 GRAM(S): 4; .5 INJECTION, POWDER, LYOPHILIZED, FOR SOLUTION INTRAVENOUS at 06:30

## 2018-11-01 RX ADMIN — Medication 20 MILLIGRAM(S): at 06:30

## 2018-11-01 RX ADMIN — LACTULOSE 45 GRAM(S): 10 SOLUTION ORAL at 13:50

## 2018-11-01 RX ADMIN — Medication 325 MILLIGRAM(S): at 13:51

## 2018-11-01 RX ADMIN — NYSTATIN CREAM 1 APPLICATION(S): 100000 CREAM TOPICAL at 13:51

## 2018-11-01 RX ADMIN — MIDODRINE HYDROCHLORIDE 10 MILLIGRAM(S): 2.5 TABLET ORAL at 13:52

## 2018-11-01 RX ADMIN — Medication 3 MILLILITER(S): at 07:45

## 2018-11-01 RX ADMIN — Medication 600 MILLIGRAM(S): at 06:30

## 2018-11-01 RX ADMIN — Medication 3 MILLILITER(S): at 13:08

## 2018-11-01 RX ADMIN — LACTULOSE 45 GRAM(S): 10 SOLUTION ORAL at 06:30

## 2018-11-01 RX ADMIN — MIDODRINE HYDROCHLORIDE 10 MILLIGRAM(S): 2.5 TABLET ORAL at 06:30

## 2018-11-01 NOTE — PROGRESS NOTE ADULT - SUBJECTIVE AND OBJECTIVE BOX
ID Progress note     Name: MALINDA SULTANA  Age: 89y  Gender: Female  MRN: [id number]    Interval History-- Events noted remains obtunded, asp per family opens eyes , non purposeful movements   Notes reviewed    Past Medical History--  Coronary artery disease with angina pectoris, unspecified vessel or lesion type, unspecified whether native or transplanted heart  Congestive heart failure  Asthma  Diabetes mellitus  HTN (hypertension)  No significant past surgical history      For details regarding the patient's social history, family history, and other miscellaneous elements, please refer the initial infectious diseases consultation and/or the admitting history and physical examination for this admission.    Allergies--  Allergies    No Known Allergies    Intolerances        Medications--  Antibiotics:  fluconAZOLE IVPB 100 milliGRAM(s) IV Intermittent every 24 hours  piperacillin/tazobactam IVPB. 3.375 Gram(s) IV Intermittent every 12 hours  rifaximin 550 milliGRAM(s) Oral two times a day    Immunologic:  epoetin margie Injectable 96544 Unit(s) SubCutaneous <User Schedule>    Other:  ALBUTerol/ipratropium for Nebulization  artificial  tears Solution  dextrose 40% Gel PRN  dextrose 5%.  dextrose 5%.  dextrose 50% Injectable  dextrose 50% Injectable  dextrose 50% Injectable  ferrous    sulfate  furosemide   Injectable  glucagon  Injectable PRN  guaiFENesin ER  heparin  Injectable  lactulose Syrup  metoclopramide Injectable  midodrine  nystatin Powder  octreotide  Injectable      Review of Systems--  Review of systems unable to be obtained secondary to clinical condition.    Physical Examination--    Vital Signs: T(F): 97.8 (11-01-18 @ 06:08), Max: 97.8 (11-01-18 @ 06:08)  HR: 68 (11-01-18 @ 07:52)  BP: 116/66 (11-01-18 @ 06:08)  RR: 18 (11-01-18 @ 06:08)  SpO2: 98% (11-01-18 @ 07:52)  Wt(kg): --  General: Nontoxic-appearing Female in no acute distress. poorly responsive   HEENT: AT/NC. PERRL. EOMI. Anicteric. Conjunctiva pink and moist. Oropharynx clear. Dentition fair.  Neck: Not rigid. No sense of mass.  Nodes: None palpable.  Lungs: Coarse breath sounds  bilaterally without rales, wheezing or rhonchi  Heart: Regular rate and rhythm. No Murmur. No rub. No gallop. No palpable thrill.  Abdomen: Bowel sounds present and normoactive. Soft. Nondistended. Nontender. No sense of mass. No organomegaly.  Back: No spinal tenderness. No costovertebral angle tenderness.   Extremities: No cyanosis or clubbing. No edema.   Skin: Warm. Dry. Good turgor. No rash. No vasculitic stigmata.  Psychiatric: Appropriate affect and mood for situation.         Laboratory Studies--  CBC                        9.4    13.75 )-----------( 61       ( 31 Oct 2018 06:53 )             28.8       Chemistries  10-31    159<H>  |  122<H>  |  69<H>  ----------------------------<  96  3.2<L>   |  27  |  4.80<H>    Ca    7.9<L>      31 Oct 2018 06:53  Phos  4.2     10-31  Mg     2.1     10-31        Culture Data    GI PCR Panel, Stool (collected 26 Oct 2018 21:55)  Source: .Stool Feces  Final Report (26 Oct 2018 23:45):    GI PCR Results: NOT detected    *******Please Note:*******    GI panel PCR evaluates for:    Campylobacter, Plesiomonas shigelloides, Salmonella,    Vibrio, Yersinia enterocolitica, Enteroaggregative    Escherichia coli (EAEC), Enteropathogenic E.coli (EPEC),    Enterotoxigenic E. coli (ETEC) lt/st, Shiga-like    toxin-producing E. coli (STEC) stx1/stx2,    Shigella/ Enteroinvasive E. coli (EIEC), Cryptosporidium,    Cyclospora cayetanensis, Entamoeba histolytica,    Giardia lamblia, Adenovirus F 40/41, Astrovirus,    Norovirus GI/GII, Rotavirus A, Sapovirus    Assessment :  88 yo F w/ PMH of DM type 2, HTN, CKD stage 4 ,COPD on 3L home O2, diastolic HFpEF (last echo   10/2017) presented to ED w/ lethargy progressing to unresponsiveness. Ct head x 2 negative   Poss Asp pneumonia  Candiuria  She has metabolic encephalopathy secondary to hepatic encephalopathy, the etiology may be SAMIA   or underlying undiagnosed liver disease.   Developing metabolic acidosis from SAMIA which is  worsening  Acute hypercapnia respiratory failure multifactorial with multisystem organ failure    if her mental status improves family may back away from comfort care     Plan :   - continue with IV Zosyn , check CXR if improved dc zosyn  - dc diflucan   - await hospice evaluation if family agrees for comfort care   - consider starting NGT feeds if family agreeable to PEG   - aspiration precautions   - BIPAP per pulm  - Prognosis poor  - Nephrology follow up     Continue with present regiment.  Appropriate use of antibiotics and adverse effects reviewed.    I have discussed the above plan of care with patient's family in detail. They expressed understanding of the treatment plan . Risks, benefits and alternatives discussed in detail. I have asked if they have any questions or concerns and appropriately addressed them to the best of my ability .      > 35 minutes spent in direct patient care reviewing  the notes, lab data/ imaging , discussion with multidisciplinary team. All questions were addressed and answered to the best of my capacity .    Thank you for allowing me to participate in the care of your patient .        Jaclyn Barnes MD  [phone]

## 2018-11-01 NOTE — PROGRESS NOTE ADULT - ASSESSMENT
Acute on CKD Stage 4  Hepatorenal Syndrome  Liver Cirrhosis  Sepsis/PNA/UTI  Hypernatremia  Hypokalemia  Dehydration  CHF/COPD   Metabolic Acidosis   better  Hypernatremia    -Renal indices are worsening; oliguric  -IVF no bicarb needed, with high Na, ok to use D5W at 30 and added H2O at 150 ml q 6 hrs  -Octreotide  -Will try lasix, 20 bid, to gently diurese now that BPs are better, and midodrine is reduced to 10 mg tid from today am  -Abx per ID  -Daily chemistries  -No indication for RRT; poor prognosis.  May cause more problems with Intermittent HD  -replace k as needed    d/w son, overall prognosis bad with multiorgan failure. d/w Dr Barnes and Dr Durán: I agree with hospice care to keeep her dignified at  age 89 with MOF    Thank you

## 2018-11-01 NOTE — PROGRESS NOTE ADULT - ATTENDING COMMENTS
pt seen and examine see above   -89  f advance  age  recurrent hospitalization   DM type 2, HTN, COPD on 2.5L home O2, diastolic HFpEF (last echo 10/2017) presented to ED p/w AMS, weakness, dysuria,  recent diarrhea decreased po intake x past ~ 3 days.   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  now ansarca fluid over lode   with sever protein caloric malnutrition   sec to liver cirrhosis unknown etiology  . sever hypernatremia progressive on  iv fluid and ng tube fluid added  fu electrolyte .    thrombocytopenia  and anemia of chr dis sec to renal dis on epogen  hb stbale post 1 unit transfusion   fu platlet  closely on dvt prophy .     asp pna   iv abx zosyne  last dose today   dc in am    blood cult neg to date .  uti candida on  iv antifungal   tt   finished .    high ammonia / cirrhosis on lactulose via ng tube   .   hepatorenal syndrome -   cont octreotide/midodrine/albumin   but worsening gfr / oliguric   uremia , as per gi dr styles and renal dr díaz  not a candidate for hd  .    hypercarbia  CO2 retention; +BiPAP  but patient's current mental status will make this measure less effective,   hypokalemia replaced.    . pt son  hcp  bedside  aware with all tt plan . hospisce evaluation referred  this time  as over all prognosis very poor . speech  swallow evaluation  failed   will  need  peg if family  refuse comfort  care  at this  point  as   no other  way to feed long term  risk of aspiration and complication  present  with  it family aware .   episodes  of vtech on monitor possible sec to electrolyte imbalance with prem     now  resolved   . repeat xray chest  rt lung opacity  questionable so dc iv abx after todays dose discuss with id dr lindquist . pt family discussed in detail for comfort  care still making decision . pt seen and examine see above   -89  f advance  age  recurrent hospitalization   DM type 2, HTN, COPD on 2.5L home O2, diastolic HFpEF (last echo 10/2017) presented to ED p/w AMS, weakness, dysuria,  recent diarrhea decreased po intake x past ~ 3 days.   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  now ansarca fluid over lode   with sever protein caloric malnutrition   sec to liver cirrhosis unknown etiology  . sever hypernatremia progressive on  iv fluid and ng tube fluid added  fu electrolyte .    thrombocytopenia  and anemia of chr dis sec to renal dis on epogen  hb stbale post 1 unit transfusion   fu platlet  closely on dvt prophy .     asp pna   iv abx zosyne  last dose today   dc in am    blood cult neg to date .  uti candida on  iv antifungal   tt   finished .    high ammonia / cirrhosis on lactulose via ng tube   .   hepatorenal syndrome -   cont octreotide/midodrine/albumin   but worsening gfr / oliguric   uremia , as per gi dr styles and renal dr díaz  not a candidate for hd  .    hypercarbia  CO2 retention; +BiPAP  but patient's current mental status will make this measure less effective,   hypokalemia replaced.    . pt son  hcp  bedside  aware with all tt plan . hospisce evaluation referred  this time  as over all prognosis very poor . speech  swallow evaluation  failed   will  need  peg if family  refuse comfort  care  at this  point  as   no other  way to feed long term  risk of aspiration and complication  present  with  it family aware .   episodes  of vtech on monitor possible sec to electrolyte imbalance with prem     now  resolved   . repeat xray chest  rt lung opacity  questionable so dc iv abx after todays dose discuss with id dr lindquist . pt family discussed in detail for comfort  care still making decision . 5 pm pt  all 4 son with hcp son on bed side  agree  for comfort care no vitals except temp , no other meds except morphina , no lab , no  peg for feed want only comfort care , started , will be transfer monday to [facility] pt hosp[facility] center.

## 2018-11-01 NOTE — PROGRESS NOTE ADULT - SUBJECTIVE AND OBJECTIVE BOX
[facility] - John Tariq, No, Nirav, [place], [place]  Office Number:  [phone]    Patient resting comfortably in bed in NAD.  Laying flat with no respiratory distress.  No complaints of chest pain, dyspnea, palpitations, PND, or orthopnea.    ROS: negative unless otherwise mentioned.    Telemetry:  SR, no NSVT    MEDICATIONS  (STANDING):  ALBUTerol/ipratropium for Nebulization 3 milliLiter(s) Nebulizer every 6 hours  artificial  tears Solution 1 Drop(s) Both EYES two times a day  dextrose 5%. 1000 milliLiter(s) (50 mL/Hr) IV Continuous <Continuous>  dextrose 5%. 1000 milliLiter(s) (30 mL/Hr) IV Continuous <Continuous>  dextrose 50% Injectable 12.5 Gram(s) IV Push once  dextrose 50% Injectable 25 Gram(s) IV Push once  dextrose 50% Injectable 25 Gram(s) IV Push once  epoetin margie Injectable 80826 Unit(s) SubCutaneous <User Schedule>  ferrous    sulfate 325 milliGRAM(s) Oral daily  furosemide   Injectable 20 milliGRAM(s) IV Push two times a day  guaiFENesin  milliGRAM(s) Oral every 12 hours  heparin  Injectable 5000 Unit(s) SubCutaneous every 12 hours  lactulose Syrup 45 Gram(s) Oral three times a day  metoclopramide Injectable 5 milliGRAM(s) IV Push every 8 hours  midodrine 10 milliGRAM(s) Oral every 8 hours  nystatin Powder 1 Application(s) Topical every 8 hours  octreotide  Injectable 100 MICROGram(s) SubCutaneous every 8 hours  piperacillin/tazobactam IVPB. 3.375 Gram(s) IV Intermittent every 12 hours  rifaximin 550 milliGRAM(s) Oral two times a day    MEDICATIONS  (PRN):  dextrose 40% Gel 15 Gram(s) Oral once PRN Blood Glucose LESS THAN 70 milliGRAM(s)/deciliter  glucagon  Injectable 1 milliGRAM(s) IntraMuscular once PRN Glucose LESS THAN 70 milligrams/deciliter      Allergies    No Known Allergies    Intolerances        Vital Signs Last 24 Hrs  T(C): 36.6 (01 Nov 2018 06:08), Max: 36.6 (01 Nov 2018 06:08)  T(F): 97.8 (01 Nov 2018 06:08), Max: 97.8 (01 Nov 2018 06:08)  HR: 68 (01 Nov 2018 07:52) (68 - 85)  BP: 116/66 (01 Nov 2018 06:08) (116/66 - 136/73)  BP(mean): --  RR: 18 (01 Nov 2018 06:08) (16 - 18)  SpO2: 98% (01 Nov 2018 07:52) (92% - 100%)    I&O's Summary    31 Oct 2018 07:01  -  01 Nov 2018 07:00  --------------------------------------------------------  IN: 500 mL / OUT: 500 mL / NET: 0 mL        ON EXAM:    Constitutional: bedbound, not responsive  Lungs:  Non-labored, breath sounds are audible, + mild wheezing in all lung fields  Cardiovascular: RRR.  S1 and S2 positive.  No murmurs, rubs, gallops or clicks  Gastrointestinal: Bowel Sounds present, soft, nontender.   Lymph: No peripheral edema.   Neurological: lethargic, unable to assess  Psych:  lethargic, unable to assess      LABS: All Labs Reviewed:                        9.2    14.26 )-----------( 60       ( 01 Nov 2018 10:42 )             29.1                         9.4    13.75 )-----------( 61       ( 31 Oct 2018 06:53 )             28.8                         9.5    15.81 )-----------( 64       ( 30 Oct 2018 09:44 )             28.7     31 Oct 2018 06:53    159    |  122    |  69     ----------------------------<  96     3.2     |  27     |  4.80   31 Oct 2018 01:06    158    |  121    |  66     ----------------------------<  115    3.3     |  27     |  4.80   30 Oct 2018 08:51    153    |  117    |  70     ----------------------------<  136    3.1     |  23     |  4.70     Ca    7.9        31 Oct 2018 06:53  Ca    8.0        31 Oct 2018 01:06  Ca    7.7        30 Oct 2018 08:51  Phos  4.2       31 Oct 2018 01:06  Phos  4.1       30 Oct 2018 08:51  Mg     2.1       31 Oct 2018 06:53  Mg     1.9       31 Oct 2018 01:06  Mg     1.9       30 Oct 2018 08:51    TPro  6.1    /  Alb  2.3    /  TBili  0.7    /  DBili  x      /  AST  28     /  ALT  21     /  AlkPhos  109    30 Oct 2018 08:51    PT/INR - ( 31 Oct 2018 06:53 )   PT: 22.1 sec;   INR: 1.90 ratio         PTT - ( 31 Oct 2018 06:53 )  PTT:52.6 sec  CARDIAC MARKERS ( 31 Oct 2018 09:10 )  .225 ng/mL / x     / 20 U/L / x     / 1.3 ng/mL  CARDIAC MARKERS ( 31 Oct 2018 01:06 )  .256 ng/mL / x     / 19 U/L / x     / 1.6 ng/mL      Blood Culture:

## 2018-11-01 NOTE — PROGRESS NOTE ADULT - PROBLEM SELECTOR PLAN 2
2/2 poor PO intake   stable strict is/ os   - s/p tt   iv fluid bicarb for  sever metabolic acidosis    .   - sever Hypernatremia   increase  iv fluid and oral fluid via ng tube  poor prognosis with liver and   persistent renal failure this time .

## 2018-11-01 NOTE — PROGRESS NOTE ADULT - ASSESSMENT
Acute on CKD Stage 4  Hepatorenal Syndrome  Liver Cirrhosis  Sepsis/PNA/UTI  Hypernatremia  Hypokalemia  Dehydration  CHF/COPD   Metabolic Acidosis   better  Hypernatremia    -Renal indices are worsening; oliguric  -IVF no bicarb needed, with high Na, ok to use D5W at 30 and added H2O at 150 ml q 6 hrs  -Octreotide  -Will try lasix, 20 bid, to gently diurese now that BPs are better, and midodrine is reduced to 10 mg tid from today am  -Abx per ID  -Daily chemistries  -No indication for RRT; poor prognosis.  May cause more problems with Intermittent HD  -replace k as needed    d/w son, overall prognosis bad with multiorgan failure. d/w Dr Barnes and Dr Durán: I agree with hospice care to keeep her dignified at  age 89 with MOF    Thank you Acute on CKD Stage 4  Hepatorenal Syndrome  Liver Cirrhosis  Sepsis/PNA/UTI  Hypernatremia  Hypokalemia  Dehydration  CHF/COPD   Metabolic Acidosis   better  Hypernatremia    -Renal indices are worsening; oliguric  -IVF no bicarb needed, with high Na, ok to use D5W at 30 and added H2O at 150 ml q 6 hrs  -Octreotide  -Will try lasix, 20 bid, to gently diurese now that BPs are better, and midodrine is reduced to 10 mg tid from today am  -Abx per ID  -Daily chemistries  -No indication for RRT; poor prognosis.  May cause more problems with Intermittent HD  -replace k as needed    Again d/w sons, overall prognosis bad with multiorgan failure. d/w Dr Barnes and Dr Durán: I agree with hospice care to keeep her dignified at  age 89 with MOF    Thank you

## 2018-11-01 NOTE — PROGRESS NOTE ADULT - SUBJECTIVE AND OBJECTIVE BOX
INTERVAL HPI/OVERNIGHT EVENTS:  pt seen and examined  lethargic in bed  per overnight rn +bms, no vomiting, no s/s overt gib  afebrile overnight labs pending    MEDICATIONS  (STANDING):  ALBUTerol/ipratropium for Nebulization 3 milliLiter(s) Nebulizer every 6 hours  artificial  tears Solution 1 Drop(s) Both EYES two times a day  dextrose 5%. 1000 milliLiter(s) (50 mL/Hr) IV Continuous <Continuous>  dextrose 5%. 1000 milliLiter(s) (30 mL/Hr) IV Continuous <Continuous>  dextrose 50% Injectable 12.5 Gram(s) IV Push once  dextrose 50% Injectable 25 Gram(s) IV Push once  dextrose 50% Injectable 25 Gram(s) IV Push once  epoetin margie Injectable 67903 Unit(s) SubCutaneous <User Schedule>  ferrous    sulfate 325 milliGRAM(s) Oral daily  fluconAZOLE IVPB 100 milliGRAM(s) IV Intermittent every 24 hours  furosemide   Injectable 20 milliGRAM(s) IV Push two times a day  guaiFENesin  milliGRAM(s) Oral every 12 hours  heparin  Injectable 5000 Unit(s) SubCutaneous every 12 hours  lactulose Syrup 45 Gram(s) Oral three times a day  metoclopramide Injectable 5 milliGRAM(s) IV Push every 8 hours  midodrine 10 milliGRAM(s) Oral every 8 hours  nystatin Powder 1 Application(s) Topical every 8 hours  octreotide  Injectable 100 MICROGram(s) SubCutaneous every 8 hours  piperacillin/tazobactam IVPB. 3.375 Gram(s) IV Intermittent every 12 hours  rifaximin 550 milliGRAM(s) Oral two times a day    MEDICATIONS  (PRN):  dextrose 40% Gel 15 Gram(s) Oral once PRN Blood Glucose LESS THAN 70 milliGRAM(s)/deciliter  glucagon  Injectable 1 milliGRAM(s) IntraMuscular once PRN Glucose LESS THAN 70 milligrams/deciliter      Allergies    No Known Allergies    Intolerances        Review of Systems:    unable to obtain      Vital Signs Last 24 Hrs  T(C): 36.6 (01 Nov 2018 06:08), Max: 36.6 (01 Nov 2018 06:08)  T(F): 97.8 (01 Nov 2018 06:08), Max: 97.8 (01 Nov 2018 06:08)  HR: 68 (01 Nov 2018 07:52) (68 - 85)  BP: 116/66 (01 Nov 2018 06:08) (116/66 - 136/73)  BP(mean): --  RR: 18 (01 Nov 2018 06:08) (16 - 18)  SpO2: 98% (01 Nov 2018 07:52) (92% - 100%)    PHYSICAL EXAM:    Constitutional: NAD, lying in bed +ngt   HEENT: ncat  Neck: No LAD  Respiratory: dec bs  Cardiovascular: S1 and S2  Gastrointestinal: soft appears nt +dt  Extremities: No peripheral edema  Neurological: lethargic  Skin: No rashes      LABS:                        9.4    13.75 )-----------( 61       ( 31 Oct 2018 06:53 )             28.8     10-31    159<H>  |  122<H>  |  69<H>  ----------------------------<  96  3.2<L>   |  27  |  4.80<H>    Ca    7.9<L>      31 Oct 2018 06:53  Phos  4.2     10-31  Mg     2.1     10-31    TPro  6.1  /  Alb  2.3<L>  /  TBili  0.7  /  DBili  x   /  AST  28  /  ALT  21  /  AlkPhos  109  10-30    PT/INR - ( 31 Oct 2018 06:53 )   PT: 22.1 sec;   INR: 1.90 ratio         PTT - ( 31 Oct 2018 06:53 )  PTT:52.6 sec      RADIOLOGY & ADDITIONAL TESTS:

## 2018-11-01 NOTE — PROGRESS NOTE ADULT - PROBLEM SELECTOR PLAN 1
am labs pending  multifactorial, worsening cirrhosis w HE c/b HRS  CT showed cirrhotic liver and mod volume of abdominal pelvic ascites  sp egd w endoscopic placement of ngt   not a candidate for IR drainage  elev afp, +sma; ?ai component of liver dz  cont xifaxamin/lactulose as tolerated  dc ppi in setting of thrombocytopenia  seen by speech- recs for npo status  no gi objection to starting ngt feeds as tolerated, high risk for aspiration  neuro, id, heme/onc following  renal recs appreciated, not a candidate for rrt  aspiration prec, elev hob   dw attg, supportive care from gi perspective  pall care following, poor ppx  seen by hospice care, eval ongoing  rest of care per primary team

## 2018-11-01 NOTE — PROGRESS NOTE ADULT - SUBJECTIVE AND OBJECTIVE BOX
Date/Time Patient Seen:  		  Referring MD:   Data Reviewed	       Patient is a 89y old  Female who presents with a chief complaint of weakness, decreased PO intake (31 Oct 2018 19:01)    in bed  seen and examined    Subjective/HPI     PAST MEDICAL & SURGICAL HISTORY:  Coronary artery disease with angina pectoris, unspecified vessel or lesion type, unspecified whether native or transplanted heart  Congestive heart failure  Asthma  Diabetes mellitus  HTN (hypertension)  No significant past surgical history        Medication list         MEDICATIONS  (STANDING):  ALBUTerol/ipratropium for Nebulization 3 milliLiter(s) Nebulizer every 6 hours  artificial  tears Solution 1 Drop(s) Both EYES two times a day  dextrose 5%. 1000 milliLiter(s) (50 mL/Hr) IV Continuous <Continuous>  dextrose 5%. 1000 milliLiter(s) (30 mL/Hr) IV Continuous <Continuous>  dextrose 50% Injectable 12.5 Gram(s) IV Push once  dextrose 50% Injectable 25 Gram(s) IV Push once  dextrose 50% Injectable 25 Gram(s) IV Push once  epoetin margie Injectable 67898 Unit(s) SubCutaneous <User Schedule>  ferrous    sulfate 325 milliGRAM(s) Oral daily  fluconAZOLE IVPB 100 milliGRAM(s) IV Intermittent every 24 hours  furosemide   Injectable 20 milliGRAM(s) IV Push two times a day  guaiFENesin  milliGRAM(s) Oral every 12 hours  heparin  Injectable 5000 Unit(s) SubCutaneous every 12 hours  lactulose Syrup 45 Gram(s) Oral three times a day  metoclopramide Injectable 5 milliGRAM(s) IV Push every 8 hours  midodrine 10 milliGRAM(s) Oral every 8 hours  nystatin Powder 1 Application(s) Topical every 8 hours  octreotide  Injectable 100 MICROGram(s) SubCutaneous every 8 hours  piperacillin/tazobactam IVPB. 3.375 Gram(s) IV Intermittent every 12 hours  rifaximin 550 milliGRAM(s) Oral two times a day    MEDICATIONS  (PRN):  dextrose 40% Gel 15 Gram(s) Oral once PRN Blood Glucose LESS THAN 70 milliGRAM(s)/deciliter  glucagon  Injectable 1 milliGRAM(s) IntraMuscular once PRN Glucose LESS THAN 70 milligrams/deciliter         Vitals log        ICU Vital Signs Last 24 Hrs  T(C): 36.6 (01 Nov 2018 06:08), Max: 36.6 (01 Nov 2018 06:08)  T(F): 97.8 (01 Nov 2018 06:08), Max: 97.8 (01 Nov 2018 06:08)  HR: 83 (01 Nov 2018 06:08) (71 - 85)  BP: 116/66 (01 Nov 2018 06:08) (116/66 - 136/73)  BP(mean): --  ABP: --  ABP(mean): --  RR: 18 (01 Nov 2018 06:08) (16 - 18)  SpO2: 100% (01 Nov 2018 06:08) (92% - 100%)           Input and Output:  I&O's Detail    30 Oct 2018 07:01  -  31 Oct 2018 07:00  --------------------------------------------------------  IN:    dextrose 5%.: 120 mL    sodium bicarbonate  Infusion: 525 mL  Total IN: 645 mL    OUT:    Ureteral Catheter: 650 mL  Total OUT: 650 mL    Total NET: -5 mL      31 Oct 2018 07:01  -  01 Nov 2018 06:40  --------------------------------------------------------  IN:    Solution: 400 mL    Solution: 100 mL  Total IN: 500 mL    OUT:    Ureteral Catheter: 500 mL  Total OUT: 500 mL    Total NET: 0 mL          Lab Data                        9.4    13.75 )-----------( 61       ( 31 Oct 2018 06:53 )             28.8     10-31    159<H>  |  122<H>  |  69<H>  ----------------------------<  96  3.2<L>   |  27  |  4.80<H>    Ca    7.9<L>      31 Oct 2018 06:53  Phos  4.2     10-31  Mg     2.1     10-31    TPro  6.1  /  Alb  2.3<L>  /  TBili  0.7  /  DBili  x   /  AST  28  /  ALT  21  /  AlkPhos  109  10-30      CARDIAC MARKERS ( 31 Oct 2018 09:10 )  .225 ng/mL / x     / 20 U/L / x     / 1.3 ng/mL  CARDIAC MARKERS ( 31 Oct 2018 01:06 )  .256 ng/mL / x     / 19 U/L / x     / 1.6 ng/mL        Review of Systems	      Objective     Physical Examination    heart s1s2  lung dec BS  ng tube in place      Pertinent Lab findings & Imaging      Samantha:  NO   Adequate UO     I&O's Detail    30 Oct 2018 07:01  -  31 Oct 2018 07:00  --------------------------------------------------------  IN:    dextrose 5%.: 120 mL    sodium bicarbonate  Infusion: 525 mL  Total IN: 645 mL    OUT:    Ureteral Catheter: 650 mL  Total OUT: 650 mL    Total NET: -5 mL      31 Oct 2018 07:01  -  01 Nov 2018 06:40  --------------------------------------------------------  IN:    Solution: 400 mL    Solution: 100 mL  Total IN: 500 mL    OUT:    Ureteral Catheter: 500 mL  Total OUT: 500 mL    Total NET: 0 mL               Discussed with:     Cultures:	        Radiology

## 2018-11-01 NOTE — PROGRESS NOTE ADULT - ASSESSMENT
Mrs. Ha is a 88 yo F w/ PMH of DM type 2, HTN, COPD on 2.5L home O2, diastolic HFpEF (last echo 10/2017) presented to ED initially presenting AMS, weakness, dysuria, decreased po intake x past ~ 3 days, now with significant metabolic/hepatic encephalopathy.  Called to evaluate for cardiac clearance prior to endoscopic PEG placement.  s/p NGT placement, tolerated well.    - NSVT noted on 10/31, no additional overnight. Most likely related to hypokalemia. Would replete her K, but not would not be more aggressive with her overall clinical status.  - LV function normal as of 10/2017  - EKG with SR, LAD and poor r wave progression, unchanged from 2016.  - No sign of volume overload  - Hold Lasix in the setting of SAMIA and dehydration  - Continue BIPAP for respiratory support  - Family aware that she is critically ill.   - Poor prognosis. Actively dying.  - d/w son at bedside

## 2018-11-01 NOTE — PROGRESS NOTE ADULT - SUBJECTIVE AND OBJECTIVE BOX
NEPHROLOGY INTERVAL HPI/OVERNIGHT EVENTS:  HPI:  Hx obtained from son and other family members as pt was altered.  Pt is a 88 yo F w/ PMH of DM type 2, HTN, COPD on 2.5L home O2, diastolic HFpEF (last echo 10/2017) presented to ED p/w ams, weakness, dysuria, dec po x past ~ 3 days. Of note Pt was recently admitted for ams, similar to current presentation and was d/c 6 days ago. On previous admission Pt was diagnosed and treated for UTI. Pt was initially doing well, but then developed burning with urination, AMS, and watery diarrhea x 3 days ago. Family reports that pt was given Bactrim for unresolved UTI, but that she was only able to take 2 doses, as she could not swallow the 3rd dose this AM. Pt has poor PO intake and EMS came to her house last night to give her IVF due to dehydration. Of note patient is normally bedbound and requires 24/7 home health aid. Pt is incontinent at baseline and wears diapers. Denies any Fevers, but endorses chills, and increased fatigue. Denies any SOB, CP, N/V. Family reports pt normally is able to converse without issue, but has been incoherent for ~ the past 3 days    In the ED, temp 97.1, HR 62, /75, RR 20, Spo2 99 on supplemental o2. WBC 11.46, Hg 9.5, hct 29.5, platelet 147, PT 13.5, INR 1.23, PTT 25.9, Na 148, K 4.3, Cl 120, Co2 21, BUN 64, Cr 2.00, glucose 110, calcium 8.0, alk phos 136, lactate 1.4, lipase 117. UA: small bilirubin, moderate leukocyte esterase, trace blood, moderate epithelial, bacteria few, hyaline 0-2, yeast present    Imaging:  CXR and CT head no cont ordered  EKG: NSR    In the ED, received zosyn 3.375 mg IV x 1, NaCl 0.9% 1000mL x 2, duoneb 3 mL x 1, (22 Oct 2018 23:52)      PAST MEDICAL & SURGICAL HISTORY:  Coronary artery disease with angina pectoris, unspecified vessel or lesion type, unspecified whether native or transplanted heart  Congestive heart failure  Asthma  Diabetes mellitus  HTN (hypertension)  No significant past surgical history      MEDICATIONS  (STANDING):  ALBUTerol/ipratropium for Nebulization 3 milliLiter(s) Nebulizer every 6 hours  artificial  tears Solution 1 Drop(s) Both EYES two times a day  dextrose 5%. 1000 milliLiter(s) (50 mL/Hr) IV Continuous <Continuous>  dextrose 5%. 1000 milliLiter(s) (30 mL/Hr) IV Continuous <Continuous>  dextrose 50% Injectable 12.5 Gram(s) IV Push once  dextrose 50% Injectable 25 Gram(s) IV Push once  dextrose 50% Injectable 25 Gram(s) IV Push once  epoetin margie Injectable 68034 Unit(s) SubCutaneous <User Schedule>  ferrous    sulfate 325 milliGRAM(s) Oral daily  furosemide   Injectable 20 milliGRAM(s) IV Push two times a day  guaiFENesin  milliGRAM(s) Oral every 12 hours  heparin  Injectable 5000 Unit(s) SubCutaneous every 12 hours  lactulose Syrup 45 Gram(s) Oral three times a day  metoclopramide Injectable 5 milliGRAM(s) IV Push every 8 hours  midodrine 10 milliGRAM(s) Oral every 8 hours  nystatin Powder 1 Application(s) Topical every 8 hours  octreotide  Injectable 100 MICROGram(s) SubCutaneous every 8 hours  piperacillin/tazobactam IVPB. 3.375 Gram(s) IV Intermittent every 12 hours  rifaximin 550 milliGRAM(s) Oral two times a day    MEDICATIONS  (PRN):  dextrose 40% Gel 15 Gram(s) Oral once PRN Blood Glucose LESS THAN 70 milliGRAM(s)/deciliter  glucagon  Injectable 1 milliGRAM(s) IntraMuscular once PRN Glucose LESS THAN 70 milligrams/deciliter      Allergies    No Known Allergies    Intolerances        Vital Signs Last 24 Hrs  T(C): 36.6 (01 Nov 2018 06:08), Max: 36.6 (01 Nov 2018 06:08)  T(F): 97.8 (01 Nov 2018 06:08), Max: 97.8 (01 Nov 2018 06:08)  HR: 68 (01 Nov 2018 07:52) (68 - 85)  BP: 116/66 (01 Nov 2018 06:08) (116/66 - 136/73)  BP(mean): --  RR: 18 (01 Nov 2018 06:08) (16 - 18)  SpO2: 98% (01 Nov 2018 07:52) (92% - 100%)  Daily     Daily     PHYSICAL EXAM:    GENERAL: NAD, well-groomed, well-developed  HEAD:  Atraumatic, Normocephalic  EYES: EOMI, PERRLA, conjunctiva and sclera clear  ENMT: No tonsillar erythema, exudates, or enlargement; Moist mucous membranes, Good dentition, No lesions  NECK: Supple, No JVD, Normal thyroid  NERVOUS SYSTEM:  Alert & Oriented X3, Good concentration; Motor Strength 5/5 B/L upper and lower extremities; DTRs 2+ intact and symmetric  CHEST/LUNG: Clear to percussion bilaterally; No rales, rhonchi, wheezing, or rubs  HEART: Regular rate and rhythm; No murmurs, rubs, or gallops  ABDOMEN: Soft, Nontender, Nondistended; Bowel sounds present  EXTREMITIES:  2+ Peripheral Pulses, No clubbing, cyanosis, or edema  SKIN: No rashes or lesions    LABS:                        9.2    14.26 )-----------( 60       ( 01 Nov 2018 10:42 )             29.1     10-31    159<H>  |  122<H>  |  69<H>  ----------------------------<  96  3.2<L>   |  27  |  4.80<H>    Ca    7.9<L>      31 Oct 2018 06:53  Phos  4.2     10-31  Mg     2.1     10-31      PT/INR - ( 31 Oct 2018 06:53 )   PT: 22.1 sec;   INR: 1.90 ratio      PTT - ( 31 Oct 2018 06:53 )  PTT:52.6 sec    RADIOLOGY & ADDITIONAL TESTS:

## 2018-11-01 NOTE — PROGRESS NOTE ADULT - SUBJECTIVE AND OBJECTIVE BOX
NEPHROLOGY INTERVAL HPI/OVERNIGHT EVENTS:  HPI:  Hx obtained from son and other family members as pt was altered.  Pt is a 89 yo F w/ PMH of DM type 2, HTN, COPD on 2.5L home O2, diastolic HFpEF (last echo 10/2017) presented to ED p/w ams, weakness, dysuria, dec po x past ~ 3 days. Of note Pt was recently admitted for ams, similar to current presentation and was d/c 6 days ago. On previous admission Pt was diagnosed and treated for UTI. Pt was initially doing well, but then developed burning with urination, AMS, and watery diarrhea x 3 days ago. Family reports that pt was given Bactrim for unresolved UTI, but that she was only able to take 2 doses, as she could not swallow the 3rd dose this AM. Pt has poor PO intake and EMS came to her house last night to give her IVF due to dehydration. Of note patient is normally bedbound and requires 24/7 home health aid. Pt is incontinent at baseline and wears diapers. Denies any Fevers, but endorses chills, and increased fatigue. Denies any SOB, CP, N/V. Family reports pt normally is able to converse without issue, but has been incoherent for ~ the past 3 days    In the ED, temp 97.1, HR 62, /75, RR 20, Spo2 99 on supplemental o2. WBC 11.46, Hg 9.5, hct 29.5, platelet 147, PT 13.5, INR 1.23, PTT 25.9, Na 148, K 4.3, Cl 120, Co2 21, BUN 64, Cr 2.00, glucose 110, calcium 8.0, alk phos 136, lactate 1.4, lipase 117. UA: small bilirubin, moderate leukocyte esterase, trace blood, moderate epithelial, bacteria few, hyaline 0-2, yeast present    Imaging:  CXR and CT head no cont ordered  EKG: NSR    In the ED, received zosyn 3.375 mg IV x 1, NaCl 0.9% 1000mL x 2, duoneb 3 mL x 1, (22 Oct 2018 23:52)      PAST MEDICAL & SURGICAL HISTORY:  Coronary artery disease with angina pectoris, unspecified vessel or lesion type, unspecified whether native or transplanted heart  Congestive heart failure  Asthma  Diabetes mellitus  HTN (hypertension)  No significant past surgical history      MEDICATIONS  (STANDING):  ALBUTerol/ipratropium for Nebulization 3 milliLiter(s) Nebulizer every 6 hours  artificial  tears Solution 1 Drop(s) Both EYES two times a day  dextrose 5%. 1000 milliLiter(s) (50 mL/Hr) IV Continuous <Continuous>  dextrose 5%. 1000 milliLiter(s) (30 mL/Hr) IV Continuous <Continuous>  dextrose 50% Injectable 12.5 Gram(s) IV Push once  dextrose 50% Injectable 25 Gram(s) IV Push once  dextrose 50% Injectable 25 Gram(s) IV Push once  epoetin margie Injectable 37968 Unit(s) SubCutaneous <User Schedule>  ferrous    sulfate 325 milliGRAM(s) Oral daily  fluconAZOLE IVPB 100 milliGRAM(s) IV Intermittent every 24 hours  furosemide   Injectable 20 milliGRAM(s) IV Push two times a day  guaiFENesin  milliGRAM(s) Oral every 12 hours  heparin  Injectable 5000 Unit(s) SubCutaneous every 12 hours  lactulose Syrup 45 Gram(s) Oral three times a day  metoclopramide Injectable 5 milliGRAM(s) IV Push every 8 hours  midodrine 10 milliGRAM(s) Oral every 8 hours  nystatin Powder 1 Application(s) Topical every 8 hours  octreotide  Injectable 100 MICROGram(s) SubCutaneous every 8 hours  piperacillin/tazobactam IVPB. 3.375 Gram(s) IV Intermittent every 12 hours  rifaximin 550 milliGRAM(s) Oral two times a day    MEDICATIONS  (PRN):  dextrose 40% Gel 15 Gram(s) Oral once PRN Blood Glucose LESS THAN 70 milliGRAM(s)/deciliter  glucagon  Injectable 1 milliGRAM(s) IntraMuscular once PRN Glucose LESS THAN 70 milligrams/deciliter      Allergies    No Known Allergies    Intolerances        Vital Signs Last 24 Hrs  T(C): 36.6 (01 Nov 2018 06:08), Max: 36.6 (01 Nov 2018 06:08)  T(F): 97.8 (01 Nov 2018 06:08), Max: 97.8 (01 Nov 2018 06:08)  HR: 83 (01 Nov 2018 06:08) (73 - 85)  BP: 116/66 (01 Nov 2018 06:08) (116/66 - 136/73)  BP(mean): --  RR: 18 (01 Nov 2018 06:08) (16 - 18)  SpO2: 100% (01 Nov 2018 06:08) (92% - 100%)  Daily     Daily     PHYSICAL EXAM:    GENERAL: NAD, well-groomed, well-developed  HEAD:  Atraumatic, Normocephalic  EYES: EOMI, PERRLA, conjunctiva and sclera clear  ENMT: No tonsillar erythema, exudates, or enlargement; Moist mucous membranes, Good dentition, No lesions  NECK: Supple, No JVD, Normal thyroid  NERVOUS SYSTEM:  Alert & Oriented X3, Good concentration; Motor Strength 5/5 B/L upper and lower extremities; DTRs 2+ intact and symmetric  CHEST/LUNG: Clear to percussion bilaterally; No rales, rhonchi, wheezing, or rubs  HEART: Regular rate and rhythm; No murmurs, rubs, or gallops  ABDOMEN: Soft, Nontender, Nondistended; Bowel sounds present  EXTREMITIES:  2+ Peripheral Pulses, No clubbing, cyanosis, or edema  SKIN: No rashes or lesions    LABS:                        9.4    13.75 )-----------( 61       ( 31 Oct 2018 06:53 )             28.8     10-31    159<H>  |  122<H>  |  69<H>  ----------------------------<  96  3.2<L>   |  27  |  4.80<H>    Ca    7.9<L>      31 Oct 2018 06:53  Phos  4.2     10-31  Mg     2.1     10-31    TPro  6.1  /  Alb  2.3<L>  /  TBili  0.7  /  DBili  x   /  AST  28  /  ALT  21  /  AlkPhos  109  10-30    PT/INR - ( 31 Oct 2018 06:53 )   PT: 22.1 sec;   INR: 1.90 ratio         PTT - ( 31 Oct 2018 06:53 )  PTT:52.6 sec          RADIOLOGY & ADDITIONAL TESTS:

## 2018-11-01 NOTE — PROGRESS NOTE ADULT - PROBLEM SELECTOR PLAN 1
on emp ABX, ng tube in place, I and O  oral and skin care  cvs regimen, on LASIX  monitor mental status  serial labs  serial PE  supportive medical regimen  pt is Dying, pt is DNR  family is awaiting for more members to arrive from [place]  will follow and monitor  assist with all ADL

## 2018-11-01 NOTE — PROGRESS NOTE ADULT - SUBJECTIVE AND OBJECTIVE BOX
Patient is a 89y old  Female who presents with a chief complaint of weakness, decreased PO intake (01 Nov 2018 11:50)      HPI:  Hx obtained from son and other family members as pt was altered.  Pt is a 88 yo F w/ PMH of DM type 2, HTN, COPD on 2.5L home O2, diastolic HFpEF (last echo 10/2017) presented to ED p/w ams, weakness, dysuria, dec po x past ~ 3 days. Of note Pt was recently admitted for ams, similar to current presentation and was d/c 6 days ago. On previous admission Pt was diagnosed and treated for UTI. Pt was initially doing well, but then developed burning with urination, AMS, and watery diarrhea x 3 days ago. Family reports that pt was given Bactrim for unresolved UTI, but that she was only able to take 2 doses, as she could not swallow the 3rd dose this AM. Pt has poor PO intake and EMS came to her house last night to give her IVF due to dehydration. Of note patient is normally bedbound and requires 24/7 home health aid. Pt is incontinent at baseline and wears diapers. Denies any Fevers, but endorses chills, and increased fatigue. Denies any SOB, CP, N/V. Family reports pt normally is able to converse without issue, but has been incoherent for ~ the past 3 days     admitted with   a ms  sec to metabolic encephalopathy multifactorial prem on ckd3 now with hepatorenal syndrome , cirrhosis and ascites  unknown etiology high ammonia  , acute on chr hypercarbic resp failure   .   pt seen and examine today -   lethargic  , no fever , npo  ng tube for meds  on iv fluid .       INTERVAL HPI/OVERNIGHT EVENTS:  T(C): 36.2 (11-01-18 @ 13:45), Max: 36.6 (11-01-18 @ 06:08)  HR: 84 (11-01-18 @ 13:45) (68 - 85)  BP: 116/64 (11-01-18 @ 13:45) (116/64 - 136/73)  RR: 17 (11-01-18 @ 13:45) (16 - 18)  SpO2: 95% (11-01-18 @ 13:45) (92% - 100%)  Wt(kg): --  I&O's Summary    31 Oct 2018 07:01  - 01 Nov 2018 07:00  --------------------------------------------------------  IN: 500 mL / OUT: 500 mL / NET: 0 mL    REVIEW OF SYSTEMS:   unable  to obtain   as lethargic     PHYSICAL EXAM:  GENERAL:  + resp distress , lethargic   HEAD:  Atraumatic, Normocephalic  EYES: EOMI, PERRLA, conjunctiva and sclera clear  ENMT:  moist   mucous membranes,  No lesions  NECK: Supple,   NERVOUS SYSTEM:  Alert & Oriented X0, open eye on deep pain full stimuli   CHEST/LUNG: percussion   bl lower  lobe decrease    ; No rales, rhonchi, wheezing,   HEART: Regular rate and rhythm; No murmurs, no tachy   ABDOMEN: Soft,  Nondistended; Bowel sounds present / no tenderness   EXTREMITIES:  2+ Peripheral Pulses, No clubbing, cyanosis,  1 pulse pitting   edema bl low ext   gu mendez   SKIN: No rashes or lesions/stage 3buttock  clean         MEDICATIONS  (STANDING):  ALBUTerol/ipratropium for Nebulization 3 milliLiter(s) Nebulizer every 6 hours  artificial  tears Solution 1 Drop(s) Both EYES two times a day  dextrose 5%. 1000 milliLiter(s) (50 mL/Hr) IV Continuous <Continuous>  dextrose 5%. 1000 milliLiter(s) (30 mL/Hr) IV Continuous <Continuous>  dextrose 50% Injectable 12.5 Gram(s) IV Push once  dextrose 50% Injectable 25 Gram(s) IV Push once  dextrose 50% Injectable 25 Gram(s) IV Push once  epoetin margie Injectable 58278 Unit(s) SubCutaneous <User Schedule>  ferrous    sulfate 325 milliGRAM(s) Oral daily  furosemide   Injectable 20 milliGRAM(s) IV Push two times a day  guaiFENesin  milliGRAM(s) Oral every 12 hours  heparin  Injectable 5000 Unit(s) SubCutaneous every 12 hours  lactulose Syrup 45 Gram(s) Oral three times a day  metoclopramide Injectable 5 milliGRAM(s) IV Push every 8 hours  midodrine 10 milliGRAM(s) Oral every 8 hours  nystatin Powder 1 Application(s) Topical every 8 hours  octreotide  Injectable 100 MICROGram(s) SubCutaneous every 8 hours  piperacillin/tazobactam IVPB. 3.375 Gram(s) IV Intermittent every 12 hours  rifaximin 550 milliGRAM(s) Oral two times a day    MEDICATIONS  (PRN):  dextrose 40% Gel 15 Gram(s) Oral once PRN Blood Glucose LESS THAN 70 milliGRAM(s)/deciliter  glucagon  Injectable 1 milliGRAM(s) IntraMuscular once PRN Glucose LESS THAN 70 milligrams/deciliter      LABS:                        9.2    14.26 )-----------( 60       ( 01 Nov 2018 10:42 )             29.1     11-01    161<HH>  |  126<H>  |  67<H>  ----------------------------<  73  3.4<L>   |  26  |  5.10<H>    Ca    8.2<L>      01 Nov 2018 10:42  Phos  4.2     10-31  Mg     2.1     10-31      PT/INR - ( 01 Nov 2018 10:42 )   PT: 25.9 sec;   INR: 2.24 ratio         PTT - ( 01 Nov 2018 10:42 )  PTT:70.3 sec    CAPILLARY BLOOD GLUCOSE      POCT Blood Glucose.: 95 mg/dL (01 Nov 2018 11:30)  POCT Blood Glucose.: 93 mg/dL (01 Nov 2018 07:48)  POCT Blood Glucose.: 113 mg/dL (31 Oct 2018 21:09)  POCT Blood Glucose.: 108 mg/dL (31 Oct 2018 17:09)              RADIOLOGY & ADDITIONAL TESTS:    Imaging Personally Reviewed:   no new test     Advance Directives:   dnr / dni     Palliative Care:    hospisce appropraite

## 2018-11-02 VITALS — RESPIRATION RATE: 17 BRPM | OXYGEN SATURATION: 97 %

## 2018-11-02 DIAGNOSIS — Z51.5 ENCOUNTER FOR PALLIATIVE CARE: ICD-10-CM

## 2018-11-02 LAB — ANA TITR SER: NEGATIVE — SIGNIFICANT CHANGE UP

## 2018-11-02 PROCEDURE — 87150 DNA/RNA AMPLIFIED PROBE: CPT

## 2018-11-02 PROCEDURE — 84540 ASSAY OF URINE/UREA-N: CPT

## 2018-11-02 PROCEDURE — 83935 ASSAY OF URINE OSMOLALITY: CPT

## 2018-11-02 PROCEDURE — 86923 COMPATIBILITY TEST ELECTRIC: CPT

## 2018-11-02 PROCEDURE — 76705 ECHO EXAM OF ABDOMEN: CPT

## 2018-11-02 PROCEDURE — 82803 BLOOD GASES ANY COMBINATION: CPT

## 2018-11-02 PROCEDURE — 36600 WITHDRAWAL OF ARTERIAL BLOOD: CPT

## 2018-11-02 PROCEDURE — 87086 URINE CULTURE/COLONY COUNT: CPT

## 2018-11-02 PROCEDURE — 36430 TRANSFUSION BLD/BLD COMPNT: CPT

## 2018-11-02 PROCEDURE — 86038 ANTINUCLEAR ANTIBODIES: CPT

## 2018-11-02 PROCEDURE — 84300 ASSAY OF URINE SODIUM: CPT

## 2018-11-02 PROCEDURE — 86334 IMMUNOFIX E-PHORESIS SERUM: CPT

## 2018-11-02 PROCEDURE — 83540 ASSAY OF IRON: CPT

## 2018-11-02 PROCEDURE — 36415 COLL VENOUS BLD VENIPUNCTURE: CPT

## 2018-11-02 PROCEDURE — 86900 BLOOD TYPING SEROLOGIC ABO: CPT

## 2018-11-02 PROCEDURE — 84484 ASSAY OF TROPONIN QUANT: CPT

## 2018-11-02 PROCEDURE — 83010 ASSAY OF HAPTOGLOBIN QUANT: CPT

## 2018-11-02 PROCEDURE — 82962 GLUCOSE BLOOD TEST: CPT

## 2018-11-02 PROCEDURE — 99233 SBSQ HOSP IP/OBS HIGH 50: CPT

## 2018-11-02 PROCEDURE — 80053 COMPREHEN METABOLIC PANEL: CPT

## 2018-11-02 PROCEDURE — 74176 CT ABD & PELVIS W/O CONTRAST: CPT

## 2018-11-02 PROCEDURE — 84156 ASSAY OF PROTEIN URINE: CPT

## 2018-11-02 PROCEDURE — 83690 ASSAY OF LIPASE: CPT

## 2018-11-02 PROCEDURE — 82248 BILIRUBIN DIRECT: CPT

## 2018-11-02 PROCEDURE — 83605 ASSAY OF LACTIC ACID: CPT

## 2018-11-02 PROCEDURE — 81001 URINALYSIS AUTO W/SCOPE: CPT

## 2018-11-02 PROCEDURE — 80074 ACUTE HEPATITIS PANEL: CPT

## 2018-11-02 PROCEDURE — 86901 BLOOD TYPING SEROLOGIC RH(D): CPT

## 2018-11-02 PROCEDURE — 86022 PLATELET ANTIBODIES: CPT

## 2018-11-02 PROCEDURE — 94640 AIRWAY INHALATION TREATMENT: CPT

## 2018-11-02 PROCEDURE — 86255 FLUORESCENT ANTIBODY SCREEN: CPT

## 2018-11-02 PROCEDURE — 85610 PROTHROMBIN TIME: CPT

## 2018-11-02 PROCEDURE — P9016: CPT

## 2018-11-02 PROCEDURE — 83735 ASSAY OF MAGNESIUM: CPT

## 2018-11-02 PROCEDURE — 85730 THROMBOPLASTIN TIME PARTIAL: CPT

## 2018-11-02 PROCEDURE — 80048 BASIC METABOLIC PNL TOTAL CA: CPT

## 2018-11-02 PROCEDURE — 76775 US EXAM ABDO BACK WALL LIM: CPT

## 2018-11-02 PROCEDURE — P9047: CPT

## 2018-11-02 PROCEDURE — 82105 ALPHA-FETOPROTEIN SERUM: CPT

## 2018-11-02 PROCEDURE — 83036 HEMOGLOBIN GLYCOSYLATED A1C: CPT

## 2018-11-02 PROCEDURE — 86708 HEPATITIS A ANTIBODY: CPT

## 2018-11-02 PROCEDURE — 81332 SERPINA1 GENE: CPT

## 2018-11-02 PROCEDURE — 93005 ELECTROCARDIOGRAM TRACING: CPT

## 2018-11-02 PROCEDURE — 86850 RBC ANTIBODY SCREEN: CPT

## 2018-11-02 PROCEDURE — 87040 BLOOD CULTURE FOR BACTERIA: CPT

## 2018-11-02 PROCEDURE — 86376 MICROSOMAL ANTIBODY EACH: CPT

## 2018-11-02 PROCEDURE — 87340 HEPATITIS B SURFACE AG IA: CPT

## 2018-11-02 PROCEDURE — 84443 ASSAY THYROID STIM HORMONE: CPT

## 2018-11-02 PROCEDURE — 82436 ASSAY OF URINE CHLORIDE: CPT

## 2018-11-02 PROCEDURE — 82553 CREATINE MB FRACTION: CPT

## 2018-11-02 PROCEDURE — 82728 ASSAY OF FERRITIN: CPT

## 2018-11-02 PROCEDURE — 82746 ASSAY OF FOLIC ACID SERUM: CPT

## 2018-11-02 PROCEDURE — 82607 VITAMIN B-12: CPT

## 2018-11-02 PROCEDURE — 94760 N-INVAS EAR/PLS OXIMETRY 1: CPT

## 2018-11-02 PROCEDURE — 99285 EMERGENCY DEPT VISIT HI MDM: CPT | Mod: 25

## 2018-11-02 PROCEDURE — 84165 PROTEIN E-PHORESIS SERUM: CPT

## 2018-11-02 PROCEDURE — 83550 IRON BINDING TEST: CPT

## 2018-11-02 PROCEDURE — 86335 IMMUNFIX E-PHORSIS/URINE/CSF: CPT

## 2018-11-02 PROCEDURE — 85384 FIBRINOGEN ACTIVITY: CPT

## 2018-11-02 PROCEDURE — 82140 ASSAY OF AMMONIA: CPT

## 2018-11-02 PROCEDURE — 85045 AUTOMATED RETICULOCYTE COUNT: CPT

## 2018-11-02 PROCEDURE — 82272 OCCULT BLD FECES 1-3 TESTS: CPT

## 2018-11-02 PROCEDURE — 86709 HEPATITIS A IGM ANTIBODY: CPT

## 2018-11-02 PROCEDURE — 96365 THER/PROPH/DIAG IV INF INIT: CPT

## 2018-11-02 PROCEDURE — 84145 PROCALCITONIN (PCT): CPT

## 2018-11-02 PROCEDURE — 94660 CPAP INITIATION&MGMT: CPT

## 2018-11-02 PROCEDURE — 82550 ASSAY OF CK (CPK): CPT

## 2018-11-02 PROCEDURE — 82390 ASSAY OF CERULOPLASMIN: CPT

## 2018-11-02 PROCEDURE — 84155 ASSAY OF PROTEIN SERUM: CPT

## 2018-11-02 PROCEDURE — 86381 MITOCHONDRIAL ANTIBODY EACH: CPT

## 2018-11-02 PROCEDURE — 83930 ASSAY OF BLOOD OSMOLALITY: CPT

## 2018-11-02 PROCEDURE — 70450 CT HEAD/BRAIN W/O DYE: CPT

## 2018-11-02 PROCEDURE — 87507 IADNA-DNA/RNA PROBE TQ 12-25: CPT

## 2018-11-02 PROCEDURE — 82570 ASSAY OF URINE CREATININE: CPT

## 2018-11-02 PROCEDURE — 84100 ASSAY OF PHOSPHORUS: CPT

## 2018-11-02 PROCEDURE — 85027 COMPLETE CBC AUTOMATED: CPT

## 2018-11-02 PROCEDURE — 83615 LACTATE (LD) (LDH) ENZYME: CPT

## 2018-11-02 PROCEDURE — 71250 CT THORAX DX C-: CPT

## 2018-11-02 PROCEDURE — 71045 X-RAY EXAM CHEST 1 VIEW: CPT

## 2018-11-02 RX ORDER — ATROPINE SULFATE 1 %
2 DROPS OPHTHALMIC (EYE)
Qty: 0 | Refills: 0 | Status: DISCONTINUED | OUTPATIENT
Start: 2018-11-02 | End: 2018-11-03

## 2018-11-02 RX ORDER — HYDROMORPHONE HYDROCHLORIDE 2 MG/ML
1 INJECTION INTRAMUSCULAR; INTRAVENOUS; SUBCUTANEOUS
Qty: 0 | Refills: 0 | Status: DISCONTINUED | OUTPATIENT
Start: 2018-11-02 | End: 2018-11-03

## 2018-11-02 RX ORDER — ATROPINE SULFATE 1 %
2 DROPS OPHTHALMIC (EYE)
Qty: 0 | Refills: 0 | COMMUNITY
Start: 2018-11-02

## 2018-11-02 RX ORDER — NYSTATIN CREAM 100000 [USP'U]/G
1 CREAM TOPICAL
Qty: 0 | Refills: 0 | COMMUNITY
Start: 2018-11-02

## 2018-11-02 RX ADMIN — NYSTATIN CREAM 1 APPLICATION(S): 100000 CREAM TOPICAL at 00:25

## 2018-11-02 RX ADMIN — HYDROMORPHONE HYDROCHLORIDE 1 MILLIGRAM(S): 2 INJECTION INTRAMUSCULAR; INTRAVENOUS; SUBCUTANEOUS at 17:56

## 2018-11-02 RX ADMIN — Medication 1 DROP(S): at 05:57

## 2018-11-02 RX ADMIN — Medication 1 DROP(S): at 17:57

## 2018-11-02 RX ADMIN — NYSTATIN CREAM 1 APPLICATION(S): 100000 CREAM TOPICAL at 05:57

## 2018-11-02 RX ADMIN — NYSTATIN CREAM 1 APPLICATION(S): 100000 CREAM TOPICAL at 14:30

## 2018-11-02 RX ADMIN — HYDROMORPHONE HYDROCHLORIDE 1 MILLIGRAM(S): 2 INJECTION INTRAMUSCULAR; INTRAVENOUS; SUBCUTANEOUS at 18:15

## 2018-11-02 RX ADMIN — NYSTATIN CREAM 1 APPLICATION(S): 100000 CREAM TOPICAL at 21:19

## 2018-11-02 NOTE — PROGRESS NOTE ADULT - ASSESSMENT
Patient is an 89 years old woman with sepsis, hypotension, respiratory failure and renal failure. Patient was found to have also Liver cirrhosis and hepatic encephalopathy. Hematology was consulted for anemia.  patient is on comfort care as per family now

## 2018-11-02 NOTE — PROGRESS NOTE ADULT - PROBLEM SELECTOR PLAN 6
BP stable.   - hold losartan in setting of SAMIA. monitor Cr.
was hypotensive shock   now BP stable.   - hold losartan  with  hepato renal failure .
was hypotensive shock   now BP stable.   - hold losartan  with  hepato renal failure .
was hypotensive shock   now BP stable.   - hold losartan in setting of SAMIA. monitor Cr.
was hypotensive shock   now BP stable.   - hold losartan in setting of SAMIA. monitor Cr.
on comfort care
BP stable.   - hold losartan in setting of SAMIA. monitor Cr.
was hypotensive shock   now BP stable.   - hold losartan  with  hepato renal failure .

## 2018-11-02 NOTE — PROGRESS NOTE ADULT - PROBLEM SELECTOR PLAN 9
- bedrest, turn q 2.  wound care consult called on 10/24, recalled 10/26.  prealbumin  barrier cream
on clinical exam by nurse stage 3 now - bedrest, turn q 2.  prealbumin  barrier cream
- bedrest, turn q 2.  wound care consult  prealbumin  barrier cream
- bedrest, turn q 2.  wound care consult  prealbumin  barrier cream
- bedrest, turn q 2.  wound care consult called on 10/24.  prealbumin  barrier cream
on clinical exam by nurse stage 3 now - bedrest, turn q 2.  prealbumin  barrier cream
on clinical exam by nurse stage 3 now - bedrest, turn q 2.  wound care consult called on 10/24, recalled 10/26.  prealbumin  barrier cream
on clinical exam by nurse stage 3 now - bedrest, turn q 2.  prealbumin  barrier cream

## 2018-11-02 NOTE — PROGRESS NOTE ADULT - PROBLEM SELECTOR PLAN 4
- EF 65% on echo 10/2017  - Hold furosemide in the setting of SAMIA  - will monitor for signs of fluid overload  - hold ARB due to SAMIA.
multifactorial  no s/s overt gib per nursing  fobt neg  trend h/h, transfuse prn  hold ac asa nsaids  cont ppi  heme/onc following  supportive care
multifactorial  no s/s overt gib per nursing  fobt neg  trend h/h, transfuse prn  hold ac asa nsaids  cont ppi  heme/onc following  supportive care
multifactorial  no s/s overt gib per nursing  fobt neg  trend h/h, transfuse prn  hold ac asa nsaids  cont ppi  heme/onc following, on procrit  supportive care
multifactorial  no s/s overt gib per nursing  fobt neg  trend h/h, transfuse prn  hold ac asa nsaids  heme/onc following, on procrit  supportive care
- EF 65% on echo 10/2017  - Hold furosemide in the setting of SAMIA  - will monitor for signs of fluid overload  - hold ARB due to SAMIA.
- EF 65% on echo 10/2017  - Hold furosemide in the setting of SAMIA  - will monitor for signs of fluid overload  - hold ARB due to SAMIA.
- EF 65% on echo 10/2017  - Hold furosemide in the setting of SAMIA/atn on ckd3   - will monitor for signs of fluid overload  - hold ARB due to SAMIA.
- EF 65% on echo 10/2017 chr diastolic chf.  - Hold furosemide in the setting of SAMIA on ckd3 now hepatorenal syndrome sec liver cirrhosis  unknown etiology   .
- EF 65% on echo 10/2017 chr diastolic chf.  - Hold furosemide in the setting of SAMIA on ckd3 now hepatorenal syndrome sec liver cirrhosis  unknown etiology .   - will monitor for signs of fluid overload  - hold ARB due to SAMIA.
- EF 65% on echo 10/2017 chr diastolic chf.  - Hold furosemide in the setting of SAMIA on ckd3 now hepatorenal syndrome sec liver cirrhosis .   - will monitor for signs of fluid overload  - hold ARB due to SAMIA.
- EF 65% on echo 10/2017 chr diastolic chf. started on lasix by renal for anasarca    SAMIA on ckd3 now hepatorenal syndrome sec liver cirrhosis  unknown etiology   .
am labs pending  multifactorial  no s/s overt gib per nursing  fobt neg  trend h/h, transfuse prn  hold ac asa nsaids  heme/onc following, on procrit  supportive care
overnight events noted, suspect 2/2 traumatic ngt insertion  no further s/s overt gib per nursing  fobt neg  trend h/h, transfuse prn  hold ac asa nsaids  cont ppi  heme/onc following, w/u in progress  will follow
see above
on    comfort care
- EF 65% on echo 10/2017  - Hold furosemide in the setting of SAMIA  - will monitor for signs of fluid overload  - hold ARB due to SAMIA.
- EF 65% on echo 10/2017 chr diastolic chf. started on lasix by renal for anasarca    SAMIA on ckd3 now hepatorenal syndrome sec liver cirrhosis  unknown etiology   .

## 2018-11-02 NOTE — PROGRESS NOTE ADULT - PROBLEM SELECTOR PLAN 1
end of life care  son - physician at the bedside, wants NG tube to stay in for now  will change Morphine to Dilaudid in pt with renal failure  prognosis very poor  atropine SL prn for oral secretions  artificial tears PRN   DNR DNI  supportive palliative measures, candidate for inpatient hospice

## 2018-11-02 NOTE — PROGRESS NOTE ADULT - PROBLEM SELECTOR PROBLEM 9
Sacral decubitus ulcer, stage II

## 2018-11-02 NOTE — PROGRESS NOTE ADULT - PROBLEM SELECTOR PROBLEM 4
Anemia
Congestive heart failure
Anemia
Congestive heart failure

## 2018-11-02 NOTE — PROGRESS NOTE ADULT - PROBLEM SELECTOR PLAN 5
-Hold home medications:   - low dose insuline sliding scale  -Hypoglycemia Protocol, ERICA, Mike AC&HS.  -Follow up HbA1c.
-Hold home medications:   -Stop insulin coverage and accuchecks. A1c is 5.1
type2  controlled -Hold home medications:   -Stop insulin coverage and accuchecks. A1c is 5.1
type2  controlled -Hold home medications:   -Stop insulin coverage and accuchecks. A1c is 5.1
type2  controlled -Hold home medications:   -Stop insulin coverage and accuchecks. A1c is 5.1. npo.
on comfort care
-Hold home medications:   -Stop insulin coverage and accuchecks. A1c is 5.1
type2  controlled -Hold home medications:   -Stop insulin coverage and accuchecks. A1c is 5.1. npo.

## 2018-11-02 NOTE — PROGRESS NOTE ADULT - PROBLEM SELECTOR PLAN 7
Patient had >400 cc on bladder scan, yesterday but ended up voiding.  Seen by urology. No need for mendez catheter at this time.  Repeat bladder scan 1400 today.
Patient had >400 cc on bladder scan.   Nursing unable to place mendez due to uterine prolapse.  Will ask urology to see patient.
Seen by urology. No need for mendez catheter at this time.  External catheter in place to monitor urine output.
recurrent / mendez placed strict is and os
recurrent / mendez placed strict is and os , oliguric.
foely on comfort care .
Seen by urology. No need for mendez catheter at this time.  External catheter in place to monitor urine output.
recurrent / mendez placed strict is and os , oliguric.

## 2018-11-02 NOTE — PROGRESS NOTE ADULT - SUBJECTIVE AND OBJECTIVE BOX
Patient is a 89y old  Female who presents with a chief complaint of weakness, decreased PO intake (02 Nov 2018 13:03)      HPI:  Hx obtained from son and other family members as pt was altered.  Pt is a 88 yo F w/ PMH of DM type 2, HTN, COPD on 2.5L home O2, diastolic HFpEF (last echo 10/2017) presented to ED p/w ams, weakness, dysuria, dec po x past ~ 3 days. Of note Pt was recently admitted for ams, similar to current presentation and was d/c 6 days ago  pt was on comfort care y ago . recently  On previous admission Pt was diagnosed and treated for UTI. Pt was initially doing well, but then developed burning with urination, AMS, and watery diarrhea x 3 days ago. Family reports that pt was given Bactrim for unresolved UTI, but that she was only able to take 2 doses, as she could not swallow the 3rd dose this AM. Pt has poor PO intake and EMS came to her house last night to give her IVF due to dehydration. Of note patient is normally bedbound and requires 24/7 home health aid. Pt is incontinent at baseline and wears diapers. Denies any Fevers, but endorses chills, and increased fatigue. Denies any SOB, CP, N/V. Family reports pt normally is able to converse without issue, but has been incoherent for ~ the past 3 days     admitted with   a ms  sec to metabolic encephalopathy multifactorial prem on ckd3   uremia  with hepatorenal syndrome , cirrhosis and ascites  unknown etiology high ammonia  , acute on chr hypercarbic resp failure    .   pt seen and examine today -     on comfort care , no resp distress  , no agitation , ng tube intact , mendez intact  .         INTERVAL HPI/OVERNIGHT EVENTS:  T(C): 37.1 (11-02-18 @ 05:50), Max: 37.1 (11-02-18 @ 05:50)  HR: 79 (11-01-18 @ 20:28) (79 - 79)  BP: 133/68 (11-01-18 @ 20:28) (133/68 - 133/68)  RR: 17 (11-01-18 @ 20:28) (17 - 17)  SpO2: 97% (11-01-18 @ 20:28) (97% - 97%)  Wt(kg): --  I&O's Summary    01 Nov 2018 07:01  -  02 Nov 2018 07:00  --------------------------------------------------------  IN: 0 mL / OUT: 150 mL / NET: -150 mL    REVIEW OF SYSTEMS:   unable  to obtain   obtunded     PHYSICAL EXAM:  GENERAL:  + resp distress ,  HEAD:  Atraumatic, Normocephalic  EYES: EOMI, PERRLA, conjunctiva and sclera clear  ENMT:  dry  mucous membranes,  No lesions, ng tube   NECK: Supple,   NERVOUS SYSTEM:  Alert & Oriented X0,   no response on  deep pain full stimuli   CHEST/LUNG: percussion   bl lower  lobe decrease    ; No rales, rhonchi, wheezing,   HEART: Regular rate and rhythm; No murmurs, no tachy   ABDOMEN: Soft,  Nondistended; Bowel sounds present / no tenderness   EXTREMITIES:  2+ Peripheral Pulses, No clubbing, cyanosis,  1 pulse pitting   edema bl low ext   gu mendez   SKIN: No rashes or lesions/stage 3buttock  clean           MEDICATIONS  (STANDING):  artificial  tears Solution 1 Drop(s) Both EYES two times a day  nystatin Powder 1 Application(s) Topical every 8 hours  sodium chloride 0.45%. 1000 milliLiter(s) (30 mL/Hr) IV Continuous <Continuous>    MEDICATIONS  (PRN):  atropine 1% Ophthalmic Solution for SubLingual Use 2 Drop(s) SubLingual four times a day PRN oral secretion  HYDROmorphone  Injectable 1 milliGRAM(s) IV Push every 15 minutes PRN pain, distress, comfort measures, palliative management      LABS:                        9.2    14.26 )-----------( 60       ( 01 Nov 2018 10:42 )             29.1     11-01    161<HH>  |  126<H>  |  67<H>  ----------------------------<  73  3.4<L>   |  26  |  5.10<H>    Ca    8.2<L>      01 Nov 2018 10:42      PT/INR - ( 01 Nov 2018 10:42 )   PT: 25.9 sec;   INR: 2.24 ratio         PTT - ( 01 Nov 2018 10:42 )  PTT:70.3 sec    CAPILLARY BLOOD GLUCOSE      POCT Blood Glucose.: 109 mg/dL (01 Nov 2018 21:15)  POCT Blood Glucose.: 109 mg/dL (01 Nov 2018 17:02)              RADIOLOGY & ADDITIONAL TESTS:    Imaging Personally Reviewed:   no new test / no lab     Advance Directives:    dnr /dni   Palliative Care:  comfort care

## 2018-11-02 NOTE — PROGRESS NOTE ADULT - SUBJECTIVE AND OBJECTIVE BOX
Date/Time Patient Seen:  		  Referring MD:   Data Reviewed	       Patient is a 89y old  Female who presents with a chief complaint of weakness, decreased PO intake (01 Nov 2018 15:43)    in bed  seen and examined  son at the bedside    Subjective/HPI     PAST MEDICAL & SURGICAL HISTORY:  Coronary artery disease with angina pectoris, unspecified vessel or lesion type, unspecified whether native or transplanted heart  Congestive heart failure  Asthma  Diabetes mellitus  HTN (hypertension)  No significant past surgical history        Medication list         MEDICATIONS  (STANDING):  artificial  tears Solution 1 Drop(s) Both EYES two times a day  nystatin Powder 1 Application(s) Topical every 8 hours  sodium chloride 0.45%. 1000 milliLiter(s) (30 mL/Hr) IV Continuous <Continuous>    MEDICATIONS  (PRN):  atropine 1% Ophthalmic Solution for SubLingual Use 2 Drop(s) SubLingual four times a day PRN oral secretion  HYDROmorphone  Injectable 1 milliGRAM(s) IV Push every 15 minutes PRN pain, distress, comfort measures, palliative management         Vitals log        ICU Vital Signs Last 24 Hrs  T(C): 37.1 (02 Nov 2018 05:50), Max: 37.1 (02 Nov 2018 05:50)  T(F): 98.8 (02 Nov 2018 05:50), Max: 98.8 (02 Nov 2018 05:50)  HR: 79 (01 Nov 2018 20:28) (68 - 84)  BP: 133/68 (01 Nov 2018 20:28) (116/64 - 133/68)  BP(mean): --  ABP: --  ABP(mean): --  RR: 17 (01 Nov 2018 20:28) (17 - 17)  SpO2: 97% (01 Nov 2018 20:28) (95% - 98%)           Input and Output:  I&O's Detail    31 Oct 2018 07:01  -  01 Nov 2018 07:00  --------------------------------------------------------  IN:    Solution: 400 mL    Solution: 100 mL  Total IN: 500 mL    OUT:    Ureteral Catheter: 500 mL  Total OUT: 500 mL    Total NET: 0 mL      01 Nov 2018 07:01  -  02 Nov 2018 06:58  --------------------------------------------------------  IN:  Total IN: 0 mL    OUT:    Voided: 150 mL  Total OUT: 150 mL    Total NET: -150 mL          Lab Data                        9.2    14.26 )-----------( 60       ( 01 Nov 2018 10:42 )             29.1     11-01    161<HH>  |  126<H>  |  67<H>  ----------------------------<  73  3.4<L>   |  26  |  5.10<H>    Ca    8.2<L>      01 Nov 2018 10:42        CARDIAC MARKERS ( 31 Oct 2018 09:10 )  .225 ng/mL / x     / 20 U/L / x     / 1.3 ng/mL        Review of Systems	      Objective     Physical Examination    heart s1s2  lung dec BS      Pertinent Lab findings & Imaging      Samantha:  NO   Adequate UO     I&O's Detail    31 Oct 2018 07:01  -  01 Nov 2018 07:00  --------------------------------------------------------  IN:    Solution: 400 mL    Solution: 100 mL  Total IN: 500 mL    OUT:    Ureteral Catheter: 500 mL  Total OUT: 500 mL    Total NET: 0 mL      01 Nov 2018 07:01  -  02 Nov 2018 06:58  --------------------------------------------------------  IN:  Total IN: 0 mL    OUT:    Voided: 150 mL  Total OUT: 150 mL    Total NET: -150 mL               Discussed with:     Cultures:	        Radiology

## 2018-11-02 NOTE — PROGRESS NOTE ADULT - PROBLEM SELECTOR PLAN 10
IMPROVE VTE Individual Risk Assessment        RISK                                                          Points  [  ] Previous VTE                                                3  [  ] Thrombophilia                                             2  [ x ] Lower limb paralysis                                   2        (unable to hold up >15 seconds)    [  ] Current Cancer                                            2         (within 6 months)  [ x ] Immobilization > 24 hrs                              1  [  ] ICU/CCU stay > 24 hours                            1  [ x ] Age > 60                                                    1  IMPROVE VTE Score _____4____  Heparin for DVT prophylaxis.
IMPROVE VTE Individual Risk Assessment        RISK                                                          Points  [  ] Previous VTE                                                3  [  ] Thrombophilia                                             2  [ x ] Lower limb paralysis                                   2        (unable to hold up >15 seconds)    [  ] Current Cancer                                            2         (within 6 months)  [ x ] Immobilization > 24 hrs                              1  [  ] ICU/CCU stay > 24 hours                            1  [ x ] Age > 60                                                    1  IMPROVE VTE Score _____4____  Heparin for DVT prophylaxis. fu cbc as low platlet.
IMPROVE VTE Individual Risk Assessment        RISK                                                          Points  [  ] Previous VTE                                                3  [  ] Thrombophilia                                             2  [ x ] Lower limb paralysis                                   2        (unable to hold up >15 seconds)    [  ] Current Cancer                                            2         (within 6 months)  [ x ] Immobilization > 24 hrs                              1  [  ] ICU/CCU stay > 24 hours                            1  [ x ] Age > 60                                                    1  IMPROVE VTE Score _____4____  Heparin for DVT prophylaxis.
IMPROVE VTE Individual Risk Assessment        RISK                                                          Points  [  ] Previous VTE                                                3  [  ] Thrombophilia                                             2  [ x ] Lower limb paralysis                                   2        (unable to hold up >15 seconds)    [  ] Current Cancer                                            2         (within 6 months)  [ x ] Immobilization > 24 hrs                              1  [  ] ICU/CCU stay > 24 hours                            1  [ x ] Age > 60                                                    1  IMPROVE VTE Score _____4____  Heparin for DVT prophylaxis. fu cbc as low platlet.
IMPROVE VTE Individual Risk Assessment        RISK                                                          Points  [  ] Previous VTE                                                3  [  ] Thrombophilia                                             2  [ x ] Lower limb paralysis                                   2        (unable to hold up >15 seconds)    [  ] Current Cancer                                            2         (within 6 months)  [ x ] Immobilization > 24 hrs                              1  [  ] ICU/CCU stay > 24 hours                            1  [ x ] Age > 60                                                    1  IMPROVE VTE Score _____4____  Heparin for DVT prophylaxis. fu cbc as low platlet.
on comfort care

## 2018-11-02 NOTE — PROGRESS NOTE ADULT - SUBJECTIVE AND OBJECTIVE BOX
INTERVAL HPI/OVERNIGHT EVENTS:  pt seen and examined  lethargic in bed  per overnight rn pt now on comfort measures    MEDICATIONS  (STANDING):  artificial  tears Solution 1 Drop(s) Both EYES two times a day  nystatin Powder 1 Application(s) Topical every 8 hours  sodium chloride 0.45%. 1000 milliLiter(s) (30 mL/Hr) IV Continuous <Continuous>    MEDICATIONS  (PRN):  atropine 1% Ophthalmic Solution for SubLingual Use 2 Drop(s) SubLingual four times a day PRN oral secretion  HYDROmorphone  Injectable 1 milliGRAM(s) IV Push every 15 minutes PRN pain, distress, comfort measures, palliative management      Allergies    No Known Allergies    Intolerances        Review of Systems:    unable to obtain      Vital Signs Last 24 Hrs  T(C): 37.1 (02 Nov 2018 05:50), Max: 37.1 (02 Nov 2018 05:50)  T(F): 98.8 (02 Nov 2018 05:50), Max: 98.8 (02 Nov 2018 05:50)  HR: 79 (01 Nov 2018 20:28) (72 - 84)  BP: 133/68 (01 Nov 2018 20:28) (116/64 - 133/68)  BP(mean): --  RR: 17 (01 Nov 2018 20:28) (17 - 17)  SpO2: 97% (01 Nov 2018 20:28) (95% - 97%)    PHYSICAL EXAM:    Constitutional: NAD, lying in bed +ngt   HEENT: ncat  Neck: No LAD  Respiratory: dec bs  Cardiovascular: S1 and S2  Gastrointestinal: soft appears nt +dt  Extremities: No peripheral edema  Neurological: lethargic  Skin: No rashes        LABS:                        9.2    14.26 )-----------( 60       ( 01 Nov 2018 10:42 )             29.1     11-01    161<HH>  |  126<H>  |  67<H>  ----------------------------<  73  3.4<L>   |  26  |  5.10<H>    Ca    8.2<L>      01 Nov 2018 10:42      PT/INR - ( 01 Nov 2018 10:42 )   PT: 25.9 sec;   INR: 2.24 ratio         PTT - ( 01 Nov 2018 10:42 )  PTT:70.3 sec      RADIOLOGY & ADDITIONAL TESTS:

## 2018-11-02 NOTE — PROGRESS NOTE ADULT - PROBLEM SELECTOR PROBLEM 2
Dehydration
Other ascites
Dehydration
Other ascites
Other ascites
Dehydration
Anemia
Thrombocytopenia
Anemia
Other ascites
Dehydration

## 2018-11-02 NOTE — PROGRESS NOTE ADULT - ATTENDING COMMENTS
pt seen and examine see above   -89  f advance  age  recurrent hospitalization   DM type 2, HTN, COPD on 2.5L home O2, diastolic HFpEF (last echo 10/2017) presented to ED p/w AMS, weakness, dysuria,  recent diarrhea decreased po intake x past ~ 3 days.   admitted with  ams sec to   muti factorial   metabolic encephalopathy   uremia  prem  on ckd4   now  with hepatorenal syndrome  with liver cirrhosis  unknown etiology  - sever metabolic acidosis  resp acidosis  / lactic acidosis     anasarca fluid over lode   with sever protein caloric malnutrition   sec to liver cirrhosis unknown etiology   and sever hypernatremia    thrombocytopenia  and anemia of chr dis sec to renal dis     this time  as over all prognosis very poor . speech  swallow evaluation  failed   will  need  peg but  family  refuse comfort  care  at this  point  as   no other  way to feed long term  risk of aspiration and complication  present  with  it family aware . pt  all 4 son with hcp son on bed side  agree  for comfort care no vitals except temp , no other meds except morphine  iv for  any discomfort , no lab , want only comfort care   will  cont foely and ng tube   for comfort care meds  , will be transfer Monday to in pt hospices center. . pt son  hcp  bedside  aware with all tt plan .

## 2018-11-02 NOTE — PROGRESS NOTE ADULT - PROBLEM SELECTOR PLAN 1
sec to  metabolic encephalopathy  multi factorial   hepatic encephalopathy with liver cirrhosis  unknown etiology / uremia /hypercarbia  , multi organ failure / now comfort care  with family request.

## 2018-11-02 NOTE — PROGRESS NOTE ADULT - NSHPATTENDINGPLANDISCUSS_GEN_ALL_CORE
family at bedside
patient's --counseled extensively on her current clinical status, encephalopathy, need for endoscopic placement of NGT for treatment of hyperammonemia, deteriorating kidney function, management of metabolic encephalopathy, antibiotics, culture data, SAMIA
pt / son hcp
patient's family--counseled extensively on her current clinical status, need for culture data, management of metabolic encephalopathy, antibiotics, dehydration and SAMIA
patient's son--counseled extensively on her current clinical status, lack of need for mendez cath, management of metabolic encephalopathy, antibiotics, culture data, dehydration and SAMIA
patient's son--counseled extensively on her current clinical status, encephalopathy, need for NGT for treatment of hyperammonemia, lack of need for mendez cath, management of metabolic encephalopathy, antibiotics, culture data, dehydration and SAMIA
pt / son / grand son / icu attending
pt / son hcp

## 2018-11-02 NOTE — PROGRESS NOTE ADULT - ASSESSMENT
Pt is a 88 yo F w/ PMH of DM type 2, HTN, COPD on 2.5L home O2, diastolic HFpEF (last echo 10/2017) presented to ED p/w AMS, weakness, dysuria, decreased po intake x past ~ 3 days   with prem now hepatorenal syndrome / multiorgan failure  sever metabolic encephalopathy  acute on chr hypercarbic resp failure    multiorgan failure  on comfort care  with family request  now with over all prognosis .

## 2018-11-02 NOTE — PROGRESS NOTE ADULT - ASSESSMENT
Mrs. Ha is a 88 yo F w/ PMH of DM type 2, HTN, COPD on 2.5L home O2, diastolic HFpEF (last echo 10/2017) presented to ED initially presenting AMS, weakness, dysuria, decreased po intake x past ~ 3 days, now with significant metabolic/hepatic encephalopathy.  Called to evaluate for cardiac clearance prior to endoscopic PEG placement.  s/p NGT placement, tolerated well.    - NSVT noted on 10/31  - LV function normal as of 10/2017  - EKG with SR, LAD and poor r wave progression, unchanged from 2016.  - No sign of volume overload  - Family aware that she is critically ill have agreed to full comfort measures  - Pt is on full comfort care and will transfer to inpt hospice on Monday as per Medicine note.  - Sons at bedside  - Will sign off    Erica Somers NP-C  Cardiology Mrs. Ha is a 88 yo F w/ PMH of DM type 2, HTN, COPD on 2.5L home O2, diastolic HFpEF (last echo 10/2017) presented to ED initially presenting AMS, weakness, dysuria, decreased po intake x past ~ 3 days, now with significant metabolic/hepatic encephalopathy.  Called to evaluate for cardiac clearance prior to endoscopic PEG placement.  s/p NGT placement, tolerated well.    - NSVT noted on 10/31  - LV function normal as of 10/2017  - EKG with SR, LAD and poor r wave progression, unchanged from 2016.  - No sign of volume overload  - Family aware that she is critically ill have agreed to full comfort measures  - Pt is on full comfort care and will transfer to inpt hospice on Monday as per Medicine note.  - Sons at bedside       Erica Somers NP-C  Cardiology

## 2018-11-02 NOTE — PROGRESS NOTE ADULT - PROBLEM SELECTOR PLAN 1
multifactorial, worsening cirrhosis w HE c/b HRS  now on comfort measures  no peg per primary notes  for possible hospice transfer on monday

## 2018-11-02 NOTE — PROGRESS NOTE ADULT - SUBJECTIVE AND OBJECTIVE BOX
[facility] -- John Tariq, No, iNrav, Chito Tapia Savella  Office # [phone]      Follow Up:  NSVT    Subjective/Observations: Seen and examined.  Resting lying in bed with son's at bedside.  NAD      REVIEW OF SYSTEMS: All other review of systems is negative unless indicated above    PAST MEDICAL & SURGICAL HISTORY:  Coronary artery disease with angina pectoris, unspecified vessel or lesion type, unspecified whether native or transplanted heart  Congestive heart failure  Asthma  Diabetes mellitus  HTN (hypertension)  No significant past surgical history      MEDICATIONS  (STANDING):  artificial  tears Solution 1 Drop(s) Both EYES two times a day  nystatin Powder 1 Application(s) Topical every 8 hours  sodium chloride 0.45%. 1000 milliLiter(s) (30 mL/Hr) IV Continuous <Continuous>    MEDICATIONS  (PRN):  atropine 1% Ophthalmic Solution for SubLingual Use 2 Drop(s) SubLingual four times a day PRN oral secretion  HYDROmorphone  Injectable 1 milliGRAM(s) IV Push every 15 minutes PRN pain, distress, comfort measures, palliative management      Allergies    No Known Allergies    Intolerances            Vital Signs Last 24 Hrs  T(C): 37.1 (02 Nov 2018 05:50), Max: 37.1 (02 Nov 2018 05:50)  T(F): 98.8 (02 Nov 2018 05:50), Max: 98.8 (02 Nov 2018 05:50)  HR: 79 (01 Nov 2018 20:28) (79 - 79)  BP: 133/68 (01 Nov 2018 20:28) (133/68 - 133/68)  BP(mean): --  RR: 17 (01 Nov 2018 20:28) (17 - 17)  SpO2: 97% (01 Nov 2018 20:28) (97% - 97%)    I&O's Summary    01 Nov 2018 07:01  -  02 Nov 2018 07:00  --------------------------------------------------------  IN: 0 mL / OUT: 150 mL / NET: -150 mL          PHYSICAL EXAM:  TELE: Not on tele  Constitutional: NAD, not arousable, frail  HEENT: Moist Mucous Membranes, Anicteric  Pulmonary: Non-labored, breath sounds are clear anteriorly  Cardiovascular: Regular, S1 and S2, No murmurs, rubs, gallops or clicks  Gastrointestinal: Bowel Sounds present, soft, nontender.   Lymph: No peripheral edema. No lymphadenopathy.  Skin: No visible rashes or ulcers.  Psych:  unable to assess    LABS: All Labs Reviewed:                        9.2    14.26 )-----------( 60       ( 01 Nov 2018 10:42 )             29.1                         9.4    13.75 )-----------( 61       ( 31 Oct 2018 06:53 )             28.8     01 Nov 2018 10:42    161    |  126    |  67     ----------------------------<  73     3.4     |  26     |  5.10   31 Oct 2018 06:53    159    |  122    |  69     ----------------------------<  96     3.2     |  27     |  4.80   31 Oct 2018 01:06    158    |  121    |  66     ----------------------------<  115    3.3     |  27     |  4.80     Ca    8.2        01 Nov 2018 10:42  Ca    7.9        31 Oct 2018 06:53  Ca    8.0        31 Oct 2018 01:06  Phos  4.2       31 Oct 2018 01:06  Mg     2.1       31 Oct 2018 06:53  Mg     1.9       31 Oct 2018 01:06      PT/INR - ( 01 Nov 2018 10:42 )   PT: 25.9 sec;   INR: 2.24 ratio         PTT - ( 01 Nov 2018 10:42 )  PTT:70.3 sec

## 2018-11-02 NOTE — PROGRESS NOTE ADULT - SUBJECTIVE AND OBJECTIVE BOX
NEPHROLOGY INTERVAL HPI/OVERNIGHT EVENTS:  HPI:  Hx obtained from son and other family members as pt was altered.  Pt is a 88 yo F w/ PMH of DM type 2, HTN, COPD on 2.5L home O2, diastolic HFpEF (last echo 10/2017) presented to ED p/w ams, weakness, dysuria, dec po x past ~ 3 days. Of note Pt was recently admitted for ams, similar to current presentation and was d/c 6 days ago. On previous admission Pt was diagnosed and treated for UTI. Pt was initially doing well, but then developed burning with urination, AMS, and watery diarrhea x 3 days ago. Family reports that pt was given Bactrim for unresolved UTI, but that she was only able to take 2 doses, as she could not swallow the 3rd dose this AM. Pt has poor PO intake and EMS came to her house last night to give her IVF due to dehydration. Of note patient is normally bedbound and requires 24/7 home health aid. Pt is incontinent at baseline and wears diapers. Denies any Fevers, but endorses chills, and increased fatigue. Denies any SOB, CP, N/V. Family reports pt normally is able to converse without issue, but has been incoherent for ~ the past 3 days    In the ED, temp 97.1, HR 62, /75, RR 20, Spo2 99 on supplemental o2. WBC 11.46, Hg 9.5, hct 29.5, platelet 147, PT 13.5, INR 1.23, PTT 25.9, Na 148, K 4.3, Cl 120, Co2 21, BUN 64, Cr 2.00, glucose 110, calcium 8.0, alk phos 136, lactate 1.4, lipase 117. UA: small bilirubin, moderate leukocyte esterase, trace blood, moderate epithelial, bacteria few, hyaline 0-2, yeast present    Imaging:  CXR and CT head no cont ordered  EKG: NSR    In the ED, received zosyn 3.375 mg IV x 1, NaCl 0.9% 1000mL x 2, duoneb 3 mL x 1, (22 Oct 2018 23:52)      PAST MEDICAL & SURGICAL HISTORY:  Coronary artery disease with angina pectoris, unspecified vessel or lesion type, unspecified whether native or transplanted heart  Congestive heart failure  Asthma  Diabetes mellitus  HTN (hypertension)  No significant past surgical history      MEDICATIONS  (STANDING):  artificial  tears Solution 1 Drop(s) Both EYES two times a day  nystatin Powder 1 Application(s) Topical every 8 hours  sodium chloride 0.45%. 1000 milliLiter(s) (30 mL/Hr) IV Continuous <Continuous>    MEDICATIONS  (PRN):  atropine 1% Ophthalmic Solution for SubLingual Use 2 Drop(s) SubLingual four times a day PRN oral secretion  HYDROmorphone  Injectable 1 milliGRAM(s) IV Push every 15 minutes PRN pain, distress, comfort measures, palliative management    Allergies    No Known Allergies    Intolerances    Vital Signs Last 24 Hrs  T(C): 37.1 (02 Nov 2018 05:50), Max: 37.1 (02 Nov 2018 05:50)  T(F): 98.8 (02 Nov 2018 05:50), Max: 98.8 (02 Nov 2018 05:50)  HR: 79 (01 Nov 2018 20:28) (72 - 84)  BP: 133/68 (01 Nov 2018 20:28) (116/64 - 133/68)  BP(mean): --  RR: 17 (01 Nov 2018 20:28) (17 - 17)  SpO2: 97% (01 Nov 2018 20:28) (95% - 97%)  Daily     Daily     PHYSICAL EXAM:    GENERAL: NAD, well-groomed, well-developed  HEAD:  Atraumatic, Normocephalic  EYES: EOMI, PERRLA, conjunctiva and sclera clear  ENMT: No tonsillar erythema, exudates, or enlargement; Moist mucous membranes, Good dentition, No lesions  NECK: Supple, No JVD, Normal thyroid  NERVOUS SYSTEM:  Alert & Oriented X3, Good concentration; Motor Strength 5/5 B/L upper and lower extremities; DTRs 2+ intact and symmetric  CHEST/LUNG: Clear to percussion bilaterally; No rales, rhonchi, wheezing, or rubs  HEART: Regular rate and rhythm; No murmurs, rubs, or gallops  ABDOMEN: Soft, Nontender, Nondistended; Bowel sounds present  EXTREMITIES:  2+ Peripheral Pulses, No clubbing, cyanosis, edema ++  SKIN: No rashes or lesions    LABS:                        9.2    14.26 )-----------( 60       ( 01 Nov 2018 10:42 )             29.1     11-01    161<HH>  |  126<H>  |  67<H>  ----------------------------<  73  3.4<L>   |  26  |  5.10<H>    Ca    8.2<L>      01 Nov 2018 10:42      PT/INR - ( 01 Nov 2018 10:42 )   PT: 25.9 sec;   INR: 2.24 ratio         PTT - ( 01 Nov 2018 10:42 )  PTT:70.3 sec    RADIOLOGY & ADDITIONAL TESTS:

## 2018-11-02 NOTE — PROGRESS NOTE ADULT - PROBLEM SELECTOR PROBLEM 7
Urinary retention

## 2018-11-02 NOTE — PROGRESS NOTE ADULT - PROBLEM SELECTOR PROBLEM 3
Cirrhosis
SAMIA (acute kidney injury)
Cirrhosis
Cirrhosis
SAMIA (acute kidney injury)

## 2018-11-02 NOTE — PROGRESS NOTE ADULT - PROBLEM SELECTOR PLAN 2
Anemia is multifactorial--likely due to renal failure and sepsis.   s/p 1 unit of PRBC 10/28/2018  hb 9.2 on 11/1/2018  abn spep--patient son declines any further work up or treatment for abn spep.  now patient is on comfort care only as per patient family  I will sign off the case, please re consult as needed

## 2018-11-02 NOTE — PROGRESS NOTE ADULT - PROBLEM SELECTOR PLAN 1
platelets are slow  platelets are at 60 k on 11/1/18  smear reviewed, repeat labs--unremarkable ldh/low haptoglobin--likely from liver, h/h low but stable  naomi antibody is negative  coagulopathy--pt is off heparin and is on comfort care only as per patient family, family refuses any further work up or treatment

## 2018-11-02 NOTE — PROGRESS NOTE ADULT - ASSESSMENT
Acute on CKD Stage 4  Hepatorenal Syndrome  Liver Cirrhosis  Sepsis/PNA/UTI  Hypernatremia  Hypokalemia  Dehydration  CHF/COPD   Metabolic Acidosis   better  Hypernatremia  -Renal indices are worsening; oliguric  -IVF no bicarb needed, with high Na, ok to use D5W and added H2O was increased  -Octreotide  -Will try lasix, 20 bid, to gently diurese now that BPs are better, and midodrine is reduced to 10 mg tid from today am  -Abx per ID  -Daily chemistries  -No indication for RRT; poor prognosis.  May cause more problems with Intermittent HD  -replace k as needed    Again d/w sons, overall prognosis bad with multiorgan failure. d/w Dr Barnes and Dr Durán: I agree with hospice care to keeep her dignified at  age 89 with MOF    Thank you Acute on CKD Stage 4  Hepatorenal Syndrome  Liver Cirrhosis  Sepsis/PNA/UTI  Hypernatremia  Hypokalemia  Dehydration  CHF/COPD   Metabolic Acidosis   better  Hypernatremia  -Renal indices are worsening; oliguric  -IVF no bicarb needed now, with high Na, ok to use D5W and added H2O was increased  -Octreotide  -d/c lasix  -Abx per ID  -Daily chemistries  -No indication for RRT; poor prognosis.  May cause more problems with Intermittent HD  -replace k as needed    Again d/w sons, overall prognosis bad with multiorgan failure. d/w Dr Barnes and Dr Durán: I agree with hospice care to keeep her dignified at  age 89 with MOF    Thank you Acute on CKD Stage 4  Hepatorenal Syndrome  Liver Cirrhosis  Sepsis/PNA/UTI  Hypernatremia  Hypokalemia  Dehydration  CHF/COPD   Metabolic Acidosis   better  Hypernatremia    -Renal indices are worsening; oliguric  -IVF at 1/2 NS, acceptable to hospice for terminal care    Again d/w Dr Durán and family     Thank you

## 2018-11-02 NOTE — PROGRESS NOTE ADULT - SUBJECTIVE AND OBJECTIVE BOX
ID Progress note     Name: MALINDA SULTANA  Age: 89y  Gender: Female  MRN: [id number]    Interval History-- Events noted, now on comfort care only , family wants to leave NGT in . All antibiotics and labs stopped   Notes reviewed    Past Medical History--  Coronary artery disease with angina pectoris, unspecified vessel or lesion type, unspecified whether native or transplanted heart  Congestive heart failure  Asthma  Diabetes mellitus  HTN (hypertension)  No significant past surgical history      For details regarding the patient's social history, family history, and other miscellaneous elements, please refer the initial infectious diseases consultation and/or the admitting history and physical examination for this admission.    Allergies--  Allergies    No Known Allergies    Intolerances        Medications--  Antibiotics:    Immunologic:    Other:  artificial  tears Solution  atropine 1% Ophthalmic Solution for SubLingual Use PRN  HYDROmorphone  Injectable PRN  nystatin Powder  sodium chloride 0.45%.      Review of Systems--  Review of systems unable to be obtained secondary to clinical condition.    Physical Examination--    Vital Signs: T(F): 98.8 (11-02-18 @ 05:50), Max: 98.8 (11-02-18 @ 05:50)  HR: 79 (11-01-18 @ 20:28)  BP: 133/68 (11-01-18 @ 20:28)  RR: 17 (11-01-18 @ 20:28)  SpO2: 97% (11-01-18 @ 20:28)  Wt(kg): --    General: Nontoxic-appearing Female in no acute distress. poorly responsive   HEENT: AT/NC. PERRL. EOMI. Anicteric. Conjunctiva pink and moist. Oropharynx clear. Dentition fair.  Neck: Not rigid. No sense of mass.  Nodes: None palpable.  Lungs: Coarse breath sounds  bilaterally without rales, wheezing or rhonchi  Heart: Regular rate and rhythm. No Murmur. No rub. No gallop. No palpable thrill.  Abdomen: Bowel sounds present and normoactive. Soft. Nondistended. Nontender. No sense of mass. No organomegaly.  Back: No spinal tenderness. No costovertebral angle tenderness.   Extremities: No cyanosis or clubbing. No edema.   Skin: Warm. Dry. Good turgor. No rash. No vasculitic stigmata.  Psychiatric: Appropriate affect and mood for situation.     Laboratory Studies--  CBC                        9.2    14.26 )-----------( 60       ( 01 Nov 2018 10:42 )             29.1       Chemistries  11-01    161<HH>  |  126<H>  |  67<H>  ----------------------------<  73  3.4<L>   |  26  |  5.10<H>    Ca    8.2<L>      01 Nov 2018 10:42        Culture Data    GI PCR Panel, Stool (collected 26 Oct 2018 21:55)  Source: .Stool Feces  Final Report (26 Oct 2018 23:45):    GI PCR Results: NOT detected    *******Please Note:*******    GI panel PCR evaluates for:    Campylobacter, Plesiomonas shigelloides, Salmonella,    Vibrio, Yersinia enterocolitica, Enteroaggregative    Escherichia coli (EAEC), Enteropathogenic E.coli (EPEC),    Enterotoxigenic E. coli (ETEC) lt/st, Shiga-like    toxin-producing E. coli (STEC) stx1/stx2,    Shigella/ Enteroinvasive E. coli (EIEC), Cryptosporidium,    Cyclospora cayetanensis, Entamoeba histolytica,    Giardia lamblia, Adenovirus F 40/41, Astrovirus,    Norovirus GI/GII, Rotavirus A, Sapovirus      Radiology:    Xray Chest 1 View-PORTABLE IMMEDIATE (11.01.18 @ 10:55) >    Evaluation is somewhat limited secondary to patient positioning. The   right upper lobe is not well evaluated secondary to overlying   material/patient's chin. There is an enteric catheter coursing under the   diaphragm with tip off of thefilm. There is questionable patchy opacity   in the right upper lung. There is no pleural effusion or pneumothorax.   The cardiac silhouette is not well evaluated on this projection.    IMPRESSION:     Limited evaluation secondary to patient positioning. Questionable patchy   opacity in the right upper lung.      Assessment--    88 yo F w/ PMH of DM type 2, HTN, CKD stage 4 ,COPD on 3L home O2, diastolic HFpEF (last echo   10/2017) presented to ED w/ lethargy progressing to unresponsiveness. Ct head x 2 negative   Poss Asp pneumonia  Candiuria  She has metabolic encephalopathy secondary to hepatic encephalopathy, the etiology may be SAMIA   or underlying undiagnosed liver disease.   Developing metabolic acidosis from SAMIA which is  worsening  Acute hypercapnia respiratory failure multifactorial with multisystem organ failure    Now on comfort measures only   Plan :   - comfort measures only   - family wants to take her home   - I will respectfully sign off the case as on comfort care       Continue with present regiment.  Appropriate use of antibiotics and adverse effects reviewed.    I have discussed the above plan of care with patient's family in detail. They expressed understanding of the treatment plan . Risks, benefits and alternatives discussed in detail. I have asked if they have any questions or concerns and appropriately addressed them to the best of my ability .      > 15 minutes spent in direct patient care reviewing  the notes, lab data/ imaging , discussion with multidisciplinary team. All questions were addressed and answered to the best of my capacity .    Thank you for allowing me to participate in the care of your patient .        Jaclyn Barnes MD  [phone]

## 2018-11-02 NOTE — PROGRESS NOTE ADULT - SUBJECTIVE AND OBJECTIVE BOX
Patient is a 89y old  Female who presents with a chief complaint of weakness, decreased PO intake (02 Nov 2018 06:58)       Pt is seen and examined  pt is awake and lying in bed/out of bed to chair  pt seems comfortable and denies any complaints at this time    HPI:  Hx obtained from son and other family members as pt was altered.  Pt is a 89 yo F w/ PMH of DM type 2, HTN, COPD on 2.5L home O2, diastolic HFpEF (last echo 10/2017) presented to ED p/w ams, weakness, dysuria, dec po x past ~ 3 days. Of note Pt was recently admitted for ams, similar to current presentation and was d/c 6 days ago. On previous admission Pt was diagnosed and treated for UTI. Pt was initially doing well, but then developed burning with urination, AMS, and watery diarrhea x 3 days ago. Family reports that pt was given Bactrim for unresolved UTI, but that she was only able to take 2 doses, as she could not swallow the 3rd dose this AM. Pt has poor PO intake and EMS came to her house last night to give her IVF due to dehydration. Of note patient is normally bedbound and requires 24/7 home health aid. Pt is incontinent at baseline and wears diapers. Denies any Fevers, but endorses chills, and increased fatigue. Denies any SOB, CP, N/V. Family reports pt normally is able to converse without issue, but has been incoherent for ~ the past 3 days    In the ED, temp 97.1, HR 62, /75, RR 20, Spo2 99 on supplemental o2. WBC 11.46, Hg 9.5, hct 29.5, platelet 147, PT 13.5, INR 1.23, PTT 25.9, Na 148, K 4.3, Cl 120, Co2 21, BUN 64, Cr 2.00, glucose 110, calcium 8.0, alk phos 136, lactate 1.4, lipase 117. UA: small bilirubin, moderate leukocyte esterase, trace blood, moderate epithelial, bacteria few, hyaline 0-2, yeast present    Imaging:  CXR and CT head no cont ordered  EKG: NSR    In the ED, received zosyn 3.375 mg IV x 1, NaCl 0.9% 1000mL x 2, duoneb 3 mL x 1, (22 Oct 2018 23:52)         ROS:  Negative except for:    MEDICATIONS  (STANDING):  artificial  tears Solution 1 Drop(s) Both EYES two times a day  nystatin Powder 1 Application(s) Topical every 8 hours  sodium chloride 0.45%. 1000 milliLiter(s) (30 mL/Hr) IV Continuous <Continuous>    MEDICATIONS  (PRN):  atropine 1% Ophthalmic Solution for SubLingual Use 2 Drop(s) SubLingual four times a day PRN oral secretion  HYDROmorphone  Injectable 1 milliGRAM(s) IV Push every 15 minutes PRN pain, distress, comfort measures, palliative management      Allergies    No Known Allergies    Intolerances        Vital Signs Last 24 Hrs  T(C): 37.1 (02 Nov 2018 05:50), Max: 37.1 (02 Nov 2018 05:50)  T(F): 98.8 (02 Nov 2018 05:50), Max: 98.8 (02 Nov 2018 05:50)  HR: 79 (01 Nov 2018 20:28) (72 - 84)  BP: 133/68 (01 Nov 2018 20:28) (116/64 - 133/68)  BP(mean): --  RR: 17 (01 Nov 2018 20:28) (17 - 17)  SpO2: 97% (01 Nov 2018 20:28) (95% - 97%)    PHYSICAL EXAM  General: adult in NAD  HEENT: clear oropharynx, anicteric sclera, pink conjunctiva  Neck: supple  CV: normal S1/S2 with no murmur rubs or gallops  Lungs: positive air movement b/l ant lungs,clear to auscultation, no wheezes, no rales  Abdomen: soft non-tender non-distended, no hepatosplenomegaly  Ext: no clubbing cyanosis or edema  Skin: no rashes and no petechiae  Neuro: alert and oriented X 4, no focal deficits  LABS:                          9.2    14.26 )-----------( 60       ( 01 Nov 2018 10:42 )             29.1         Mean Cell Volume : 89.0 fl  Mean Cell Hemoglobin : 28.1 pg  Mean Cell Hemoglobin Concentration : 31.6 gm/dL  Auto Neutrophil # : 10.92 K/uL  Auto Lymphocyte # : 1.78 K/uL  Auto Monocyte # : 0.98 K/uL  Auto Eosinophil # : 0.42 K/uL  Auto Basophil # : 0.04 K/uL  Auto Neutrophil % : 76.6 %  Auto Lymphocyte % : 12.5 %  Auto Monocyte % : 6.9 %  Auto Eosinophil % : 2.9 %  Auto Basophil % : 0.3 %    Serial CBC's  11-01 @ 10:42  Hct-29.1 / Hgb-9.2 / Plat-60 / RBC-3.27 / WBC-14.26          Serial CBC's  10-31 @ 06:53  Hct-28.8 / Hgb-9.4 / Plat-61 / RBC-3.32 / WBC-13.75          Serial CBC's  10-30 @ 09:44  Hct-28.7 / Hgb-9.5 / Plat-64 / RBC-3.30 / WBC-15.81          Serial CBC's  10-29 @ 09:54  Hct-27.6 / Hgb-8.9 / Plat-105 / RBC-3.13 / WBC-20.49            11-01    161<HH>  |  126<H>  |  67<H>  ----------------------------<  73  3.4<L>   |  26  |  5.10<H>    Ca    8.2<L>      01 Nov 2018 10:42        PT/INR - ( 01 Nov 2018 10:42 )   PT: 25.9 sec;   INR: 2.24 ratio         PTT - ( 01 Nov 2018 10:42 )  PTT:70.3 sec    Reticulocyte Percent: 1.0 % (11-01-18 @ 10:42)  Ferritin, Serum: 189 ng/mL (10-27-18 @ 16:04)  Iron - Total Binding Capacity.: 136 ug/dL (10-26-18 @ 19:09)  Iron - Total Binding Capacity.: 126 ug/dL (10-26-18 @ 19:09)  Reticulocyte Percent: 1.6 % (10-26-18 @ 14:43)  Vitamin B12, Serum: >2000 pg/mL (10-25-18 @ 10:44)  Folate, Serum: 14.6 ng/mL (10-25-18 @ 10:44)      Serum Protein Electrophoresis Interp: Weak Gamma Migrating Paraprotein Identified (10-26-18 @ 19:09)  Immunofixation, Serum:   Weak IgG Lambda Band Identified    Reference Range: None Detected (10-26-18 @ 19:09)            BLOOD SMEAR INTERPRETATION:       RADIOLOGY & ADDITIONAL STUDIES: Patient is a 89y old  Female who presents with a chief complaint of weakness, decreased PO intake (02 Nov 2018 06:58)       Pt is seen and examined  pt is lying in bed    HPI:  Hx obtained from son and other family members as pt was altered.  Pt is a 89 yo F w/ PMH of DM type 2, HTN, COPD on 2.5L home O2, diastolic HFpEF (last echo 10/2017) presented to ED p/w ams, weakness, dysuria, dec po x past ~ 3 days. Of note Pt was recently admitted for ams, similar to current presentation and was d/c 6 days ago. On previous admission Pt was diagnosed and treated for UTI. Pt was initially doing well, but then developed burning with urination, AMS, and watery diarrhea x 3 days ago. Family reports that pt was given Bactrim for unresolved UTI, but that she was only able to take 2 doses, as she could not swallow the 3rd dose this AM. Pt has poor PO intake and EMS came to her house last night to give her IVF due to dehydration. Of note patient is normally bedbound and requires 24/7 home health aid. Pt is incontinent at baseline and wears diapers. Denies any Fevers, but endorses chills, and increased fatigue. Denies any SOB, CP, N/V. Family reports pt normally is able to converse without issue, but has been incoherent for ~ the past 3 days    In the ED, temp 97.1, HR 62, /75, RR 20, Spo2 99 on supplemental o2. WBC 11.46, Hg 9.5, hct 29.5, platelet 147, PT 13.5, INR 1.23, PTT 25.9, Na 148, K 4.3, Cl 120, Co2 21, BUN 64, Cr 2.00, glucose 110, calcium 8.0, alk phos 136, lactate 1.4, lipase 117. UA: small bilirubin, moderate leukocyte esterase, trace blood, moderate epithelial, bacteria few, hyaline 0-2, yeast present    Imaging:  CXR and CT head no cont ordered  EKG: NSR    In the ED, received zosyn 3.375 mg IV x 1, NaCl 0.9% 1000mL x 2, duoneb 3 mL x 1, (22 Oct 2018 23:52)         ROS:  Negative except for:    MEDICATIONS  (STANDING):  artificial  tears Solution 1 Drop(s) Both EYES two times a day  nystatin Powder 1 Application(s) Topical every 8 hours  sodium chloride 0.45%. 1000 milliLiter(s) (30 mL/Hr) IV Continuous <Continuous>    MEDICATIONS  (PRN):  atropine 1% Ophthalmic Solution for SubLingual Use 2 Drop(s) SubLingual four times a day PRN oral secretion  HYDROmorphone  Injectable 1 milliGRAM(s) IV Push every 15 minutes PRN pain, distress, comfort measures, palliative management      Allergies    No Known Allergies    Intolerances        Vital Signs Last 24 Hrs  T(C): 37.1 (02 Nov 2018 05:50), Max: 37.1 (02 Nov 2018 05:50)  T(F): 98.8 (02 Nov 2018 05:50), Max: 98.8 (02 Nov 2018 05:50)  HR: 79 (01 Nov 2018 20:28) (72 - 84)  BP: 133/68 (01 Nov 2018 20:28) (116/64 - 133/68)  BP(mean): --  RR: 17 (01 Nov 2018 20:28) (17 - 17)  SpO2: 97% (01 Nov 2018 20:28) (95% - 97%)    PHYSICAL EXAM  General: adult in NAD  HEENT: clear oropharynx, anicteric sclera, pink conjunctiva  Neck: supple  CV: normal S1/S2 with no murmur rubs or gallops  Lungs: positive air movement b/l ant lungs,clear to auscultation, no wheezes, no rales  Abdomen: soft non-tender non-distended, no hepatosplenomegaly  Ext: no clubbing cyanosis or edema  Skin: no rashes and no petechiae  Neuro: alert and oriented X 4, no focal deficits  LABS:                          9.2    14.26 )-----------( 60       ( 01 Nov 2018 10:42 )             29.1         Mean Cell Volume : 89.0 fl  Mean Cell Hemoglobin : 28.1 pg  Mean Cell Hemoglobin Concentration : 31.6 gm/dL  Auto Neutrophil # : 10.92 K/uL  Auto Lymphocyte # : 1.78 K/uL  Auto Monocyte # : 0.98 K/uL  Auto Eosinophil # : 0.42 K/uL  Auto Basophil # : 0.04 K/uL  Auto Neutrophil % : 76.6 %  Auto Lymphocyte % : 12.5 %  Auto Monocyte % : 6.9 %  Auto Eosinophil % : 2.9 %  Auto Basophil % : 0.3 %    Serial CBC's  11-01 @ 10:42  Hct-29.1 / Hgb-9.2 / Plat-60 / RBC-3.27 / WBC-14.26          Serial CBC's  10-31 @ 06:53  Hct-28.8 / Hgb-9.4 / Plat-61 / RBC-3.32 / WBC-13.75          Serial CBC's  10-30 @ 09:44  Hct-28.7 / Hgb-9.5 / Plat-64 / RBC-3.30 / WBC-15.81          Serial CBC's  10-29 @ 09:54  Hct-27.6 / Hgb-8.9 / Plat-105 / RBC-3.13 / WBC-20.49            11-01    161<HH>  |  126<H>  |  67<H>  ----------------------------<  73  3.4<L>   |  26  |  5.10<H>    Ca    8.2<L>      01 Nov 2018 10:42        PT/INR - ( 01 Nov 2018 10:42 )   PT: 25.9 sec;   INR: 2.24 ratio         PTT - ( 01 Nov 2018 10:42 )  PTT:70.3 sec    Reticulocyte Percent: 1.0 % (11-01-18 @ 10:42)  Ferritin, Serum: 189 ng/mL (10-27-18 @ 16:04)  Iron - Total Binding Capacity.: 136 ug/dL (10-26-18 @ 19:09)  Iron - Total Binding Capacity.: 126 ug/dL (10-26-18 @ 19:09)  Reticulocyte Percent: 1.6 % (10-26-18 @ 14:43)  Vitamin B12, Serum: >2000 pg/mL (10-25-18 @ 10:44)  Folate, Serum: 14.6 ng/mL (10-25-18 @ 10:44)      Serum Protein Electrophoresis Interp: Weak Gamma Migrating Paraprotein Identified (10-26-18 @ 19:09)  Immunofixation, Serum:   Weak IgG Lambda Band Identified    Reference Range: None Detected (10-26-18 @ 19:09)            BLOOD SMEAR INTERPRETATION:       RADIOLOGY & ADDITIONAL STUDIES: ---------------------------------------------hem/onc sign off-----------------------------------------------    Patient is a 89y old  Female who presents with a chief complaint of weakness, decreased PO intake (02 Nov 2018 06:58)       Pt is seen and examined  pt is lying in bed  patient is on comfort care as per family    HPI:  Hx obtained from son and other family members as pt was altered.  Pt is a 89 yo F w/ PMH of DM type 2, HTN, COPD on 2.5L home O2, diastolic HFpEF (last echo 10/2017) presented to ED p/w ams, weakness, dysuria, dec po x past ~ 3 days. Of note Pt was recently admitted for ams, similar to current presentation and was d/c 6 days ago. On previous admission Pt was diagnosed and treated for UTI. Pt was initially doing well, but then developed burning with urination, AMS, and watery diarrhea x 3 days ago. Family reports that pt was given Bactrim for unresolved UTI, but that she was only able to take 2 doses, as she could not swallow the 3rd dose this AM. Pt has poor PO intake and EMS came to her house last night to give her IVF due to dehydration. Of note patient is normally bedbound and requires 24/7 home health aid. Pt is incontinent at baseline and wears diapers. Denies any Fevers, but endorses chills, and increased fatigue. Denies any SOB, CP, N/V. Family reports pt normally is able to converse without issue, but has been incoherent for ~ the past 3 days    In the ED, temp 97.1, HR 62, /75, RR 20, Spo2 99 on supplemental o2. WBC 11.46, Hg 9.5, hct 29.5, platelet 147, PT 13.5, INR 1.23, PTT 25.9, Na 148, K 4.3, Cl 120, Co2 21, BUN 64, Cr 2.00, glucose 110, calcium 8.0, alk phos 136, lactate 1.4, lipase 117. UA: small bilirubin, moderate leukocyte esterase, trace blood, moderate epithelial, bacteria few, hyaline 0-2, yeast present    Imaging:  CXR and CT head no cont ordered  EKG: NSR    In the ED, received zosyn 3.375 mg IV x 1, NaCl 0.9% 1000mL x 2, duoneb 3 mL x 1, (22 Oct 2018 23:52)         ROS:  Negative except for:    MEDICATIONS  (STANDING):  artificial  tears Solution 1 Drop(s) Both EYES two times a day  nystatin Powder 1 Application(s) Topical every 8 hours  sodium chloride 0.45%. 1000 milliLiter(s) (30 mL/Hr) IV Continuous <Continuous>    MEDICATIONS  (PRN):  atropine 1% Ophthalmic Solution for SubLingual Use 2 Drop(s) SubLingual four times a day PRN oral secretion  HYDROmorphone  Injectable 1 milliGRAM(s) IV Push every 15 minutes PRN pain, distress, comfort measures, palliative management      Allergies    No Known Allergies    Intolerances        Vital Signs Last 24 Hrs  T(C): 37.1 (02 Nov 2018 05:50), Max: 37.1 (02 Nov 2018 05:50)  T(F): 98.8 (02 Nov 2018 05:50), Max: 98.8 (02 Nov 2018 05:50)  HR: 79 (01 Nov 2018 20:28) (72 - 84)  BP: 133/68 (01 Nov 2018 20:28) (116/64 - 133/68)  BP(mean): --  RR: 17 (01 Nov 2018 20:28) (17 - 17)  SpO2: 97% (01 Nov 2018 20:28) (95% - 97%)    PHYSICAL EXAM  General: adult in NAD  HEENT: clear oropharynx, anicteric sclera, pink conjunctiva  Neck: supple  CV: normal S1/S2 with no murmur rubs or gallops  Lungs: positive air movement b/l ant lungs,clear to auscultation, no wheezes, no rales  Abdomen: soft non-tender non-distended, no hepatosplenomegaly  Ext: no clubbing cyanosis or edema  Skin: no rashes and no petechiae  Neuro: alert and oriented X 4, no focal deficits  LABS:                          9.2    14.26 )-----------( 60       ( 01 Nov 2018 10:42 )             29.1         Mean Cell Volume : 89.0 fl  Mean Cell Hemoglobin : 28.1 pg  Mean Cell Hemoglobin Concentration : 31.6 gm/dL  Auto Neutrophil # : 10.92 K/uL  Auto Lymphocyte # : 1.78 K/uL  Auto Monocyte # : 0.98 K/uL  Auto Eosinophil # : 0.42 K/uL  Auto Basophil # : 0.04 K/uL  Auto Neutrophil % : 76.6 %  Auto Lymphocyte % : 12.5 %  Auto Monocyte % : 6.9 %  Auto Eosinophil % : 2.9 %  Auto Basophil % : 0.3 %    Serial CBC's  11-01 @ 10:42  Hct-29.1 / Hgb-9.2 / Plat-60 / RBC-3.27 / WBC-14.26          Serial CBC's  10-31 @ 06:53  Hct-28.8 / Hgb-9.4 / Plat-61 / RBC-3.32 / WBC-13.75          Serial CBC's  10-30 @ 09:44  Hct-28.7 / Hgb-9.5 / Plat-64 / RBC-3.30 / WBC-15.81          Serial CBC's  10-29 @ 09:54  Hct-27.6 / Hgb-8.9 / Plat-105 / RBC-3.13 / WBC-20.49            11-01    161<HH>  |  126<H>  |  67<H>  ----------------------------<  73  3.4<L>   |  26  |  5.10<H>    Ca    8.2<L>      01 Nov 2018 10:42        PT/INR - ( 01 Nov 2018 10:42 )   PT: 25.9 sec;   INR: 2.24 ratio         PTT - ( 01 Nov 2018 10:42 )  PTT:70.3 sec    Reticulocyte Percent: 1.0 % (11-01-18 @ 10:42)  Ferritin, Serum: 189 ng/mL (10-27-18 @ 16:04)  Iron - Total Binding Capacity.: 136 ug/dL (10-26-18 @ 19:09)  Iron - Total Binding Capacity.: 126 ug/dL (10-26-18 @ 19:09)  Reticulocyte Percent: 1.6 % (10-26-18 @ 14:43)  Vitamin B12, Serum: >2000 pg/mL (10-25-18 @ 10:44)  Folate, Serum: 14.6 ng/mL (10-25-18 @ 10:44)      Serum Protein Electrophoresis Interp: Weak Gamma Migrating Paraprotein Identified (10-26-18 @ 19:09)  Immunofixation, Serum:   Weak IgG Lambda Band Identified    Reference Range: None Detected (10-26-18 @ 19:09)

## 2018-11-02 NOTE — PROGRESS NOTE ADULT - PROBLEM SELECTOR PLAN 8
acute with chronic hypercarbic  respiratory failure on bipap , on 2.5L O2 at home.   - duonebs q6 standing   - continue supplemental O2  - metabolic acidosis and resp acidosis .
acute with chronic hypercarbic  respiratory failure on bipap , on 2.5L O2 at home.   - duonebs q6 standing   - continue supplemental O2  - metabolic acidosis and resp acidosis / fu abg
acute with chronic hypercarbic  respiratory failure on bipap night , on 2.5L O2 at home.   - duonebs q6 standing   - continue supplemental O2  - metabolic acidosis and resp acidosis  pulm dr najera on case .
acute with chronic hypercarbic  respiratory failure on bipap night , on 2.5L O2 at home.   - duonebs q6 standing   - continue supplemental O2  - metabolic acidosis and resp acidosis .
with chronic hypoxic respiratory failure, on 2.5L O2 at home.   - duonebs q6 standing  - continue supplemental O2  -CO2 narcosis ruled out--ABG unremarkable yesterday, although she does now have metabolic acidosis due to her kidney dysfunction.
with chronic hypoxic respiratory failure, on 2.5L O2 at home.   - duonebs q6 standing  - continue supplemental O2  -CO2 narcosis ruled out--ABG unremarkable yesterday.
with chronic hypoxic respiratory failure, on 2.5L O2 at home.   - duonebs q6 standing  - continue supplemental O2  -will check ABG today to r/o CO2 narcosis as possible cause of mental status changes
with chronic hypoxic respiratory failure, on 2.5L O2 at home.   - duonebs q6 standing , abg today   - continue supplemental O2  - metabolic acidosis prem/atn onckd3
continue o2 , no bipap  , on  comfort care
with chronic hypoxic respiratory failure, on 2.5L O2 at home.   - duonebs q6 standing  - continue supplemental O2  -CO2 narcosis ruled out--ABG unremarkable yesterday, although she does now have metabolic acidosis due to her kidney dysfunction.
acute with chronic hypercarbic  respiratory failure on bipap night , on 2.5L O2 at home.   - duonebs q6 standing   - continue supplemental O2  - metabolic acidosis and resp acidosis  pulm dr najera on case .

## 2018-11-03 PROCEDURE — 99232 SBSQ HOSP IP/OBS MODERATE 35: CPT

## 2018-11-03 RX ADMIN — NYSTATIN CREAM 1 APPLICATION(S): 100000 CREAM TOPICAL at 05:00

## 2018-11-03 RX ADMIN — Medication 1 DROP(S): at 05:00

## 2018-11-03 NOTE — PROGRESS NOTE ADULT - REASON FOR ADMISSION
weakness, decreased PO intake
weakness, decreased PO intake ARF
weakness, decreased PO intake

## 2018-11-03 NOTE — PROGRESS NOTE ADULT - PROVIDER SPECIALTY LIST ADULT
Anesthesia
Cardiology
Gastroenterology
Heme/Onc
Hospitalist
Infectious Disease
Nephrology
Neurology
Neurology
Palliative Care
Pulmonology
Gastroenterology
Heme/Onc
Hospitalist
Neurology
Neurology
Palliative Care
Infectious Disease
Nephrology
Neurology
Nephrology
Pulmonology
Gastroenterology
Hospitalist
Hospitalist

## 2018-11-03 NOTE — DISCHARGE NOTE FOR THE EXPIRED PATIENT - SECONDARY DIAGNOSIS.
Cirrhosis COPD (chronic obstructive pulmonary disease) Congestive heart failure Coronary artery disease with angina pectoris, unspecified vessel or lesion type, unspecified whether native or transplanted heart Encephalopathy Diabetes mellitus HTN (hypertension) Multi-organ failure with heart failure

## 2018-11-03 NOTE — CHART NOTE - NSCHARTNOTEFT_GEN_A_CORE
Assessment: patient seen now NPO comfort measures only noted failed speech evaluation 10/31 rx NPO    Factors impacting intake: [ ] none [ ] nausea  [ ] vomiting [ ] diarrhea [ ] constipation  [ ]chewing problems [ x] swallowing issues  [x ] other: comfort measures  Diet Presciption:   Intake:       Pertinent Medications: MEDICATIONS  (STANDING):  artificial  tears Solution 1 Drop(s) Both EYES two times a day  nystatin Powder 1 Application(s) Topical every 8 hours  sodium chloride 0.45%. 1000 milliLiter(s) (30 mL/Hr) IV Continuous <Continuous>    MEDICATIONS  (PRN):  atropine 1% Ophthalmic Solution for SubLingual Use 2 Drop(s) SubLingual four times a day PRN oral secretion  HYDROmorphone  Injectable 1 milliGRAM(s) IV Push every 15 minutes PRN pain, distress, comfort measures, palliative management    Pertinent Labs: 11-01 Na161 mmol/L<HH> Glu 73 mg/dL K+ 3.4 mmol/L<L> Cr  5.10 mg/dL<H> BUN 67 mg/dL<H> 10-31 Phos 4.2 mg/dL 10-30 Alb 2.3 g/dL<L> 10-23 [id number] 5.1 %        Skin:  je 9 left buttock stage 2 sacrum stage 3    Estimated Needs:   [x ] no change since previous assessment  [ ] recalculated:     Previous Nutrition Diagnosis:   [ ] Inadequate Energy Intake [ ]Inadequate Oral Intake [ ] Excessive Energy Intake   [ ] Underweight [x ] Increased Nutrient Needs [ ] Overweight/Obesity   [ ] Altered GI Function [ ] Unintended Weight Loss [ ] Food & Nutrition Related Knowledge Deficit [ ] Malnutrition   (X) swallow difficulties  Nutrition Diagnosis is [x ] ongoing  [ ] resolved [ ] not applicable     New Nutrition Diagnosis: [x ] not applicable       Interventions:   Recommend  [ ] Change Diet To:  [ ] Nutrition Supplement  [ ] Nutrition Support  [ x] Other: patient on comfort measures only     Monitoring and Evaluation:  [ x ] weights [ x ] labs[ x ] follow up per protocol  [x ] other: will follow and remain available

## 2018-11-03 NOTE — DISCHARGE NOTE FOR THE EXPIRED PATIENT - HOSPITAL COURSE
89 yo F w/ PMH of DM type 2, HTN, COPD on 2.5L home O2, diastolic HFpEF (last echo 10/2017) presented to ED p/w ams, weakness, dysuria, decreased oral intake x past ~ 3 days. Of note Pt was recently admitted for ams, similar to current presentation and was d/c 6 days prior to this admission. Previously she was admitted for suspected UTI and metabolic encephalopathy. During that admission her urine culture grew Candida. It was felt to be colonization and thusly, antibiotics were discontinued. Patient was treated with IV hydration and improved to her baseline mental status. Following that admission, she was doing well, but then developed t was initially doing well, but then developed dysuria and AMS, along with watery diarrhea x 3 days leading up to this admission. SHe was prescribed Bactrim by  her \[facility]\"" physician, but  she was only able to take 2 doses, as she could not swallow the 3rd dose the morning of presentation. EMS visit home the night before this presentation to give IVF for dehydration.    In the ED, temp 97.1, HR 62, /75, RR 20, Spo2 99 on supplemental o2. WBC 11.46, Hg 9.5, hct 29.5, platelet 147, PT 13.5, INR 1.23, PTT 25.9, Na 148, K 4.3, Cl 120, Co2 21, BUN 64, Cr 2.00, glucose 110, calcium 8.0, alk phos 136, lactate 1.4, lipase 117. UA: small bilirubin, moderate leukocyte esterase, trace blood, moderate epithelial, bacteria few, hyaline 0-2, yeast present    She was admitted for metabolic encephalopathy suspected to be due to UTI and SAMIA. Her Lasix was held and patient was hydrated. She was treated with Ceftriaxone. She was evaluated by ID and it was not suspected that she had an active UTI and recommended continuing IV Ceftriaxone until cultures resulted. Patient's creatinine showed modest improvement and then began to worsen. Her mental status remained unchanged. She was seen by Neurology who attributed this to acute metabolic encephalopathy due to SAMIA and possible infection. She had repeat CT head that showed no acute pathology but was limited due to movement artifact. MRI could not be performed as patient could not stay still. Ceftriaxone was changed to Zosyn to better cover possible aspiration pneumonia. She was also started on Fluconazole for treatment of her Candiduria.    When her mental status change persisted, additional testing sent and ammonia level was found to be 203. Multiple attempts were made to place NG tube as patient was too lethargic to take lactulose and Xifaxin by mouth. All attempts were unsuccessful due to her large hiatal hernia. She then had NG tube placed via endoscopy by Dr. Zuniga, and then started receiving Lactulose and Xifaxin as well as tube feeds. She had an abdominal CT which showed a cirrhotic liver and moderate amount of ascites in abdomen. Palliative Dr. Barraza was consulted. Patient's family agreed for comfort measures only on 11/2/2018. Patient was found unresponsive on 11/3/18. Patient was evaluated by resident. Patient did not respond to verbal, physical, or painful stimuli. Absent heart and breath sounds on auscultation. Pupils are fixed and dilated, absent corneal reflex. No palpable pulses at radial, carotid, or femoral arteries. Family was present at bedside. Dr. Stringer was called and notifed.     Time of Death: 13:20.

## 2018-11-03 NOTE — PROGRESS NOTE ADULT - SUBJECTIVE AND OBJECTIVE BOX
CHIEF COMPLAINT/INTERVAL HISTORY:  Pt. seen and evaluated  for metabolic encephalopathy and hepatorenal syndrome.  Pt. is lethargic and unresponsive.  On comfort care measures and awaiting discharge to [facility] on Monday.  Son at bedside requesting NG tube to be removed.      REVIEW OF SYSTEMS:  Unable to be obtained as patient is unresponsive.      Vital Signs Last 24 Hrs  T(C): --  T(F): --  HR: --  BP: --  BP(mean): --  RR: 17 (02 Nov 2018 20:07) (17 - 17)  SpO2: 97% (02 Nov 2018 20:07) (97% - 97%)    PHYSICAL EXAM:  GENERAL: unresponsive  HEENT: conjunctiva and sclera clear, +NGT  Chest: Diminished BS at bases, no wheezing  CV: S1S2, RRR,   GI: soft, +BS, NT/ND  Musculoskeletal: no LE edema  Psychiatric: unresponsive  Skin: warm and dry, stage 3 sacral decubitus ulcer    LABS:                        9.2    14.26 )-----------( 60       ( 01 Nov 2018 10:42 )             29.1     11-01    161<HH>  |  126<H>  |  67<H>  ----------------------------<  73  3.4<L>   |  26  |  5.10<H>    Ca    8.2<L>      01 Nov 2018 10:42      PT/INR - ( 01 Nov 2018 10:42 )   PT: 25.9 sec;   INR: 2.24 ratio         PTT - ( 01 Nov 2018 10:42 )  PTT:70.3 sec      Assessment and Plan:  -Metabolic encephalopathy and hepatorenal syndrome:  Pt. on comfort measures only.  Awaiting transfer to [facility].  Continue dilaudid 1mg IV PRN and atropine 2 drops SL four times a day PRN.    -Dehydration:  1/2NS@30cc/hr  -Chronic diastolic CHF:  On comfort measures  -HTN: Comfort measures  -DM:  Comfort measures  -Urinary retention:  Maintain mendez catheter  -COPD:  continue O2 support.    -Sacral decubitus ulcer stage 3:  frequent positioning.    -prophylactic measures:  On comfort care only.

## 2018-11-03 NOTE — PROGRESS NOTE ADULT - SUBJECTIVE AND OBJECTIVE BOX
This patient is pending hospice discharge. Prognosis is grim.   Will not actively follow over the weekend. Please call us if needed. [phone].    Thank you

## 2018-11-03 NOTE — PROGRESS NOTE ADULT - PROBLEM SELECTOR PLAN 1
end of life care  multiple medical issues  frail and weak  Opioids PRN  supportive measures  emotional support for family  oral hygiene  skin care  monitor for distress, suffering, pain  will follow  pt is DNR DNI

## 2018-11-03 NOTE — CHART NOTE - NSCHARTNOTEFT_GEN_A_CORE
Called by RN as patient appears to be no longer breathing. Patient seen and examined at bedside. Patient did not respond to verbal, physical, or painful stimuli. Absent heart and breath sounds on auscultation. Pupils are fixed and dilated, absent corneal reflex. No palpable pulses at radial, carotid, or femoral arteries.    Time of Death: 13:20  Dr. Stringer notified via phone.  Family present at bedside. 2

## 2018-11-03 NOTE — PROGRESS NOTE ADULT - SUBJECTIVE AND OBJECTIVE BOX
Date/Time Patient Seen:  		  Referring MD:   Data Reviewed	       Patient is a 89y old  Female who presents with a chief complaint of weakness, decreased PO intake (03 Nov 2018 06:26)    seen and examined    Subjective/HPI     PAST MEDICAL & SURGICAL HISTORY:  Coronary artery disease with angina pectoris, unspecified vessel or lesion type, unspecified whether native or transplanted heart  Congestive heart failure  Asthma  Diabetes mellitus  HTN (hypertension)  No significant past surgical history        Medication list         MEDICATIONS  (STANDING):  artificial  tears Solution 1 Drop(s) Both EYES two times a day  nystatin Powder 1 Application(s) Topical every 8 hours  sodium chloride 0.45%. 1000 milliLiter(s) (30 mL/Hr) IV Continuous <Continuous>    MEDICATIONS  (PRN):  atropine 1% Ophthalmic Solution for SubLingual Use 2 Drop(s) SubLingual four times a day PRN oral secretion  HYDROmorphone  Injectable 1 milliGRAM(s) IV Push every 15 minutes PRN pain, distress, comfort measures, palliative management         Vitals log        ICU Vital Signs Last 24 Hrs  T(C): --  T(F): --  HR: --  BP: --  BP(mean): --  ABP: --  ABP(mean): --  RR: 17 (02 Nov 2018 20:07) (17 - 17)  SpO2: 97% (02 Nov 2018 20:07) (97% - 97%)           Input and Output:  I&O's Detail    02 Nov 2018 07:01  -  03 Nov 2018 07:00  --------------------------------------------------------  IN:  Total IN: 0 mL    OUT:    Voided: 250 mL  Total OUT: 250 mL    Total NET: -250 mL          Lab Data                        9.2    14.26 )-----------( 60       ( 01 Nov 2018 10:42 )             29.1     11-01    161<HH>  |  126<H>  |  67<H>  ----------------------------<  73  3.4<L>   |  26  |  5.10<H>    Ca    8.2<L>      01 Nov 2018 10:42              Review of Systems	      Objective     Physical Examination    heart s1s2  lung dec BS      Pertinent Lab findings & Imaging      Samantha:  NO   Adequate UO     I&O's Detail    02 Nov 2018 07:01  -  03 Nov 2018 07:00  --------------------------------------------------------  IN:  Total IN: 0 mL    OUT:    Voided: 250 mL  Total OUT: 250 mL    Total NET: -250 mL               Discussed with:     Cultures:	        Radiology

## 2018-11-03 NOTE — PROGRESS NOTE ADULT - SUBJECTIVE AND OBJECTIVE BOX
INTERVAL HPI/OVERNIGHT EVENTS:  pt family wants comfort measures    Allergies    No Known Allergies    Intolerances    General:  No wt loss, fevers, chills, night sweats, fatigue,   Eyes:  Good vision, no reported pain  ENT:  No sore throat, pain, runny nose, dysphagia  CV:  No pain, palpitations, hypo/hypertension  Resp:  No dyspnea, cough, tachypnea, wheezing  GI:  No pain, No nausea, No vomiting, No diarrhea, No constipation, No weight loss, No fever, No pruritis, No rectal bleeding, No tarry stools, No dysphagia,  :  No pain, bleeding, incontinence, nocturia  Muscle:  No pain, weakness  Neuro:  No weakness, tingling, memory problems  Psych:  No fatigue, insomnia, mood problems, depression  Endocrine:  No polyuria, polydipsia, cold/heat intolerance  Heme:  No petechiae, ecchymosis, easy bruisability  Skin:  No rash, tattoos, scars, edema    PHYSICAL EXAM:   Vital Signs:  Vital Signs Last 24 Hrs  T(C): --  T(F): --  HR: --  BP: --  BP(mean): --  RR: 17 (02 Nov 2018 20:07) (17 - 17)  SpO2: 97% (02 Nov 2018 20:07) (97% - 97%)  Daily     Daily I&O's Summary    02 Nov 2018 07:01  -  03 Nov 2018 07:00  --------------------------------------------------------  IN: 0 mL / OUT: 250 mL / NET: -250 mL        GENERAL:  Appears stated age, well-groomed, well-nourished, no distress  HEENT:  NC/AT,  conjunctivae clear and pink, no thyromegaly, nodules, adenopathy, no JVD, sclera -anicteric  CHEST:  Full & symmetric excursion, no increased effort, breath sounds clear  HEART:  Regular rhythm, S1, S2, no murmur/rub/S3/S4, no abdominal bruit, no edema  ABDOMEN:  Soft, non-tender, non-distended, normoactive bowel sounds,  no masses ,no hepato-splenomegaly, no signs of chronic liver disease  EXTEREMITIES:  no cyanosis,clubbing or edema  SKIN:  No rash/erythema/ecchymoses/petechiae/wounds/abscess/warm/dry  NEURO:  Alert, oriented, no asterixis, no tremor, no encephalopathy      LABS:              amylase   lipase  RADIOLOGY & ADDITIONAL TESTS:

## 2018-11-30 RX ADMIN — Medication 1 DROP(S): at 06:31

## 2018-12-28 NOTE — H&P ADULT - NSHPSOURCEINFOTX_GEN_ALL_CORE
Subjective:      No events.  Pain controlled.  He's been up walking.    Objective:    Blood pressure 129/81, pulse 96, temperature 36.9 °C (98.5 °F), temperature source Oral, resp. rate 18, height 1.829 m (6'), weight 82.6 kg (182 lb 1.6 oz), SpO2 98 %.                Intake/Output Summary (Last 24 hours) at 12/28/18 0525  Last data filed at 12/28/18 0100   Gross per 24 hour   Intake             2155 ml   Output              600 ml   Net             1555 ml       Comfortable, no distress  Neurologically and vascularly intact with palpable pedal pulses bilaterally.  Dressing C/D/I      Assessment:    Doing well s/p reimplantation total knee arthroplasty.  Cultures pending  Prior knee flexion contracture    Plan:      **EMPHASIZE KNEE EXTENSION, AGGRESSIVE HAMSTRING STRETCHING, AND HEEL ELEVATION  Continue IV antibiotics pending culture results  Diet as tolerated  Mobilize today - WBAT  OT/PT  DVT ppx - ASA BID + Sequential Compression Devices  Plan to discharge to SNF in upcoming days.    Follow-up with Dr. Boston' office approximately 2 weeks post-op.     Oracio (son/[facility]) [phone]

## 2019-07-01 NOTE — ED ADULT NURSE NOTE - NS ED NURSE TRANSPORT WITH
ATTENDING: Reny Arevalo DO    PCP: Donnie Gannon MD     Duration: 45 minutes with 16 psychotherapy  Red Rule Observed    IDENTIFYING INFO:  Mayra is a 10  Yrs 11  Mos female with her mother, father, uncle and 2 siblings.  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  Patient has an IEP for math and social work services. Pt is referred by Dr Ale Majano, of [facility].     CHIEF COMPLAINT  Anxiety and depression    HISTORY OF PRESENT ILLNESS:  Prior to today's appointment, writer reviewed pt's medical record.     At that time of last visit, Mayra continued to struggle. She described school as \"stinking\". She didn't like the drama. Described mood as sad, internalizes. Was trying to use coping skills that Dr. Majano had taught her but was finding it difficult. Was getting mad and then throwing \"a kind of a tantrum\". Dad said she would get on a topic and over perseverated on it.  She continued to struggle with the Autism diagnosis, worried that this malissa single her out and result in kids thinking she was \"weird\" at school. As a result this writer recommended trial of Lexapro and continuing with therapy.       Mayra returns to clinic today with dad. Patient is seen with parent at  Mayra's  request. Mayra is cooperative with the process.    Current medications include:  Current Outpatient Medications   Medication Sig Dispense Refill   • escitalopram (LEXAPRO) 20 MG tablet Take 1 tablet by mouth daily. 30 tablet 0   • escitalopram (LEXAPRO) 10 MG tablet Take 1 tablet by mouth daily. 30 tablet 0   • Cetirizine HCl (ZYRTEC CHILDRENS ALLERGY PO) Oral       No current facility-administered medications for this visit.         Mayra is complaint with her medication     Continues to feel nervous. Was very worried when  went on vacation - fearful that no one would be there to take care of them, when in reality mom and dad would be there. Had a panic attack. Hard reassure. Took a whole day to relax. Describes  feeling emotionally numb although behavior spoke of otherwise    Fearful that someone is going to brian them (because she forgets to leave garage door open) and then come into the house and kill them. Mom apparently warned her of this to get her to close the door.    Also worried family getting evicted. Continues to worry about finances. Mom tells her they have to save $ because they are broke and don't want to lose the house.      Temper tantrums happen a lot less (per pt). Dad says there have been some big ones. Pt tells me she was fighting with mom and mom told her to shut up, so she yelled back.     Has threatened to cut herself with a knife when upset/sad as well as been \"tempted to call the suicide hotline\". Cutting followed peer calling Mayra ugly and then provoking argument with dad. Tells me she has urges to call them all the time but kept safe     Stressors include:   conflict with parents and family finances    At present time, Mayra rates her  anxiety  a  7 out of 10, tantrums an 8 out of 10 and depression a 4 with10 being the worst.    Substance use is not an issue.    Mayra denies other symptoms. Mayra can convincingly contract for safety. Father feels safe going home. We reviewed her safety plan.       PSYCHOTHERAPY:  Writer listened empathically to Mayra's frustration and anger. Writer encouraged Mayra to work on putting her feelings into words that others may listen to - with ultimate goal that those around pt can hear and support her.  We discussed how acting out one's feelings can be counter-productive to the end hope of being understood.       PAST PSYCHIATRIC MEDICATION:  Lexapro 10 -> Lexapro 20    PAST PSYCHIATRIC HISTORY   Dr. Majano    MEDICAL HISTORY  No history of head injury seizures loss of consciousness or HIV exposure.  She does wear glasses.    FAMILY PSYCHIATRIC HISTORY    Father: was adopted at birth.  Just recently learned of his birth family a few months ago.  They have been  supportive of patient and father    Maternal uncle: 1 is considered an outcast and  himself from the family.    SOCIAL HISTORY:  Obviously intelligent but struggles with peer relations.  Conflict at school.  Lives with family of origin who are supportive.  Maternal grandmother recently .  Few years ago family dog  which patient still talks about.    REVIEW OF SYSTEMS:  No current medical issues with eyes, ears, nose, throat, GI,  and all other complete review of system    MENTAL STATUS EXAM:   BP 90/64   Pulse 72   Ht 4' 10.75\" (1.492 m)   Wt 33.7 kg (74 lb 6.4 oz)   BMI 15.16 kg/m²   BSA 1.21 m²   Musculoskeletal Exam: normal,  EPS noted. No atrophy muscles.   Neurological: normal  Orientation: to person, place, time, purpose  General Appearance and Manner: adequately groomed and dressed; cooperative  Speech:  monotone, interrupts, perseverates  Thought Processes: rigid  Associations: perseverates on topics  Suicidal Ideation: denies, able to convincingly contract for safety  SIB: none. Convincingly contracts for safety  Homicidal Ideation: claims but denies thoughts; ideation; plan or intent.   Violent Ideation: denies  Perceptual difficulties: none that pt admits or that are blatant  Judgement and Insight: poor  Memory: remote; recent remote and recent all intact  Attention/Concentration: satisfactory  Language: normal  Fund of Knowledge: appropriate  Mood:  \"worried\" as spoken by the patient  Affect: anxious  Access to Firearms: no  Strengths: intelligent  Weakness: appears to be on spectrum  Motivation for Treatment: parents  High    IMPRESSIONS    AXIS I:  Probably Autistic Spectrum Disorder with resulting relational  Difficulties; R/O DM DD    AXIS II:  Defer    AXIS III:  defer    AXIS IV academic, peer relationship problems and social isolation    AXIS V: 55-56    RECOMMENDATIONS:  - If at all possible, return to therapy given upset and suicidal thoughts  - Begin Melatonin for sleep.  At 2 or 3 mg. If not effective can increase to 5 mg. If sleep still poor, to call  - Increase Lexapro to 20 mg at night. If  Not feeling SIGNIFICANTLY better in 7 days, would change prep to Prozac  - Parents to call and check in within 7- 10 days  - Parents strongly encouraged to avoid talking about finances with pt    Current Outpatient Medications   Medication Sig Dispense Refill   • escitalopram (LEXAPRO) 20 MG tablet Take 1 tablet by mouth daily. 30 tablet 0   • escitalopram (LEXAPRO) 10 MG tablet Take 1 tablet by mouth daily. 30 tablet 0   • Cetirizine HCl (ZYRTEC CHILDRENS ALLERGY PO) Oral       No current facility-administered medications for this visit.          - Pt and parents are agreeable to course of treatment as outlined above. Should they opt for medication, they will need monitor for any adverse side-effects.    - Parents agree to notify writer immediately if any problems whether or not they opt for medication use.     - As always, will coordinate care with Mayra's other providers     - Encouraged regular compliance with medications and appointments. Discussed the risks of not doing so.     - Parents agree to call us at least 72 hours in advance to running out of medications and calling us prior to making adjustments on their own     - Any concerns re: life-threatening behavior or side-effects, parents understands that they are to call 911 and notify us immediately upon emergency evaluation     - Numbers for emergency hotlines, hospital crisis line provided. Family is aware that there is a child psychiatrist on call 24/7 via our answering service.     PATIENT EDUCATION:  Writer provided psycho-education regarding the above mentioned diagnoses and what is currently considered the standard of care regarding treatment. Writer also provided education on the risks, benefits, and potential side effects about the psychotropic medications discussed above.           IV

## 2019-08-30 NOTE — ED PROVIDER NOTE - CAS EDP CONSULT REGARDING 1
,DirectAddress_Unknown ,DirectAddress_Unknown,[email or web address]

## 2019-09-13 NOTE — CONSULT NOTE ADULT - PROBLEM SELECTOR RECOMMENDATION 4
Aurelio Crabtree is a 69 year old male who presents questing a referral to a plastic surgeon or similar doctor that could remove some redundant skin under his chin which he finds cosmetically objectionable.    Patient Active Problem List   Diagnosis   • Chronic open angle glaucoma of both eyes, severe stage   • Cataract, cortical, left eye   • Bilateral pes planus   • BPH (benign prostatic hyperplasia)   • Cervicalgia   • Corneal transplant failure   • Arthritis of midfoot   • Hallux valgus, acquired   • Hammertoes of both feet   • History of cornea transplant   • Irregular astigmatism of both eyes   • Keratoconus of both eyes   • Localized osteoarthritis of left knee   • Pain in joint, pelvic region and thigh   • Seasonal allergic rhinitis due to pollen   • Sleep apnea   • Obesity (BMI 30-39.9)         he denies chest pain, shortness of breath, pedal edema            Current Outpatient Medications   Medication Sig   • HYDROcodone-acetaminophen 7.5-300 MG tablet Take 1 tablet by mouth every 8 hours as needed (Pain).   • doxazosin (CARDURA) 8 MG tablet Take 1 tablet by mouth at bedtime.   • brimonidine (ALPHAGAN) 0.2 % ophthalmic solution 1 drop.   • brinzolamide-brimonidine (SIMBRINZA) 1-0.2 % ophthalmic suspension INSTILL 1 DROP IN THE RIGHT EYE THREE TIMES A DAY   • carisoprodol (SOMA) 350 MG tablet TAKE 1 TABLET EVERY 6 HOURS- prn   • dorzolamide-timolol (COSOPT) 22.3-6.8 MG/ML ophthalmic solution 1 drop.   • fluticasone (FLONASE) 50 MCG/ACT nasal spray    • latanoprost (XALATAN) 0.005 % ophthalmic solution 1 drop.   • nystatin-triamcinolone (MYCOLOG II) 942633-2.1 UNIT/GM-% cream apply twice daily to affected area x 2 weeks   • prednisoLONE acetate (PRED FORTE, OMNIPRED) 1 % ophthalmic suspension 1 drop.   • sodium chloride (HESHAM 128) 5 % ophthalmic ointment Place 1 Application into the right eye nightly.   • timolol (TIMOPTIC) 0.5 % ophthalmic solution 1 drop.   • timolol (ISTALOL) 0.5 % (DAILY) ophthalmic  solution 1 drop.       New medicine prescribed or medicine change from today's visit:    ALLERGIES:  ALLERGIES:   Allergen Reactions   • Ibuprofen RASH and Other (See Comments)   • Rofecoxib HIVES and RASH   • Sulfa Antibiotics HIVES and RASH     rash with celebrex     • Celecoxib RASH       Exam:  Blood pressure 102/64, pulse 70, weight 113.4 kg (250 lb).  General Appearence: Well developed in no apparent distress.  Neck: No lymphadenopathy or masses noted. Trachea midline.  There is redundant skin present under his jaw.    Impression/Plan:  1. Redundant skin      Refer to ENT      Electronically signed by Ander Louise MD on 9/13/2019     bedbound  frail  weak  pt is DNR  high risk for procedure  supportive medical regimen and care  planned for EGD  discussed prognosis and outlook with family at the bedside

## 2019-12-19 NOTE — CONSULT NOTE ADULT - PROBLEM SELECTOR PROBLEM 1
Tele showing afib RVR. Tachycardic with activity. A&O. Ambulating Ax1. Sodium bicarb infusing. Tolerating renal diet. Cash in place with adequate urine output. Wearing cpap overnight. Will monitor   Wound of sacral region, initial encounter

## 2020-03-25 NOTE — ED PROVIDER NOTE - TEMPLATE, MLM
Requested Prescriptions     Pending Prescriptions Disp Refills    amLODIPine (NORVASC) 2.5 mg tablet 90 Tab 0     Sig: .
Requested Prescriptions     Pending Prescriptions Disp Refills    amLODIPine (NORVASC) 2.5 mg tablet 90 Tab 0     Sig: . TAKE 1 TABLET EVERY DAY       Last Refill: 10-25-19  Last visit:8-21-19
Communicable/Infectious

## 2020-06-08 NOTE — ED ADULT NURSE NOTE - CADM POA URETHRAL CATHETER
ORTHO PT FLOWSHEET     Exercises Current Session Time   MODALITIES- HEAT/ICE TOTAL TIME FOR SESSION Not performed   heat Prn pre manual   Ice prn post Rx     THER EX TOTAL TIME FOR SESSION 8-22 Minutes   pt ed  Pt educated in HEP per written materials  6/8: TAC  repeated standing ext  seated sciatic N glides    Patient has been instructed to have Postural awareness with ADLs          Standing ther exer: PLAN  Repeated extension standing  Lateral shifting to neutral posture     MAT ther-ex PLAN MET R hip flex L ext 10 sec hold x     TAC progression:  TAC  5 sec hold 10x  TAC add 5 sec hold 10x with small ball  TAC with alternate hip abd  10x  TAC with alternate foot lift 10x        PLAN:   SL rolling LE 90/90 on foam roller for multifidis activation  SL clamshell YTB            Plan:  Prone lying on pillows  Prone on elbows - repeated press up    prone knee flex  prone hip ext            NEURO RE-ED TOTAL TIME FOR SESSION Not performed   POSTURAL RE-ED         MANUAL TOTAL TIME FOR SESSION Assessment planned   Mobilization  Prone lumbar mobs for mobility L2-5 PA gr I II      Joint mobs Inf sacral mob                                      Manual STM Lsp paraspinals                                              long leg distraction RLE -3 cycles       MWM R hip distraction with ER -3 cycles         No

## 2020-07-27 NOTE — PATIENT PROFILE ADULT - NSPROPATIENTLACTATING_GEN_A_NUR
----- Message from Katheryn Pagan sent at 7/27/2020  8:28 AM CDT -----  Contact: patient [phone]  Patient is returning a phone call.  Who left a message for the patient: Ifeanyi  Does patient know what this is regarding:    Comments:    Pt will be waiting for your call.     no

## 2020-10-18 NOTE — PROGRESS NOTE ADULT - ASSESSMENT
88 year old female admitted with severe sepsis secondary to UTI/pneumonia found to have hypoxic hepatitis DISPLAY PLAN FREE TEXT

## 2020-12-16 PROBLEM — Z87.440 HISTORY OF URINARY TRACT INFECTION: Chronic | Status: RESOLVED | Noted: 2018-10-21 | Resolved: 2020-12-16

## 2021-05-19 NOTE — PROGRESS NOTE ADULT - SUBJECTIVE AND OBJECTIVE BOX
Radiation Oncology Nursing OTV Note    Patient Name: Marlene Rodriguez  MRN: [id number]  : 1948  Date of service: 21    Patient is here for a weekly on treatment visit for radiation treatment to pelvis  Radiation Treatments     Active   Plans   1.1 Pelvis   Most recent treatment: Dose planned: 180 cGy (fraction 9 on 2021)   Total: Dose planned: 4,500 cGy (25 fractions)   Elapsed Days: 13      Reference Points   Pelvis   Most recent treatment: Dose given: 180 cGy (on 2021)   Total: Dose given: 1,620 cGy   Elapsed Days: 13                    Wt Readings from Last 10 Encounters:   21 91.4 kg (201 lb 6.4 oz)   21 91.4 kg (201 lb 8 oz)   21 91.7 kg (202 lb 2.6 oz)   21 90.7 kg (199 lb 15.3 oz)   21 92.2 kg (203 lb 4.2 oz)   21 92.3 kg (203 lb 7.8 oz)   21 91.4 kg (201 lb 8 oz)   21 90.7 kg (200 lb)   21 91 kg (200 lb 9.9 oz)   21 91.4 kg (201 lb 8 oz)     Vitals:    21 1249   BP: 114/76   Pulse: 93   Resp: 14   Temp: 97.8 °F (36.6 °C)      Vitals:    21 1249   PainSc:  0             Current Outpatient Medications   Medication Sig Dispense Refill   • furosemide (LASIX) 20 MG tablet TAKE 1 TABLET DAILY AND TAKE AN EXTRA DOSE IN THE EVENING AS NEEDED FOR EDEMA, SHORTNESS OF BREATH 90 tablet 1   • metoPROLOL succinate (TOPROL-XL) 50 MG 24 hr tablet Take 1 tablet by mouth 2 times daily. 180 tablet 0   • sacubitril-valsartan (Entresto) 24-26 MG per tablet Take 1 tablet by mouth 2 times daily. 60 tablet 0   • Vitamin D, Ergocalciferol, 1.25 mg (50,000 units) capsule TAKE 1 CAPSULE 1 DAY A WEEK. 12 capsule 0   • rosuvastatin (CRESTOR) 5 MG tablet 1/2  tablet on Monday, Wednesday and Friday     • magnesium gluconate (MAGONATE) 500 MG tablet Take 1 tablet by mouth 2 times daily. 60 tablet 3   • vitamin B-12 (CYANOCOBALAMIN) 1000 MCG tablet Take 1,000 mcg by mouth daily.     • allopurinol (ZYLOPRIM) 300 MG tablet Take 150 mg by mouth daily.       • ALPRAZolam (Xanax) 0.25 MG tablet Take by mouth as needed.      • aspirin (ASPIRIN 81) 81 MG EC tablet Take 1 tablet by mouth daily. 90 tablet 0   • ascorbic acid (VITAMIN C) 500 MG tablet Take 1,000 mg by mouth daily.      • SOFTCLIX LANCETS Misc TEST BLOOD SUGAR TWICE DAILY 200 each 0   • ACCU-CHEK ROSALIO PLUS test strip TEST BLOOD GLUCOSE TWO TIMES A  strip 0   • Blood Glucose Calibration (ACCU-CHEK ROSALIO) Solution Use as directed       No current facility-administered medications for this encounter.                TOXICITY:  Treatment Site  Treatment Site: Pelvis - Female  General - Radiation  Anorexia: Absent or within normal limits  Dehydration: Absent or within normal limits  Diarrhea: Increase of less than 4 stools per day over baseline OR mild increase in ostomy output compared to baseline  Fatigue: Fatigue relieved by rest  Nausea: Absent or within normal limits  Pain: Absent or within normal limits  Vomiting: Absent or within normal limits  Renal and Urinary - Abdomen  Urinary Incontinence: Absent or within normal limits          Renal and Urinary - Pelvis (Female)  Urinary Incontinence: Absent or within normal limits  Renal and Urinary - Pelvis (Male)  Urinary Incontinence: Absent or within normal limits         PAIN:0         Educated on skin care  Patient is concerned about increased BM but no diarrhea  Questions were answered and patient verbalized understanding.   Report was given to Dr. Mckenzie   [facility] -- John Tariq, Nirav Sarabia, Chito Tapia Savella  Office # [phone]      Follow Up:  elevated trop    Subjective/Observations: Patient seen and examined. Events noted. Currently on bipap. Family states that she is hip pain.       REVIEW OF SYSTEMS: Limited 2/2 comorbidities     PAST MEDICAL & SURGICAL HISTORY:  Asthma  Diabetes mellitus  HTN (hypertension)  No significant past surgical history      MEDICATIONS  (STANDING):  ALBUTerol/ipratropium for Nebulization 3 milliLiter(s) Nebulizer every 4 hours  aspirin Suppository 300 milliGRAM(s) Rectal daily  azithromycin  IVPB      azithromycin  IVPB 500 milliGRAM(s) IV Intermittent every 24 hours  dextrose 5% + sodium chloride 0.45%. 1000 milliLiter(s) (50 mL/Hr) IV Continuous <Continuous>  dextrose 5%. 1000 milliLiter(s) (50 mL/Hr) IV Continuous <Continuous>  dextrose 50% Injectable 12.5 Gram(s) IV Push once  dextrose 50% Injectable 25 Gram(s) IV Push once  dextrose 50% Injectable 25 Gram(s) IV Push once  heparin  Injectable 5000 Unit(s) SubCutaneous every 12 hours  influenza   Vaccine 0.5 milliLiter(s) IntraMuscular once  insulin lispro (HumaLOG) corrective regimen sliding scale   SubCutaneous Before meals and at bedtime  metoprolol Injectable 2.5 milliGRAM(s) IV Push every 6 hours  nystatin Powder 1 Application(s) Topical every 6 hours  pantoprazole  Injectable 40 milliGRAM(s) IV Push daily  piperacillin/tazobactam IVPB. 3.375 Gram(s) IV Intermittent every 12 hours    MEDICATIONS  (PRN):  acetaminophen  Suppository. 650 milliGRAM(s) Rectal every 4 hours PRN Mild Pain (1 - 3)  dextrose Gel 1 Dose(s) Oral once PRN Blood Glucose LESS THAN 70 milliGRAM(s)/deciliter  glucagon  Injectable 1 milliGRAM(s) IntraMuscular once PRN Glucose LESS THAN 70 milligrams/deciliter  HYDROmorphone  Injectable 0.25 milliGRAM(s) IV Push every 4 hours PRN Severe Pain (7 - 10)  ondansetron Injectable 4 milliGRAM(s) IV Push every 6 hours PRN Nausea      Allergies    No Known Allergies    Intolerances            Vital Signs Last 24 Hrs  T(C): 37.1 (03 Oct 2017 06:00), Max: 37.1 (02 Oct 2017 11:33)  T(F): 98.7 (03 Oct 2017 06:00), Max: 98.8 (02 Oct 2017 11:33)  HR: 62 (03 Oct 2017 07:00) (60 - 103)  BP: 133/59 (03 Oct 2017 07:00) (104/51 - 142/60)  BP(mean): 85 (03 Oct 2017 07:00) (73 - 91)  RR: 19 (03 Oct 2017 07:00) (14 - 33)  SpO2: 93% (03 Oct 2017 07:00) (91% - 100%)    I&O's Summary    02 Oct 2017 07:01  -  03 Oct 2017 07:00  --------------------------------------------------------  IN: 1800 mL / OUT: 607 mL / NET: 1193 mL          PHYSICAL EXAM:  TELE:    Constitutional: on bipap  HEENT: Moist Mucous Membranes, Anicteric  Pulmonary: Decreased breath sounds b/l.   Cardiovascular: Regular, S1 and S2 distant  Gastrointestinal: Bowel Sounds present, soft, nontender.   Lymph: No peripheral edema. No lymphadenopathy.  Skin: No visible rashes or ulcers.  Psych:  unable to assess    LABS: All Labs Reviewed:                        7.2    5.7   )-----------( x        ( 03 Oct 2017 06:46 )             24.1                         8.1    6.8   )-----------( 94       ( 02 Oct 2017 06:02 )             25.8                         8.7    11.7  )-----------( 92       ( 01 Oct 2017 11:01 )             28.7     02 Oct 2017 06:02    141    |  107    |  59     ----------------------------<  132    4.4     |  24     |  3.50   01 Oct 2017 11:01    141    |  108    |  52     ----------------------------<  93     4.9     |  22     |  2.90   01 Oct 2017 06:04    141    |  108    |  51     ----------------------------<  106    4.9     |  23     |  2.80     Ca    7.4        02 Oct 2017 06:02  Ca    7.3        01 Oct 2017 11:01  Ca    7.1        01 Oct 2017 06:04  Phos  5.0       02 Oct 2017 06:02  Phos  5.8       01 Oct 2017 11:01  Mg     2.1       02 Oct 2017 06:02  Mg     2.2       01 Oct 2017 11:01  Mg     2.2       01 Oct 2017 06:04    TPro  6.5    /  Alb  2.4    /  TBili  0.7    /  DBili  x      /  AST  2792   /  ALT  2221   /  AlkPhos  119    02 Oct 2017 06:02  TPro  6.9    /  Alb  2.1    /  TBili  0.7    /  DBili  x      /  AST  4402   /  ALT  2624   /  AlkPhos  136    01 Oct 2017 11:01  TPro  6.3    /  Alb  2.0    /  TBili  0.6    /  DBili  .30    /  AST  4398   /  ALT  2340   /  AlkPhos  117    01 Oct 2017 06:04    PT/INR - ( 02 Oct 2017 06:02 )   PT: 18.5 sec;   INR: 1.68 ratio         PTT - ( 02 Oct 2017 06:02 )  PTT:29.6 sec  CARDIAC MARKERS ( 02 Oct 2017 06:02 )  21.800 ng/mL / x     / 432 U/L / x     / 24.7 ng/mL  CARDIAC MARKERS ( 01 Oct 2017 11:01 )  33.600 ng/mL / x     / 832 U/L / x     / 54.6 ng/mL         < from: Xray Chest 1 View AP -PORTABLE-Routine (10.02.17 @ 07:01) >    EXAM:  PORTABLE CHEST                            PROCEDURE DATE:  10/02/2017          INTERPRETATION:  Follow-up.    AP chest. Prior 10/1/2017.  Low lung volumes. Patient's chin obscures left apex. No change heart   mediastinum. There is interval resolution of airspace disease at the   right base. The lungs grossly clear at this time. Cardiac apex obscures   left base.    Impression: As above                EDELMIRA STEVENS M.D., ATTENDING RADIOLOGIST  This document has been electronically signed. Oct  2 2017  8:28AM                < end of copied text >       prelim echo TDS with normal LV?

## 2021-08-11 NOTE — ED ADULT NURSE NOTE - CAS TRG GENERAL NORM CIRC DET
CT chest without contrast in 3 months     Follow up in 4 months    To get images of CT chest from [facility] done 4/20 soon     Uptodate with COVID shots       
Strong peripheral pulses

## 2021-10-01 NOTE — PATIENT PROFILE ADULT - LOCATION #1
SACRUM [PTH level measured within last] : patient's PTH level measured within the last 12 months [No] : patient is not an appropriate candidate for kidney transplantation evaluation

## 2022-04-18 NOTE — ED PROVIDER NOTE - CARE PLAN
Statement Selected Principal Discharge DX:	Sepsis, due to unspecified organism  Secondary Diagnosis:	Pneumonia, unspecified organism

## 2022-04-25 NOTE — PROGRESS NOTE ADULT - PROBLEM SELECTOR PLAN 1
The cataracts are visually significant. end of life care  multiple medical issues  frail and weak  Opioids PRN  supportive measures  emotional support for family  oral hygiene  skin care  monitor for distress, suffering, pain  will follow  pt is DNR DNI

## 2023-03-23 NOTE — PATIENT PROFILE ADULT - STAGE
82 y/o F Anxiety, Paroxysmal AFib on eliquis, Aortic Dissection s/p extensive covered stent placement in descending aorta , HTN, HLD, Hypothyroidism, Anxiety, Aortic Valve Replacement here after fall.     #Acute left femoral neck intertrochanteric comminuted fracture 2/2 fall  -above Noted on trauma w/u, rest of w/u with no other fractures  -ortho onboard, sx later today  -Cardio clearance  -Bedrest for now, weight status as per ortho after surgery  -PT/rehab after surgery   -Fall precautions  -NPO, IV fluids for hydration  -Morphine prn for pain, currently controlled    #Increase in size of descending aortic aneurysm   #Hx of aortic dissection with stent placement in descending aorta  -CT abdomen: Increased in size descending thoracic aorta/abdominal aortic aneurysm measuring up to 5.3 cm in diameter (4-261) at the diaphragmatic hiatus, previously 4.7 cm.  -Discussed with daughters at length, were already notified by ED of this finding, they already discussed it with pt's family doctor. They would not want any invasive procedures and understand the risks of this finding. They do not want vascular eval at this time given pt's already poor mental and functional status.     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 In retrospect likely present since at least July 2021 though much less conspicuous due to different phase of contrast. Further evaluation can be considered as clinically warranted.  -Discussed with daughters, they do not want any invasive w/u including no biopsy. I instructed them that they can have routine active screening with ultrasound every few months if they wish.    #Paroxysmal afib  -Family self stopped eliquis a month ago  -c/w multaq   -c/w metoprolol     #Hypothyroid  -c/w synthroid    DVT ppx: lovenox   Diet: npo for surgery   Code Status: DNR/DNI-discussed with daughters Ewa (harriet is [facility]), [phone]. They are ok with rescinding DNR/DNI for the sake of the surgery.   Dispo: acute      stage II

## 2023-04-28 NOTE — DISCHARGE NOTE ADULT - PLAN OF CARE
Yes resolved c/w home meds   follow with PMD change position and wound care finished antibx 2/2 to UTI   Follow with PMD

## 2023-06-28 NOTE — ED ADULT TRIAGE NOTE - HEIGHT IN INCHES
Outpatient Care Management Note:  Follow up call attempted to Jane Pinto  Message left for patient to please return call  Contact information left on message  3

## 2024-03-27 NOTE — PATIENT PROFILE ADULT - NSPROMEDSBROUGHTTOHOSP_GEN_A_NUR
Third Attempt:     Author reached out to patient to schedule screening colonoscopy. Author JOÃO for patient, introduced self and informed pt to call/email [facility] office to schedule colonoscopy.  Patient made aware contact information will be sent via [facility].   no

## 2024-05-28 NOTE — ED ADULT TRIAGE NOTE - WEIGHT IN LBS
Population Health Chart Review & Patient Outreach Details      Additional [facility] Health Notes:               Updates Requested / Reviewed:      Updated Care Coordination Note, Care Everywhere, , External Sources: DIS, and Immunizations Reconciliation Completed or Queried: [facility] Topics Overdue:      [facility] Score: 1     Mammogram    Shingles/Zoster Vaccine  RSV Vaccine                  Health Maintenance Topic(s) Outreach Outcomes & Actions Taken:      Breast Cancer Screening - Outreach Outcomes & Actions Taken  : Mammogram Order Placed     160

## 2024-12-21 NOTE — DIETITIAN INITIAL EVALUATION ADULT. - PROBLEM SELECTOR PROBLEM 1
Patient requests all Lab, Cardiology, and Radiology Results on their Discharge Instructions
Altered mental status, unspecified altered mental status type

## 2025-07-12 NOTE — PROGRESS NOTE ADULT - SUBJECTIVE AND OBJECTIVE BOX
ID Progress note     Name: MALINDA SULTANA  Age: 89y  Gender: Female  MRN: [id number]    Interval History-- Events noted, remains obtunded, unable to place NGT after several attempts probably has hiatal hernia . Did hot hold lactulose  retention enema. Son at bedside was about to feed her, advised not to fed her as she is poorly responsive   Notes reviewed    Past Medical History--  Coronary artery disease with angina pectoris, unspecified vessel or lesion type, unspecified whether native or transplanted heart  Congestive heart failure  Asthma  Diabetes mellitus  HTN (hypertension)  No significant past surgical history      For details regarding the patient's social history, family history, and other miscellaneous elements, please refer the initial infectious diseases consultation and/or the admitting history and physical examination for this admission.    Allergies--  Allergies    No Known Allergies    Intolerances        Medications--  Antibiotics:  fluconAZOLE IVPB      fluconAZOLE IVPB 100 milliGRAM(s) IV Intermittent every 24 hours  piperacillin/tazobactam IVPB. 3.375 Gram(s) IV Intermittent every 12 hours  rifaximin 550 milliGRAM(s) Oral two times a day    Immunologic:    Other:  ALBUTerol/ipratropium for Nebulization  dextrose 40% Gel PRN  dextrose 5%.  dextrose 50% Injectable  dextrose 50% Injectable  dextrose 50% Injectable  ferrous    sulfate  glucagon  Injectable PRN  guaiFENesin ER  heparin  Injectable  lactulose Syrup  pantoprazole    Tablet  sodium bicarbonate  Infusion      Review of Systems--  Review of systems unable to be obtained secondary to clinical condition.    Physical Examination--    Vital Signs: T(F): 97.6 (10-26-18 @ 04:55), Max: 98 (10-25-18 @ 22:58)  HR: 97 (10-26-18 @ 04:55)  BP: 129/58 (10-26-18 @ 04:55)  RR: 20 (10-26-18 @ 04:55)  SpO2: 95% (10-26-18 @ 04:55)  Wt(kg): --  General: elderly frail appearing Female in no acute distress.  HEENT: AT/NC. PERRL. EOMI. Anicteric. Conjunctiva pink and moist. Oropharynx - poor oral hygiene.  Neck: Not rigid. No sense of mass.  Nodes: None palpable.  Lungs: Coarse breath sounds  bilaterally without rales, wheezing or rhonchi  Heart: Regular rate and rhythm. No Murmur. No rub. No gallop. No palpable thrill.  Abdomen: Bowel sounds present and normoactive. Soft. Nondistended. Nontender. No sense of mass. No organomegaly.  Back: No spinal tenderness. No costovertebral angle tenderness.   Extremities: No cyanosis or clubbing. No edema.   Skin: Warm. Dry. Good turgor. No rash. No vasculitic stigmata.  Psychiatric: Appropriate affect and mood for situation.         Laboratory Studies--  CBC                        8.8    20.00 )-----------( 150      ( 25 Oct 2018 08:53 )             27.6       Chemistries  10-25    147<H>  |  120<H>  |  62<H>  ----------------------------<  158<H>  4.0   |  16<L>  |  2.40<H>    Ca    7.6<L>      25 Oct 2018 08:53  Phos  3.3     10-24  Mg     2.0     10-24    TPro  6.3  /  Alb  1.8<L>  /  TBili  0.5  /  DBili  x   /  AST  24  /  ALT  18  /  AlkPhos  124<H>  10-24    Ammonia, Serum (10.25.18 @ 09:06)    Ammonia, Serum: 203 umol/L    Blood Gas Profile - Arterial (10.25.18 @ 12:59)    pH, Arterial: 7.27    pCO2, Arterial: 36 mmHg    pO2, Arterial: 87 mmHg    HCO3, Arterial: 17 mmol/L    Base Excess, Arterial: -9.7 mmol/L    Oxygen Saturation, Arterial: 95 %    FIO2, Arterial: 21.0    Blood Gas Comments Arterial: NC 2L. PMAT. R. Radial    CAPILLARY BLOOD GLUCOSE      POCT Blood Glucose.: 155 mg/dL (26 Oct 2018 07:46)  POCT Blood Glucose.: 174 mg/dL (25 Oct 2018 12:09)      Recent Cultures:    Culture - Blood (collected 24 Oct 2018 15:35)  Source: .Blood Blood-Venous  Preliminary Report (25 Oct 2018 16:01):    No growth to date.    Culture - Blood (collected 24 Oct 2018 15:35)  Source: .Blood Blood-Venous  Preliminary Report (25 Oct 2018 16:01):    No growth to date.    Culture - Blood (collected 23 Oct 2018 09:17)  Source: .Blood Blood-Peripheral  Gram Stain (24 Oct 2018 08:39):    Growth in anaerobic bottle: Gram Positive Cocci in Clusters  Final Report (25 Oct 2018 11:39):    Growth in anaerobic bottle: Coag Negative Staphylococcus    Single set isolate, possible contaminant. Contact    Microbiology if susceptibility testing clinically    indicated.    "Due to technical problems, Proteus sp. will Not be reported as part of    theBCID panel until further notice"    ***Blood Panel PCR results on this specimen are available    approximately 3 hours after the Gram stain result.***    Gram stain, PCR, and/or culture results may not always    correspond due to difference in methodologies.    ************************************************************    This PCR assay was performed using [employer].    The following targets are tested for: Enterococcus,    vancomycin resistant enterococci, Listeria monocytogenes,    coagulase negative staphylococci, S. aureus,    methicillin resistant S. aureus, Streptococcus agalactiae    (Group B), S. pneumoniae, S. pyogenes (Group A),    Acinetobacter baumannii, Enterobacter cloacae, E. coli,    Klebsiella oxytoca, K. pneumoniae, Proteus sp.,    Serratia marcescens, Haemophilus influenzae,    Neisseria meningitidis, Pseudomonas aeruginosa, Candida    albicans, C. glabrata, C krusei, C parapsilosis,    C. tropicalis and the KPC resistance gene.  Organism: Blood Culture PCR (25 Oct 2018 11:39)  Organism: Blood Culture PCR (25 Oct 2018 11:39)    Culture - Blood (collected 23 Oct 2018 09:17)  Source: .Blood Blood-Peripheral  Preliminary Report (24 Oct 2018 10:01):    No growth to date.    Culture - Urine (collected 23 Oct 2018 09:11)  Source: .Urine Catheterized  Preliminary Report (25 Oct 2018 10:22):    10,000 - 49,000 CFU/mL Presumptive Candida albicans        Radiology:  Xray Chest 1 View- PORTABLE-Urgent (10.25.18 @ 19:18) >  AP chest. Prior earlier same day.    Impression: Nasogastric tube tip is in the distal esophagus should be   further advanced. Otherwise no gross change.      Assessment--  89 yo F w/ PMH of DM type 2, HTN, CKD stage 4 ,COPD on 3L home O2, diastolic HFpEF (last echo 10/2017) presented to ED w/ lethargy progressing to unresponsiveness , she most likely has metabolic encephalopathy secondary to UTI and dehydration from poor oral intake. Her UA is not impressive and she my be colonized with yeast . She  likely has uterine prolapse which may cause urinary retention  .     She has metabolic encephaloathy secondary to hepatic encephalopathy , the etiology may be SAMIA or underlying undiagnosed liver disease. Developing metabolic acidosis from SAMIA     She is at high risk for aspiration because of her mental status and leukocytosis could be from it     Family is persistent in treating the Candiduria     Coag negative staph bacteremia likely skin contamination   Plan :   - will follow ammonia and  hepatitis serology   - continue with IV Zosyn and diflucan  - Keep NPO till more awake   - aspiration precautions   - insert NGT as unable to take any po meds or meals   - if uterine prolase persists then consider Gyn evaluation for vaginal pessary   - consider neurology follow up    Continue with present regime .  Appropriate use of antibiotics and adverse effects reviewed.    I have discussed the above plan of care with patient's family in detail. They expressed understanding of the treatment plan . Risks, benefits and alternatives discussed in detail. I have asked if they have any questions or concerns and appropriately addressed them to the best of my ability .      > 35 minutes spent in direct patient care reviewing  the notes, lab data/ imaging , discussion with multidisciplinary team. All questions were addressed and answered to the best of my capacity .    I will be away from 10/27 thru 10/28/18 , Dr. Zabala is covering me.      Thank you for allowing me to participate in the care of your patient .        Jaclyn Barnes MD  [phone] Transportation Risk (There is always a risk of traffic delays resulting in deterioration of condition.)